# Patient Record
Sex: MALE | Race: WHITE | NOT HISPANIC OR LATINO | Employment: OTHER | URBAN - METROPOLITAN AREA
[De-identification: names, ages, dates, MRNs, and addresses within clinical notes are randomized per-mention and may not be internally consistent; named-entity substitution may affect disease eponyms.]

---

## 2017-01-13 ENCOUNTER — TELEPHONE (OUTPATIENT)
Dept: TRANSPLANT | Facility: CLINIC | Age: 57
End: 2017-01-13

## 2017-01-13 DIAGNOSIS — G89.29 CHRONIC BACK PAIN, UNSPECIFIED BACK LOCATION, UNSPECIFIED BACK PAIN LATERALITY: Primary | ICD-10-CM

## 2017-01-13 DIAGNOSIS — M54.9 CHRONIC BACK PAIN, UNSPECIFIED BACK LOCATION, UNSPECIFIED BACK PAIN LATERALITY: Primary | ICD-10-CM

## 2017-01-13 NOTE — TELEPHONE ENCOUNTER
----- Message from Radha Bergman sent at 1/12/2017  4:50 PM CST -----  Contact: Dianne DOWELL   Calling to speak with Bina about a getting a pain management doctor. Please call

## 2017-01-13 NOTE — TELEPHONE ENCOUNTER
Informed pt's sister in law will place referral to pain management so they can call and make appointment at the location of their choice.

## 2017-01-19 ENCOUNTER — TELEPHONE (OUTPATIENT)
Dept: TRANSPLANT | Facility: CLINIC | Age: 57
End: 2017-01-19

## 2017-01-19 NOTE — TELEPHONE ENCOUNTER
----- Message from Yeny Kendall sent at 1/18/2017  3:04 PM CST -----  Contact: pt ABRAM  Says that she did receive msg but still wants to talk to you, please call

## 2017-02-15 ENCOUNTER — TELEPHONE (OUTPATIENT)
Dept: TRANSPLANT | Facility: CLINIC | Age: 57
End: 2017-02-15

## 2017-04-04 ENCOUNTER — CLINICAL SUPPORT (OUTPATIENT)
Dept: SMOKING CESSATION | Facility: CLINIC | Age: 57
End: 2017-04-04
Payer: COMMERCIAL

## 2017-04-04 DIAGNOSIS — F17.210 MODERATE CIGARETTE SMOKER (10-19 PER DAY): Primary | ICD-10-CM

## 2017-04-04 PROCEDURE — 99404 PREV MED CNSL INDIV APPRX 60: CPT | Mod: S$GLB,,,

## 2017-04-04 RX ORDER — VENLAFAXINE HYDROCHLORIDE 150 MG/1
75 CAPSULE, EXTENDED RELEASE ORAL DAILY
Status: ON HOLD | COMMUNITY
End: 2018-03-15 | Stop reason: HOSPADM

## 2017-04-04 RX ORDER — GABAPENTIN 400 MG/1
400 CAPSULE ORAL 3 TIMES DAILY
Status: ON HOLD | COMMUNITY
End: 2018-03-15 | Stop reason: HOSPADM

## 2017-04-04 RX ORDER — IBUPROFEN 200 MG
1 TABLET ORAL DAILY
Qty: 14 PATCH | Refills: 0 | Status: SHIPPED | OUTPATIENT
Start: 2017-04-04 | End: 2017-04-20 | Stop reason: SDUPTHER

## 2017-04-11 ENCOUNTER — CLINICAL SUPPORT (OUTPATIENT)
Dept: SMOKING CESSATION | Facility: CLINIC | Age: 57
End: 2017-04-11
Payer: COMMERCIAL

## 2017-04-11 DIAGNOSIS — F17.210 LIGHT CIGARETTE SMOKER (1-9 CIGS/DAY): Primary | ICD-10-CM

## 2017-04-11 PROCEDURE — 90853 GROUP PSYCHOTHERAPY: CPT | Mod: S$GLB,,,

## 2017-04-11 NOTE — Clinical Note
Patient is smoking 7-8 cpd.  Patient set a new goal of 4 cpd.  Patient continue to use nicotine spray 10 mg/ml as needed in place of cigarettes with no negative side effects. The patient denies any abnormal behavioral or mental changes at this time. The patient will continue with group therapy sessions and medication monitoring by CTTS. Prescribed medication management will be by physician.

## 2017-04-15 NOTE — PROGRESS NOTES
Site: [facility]  Date:  4/11/17  Clinical Status of Patient: Outpatient   Length of Service and Code: 60 minutes - 54041   Number in Attendance: 5  Group Activities/Focus of Group:  orientation, client introductions, completion of TCRS (Tobacco Cessation Rating Scale) learned addiction model, cues/triggers, personal reasons for quitting, medications, goals, quit date    Target symptoms:  withdrawal and medication side effects             The following were rated moderate (3) to severe (4) on TCRS:       Moderate 3: Andgry/Irritated/Frustrated/incresed appetite/Explained as withdrawal symptoms     Severe 4:  none  Patient's Response to Intervention: Patient is smoking 7-8 cpd.  Patient set a new goal of 4 cpd.  Patient continue to use nicotine spray 10 mg/ml as needed in place of cigarettes with no negative side effects. The patient denies any abnormal behavioral or mental changes at this time. The patient will continue with group therapy sessions and medication monitoring by CTTS. Prescribed medication management will be by physician.        Progress Toward Goals and Other Mental Status Changes: The patient denies any abnormal behavioral or mental changes at this time.     Interval History:     Diagnosis: Z72.0  Plan: The patient will continue with group therapy sessions and medication regimen prescribed with management by physician or Cessation Clinic Provider. Patient will inform Smoking Clinic Cessation Counselor of symptoms as rated high on TCRS.    Return to Clinic: 1 week

## 2017-04-18 ENCOUNTER — CLINICAL SUPPORT (OUTPATIENT)
Dept: SMOKING CESSATION | Facility: CLINIC | Age: 57
End: 2017-04-18
Payer: COMMERCIAL

## 2017-04-18 DIAGNOSIS — F17.210 HEAVY SMOKER (MORE THAN 20 CIGARETTES PER DAY): Primary | ICD-10-CM

## 2017-04-18 PROCEDURE — 99999 PR PBB SHADOW E&M-EST. PATIENT-LVL I: CPT | Mod: PBBFAC,,,

## 2017-04-18 PROCEDURE — 90853 GROUP PSYCHOTHERAPY: CPT | Mod: S$GLB,,,

## 2017-04-18 NOTE — Clinical Note
: Patient was unsure of how many cigarettes he smoked this week.  Patient has committed to smoking no more than 1 pack of cigarettes this week.  Patient remains on prescribed tobacco cessation medication regimen of 21 mg patch, without any negative side effects at this time. Patient continue to use 10mg/ml nicotine spray with no negative side effects.

## 2017-04-20 RX ORDER — IBUPROFEN 200 MG
1 TABLET ORAL DAILY
Qty: 14 PATCH | Refills: 0 | Status: SHIPPED | OUTPATIENT
Start: 2017-04-20 | End: 2017-05-11 | Stop reason: DRUGHIGH

## 2017-04-20 NOTE — PROGRESS NOTES
Smoking Cessation Group Session #3    Site: [facility]  Date:  4/20/17  Clinical Status of Patient: Outpatient   Length of Service and Code: 60 minutes - 14248   Number in Attendance: 5  Group Activities/Focus of Group:  completion of TCRS (Tobacco Cessation Rating Scale) reviewed strategies, controlling environment, cues, triggers, new goals set. Introduced high risk situations with preparation interventions, caffeine similarities with withdrawal issues of habit and nicotine, Alcohol, Understanding urges, cravings, stress and relaxation. Open discussion with intervention discussion.  Specific session focus:     Target symptoms:  withdrawal and medication side effects             The following were rated moderate (3) to severe (4) on TCRS:       Moderate 3: Desire crave tobacco/Depressed sad mood/anxous nervous/Explained as withdrawal symptoms     Severe 4:   none  Patient's Response to Intervention: Patient was unsure of how many cigarettes he smoked this week.  Patient has committed to smoking no more than 1 pack of cigarettes this week.  Patient remains on prescribed tobacco cessation medication regimen of 21 mg patch, without any negative side effects at this time. Patient continue to use 10mg/ml nicotine spray with no negative side effects.  The patient denies any abnormal behavioral or mental changes at this time. The patient will continue with group therapy sessions and medication monitoring by CTTS. Prescribed medication management will be by physician.       Progress Toward Goals and Other Mental Status Changes: The patient denies any abnormal behavioral or mental changes at this time.     Interval History:     Diagnosis: Z72.0  Plan: The patient will continue with group therapy sessions and medication regimen prescribed with management by physician or by the Cessation Clinic Provider. Patient will inform Smoking Cessation Counselor of symptoms as rated high on TCRS.    Return to Clinic: 1 week    Quit Date:     Planned Quit Date:

## 2017-05-02 ENCOUNTER — CLINICAL SUPPORT (OUTPATIENT)
Dept: SMOKING CESSATION | Facility: CLINIC | Age: 57
End: 2017-05-02
Payer: COMMERCIAL

## 2017-05-02 DIAGNOSIS — F17.210 LIGHT SMOKER: Primary | ICD-10-CM

## 2017-05-02 PROCEDURE — 90853 GROUP PSYCHOTHERAPY: CPT | Mod: S$GLB,,,

## 2017-05-02 NOTE — Clinical Note
Patient is smoking 8cpd.  We reviewed patient's willingness to quit.  Patient continue to use nicotine nasal spray 10mg/ml as needed in place of cigarettes. Patient set a new goal of 5 cpd as he is not ready for complete abstinence at this time.  The patient denies any abnormal behavioral or mental changes at this time. The patient will continue with group therapy sessions and medication monitoring by CTTS. Prescribed medication management will be by physician.

## 2017-05-04 NOTE — PROGRESS NOTES
Smoking Cessation Group Session #5    Site: [facility]  Date:  5/3/2017  Clinical Status of Patient: Outpatient   Length of Service and Code: 60 minutes - 96970   Number in Attendance: 5  Group Activities/Focus of Group:  completion of TCRS (Tobacco Cessation Rating Scale) reviewed strategies, habitual behavior, high risks situations, understanding urges and cravings, stress and relaxation with open discussion and additional interventions, Introduced lapses, relapses, understanding them and analyzing the situation of a lapse, conflict issues that may be linked to a lapse.       Specific session focus: Lapse and Relapse    Target symptoms:  withdrawal and medication side effects             The following were rated moderate (3) to severe (4) on TCRS:       Moderate 3: none     Severe 4:   none  Patient's Response to Intervention: Patient is smoking 8cpd.  We reviewed patient's willingness to quit.  Patient continue to use nicotine nasal spray 10mg/ml as needed in place of cigarettes. Patient set a new goal of 5 cpd as he is not ready for complete abstinence at this time.  The patient denies any abnormal behavioral or mental changes at this time. The patient will continue with group therapy sessions and medication monitoring by CTTS. Prescribed medication management will be by physician.       Progress Toward Goals and Other Mental Status Changes: The patient denies any abnormal behavioral or mental changes at this time.     Interval History:     Diagnosis: Z72.0  Plan: The patient will continue with group therapy sessions and medication regimen prescribed with management by physician or by the Cessation Clinic Provider. Patient will inform Smoking Cessation Counselor of symptoms as rated high on TCRS.    Return to Clinic: 1 week    Quit Date:    Planned Quit Date:

## 2017-05-09 ENCOUNTER — CLINICAL SUPPORT (OUTPATIENT)
Dept: SMOKING CESSATION | Facility: CLINIC | Age: 57
End: 2017-05-09
Payer: COMMERCIAL

## 2017-05-09 DIAGNOSIS — F17.210 NICOTINE DEPENDENCE, CIGARETTES, UNCOMPLICATED: Primary | ICD-10-CM

## 2017-05-09 PROCEDURE — 90853 GROUP PSYCHOTHERAPY: CPT | Mod: S$GLB,,,

## 2017-05-09 NOTE — Clinical Note
[facility]  Patient continue to use nicotine spray 10mg/ml as needed in place of cigarettes.  Patient will begin on 14 mg nicotine patch.  The patient denies any abnormal behavioral or mental changes at this time. The patient will continue with group therapy sessions and medication monitoring by CTTS. Prescribed medication management will be by physician.

## 2017-05-11 RX ORDER — IBUPROFEN 200 MG
1 TABLET ORAL DAILY
Qty: 14 PATCH | Refills: 0 | Status: SHIPPED | OUTPATIENT
Start: 2017-05-11 | End: 2017-07-18 | Stop reason: SDUPTHER

## 2017-05-12 NOTE — PROGRESS NOTES
Smoking Cessation Group Session #6    Site: [facility]  Date:  5/9/17  Clinical Status of Patient: Outpatient   Length of Service and Code: 60 minutes - 21005   Number in Attendance: 5  Group Activities/Focus of Group:  completion of TCRS (Tobacco Cessation Rating Scale) reviewed strategies, cues, triggers, high risk situations, lapses, relapses, diet, exercise, stress, relaxation, sleep, habitual behavior, and life style changes.      Specific session focus: Review    Target symptoms:  withdrawal and medication side effects             The following were rated moderate (3) to severe (4) on TCRS:       Moderate 3: none     Severe 4:   none  Patient's Response to Intervention: Patient is tobacco free as of 5/5/17.  Patient continue to use nicotine spray 10mg/ml as needed in place of cigarettes.  Patient will begin on 14 mg nicotine patch.  The patient denies any abnormal behavioral or mental changes at this time. The patient will continue with group therapy sessions and medication monitoring by CTTS. Prescribed medication management will be by physician.         Progress Toward Goals and Other Mental Status Changes: The patient denies any abnormal behavioral or mental changes at this time.     Interval History:     Diagnosis: Z72.0  Plan: The patient will continue with group therapy sessions and medication regimen prescribed with management by physician or by the Cessation Clinic Provider. Patient will inform Smoking Cessation Counselor of symptoms as rated high on TCRS.    Return to Clinic: 1 week    Quit Date: 5/5/17   Planned Quit Date:

## 2017-05-17 ENCOUNTER — TELEPHONE (OUTPATIENT)
Dept: TRANSPLANT | Facility: CLINIC | Age: 57
End: 2017-05-17

## 2017-05-17 DIAGNOSIS — Z94.4 LIVER REPLACED BY TRANSPLANT: Primary | ICD-10-CM

## 2017-05-22 ENCOUNTER — TELEPHONE (OUTPATIENT)
Dept: SMOKING CESSATION | Facility: CLINIC | Age: 57
End: 2017-05-22

## 2017-05-22 NOTE — TELEPHONE ENCOUNTER
Telephoned patient as per follow up.  I received a text from his sister-in-law on last week with report that he and she were still tobacco free.  I left a message for patient to contact me today for follow up.

## 2017-06-19 ENCOUNTER — OFFICE VISIT (OUTPATIENT)
Dept: TRANSPLANT | Facility: CLINIC | Age: 57
End: 2017-06-19
Payer: MEDICAID

## 2017-06-19 VITALS
BODY MASS INDEX: 24.66 KG/M2 | OXYGEN SATURATION: 97 % | RESPIRATION RATE: 16 BRPM | TEMPERATURE: 99 F | DIASTOLIC BLOOD PRESSURE: 104 MMHG | HEART RATE: 77 BPM | WEIGHT: 176.13 LBS | HEIGHT: 71 IN | SYSTOLIC BLOOD PRESSURE: 155 MMHG

## 2017-06-19 DIAGNOSIS — F10.21 ALCOHOL DEPENDENCE IN REMISSION: ICD-10-CM

## 2017-06-19 DIAGNOSIS — M54.9 CHRONIC BACK PAIN, UNSPECIFIED BACK LOCATION, UNSPECIFIED BACK PAIN LATERALITY: ICD-10-CM

## 2017-06-19 DIAGNOSIS — Z29.89 PROPHYLACTIC IMMUNOTHERAPY: ICD-10-CM

## 2017-06-19 DIAGNOSIS — G89.29 CHRONIC BACK PAIN, UNSPECIFIED BACK LOCATION, UNSPECIFIED BACK PAIN LATERALITY: ICD-10-CM

## 2017-06-19 DIAGNOSIS — Z94.4 STATUS POST LIVER TRANSPLANT: Primary | Chronic | ICD-10-CM

## 2017-06-19 PROCEDURE — 99999 PR PBB SHADOW E&M-EST. PATIENT-LVL IV: CPT | Mod: PBBFAC,,, | Performed by: NURSE PRACTITIONER

## 2017-06-19 PROCEDURE — 99214 OFFICE O/P EST MOD 30 MIN: CPT | Mod: PBBFAC | Performed by: NURSE PRACTITIONER

## 2017-06-19 PROCEDURE — 99213 OFFICE O/P EST LOW 20 MIN: CPT | Mod: S$PBB,,, | Performed by: NURSE PRACTITIONER

## 2017-06-19 RX ORDER — LANOLIN ALCOHOL/MO/W.PET/CERES
400 CREAM (GRAM) TOPICAL DAILY
COMMUNITY

## 2017-06-19 RX ORDER — QUETIAPINE FUMARATE 25 MG/1
25 TABLET, FILM COATED ORAL NIGHTLY PRN
Status: ON HOLD | COMMUNITY
End: 2018-03-15 | Stop reason: HOSPADM

## 2017-06-19 NOTE — Clinical Note
Patient liver is [place] and worried he will not f/u with Derm.  Can assist in getting him an appt  rtc 1 year with Dr. CYR

## 2017-06-19 NOTE — LETTER
June 19, 2017        Maryana Kearns  [address]  [facility] [address]  Phone: [phone]  Fax: [phone]             [facility]  [address]  [facility] [address]  Phone: [phone]   Patient: Dereck Centeno   MR Number: [id number]   YOB: 1960   Date of Visit: 6/19/2017       Dear Dr. Maryana Kearns    Thank you for referring Dereck Centeno to me for evaluation. Attached you will find relevant portions of my assessment and plan of care.    If you have questions, please do not hesitate to call me. I look forward to following Dereck Centeno along with you.    Sincerely,    Babs Goodrich NP    Enclosure    If you would like to receive this communication electronically, please contact [email or web address] or [phone] to request [employer] access.    [employer] is a tool which provides read-only access to select patient information with whom you have a relationship. Its easy to use and provides real time access to review your patients record including encounter summaries, notes, results, and demographic information.    If you feel you have received this communication in error or would no longer like to receive these types of communications, please e-mail [email or web address]

## 2017-06-19 NOTE — PROGRESS NOTES
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    ORGAN: LIVER  Whole or Partial: whole liver  Donor Type:  - brain death  CDC High Risk: no  Donor CMV Status: Negative  Donor HCV Status: Negative  Donor HBcAb: Negative  Biliary Anastomosis: end to end  Arterial Anatomy: standard  IVC reconstruction: end to end ivc  Portal vein status: patent    He has had the following complications since transplant: recurrent hepatitis C. The noted complications is well controlled.    Subjective:     Interval History: Dereck was last seen on 2016. Currently, he is doing adequately. Current complaints include foot and back pain, chronic.    He has good allograft function, maintained on tacrolimus .   His hepatitis C ws treated post transplant w/ harvoni on 10/1/15 until 3/24/2016  - 24 weeks  - SVR 24 on 10/2016    No c/o jaundice, dark urine, abdominal distension, edema.  His only complaints is chronic back pain and foot pain which is r/t bunions.  He sees a specialist who has recommended surgery but has declined at this time.  He is requesting for refill of oxycodone.      Review of Systems   Constitutional: Negative for activity change, appetite change, chills, diaphoresis, fatigue, fever and unexpected weight change.   HENT: Negative for facial swelling.    Respiratory: Negative for cough, chest tightness and shortness of breath.    Cardiovascular: Negative for chest pain, palpitations and leg swelling.   Gastrointestinal: Negative for abdominal distention, abdominal pain, blood in stool, constipation, diarrhea, nausea and vomiting.   Musculoskeletal: Positive for back pain. Negative for neck pain and neck stiffness.   Skin: Negative for color change, rash and wound.   Neurological: Negative for dizziness, tremors, weakness and light-headedness.    Hematological: Negative for adenopathy. Does not bruise/bleed easily.   Psychiatric/Behavioral: Negative for agitation and decreased concentration. The patient is not nervous/anxious.        Objective:     Physical Exam   Constitutional: He is oriented to person, place, and time. He appears well-developed and well-nourished. No distress.   HENT:   Head: Normocephalic and atraumatic.   Eyes: Conjunctivae are normal. Pupils are equal, round, and reactive to light. No scleral icterus.   Neck: Normal range of motion. Neck supple.   Cardiovascular: Normal rate.    Pulmonary/Chest: Effort normal.   Abdominal: Soft. He exhibits no distension and no mass. There is no tenderness. There is no rebound and no guarding.   Musculoskeletal: Normal range of motion.   Neurological: He is alert and oriented to person, place, and time. No cranial nerve deficit. He exhibits normal muscle tone. Coordination normal.   No asterixis   Skin: Skin is warm and dry. No rash noted. He is not diaphoretic. No erythema.        Surgical incision healed well, no hernia appreciated   Psychiatric: He has a normal mood and affect. His behavior is normal. Judgment and thought content normal.     Lab Results   Component Value Date    BILITOT 1.0 05/16/2017    AST 22 05/16/2017    ALT 22 05/16/2017    ALKPHOS 72 05/16/2017    CREATININE 1.05 05/16/2017    ALBUMIN 4.3 05/16/2017     Lab Results   Component Value Date    WBC 7.61 05/16/2017    HGB 15.7 05/16/2017    HCT 46.3 05/16/2017    HCT 32 (L) 01/12/2015     (L) 05/16/2017     Lab Results   Component Value Date    TACROLIMUS 5.6 05/16/2017       Assessment/Plan:     1. Liver transplant 1/11/2015 for HCV    2. Prophylactic immunotherapy    3. Squamous cell carcinoma    4. Alcohol dependence in remission        S/P liver transplant due to HCV/alcohol : Normal LFTs. Continue monitoring symptoms, labs and drug levels for drug-related toxicity and side effects  -continue with post transplant labs  per protocol  IS: Chronic immunosuppressive medications for rejection prophylaxis at target.   no adjustment needed.   Alcohol abuse: in remission  SCC: reminded of annual f/u with Derm for surveillance  Chronic back pain/foot pain: informed I an unable to treat chronic pain and suggested that he f/u with either Ortho or pain management   Maintenance:  -Instructed to f/u with PCP for regular health maintenance care including cancer screenings and BMD  -Reviewed need to avoid sun exposure with use of sunblock, hats, long sleeves related to increased risk of skin cancers  -Recommend f/u with Dermatology for annual skin checks    RTC 1 year    FRANK Rodriguez Patient Status  Functional Status: 100% - Normal, no complaints, no evidence of disease  Physical Capacity: Limited Mobility

## 2017-06-30 DIAGNOSIS — Z94.4 STATUS POST LIVER TRANSPLANT: Chronic | ICD-10-CM

## 2017-06-30 RX ORDER — TACROLIMUS 1 MG/1
CAPSULE ORAL
Qty: 90 CAPSULE | Refills: 11 | Status: SHIPPED | OUTPATIENT
Start: 2017-06-30 | End: 2018-07-23 | Stop reason: SDUPTHER

## 2017-07-18 ENCOUNTER — CLINICAL SUPPORT (OUTPATIENT)
Dept: SMOKING CESSATION | Facility: CLINIC | Age: 57
End: 2017-07-18
Payer: COMMERCIAL

## 2017-07-18 DIAGNOSIS — F17.210 LIGHT SMOKER: Primary | ICD-10-CM

## 2017-07-18 PROCEDURE — 99404 PREV MED CNSL INDIV APPRX 60: CPT | Mod: S$GLB,,,

## 2017-07-18 PROCEDURE — 99999 PR PBB SHADOW E&M-EST. PATIENT-LVL I: CPT | Mod: PBBFAC,,,

## 2017-07-18 RX ORDER — IBUPROFEN 200 MG
1 TABLET ORAL DAILY
Qty: 14 PATCH | Refills: 0 | Status: SHIPPED | OUTPATIENT
Start: 2017-07-18 | End: 2017-07-31 | Stop reason: SDUPTHER

## 2017-07-31 ENCOUNTER — CLINICAL SUPPORT (OUTPATIENT)
Dept: SMOKING CESSATION | Facility: CLINIC | Age: 57
End: 2017-07-31
Payer: COMMERCIAL

## 2017-07-31 DIAGNOSIS — F17.210 LIGHT SMOKER: Primary | ICD-10-CM

## 2017-07-31 PROCEDURE — 90853 GROUP PSYCHOTHERAPY: CPT | Mod: S$GLB,,,

## 2017-07-31 RX ORDER — IBUPROFEN 200 MG
1 TABLET ORAL DAILY
Qty: 14 PATCH | Refills: 0 | Status: SHIPPED | OUTPATIENT
Start: 2017-07-31 | End: 2017-08-28 | Stop reason: SDUPTHER

## 2017-07-31 NOTE — Clinical Note
Patient smokes 3 cigarettes per week.  Patient reports only smoking when he has the opportunity.  We discussed his willingness to quit.  We reviewed tobacco cessation elimination strategies.  Patient remains on prescribed tobacco cessation medication regimen of 21 mg patch, without any negative side effects at this time.The patient denies any abnormal behavioral or mental changes at this time.

## 2017-07-31 NOTE — PROGRESS NOTES
Site: [facility]  Date:  7/31/2017  Clinical Status of Patient: Outpatient   Length of Service and Code: 60 minutes - 68094   Number in Attendance: 2  Group Activities/Focus of Group:  orientation, client introductions, completion of TCRS (Tobacco Cessation Rating Scale) learned addiction model, cues/triggers, personal reasons for quitting, medications, goals, quit date    Target symptoms:  withdrawal and medication side effects             The following were rated moderate (3) to severe (4) on TCRS:       Moderate 3: Desire Crave tobacco/Explained as withdrawal symptom     Severe 4:   None  Patient's Response to Intervention: Patient smokes 3 cigarettes per week.  Patient reports only smoking when he has the opportunity.  We discussed his willingness to quit.  We reviewed tobacco cessation elimination strategies.  Patient remains on prescribed tobacco cessation medication regimen of 21 mg patch, without any negative side effects at this time.The patient denies any abnormal behavioral or mental changes at this time.       Progress Toward Goals and Other Mental Status Changes: The patient denies any abnormal behavioral or mental changes at this time.     Interval History:     Diagnosis: Z72.0  Plan: The patient will continue with group therapy sessions and medication regimen prescribed with management by physician or Cessation Clinic Provider. Patient will inform Smoking Clinic Cessation Counselor of symptoms as rated high on TCRS.    Return to Clinic: 2 weeks

## 2017-08-16 ENCOUNTER — TELEPHONE (OUTPATIENT)
Dept: TRANSPLANT | Facility: CLINIC | Age: 57
End: 2017-08-16

## 2017-08-28 ENCOUNTER — CLINICAL SUPPORT (OUTPATIENT)
Dept: SMOKING CESSATION | Facility: CLINIC | Age: 57
End: 2017-08-28
Payer: COMMERCIAL

## 2017-08-28 DIAGNOSIS — F17.210 LIGHT SMOKER: Primary | ICD-10-CM

## 2017-08-28 PROCEDURE — 90853 GROUP PSYCHOTHERAPY: CPT | Mod: S$GLB,,,

## 2017-08-28 PROCEDURE — 99999 PR PBB SHADOW E&M-EST. PATIENT-LVL I: CPT | Mod: PBBFAC,,,

## 2017-08-28 RX ORDER — IBUPROFEN 200 MG
1 TABLET ORAL DAILY
Qty: 14 PATCH | Refills: 0 | Status: SHIPPED | OUTPATIENT
Start: 2017-08-28 | End: 2017-09-16 | Stop reason: SDUPTHER

## 2017-08-28 NOTE — Clinical Note
Patient is smoking from 0-2 cpd.  Patient is smoking cigarettes he gets from his neighbor.  We discussed his commitment to quitting tobacco.  We discussed positive thinking and strategies to eliminate tobacco. Patient remains on 21 mg nicotine patch with no negative side effects at this time.  The patient will continue with group therapy sessions and medication monitoring by CTTS. Prescribed medication management will be by physician.

## 2017-08-30 NOTE — PROGRESS NOTES
Smoking Cessation Group Session #2    Site: [facility]  Date:  8/28/17  Clinical Status of Patient: Outpatient   Length of Service and Code: 60 minutes - 26043   Number in Attendance: 2  Group Activities/Focus of Group: completion of TCRS (Tobacco Cessation Rating Scale) reviewed strategies, cues, and triggers. Introduced the negative impact of tobacco on health, the health advantages of discontinuing the use of tobacco, time line improved health changes after a quit, withdrawal issues to expect from nicotine and habit, and ways to achieve the goal of a quit.  Specific session focus: Health Issue/    Target symptoms:  withdrawal and medication side effects             The following were rated moderate (3) to severe (4) on TCRS:       Moderate 3: Angry irritated/Desire Crave tobacco/Increased appetite weigh gain/Depressedsad/Explained as possible withdrawal symptoms     Severe 4:   Anxious/Withdrawal  Patient's Response to Intervention: Patient is smoking from 0-2 cpd.  Patient is smoking cigarettes he gets from his neighbor.  We discussed his commitment to quitting tobacco.  We discussed positive thinking and strategies to eliminate tobacco. Patient remains on 21 mg nicotine patch with no negative side effects at this time.  The patient will continue with group therapy sessions and medication monitoring by CTTS. Prescribed medication management will be by physician.     Progress Toward Goals and Other Mental Status Changes: The patient denies any abnormal behavioral or mental changes at this time.     Interval History:     Diagnosis: Z72.0  Plan: The patient will continue with group therapy sessions and medication regimen prescribed with management by physician or by the Cessation Clinic Provider. Patient will inform Smoking Cessation Counselor of symptoms as rated high on TCRS.    Return to Clinic: 1 week    Quit Date:    Planned Quit Date:

## 2017-09-01 ENCOUNTER — TELEPHONE (OUTPATIENT)
Dept: TRANSPLANT | Facility: CLINIC | Age: 57
End: 2017-09-01

## 2017-09-01 NOTE — TELEPHONE ENCOUNTER
----- Message from Radha Bergman sent at 9/1/2017  9:34 AM CDT -----  Contact: patient   Calling to get a refill on the patient prograf, please call

## 2017-09-13 ENCOUNTER — CLINICAL SUPPORT (OUTPATIENT)
Dept: SMOKING CESSATION | Facility: CLINIC | Age: 57
End: 2017-09-13
Payer: COMMERCIAL

## 2017-09-13 DIAGNOSIS — F17.210 LIGHT SMOKER: Primary | ICD-10-CM

## 2017-09-13 PROCEDURE — 99407 BEHAV CHNG SMOKING > 10 MIN: CPT | Mod: S$GLB,,,

## 2017-09-16 RX ORDER — IBUPROFEN 200 MG
1 TABLET ORAL DAILY
Qty: 14 PATCH | Refills: 0 | Status: SHIPPED | OUTPATIENT
Start: 2017-09-16 | End: 2017-09-29 | Stop reason: SDUPTHER

## 2017-09-26 ENCOUNTER — CLINICAL SUPPORT (OUTPATIENT)
Dept: SMOKING CESSATION | Facility: CLINIC | Age: 57
End: 2017-09-26
Payer: COMMERCIAL

## 2017-09-26 DIAGNOSIS — F17.210 LIGHT SMOKER: Primary | ICD-10-CM

## 2017-09-26 PROCEDURE — 99407 BEHAV CHNG SMOKING > 10 MIN: CPT | Mod: S$GLB,,,

## 2017-09-29 RX ORDER — IBUPROFEN 200 MG
1 TABLET ORAL DAILY
Qty: 14 PATCH | Refills: 0 | Status: SHIPPED | OUTPATIENT
Start: 2017-09-29 | End: 2017-10-24 | Stop reason: SDUPTHER

## 2017-10-21 ENCOUNTER — ANESTHESIA EVENT (OUTPATIENT)
Dept: ENDOSCOPY | Facility: HOSPITAL | Age: 57
End: 2017-10-21
Payer: MEDICAID

## 2017-10-21 ENCOUNTER — ANESTHESIA (OUTPATIENT)
Dept: ENDOSCOPY | Facility: HOSPITAL | Age: 57
End: 2017-10-21
Payer: MEDICAID

## 2017-10-21 ENCOUNTER — HOSPITAL ENCOUNTER (EMERGENCY)
Facility: HOSPITAL | Age: 57
Discharge: HOME | End: 2017-10-21
Attending: EMERGENCY MEDICINE | Admitting: INTERNAL MEDICINE
Payer: MEDICAID

## 2017-10-21 VITALS
TEMPERATURE: 98 F | RESPIRATION RATE: 16 BRPM | DIASTOLIC BLOOD PRESSURE: 88 MMHG | BODY MASS INDEX: 25.2 KG/M2 | OXYGEN SATURATION: 96 % | SYSTOLIC BLOOD PRESSURE: 150 MMHG | HEIGHT: 71 IN | WEIGHT: 180 LBS | HEART RATE: 72 BPM

## 2017-10-21 DIAGNOSIS — T18.108A FOREIGN BODY IN ESOPHAGUS, INITIAL ENCOUNTER: Primary | ICD-10-CM

## 2017-10-21 DIAGNOSIS — R13.14 PHARYNGOESOPHAGEAL DYSPHAGIA: ICD-10-CM

## 2017-10-21 PROCEDURE — 00740 HC ANESTHESIA EA ADD 15 MINS: CPT | Performed by: INTERNAL MEDICINE

## 2017-10-21 PROCEDURE — 00740 HC ANESTHESIA 1ST 15 MINUTES: CPT | Performed by: INTERNAL MEDICINE

## 2017-10-21 PROCEDURE — 27201089 HC SNARE, DISP (ANY): Performed by: INTERNAL MEDICINE

## 2017-10-21 PROCEDURE — 96360 HYDRATION IV INFUSION INIT: CPT | Mod: 59

## 2017-10-21 PROCEDURE — 43247 EGD REMOVE FOREIGN BODY: CPT | Performed by: INTERNAL MEDICINE

## 2017-10-21 PROCEDURE — 00731 ANES UPR GI NDSC PX NOS: CPT | Performed by: INTERNAL MEDICINE

## 2017-10-21 PROCEDURE — 25000003 PHARM REV CODE 250: Performed by: NURSE ANESTHETIST, CERTIFIED REGISTERED

## 2017-10-21 PROCEDURE — 43247 EGD REMOVE FOREIGN BODY: CPT | Mod: ,,, | Performed by: INTERNAL MEDICINE

## 2017-10-21 PROCEDURE — 99284 EMERGENCY DEPT VISIT MOD MDM: CPT | Mod: 25

## 2017-10-21 PROCEDURE — 37000009 HC ANESTHESIA EA ADD 15 MINS: Performed by: INTERNAL MEDICINE

## 2017-10-21 PROCEDURE — 99214 OFFICE O/P EST MOD 30 MIN: CPT | Mod: 25,,, | Performed by: INTERNAL MEDICINE

## 2017-10-21 PROCEDURE — 63600175 PHARM REV CODE 636 W HCPCS: Performed by: NURSE ANESTHETIST, CERTIFIED REGISTERED

## 2017-10-21 PROCEDURE — 37000008 HC ANESTHESIA 1ST 15 MINUTES: Performed by: INTERNAL MEDICINE

## 2017-10-21 PROCEDURE — 25000003 PHARM REV CODE 250: Performed by: EMERGENCY MEDICINE

## 2017-10-21 RX ORDER — SODIUM CHLORIDE 9 MG/ML
INJECTION, SOLUTION INTRAVENOUS CONTINUOUS PRN
Status: DISCONTINUED | OUTPATIENT
Start: 2017-10-21 | End: 2017-10-21

## 2017-10-21 RX ORDER — LIDOCAINE HCL/PF 100 MG/5ML
SYRINGE (ML) INTRAVENOUS
Status: DISCONTINUED | OUTPATIENT
Start: 2017-10-21 | End: 2017-10-21

## 2017-10-21 RX ORDER — SUCCINYLCHOLINE CHLORIDE 20 MG/ML
INJECTION INTRAMUSCULAR; INTRAVENOUS
Status: DISCONTINUED | OUTPATIENT
Start: 2017-10-21 | End: 2017-10-21

## 2017-10-21 RX ORDER — MIDAZOLAM HYDROCHLORIDE 1 MG/ML
INJECTION INTRAMUSCULAR; INTRAVENOUS
Status: DISCONTINUED | OUTPATIENT
Start: 2017-10-21 | End: 2017-10-21

## 2017-10-21 RX ORDER — ONDANSETRON HYDROCHLORIDE 2 MG/ML
INJECTION, SOLUTION INTRAMUSCULAR; INTRAVENOUS
Status: DISCONTINUED | OUTPATIENT
Start: 2017-10-21 | End: 2017-10-21

## 2017-10-21 RX ORDER — PROPOFOL 10 MG/ML
VIAL (ML) INTRAVENOUS
Status: DISCONTINUED | OUTPATIENT
Start: 2017-10-21 | End: 2017-10-21

## 2017-10-21 RX ORDER — SODIUM CHLORIDE 9 MG/ML
1000 INJECTION, SOLUTION INTRAVENOUS
Status: COMPLETED | OUTPATIENT
Start: 2017-10-21 | End: 2017-10-21

## 2017-10-21 RX ORDER — FENTANYL CITRATE 50 UG/ML
INJECTION, SOLUTION INTRAMUSCULAR; INTRAVENOUS
Status: DISCONTINUED | OUTPATIENT
Start: 2017-10-21 | End: 2017-10-21

## 2017-10-21 RX ADMIN — SUCCINYLCHOLINE CHLORIDE 120 MG: 20 INJECTION, SOLUTION INTRAMUSCULAR; INTRAVENOUS at 03:10

## 2017-10-21 RX ADMIN — FENTANYL CITRATE 100 MCG: 50 INJECTION, SOLUTION INTRAMUSCULAR; INTRAVENOUS at 03:10

## 2017-10-21 RX ADMIN — ONDANSETRON 8 MG: 2 INJECTION, SOLUTION INTRAMUSCULAR; INTRAVENOUS at 04:10

## 2017-10-21 RX ADMIN — PROPOFOL 200 MG: 10 INJECTION, EMULSION INTRAVENOUS at 03:10

## 2017-10-21 RX ADMIN — LIDOCAINE HYDROCHLORIDE 80 MG: 20 INJECTION, SOLUTION INTRAVENOUS at 03:10

## 2017-10-21 RX ADMIN — SODIUM CHLORIDE 1000 ML: 0.9 INJECTION, SOLUTION INTRAVENOUS at 02:10

## 2017-10-21 RX ADMIN — SODIUM CHLORIDE: 0.9 INJECTION, SOLUTION INTRAVENOUS at 03:10

## 2017-10-21 RX ADMIN — MIDAZOLAM HYDROCHLORIDE 2 MG: 1 INJECTION, SOLUTION INTRAMUSCULAR; INTRAVENOUS at 03:10

## 2017-10-21 NOTE — ANESTHESIA POSTPROCEDURE EVALUATION
"Anesthesia Post Evaluation    Patient: Dereck Centeno    Procedure(s) Performed: Procedure(s) (LRB):  REMOVAL-FOREIGN BODY (N/A)    Final Anesthesia Type: general  Patient location during evaluation: PACU  Patient participation: Yes- Able to Participate  Level of consciousness: awake and alert  Post-procedure vital signs: reviewed and stable  Pain management: adequate  Airway patency: patent  PONV status at discharge: No PONV  Anesthetic complications: no      Cardiovascular status: blood pressure returned to baseline  Respiratory status: unassisted  Hydration status: euvolemic  Follow-up not needed.        Visit Vitals  BP (!) 150/88   Pulse 72   Temp 36.8 °C (98.2 °F)   Resp 16   Ht 5' 11" (1.803 m)   Wt 81.6 kg (180 lb)   SpO2 96%   BMI 25.10 kg/m²       Pain/Huber Score: Pain Assessment Performed: Yes (10/21/2017  5:24 AM)  Presence of Pain: denies (10/21/2017  5:24 AM)  Huber Score: 10 (10/21/2017  5:24 AM)      "

## 2017-10-21 NOTE — ED PROVIDER NOTES
Encounter Date: 10/21/2017    SCRIBE #1 NOTE: I, Henok Luis, am scribing for, and in the presence of,  Omer Edgar MD. I have scribed the entire note.       History     Chief Complaint   Patient presents with    Foreign Body In Throat     Patient presents to the ED for food bolus in the throat. Was seen at [facility] and sent to [facility] for gastro Dr. Duncan for endoscopy procedure.      Time patient was seen by the provider: 2:16 AM      The patient is a 56 y.o. male with hx of: Alcoholic Cirrhosis, depression, HTN, throat cancer that presents to the ED with a complaint of FB stuck in throat starting around 2000. He states that he was eating ribs and he believes a piece of that is the cause of this. Pt reports he is unable to tolerate secretions. He denies abdominal pain, or other complaints at this time. The patient sees Dr. Duncan in GI. The patient presents in transfer from [facility] with Dr. Duncan consulted, he will come in and take the patient to GI lab.        The history is provided by the patient.     Review of patient's allergies indicates:  No Known Allergies  Past Medical History:   Diagnosis Date    Alcohol withdrawal seizure     Alcoholic cirrhosis     Anxiety     Depression     Hepatitis C     History of throat cancer     HTN (hypertension), benign     Tobacco use      Past Surgical History:   Procedure Laterality Date    ESOPHAGEAL VARICE LIGATION      LIVER TRANSPLANT      TIPS PROCEDURE       Family History   Problem Relation Age of Onset    Alcohol abuse Mother     Alcohol abuse Father      Social History   Substance Use Topics    Smoking status: Former Smoker     Packs/day: 0.50     Types: Cigarettes     Quit date: 5/5/2017    Smokeless tobacco: Former User     Types: Chew    Alcohol use No      Comment: former heavy alcohol user, quit 1 year ago     Review of Systems   Constitutional: Negative for fever.   HENT: Negative for sore throat.          Throat pain, sensation of FB   Respiratory: Negative for shortness of breath.    Cardiovascular: Negative for chest pain.   Gastrointestinal: Positive for nausea and vomiting.   Genitourinary: Negative for dysuria.   Musculoskeletal: Negative for back pain.   Skin: Negative for rash.   Neurological: Negative for weakness.   Hematological: Does not bruise/bleed easily.       Physical Exam     Initial Vitals [10/21/17 0213]   BP Pulse Resp Temp SpO2   (!) 192/94 61 16 98.3 °F (36.8 °C) 98 %      MAP       126.67         Physical Exam    Nursing note and vitals reviewed.  Constitutional: He appears well-developed and well-nourished.   HENT:   Head: Normocephalic and atraumatic.   Eyes: Conjunctivae are normal.   Neck: Neck supple.   Cardiovascular: Normal rate, regular rhythm, normal heart sounds and intact distal pulses. Exam reveals no gallop and no friction rub.    No murmur heard.  Pulmonary/Chest: Breath sounds normal. He has no wheezes. He has no rhonchi. He has no rales.   Abdominal: Soft. He exhibits no distension. There is no tenderness.   Musculoskeletal: Normal range of motion.   Neurological: He is alert and oriented to person, place, and time.   Skin: No rash noted. No erythema.   Psychiatric: He has a normal mood and affect.         ED Course   Procedures  Labs Reviewed - No data to display          Medical Decision Making:   ED Management:  3:12 AM Dr. Duncan is inbound to the Ed, the GI team has been notified.   Patient taken up to the endoscopy lab by Dr. Duncan.                   ED Course      Clinical Impression:     1. Foreign body in esophagus, initial encounter    2. Pharyngoesophageal dysphagia                                 Omer Goodrich MD  10/21/17 0655

## 2017-10-21 NOTE — ED TRIAGE NOTES
"Patient presents to the ED for food bolus in the throat. Was seen at [facility] and sent to [facility]wa for gastro Dr. Duncan for endoscopy procedure. Patient reports having been eating dinner that had "ribs" when the patient felt nauseated and vomited. Patient states having vomited the ribs and that a piece became stuck afterwards. Patient reports being unable to tolerate PO fluids. Denies any nausea or vomiting at this time. Respirations are even and non labored.     Review of patient's allergies indicates:  No Known Allergies     Patient has verified the spelling of their name and  on armband.   APPEARANCE: Patient is alert, calm, oriented x 4, and does not appear distressed.  SKIN: Skin is normal for race, warm, and dry. Normal skin turgor and mucous membranes moist.  CARDIAC: Normal rate and no murmur heard.   RESPIRATORY:Normal rate and effort. Breath sounds clear bilaterally throughout chest. Respirations are equal and unlabored.    GASTRO: Bowel sounds normal, abdomen is soft, no tenderness, and no abdominal distention. Nausea/Vomiting (resolved at this time)  MUSCLE: Full ROM. No bony tenderness or soft tissue tenderness. No obvious deformity.  PERIPHERAL VASCULAR: peripheral pulses present. Normal cap refill. No edema. Warm to touch.  ENT: EARS: no obvious drainage. NOSE: no active bleeding. THROAT: no redness or swelling. +foreign body in the throat.   "

## 2017-10-21 NOTE — ANESTHESIA PREPROCEDURE EVALUATION
10/21/2017  Dereck Centeno is a 56 y.o., male.    Anesthesia Evaluation      I have reviewed the Medications.     Review of Systems  Anesthesia Hx:  No problems with previous Anesthesia Denies Hx of Anesthetic complications History of prior surgery of interest to airway management or planning: Previous anesthesia: General Denies Family Hx of Anesthesia complications.   Denies Personal Hx of Anesthesia complications.   Social:  No Alcohol Use, Smoker    Hematology/Oncology:  Hematology Normal       -- Cancer in past history:  Oncology Comments: Oropharygeal CA     EENT/Dental:EENT/Dental Normal   Cardiovascular:   Exercise tolerance: good Hypertension    Pulmonary:  Pulmonary Normal    Renal/:  Renal/ Normal     Hepatic/GI:   Liver Disease, Hepatitis    Musculoskeletal:  Musculoskeletal Normal    Neurological:  Neurology Normal    Endocrine:  Endocrine Normal    Dermatological:  Skin Normal    Psych:   Psychiatric History          Physical Exam  General:  Well nourished    Airway/Jaw/Neck:  Airway Findings: Mouth Opening: Normal Tongue: Normal  General Airway Assessment: Adult  Mallampati: II      Dental:  Dental Findings: In tact, Upper Dentures   Chest/Lungs:  Chest/Lungs Findings: Clear to auscultation, Normal Respiratory Rate     Heart/Vascular:  Heart Findings: Rate: Normal  Rhythm: Regular Rhythm        Mental Status:  Mental Status Findings:  Cooperative, Alert and Oriented         Anesthesia Plan  Type of Anesthesia, risks & benefits discussed:  Anesthesia Type:  general  Patient's Preference: general  Intra-op Monitoring Plan:   Intra-op Monitoring Plan Comments:   Post Op Pain Control Plan:   Post Op Pain Control Plan Comments:   Induction:   IV  Beta Blocker:         Informed Consent: Patient understands risks and agrees with Anesthesia plan.  Questions answered. Anesthesia consent signed with  patient.  ASA Score: 2     Day of Surgery Review of History & Physical:            Ready For Surgery From Anesthesia Perspective.

## 2017-10-21 NOTE — DISCHARGE INSTRUCTIONS
Post EGD Discharge Instruction    Dereck S Jacobo  10/21/2017  No att. providers found    RESTRICTIONS ON ACTIVITY:    -DO NOT drive a car, operate machinery or make critical decisions, or do activities that require coordination or balance for 24 hours.  -Following Day: Return to full activities including work.  -Diet: Eat and drink normally unless instructed otherwise.    TREATMENT FOR COMMON SIDE EFFECTS:  *Sore Throat - treat with throat lozenges, gargle with warm salt water.  *Mild abdominal pain & bloating- rest and take liquids only.    SYMPTOMS TO WATCH FOR AND REPORT TO YOUR PHYSICIAN:  1. Chills or fever occurring 24 hours after procedure.  2. Pain in chest.  3. SEVERE abdominal pain or bloating.  4. Rectal bleeding which could be maroon or black.    If you have any questions or problems, please call your Physician:    No att. providers found Phone:     Lab Results: [phone]    If a complication or emergency situation arises and you are unable to reach your Physician - GO TO THE EMERGENCY ROOM.

## 2017-10-21 NOTE — PLAN OF CARE
Discharge instructions given and reviewed with patient, VS stable, wheeled down in the lobby with family memebers, care transferred to family

## 2017-10-21 NOTE — TRANSFER OF CARE
"Anesthesia Transfer of Care Note    Patient: Dereck Centeno    Procedure(s) Performed: Procedure(s) (LRB):  REMOVAL-FOREIGN BODY (N/A)    Patient location: PACU    Anesthesia Type: general    Transport from OR: Transported from OR on 100% O2 by closed face mask with adequate spontaneous ventilation    Post pain: adequate analgesia    Post assessment: no apparent anesthetic complications    Post vital signs: stable    Level of consciousness: sedated    Nausea/Vomiting: no nausea/vomiting    Complications: none    Transfer of care protocol was followed      Last vitals:   Visit Vitals  BP (!) 160/78 (BP Location: Left arm, Patient Position: Sitting)   Pulse (!) 56   Temp 36.8 °C (98.3 °F) (Oral)   Resp 16   Ht 5' 11" (1.803 m)   Wt 81.6 kg (180 lb)   SpO2 99%   BMI 25.10 kg/m²     "

## 2017-10-21 NOTE — ED NOTES
Pt placed in gown, undressed from the waist up, all jewelry removed and placed in cup with lid. Asked pt if he had any belongings he would like locked up with security, like his jewelry or wallet, pt denied. Pt had 2 silver rings, 2 silver chains, and one silver earring.

## 2017-10-21 NOTE — PLAN OF CARE
Patient aaox3. Vss. C/o slight irritation to his throat, coke provided to him per request. Dr. Austyn chavez to release patient from PACU. Endoscopy nurse Yoly STANTON at bedside with patient.

## 2017-10-24 ENCOUNTER — CLINICAL SUPPORT (OUTPATIENT)
Dept: SMOKING CESSATION | Facility: CLINIC | Age: 57
End: 2017-10-24
Payer: COMMERCIAL

## 2017-10-24 DIAGNOSIS — F17.210 LIGHT SMOKER: Primary | ICD-10-CM

## 2017-10-24 PROCEDURE — 99999 PR PBB SHADOW E&M-EST. PATIENT-LVL I: CPT | Mod: PBBFAC,,,

## 2017-10-24 PROCEDURE — 90853 GROUP PSYCHOTHERAPY: CPT | Mod: S$GLB,,,

## 2017-10-24 RX ORDER — IBUPROFEN 200 MG
1 TABLET ORAL DAILY
Qty: 14 PATCH | Refills: 0 | Status: ON HOLD | OUTPATIENT
Start: 2017-10-24 | End: 2018-03-15 | Stop reason: HOSPADM

## 2017-10-24 NOTE — Clinical Note
Patient is smoking 1-2 cigarettes per week.  Patient states he will continue to attempt complete tobacco cessation.  Patient remains on prescribed tobacco cessation medication regimen of 21 mg patch, without any negative side effects at this time. Patient understands that we will begin weaning to 14 mg nicotine patch next visit.  We reviewed willingness and commitment to quit.  The patient denies any abnormal behavioral or mental changes at this time.  Patient will follow up in two weeks.

## 2017-10-26 NOTE — PROGRESS NOTES
Smoking Cessation Group Session #8    Site: [facility]  Date:  10/24/17  Clinical Status of Patient: Outpatient   Length of Service and Code: 60 minutes - [id number]   Number in Attendance: 2  Group Activities/Focus of Group:  completion of TCRS (Tobacco Cessation Rating Scale) reviewed strategies, cues, triggers, high risk situations, lapses, relapses, diet, exercise, stress, relaxation, sleep, habitual behavior, and life style changes.  Specific session focus:     Target symptoms:  withdrawal and medication side effects             The following were rated moderate (3) to severe (4) on TCRS:       Moderate 3: none     Severe 4:   none  Patient's Response to Intervention: Patient is smoking 1-2 cigarettes per week.  Patient states he will continue to attempt complete tobacco cessation.  Patient remains on prescribed tobacco cessation medication regimen of 21 mg patch, without any negative side effects at this time. Patient understands that we will begin weaning to 14 mg nicotine patch next visit.  We reviewed willingness and commitment to quit.  The patient denies any abnormal behavioral or mental changes at this time.  Patient will follow up in two weeks.     Progress Toward Goals and Other Mental Status Changes: The patient denies any abnormal behavioral or mental changes at this time.     Interval History:     Diagnosis: Z72.0  Plan: The patient will continue with group therapy sessions and medication regimen prescribed with management by physician or by the Cessation Clinic Provider. Patient will inform Smoking Cessation Counselor of symptoms as rated high on TCRS.    Return to Clinic: 1 week    Quit Date:    Planned Quit Date:

## 2017-11-29 ENCOUNTER — TELEPHONE (OUTPATIENT)
Dept: TRANSPLANT | Facility: CLINIC | Age: 57
End: 2017-11-29

## 2017-12-01 NOTE — H&P
Consult Note  Gastroenterology      SUBJECTIVE:     History of Present Illness:  The patient is a 56 y.o. male with hx of: Alcoholic Cirrhosis, depression, HTN, throat cancer that presents to the ED with a complaint of FB stuck in throat starting around 2000. He states that he was eating ribs and he believes a piece of that is the cause of this. Pt reports he is unable to tolerate secretions. He denies abdominal pain, or other complaints at this time.  The patient presents in transfer from [facility]       Review of patient's allergies indicates:  No Known Allergies    Past Medical History:   Diagnosis Date    Alcohol withdrawal seizure     Alcoholic cirrhosis     Anxiety     Depression     Hepatitis C     History of throat cancer     HTN (hypertension), benign     Tobacco use      Past Surgical History:   Procedure Laterality Date    ESOPHAGEAL VARICE LIGATION      LIVER TRANSPLANT      TIPS PROCEDURE       Family History   Problem Relation Age of Onset    Alcohol abuse Mother     Alcohol abuse Father      Social History   Substance Use Topics    Smoking status: Former Smoker     Packs/day: 0.50     Types: Cigarettes     Quit date: 5/5/2017    Smokeless tobacco: Former User     Types: Chew    Alcohol use No      Comment: former heavy alcohol user, quit 1 year ago     .     OBJECTIVE:     Vital Signs (Most Recent)  Temp: 98.2 °F (36.8 °C) (10/21/17 0455)  Pulse: 72 (10/21/17 0524)  Resp: 16 (10/21/17 0524)  BP: (!) 150/88 (10/21/17 0524)  SpO2: 96 % (10/21/17 0524)    Temperature Range Min/Max (Last 24H):       Physical Exam:  Eyes: Pupils are equal, round, and reactive to light.   Neck: Supple. No mass  Cardiovascular: Regular rhythm . No murmur   Pulmonary/Chest: Lungs clear   Abdominal: Soft. No mass palpated. Nontender, no guarding. Positive bowel sounds   Musculoskeletal: No deformity. No edema.   Psychiatric: Alert and oriented    Laboratory:  Lab Results   Component  Value Date     10/21/2017    K 3.9 10/21/2017    CL 99 10/21/2017    CO2 31 (H) 10/21/2017    BUN 8 10/21/2017    CREATININE 1.05 10/21/2017    GLU 99 10/21/2017    HGBA1C 4.0 (L) 01/10/2015    MG 1.8 04/26/2016    AST 33 10/21/2017    ALT 36 10/21/2017    ALBUMIN 5.1 10/21/2017    PROT 8.2 10/21/2017    BILITOT 1.2 (H) 10/21/2017    WBC 10.80 10/21/2017    HGB 16.9 10/21/2017    HCT 48.9 10/21/2017    HCT 32 (L) 01/12/2015    MCV 90 10/21/2017     (L) 10/21/2017    INR 1.1 10/21/2017    TSH 4.722 (H) 01/05/2015    CHOL 140 04/26/2016    HDL 47 04/26/2016    LDLCALC 79 04/26/2016    TRIG 72 04/26/2016         ASSESSMENT/PLAN:     Imp  Esophageal food bolus impaction    Plan   EGD    Addendum  EGD performed and food bolus removed  No tight stenosis noted    Discharged on a soft diet to follow up with GI    Yunier

## 2017-12-11 ENCOUNTER — TELEPHONE (OUTPATIENT)
Dept: TRANSPLANT | Facility: CLINIC | Age: 57
End: 2017-12-11

## 2017-12-11 NOTE — TELEPHONE ENCOUNTER
----- Message from Mathew Finney MD sent at 12/11/2017 10:10 AM CST -----  Results reviewed  ?post dose level

## 2017-12-18 ENCOUNTER — TELEPHONE (OUTPATIENT)
Dept: TRANSPLANT | Facility: CLINIC | Age: 57
End: 2017-12-18

## 2018-02-14 ENCOUNTER — TELEPHONE (OUTPATIENT)
Dept: TRANSPLANT | Facility: CLINIC | Age: 58
End: 2018-02-14

## 2018-03-13 PROBLEM — T50.902A INTENTIONAL OVERDOSE OF DRUG IN TABLET FORM: Status: ACTIVE | Noted: 2018-03-13

## 2018-03-14 ENCOUNTER — HOSPITAL ENCOUNTER (INPATIENT)
Facility: HOSPITAL | Age: 58
LOS: 1 days | Discharge: PSYCHIATRIC OR SUBSTANCE USE FACILITY | DRG: 918 | End: 2018-03-15
Attending: HOSPITALIST | Admitting: HOSPITALIST
Payer: MEDICAID

## 2018-03-14 DIAGNOSIS — Z94.4 STATUS POST LIVER TRANSPLANT: Chronic | ICD-10-CM

## 2018-03-14 DIAGNOSIS — F10.20 ALCOHOL USE DISORDER, SEVERE, DEPENDENCE: ICD-10-CM

## 2018-03-14 DIAGNOSIS — T50.902A INTENTIONAL OVERDOSE OF DRUG IN TABLET FORM: ICD-10-CM

## 2018-03-14 DIAGNOSIS — F32.A DEPRESSION, UNSPECIFIED DEPRESSION TYPE: ICD-10-CM

## 2018-03-14 DIAGNOSIS — R41.0 DELIRIUM: ICD-10-CM

## 2018-03-14 PROBLEM — I16.0 HYPERTENSIVE URGENCY: Status: ACTIVE | Noted: 2018-03-14

## 2018-03-14 PROBLEM — R74.01 TRANSAMINITIS: Status: ACTIVE | Noted: 2018-03-14

## 2018-03-14 LAB
ALBUMIN SERPL BCP-MCNC: 4.3 G/DL
ALP SERPL-CCNC: 101 U/L
ALT SERPL W/O P-5'-P-CCNC: 63 U/L
ANION GAP SERPL CALC-SCNC: 8 MMOL/L
AST SERPL-CCNC: 66 U/L
BASOPHILS # BLD AUTO: 0.06 K/UL
BASOPHILS NFR BLD: 0.6 %
BILIRUB SERPL-MCNC: 0.8 MG/DL
BUN SERPL-MCNC: 6 MG/DL
CALCIUM SERPL-MCNC: 9.6 MG/DL
CHLORIDE SERPL-SCNC: 104 MMOL/L
CO2 SERPL-SCNC: 28 MMOL/L
CREAT SERPL-MCNC: 1 MG/DL
DIFFERENTIAL METHOD: ABNORMAL
EOSINOPHIL # BLD AUTO: 0.4 K/UL
EOSINOPHIL NFR BLD: 3.6 %
ERYTHROCYTE [DISTWIDTH] IN BLOOD BY AUTOMATED COUNT: 12.9 %
EST. GFR  (AFRICAN AMERICAN): >60 ML/MIN/1.73 M^2
EST. GFR  (NON AFRICAN AMERICAN): >60 ML/MIN/1.73 M^2
ESTIMATED AVG GLUCOSE: 94 MG/DL
GLUCOSE SERPL-MCNC: 152 MG/DL
HAV IGM SERPL QL IA: NEGATIVE
HBA1C MFR BLD HPLC: 4.9 %
HBV CORE IGM SERPL QL IA: NEGATIVE
HBV SURFACE AG SERPL QL IA: NEGATIVE
HCT VFR BLD AUTO: 47.7 %
HCV AB SERPL QL IA: POSITIVE
HGB BLD-MCNC: 16 G/DL
IMM GRANULOCYTES # BLD AUTO: 0.05 K/UL
IMM GRANULOCYTES NFR BLD AUTO: 0.5 %
LYMPHOCYTES # BLD AUTO: 2.1 K/UL
LYMPHOCYTES NFR BLD: 19.9 %
MAGNESIUM SERPL-MCNC: 1.6 MG/DL
MCH RBC QN AUTO: 30.1 PG
MCHC RBC AUTO-ENTMCNC: 33.5 G/DL
MCV RBC AUTO: 90 FL
MONOCYTES # BLD AUTO: 0.7 K/UL
MONOCYTES NFR BLD: 6.9 %
NEUTROPHILS # BLD AUTO: 7.2 K/UL
NEUTROPHILS NFR BLD: 68.5 %
NRBC BLD-RTO: 0 /100 WBC
PHOSPHATE SERPL-MCNC: 4.3 MG/DL
PLATELET # BLD AUTO: 210 K/UL
PMV BLD AUTO: 10.8 FL
POTASSIUM SERPL-SCNC: 4.4 MMOL/L
PROT SERPL-MCNC: 7.1 G/DL
RBC # BLD AUTO: 5.31 M/UL
SODIUM SERPL-SCNC: 140 MMOL/L
TACROLIMUS BLD-MCNC: 7 NG/ML
WBC # BLD AUTO: 10.53 K/UL

## 2018-03-14 PROCEDURE — 80053 COMPREHEN METABOLIC PANEL: CPT

## 2018-03-14 PROCEDURE — 99223 1ST HOSP IP/OBS HIGH 75: CPT | Mod: ,,, | Performed by: PHYSICIAN ASSISTANT

## 2018-03-14 PROCEDURE — 99232 SBSQ HOSP IP/OBS MODERATE 35: CPT | Mod: ,,, | Performed by: INTERNAL MEDICINE

## 2018-03-14 PROCEDURE — 25000003 PHARM REV CODE 250: Performed by: HOSPITALIST

## 2018-03-14 PROCEDURE — 20600001 HC STEP DOWN PRIVATE ROOM

## 2018-03-14 PROCEDURE — 80074 ACUTE HEPATITIS PANEL: CPT

## 2018-03-14 PROCEDURE — 83036 HEMOGLOBIN GLYCOSYLATED A1C: CPT

## 2018-03-14 PROCEDURE — 25000003 PHARM REV CODE 250: Performed by: PHYSICIAN ASSISTANT

## 2018-03-14 PROCEDURE — 36415 COLL VENOUS BLD VENIPUNCTURE: CPT

## 2018-03-14 PROCEDURE — 85025 COMPLETE CBC W/AUTO DIFF WBC: CPT

## 2018-03-14 PROCEDURE — 83735 ASSAY OF MAGNESIUM: CPT

## 2018-03-14 PROCEDURE — 90792 PSYCH DIAG EVAL W/MED SRVCS: CPT | Mod: AF,HB,, | Performed by: PSYCHIATRY & NEUROLOGY

## 2018-03-14 PROCEDURE — 63600175 PHARM REV CODE 636 W HCPCS: Performed by: PHYSICIAN ASSISTANT

## 2018-03-14 PROCEDURE — 84100 ASSAY OF PHOSPHORUS: CPT

## 2018-03-14 PROCEDURE — 80197 ASSAY OF TACROLIMUS: CPT

## 2018-03-14 RX ORDER — VENLAFAXINE HYDROCHLORIDE 75 MG/1
75 CAPSULE, EXTENDED RELEASE ORAL DAILY
Status: DISCONTINUED | OUTPATIENT
Start: 2018-03-14 | End: 2018-03-14

## 2018-03-14 RX ORDER — FAMOTIDINE 20 MG/1
20 TABLET, FILM COATED ORAL NIGHTLY
Status: DISCONTINUED | OUTPATIENT
Start: 2018-03-14 | End: 2018-03-15 | Stop reason: HOSPADM

## 2018-03-14 RX ORDER — IBUPROFEN 200 MG
16 TABLET ORAL
Status: DISCONTINUED | OUTPATIENT
Start: 2018-03-14 | End: 2018-03-15 | Stop reason: HOSPADM

## 2018-03-14 RX ORDER — IBUPROFEN 200 MG
24 TABLET ORAL
Status: DISCONTINUED | OUTPATIENT
Start: 2018-03-14 | End: 2018-03-15 | Stop reason: HOSPADM

## 2018-03-14 RX ORDER — ASPIRIN 81 MG/1
81 TABLET ORAL DAILY
Status: DISCONTINUED | OUTPATIENT
Start: 2018-03-14 | End: 2018-03-15 | Stop reason: HOSPADM

## 2018-03-14 RX ORDER — LANOLIN ALCOHOL/MO/W.PET/CERES
800 CREAM (GRAM) TOPICAL 2 TIMES DAILY
Status: DISCONTINUED | OUTPATIENT
Start: 2018-03-14 | End: 2018-03-15 | Stop reason: HOSPADM

## 2018-03-14 RX ORDER — HYDRALAZINE HYDROCHLORIDE 50 MG/1
50 TABLET, FILM COATED ORAL EVERY 6 HOURS PRN
Status: DISCONTINUED | OUTPATIENT
Start: 2018-03-14 | End: 2018-03-14

## 2018-03-14 RX ORDER — POLYETHYLENE GLYCOL 3350 17 G/17G
17 POWDER, FOR SOLUTION ORAL DAILY PRN
Status: DISCONTINUED | OUTPATIENT
Start: 2018-03-14 | End: 2018-03-15 | Stop reason: HOSPADM

## 2018-03-14 RX ORDER — AMOXICILLIN 250 MG
1 CAPSULE ORAL 2 TIMES DAILY
Status: DISCONTINUED | OUTPATIENT
Start: 2018-03-14 | End: 2018-03-15 | Stop reason: HOSPADM

## 2018-03-14 RX ORDER — SODIUM CHLORIDE 0.9 % (FLUSH) 0.9 %
5 SYRINGE (ML) INJECTION
Status: DISCONTINUED | OUTPATIENT
Start: 2018-03-14 | End: 2018-03-15 | Stop reason: HOSPADM

## 2018-03-14 RX ORDER — TACROLIMUS 1 MG/1
1 CAPSULE ORAL EVERY EVENING
Status: DISCONTINUED | OUTPATIENT
Start: 2018-03-14 | End: 2018-03-15 | Stop reason: HOSPADM

## 2018-03-14 RX ORDER — LANOLIN ALCOHOL/MO/W.PET/CERES
400 CREAM (GRAM) TOPICAL DAILY
Status: DISCONTINUED | OUTPATIENT
Start: 2018-03-14 | End: 2018-03-14

## 2018-03-14 RX ORDER — CLONIDINE HYDROCHLORIDE 0.1 MG/1
0.2 TABLET ORAL 2 TIMES DAILY
Status: DISCONTINUED | OUTPATIENT
Start: 2018-03-14 | End: 2018-03-15 | Stop reason: HOSPADM

## 2018-03-14 RX ORDER — ONDANSETRON 8 MG/1
8 TABLET, ORALLY DISINTEGRATING ORAL EVERY 8 HOURS PRN
Status: DISCONTINUED | OUTPATIENT
Start: 2018-03-14 | End: 2018-03-15 | Stop reason: HOSPADM

## 2018-03-14 RX ORDER — HYDRALAZINE HYDROCHLORIDE 20 MG/ML
10 INJECTION INTRAMUSCULAR; INTRAVENOUS EVERY 6 HOURS PRN
Status: DISCONTINUED | OUTPATIENT
Start: 2018-03-14 | End: 2018-03-15 | Stop reason: HOSPADM

## 2018-03-14 RX ORDER — IPRATROPIUM BROMIDE AND ALBUTEROL SULFATE 2.5; .5 MG/3ML; MG/3ML
3 SOLUTION RESPIRATORY (INHALATION) EVERY 4 HOURS PRN
Status: DISCONTINUED | OUTPATIENT
Start: 2018-03-14 | End: 2018-03-15 | Stop reason: HOSPADM

## 2018-03-14 RX ORDER — GABAPENTIN 400 MG/1
400 CAPSULE ORAL 3 TIMES DAILY
Status: DISCONTINUED | OUTPATIENT
Start: 2018-03-14 | End: 2018-03-14

## 2018-03-14 RX ORDER — CHOLECALCIFEROL (VITAMIN D3) 25 MCG
2000 TABLET ORAL DAILY
Status: DISCONTINUED | OUTPATIENT
Start: 2018-03-14 | End: 2018-03-15 | Stop reason: HOSPADM

## 2018-03-14 RX ORDER — PAROXETINE 10 MG/1
10 TABLET, FILM COATED ORAL EVERY MORNING
Status: DISCONTINUED | OUTPATIENT
Start: 2018-03-14 | End: 2018-03-14

## 2018-03-14 RX ORDER — THIAMINE HCL 100 MG
100 TABLET ORAL DAILY
Status: DISCONTINUED | OUTPATIENT
Start: 2018-03-14 | End: 2018-03-15 | Stop reason: HOSPADM

## 2018-03-14 RX ORDER — FOLIC ACID 1 MG/1
1 TABLET ORAL DAILY
Status: DISCONTINUED | OUTPATIENT
Start: 2018-03-14 | End: 2018-03-15 | Stop reason: HOSPADM

## 2018-03-14 RX ORDER — RAMELTEON 8 MG/1
8 TABLET ORAL NIGHTLY PRN
Status: DISCONTINUED | OUTPATIENT
Start: 2018-03-14 | End: 2018-03-15 | Stop reason: HOSPADM

## 2018-03-14 RX ORDER — TACROLIMUS 1 MG/1
2 CAPSULE ORAL EVERY MORNING
Status: DISCONTINUED | OUTPATIENT
Start: 2018-03-14 | End: 2018-03-15 | Stop reason: HOSPADM

## 2018-03-14 RX ORDER — GLUCAGON 1 MG
1 KIT INJECTION
Status: DISCONTINUED | OUTPATIENT
Start: 2018-03-14 | End: 2018-03-15 | Stop reason: HOSPADM

## 2018-03-14 RX ORDER — ACETAMINOPHEN 325 MG/1
650 TABLET ORAL EVERY 4 HOURS PRN
Status: DISCONTINUED | OUTPATIENT
Start: 2018-03-14 | End: 2018-03-15 | Stop reason: HOSPADM

## 2018-03-14 RX ORDER — PROMETHAZINE HYDROCHLORIDE 25 MG/1
25 TABLET ORAL EVERY 6 HOURS PRN
Status: DISCONTINUED | OUTPATIENT
Start: 2018-03-14 | End: 2018-03-15 | Stop reason: HOSPADM

## 2018-03-14 RX ORDER — CLORAZEPATE DIPOTASSIUM 3.75 MG/1
3.75 TABLET ORAL 2 TIMES DAILY PRN
Status: DISCONTINUED | OUTPATIENT
Start: 2018-03-14 | End: 2018-03-14

## 2018-03-14 RX ADMIN — FOLIC ACID 1 MG: 1 TABLET ORAL at 09:03

## 2018-03-14 RX ADMIN — FAMOTIDINE 20 MG: 20 TABLET, FILM COATED ORAL at 08:03

## 2018-03-14 RX ADMIN — MAGNESIUM OXIDE TAB 400 MG (241.3 MG ELEMENTAL MG) 800 MG: 400 (241.3 MG) TAB at 09:03

## 2018-03-14 RX ADMIN — RAMELTEON 8 MG: 8 TABLET, FILM COATED ORAL at 11:03

## 2018-03-14 RX ADMIN — MAGNESIUM OXIDE TAB 400 MG (241.3 MG ELEMENTAL MG) 800 MG: 400 (241.3 MG) TAB at 08:03

## 2018-03-14 RX ADMIN — FAMOTIDINE 20 MG: 20 TABLET, FILM COATED ORAL at 03:03

## 2018-03-14 RX ADMIN — TACROLIMUS 1 MG: 1 CAPSULE ORAL at 05:03

## 2018-03-14 RX ADMIN — CLONIDINE HYDROCHLORIDE 0.2 MG: 0.1 TABLET ORAL at 08:03

## 2018-03-14 RX ADMIN — ACETAMINOPHEN 650 MG: 325 TABLET ORAL at 06:03

## 2018-03-14 RX ADMIN — HYDRALAZINE HYDROCHLORIDE 10 MG: 20 INJECTION INTRAMUSCULAR; INTRAVENOUS at 05:03

## 2018-03-14 RX ADMIN — STANDARDIZED SENNA CONCENTRATE AND DOCUSATE SODIUM 1 TABLET: 8.6; 5 TABLET, FILM COATED ORAL at 08:03

## 2018-03-14 RX ADMIN — CLONIDINE HYDROCHLORIDE 0.2 MG: 0.1 TABLET ORAL at 11:03

## 2018-03-14 RX ADMIN — Medication 100 MG: at 09:03

## 2018-03-14 RX ADMIN — STANDARDIZED SENNA CONCENTRATE AND DOCUSATE SODIUM 1 TABLET: 8.6; 5 TABLET, FILM COATED ORAL at 09:03

## 2018-03-14 RX ADMIN — ASPIRIN 81 MG: 81 TABLET, COATED ORAL at 09:03

## 2018-03-14 RX ADMIN — VITAMIN D, TAB 1000IU (100/BT) 2000 UNITS: 25 TAB at 09:03

## 2018-03-14 RX ADMIN — TACROLIMUS 2 MG: 1 CAPSULE ORAL at 09:03

## 2018-03-14 RX ADMIN — THERA TABS 1 TABLET: TAB at 09:03

## 2018-03-14 RX ADMIN — HYDRALAZINE HYDROCHLORIDE 10 MG: 20 INJECTION INTRAMUSCULAR; INTRAVENOUS at 12:03

## 2018-03-14 RX ADMIN — HYDRALAZINE HYDROCHLORIDE 50 MG: 50 TABLET ORAL at 03:03

## 2018-03-14 RX ADMIN — HYDRALAZINE HYDROCHLORIDE 10 MG: 20 INJECTION INTRAMUSCULAR; INTRAVENOUS at 11:03

## 2018-03-14 NOTE — CONSULTS
[facility]  Psychiatry  Consult Note    Patient Name: Dereck Centeno  MRN: [id number]   Code Status: Full Code  Admission Date: 3/14/2018  Hospital Length of Stay: 0 days  Expected Discharge Date: 3/16/2018  Attending Physician: Sorin Soto MD  Primary Care Provider: Eva Reynaga MD    Current Legal Status: [facility]    Patient information was obtained from patient and sister in law.     Subjective:     Principal Problem:Intentional overdose of drug in tablet form    Chief Complaint: Overdose, Depression     HPI:   Consultation-Liaison Psychiatry Consult Note      Chief Complaint / Reason for Consult:     Overdose, Depression    Subjective:     History of Present Illness:   Dereck Centeno is a 57 y.o. male with a history of anxiety, depression, chronic back pain, liver transplant (2015) secondary to HCV/ETOH abuse.  He presents with intentional overdose.  He states that he took 20mg clonidine and 1600mg gabapentin at 14:30 yesterday.  He also reports drinking ETOH.  Patient very difficult to understand on exam, but states that he was stressed about his living situation.      Patient initially cooperative with interview but later attempted to leave the room.  He was redirected by sitter and cooperated when threatened that security could be called.  Patient explains that he overdosed last night in a suicide attempt impulsively.  He has been struggling with depression for years and reports compliance with medications but cannot name what he takes, dosages, who or where he is seen for outpatient psychiatric care.  Initially denies alcohol use but when confronted with BAL in ED endorses recent alcohol use. Prior to this relapse he has been sober for many months.  Patient reports feeling guilty about relapsing on alcohol.  He does not plan on engaging in psychiatric care and would like to return home as soon as possible.  Interview was difficult to conduct due to patient's poor memory and difficulty  "with concentration.  His speech was slurred but improved when he removed his dentures.       Collateral: Dianne Centeno, sister in law, [phone]  Patient's sister in law was shocked regarding the patient's overdose.  Patient has been living in her home and she is responsible for making sure he has his medications.  She left to go to [place] for three days and the patient relapsed on alcohol and overdosed.  Patient is working at [employer] part time, attending meetings for .  There were no signs that he would attempt suicide.  Sister in law describes patient's baseline as "a bit slow" but reports that his mental status changed last night after the overdose.      Medical Review Of Systems:  Pertinent items are noted in HPI.    Psychiatric Review Of Systems - Is patient experiencing or having changes in:  sleep: no  appetite: no  weight: no  energy/anergy: no  interest/pleasure/anhedonia: yes  somatic symptoms: yes  libido: no  anxiety/panic: yes  guilty/hopelessness: yes  concentration: yes  S.I.B.s/risky behavior: yes  any drugs: no  alcohol: yes     Allergies:  Patient has no known allergies.    Past Medical/Surgical History  Past Medical History:   Diagnosis Date    Alcohol withdrawal seizure     Alcoholic cirrhosis     Anxiety     Depression     Hepatitis C     History of throat cancer     HTN (hypertension), benign     Tobacco use       has a past medical history of Alcohol withdrawal seizure; Alcoholic cirrhosis; Anxiety; Depression; Hepatitis C; History of throat cancer; HTN (hypertension), benign; and Tobacco use.  Past Surgical History:   Procedure Laterality Date    ESOPHAGEAL VARICE LIGATION      LIVER TRANSPLANT      TIPS PROCEDURE         Past Psychiatric History:  Previous Medication Trials: yes, Paxil 10mg daily, Seroquel 25mg qHS PRN, and Effexor 75mg daily  Previous Psychiatric Hospitalizations: yes, 2 related to prior suicide attempts/overdoses  Previous Suicide Attempts: yes   History " "of Violence: yes  Outpatient Psychiatrist: yes    Social History:  Marital Status:   Children: 3   Employment Status/Info: currently employed, part time at [employer]  Education: high school diploma/GED  Special Ed: no  Housing Status: with sister in law and brother  History of phys/sexual abuse: no  Access to gun: no    Substance Abuse History:  Recreational Drugs: patient states "i've tried them all". Sober for several years from IV drug use with heroin, amphetamines, crack  Use of Alcohol: patient is an alcoholic, reports recent relapse with light drinking  Rehab History:yes   Tobacco Use:yes  Use of Caffeine: denies use  Use of OTC: denied  Legal consequences of chemical use: yes    Legal History:  Past Charges/Incarcerations:yes, several incarcerations related to drugs   Pending charges:no     Family Psychiatric History:   denied    Objective:     Current Medications:  Infusions:    Scheduled:   aspirin  81 mg Oral Daily    famotidine  20 mg Oral QHS    folic acid  1 mg Oral Daily    magnesium oxide  800 mg Oral BID    multivitamin  1 tablet Oral Daily    senna-docusate 8.6-50 mg  1 tablet Oral BID    tacrolimus  1 mg Oral Daily    tacrolimus  2 mg Oral Daily    thiamine  100 mg Oral Daily    vitamin D  2,000 Units Oral Daily     PRN:  acetaminophen, albuterol-ipratropium 2.5mg-0.5mg/3mL, dextrose 50%, dextrose 50%, glucagon (human recombinant), glucose, glucose, hydrALAZINE, ondansetron, polyethylene glycol, promethazine, ramelteon, sodium chloride 0.9%    Home Medications:  Prior to Admission medications    Medication Sig Start Date End Date Taking? Authorizing Provider   albuterol (PROVENTIL HFA) 90 mcg/actuation inhaler Inhale 2 puffs into the lungs every 6 (six) hours as needed for Wheezing or Shortness of Breath.    Historical Provider, MD   alendronate (FOSAMAX) 70 MG tablet Take 70 mg by mouth every 7 days.    Historical Provider, MD   aspirin (ECOTRIN) 81 MG EC tablet Take 1 tablet (81 " mg total) by mouth once daily. 1/27/15 6/19/17  Ru Golden MD   cloNIDine (CATAPRES) 0.2 MG tablet Take 0.2 mg by mouth 2 (two) times daily.    Historical Provider, MD   clorazepate (TRANXENE) 3.75 MG Tab Take 3.75 mg by mouth 2 (two) times daily as needed.    Historical Provider, MD   docusate sodium (COLACE) 100 MG capsule Take 100 mg by mouth 3 (three) times daily as needed for Constipation.    Historical Provider, MD   famotidine (PEPCID) 20 MG tablet Take 1 tablet (20 mg total) by mouth every evening. 1/14/15 1/14/16  Lyle Downs MD   gabapentin (NEURONTIN) 400 MG capsule Take 400 mg by mouth 3 (three) times daily.    Historical Provider, MD   magnesium oxide (MAG-OX) 400 mg tablet Take 400 mg by mouth once daily.    Historical Provider, MD   nicotine (NICODERM CQ) 21 mg/24 hr Place 1 patch onto the skin once daily. 10/24/17   Lucia Herrera MD   oxycodone (ROXICODONE) 10 mg Tab immediate release tablet Take 1 tablet (10 mg total) by mouth every 4 (four) hours as needed. 2/18/15   Luis Eduardo MD   paroxetine (PAXIL) 10 MG tablet Take 10 mg by mouth every morning.    Historical Provider, MD   polyethylene glycol (GLYCOLAX) 17 gram PwPk Take 17 g by mouth daily as needed.    Historical Provider, MD   quetiapine (SEROQUEL) 25 MG Tab Take 25 mg by mouth nightly as needed (Taking 1/2 tablet as needed at night.).    Historical Provider, MD   ribavirin (REBETOL) 200 MG Cap Take 3 capsules (600 mg total) by mouth 2 (two) times daily. Initial dose 400 mg  mg PM. 6/8/15 6/7/16  Guillermina Daily NP   tacrolimus (PROGRAF) 1 MG Cap 2 mg in am and 1 mg in pm 6/30/17   Mathew Finney MD   venlafaxine (EFFEXOR-XR) 150 MG Cp24 Take 75 mg by mouth once daily.    Historical Provider, MD   vitamin D 1000 units Tab Take 2 tablets (2,000 Units total) by mouth once daily. 1/8/15   Mathew Finney MD   zinc gluconate 50 mg tablet Take 50 mg by mouth once daily.    Historical Provider, MD      Vital Signs:  Temp:  [97.8 °F (36.6 °C)-99 °F (37.2 °C)]   Pulse:  [43-60]   Resp:  [13-29]   BP: (165-215)/()   SpO2:  [92 %-99 %]     Physical Exam:  Gen: Drowsy, irritable, uncooperative, NAD   Head: MMM   Lungs: respirations unlabored   Chest wall: No tenderness or deformity   Heart: RRR   Abdomen: +BS   Extremities: Extremities normal, atraumatic   Pulses: 2+ and symmetric all extremities   Skin: Skin color, texture, turgor normal   Neurologic: CN II-XII grossly intact     Mental Status Exam:  Appearance: older than stated age, disheveled, lying in bed   Behavior: uncooperative, hostile, eye contact minimal   Speech/Language: normal tone, normal pitch, slowed, dysarthia   Mood: irritable   Affect: mood congruent   Thought Process:  poverty of thought   Thought Content: suicidal thoughts: (passive-yes)   Orientation: grossly intact   Cognition: impaired   Insight: poor   Judgment: poor     Laboratory Data:  Recent Results (from the past 48 hour(s))   CBC auto differential    Collection Time: 03/13/18  8:02 PM   Result Value Ref Range    WBC 8.75 3.90 - 12.70 K/uL    RBC 5.14 4.60 - 6.20 M/uL    Hemoglobin 15.9 14.0 - 18.0 g/dL    Hematocrit 46.3 40.0 - 54.0 %    MCV 90 82 - 98 fL    MCH 30.9 27.0 - 31.0 pg    MCHC 34.3 32.0 - 36.0 g/dL    RDW 13.0 11.5 - 14.5 %    Platelets 195 150 - 350 K/uL    MPV 10.5 9.2 - 12.9 fL    Gran # (ANC) 5.6 1.8 - 7.7 K/uL    Lymph # 2.1 1.0 - 4.8 K/uL    Mono # 0.7 0.3 - 1.0 K/uL    Eos # 0.4 0.0 - 0.5 K/uL    Baso # 0.05 0.00 - 0.20 K/uL    Gran% 64.2 38.0 - 73.0 %    Lymph% 23.4 18.0 - 48.0 %    Mono% 7.8 4.0 - 15.0 %    Eosinophil% 4.0 0.0 - 8.0 %    Basophil% 0.6 0.0 - 1.9 %    Differential Method Automated    Comprehensive metabolic panel    Collection Time: 03/13/18  8:02 PM   Result Value Ref Range    Sodium 144 136 - 145 mmol/L    Potassium 4.2 3.5 - 5.1 mmol/L    Chloride 106 95 - 110 mmol/L    CO2 25 23 - 29 mmol/L    Glucose 160 (H) 70 - 110 mg/dL    BUN, Bld 8  2 - 20 mg/dL    Creatinine 0.93 0.50 - 1.40 mg/dL    Calcium 9.1 8.7 - 10.5 mg/dL    Total Protein 7.4 6.0 - 8.4 g/dL    Albumin 4.6 3.5 - 5.2 g/dL    Total Bilirubin 1.1 (H) 0.1 - 1.0 mg/dL    Alkaline Phosphatase 86 38 - 126 U/L     (H) 15 - 46 U/L    ALT 94 (H) 10 - 44 U/L    Anion Gap 13 8 - 16 mmol/L    eGFR if African American >60.0 >60 mL/min/1.73 m^2    eGFR if non African American >60.0 >60 mL/min/1.73 m^2   TSH    Collection Time: 03/13/18  8:02 PM   Result Value Ref Range    TSH 4.100 (H) 0.400 - 4.000 uIU/mL   Ethanol    Collection Time: 03/13/18  8:02 PM   Result Value Ref Range    Alcohol, Medical, Serum 144 (H) <10 mg/dL   Acetaminophen level    Collection Time: 03/13/18  8:02 PM   Result Value Ref Range    Acetaminophen (Tylenol), Serum <10.0 10.0 - 20.0 ug/mL   Salicylate level    Collection Time: 03/13/18  8:02 PM   Result Value Ref Range    Salicylate Lvl <5.0 (L) 15.0 - 30.0 mg/dL   POCT glucose    Collection Time: 03/13/18  8:02 PM   Result Value Ref Range    POC Glucose 158 (A) 70 - 110 mg/dL   T4, free    Collection Time: 03/13/18  8:02 PM   Result Value Ref Range    Free T4 1.07 0.71 - 1.51 ng/dL   Urinalysis - clean catch    Collection Time: 03/13/18  8:12 PM   Result Value Ref Range    Specimen UA Urine, Clean Catch     Color, UA Yellow Yellow, Straw, Jannet    Appearance, UA Clear Clear    pH, UA 6.0 5.0 - 8.0    Specific Gravity, UA 1.015 1.005 - 1.030    Protein, UA Trace (A) Negative    Glucose, UA Negative Negative    Ketones, UA Negative Negative    Bilirubin (UA) Negative Negative    Occult Blood UA Trace (A) Negative    Nitrite, UA Negative Negative    Urobilinogen, UA Negative <2.0 EU/dL    Leukocytes, UA Negative Negative   Drug screen panel, emergency    Collection Time: 03/13/18  8:12 PM   Result Value Ref Range    Benzodiazepines Negative     Methadone metabolites Negative     Cocaine (Metab.) Negative     Opiate Scrn, Ur Negative     Barbiturate Screen, Ur Negative      Amphetamine Screen, Ur Negative     THC Negative     Phencyclidine Negative     Creatinine, Random Ur 58.7 23.0 - 375.0 mg/dL    Toxicology Information SEE COMMENT    Tacrolimus level    Collection Time: 03/13/18  8:39 PM   Result Value Ref Range    Tacrolimus Lvl 6.2 5.0 - 15.0 ng/mL   Protime-INR    Collection Time: 03/13/18  8:39 PM   Result Value Ref Range    Prothrombin Time 12.5 9.0 - 12.5 sec    INR 1.1 0.8 - 1.2   APTT    Collection Time: 03/13/18  8:39 PM   Result Value Ref Range    aPTT 30.0 21.0 - 32.0 sec   Ammonia    Collection Time: 03/13/18  8:39 PM   Result Value Ref Range    Ammonia 20 9 - 30 umol/L   Lactic acid, plasma    Collection Time: 03/13/18  8:39 PM   Result Value Ref Range    Lactate (Lactic Acid) 1.8 0.5 - 2.2 mmol/L   Bilirubin, direct    Collection Time: 03/13/18  9:05 PM   Result Value Ref Range    Bilirubin, Direct 0.2 0.0 - 0.3 mg/dL   Hepatic function panel    Collection Time: 03/13/18 10:12 PM   Result Value Ref Range    Total Protein 7.2 6.0 - 8.4 g/dL    Albumin 4.5 3.5 - 5.2 g/dL    Total Bilirubin 1.1 (H) 0.1 - 1.0 mg/dL     (H) 15 - 46 U/L    ALT 95 (H) 10 - 44 U/L    Alkaline Phosphatase 86 38 - 126 U/L    Bilirubin, Direct 0.2 0.0 - 0.3 mg/dL   Comprehensive Metabolic Panel (CMP)    Collection Time: 03/14/18  6:06 AM   Result Value Ref Range    Sodium 140 136 - 145 mmol/L    Potassium 4.4 3.5 - 5.1 mmol/L    Chloride 104 95 - 110 mmol/L    CO2 28 23 - 29 mmol/L    Glucose 152 (H) 70 - 110 mg/dL    BUN, Bld 6 6 - 20 mg/dL    Creatinine 1.0 0.5 - 1.4 mg/dL    Calcium 9.6 8.7 - 10.5 mg/dL    Total Protein 7.1 6.0 - 8.4 g/dL    Albumin 4.3 3.5 - 5.2 g/dL    Total Bilirubin 0.8 0.1 - 1.0 mg/dL    Alkaline Phosphatase 101 55 - 135 U/L    AST 66 (H) 10 - 40 U/L    ALT 63 (H) 10 - 44 U/L    Anion Gap 8 8 - 16 mmol/L    eGFR if African American >60.0 >60 mL/min/1.73 m^2    eGFR if non African American >60.0 >60 mL/min/1.73 m^2   Magnesium    Collection Time: 03/14/18  6:06 AM    Result Value Ref Range    Magnesium 1.6 1.6 - 2.6 mg/dL   Phosphorus    Collection Time: 03/14/18  6:06 AM   Result Value Ref Range    Phosphorus 4.3 2.7 - 4.5 mg/dL   Hemoglobin A1c    Collection Time: 03/14/18  6:06 AM   Result Value Ref Range    Hemoglobin A1C 4.9 4.0 - 5.6 %    Estimated Avg Glucose 94 68 - 131 mg/dL   CBC with Automated Differential    Collection Time: 03/14/18  6:06 AM   Result Value Ref Range    WBC 10.53 3.90 - 12.70 K/uL    RBC 5.31 4.60 - 6.20 M/uL    Hemoglobin 16.0 14.0 - 18.0 g/dL    Hematocrit 47.7 40.0 - 54.0 %    MCV 90 82 - 98 fL    MCH 30.1 27.0 - 31.0 pg    MCHC 33.5 32.0 - 36.0 g/dL    RDW 12.9 11.5 - 14.5 %    Platelets 210 150 - 350 K/uL    MPV 10.8 9.2 - 12.9 fL    Immature Granulocytes 0.5 0.0 - 0.5 %    Gran # (ANC) 7.2 1.8 - 7.7 K/uL    Immature Grans (Abs) 0.05 (H) 0.00 - 0.04 K/uL    Lymph # 2.1 1.0 - 4.8 K/uL    Mono # 0.7 0.3 - 1.0 K/uL    Eos # 0.4 0.0 - 0.5 K/uL    Baso # 0.06 0.00 - 0.20 K/uL    nRBC 0 0 /100 WBC    Gran% 68.5 38.0 - 73.0 %    Lymph% 19.9 18.0 - 48.0 %    Mono% 6.9 4.0 - 15.0 %    Eosinophil% 3.6 0.0 - 8.0 %    Basophil% 0.6 0.0 - 1.9 %    Differential Method Automated       No results found for: PHENYTOIN, PHENOBARB, VALPROATE, CBMZ  Imaging:  Imaging Results    None          Assessment:     Dereck Centeno is a 57 y.o. male with a history of anxiety, depression, chronic back pain, liver transplant (2015) secondary to HCV/ETOH abuse.  He presents with intentional overdose.  He states that he took 20mg clonidine and 1600mg gabapentin at 14:30 yesterday.  He also reports drinking ETOH.  Psychiatry was consulted to address overdose.    Recommendations:     Depressive Disorder Unspecified, R/O SIMD  Alcohol Use Disorder  Polysubstance Abuse with opiates, amphetamines, cocaine in sustained remission  R/O Delirium due to underlying medical condition, mixed type  · PEC patient for danger to self after overdose and request to leave hospital AMA.  Seek  inpatient psychiatric hospitalization once medically cleared.  Psychiatry will continue to follow the patient during this admission.    · Continue to hold psychiatric medications at this time in the context of delirium, recent overdose.  May use Zyprexa 10 mg PO/IM q8h PRN non redirectable agitation. Monitor for QTc prolongation and adverse reactions.  Most recent Qtc 460.  Avoid benzodiazepines, antihistamines, anticholinergics, and minimize opiate use as these may worsen delirium.  Recommend consult to PT/OT. Early mobility and exercise has been shown to decrease duration of delirium. Level of activity can be determined by RASS score.  RASS -1/0/1: active ROM, active exercise, sit, stand, walk, ADL training  RASS -3/-2: passive ROM, sit  RASS -5/-4: passive ROM  Provide appropriate lighting and clear signage; a clock and calendar should be easily visible to the patient.  Monitor environmental factors. Reduce light and noise at night (close shades, turn off lights, turn off TV, ect). Correct any alterations in sleep cycle.  Reorient the patient to person, place, time and situation on each encounter.   Correct sensory deficits if possible (replace eye glasses, hearing aids, ect).  Avoid restraints if possible. Severely delirious patients benefit from constant observation by a sitter.  Do not leave patient unattended.    Case discussed with staff psychiatrist, Pb Tyler MD.  Will continue to follow and monitor progression of current psychiatric condition.    Eva Dykes MD  [facility] PGY2  [phone]        Hospital Course: No notes on file    No new subjective & objective note has been filed under this hospital service since the last note was generated.    Assessment/Plan:     No notes have been filed under this hospital service.  Service: Psychiatry       Need for Continued Hospitalization:   Psychiatric illness continues to pose a potential threat to life or bodily function, of self or  others, thereby requiring the need for continued inpatient psychiatric hospitalization.    Anticipated Disposition: Psychiatric Hospital     Total time:  35 with greater than 50% of this time spent in counseling and/or coordination of care.       Eva Dykes MD   Psychiatry  [facility]

## 2018-03-14 NOTE — SUBJECTIVE & OBJECTIVE
Review of Systems   Constitutional: Positive for appetite change and fatigue. Negative for chills and fever.   HENT: Negative for facial swelling, mouth sores and trouble swallowing.    Eyes: Negative for pain and redness.   Respiratory: Negative for cough and shortness of breath.    Cardiovascular: Negative for chest pain and leg swelling.   Gastrointestinal: Positive for abdominal pain and nausea.   Genitourinary: Negative for dysuria and hematuria.   Musculoskeletal: Negative for gait problem and neck stiffness.   Skin: Negative for rash and wound.   Neurological: Negative for seizures and headaches.   Psychiatric/Behavioral: Positive for behavioral problems and suicidal ideas. Negative for confusion and hallucinations.       Past Medical History:   Diagnosis Date    Alcohol withdrawal seizure     Alcoholic cirrhosis     Anxiety     Depression     Hepatitis C     History of throat cancer     HTN (hypertension), benign     Tobacco use        Past Surgical History:   Procedure Laterality Date    ESOPHAGEAL VARICE LIGATION      LIVER TRANSPLANT      TIPS PROCEDURE         Family history of liver disease: No    Review of patient's allergies indicates:  No Known Allergies    Social History Main Topics    Smoking status: Former Smoker     Packs/day: 0.50     Types: Cigarettes     Quit date: 5/5/2017    Smokeless tobacco: Former User     Types: Chew    Alcohol use No      Comment: former heavy alcohol user, quit 1 year ago    Drug use: No    Sexual activity: Not on file       Prescriptions Prior to Admission   Medication Sig Dispense Refill Last Dose    albuterol (PROVENTIL HFA) 90 mcg/actuation inhaler Inhale 2 puffs into the lungs every 6 (six) hours as needed for Wheezing or Shortness of Breath.   Taking    alendronate (FOSAMAX) 70 MG tablet Take 70 mg by mouth every 7 days.       aspirin (ECOTRIN) 81 MG EC tablet Take 1 tablet (81 mg total) by mouth once daily. 30 tablet 0 Taking    cloNIDine  (CATAPRES) 0.2 MG tablet Take 0.2 mg by mouth 2 (two) times daily.       clorazepate (TRANXENE) 3.75 MG Tab Take 3.75 mg by mouth 2 (two) times daily as needed.   Taking    docusate sodium (COLACE) 100 MG capsule Take 100 mg by mouth 3 (three) times daily as needed for Constipation.   Not Taking    famotidine (PEPCID) 20 MG tablet Take 1 tablet (20 mg total) by mouth every evening. 30 tablet 11 Not Taking    gabapentin (NEURONTIN) 400 MG capsule Take 400 mg by mouth 3 (three) times daily.   Taking    magnesium oxide (MAG-OX) 400 mg tablet Take 400 mg by mouth once daily.   Taking    nicotine (NICODERM CQ) 21 mg/24 hr Place 1 patch onto the skin once daily. 14 patch 0 Taking    oxycodone (ROXICODONE) 10 mg Tab immediate release tablet Take 1 tablet (10 mg total) by mouth every 4 (four) hours as needed. 30 tablet 0 Taking    paroxetine (PAXIL) 10 MG tablet Take 10 mg by mouth every morning.       polyethylene glycol (GLYCOLAX) 17 gram PwPk Take 17 g by mouth daily as needed.   Not Taking    quetiapine (SEROQUEL) 25 MG Tab Take 25 mg by mouth nightly as needed (Taking 1/2 tablet as needed at night.).   Taking    ribavirin (REBETOL) 200 MG Cap Take 3 capsules (600 mg total) by mouth 2 (two) times daily. Initial dose 400 mg  mg PM. 180 capsule 11 Not Taking    tacrolimus (PROGRAF) 1 MG Cap 2 mg in am and 1 mg in pm 90 capsule 11 Taking    venlafaxine (EFFEXOR-XR) 150 MG Cp24 Take 75 mg by mouth once daily.   Taking    vitamin D 1000 units Tab Take 2 tablets (2,000 Units total) by mouth once daily. 60 tablet 5 Not Taking    zinc gluconate 50 mg tablet Take 50 mg by mouth once daily.          Objective:     Vital Signs (Most Recent):  Temp: 99.1 °F (37.3 °C) (03/14/18 1116)  Pulse: (!) 58 (03/14/18 1116)  Resp: 16 (03/14/18 1116)  BP: (!) 176/96 (03/14/18 0737)  SpO2: (!) 91 % (03/14/18 1116) Vital Signs (24h Range):  Temp:  [97.8 °F (36.6 °C)-99.1 °F (37.3 °C)] 99.1 °F (37.3 °C)  Pulse:  [43-60]  58  Resp:  [13-29] 16  SpO2:  [91 %-99 %] 91 %  BP: (165-215)/() 176/96     Weight: 78 kg (171 lb 15.3 oz) (03/14/18 0355)  Body mass index is 23.98 kg/m².    Physical Exam   Constitutional: He is oriented to person, place, and time. No distress.   Slow to respond but appropriate   HENT:   Head: Normocephalic and atraumatic.   Eyes: Conjunctivae and EOM are normal.   Neck: Neck supple. No thyromegaly present.   Cardiovascular: Normal rate, regular rhythm and intact distal pulses.    Pulmonary/Chest: Effort normal and breath sounds normal. No respiratory distress.   Abdominal: He exhibits no distension.   Musculoskeletal: Normal range of motion. He exhibits no tenderness.   Neurological: He is alert and oriented to person, place, and time. No cranial nerve deficit.   Skin: Skin is warm and dry. No rash noted.   Psychiatric:   Depressed mood ; + SI   Vitals reviewed.      MELD-Na score: 7 at 3/14/2018  6:06 AM  MELD score: 7 at 3/14/2018  6:06 AM  Calculated from:  Serum Creatinine: 1.0 mg/dL at 3/14/2018  6:06 AM  Serum Sodium: 140 mmol/L (Rounded to 137) at 3/14/2018  6:06 AM  Total Bilirubin: 0.8 mg/dL (Rounded to 1) at 3/14/2018  6:06 AM  INR(ratio): 1.1 at 3/13/2018  8:39 PM  Age: 57 years    Significant Labs:  CBC:   Recent Labs  Lab 03/14/18  0606   WBC 10.53   RBC 5.31   HGB 16.0   HCT 47.7        CMP:   Recent Labs  Lab 03/14/18  0606   *   CALCIUM 9.6   ALBUMIN 4.3   PROT 7.1      K 4.4   CO2 28      BUN 6   CREATININE 1.0   ALKPHOS 101   ALT 63*   AST 66*   BILITOT 0.8     Coagulation:   Recent Labs  Lab 03/13/18 2039   INR 1.1   APTT 30.0       Significant Imaging:  Labs: Reviewed

## 2018-03-14 NOTE — HPI
Patient is a 57 year old gentleman with a h/o anxiety, depression, chronic back pain, liver transplant (2015) secondary to HCV/ETOH abuse.  He presents with intentional overdose.  He states that he took 20mg clonidine and 1600mg gabapentin at 14:30 yesterday.  He also reports drinking ETOH.  Patient very difficult to understand on exam, but states that he was stressed about his living situation.  He denies chest pain, SOB, dizziness, palpitations, fever/chills, N/V/D, headache.  Currently no family at bedside, but per ER note, family reports history of depression with suicide attempt several years ago.

## 2018-03-14 NOTE — ASSESSMENT & PLAN NOTE
- /113.  - Patient reports taking 20mg clonidine and 1600mg gabapentin around 14:30 in suicide attempt.  Nevertheless, BP remains elevated.    - PRN hydralazine.  - Patient's only antihypertensive is clonidine 0.2mg BID.  This is likely a poor choice in this patient.  Consider switching.

## 2018-03-14 NOTE — CONSULTS
[facility]  Hepatology  Consult Note    Patient Name: Dereck Centeno  MRN: [id number]  Admission Date: 3/14/2018  Hospital Length of Stay: 0 days  Attending Provider: Soirn Soto MD   Primary Care Physician: Eva Reynaga MD  Principal Problem:Intentional overdose of drug in tablet form    Consults  Subjective:     Transplant status: Post-transplant    HPI:  This is a 56 y/o male with hx of OLTx 2/2 HCV / Etoh 1/11/15 (currently on prograf, hx of relapse to etoh, s/p treatment with Harvoni post transplant with SVR 24) who presented for intentional overdose.  Pt has been in 'funk' with depression lately. He admits to taking 20mg clonidine and 1600 gabapentin. Also with use of etoh.  His LFTs were initially elevated ,but are  Trending down on repeat today.    Hepatology evaluation in post transplant setting.  He is maintained on prograf, he thinks he takes 2in Am and 1 in pm.      Review of Systems   Constitutional: Positive for appetite change and fatigue. Negative for chills and fever.   HENT: Negative for facial swelling, mouth sores and trouble swallowing.    Eyes: Negative for pain and redness.   Respiratory: Negative for cough and shortness of breath.    Cardiovascular: Negative for chest pain and leg swelling.   Gastrointestinal: Positive for abdominal pain and nausea.   Genitourinary: Negative for dysuria and hematuria.   Musculoskeletal: Negative for gait problem and neck stiffness.   Skin: Negative for rash and wound.   Neurological: Negative for seizures and headaches.   Psychiatric/Behavioral: Positive for behavioral problems and suicidal ideas. Negative for confusion and hallucinations.       Past Medical History:   Diagnosis Date    Alcohol withdrawal seizure     Alcoholic cirrhosis     Anxiety     Depression     Hepatitis C     History of throat cancer     HTN (hypertension), benign     Tobacco use        Past Surgical History:   Procedure Laterality Date     ESOPHAGEAL VARICE LIGATION      LIVER TRANSPLANT      TIPS PROCEDURE         Family history of liver disease: No    Review of patient's allergies indicates:  No Known Allergies    Social History Main Topics    Smoking status: Former Smoker     Packs/day: 0.50     Types: Cigarettes     Quit date: 5/5/2017    Smokeless tobacco: Former User     Types: Chew    Alcohol use No      Comment: former heavy alcohol user, quit 1 year ago    Drug use: No    Sexual activity: Not on file       Prescriptions Prior to Admission   Medication Sig Dispense Refill Last Dose    albuterol (PROVENTIL HFA) 90 mcg/actuation inhaler Inhale 2 puffs into the lungs every 6 (six) hours as needed for Wheezing or Shortness of Breath.   Taking    alendronate (FOSAMAX) 70 MG tablet Take 70 mg by mouth every 7 days.       aspirin (ECOTRIN) 81 MG EC tablet Take 1 tablet (81 mg total) by mouth once daily. 30 tablet 0 Taking    cloNIDine (CATAPRES) 0.2 MG tablet Take 0.2 mg by mouth 2 (two) times daily.       clorazepate (TRANXENE) 3.75 MG Tab Take 3.75 mg by mouth 2 (two) times daily as needed.   Taking    docusate sodium (COLACE) 100 MG capsule Take 100 mg by mouth 3 (three) times daily as needed for Constipation.   Not Taking    famotidine (PEPCID) 20 MG tablet Take 1 tablet (20 mg total) by mouth every evening. 30 tablet 11 Not Taking    gabapentin (NEURONTIN) 400 MG capsule Take 400 mg by mouth 3 (three) times daily.   Taking    magnesium oxide (MAG-OX) 400 mg tablet Take 400 mg by mouth once daily.   Taking    nicotine (NICODERM CQ) 21 mg/24 hr Place 1 patch onto the skin once daily. 14 patch 0 Taking    oxycodone (ROXICODONE) 10 mg Tab immediate release tablet Take 1 tablet (10 mg total) by mouth every 4 (four) hours as needed. 30 tablet 0 Taking    paroxetine (PAXIL) 10 MG tablet Take 10 mg by mouth every morning.       polyethylene glycol (GLYCOLAX) 17 gram PwPk Take 17 g by mouth daily as needed.   Not Taking    quetiapine  (SEROQUEL) 25 MG Tab Take 25 mg by mouth nightly as needed (Taking 1/2 tablet as needed at night.).   Taking    ribavirin (REBETOL) 200 MG Cap Take 3 capsules (600 mg total) by mouth 2 (two) times daily. Initial dose 400 mg  mg PM. 180 capsule 11 Not Taking    tacrolimus (PROGRAF) 1 MG Cap 2 mg in am and 1 mg in pm 90 capsule 11 Taking    venlafaxine (EFFEXOR-XR) 150 MG Cp24 Take 75 mg by mouth once daily.   Taking    vitamin D 1000 units Tab Take 2 tablets (2,000 Units total) by mouth once daily. 60 tablet 5 Not Taking    zinc gluconate 50 mg tablet Take 50 mg by mouth once daily.          Objective:     Vital Signs (Most Recent):  Temp: 99.1 °F (37.3 °C) (03/14/18 1116)  Pulse: (!) 58 (03/14/18 1116)  Resp: 16 (03/14/18 1116)  BP: (!) 176/96 (03/14/18 0737)  SpO2: (!) 91 % (03/14/18 1116) Vital Signs (24h Range):  Temp:  [97.8 °F (36.6 °C)-99.1 °F (37.3 °C)] 99.1 °F (37.3 °C)  Pulse:  [43-60] 58  Resp:  [13-29] 16  SpO2:  [91 %-99 %] 91 %  BP: (165-215)/() 176/96     Weight: 78 kg (171 lb 15.3 oz) (03/14/18 0355)  Body mass index is 23.98 kg/m².    Physical Exam   Constitutional: He is oriented to person, place, and time. No distress.   Slow to respond but appropriate   HENT:   Head: Normocephalic and atraumatic.   Eyes: Conjunctivae and EOM are normal.   Neck: Neck supple. No thyromegaly present.   Cardiovascular: Normal rate, regular rhythm and intact distal pulses.    Pulmonary/Chest: Effort normal and breath sounds normal. No respiratory distress.   Abdominal: He exhibits no distension.   Musculoskeletal: Normal range of motion. He exhibits no tenderness.   Neurological: He is alert and oriented to person, place, and time. No cranial nerve deficit.   Skin: Skin is warm and dry. No rash noted.   Psychiatric:   Depressed mood ; + SI   Vitals reviewed.      MELD-Na score: 7 at 3/14/2018  6:06 AM  MELD score: 7 at 3/14/2018  6:06 AM  Calculated from:  Serum Creatinine: 1.0 mg/dL at 3/14/2018  6:06  AM  Serum Sodium: 140 mmol/L (Rounded to 137) at 3/14/2018  6:06 AM  Total Bilirubin: 0.8 mg/dL (Rounded to 1) at 3/14/2018  6:06 AM  INR(ratio): 1.1 at 3/13/2018  8:39 PM  Age: 57 years    Significant Labs:  CBC:   Recent Labs  Lab 03/14/18  0606   WBC 10.53   RBC 5.31   HGB 16.0   HCT 47.7        CMP:   Recent Labs  Lab 03/14/18  0606   *   CALCIUM 9.6   ALBUMIN 4.3   PROT 7.1      K 4.4   CO2 28      BUN 6   CREATININE 1.0   ALKPHOS 101   ALT 63*   AST 66*   BILITOT 0.8     Coagulation:   Recent Labs  Lab 03/13/18 2039   INR 1.1   APTT 30.0       Significant Imaging:  Labs: Reviewed    Assessment/Plan:     * Intentional overdose of drug in tablet form    Appreciate IM and psych teams.    PEC.          Liver transplant 1/11/2015 for HCV    Slight elevation in LFTs on admit, likely due to Etoh and overdose.  Already improving and anticipate full recovery.    Counseled today on strict etoh abstinence to protect his liver graft.    Recs:  CMP and INR daily while inpatient  Continue tacrolimus home dose.  Check trough daily tacrolimus levels.            Thank you for your consult. I will follow-up with patient. Please contact us if you have any additional questions.   Will follow by chart.    Rusty Beaulieu MD  Hepatology  [facility]

## 2018-03-14 NOTE — PROGRESS NOTES
"Patient lethargic upon am assessment, speech slurred - patient difficult to understand. States he does not have bottom dentures with him. Disoriented to time, states it is "1919" then corrects himself to state "2019." Also states "I am going home, I am just waiting on the paperwork." Patient appears agitated with questioning at times and when RN states he is not yet ready to be discharged. BP elevated at 176/96 manually but improved since hydralazine administration at 0600. Bed alarm on, sitter at bedside.   "
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
## 2018-03-14 NOTE — HPI
This is a 56 y/o male with hx of OLTx 2/2 HCV / Etoh 1/11/15 (currently on prograf, hx of relapse to etoh, s/p treatment with [facility] post transplant with SVR 24) who presented for intentional overdose.  Pt has been in 'funk' with depression lately. He admits to taking 20mg clonidine and 1600 gabapentin. Also with use of etoh.  His LFTs were initially elevated ,but are  Trending down on repeat today.    Hepatology evaluation in post transplant setting.  He is maintained on prograf, he thinks he takes 2in Am and 1 in pm.

## 2018-03-14 NOTE — PLAN OF CARE
Problem: Patient Care Overview  Goal: Plan of Care Review  Outcome: Ongoing (interventions implemented as appropriate)  Patient arousable but asleep the majority of shift - falls asleep when unstimulated. Speech remains slurred and difficult to understand. Patient's BP remains elevated but has slightly improved - clonidine 0.2mg BID restarted, hydralazine IV given x1 prn. Psych evaluated patient - awaiting psych bed once medically cleared.

## 2018-03-14 NOTE — ASSESSMENT & PLAN NOTE
Depression  Anxiety disorder    - Patient reports intentional OD on clonidine and gabapentin.  - Tox screen negative.  Blood ETOH 144.  - PEC in place.  Sitter at bedside.  - Will consult psych.  - Telemetry, neuro checks q4hrs.  - Will hold clonidine 0.2mg BID, clorazepate 3.75mg BID PRN, gabapentin 400mg TID, oxycodone 10mg QID PRN, Paxil 10mg daily, Seroquel 25mg qHS PRN, and Effexor 75mg daily pending psych recommendations.

## 2018-03-14 NOTE — ASSESSMENT & PLAN NOTE
Transaminitis    -  elevated from 23 on 2/12/2018, ALT up to 94 from 22, AlkPhos 86 from 68.  - Blood ETOH level 144.  - Complete cessation recommended.   - Hepatitis panel, although strongly suspect due to ETOH use.  - Thiamine, folic acid, multivitamin.  Monitor for withdrawals.

## 2018-03-14 NOTE — ASSESSMENT & PLAN NOTE
Slight elevation in LFTs on admit, likely due to Etoh and overdose.  Already improving and anticipate full recovery.    Counseled today on strict etoh abstinence to protect his liver graft.    Recs:  CMP and INR daily while inpatient  Continue tacrolimus home dose.  Check trough daily tacrolimus levels.
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
## 2018-03-14 NOTE — PLAN OF CARE
Cm spoke w sitter at bs. Pt is PEC'd  Dc plan: inpatient psych vs other  Info obtained from chart/evy/md at [facility]     03/14/18 6239   Discharge Assessment   Assessment Type Discharge Planning Assessment   Confirmed/corrected address and phone number on facesheet? No   Assessment information obtained from? Medical Record;Other   Expected Length of Stay (days) 3   Communicated expected length of stay with patient/caregiver yes  (sitter at bs; pt is pec)   Prior to hospitilization cognitive status: Alert/Oriented   Prior to hospitalization functional status: Independent;Assistive Equipment   Current cognitive status: Alert/Oriented  (per sitter at bs; currently, sleeping)   Current Functional Status: Independent;Assistive Equipment   Lives With sibling(s)   Able to Return to Prior Arrangements (most likely will need Inpt psych)   Is patient able to care for self after discharge? No   Patient's perception of discharge disposition psychiatric hospital   Patient currently being followed by outpatient case management? No   Patient currently receives any other outside agency services? No   Equipment Currently Used at Home walker, rolling   Do you have any problems affording any of your prescribed medications? No   Is the patient taking medications as prescribed? yes   Does the patient have transportation home? Yes   Transportation Available family or friend will provide   Does the patient receive services at the [facility]? No   Discharge Plan A Psychiatric hospital   Discharge Plan B Other   Patient/Family In Agreement With Plan unable to assess

## 2018-03-14 NOTE — SUBJECTIVE & OBJECTIVE
Past Medical History:   Diagnosis Date    Alcohol withdrawal seizure     Alcoholic cirrhosis     Anxiety     Depression     Hepatitis C     History of throat cancer     HTN (hypertension), benign     Tobacco use        Past Surgical History:   Procedure Laterality Date    ESOPHAGEAL VARICE LIGATION      LIVER TRANSPLANT      TIPS PROCEDURE         Review of patient's allergies indicates:  No Known Allergies    Current Facility-Administered Medications on File Prior to Encounter   Medication    [COMPLETED] 0.9%  NaCl infusion    [COMPLETED] naloxone 0.4 mg/mL injection 0.8 mg    [COMPLETED] ondansetron injection 4 mg     Current Outpatient Prescriptions on File Prior to Encounter   Medication Sig    albuterol (PROVENTIL HFA) 90 mcg/actuation inhaler Inhale 2 puffs into the lungs every 6 (six) hours as needed for Wheezing or Shortness of Breath.    alendronate (FOSAMAX) 70 MG tablet Take 70 mg by mouth every 7 days.    aspirin (ECOTRIN) 81 MG EC tablet Take 1 tablet (81 mg total) by mouth once daily.    cloNIDine (CATAPRES) 0.2 MG tablet Take 0.2 mg by mouth 2 (two) times daily.    clorazepate (TRANXENE) 3.75 MG Tab Take 3.75 mg by mouth 2 (two) times daily as needed.    docusate sodium (COLACE) 100 MG capsule Take 100 mg by mouth 3 (three) times daily as needed for Constipation.    famotidine (PEPCID) 20 MG tablet Take 1 tablet (20 mg total) by mouth every evening.    gabapentin (NEURONTIN) 400 MG capsule Take 400 mg by mouth 3 (three) times daily.    magnesium oxide (MAG-OX) 400 mg tablet Take 400 mg by mouth once daily.    nicotine (NICODERM CQ) 21 mg/24 hr Place 1 patch onto the skin once daily.    oxycodone (ROXICODONE) 10 mg Tab immediate release tablet Take 1 tablet (10 mg total) by mouth every 4 (four) hours as needed.    paroxetine (PAXIL) 10 MG tablet Take 10 mg by mouth every morning.    polyethylene glycol (GLYCOLAX) 17 gram PwPk Take 17 g by mouth daily as needed.    quetiapine  (SEROQUEL) 25 MG Tab Take 25 mg by mouth nightly as needed (Taking 1/2 tablet as needed at night.).    ribavirin (REBETOL) 200 MG Cap Take 3 capsules (600 mg total) by mouth 2 (two) times daily. Initial dose 400 mg  mg PM.    tacrolimus (PROGRAF) 1 MG Cap 2 mg in am and 1 mg in pm    venlafaxine (EFFEXOR-XR) 150 MG Cp24 Take 75 mg by mouth once daily.    vitamin D 1000 units Tab Take 2 tablets (2,000 Units total) by mouth once daily.    zinc gluconate 50 mg tablet Take 50 mg by mouth once daily.     Family History     Problem Relation (Age of Onset)    Alcohol abuse Mother, Father        Social History Main Topics    Smoking status: Former Smoker     Packs/day: 0.50     Types: Cigarettes     Quit date: 5/5/2017    Smokeless tobacco: Former User     Types: Chew    Alcohol use No      Comment: former heavy alcohol user, quit 1 year ago    Drug use: No    Sexual activity: Not on file     Review of Systems   Constitutional: Negative for activity change, appetite change, chills, diaphoresis, fatigue, fever and unexpected weight change.   HENT: Negative for congestion, rhinorrhea, sore throat, trouble swallowing and voice change.    Eyes: Negative for visual disturbance.   Respiratory: Negative for cough, choking, chest tightness, shortness of breath and wheezing.    Cardiovascular: Negative for chest pain, palpitations and leg swelling.   Gastrointestinal: Negative for abdominal distention, abdominal pain, anal bleeding, blood in stool, constipation, diarrhea, nausea and vomiting.   Endocrine: Negative for cold intolerance, heat intolerance, polydipsia and polyuria.   Genitourinary: Negative for dysuria, flank pain, frequency, hematuria and urgency.   Musculoskeletal: Negative for arthralgias, back pain, joint swelling and myalgias.   Skin: Negative for color change and rash.   Neurological: Negative for dizziness, seizures, syncope, facial asymmetry, speech difficulty, weakness, light-headedness,  numbness and headaches.   Hematological: Negative for adenopathy. Does not bruise/bleed easily.   Psychiatric/Behavioral: Positive for self-injury and suicidal ideas. Negative for agitation, confusion and hallucinations.     Objective:     Vital Signs (Most Recent):  Temp: 98 °F (36.7 °C) (03/14/18 0143)  Pulse: (!) 51 (03/14/18 0355)  Resp: 14 (03/14/18 0355)  BP: (!) 201/113 (03/14/18 0355)  SpO2: 96 % (03/14/18 0355) Vital Signs (24h Range):  Temp:  [97.8 °F (36.6 °C)-98 °F (36.7 °C)] 98 °F (36.7 °C)  Pulse:  [43-60] 51  Resp:  [13-29] 14  SpO2:  [93 %-99 %] 96 %  BP: (165-215)/() 201/113     Weight: 78 kg (171 lb 15.3 oz)  Body mass index is 23.98 kg/m².    Physical Exam   Constitutional: He is oriented to person, place, and time. He appears well-developed and well-nourished. He appears lethargic. No distress.   HENT:   Head: Normocephalic and atraumatic.   Neck: Neck supple. Carotid bruit is not present. No thyromegaly present.   Cardiovascular: Normal rate and regular rhythm.  Exam reveals no gallop.    No murmur heard.  Pulmonary/Chest: Effort normal and breath sounds normal. No respiratory distress. He has no wheezes.   Abdominal: Bowel sounds are normal. He exhibits no distension. There is no splenomegaly or hepatomegaly. There is no tenderness.   Musculoskeletal: Normal range of motion. He exhibits no edema.   Neurological: He is oriented to person, place, and time. He appears lethargic. No cranial nerve deficit or sensory deficit.   Skin: Skin is warm and dry. No rash noted.   Psychiatric: He has a normal mood and affect. His behavior is normal. His speech is slurred. He expresses suicidal ideation. He expresses no homicidal ideation.           Significant Labs:   CBC:   Recent Labs  Lab 03/13/18 2002   WBC 8.75   HGB 15.9   HCT 46.3        CMP:   Recent Labs  Lab 03/13/18 2002 03/13/18  2212     --    K 4.2  --      --    CO2 25  --    *  --    BUN 8  --    CREATININE  0.93  --    CALCIUM 9.1  --    PROT 7.4 7.2   ALBUMIN 4.6 4.5   BILITOT 1.1* 1.1*   ALKPHOS 86 86   * 143*   ALT 94* 95*   ANIONGAP 13  --    EGFRNONAA >60.0  --      Urine Studies:   Recent Labs  Lab 03/13/18 2012   COLORU Yellow   APPEARANCEUA Clear   PHUR 6.0   SPECGRAV 1.015   PROTEINUA Trace*   GLUCUA Negative   KETONESU Negative   BILIRUBINUA Negative   OCCULTUA Trace*   NITRITE Negative   UROBILINOGEN Negative   LEUKOCYTESUR Negative       Significant Imaging: I have reviewed all pertinent imaging results/findings within the past 24 hours.

## 2018-03-14 NOTE — H&P
[facility] Hospital Medicine  History & Physical    Patient Name: Dereck Centeno  MRN: [id number]  Admission Date: 3/14/2018  Attending Physician: Sorin Soto MD   Primary Care Provider: Eva Reynaga MD    [facility] Medicine Team: Networked reference to record PCT  Jennifer Garcia PA-C     Patient information was obtained from patient, past medical records and ER records.     Subjective:     Principal Problem:Intentional overdose of drug in tablet form    Chief Complaint: No chief complaint on file.       HPI: Patient is a 57 year old gentleman with a h/o anxiety, depression, chronic back pain, liver transplant (2015) secondary to HCV/ETOH abuse.  He presents with intentional overdose.  He states that he took 20mg clonidine and 1600mg gabapentin at 14:30 yesterday.  He also reports drinking ETOH.  Patient very difficult to understand on exam, but states that he was stressed about his living situation.  He denies chest pain, SOB, dizziness, palpitations, fever/chills, N/V/D, headache.  Currently no family at bedside, but per ER note, family reports history of depression with suicide attempt several years ago.      Past Medical History:   Diagnosis Date    Alcohol withdrawal seizure     Alcoholic cirrhosis     Anxiety     Depression     Hepatitis C     History of throat cancer     HTN (hypertension), benign     Tobacco use        Past Surgical History:   Procedure Laterality Date    ESOPHAGEAL VARICE LIGATION      LIVER TRANSPLANT      TIPS PROCEDURE         Review of patient's allergies indicates:  No Known Allergies    Current Facility-Administered Medications on File Prior to Encounter   Medication    [COMPLETED] 0.9%  NaCl infusion    [COMPLETED] naloxone 0.4 mg/mL injection 0.8 mg    [COMPLETED] ondansetron injection 4 mg     Current Outpatient Prescriptions on File Prior to Encounter   Medication Sig    albuterol (PROVENTIL HFA) 90 mcg/actuation inhaler Inhale 2  puffs into the lungs every 6 (six) hours as needed for Wheezing or Shortness of Breath.    alendronate (FOSAMAX) 70 MG tablet Take 70 mg by mouth every 7 days.    aspirin (ECOTRIN) 81 MG EC tablet Take 1 tablet (81 mg total) by mouth once daily.    cloNIDine (CATAPRES) 0.2 MG tablet Take 0.2 mg by mouth 2 (two) times daily.    clorazepate (TRANXENE) 3.75 MG Tab Take 3.75 mg by mouth 2 (two) times daily as needed.    docusate sodium (COLACE) 100 MG capsule Take 100 mg by mouth 3 (three) times daily as needed for Constipation.    famotidine (PEPCID) 20 MG tablet Take 1 tablet (20 mg total) by mouth every evening.    gabapentin (NEURONTIN) 400 MG capsule Take 400 mg by mouth 3 (three) times daily.    magnesium oxide (MAG-OX) 400 mg tablet Take 400 mg by mouth once daily.    nicotine (NICODERM CQ) 21 mg/24 hr Place 1 patch onto the skin once daily.    oxycodone (ROXICODONE) 10 mg Tab immediate release tablet Take 1 tablet (10 mg total) by mouth every 4 (four) hours as needed.    paroxetine (PAXIL) 10 MG tablet Take 10 mg by mouth every morning.    polyethylene glycol (GLYCOLAX) 17 gram PwPk Take 17 g by mouth daily as needed.    quetiapine (SEROQUEL) 25 MG Tab Take 25 mg by mouth nightly as needed (Taking 1/2 tablet as needed at night.).    ribavirin (REBETOL) 200 MG Cap Take 3 capsules (600 mg total) by mouth 2 (two) times daily. Initial dose 400 mg  mg PM.    tacrolimus (PROGRAF) 1 MG Cap 2 mg in am and 1 mg in pm    venlafaxine (EFFEXOR-XR) 150 MG Cp24 Take 75 mg by mouth once daily.    vitamin D 1000 units Tab Take 2 tablets (2,000 Units total) by mouth once daily.    zinc gluconate 50 mg tablet Take 50 mg by mouth once daily.     Family History     Problem Relation (Age of Onset)    Alcohol abuse Mother, Father        Social History Main Topics    Smoking status: Former Smoker     Packs/day: 0.50     Types: Cigarettes     Quit date: 5/5/2017    Smokeless tobacco: Former User     Types:  Chew    Alcohol use No      Comment: former heavy alcohol user, quit 1 year ago    Drug use: No    Sexual activity: Not on file     Review of Systems   Constitutional: Negative for activity change, appetite change, chills, diaphoresis, fatigue, fever and unexpected weight change.   HENT: Negative for congestion, rhinorrhea, sore throat, trouble swallowing and voice change.    Eyes: Negative for visual disturbance.   Respiratory: Negative for cough, choking, chest tightness, shortness of breath and wheezing.    Cardiovascular: Negative for chest pain, palpitations and leg swelling.   Gastrointestinal: Negative for abdominal distention, abdominal pain, anal bleeding, blood in stool, constipation, diarrhea, nausea and vomiting.   Endocrine: Negative for cold intolerance, heat intolerance, polydipsia and polyuria.   Genitourinary: Negative for dysuria, flank pain, frequency, hematuria and urgency.   Musculoskeletal: Negative for arthralgias, back pain, joint swelling and myalgias.   Skin: Negative for color change and rash.   Neurological: Negative for dizziness, seizures, syncope, facial asymmetry, speech difficulty, weakness, light-headedness, numbness and headaches.   Hematological: Negative for adenopathy. Does not bruise/bleed easily.   Psychiatric/Behavioral: Positive for self-injury and suicidal ideas. Negative for agitation, confusion and hallucinations.     Objective:     Vital Signs (Most Recent):  Temp: 98 °F (36.7 °C) (03/14/18 0143)  Pulse: (!) 51 (03/14/18 0355)  Resp: 14 (03/14/18 0355)  BP: (!) 201/113 (03/14/18 0355)  SpO2: 96 % (03/14/18 0355) Vital Signs (24h Range):  Temp:  [97.8 °F (36.6 °C)-98 °F (36.7 °C)] 98 °F (36.7 °C)  Pulse:  [43-60] 51  Resp:  [13-29] 14  SpO2:  [93 %-99 %] 96 %  BP: (165-215)/() 201/113     Weight: 78 kg (171 lb 15.3 oz)  Body mass index is 23.98 kg/m².    Physical Exam   Constitutional: He is oriented to person, place, and time. He appears well-developed and  well-nourished. He appears lethargic. No distress.   HENT:   Head: Normocephalic and atraumatic.   Neck: Neck supple. Carotid bruit is not present. No thyromegaly present.   Cardiovascular: Normal rate and regular rhythm.  Exam reveals no gallop.    No murmur heard.  Pulmonary/Chest: Effort normal and breath sounds normal. No respiratory distress. He has no wheezes.   Abdominal: Bowel sounds are normal. He exhibits no distension. There is no splenomegaly or hepatomegaly. There is no tenderness.   Musculoskeletal: Normal range of motion. He exhibits no edema.   Neurological: He is oriented to person, place, and time. He appears lethargic. No cranial nerve deficit or sensory deficit.   Skin: Skin is warm and dry. No rash noted.   Psychiatric: He has a normal mood and affect. His behavior is normal. His speech is slurred. He expresses suicidal ideation. He expresses no homicidal ideation.           Significant Labs:   CBC:   Recent Labs  Lab 03/13/18 2002   WBC 8.75   HGB 15.9   HCT 46.3        CMP:   Recent Labs  Lab 03/13/18 2002 03/13/18  2212     --    K 4.2  --      --    CO2 25  --    *  --    BUN 8  --    CREATININE 0.93  --    CALCIUM 9.1  --    PROT 7.4 7.2   ALBUMIN 4.6 4.5   BILITOT 1.1* 1.1*   ALKPHOS 86 86   * 143*   ALT 94* 95*   ANIONGAP 13  --    EGFRNONAA >60.0  --      Urine Studies:   Recent Labs  Lab 03/13/18 2012   COLORU Yellow   APPEARANCEUA Clear   PHUR 6.0   SPECGRAV 1.015   PROTEINUA Trace*   GLUCUA Negative   KETONESU Negative   BILIRUBINUA Negative   OCCULTUA Trace*   NITRITE Negative   UROBILINOGEN Negative   LEUKOCYTESUR Negative       Significant Imaging: I have reviewed all pertinent imaging results/findings within the past 24 hours.    Assessment/Plan:     * Intentional overdose of drug in tablet form    Depression  Anxiety disorder    - Patient reports intentional OD on clonidine and gabapentin.  - Tox screen negative.  Blood ETOH 144.  - PEC in  place.  Sitter at bedside.  - Will consult psych.  - Telemetry, neuro checks q4hrs.  - Will hold clonidine 0.2mg BID, clorazepate 3.75mg BID PRN, gabapentin 400mg TID, oxycodone 10mg QID PRN, Paxil 10mg daily, Seroquel 25mg qHS PRN, and Effexor 75mg daily pending psych recommendations.        Hypertensive urgency    - /113.  - Patient reports taking 20mg clonidine and 1600mg gabapentin around 14:30 in suicide attempt.  Nevertheless, BP remains elevated.    - PRN hydralazine.  - Patient's only antihypertensive is clonidine 0.2mg BID.  This is likely a poor choice in this patient.  Consider switching.          Alcohol dependence with intoxication    Transaminitis    -  elevated from 23 on 2/12/2018, ALT up to 94 from 22, AlkPhos 86 from 68.  - Blood ETOH level 144.  - Complete cessation recommended.   - Hepatitis panel, although strongly suspect due to ETOH use.  - Thiamine, folic acid, multivitamin.  Monitor for withdrawals.        Liver transplant 1/11/2015 for HCV    Prophylactic immunotherapy    - Continue Prograf 2mg daily and 1mg qHS.  - Monitor levels.        Chronic back pain    - Will hold oxycodone.  Supportive care.            VTE Risk Mitigation         Ordered     Medium Risk of VTE  Once      03/14/18 0321     Place sequential compression device  Until discontinued      03/14/18 0321     Place MUSTAPHA hose  Until discontinued      03/14/18 0321             Jennifer Garcia PA-C  Department of Hospital Medicine   [facility]

## 2018-03-14 NOTE — CONSULTS
Hepatology Note    Consult received. Please see consult note from same date.    Please call with any additional concerns /questions    Rusty Beaulieu MD  Gastroenterology / Hepatology Fellow (PGY-V)  Pager: [phone]

## 2018-03-15 ENCOUNTER — HOSPITAL ENCOUNTER (INPATIENT)
Facility: HOSPITAL | Age: 58
LOS: 5 days | Discharge: HOME | DRG: 897 | End: 2018-03-20
Attending: PSYCHIATRY & NEUROLOGY | Admitting: PSYCHIATRY & NEUROLOGY
Payer: MEDICAID

## 2018-03-15 VITALS
TEMPERATURE: 98 F | SYSTOLIC BLOOD PRESSURE: 158 MMHG | WEIGHT: 171.94 LBS | RESPIRATION RATE: 18 BRPM | HEIGHT: 71 IN | HEART RATE: 52 BPM | OXYGEN SATURATION: 97 % | DIASTOLIC BLOOD PRESSURE: 87 MMHG | BODY MASS INDEX: 24.07 KG/M2

## 2018-03-15 DIAGNOSIS — T50.902A INTENTIONAL OVERDOSE OF DRUG IN TABLET FORM: ICD-10-CM

## 2018-03-15 DIAGNOSIS — M54.9 CHRONIC BACK PAIN, UNSPECIFIED BACK LOCATION, UNSPECIFIED BACK PAIN LATERALITY: Chronic | ICD-10-CM

## 2018-03-15 DIAGNOSIS — Z72.0 TOBACCO ABUSE: ICD-10-CM

## 2018-03-15 DIAGNOSIS — F10.20 ALCOHOL USE DISORDER, SEVERE, DEPENDENCE: Primary | ICD-10-CM

## 2018-03-15 DIAGNOSIS — G89.29 CHRONIC BACK PAIN, UNSPECIFIED BACK LOCATION, UNSPECIFIED BACK PAIN LATERALITY: Chronic | ICD-10-CM

## 2018-03-15 DIAGNOSIS — F41.9 ANXIETY DISORDER, UNSPECIFIED TYPE: ICD-10-CM

## 2018-03-15 DIAGNOSIS — F19.94 SUBSTANCE INDUCED MOOD DISORDER: Chronic | ICD-10-CM

## 2018-03-15 DIAGNOSIS — F32.A DEPRESSIVE DISORDER: ICD-10-CM

## 2018-03-15 DIAGNOSIS — I10 ESSENTIAL HYPERTENSION: ICD-10-CM

## 2018-03-15 DIAGNOSIS — Z94.4 STATUS POST LIVER TRANSPLANT: Chronic | ICD-10-CM

## 2018-03-15 DIAGNOSIS — R41.0 DELIRIUM: ICD-10-CM

## 2018-03-15 LAB
ALBUMIN SERPL BCP-MCNC: 3.6 G/DL
ALP SERPL-CCNC: 82 U/L
ALT SERPL W/O P-5'-P-CCNC: 33 U/L
ANION GAP SERPL CALC-SCNC: 11 MMOL/L
AST SERPL-CCNC: 26 U/L
BASOPHILS # BLD AUTO: 0.02 K/UL
BASOPHILS NFR BLD: 0.3 %
BILIRUB SERPL-MCNC: 0.9 MG/DL
BUN SERPL-MCNC: 13 MG/DL
CALCIUM SERPL-MCNC: 8.5 MG/DL
CHLORIDE SERPL-SCNC: 100 MMOL/L
CO2 SERPL-SCNC: 23 MMOL/L
CREAT SERPL-MCNC: 0.8 MG/DL
DIFFERENTIAL METHOD: ABNORMAL
EOSINOPHIL # BLD AUTO: 0.2 K/UL
EOSINOPHIL NFR BLD: 2 %
ERYTHROCYTE [DISTWIDTH] IN BLOOD BY AUTOMATED COUNT: 12.5 %
EST. GFR  (AFRICAN AMERICAN): >60 ML/MIN/1.73 M^2
EST. GFR  (NON AFRICAN AMERICAN): >60 ML/MIN/1.73 M^2
GLUCOSE SERPL-MCNC: 137 MG/DL
HCT VFR BLD AUTO: 41.5 %
HGB BLD-MCNC: 14.3 G/DL
IMM GRANULOCYTES # BLD AUTO: 0.03 K/UL
IMM GRANULOCYTES NFR BLD AUTO: 0.4 %
LYMPHOCYTES # BLD AUTO: 0.8 K/UL
LYMPHOCYTES NFR BLD: 11 %
MCH RBC QN AUTO: 31.1 PG
MCHC RBC AUTO-ENTMCNC: 34.5 G/DL
MCV RBC AUTO: 90 FL
MONOCYTES # BLD AUTO: 0.4 K/UL
MONOCYTES NFR BLD: 5.6 %
NEUTROPHILS # BLD AUTO: 6.2 K/UL
NEUTROPHILS NFR BLD: 80.7 %
NRBC BLD-RTO: 0 /100 WBC
PLATELET # BLD AUTO: 106 K/UL
PLATELET BLD QL SMEAR: ABNORMAL
PMV BLD AUTO: 11.1 FL
POTASSIUM SERPL-SCNC: 3.4 MMOL/L
PROT SERPL-MCNC: 5.9 G/DL
RBC # BLD AUTO: 4.6 M/UL
SODIUM SERPL-SCNC: 134 MMOL/L
TACROLIMUS BLD-MCNC: 6.5 NG/ML
WBC # BLD AUTO: 7.67 K/UL

## 2018-03-15 PROCEDURE — 80053 COMPREHEN METABOLIC PANEL: CPT

## 2018-03-15 PROCEDURE — 99232 SBSQ HOSP IP/OBS MODERATE 35: CPT | Mod: AF,HB,, | Performed by: PSYCHIATRY & NEUROLOGY

## 2018-03-15 PROCEDURE — 25000003 PHARM REV CODE 250: Performed by: PHYSICIAN ASSISTANT

## 2018-03-15 PROCEDURE — 25000003 PHARM REV CODE 250: Performed by: PSYCHIATRY & NEUROLOGY

## 2018-03-15 PROCEDURE — 94761 N-INVAS EAR/PLS OXIMETRY MLT: CPT

## 2018-03-15 PROCEDURE — 99239 HOSP IP/OBS DSCHRG MGMT >30: CPT | Mod: ,,, | Performed by: HOSPITALIST

## 2018-03-15 PROCEDURE — 63600175 PHARM REV CODE 636 W HCPCS: Performed by: PHYSICIAN ASSISTANT

## 2018-03-15 PROCEDURE — 12400001 HC PSYCH SEMI-PRIVATE ROOM

## 2018-03-15 PROCEDURE — 36415 COLL VENOUS BLD VENIPUNCTURE: CPT

## 2018-03-15 PROCEDURE — 25000003 PHARM REV CODE 250: Performed by: HOSPITALIST

## 2018-03-15 PROCEDURE — 80197 ASSAY OF TACROLIMUS: CPT

## 2018-03-15 PROCEDURE — 85025 COMPLETE CBC W/AUTO DIFF WBC: CPT

## 2018-03-15 RX ORDER — DIPHENHYDRAMINE HCL 50 MG
50 CAPSULE ORAL EVERY 4 HOURS PRN
Status: DISCONTINUED | OUTPATIENT
Start: 2018-03-15 | End: 2018-03-20 | Stop reason: HOSPADM

## 2018-03-15 RX ORDER — DIPHENHYDRAMINE HYDROCHLORIDE 50 MG/ML
50 INJECTION INTRAMUSCULAR; INTRAVENOUS EVERY 4 HOURS PRN
Status: DISCONTINUED | OUTPATIENT
Start: 2018-03-15 | End: 2018-03-20 | Stop reason: HOSPADM

## 2018-03-15 RX ORDER — NICOTINE 7MG/24HR
1 PATCH, TRANSDERMAL 24 HOURS TRANSDERMAL DAILY
Status: DISCONTINUED | OUTPATIENT
Start: 2018-03-16 | End: 2018-03-18

## 2018-03-15 RX ORDER — TACROLIMUS 0.5 MG/1
1 CAPSULE ORAL EVERY EVENING
Status: DISCONTINUED | OUTPATIENT
Start: 2018-03-15 | End: 2018-03-15

## 2018-03-15 RX ORDER — ADHESIVE BANDAGE
30 BANDAGE TOPICAL DAILY PRN
Status: DISCONTINUED | OUTPATIENT
Start: 2018-03-15 | End: 2018-03-20 | Stop reason: HOSPADM

## 2018-03-15 RX ORDER — THIAMINE HCL 100 MG
100 TABLET ORAL DAILY
Status: DISCONTINUED | OUTPATIENT
Start: 2018-03-16 | End: 2018-03-15

## 2018-03-15 RX ORDER — FOLIC ACID 1 MG/1
1 TABLET ORAL DAILY
Status: DISCONTINUED | OUTPATIENT
Start: 2018-03-16 | End: 2018-03-20 | Stop reason: HOSPADM

## 2018-03-15 RX ORDER — LORAZEPAM 1 MG/1
2 TABLET ORAL EVERY 4 HOURS PRN
Status: DISCONTINUED | OUTPATIENT
Start: 2018-03-15 | End: 2018-03-20 | Stop reason: HOSPADM

## 2018-03-15 RX ORDER — DOCUSATE SODIUM 100 MG/1
100 CAPSULE, LIQUID FILLED ORAL DAILY PRN
Status: DISCONTINUED | OUTPATIENT
Start: 2018-03-15 | End: 2018-03-20 | Stop reason: HOSPADM

## 2018-03-15 RX ORDER — HALOPERIDOL 5 MG/ML
5 INJECTION INTRAMUSCULAR EVERY 4 HOURS PRN
Status: DISCONTINUED | OUTPATIENT
Start: 2018-03-15 | End: 2018-03-20 | Stop reason: HOSPADM

## 2018-03-15 RX ORDER — CHOLECALCIFEROL (VITAMIN D3) 25 MCG
2000 TABLET ORAL DAILY
Status: DISCONTINUED | OUTPATIENT
Start: 2018-03-16 | End: 2018-03-20 | Stop reason: HOSPADM

## 2018-03-15 RX ORDER — TACROLIMUS 0.5 MG/1
1 CAPSULE ORAL EVERY EVENING
Status: DISCONTINUED | OUTPATIENT
Start: 2018-03-15 | End: 2018-03-20 | Stop reason: HOSPADM

## 2018-03-15 RX ORDER — CLONIDINE HYDROCHLORIDE 0.1 MG/1
0.2 TABLET ORAL 2 TIMES DAILY
Status: DISCONTINUED | OUTPATIENT
Start: 2018-03-15 | End: 2018-03-19

## 2018-03-15 RX ORDER — FOLIC ACID 1 MG/1
1 TABLET ORAL DAILY
Status: DISCONTINUED | OUTPATIENT
Start: 2018-03-16 | End: 2018-03-15

## 2018-03-15 RX ORDER — THIAMINE HCL 100 MG
100 TABLET ORAL DAILY
Status: DISCONTINUED | OUTPATIENT
Start: 2018-03-16 | End: 2018-03-20 | Stop reason: HOSPADM

## 2018-03-15 RX ORDER — LANOLIN ALCOHOL/MO/W.PET/CERES
800 CREAM (GRAM) TOPICAL 2 TIMES DAILY
Status: DISCONTINUED | OUTPATIENT
Start: 2018-03-15 | End: 2018-03-20 | Stop reason: HOSPADM

## 2018-03-15 RX ORDER — HYDROXYZINE HYDROCHLORIDE 25 MG/1
25 TABLET, FILM COATED ORAL NIGHTLY PRN
Status: DISCONTINUED | OUTPATIENT
Start: 2018-03-15 | End: 2018-03-20 | Stop reason: HOSPADM

## 2018-03-15 RX ORDER — SENNOSIDES 8.6 MG/1
8.6 TABLET ORAL 2 TIMES DAILY
Status: DISCONTINUED | OUTPATIENT
Start: 2018-03-15 | End: 2018-03-20 | Stop reason: HOSPADM

## 2018-03-15 RX ORDER — HALOPERIDOL 5 MG/1
5 TABLET ORAL EVERY 8 HOURS PRN
Status: DISCONTINUED | OUTPATIENT
Start: 2018-03-15 | End: 2018-03-20 | Stop reason: HOSPADM

## 2018-03-15 RX ORDER — TACROLIMUS 0.5 MG/1
2 CAPSULE ORAL EVERY MORNING
Status: DISCONTINUED | OUTPATIENT
Start: 2018-03-16 | End: 2018-03-20 | Stop reason: HOSPADM

## 2018-03-15 RX ORDER — FAMOTIDINE 20 MG/1
20 TABLET, FILM COATED ORAL NIGHTLY
Status: DISCONTINUED | OUTPATIENT
Start: 2018-03-15 | End: 2018-03-20 | Stop reason: HOSPADM

## 2018-03-15 RX ORDER — ACETAMINOPHEN 325 MG/1
650 TABLET ORAL EVERY 6 HOURS PRN
Status: DISCONTINUED | OUTPATIENT
Start: 2018-03-15 | End: 2018-03-20 | Stop reason: HOSPADM

## 2018-03-15 RX ORDER — NAPROXEN SODIUM 220 MG/1
81 TABLET, FILM COATED ORAL DAILY
Status: DISCONTINUED | OUTPATIENT
Start: 2018-03-16 | End: 2018-03-20 | Stop reason: HOSPADM

## 2018-03-15 RX ADMIN — THERA TABS 1 TABLET: TAB at 09:03

## 2018-03-15 RX ADMIN — CLONIDINE HYDROCHLORIDE 0.2 MG: 0.1 TABLET ORAL at 09:03

## 2018-03-15 RX ADMIN — TACROLIMUS 1 MG: 0.5 CAPSULE ORAL at 06:03

## 2018-03-15 RX ADMIN — MAGNESIUM OXIDE TAB 400 MG (241.3 MG ELEMENTAL MG) 800 MG: 400 (241.3 MG) TAB at 09:03

## 2018-03-15 RX ADMIN — VITAMIN D, TAB 1000IU (100/BT) 2000 UNITS: 25 TAB at 09:03

## 2018-03-15 RX ADMIN — TACROLIMUS 2 MG: 1 CAPSULE ORAL at 09:03

## 2018-03-15 RX ADMIN — ASPIRIN 81 MG: 81 TABLET, COATED ORAL at 09:03

## 2018-03-15 RX ADMIN — FAMOTIDINE 20 MG: 20 TABLET, FILM COATED ORAL at 09:03

## 2018-03-15 RX ADMIN — FOLIC ACID 1 MG: 1 TABLET ORAL at 09:03

## 2018-03-15 RX ADMIN — ACETAMINOPHEN 650 MG: 325 TABLET ORAL at 03:03

## 2018-03-15 RX ADMIN — Medication 100 MG: at 09:03

## 2018-03-15 RX ADMIN — SENNOSIDES 8.6 MG: 8.6 TABLET, FILM COATED ORAL at 09:03

## 2018-03-15 RX ADMIN — POLYETHYLENE GLYCOL 3350 17 G: 17 POWDER, FOR SOLUTION ORAL at 11:03

## 2018-03-15 RX ADMIN — STANDARDIZED SENNA CONCENTRATE AND DOCUSATE SODIUM 1 TABLET: 8.6; 5 TABLET, FILM COATED ORAL at 09:03

## 2018-03-15 NOTE — HOSPITAL COURSE
3/15/2018 : Patient continues to refuse need for Psychiatric hospitalization and reports embarrassment regarding overdose.  Mental status and cognition have improved.  Discussed improvement in blood pressure and future plans.

## 2018-03-15 NOTE — H&P
[facility]  Psychiatry  History & Physical    Patient Name: Dereck Centeno  MRN: [id number]   Code Status: Full Code  Admission Date: 3/15/2018  Attending Physician: Lyle Machado MD   Primary Care Provider: Eva Reynaga MD    Current Legal Status: [facility]    Patient information was obtained from patient, relative(s) and ER records.     Subjective:     Principal Problem: Overdose    Chief Complaint:  Overdose     HPI:   History of Present Illness:   Dereck Centeno is a 57 y.o. male with a history of anxiety, depression, chronic back pain, liver transplant (2015) secondary to HCV/ETOH abuse.  He presents with intentional overdose.  He states that he took 20mg clonidine and 1600mg gabapentin at 14:30 yesterday.  He also reports drinking ETOH.  Patient very difficult to understand on exam, but states that he was stressed about his living situation.       Patient initially cooperative with interview but later attempted to leave the room.  He was redirected by sitter and cooperated when threatened that security could be called.  Patient explains that he overdosed last night in a suicide attempt impulsively.  He has been struggling with depression for years and reports compliance with medications but cannot name what he takes, dosages, who or where he is seen for outpatient psychiatric care.  Initially denies alcohol use but when confronted with BAL in ED endorses recent alcohol use. Prior to this relapse he has been sober for many months.  Patient reports feeling guilty about relapsing on alcohol.  He does not plan on engaging in psychiatric care and would like to return home as soon as possible.  Interview was difficult to conduct due to patient's poor memory and difficulty with concentration.  His speech was slurred but improved when he removed his dentures.        Collateral: Dianne Centeno, sister in law, [phone]  Patient's sister in law was shocked regarding the patient's overdose.   "Patient has been living in her home and she is responsible for making sure he has his medications.  She left to go to [place] for three days and the patient relapsed on alcohol and overdosed.  Patient is working at [employer] part time, attending meetings for [employer].  There were no signs that he would attempt suicide.  Sister in law describes patient's baseline as "a bit slow" but reports that his mental status changed last night after the overdose.       Medical Review Of Systems:  Pertinent items are noted in HPI.     Psychiatric Review Of Systems - Is patient experiencing or having changes in:  sleep: no  appetite: no  weight: no  energy/anergy: no  interest/pleasure/anhedonia: yes  somatic symptoms: yes  libido: no  anxiety/panic: yes  guilty/hopelessness: yes  concentration: yes  S.I.B.s/risky behavior: yes  any drugs: no  alcohol: yes      Allergies:  Patient has no known allergies.     Past Medical/Surgical History       Past Medical History:   Diagnosis Date    Alcohol withdrawal seizure      Alcoholic cirrhosis      Anxiety      Depression      Hepatitis C      History of throat cancer      HTN (hypertension), benign      Tobacco use         has a past medical history of Alcohol withdrawal seizure; Alcoholic cirrhosis; Anxiety; Depression; Hepatitis C; History of throat cancer; HTN (hypertension), benign; and Tobacco use.        Past Surgical History:   Procedure Laterality Date    ESOPHAGEAL VARICE LIGATION        LIVER TRANSPLANT        TIPS PROCEDURE             Past Psychiatric History:  Previous Medication Trials: yes, Paxil 10mg daily, Seroquel 25mg qHS PRN, and Effexor 75mg daily  Previous Psychiatric Hospitalizations: yes, 2 related to prior suicide attempts/overdoses  Previous Suicide Attempts: yes   History of Violence: yes  Outpatient Psychiatrist: yes     Social History:  Marital Status:   Children: 3   Employment Status/Info: currently employed, part time at [employer]  Education: " "high school diploma/GED  Special Ed: no  Housing Status: with sister in law and brother  History of phys/sexual abuse: no  Access to gun: no     Substance Abuse History:  Recreational Drugs: patient states "i've tried them all". Sober for several years from IV drug use with heroin, amphetamines, crack  Use of Alcohol: patient is an alcoholic, reports recent relapse with light drinking  Rehab History:yes   Tobacco Use:yes  Use of Caffeine: denies use  Use of OTC: denied  Legal consequences of chemical use: yes     Legal History:  Past Charges/Incarcerations:yes, several incarcerations related to drugs   Pending charges:no      Family Psychiatric History:   denied           Patient History           Medical as of 3/15/2018     Past Medical History     Diagnosis Date Comments Source    Alcohol withdrawal seizure -- -- Provider    Alcoholic cirrhosis -- -- Provider    Anxiety -- -- Provider    Depression -- -- Provider    Hepatitis C -- -- Provider    History of throat cancer -- -- Provider    HTN (hypertension), benign -- -- Provider    Tobacco use -- -- Provider                  Surgical as of 3/15/2018     Past Surgical History     Procedure Laterality Date Comments Source    TIPS PROCEDURE -- -- -- Provider    ESOPHAGEAL VARICE LIGATION -- -- -- Provider    LIVER TRANSPLANT -- -- -- Provider                  Family as of 3/15/2018     Problem Relation Name Age of Onset Comments Source    Alcohol abuse Mother -- -- -- Provider    Alcohol abuse Father -- -- -- Provider            Tobacco Use as of 3/15/2018     Smoking Status Smoking Start Date Smoking Quit Date Packs/day Years Used    Former Smoker -- 5/5/2017 0.50 --    Types Comments Smokeless Tobacco Status Smokeless Tobacco Quit Date Source     Cigarettes, Chew -- Former User -- Provider            Alcohol Use as of 3/15/2018     Alcohol Use Drinks/Week Alcohol/Week Comments Source    No -- -- former heavy alcohol user, quit 1 year ago Provider            Drug " Use as of 3/15/2018     Drug Use Types Frequency Comments Source    No -- -- -- Provider            Sexual Activity as of 3/15/2018     Sexually Active Birth Control Partners Comments Source    Not Asked -- -- -- Provider            Activities of Daily Living as of 3/15/2018    **None**           Social Documentation as of 3/15/2018    **None**           Occupational as of 3/15/2018    **None**           Socioeconomic as of 3/15/2018     Marital Status Spouse Name Number of Children Years Education Preferred Language Ethnicity Race Source    Single -- -- -- English /White White Provider         Pertinent History Q A Comments    as of 3/15/2018 Lives with      Place in Birth Order      Lives in      Number of Siblings      Raised by      Legal Involvement      Childhood Trauma      Criminal History of      Financial Status      Highest Level of Education      Does patient have access to a firearm?       Service      Primary Leisure Activity      Spirituality       Past Medical History:   Diagnosis Date    Alcohol withdrawal seizure     Alcoholic cirrhosis     Anxiety     Depression     Hepatitis C     History of throat cancer     HTN (hypertension), benign     Tobacco use      Past Surgical History:   Procedure Laterality Date    ESOPHAGEAL VARICE LIGATION      LIVER TRANSPLANT      TIPS PROCEDURE       Family History     Problem Relation (Age of Onset)    Alcohol abuse Mother, Father        Social History Main Topics    Smoking status: Former Smoker     Packs/day: 0.50     Types: Cigarettes     Quit date: 5/5/2017    Smokeless tobacco: Former User     Types: Chew    Alcohol use No      Comment: former heavy alcohol user, quit 1 year ago    Drug use: No    Sexual activity: Not on file     Review of patient's allergies indicates:  No Known Allergies    Current Facility-Administered Medications on File Prior to Encounter   Medication    [DISCONTINUED] acetaminophen tablet 650 mg     [DISCONTINUED] albuterol-ipratropium 2.5mg-0.5mg/3mL nebulizer solution 3 mL    [DISCONTINUED] aspirin EC tablet 81 mg    [DISCONTINUED] cloNIDine tablet 0.2 mg    [DISCONTINUED] dextrose 50% injection 12.5 g    [DISCONTINUED] dextrose 50% injection 25 g    [DISCONTINUED] famotidine tablet 20 mg    [DISCONTINUED] folic acid tablet 1 mg    [DISCONTINUED] glucagon (human recombinant) injection 1 mg    [DISCONTINUED] glucose chewable tablet 16 g    [DISCONTINUED] glucose chewable tablet 24 g    [DISCONTINUED] hydrALAZINE injection 10 mg    [DISCONTINUED] magnesium oxide tablet 800 mg    [DISCONTINUED] multivitamin tablet 1 tablet    [DISCONTINUED] ondansetron disintegrating tablet 8 mg    [DISCONTINUED] polyethylene glycol packet 17 g    [DISCONTINUED] promethazine tablet 25 mg    [DISCONTINUED] ramelteon tablet 8 mg    [DISCONTINUED] senna-docusate 8.6-50 mg per tablet 1 tablet    [DISCONTINUED] sodium chloride 0.9% flush 5 mL    [DISCONTINUED] tacrolimus capsule 1 mg    [DISCONTINUED] tacrolimus capsule 2 mg    [DISCONTINUED] thiamine tablet 100 mg    [DISCONTINUED] vitamin D 1000 units tablet 2,000 Units     Current Outpatient Prescriptions on File Prior to Encounter   Medication Sig    albuterol (PROVENTIL HFA) 90 mcg/actuation inhaler Inhale 2 puffs into the lungs every 6 (six) hours as needed for Wheezing or Shortness of Breath.    alendronate (FOSAMAX) 70 MG tablet Take 70 mg by mouth every 7 days.    aspirin (ECOTRIN) 81 MG EC tablet Take 1 tablet (81 mg total) by mouth once daily.    cloNIDine (CATAPRES) 0.2 MG tablet Take 0.2 mg by mouth 2 (two) times daily.    docusate sodium (COLACE) 100 MG capsule Take 100 mg by mouth 3 (three) times daily as needed for Constipation.    famotidine (PEPCID) 20 MG tablet Take 1 tablet (20 mg total) by mouth every evening.    magnesium oxide (MAG-OX) 400 mg tablet Take 400 mg by mouth once daily.    [START ON 3/16/2018] multivitamin (THERAGRAN)  tablet Take 1 tablet by mouth once daily.    tacrolimus (PROGRAF) 1 MG Cap 2 mg in am and 1 mg in pm    vitamin D 1000 units Tab Take 2 tablets (2,000 Units total) by mouth once daily.    [DISCONTINUED] clorazepate (TRANXENE) 3.75 MG Tab Take 3.75 mg by mouth 2 (two) times daily as needed.    [DISCONTINUED] gabapentin (NEURONTIN) 400 MG capsule Take 400 mg by mouth 3 (three) times daily.    [DISCONTINUED] nicotine (NICODERM CQ) 21 mg/24 hr Place 1 patch onto the skin once daily.    [DISCONTINUED] oxycodone (ROXICODONE) 10 mg Tab immediate release tablet Take 1 tablet (10 mg total) by mouth every 4 (four) hours as needed.    [DISCONTINUED] paroxetine (PAXIL) 10 MG tablet Take 10 mg by mouth every morning.    [DISCONTINUED] polyethylene glycol (GLYCOLAX) 17 gram PwPk Take 17 g by mouth daily as needed.    [DISCONTINUED] quetiapine (SEROQUEL) 25 MG Tab Take 25 mg by mouth nightly as needed (Taking 1/2 tablet as needed at night.).    [DISCONTINUED] ribavirin (REBETOL) 200 MG Cap Take 3 capsules (600 mg total) by mouth 2 (two) times daily. Initial dose 400 mg  mg PM.    [DISCONTINUED] venlafaxine (EFFEXOR-XR) 150 MG Cp24 Take 75 mg by mouth once daily.    [DISCONTINUED] zinc gluconate 50 mg tablet Take 50 mg by mouth once daily.     Psychotherapeutics     Start     Stop Route Frequency Ordered    03/15/18 1646  haloperidol tablet 5 mg  (Med - Acute  Behavioral Management)      -- Oral Every 8 hours PRN 03/15/18 1646    03/15/18 1646  LORazepam tablet 2 mg  (Med - Acute  Behavioral Management)      -- Oral Every 4 hours PRN 03/15/18 1646    03/15/18 1646  haloperidol lactate injection 5 mg  (Med - Acute  Behavioral Management)      -- IM Every 4 hours PRN 03/15/18 1646    03/15/18 1646  lorazepam (ATIVAN) injection 2 mg  (Med - Acute  Behavioral Management)      -- IM Every 4 hours PRN 03/15/18 1646        Review of Systems  Strengths and Liabilities: Strength: Patient has positive support  network.    Objective:     Vital Signs (Most Recent):    Vital Signs (24h Range):  Temp:  [98.4 °F (36.9 °C)-99 °F (37.2 °C)] 98.4 °F (36.9 °C)  Pulse:  [50-62] 52  Resp:  [16-18] 18  SpO2:  [94 %-97 %] 97 %  BP: (130-182)/(72-99) 158/87           There is no height or weight on file to calculate BMI.    No intake or output data in the 24 hours ending 03/15/18 1656    Physical Exam   Eyes: EOM are normal.     NEUROLOGICAL EXAMINATION:     CRANIAL NERVES   Cranial nerves II through XII intact.     CN II   Visual fields full to confrontation.   Visual acuity: normal  Right visual field deficit: none  Left visual field deficit: none     CN III, IV, VI   Extraocular motions are normal.   Right pupil: Shape: regular.   Left pupil: Shape: regular.   CN III: no CN III palsy  CN VI: no CN VI palsy  Diplopia: none  Upgaze: normal  Downgaze: normal    CN V   Facial sensation intact.   Right facial sensation deficit: none  Left facial sensation deficit: none  Right corneal reflex: normal  Left corneal reflex: normal  Jaw jerk: normal    CN VII   Facial expression full, symmetric.   Right facial weakness: none  Left facial weakness: none    CN VIII   CN VIII normal.   Hearing: intact    CN IX, X   CN IX normal.   CN X normal.   Palate: symmetric  Right gag reflex: normal  Left gag reflex: normal    CN XI   CN XI normal.   Right sternocleidomastoid strength: normal  Left sternocleidomastoid strength: normal  Right trapezius strength: normal  Left trapezius strength: normal    CN XII   CN XII normal.   Tongue: not atrophic  Fasciculations: absent  Tongue deviation: none       Physical Exam:  Gen: Drowsy, irritable, uncooperative, NAD   Head: MMM   Lungs: respirations unlabored   Chest wall: No tenderness or deformity   Heart: RRR   Abdomen: +BS   Extremities: Extremities normal, atraumatic   Pulses: 2+ and symmetric all extremities   Skin: Skin color, texture, turgor normal   Neurologic: CN II-XII grossly intact      Mental  Status Exam:  Appearance: older than stated age, disheveled, lying in bed   Behavior: uncooperative, hostile, eye contact minimal   Speech/Language: normal tone, normal pitch, slowed, dysarthia   Mood: irritable   Affect: mood congruent   Thought Process:  poverty of thought   Thought Content: suicidal thoughts: (passive-yes)   Orientation: grossly intact   Cognition: impaired   Insight: poor   Judgment: poor       Significant Labs:   Last 24 Hours:   Recent Lab Results       03/15/18  0429      Immature Granulocytes 0.4     Immature Grans (Abs) 0.03  Comment:  Mild elevation in immature granulocytes is non specific and   can be seen in a variety of conditions including stress response,   acute inflammation, trauma and pregnancy. Correlation with other   laboratory and clinical findings is essential.       Albumin 3.6     Alkaline Phosphatase 82     ALT 33     Anion Gap 11     AST 26     Baso # 0.02     Basophil% 0.3     Total Bilirubin 0.9  Comment:  For infants and newborns, interpretation of results should be based  on gestational age, weight and in agreement with clinical  observations.  Premature Infant recommended reference ranges:  Up to 24 hours.............<8.0 mg/dL  Up to 48 hours............<12.0 mg/dL  3-5 days..................<15.0 mg/dL  6-29 days.................<15.0 mg/dL       BUN, Bld 13     Calcium 8.5(L)     Chloride 100     CO2 23     Creatinine 0.8     Differential Method Automated     eGFR if African American >60.0     eGFR if non  >60.0  Comment:  Calculation used to obtain the estimated glomerular filtration  rate (eGFR) is the CKD-EPI equation.        Eos # 0.2     Eosinophil% 2.0     Glucose 137(H)     Gran # (ANC) 6.2     Gran% 80.7(H)     Hematocrit 41.5     Hemoglobin 14.3     Lymph # 0.8(L)     Lymph% 11.0(L)     MCH 31.1(H)     MCHC 34.5     MCV 90     Mono # 0.4     Mono% 5.6     MPV 11.1     nRBC 0     Platelet Estimate Decreased(A)     Platelets 106(L)      Potassium 3.4(L)     Total Protein 5.9(L)     RBC 4.60     RDW 12.5     Sodium 134(L)     Tacrolimus Lvl 6.5  Comment:  Testing performed by Liquid Chromatography-Tandem  Mass Spectrometry (LC-MS/MS).  This test was developed and its performance characteristics  determined by [facility], Department of Pathology  and Laboratory Medicine in a manner consistent with CLIA  requirements. It has not been cleared or approved by the [place]  Food and Drug Administration.  This test is used for clinical   purposes.  It should not be regarded as investigational or for  research.       WBC 7.67           Significant Imaging: None    Assessment/Plan:     Intentional overdose of drug in tablet form    Patient was admitted to Hospital Medicine due to rebound hypertension.  Initially clonidine was held but later restarted at home dose of 0.2mg BID prior to discharge.  Other home medications of clorazepate 3.75mg BID PRN, gabapentin 400mg TID, oxycodone 10mg QID PRN, Paxil 10mg daily, Seroquel 25mg qHS PRN, and Effexor 75mg held.  Will defer to primary team to restart as needed.   Suicide Precautions         Delirium    Patient initially struggled with clear communication and concentration after overdose.  He was unstable on his feet and attempted to elope from the ICU during the psychiatric consult interview.  On day of discharge from medicine he was alert and oriented and appears to have returned to baseline.         Alcohol use disorder, severe, dependence    Patient was admitted with BAL of 144. He reports relapse after long period (years) of sobriety.  Substance Abuse treatment to be discussed prior to discharge  Patient has is a recipient of a liver transplant   Folic Acid, Multivitamin, Thiamine         HTN (hypertension)    Improving, continued home clonidine         Tobacco abuse    Will order a nicotine patch            Estimated Discharge Date:   Initial Discharge Plan: Other: Residential Rehab    Prognosis:  Fair    Need for Continued Hospitalization:   Psychiatric illness continues to pose a potential threat to life or bodily function, of self or others, thereby requiring the need for continued inpatient psychiatric hospitalization.    Total Time: 30 with greater than 50% of time spent in counseling and/or coordination of care.     Eva Dykes MD   Psychiatry  [facility]

## 2018-03-15 NOTE — SUBJECTIVE & OBJECTIVE
Patient History           Medical as of 3/15/2018     Past Medical History     Diagnosis Date Comments Source    Alcohol withdrawal seizure -- -- Provider    Alcoholic cirrhosis -- -- Provider    Anxiety -- -- Provider    Depression -- -- Provider    Hepatitis C -- -- Provider    History of throat cancer -- -- Provider    HTN (hypertension), benign -- -- Provider    Tobacco use -- -- Provider                  Surgical as of 3/15/2018     Past Surgical History     Procedure Laterality Date Comments Source    TIPS PROCEDURE -- -- -- Provider    ESOPHAGEAL VARICE LIGATION -- -- -- Provider    LIVER TRANSPLANT -- -- -- Provider                  Family as of 3/15/2018     Problem Relation Name Age of Onset Comments Source    Alcohol abuse Mother -- -- -- Provider    Alcohol abuse Father -- -- -- Provider            Tobacco Use as of 3/15/2018     Smoking Status Smoking Start Date Smoking Quit Date Packs/day Years Used    Former Smoker -- 5/5/2017 0.50 --    Types Comments Smokeless Tobacco Status Smokeless Tobacco Quit Date Source     Cigarettes, Chew -- Former User -- Provider            Alcohol Use as of 3/15/2018     Alcohol Use Drinks/Week Alcohol/Week Comments Source    No -- -- former heavy alcohol user, quit 1 year ago Provider            Drug Use as of 3/15/2018     Drug Use Types Frequency Comments Source    No -- -- -- Provider            Sexual Activity as of 3/15/2018     Sexually Active Birth Control Partners Comments Source    Not Asked -- -- -- Provider            Activities of Daily Living as of 3/15/2018    **None**           Social Documentation as of 3/15/2018    **None**           Occupational as of 3/15/2018    **None**           Socioeconomic as of 3/15/2018     Marital Status Spouse Name Number of Children Years Education Preferred Language Ethnicity Race Source    Single -- -- -- English /White White Provider         Pertinent History Q A Comments    as of 3/15/2018 Lives with       Place in Birth Order      Lives in      Number of Siblings      Raised by      Legal Involvement      Childhood Trauma      Criminal History of      Financial Status      Highest Level of Education      Does patient have access to a firearm?       Service      Primary Leisure Activity      Spirituality       Past Medical History:   Diagnosis Date    Alcohol withdrawal seizure     Alcoholic cirrhosis     Anxiety     Depression     Hepatitis C     History of throat cancer     HTN (hypertension), benign     Tobacco use      Past Surgical History:   Procedure Laterality Date    ESOPHAGEAL VARICE LIGATION      LIVER TRANSPLANT      TIPS PROCEDURE       Family History     Problem Relation (Age of Onset)    Alcohol abuse Mother, Father        Social History Main Topics    Smoking status: Former Smoker     Packs/day: 0.50     Types: Cigarettes     Quit date: 5/5/2017    Smokeless tobacco: Former User     Types: Chew    Alcohol use No      Comment: former heavy alcohol user, quit 1 year ago    Drug use: No    Sexual activity: Not on file     Review of patient's allergies indicates:  No Known Allergies    Current Facility-Administered Medications on File Prior to Encounter   Medication    [DISCONTINUED] acetaminophen tablet 650 mg    [DISCONTINUED] albuterol-ipratropium 2.5mg-0.5mg/3mL nebulizer solution 3 mL    [DISCONTINUED] aspirin EC tablet 81 mg    [DISCONTINUED] cloNIDine tablet 0.2 mg    [DISCONTINUED] dextrose 50% injection 12.5 g    [DISCONTINUED] dextrose 50% injection 25 g    [DISCONTINUED] famotidine tablet 20 mg    [DISCONTINUED] folic acid tablet 1 mg    [DISCONTINUED] glucagon (human recombinant) injection 1 mg    [DISCONTINUED] glucose chewable tablet 16 g    [DISCONTINUED] glucose chewable tablet 24 g    [DISCONTINUED] hydrALAZINE injection 10 mg    [DISCONTINUED] magnesium oxide tablet 800 mg    [DISCONTINUED] multivitamin tablet 1 tablet    [DISCONTINUED] ondansetron  disintegrating tablet 8 mg    [DISCONTINUED] polyethylene glycol packet 17 g    [DISCONTINUED] promethazine tablet 25 mg    [DISCONTINUED] ramelteon tablet 8 mg    [DISCONTINUED] senna-docusate 8.6-50 mg per tablet 1 tablet    [DISCONTINUED] sodium chloride 0.9% flush 5 mL    [DISCONTINUED] tacrolimus capsule 1 mg    [DISCONTINUED] tacrolimus capsule 2 mg    [DISCONTINUED] thiamine tablet 100 mg    [DISCONTINUED] vitamin D 1000 units tablet 2,000 Units     Current Outpatient Prescriptions on File Prior to Encounter   Medication Sig    albuterol (PROVENTIL HFA) 90 mcg/actuation inhaler Inhale 2 puffs into the lungs every 6 (six) hours as needed for Wheezing or Shortness of Breath.    alendronate (FOSAMAX) 70 MG tablet Take 70 mg by mouth every 7 days.    aspirin (ECOTRIN) 81 MG EC tablet Take 1 tablet (81 mg total) by mouth once daily.    cloNIDine (CATAPRES) 0.2 MG tablet Take 0.2 mg by mouth 2 (two) times daily.    docusate sodium (COLACE) 100 MG capsule Take 100 mg by mouth 3 (three) times daily as needed for Constipation.    famotidine (PEPCID) 20 MG tablet Take 1 tablet (20 mg total) by mouth every evening.    magnesium oxide (MAG-OX) 400 mg tablet Take 400 mg by mouth once daily.    [START ON 3/16/2018] multivitamin (THERAGRAN) tablet Take 1 tablet by mouth once daily.    tacrolimus (PROGRAF) 1 MG Cap 2 mg in am and 1 mg in pm    vitamin D 1000 units Tab Take 2 tablets (2,000 Units total) by mouth once daily.    [DISCONTINUED] clorazepate (TRANXENE) 3.75 MG Tab Take 3.75 mg by mouth 2 (two) times daily as needed.    [DISCONTINUED] gabapentin (NEURONTIN) 400 MG capsule Take 400 mg by mouth 3 (three) times daily.    [DISCONTINUED] nicotine (NICODERM CQ) 21 mg/24 hr Place 1 patch onto the skin once daily.    [DISCONTINUED] oxycodone (ROXICODONE) 10 mg Tab immediate release tablet Take 1 tablet (10 mg total) by mouth every 4 (four) hours as needed.    [DISCONTINUED] paroxetine (PAXIL) 10 MG  tablet Take 10 mg by mouth every morning.    [DISCONTINUED] polyethylene glycol (GLYCOLAX) 17 gram PwPk Take 17 g by mouth daily as needed.    [DISCONTINUED] quetiapine (SEROQUEL) 25 MG Tab Take 25 mg by mouth nightly as needed (Taking 1/2 tablet as needed at night.).    [DISCONTINUED] ribavirin (REBETOL) 200 MG Cap Take 3 capsules (600 mg total) by mouth 2 (two) times daily. Initial dose 400 mg  mg PM.    [DISCONTINUED] venlafaxine (EFFEXOR-XR) 150 MG Cp24 Take 75 mg by mouth once daily.    [DISCONTINUED] zinc gluconate 50 mg tablet Take 50 mg by mouth once daily.     Psychotherapeutics     Start     Stop Route Frequency Ordered    03/15/18 1646  haloperidol tablet 5 mg  (Med - Acute  Behavioral Management)      -- Oral Every 8 hours PRN 03/15/18 1646    03/15/18 1646  LORazepam tablet 2 mg  (Med - Acute  Behavioral Management)      -- Oral Every 4 hours PRN 03/15/18 1646    03/15/18 1646  haloperidol lactate injection 5 mg  (Med - Acute  Behavioral Management)      -- IM Every 4 hours PRN 03/15/18 1646    03/15/18 1646  lorazepam (ATIVAN) injection 2 mg  (Med - Acute  Behavioral Management)      -- IM Every 4 hours PRN 03/15/18 1646        Review of Systems  Strengths and Liabilities: Strength: Patient has positive support network.    Objective:     Vital Signs (Most Recent):    Vital Signs (24h Range):  Temp:  [98.4 °F (36.9 °C)-99 °F (37.2 °C)] 98.4 °F (36.9 °C)  Pulse:  [50-62] 52  Resp:  [16-18] 18  SpO2:  [94 %-97 %] 97 %  BP: (130-182)/(72-99) 158/87           There is no height or weight on file to calculate BMI.    No intake or output data in the 24 hours ending 03/15/18 1656    Physical Exam   Eyes: EOM are normal.     NEUROLOGICAL EXAMINATION:     CRANIAL NERVES   Cranial nerves II through XII intact.     CN II   Visual fields full to confrontation.   Visual acuity: normal  Right visual field deficit: none  Left visual field deficit: none     CN III, IV, VI   Extraocular motions are normal.    Right pupil: Shape: regular.   Left pupil: Shape: regular.   CN III: no CN III palsy  CN VI: no CN VI palsy  Diplopia: none  Upgaze: normal  Downgaze: normal    CN V   Facial sensation intact.   Right facial sensation deficit: none  Left facial sensation deficit: none  Right corneal reflex: normal  Left corneal reflex: normal  Jaw jerk: normal    CN VII   Facial expression full, symmetric.   Right facial weakness: none  Left facial weakness: none    CN VIII   CN VIII normal.   Hearing: intact    CN IX, X   CN IX normal.   CN X normal.   Palate: symmetric  Right gag reflex: normal  Left gag reflex: normal    CN XI   CN XI normal.   Right sternocleidomastoid strength: normal  Left sternocleidomastoid strength: normal  Right trapezius strength: normal  Left trapezius strength: normal    CN XII   CN XII normal.   Tongue: not atrophic  Fasciculations: absent  Tongue deviation: none       Physical Exam:  Gen: Drowsy, irritable, uncooperative, NAD   Head: MMM   Lungs: respirations unlabored   Chest wall: No tenderness or deformity   Heart: RRR   Abdomen: +BS   Extremities: Extremities normal, atraumatic   Pulses: 2+ and symmetric all extremities   Skin: Skin color, texture, turgor normal   Neurologic: CN II-XII grossly intact      Mental Status Exam:  Appearance: older than stated age, disheveled, lying in bed   Behavior: uncooperative, hostile, eye contact minimal   Speech/Language: normal tone, normal pitch, slowed, dysarthia   Mood: irritable   Affect: mood congruent   Thought Process:  poverty of thought   Thought Content: suicidal thoughts: (passive-yes)   Orientation: grossly intact   Cognition: impaired   Insight: poor   Judgment: poor       Significant Labs:   Last 24 Hours:   Recent Lab Results       03/15/18  0429      Immature Granulocytes 0.4     Immature Grans (Abs) 0.03  Comment:  Mild elevation in immature granulocytes is non specific and   can be seen in a variety of conditions including stress response,    acute inflammation, trauma and pregnancy. Correlation with other   laboratory and clinical findings is essential.       Albumin 3.6     Alkaline Phosphatase 82     ALT 33     Anion Gap 11     AST 26     Baso # 0.02     Basophil% 0.3     Total Bilirubin 0.9  Comment:  For infants and newborns, interpretation of results should be based  on gestational age, weight and in agreement with clinical  observations.  Premature Infant recommended reference ranges:  Up to 24 hours.............<8.0 mg/dL  Up to 48 hours............<12.0 mg/dL  3-5 days..................<15.0 mg/dL  6-29 days.................<15.0 mg/dL       BUN, Bld 13     Calcium 8.5(L)     Chloride 100     CO2 23     Creatinine 0.8     Differential Method Automated     eGFR if African American >60.0     eGFR if non  >60.0  Comment:  Calculation used to obtain the estimated glomerular filtration  rate (eGFR) is the CKD-EPI equation.        Eos # 0.2     Eosinophil% 2.0     Glucose 137(H)     Gran # (ANC) 6.2     Gran% 80.7(H)     Hematocrit 41.5     Hemoglobin 14.3     Lymph # 0.8(L)     Lymph% 11.0(L)     MCH 31.1(H)     MCHC 34.5     MCV 90     Mono # 0.4     Mono% 5.6     MPV 11.1     nRBC 0     Platelet Estimate Decreased(A)     Platelets 106(L)     Potassium 3.4(L)     Total Protein 5.9(L)     RBC 4.60     RDW 12.5     Sodium 134(L)     Tacrolimus Lvl 6.5  Comment:  Testing performed by Liquid Chromatography-Tandem  Mass Spectrometry (LC-MS/MS).  This test was developed and its performance characteristics  determined by [facility], Department of Pathology  and Laboratory Medicine in a manner consistent with CLIA  requirements. It has not been cleared or approved by the [place]  Food and Drug Administration.  This test is used for clinical   purposes.  It should not be regarded as investigational or for  research.       WBC 7.67           Significant Imaging: None

## 2018-03-15 NOTE — SUBJECTIVE & OBJECTIVE
"  Family History     Problem Relation (Age of Onset)    Alcohol abuse Mother, Father        Social History Main Topics    Smoking status: Former Smoker     Packs/day: 0.50     Types: Cigarettes     Quit date: 5/5/2017    Smokeless tobacco: Former User     Types: Chew    Alcohol use No      Comment: former heavy alcohol user, quit 1 year ago    Drug use: No    Sexual activity: Not on file     Psychotherapeutics     Start     Stop Route Frequency Ordered    03/14/18 0419  ramelteon tablet 8 mg      -- Oral Nightly PRN 03/14/18 0321           Review of Systems  Objective:     Vital Signs (Most Recent):  Temp: 99 °F (37.2 °C) (03/15/18 0803)  Pulse: 61 (03/15/18 0803)  Resp: 18 (03/15/18 0803)  BP: 133/73 (03/15/18 0803)  SpO2: (!) 94 % (03/15/18 0803) Vital Signs (24h Range):  Temp:  [98.6 °F (37 °C)-99.1 °F (37.3 °C)] 99 °F (37.2 °C)  Pulse:  [51-62] 61  Resp:  [15-18] 18  SpO2:  [91 %-96 %] 94 %  BP: (130-184)/() 133/73     Height: 5' 11" (180.3 cm)  Weight: 78 kg (171 lb 15.3 oz)  Body mass index is 23.98 kg/m².      Intake/Output Summary (Last 24 hours) at 03/15/18 1031  Last data filed at 03/15/18 0910   Gross per 24 hour   Intake             1980 ml   Output                0 ml   Net             1980 ml       Physical Exam      Mental Status Exam:  Appearance: older than stated age, lying in bed   Behavior: Cooperative   Speech/Language: normal tone, normal pitch, slowed, dysarthia   Mood: Irritable, redirectable    Affect: mood congruent   Thought Process:  poverty of thought   Thought Content: suicidal thoughts: (passive-yes)   Orientation: grossly intact   Cognition: Impaired, improving    Insight: poor   Judgment: poor        Significant Labs:   Last 24 Hours:   Recent Lab Results       03/15/18  0429      Immature Granulocytes 0.4     Immature Grans (Abs) 0.03  Comment:  Mild elevation in immature granulocytes is non specific and   can be seen in a variety of conditions including stress response, "   acute inflammation, trauma and pregnancy. Correlation with other   laboratory and clinical findings is essential.       Albumin 3.6     Alkaline Phosphatase 82     ALT 33     Anion Gap 11     AST 26     Baso # 0.02     Basophil% 0.3     Total Bilirubin 0.9  Comment:  For infants and newborns, interpretation of results should be based  on gestational age, weight and in agreement with clinical  observations.  Premature Infant recommended reference ranges:  Up to 24 hours.............<8.0 mg/dL  Up to 48 hours............<12.0 mg/dL  3-5 days..................<15.0 mg/dL  6-29 days.................<15.0 mg/dL       BUN, Bld 13     Calcium 8.5(L)     Chloride 100     CO2 23     Creatinine 0.8     Differential Method Automated     eGFR if African American >60.0     eGFR if non  >60.0  Comment:  Calculation used to obtain the estimated glomerular filtration  rate (eGFR) is the CKD-EPI equation.        Eos # 0.2     Eosinophil% 2.0     Glucose 137(H)     Gran # (ANC) 6.2     Gran% 80.7(H)     Hematocrit 41.5     Hemoglobin 14.3     Lymph # 0.8(L)     Lymph% 11.0(L)     MCH 31.1(H)     MCHC 34.5     MCV 90     Mono # 0.4     Mono% 5.6     MPV 11.1     nRBC 0     Platelet Estimate Decreased(A)     Platelets 106(L)     Potassium 3.4(L)     Total Protein 5.9(L)     RBC 4.60     RDW 12.5     Sodium 134(L)     Tacrolimus Lvl 6.5  Comment:  Testing performed by Liquid Chromatography-Tandem  Mass Spectrometry (LC-MS/MS).  This test was developed and its performance characteristics  determined by [facility], Department of Pathology  and Laboratory Medicine in a manner consistent with CLIA  requirements. It has not been cleared or approved by the [place]  Food and Drug Administration.  This test is used for clinical   purposes.  It should not be regarded as investigational or for  research.       WBC 7.67           Significant Imaging: None

## 2018-03-15 NOTE — ASSESSMENT & PLAN NOTE
Patient was admitted to Hospital Medicine due to rebound hypertension.  Initially clonidine was held but later restarted at home dose of 0.2mg BID prior to discharge.  Other home medications of clorazepate 3.75mg BID PRN, gabapentin 400mg TID, oxycodone 10mg QID PRN, Paxil 10mg daily, Seroquel 25mg qHS PRN, and Effexor 75mg held.  Will defer to primary team to restart as needed.   Suicide Precautions

## 2018-03-15 NOTE — PROGRESS NOTES
[facility]  Psychiatry  Progress Note    Patient Name: Dereck Centeno  MRN: [id number]   Code Status: Full Code  Admission Date: 3/14/2018  Hospital Length of Stay: 1 days  Expected Discharge Date: 3/16/2018  Attending Physician: Sorin Soto MD  Primary Care Provider: Eva Reynaga MD    Current Legal Status: [facility]    Patient information was obtained from patient.     Subjective:     Principal Problem:Intentional overdose of drug in tablet form    Chief Complaint: Overdose    HPI:   Consultation-Liaison Psychiatry Consult Note      Chief Complaint / Reason for Consult:     Overdose, Depression    Subjective:     History of Present Illness:   Dereck Centeno is a 57 y.o. male with a history of anxiety, depression, chronic back pain, liver transplant (2015) secondary to HCV/ETOH abuse.  He presents with intentional overdose.  He states that he took 20mg clonidine and 1600mg gabapentin at 14:30 yesterday.  He also reports drinking ETOH.  Patient very difficult to understand on exam, but states that he was stressed about his living situation.      Patient initially cooperative with interview but later attempted to leave the room.  He was redirected by sitter and cooperated when threatened that security could be called.  Patient explains that he overdosed last night in a suicide attempt impulsively.  He has been struggling with depression for years and reports compliance with medications but cannot name what he takes, dosages, who or where he is seen for outpatient psychiatric care.  Initially denies alcohol use but when confronted with BAL in ED endorses recent alcohol use. Prior to this relapse he has been sober for many months.  Patient reports feeling guilty about relapsing on alcohol.  He does not plan on engaging in psychiatric care and would like to return home as soon as possible.  Interview was difficult to conduct due to patient's poor memory and difficulty with concentration.  His  "speech was slurred but improved when he removed his dentures.       Collateral: Dianne Centeno, sister in law, [phone]  Patient's sister in law was shocked regarding the patient's overdose.  Patient has been living in her home and she is responsible for making sure he has his medications.  She left to go to [place] for three days and the patient relapsed on alcohol and overdosed.  Patient is working at [employer] part time, attending meetings for .  There were no signs that he would attempt suicide.  Sister in law describes patient's baseline as "a bit slow" but reports that his mental status changed last night after the overdose.      Medical Review Of Systems:  Pertinent items are noted in HPI.    Psychiatric Review Of Systems - Is patient experiencing or having changes in:  sleep: no  appetite: no  weight: no  energy/anergy: no  interest/pleasure/anhedonia: yes  somatic symptoms: yes  libido: no  anxiety/panic: yes  guilty/hopelessness: yes  concentration: yes  S.I.B.s/risky behavior: yes  any drugs: no  alcohol: yes     Allergies:  Patient has no known allergies.    Past Medical/Surgical History  Past Medical History:   Diagnosis Date    Alcohol withdrawal seizure     Alcoholic cirrhosis     Anxiety     Depression     Hepatitis C     History of throat cancer     HTN (hypertension), benign     Tobacco use       has a past medical history of Alcohol withdrawal seizure; Alcoholic cirrhosis; Anxiety; Depression; Hepatitis C; History of throat cancer; HTN (hypertension), benign; and Tobacco use.  Past Surgical History:   Procedure Laterality Date    ESOPHAGEAL VARICE LIGATION      LIVER TRANSPLANT      TIPS PROCEDURE         Past Psychiatric History:  Previous Medication Trials: yes, Paxil 10mg daily, Seroquel 25mg qHS PRN, and Effexor 75mg daily  Previous Psychiatric Hospitalizations: yes, 2 related to prior suicide attempts/overdoses  Previous Suicide Attempts: yes   History of Violence: " "yes  Outpatient Psychiatrist: yes    Social History:  Marital Status:   Children: 3   Employment Status/Info: currently employed, part time at [employer]  Education: high school diploma/GED  Special Ed: no  Housing Status: with sister in law and brother  History of phys/sexual abuse: no  Access to gun: no    Substance Abuse History:  Recreational Drugs: patient states "i've tried them all". Sober for several years from IV drug use with heroin, amphetamines, crack  Use of Alcohol: patient is an alcoholic, reports recent relapse with light drinking  Rehab History:yes   Tobacco Use:yes  Use of Caffeine: denies use  Use of OTC: denied  Legal consequences of chemical use: yes    Legal History:  Past Charges/Incarcerations:yes, several incarcerations related to drugs   Pending charges:no     Family Psychiatric History:   denied    Objective:     Current Medications:  Infusions:    Scheduled:   aspirin  81 mg Oral Daily    famotidine  20 mg Oral QHS    folic acid  1 mg Oral Daily    magnesium oxide  800 mg Oral BID    multivitamin  1 tablet Oral Daily    senna-docusate 8.6-50 mg  1 tablet Oral BID    tacrolimus  1 mg Oral Daily    tacrolimus  2 mg Oral Daily    thiamine  100 mg Oral Daily    vitamin D  2,000 Units Oral Daily     PRN:  acetaminophen, albuterol-ipratropium 2.5mg-0.5mg/3mL, dextrose 50%, dextrose 50%, glucagon (human recombinant), glucose, glucose, hydrALAZINE, ondansetron, polyethylene glycol, promethazine, ramelteon, sodium chloride 0.9%    Home Medications:  Prior to Admission medications    Medication Sig Start Date End Date Taking? Authorizing Provider   albuterol (PROVENTIL HFA) 90 mcg/actuation inhaler Inhale 2 puffs into the lungs every 6 (six) hours as needed for Wheezing or Shortness of Breath.    Historical Provider, MD   alendronate (FOSAMAX) 70 MG tablet Take 70 mg by mouth every 7 days.    Historical Provider, MD   aspirin (ECOTRIN) 81 MG EC tablet Take 1 tablet (81 mg total) by " mouth once daily. 1/27/15 6/19/17  Ru Golden MD   cloNIDine (CATAPRES) 0.2 MG tablet Take 0.2 mg by mouth 2 (two) times daily.    Historical Provider, MD   clorazepate (TRANXENE) 3.75 MG Tab Take 3.75 mg by mouth 2 (two) times daily as needed.    Historical Provider, MD   docusate sodium (COLACE) 100 MG capsule Take 100 mg by mouth 3 (three) times daily as needed for Constipation.    Historical Provider, MD   famotidine (PEPCID) 20 MG tablet Take 1 tablet (20 mg total) by mouth every evening. 1/14/15 1/14/16  Lyle Downs MD   gabapentin (NEURONTIN) 400 MG capsule Take 400 mg by mouth 3 (three) times daily.    Historical Provider, MD   magnesium oxide (MAG-OX) 400 mg tablet Take 400 mg by mouth once daily.    Historical Provider, MD   nicotine (NICODERM CQ) 21 mg/24 hr Place 1 patch onto the skin once daily. 10/24/17   Lucia Herrera MD   oxycodone (ROXICODONE) 10 mg Tab immediate release tablet Take 1 tablet (10 mg total) by mouth every 4 (four) hours as needed. 2/18/15   Luis Eduardo MD   paroxetine (PAXIL) 10 MG tablet Take 10 mg by mouth every morning.    Historical Provider, MD   polyethylene glycol (GLYCOLAX) 17 gram PwPk Take 17 g by mouth daily as needed.    Historical Provider, MD   quetiapine (SEROQUEL) 25 MG Tab Take 25 mg by mouth nightly as needed (Taking 1/2 tablet as needed at night.).    Historical Provider, MD   ribavirin (REBETOL) 200 MG Cap Take 3 capsules (600 mg total) by mouth 2 (two) times daily. Initial dose 400 mg  mg PM. 6/8/15 6/7/16  Guillermina Daily NP   tacrolimus (PROGRAF) 1 MG Cap 2 mg in am and 1 mg in pm 6/30/17   Mathew Finney MD   venlafaxine (EFFEXOR-XR) 150 MG Cp24 Take 75 mg by mouth once daily.    Historical Provider, MD   vitamin D 1000 units Tab Take 2 tablets (2,000 Units total) by mouth once daily. 1/8/15   Mathew Finney MD   zinc gluconate 50 mg tablet Take 50 mg by mouth once daily.    Historical Provider, MD Garcia  Signs:  Temp:  [97.8 °F (36.6 °C)-99 °F (37.2 °C)]   Pulse:  [43-60]   Resp:  [13-29]   BP: (165-215)/()   SpO2:  [92 %-99 %]     Physical Exam:  Gen: Drowsy, irritable, uncooperative, NAD   Head: MMM   Lungs: respirations unlabored   Chest wall: No tenderness or deformity   Heart: RRR   Abdomen: +BS   Extremities: Extremities normal, atraumatic   Pulses: 2+ and symmetric all extremities   Skin: Skin color, texture, turgor normal   Neurologic: CN II-XII grossly intact     Mental Status Exam:  Appearance: older than stated age, disheveled, lying in bed   Behavior: uncooperative, hostile, eye contact minimal   Speech/Language: normal tone, normal pitch, slowed, dysarthia   Mood: irritable   Affect: mood congruent   Thought Process:  poverty of thought   Thought Content: suicidal thoughts: (passive-yes)   Orientation: grossly intact   Cognition: impaired   Insight: poor   Judgment: poor     Laboratory Data:  Recent Results (from the past 48 hour(s))   CBC auto differential    Collection Time: 03/13/18  8:02 PM   Result Value Ref Range    WBC 8.75 3.90 - 12.70 K/uL    RBC 5.14 4.60 - 6.20 M/uL    Hemoglobin 15.9 14.0 - 18.0 g/dL    Hematocrit 46.3 40.0 - 54.0 %    MCV 90 82 - 98 fL    MCH 30.9 27.0 - 31.0 pg    MCHC 34.3 32.0 - 36.0 g/dL    RDW 13.0 11.5 - 14.5 %    Platelets 195 150 - 350 K/uL    MPV 10.5 9.2 - 12.9 fL    Gran # (ANC) 5.6 1.8 - 7.7 K/uL    Lymph # 2.1 1.0 - 4.8 K/uL    Mono # 0.7 0.3 - 1.0 K/uL    Eos # 0.4 0.0 - 0.5 K/uL    Baso # 0.05 0.00 - 0.20 K/uL    Gran% 64.2 38.0 - 73.0 %    Lymph% 23.4 18.0 - 48.0 %    Mono% 7.8 4.0 - 15.0 %    Eosinophil% 4.0 0.0 - 8.0 %    Basophil% 0.6 0.0 - 1.9 %    Differential Method Automated    Comprehensive metabolic panel    Collection Time: 03/13/18  8:02 PM   Result Value Ref Range    Sodium 144 136 - 145 mmol/L    Potassium 4.2 3.5 - 5.1 mmol/L    Chloride 106 95 - 110 mmol/L    CO2 25 23 - 29 mmol/L    Glucose 160 (H) 70 - 110 mg/dL    BUN, Bld 8 2 - 20  mg/dL    Creatinine 0.93 0.50 - 1.40 mg/dL    Calcium 9.1 8.7 - 10.5 mg/dL    Total Protein 7.4 6.0 - 8.4 g/dL    Albumin 4.6 3.5 - 5.2 g/dL    Total Bilirubin 1.1 (H) 0.1 - 1.0 mg/dL    Alkaline Phosphatase 86 38 - 126 U/L     (H) 15 - 46 U/L    ALT 94 (H) 10 - 44 U/L    Anion Gap 13 8 - 16 mmol/L    eGFR if African American >60.0 >60 mL/min/1.73 m^2    eGFR if non African American >60.0 >60 mL/min/1.73 m^2   TSH    Collection Time: 03/13/18  8:02 PM   Result Value Ref Range    TSH 4.100 (H) 0.400 - 4.000 uIU/mL   Ethanol    Collection Time: 03/13/18  8:02 PM   Result Value Ref Range    Alcohol, Medical, Serum 144 (H) <10 mg/dL   Acetaminophen level    Collection Time: 03/13/18  8:02 PM   Result Value Ref Range    Acetaminophen (Tylenol), Serum <10.0 10.0 - 20.0 ug/mL   Salicylate level    Collection Time: 03/13/18  8:02 PM   Result Value Ref Range    Salicylate Lvl <5.0 (L) 15.0 - 30.0 mg/dL   POCT glucose    Collection Time: 03/13/18  8:02 PM   Result Value Ref Range    POC Glucose 158 (A) 70 - 110 mg/dL   T4, free    Collection Time: 03/13/18  8:02 PM   Result Value Ref Range    Free T4 1.07 0.71 - 1.51 ng/dL   Urinalysis - clean catch    Collection Time: 03/13/18  8:12 PM   Result Value Ref Range    Specimen UA Urine, Clean Catch     Color, UA Yellow Yellow, Straw, Jannet    Appearance, UA Clear Clear    pH, UA 6.0 5.0 - 8.0    Specific Gravity, UA 1.015 1.005 - 1.030    Protein, UA Trace (A) Negative    Glucose, UA Negative Negative    Ketones, UA Negative Negative    Bilirubin (UA) Negative Negative    Occult Blood UA Trace (A) Negative    Nitrite, UA Negative Negative    Urobilinogen, UA Negative <2.0 EU/dL    Leukocytes, UA Negative Negative   Drug screen panel, emergency    Collection Time: 03/13/18  8:12 PM   Result Value Ref Range    Benzodiazepines Negative     Methadone metabolites Negative     Cocaine (Metab.) Negative     Opiate Scrn, Ur Negative     Barbiturate Screen, Ur Negative      Amphetamine Screen, Ur Negative     THC Negative     Phencyclidine Negative     Creatinine, Random Ur 58.7 23.0 - 375.0 mg/dL    Toxicology Information SEE COMMENT    Tacrolimus level    Collection Time: 03/13/18  8:39 PM   Result Value Ref Range    Tacrolimus Lvl 6.2 5.0 - 15.0 ng/mL   Protime-INR    Collection Time: 03/13/18  8:39 PM   Result Value Ref Range    Prothrombin Time 12.5 9.0 - 12.5 sec    INR 1.1 0.8 - 1.2   APTT    Collection Time: 03/13/18  8:39 PM   Result Value Ref Range    aPTT 30.0 21.0 - 32.0 sec   Ammonia    Collection Time: 03/13/18  8:39 PM   Result Value Ref Range    Ammonia 20 9 - 30 umol/L   Lactic acid, plasma    Collection Time: 03/13/18  8:39 PM   Result Value Ref Range    Lactate (Lactic Acid) 1.8 0.5 - 2.2 mmol/L   Bilirubin, direct    Collection Time: 03/13/18  9:05 PM   Result Value Ref Range    Bilirubin, Direct 0.2 0.0 - 0.3 mg/dL   Hepatic function panel    Collection Time: 03/13/18 10:12 PM   Result Value Ref Range    Total Protein 7.2 6.0 - 8.4 g/dL    Albumin 4.5 3.5 - 5.2 g/dL    Total Bilirubin 1.1 (H) 0.1 - 1.0 mg/dL     (H) 15 - 46 U/L    ALT 95 (H) 10 - 44 U/L    Alkaline Phosphatase 86 38 - 126 U/L    Bilirubin, Direct 0.2 0.0 - 0.3 mg/dL   Comprehensive Metabolic Panel (CMP)    Collection Time: 03/14/18  6:06 AM   Result Value Ref Range    Sodium 140 136 - 145 mmol/L    Potassium 4.4 3.5 - 5.1 mmol/L    Chloride 104 95 - 110 mmol/L    CO2 28 23 - 29 mmol/L    Glucose 152 (H) 70 - 110 mg/dL    BUN, Bld 6 6 - 20 mg/dL    Creatinine 1.0 0.5 - 1.4 mg/dL    Calcium 9.6 8.7 - 10.5 mg/dL    Total Protein 7.1 6.0 - 8.4 g/dL    Albumin 4.3 3.5 - 5.2 g/dL    Total Bilirubin 0.8 0.1 - 1.0 mg/dL    Alkaline Phosphatase 101 55 - 135 U/L    AST 66 (H) 10 - 40 U/L    ALT 63 (H) 10 - 44 U/L    Anion Gap 8 8 - 16 mmol/L    eGFR if African American >60.0 >60 mL/min/1.73 m^2    eGFR if non African American >60.0 >60 mL/min/1.73 m^2   Magnesium    Collection Time: 03/14/18  6:06 AM    Result Value Ref Range    Magnesium 1.6 1.6 - 2.6 mg/dL   Phosphorus    Collection Time: 03/14/18  6:06 AM   Result Value Ref Range    Phosphorus 4.3 2.7 - 4.5 mg/dL   Hemoglobin A1c    Collection Time: 03/14/18  6:06 AM   Result Value Ref Range    Hemoglobin A1C 4.9 4.0 - 5.6 %    Estimated Avg Glucose 94 68 - 131 mg/dL   CBC with Automated Differential    Collection Time: 03/14/18  6:06 AM   Result Value Ref Range    WBC 10.53 3.90 - 12.70 K/uL    RBC 5.31 4.60 - 6.20 M/uL    Hemoglobin 16.0 14.0 - 18.0 g/dL    Hematocrit 47.7 40.0 - 54.0 %    MCV 90 82 - 98 fL    MCH 30.1 27.0 - 31.0 pg    MCHC 33.5 32.0 - 36.0 g/dL    RDW 12.9 11.5 - 14.5 %    Platelets 210 150 - 350 K/uL    MPV 10.8 9.2 - 12.9 fL    Immature Granulocytes 0.5 0.0 - 0.5 %    Gran # (ANC) 7.2 1.8 - 7.7 K/uL    Immature Grans (Abs) 0.05 (H) 0.00 - 0.04 K/uL    Lymph # 2.1 1.0 - 4.8 K/uL    Mono # 0.7 0.3 - 1.0 K/uL    Eos # 0.4 0.0 - 0.5 K/uL    Baso # 0.06 0.00 - 0.20 K/uL    nRBC 0 0 /100 WBC    Gran% 68.5 38.0 - 73.0 %    Lymph% 19.9 18.0 - 48.0 %    Mono% 6.9 4.0 - 15.0 %    Eosinophil% 3.6 0.0 - 8.0 %    Basophil% 0.6 0.0 - 1.9 %    Differential Method Automated       No results found for: PHENYTOIN, PHENOBARB, VALPROATE, CBMZ  Imaging:  Imaging Results    None          Assessment:     Dereck Centeno is a 57 y.o. male with a history of anxiety, depression, chronic back pain, liver transplant (2015) secondary to HCV/ETOH abuse.  He presents with intentional overdose.  He states that he took 20mg clonidine and 1600mg gabapentin at 14:30 yesterday.  He also reports drinking ETOH.  Psychiatry was consulted to address overdose.    Recommendations:     Depressive Disorder Unspecified, R/O SIMD  Alcohol Use Disorder  Polysubstance Abuse with opiates, amphetamines, cocaine in sustained remission  R/O Delirium due to underlying medical condition, mixed type  · PEC patient for danger to self after overdose and request to leave hospital AMA.  Seek  inpatient psychiatric hospitalization once medically cleared.  Psychiatry will continue to follow the patient during this admission.    · Continue to hold psychiatric medications at this time in the context of delirium, recent overdose.  May use Zyprexa 10 mg PO/IM q8h PRN non redirectable agitation. Monitor for QTc prolongation and adverse reactions.  Most recent Qtc 460.  Avoid benzodiazepines, antihistamines, anticholinergics, and minimize opiate use as these may worsen delirium.  Recommend consult to PT/OT. Early mobility and exercise has been shown to decrease duration of delirium. Level of activity can be determined by RASS score.  RASS -1/0/1: active ROM, active exercise, sit, stand, walk, ADL training  RASS -3/-2: passive ROM, sit  RASS -5/-4: passive ROM  Provide appropriate lighting and clear signage; a clock and calendar should be easily visible to the patient.  Monitor environmental factors. Reduce light and noise at night (close shades, turn off lights, turn off TV, ect). Correct any alterations in sleep cycle.  Reorient the patient to person, place, time and situation on each encounter.   Correct sensory deficits if possible (replace eye glasses, hearing aids, ect).  Avoid restraints if possible. Severely delirious patients benefit from constant observation by a sitter.  Do not leave patient unattended.    Case discussed with staff psychiatrist, Pb Tyler MD.  Will continue to follow and monitor progression of current psychiatric condition.    Eva Dykes MD  [facility] PGY2  [phone]        Hospital Course: 3/15/2018 : Patient continues to refuse need for Psychiatric hospitalization and reports embarrassment regarding overdose.  Mental status and cognition have improved.  Discussed improvement in blood pressure and future plans.       Family History     Problem Relation (Age of Onset)    Alcohol abuse Mother, Father        Social History Main Topics    Smoking status: Former  "Smoker     Packs/day: 0.50     Types: Cigarettes     Quit date: 5/5/2017    Smokeless tobacco: Former User     Types: Chew    Alcohol use No      Comment: former heavy alcohol user, quit 1 year ago    Drug use: No    Sexual activity: Not on file     Psychotherapeutics     Start     Stop Route Frequency Ordered    03/14/18 0419  ramelteon tablet 8 mg      -- Oral Nightly PRN 03/14/18 0321           Review of Systems  Objective:     Vital Signs (Most Recent):  Temp: 99 °F (37.2 °C) (03/15/18 0803)  Pulse: 61 (03/15/18 0803)  Resp: 18 (03/15/18 0803)  BP: 133/73 (03/15/18 0803)  SpO2: (!) 94 % (03/15/18 0803) Vital Signs (24h Range):  Temp:  [98.6 °F (37 °C)-99.1 °F (37.3 °C)] 99 °F (37.2 °C)  Pulse:  [51-62] 61  Resp:  [15-18] 18  SpO2:  [91 %-96 %] 94 %  BP: (130-184)/() 133/73     Height: 5' 11" (180.3 cm)  Weight: 78 kg (171 lb 15.3 oz)  Body mass index is 23.98 kg/m².      Intake/Output Summary (Last 24 hours) at 03/15/18 1031  Last data filed at 03/15/18 0910   Gross per 24 hour   Intake             1980 ml   Output                0 ml   Net             1980 ml       Physical Exam      Mental Status Exam:  Appearance: older than stated age, lying in bed   Behavior: Cooperative   Speech/Language: normal tone, normal pitch, slowed, dysarthia   Mood: Irritable, redirectable    Affect: mood congruent   Thought Process:  poverty of thought   Thought Content: suicidal thoughts: (passive-yes)   Orientation: grossly intact   Cognition: Impaired, improving    Insight: poor   Judgment: poor        Significant Labs:   Last 24 Hours:   Recent Lab Results       03/15/18  0429      Immature Granulocytes 0.4     Immature Grans (Abs) 0.03  Comment:  Mild elevation in immature granulocytes is non specific and   can be seen in a variety of conditions including stress response,   acute inflammation, trauma and pregnancy. Correlation with other   laboratory and clinical findings is essential.       Albumin 3.6     Alkaline " Phosphatase 82     ALT 33     Anion Gap 11     AST 26     Baso # 0.02     Basophil% 0.3     Total Bilirubin 0.9  Comment:  For infants and newborns, interpretation of results should be based  on gestational age, weight and in agreement with clinical  observations.  Premature Infant recommended reference ranges:  Up to 24 hours.............<8.0 mg/dL  Up to 48 hours............<12.0 mg/dL  3-5 days..................<15.0 mg/dL  6-29 days.................<15.0 mg/dL       BUN, Bld 13     Calcium 8.5(L)     Chloride 100     CO2 23     Creatinine 0.8     Differential Method Automated     eGFR if African American >60.0     eGFR if non  >60.0  Comment:  Calculation used to obtain the estimated glomerular filtration  rate (eGFR) is the CKD-EPI equation.        Eos # 0.2     Eosinophil% 2.0     Glucose 137(H)     Gran # (ANC) 6.2     Gran% 80.7(H)     Hematocrit 41.5     Hemoglobin 14.3     Lymph # 0.8(L)     Lymph% 11.0(L)     MCH 31.1(H)     MCHC 34.5     MCV 90     Mono # 0.4     Mono% 5.6     MPV 11.1     nRBC 0     Platelet Estimate Decreased(A)     Platelets 106(L)     Potassium 3.4(L)     Total Protein 5.9(L)     RBC 4.60     RDW 12.5     Sodium 134(L)     Tacrolimus Lvl 6.5  Comment:  Testing performed by Liquid Chromatography-Tandem  Mass Spectrometry (LC-MS/MS).  This test was developed and its performance characteristics  determined by [facility], Department of Pathology  and Laboratory Medicine in a manner consistent with CLIA  requirements. It has not been cleared or approved by the [place]  Food and Drug Administration.  This test is used for clinical   purposes.  It should not be regarded as investigational or for  research.       WBC 7.67           Significant Imaging: None    Assessment/Plan:     * Intentional overdose of drug in tablet form    Depressive Disorder Unspecified, R/O SIMD  · PEC patient for danger to self after overdose and request to leave hospital AMA.  Seek inpatient  psychiatric hospitalization once medically cleared.  Psychiatry will continue to follow the patient during this admission.  If patient is admitted to [facility], he will need an EKG.      · Continue to hold psychiatric medications at this time in the context of delirium, recent overdose.  · May use Zyprexa 10 mg PO/IM q8h PRN non redirectable agitation. Monitor for QTc prolongation and adverse reactions.  Most recent Qtc 460.          Delirium    · Improving with better controlled blood pressure.  Likely multifactorial in nature given recent overdose.  · Avoid benzodiazepines, antihistamines, anticholinergics, and minimize opiate use as these may worsen delirium.  · Recommend consult to PT/OT. Early mobility and exercise has been shown to decrease duration of delirium. Level of activity can be determined by RASS score.  · RASS -1/0/1: active ROM, active exercise, sit, stand, walk, ADL training  · RASS -3/-2: passive ROM, sit  · RASS -5/-4: passive ROM  · Provide appropriate lighting and clear signage; a clock and calendar should be easily visible to the patient.  · Monitor environmental factors. Reduce light and noise at night (close shades, turn off lights, turn off TV, ect). Correct any alterations in sleep cycle.  · Reorient the patient to person, place, time and situation on each encounter.   · Correct sensory deficits if possible (replace eye glasses, hearing aids, ect).  · Avoid restraints if possible. Severely delirious patients benefit from constant observation by a sitter.  · Do not leave patient unattended.          Alcohol dependence with intoxication    Patient showing no signs of alcohol withdrawal and mental status is improving.               Need for Continued Hospitalization:   Psychiatric illness continues to pose a potential threat to life or bodily function, of self or others, thereby requiring the need for continued inpatient psychiatric hospitalization.    Anticipated Disposition: Psychiatric  Hospital     Total time:  15 with greater than 50% of this time spent in counseling and/or coordination of care.       Eva Dykes MD   Psychiatry  [facility]

## 2018-03-15 NOTE — DISCHARGE SUMMARY
Discharge Summary  Hospital Medicine    Patient Name: Dereck Centeno  MRN:  [id number]  Hospital Medicine [place] [facility] [place] Sorin Soto MD  Date of Admission:  3/14/2018     Date of Discharge:  03/15/2018  Length of Stay:  LOS: 1 day   Principal Problem:  Intentional overdose of drug in tablet form     Active Hospital Problems    Diagnosis  POA    *Intentional overdose of drug in tablet form [T50.902A]  Yes    Alcohol use disorder, severe, dependence [F10.20]  Unknown    Hypertensive urgency [I16.0]  Yes    Liver transplant 1/11/2015 for HCV [Z94.4]  Not Applicable     Chronic    Alcohol dependence with intoxication [F10.229]  Yes    Delirium [R41.0]  Yes    Chronic back pain [M54.9, G89.29]  Yes     Chronic    Depression [F32.9]  Yes      Resolved Hospital Problems    Diagnosis Date Resolved POA   No resolved problems to display.      Mr. Centeno is a 56 yo man with liver transplant in 2015 secondary to HCV/Etoh, depression, chronic back pain who was [place] [facility] [place] intentional drug overdose. Suposedly, pt took 20 mg of clonidine and 1600 mg of gabapentin on 3/13. He also drank 6-7 beers and 1/2 glass of wine. Pt lives with friends and has had prior suicide attempts. On admission, liver enzymes were slightly elevated but on day of discharge improved. Pt is medically cleared and can be discharged to [facility] inpatient psych.      Labs:       Recent Labs  Lab 03/13/18 2002 03/14/18  0606 03/15/18  0429   WBC 8.75 10.53 7.67   HGB 15.9 16.0 14.3   HCT 46.3 47.7 41.5    210 106*       Recent Labs  Lab 03/13/18 2002 03/13/18 2212 03/14/18  0606 03/15/18  0429     --  140 134*   K 4.2  --  4.4 3.4*     --  104 100   CO2 25  --  28 23   BUN 8  --  6 13   CREATININE 0.93  --  1.0 0.8   CALCIUM 9.1  --  9.6 8.5*   MG  --   --  1.6  --    PHOS  --   --  4.3  --    PROT 7.4 7.2 7.1 5.9*   BILITOT 1.1* 1.1* 0.8 0.9   ALKPHOS 86 86 101 82   ALT 94* 95* 63* 33   * 143* 66*  26         Vitals/PE     Vital Signs (Most Recent):  Temp: 98.9 °F (37.2 °C) (03/15/18 1118)  Pulse: (!) 55 (03/15/18 1124)  Resp: 18 (03/15/18 1124)  BP: (!) 169/86 (03/15/18 1118)  SpO2: 96 % (03/15/18 1124) Vital Signs Range (Last 24H):  Temp:  [98.6 °F (37 °C)-99 °F (37.2 °C)]   Pulse:  [50-62]   Resp:  [15-18]   BP: (130-182)/(72-99)   SpO2:  [94 %-96 %]    Body mass index is 23.98 kg/m².     Physical Exam:  Constitutional: Appears well-developed and well-nourished.   Head: Normocephalic and atraumatic.   Mouth/Throat: Oropharynx is clear and moist.   Eyes: EOM are normal. Pupils are equal, round, and reactive to light. No scleral icterus.   Neck: Normal range of motion. Neck supple.   Cardiovascular: Normal rate and regular rhythm.  No murmur heard.  Pulmonary/Chest: Effort normal and breath sounds normal. No respiratory distress. No wheezes, rales, or rhonchi  Abdominal: Soft. Bowel sounds are normal.  No distension or tenderness  Musculoskeletal: Normal range of motion. No edema.   Neurological: Alert and oriented to person, place, and time.   Skin: Skin is warm and dry.   Psychiatric: Normal mood and affect. Behavior is normal.   Vitals reviewed.      Discharge Medications:      Current Discharge Medication List      START taking these medications    Details   multivitamin (THERAGRAN) tablet Take 1 tablet by mouth once daily.         CONTINUE these medications which have NOT CHANGED    Details   albuterol (PROVENTIL HFA) 90 mcg/actuation inhaler Inhale 2 puffs into the lungs every 6 (six) hours as needed for Wheezing or Shortness of Breath.      alendronate (FOSAMAX) 70 MG tablet Take 70 mg by mouth every 7 days.      aspirin (ECOTRIN) 81 MG EC tablet Take 1 tablet (81 mg total) by mouth once daily.  Qty: 30 tablet, Refills: 0      cloNIDine (CATAPRES) 0.2 MG tablet Take 0.2 mg by mouth 2 (two) times daily.      docusate sodium (COLACE) 100 MG capsule Take 100 mg by mouth 3 (three) times daily as needed for  Constipation.      famotidine (PEPCID) 20 MG tablet Take 1 tablet (20 mg total) by mouth every evening.  Qty: 30 tablet, Refills: 11      magnesium oxide (MAG-OX) 400 mg tablet Take 400 mg by mouth once daily.      tacrolimus (PROGRAF) 1 MG Cap 2 mg in am and 1 mg in pm  Qty: 90 capsule, Refills: 11    Associated Diagnoses: Status post liver transplant      vitamin D 1000 units Tab Take 2 tablets (2,000 Units total) by mouth once daily.  Qty: 60 tablet, Refills: 5    Associated Diagnoses: Vitamin D deficiency; Edema         STOP taking these medications       clorazepate (TRANXENE) 3.75 MG Tab Comments:   Reason for Stopping:         gabapentin (NEURONTIN) 400 MG capsule Comments:   Reason for Stopping:         nicotine (NICODERM CQ) 21 mg/24 hr Comments:   Reason for Stopping:         oxycodone (ROXICODONE) 10 mg Tab immediate release tablet Comments:   Reason for Stopping:         paroxetine (PAXIL) 10 MG tablet Comments:   Reason for Stopping:         polyethylene glycol (GLYCOLAX) 17 gram PwPk Comments:   Reason for Stopping:         quetiapine (SEROQUEL) 25 MG Tab Comments:   Reason for Stopping:         ribavirin (REBETOL) 200 MG Cap Comments:   Reason for Stopping:         venlafaxine (EFFEXOR-XR) 150 MG Cp24 Comments:   Reason for Stopping:         zinc gluconate 50 mg tablet Comments:   Reason for Stopping:               Time spent on the discharge of the patient including review of hospital course with the patient. reviewing discharge medications and arranging follow-up care >30 minutes.  Patient was seen and examined on the date of discharge and determined to be suitable for discharge.    Future Appointments  Date Time Provider Department Center   5/14/2018 8:00 AM HOSPITAL LAB, [facility]       SHEBA CARRANZA MD   [facility] Medicine  Pager: [phone]  Spectralink: [phone]   Cell: [phone]

## 2018-03-15 NOTE — ASSESSMENT & PLAN NOTE
Patient initially struggled with clear communication and concentration after overdose.  He was unstable on his feet and attempted to elope from the ICU during the psychiatric consult interview.  On day of discharge from medicine he was alert and oriented and appears to have returned to baseline.

## 2018-03-15 NOTE — ASSESSMENT & PLAN NOTE
Patient was admitted with BAL of 144. He reports relapse after long period (years) of sobriety.  Substance Abuse treatment to be discussed prior to discharge  Patient has is a recipient of a liver transplant   Folic Acid, Multivitamin, Thiamine

## 2018-03-15 NOTE — PLAN OF CARE
Called [facility] & spoke to Ramiro. She will look at the patient & F/U with Dajuan    Called [facility] & spoke Gwyn. Received referral cannot meet patients needs

## 2018-03-15 NOTE — PLAN OF CARE
Called [facility] & spoke to Mickey. Cannot meet patients needs    [facility]ed [facility] & spoke to Tena. Receoved referral & will F/U with Dereck

## 2018-03-15 NOTE — HPI
"History of Present Illness:   Dereck Centeno is a 57 y.o. male with a history of anxiety, depression, chronic back pain, liver transplant (2015) secondary to HCV/ETOH abuse.  He presents with intentional overdose.  He states that he took 20mg clonidine and 1600mg gabapentin at 14:30 yesterday.  He also reports drinking ETOH.  Patient very difficult to understand on exam, but states that he was stressed about his living situation.       Patient initially cooperative with interview but later attempted to leave the room.  He was redirected by sitter and cooperated when threatened that security could be called.  Patient explains that he overdosed last night in a suicide attempt impulsively.  He has been struggling with depression for years and reports compliance with medications but cannot name what he takes, dosages, who or where he is seen for outpatient psychiatric care.  Initially denies alcohol use but when confronted with BAL in ED endorses recent alcohol use. Prior to this relapse he has been sober for many months.  Patient reports feeling guilty about relapsing on alcohol.  He does not plan on engaging in psychiatric care and would like to return home as soon as possible.  Interview was difficult to conduct due to patient's poor memory and difficulty with concentration.  His speech was slurred but improved when he removed his dentures.        Collateral: Dianne Centeno, sister in law, [phone]  Patient's sister in law was shocked regarding the patient's overdose.  Patient has been living in her home and she is responsible for making sure he has his medications.  She left to go to [place] for three days and the patient relapsed on alcohol and overdosed.  Patient is working at [employer] part time, attending meetings for [employer].  There were no signs that he would attempt suicide.  Sister in law describes patient's baseline as "a bit slow" but reports that his mental status changed last night after the overdose.  " "     Medical Review Of Systems:  Pertinent items are noted in HPI.     Psychiatric Review Of Systems - Is patient experiencing or having changes in:  sleep: no  appetite: no  weight: no  energy/anergy: no  interest/pleasure/anhedonia: yes  somatic symptoms: yes  libido: no  anxiety/panic: yes  guilty/hopelessness: yes  concentration: yes  S.I.B.s/risky behavior: yes  any drugs: no  alcohol: yes      Allergies:  Patient has no known allergies.     Past Medical/Surgical History       Past Medical History:   Diagnosis Date    Alcohol withdrawal seizure      Alcoholic cirrhosis      Anxiety      Depression      Hepatitis C      History of throat cancer      HTN (hypertension), benign      Tobacco use         has a past medical history of Alcohol withdrawal seizure; Alcoholic cirrhosis; Anxiety; Depression; Hepatitis C; History of throat cancer; HTN (hypertension), benign; and Tobacco use.        Past Surgical History:   Procedure Laterality Date    ESOPHAGEAL VARICE LIGATION        LIVER TRANSPLANT        TIPS PROCEDURE             Past Psychiatric History:  Previous Medication Trials: yes, Paxil 10mg daily, Seroquel 25mg qHS PRN, and Effexor 75mg daily  Previous Psychiatric Hospitalizations: yes, 2 related to prior suicide attempts/overdoses  Previous Suicide Attempts: yes   History of Violence: yes  Outpatient Psychiatrist: yes     Social History:  Marital Status:   Children: 3   Employment Status/Info: currently employed, part time at [employer]  Education: high school diploma/GED  Special Ed: no  Housing Status: with sister in law and brother  History of phys/sexual abuse: no  Access to gun: no     Substance Abuse History:  Recreational Drugs: patient states "i've tried them all". Sober for several years from IV drug use with heroin, amphetamines, crack  Use of Alcohol: patient is an alcoholic, reports recent relapse with light drinking  Rehab History:yes   Tobacco Use:yes  Use of Caffeine: denies " use  Use of OTC: denied  Legal consequences of chemical use: yes     Legal History:  Past Charges/Incarcerations:yes, several incarcerations related to drugs   Pending charges:no      Family Psychiatric History:   denied

## 2018-03-15 NOTE — PLAN OF CARE
Called [facility] & spoke to Nishi. Received referral & will F/U with Dereck Kelly & [facility]    Resent to [facility]

## 2018-03-15 NOTE — PLAN OF CARE
Called [facility] & spoke to Chiquis. No beds    Called [facility] & spoke to nAdriy. Will sheck & call me back    Called [facility] & spoke to JONES  Will call me back

## 2018-03-15 NOTE — PLAN OF CARE
Problem: Patient Care Overview  Goal: Plan of Care Review  Outcome: Ongoing (interventions implemented as appropriate)  Pt was AAOx4, independent, and VSS. Sitter was with pt all shift. Pt c/o of back pain and foot pain- refused tylenol. Telemetry monitoring was in place-DC for transfer. Pt was transferred to psych unit.

## 2018-03-15 NOTE — PLAN OF CARE
"Pt ambulated onto unit via wheelchair, escorted by security and floor nurse. All belongings brought with pt and placed in nsg station. Original PEC brought with pt and placed in pt's chart. CO notified of PEC transfer. Pt was dressed in blue paper scrubs, was changed into new set of blue paper scrubs,and was searched for contraband; none found. Pt is calm and cooperative upon admission.Unclean, disheveled appearance; malodorous. Pt displayed a depressed mood. Pt stated that he "tried to kill himself" by overdosing but regrets it now. Denies SI/HI at this time. Denies AH/VH at this time. Pt was oriented to unit and signed all admit paperwork. NAD observed. Will cont to monitor.  "

## 2018-03-15 NOTE — ASSESSMENT & PLAN NOTE
· Improving with better controlled blood pressure.  Likely multifactorial in nature given recent overdose.  · Avoid benzodiazepines, antihistamines, anticholinergics, and minimize opiate use as these may worsen delirium.  · Recommend consult to PT/OT. Early mobility and exercise has been shown to decrease duration of delirium. Level of activity can be determined by RASS score.  · RASS -1/0/1: active ROM, active exercise, sit, stand, walk, ADL training  · RASS -3/-2: passive ROM, sit  · RASS -5/-4: passive ROM  · Provide appropriate lighting and clear signage; a clock and calendar should be easily visible to the patient.  · Monitor environmental factors. Reduce light and noise at night (close shades, turn off lights, turn off TV, ect). Correct any alterations in sleep cycle.  · Reorient the patient to person, place, time and situation on each encounter.   · Correct sensory deficits if possible (replace eye glasses, hearing aids, ect).  · Avoid restraints if possible. Severely delirious patients benefit from constant observation by a sitter.  · Do not leave patient unattended.

## 2018-03-15 NOTE — ASSESSMENT & PLAN NOTE
Depressive Disorder Unspecified, R/O SIMD  · PEC patient for danger to self after overdose and request to leave hospital AMA.  Seek inpatient psychiatric hospitalization once medically cleared.  Psychiatry will continue to follow the patient during this admission.    · Continue to hold psychiatric medications at this time in the context of delirium, recent overdose.  · May use Zyprexa 10 mg PO/IM q8h PRN non redirectable agitation. Monitor for QTc prolongation and adverse reactions.  Most recent Qtc 460.

## 2018-03-15 NOTE — NURSING
Pt was transported to psych unit via wheelchair accompanied by security. Patient belongings were transferred with pt. PIVs were removed and Telemetry DC.

## 2018-03-16 ENCOUNTER — TELEPHONE (OUTPATIENT)
Dept: TRANSPLANT | Facility: CLINIC | Age: 58
End: 2018-03-16

## 2018-03-16 PROBLEM — F19.94 SUBSTANCE INDUCED MOOD DISORDER: Chronic | Status: ACTIVE | Noted: 2018-03-16

## 2018-03-16 LAB
CHOLEST SERPL-MCNC: 84 MG/DL
CHOLEST/HDLC SERPL: 2 {RATIO}
HDLC SERPL-MCNC: 42 MG/DL
HDLC SERPL: 50 %
LDLC SERPL CALC-MCNC: 24 MG/DL
NONHDLC SERPL-MCNC: 42 MG/DL
TRIGL SERPL-MCNC: 90 MG/DL

## 2018-03-16 PROCEDURE — 90853 GROUP PSYCHOTHERAPY: CPT | Mod: HP,,, | Performed by: PSYCHOLOGIST

## 2018-03-16 PROCEDURE — 25000003 PHARM REV CODE 250: Performed by: PSYCHIATRY & NEUROLOGY

## 2018-03-16 PROCEDURE — 80061 LIPID PANEL: CPT

## 2018-03-16 PROCEDURE — S4991 NICOTINE PATCH NONLEGEND: HCPCS | Performed by: PSYCHIATRY & NEUROLOGY

## 2018-03-16 PROCEDURE — 36415 COLL VENOUS BLD VENIPUNCTURE: CPT

## 2018-03-16 PROCEDURE — 12400001 HC PSYCH SEMI-PRIVATE ROOM

## 2018-03-16 PROCEDURE — 99233 SBSQ HOSP IP/OBS HIGH 50: CPT | Mod: AF,HB,, | Performed by: PSYCHIATRY & NEUROLOGY

## 2018-03-16 RX ORDER — GABAPENTIN 100 MG/1
300 CAPSULE ORAL 3 TIMES DAILY
Status: DISCONTINUED | OUTPATIENT
Start: 2018-03-16 | End: 2018-03-20

## 2018-03-16 RX ORDER — ESCITALOPRAM OXALATE 10 MG/1
10 TABLET ORAL DAILY
Status: DISCONTINUED | OUTPATIENT
Start: 2018-03-16 | End: 2018-03-19

## 2018-03-16 RX ORDER — MIRTAZAPINE 15 MG/1
15 TABLET, FILM COATED ORAL NIGHTLY
Status: DISCONTINUED | OUTPATIENT
Start: 2018-03-16 | End: 2018-03-20 | Stop reason: HOSPADM

## 2018-03-16 RX ADMIN — NICOTINE 1 PATCH: 7 PATCH, EXTENDED RELEASE TRANSDERMAL at 08:03

## 2018-03-16 RX ADMIN — FOLIC ACID 1 MG: 1 TABLET ORAL at 08:03

## 2018-03-16 RX ADMIN — SENNOSIDES 8.6 MG: 8.6 TABLET, FILM COATED ORAL at 08:03

## 2018-03-16 RX ADMIN — GABAPENTIN 300 MG: 100 CAPSULE ORAL at 03:03

## 2018-03-16 RX ADMIN — GABAPENTIN 300 MG: 100 CAPSULE ORAL at 08:03

## 2018-03-16 RX ADMIN — Medication 100 MG: at 08:03

## 2018-03-16 RX ADMIN — MIRTAZAPINE 15 MG: 15 TABLET, FILM COATED ORAL at 08:03

## 2018-03-16 RX ADMIN — ASPIRIN 81 MG CHEWABLE TABLET 81 MG: 81 TABLET CHEWABLE at 08:03

## 2018-03-16 RX ADMIN — TACROLIMUS 1 MG: 0.5 CAPSULE ORAL at 05:03

## 2018-03-16 RX ADMIN — THERA TABS 1 TABLET: TAB at 08:03

## 2018-03-16 RX ADMIN — MAGNESIUM OXIDE TAB 400 MG (241.3 MG ELEMENTAL MG) 800 MG: 400 (241.3 MG) TAB at 08:03

## 2018-03-16 RX ADMIN — FAMOTIDINE 20 MG: 20 TABLET, FILM COATED ORAL at 08:03

## 2018-03-16 RX ADMIN — TACROLIMUS 2 MG: 0.5 CAPSULE ORAL at 08:03

## 2018-03-16 RX ADMIN — ESCITALOPRAM OXALATE 10 MG: 10 TABLET ORAL at 12:03

## 2018-03-16 RX ADMIN — CLONIDINE HYDROCHLORIDE 0.2 MG: 0.1 TABLET ORAL at 08:03

## 2018-03-16 RX ADMIN — VITAMIN D, TAB 1000IU (100/BT) 2000 UNITS: 25 TAB at 08:03

## 2018-03-16 NOTE — PLAN OF CARE
Problem: Patient Care Overview (Adult)  Goal: Plan of Care Review  Outcome: Ongoing (interventions implemented as appropriate)  Observed awake and alert, Affect flat, mood quiet, isolative and withdrawn. Denied SI/HI, A/V hallucinations. No agitations or complaints voiced. Took scheduled medications without any problems. Appeared asleep throughout the night as noted during frequent rounds. Respirations even and unlabored. Free from falls/injury. Safety maintained. Continue to monitor.

## 2018-03-16 NOTE — PLAN OF CARE
Per chart notes, pt was dc to [facility]  psych yesterday evening.     03/16/18 0818   Final Note   Assessment Type Final Discharge Note   Discharge Disposition Psych

## 2018-03-16 NOTE — PROGRESS NOTES
Group Psychotherapy (PhD/LCSW)    Site: [facility]    Clinical status of patient: Inpatient    Date: 3/16/2018    Group Focus: Life Skills    Length of service: [id number] - 35-40 minutes    Number of patients in attendance: 6    Referred by: Acute Psychiatry Unit Treatment Team    Target symptoms: Alcohol Abuse, Anxiety and Mood Disorder    Patient's response to treatment: Active Listening and Self-disclosure    Progress toward goals: Progressing slowly    Interval History: Pt appeared alert and attentive in group. Pt participated appropriately in a group discussion of the danger of expectations when they are excessively high or excessively low. Pt discussed the way drug use (LSD) in the '90's created unrealistic expectations for him that led to problems in his life     Diagnosis: Substance Induced Mood d/o; Alcohol dependence; Anxiety     Plan: Continue treatment on APU

## 2018-03-16 NOTE — PROGRESS NOTES
03/16/18 1337   [facility] Group Therapy   Group Name Therapeutic Recreation   Participation Level None   Participation Quality Refused;Withdrawn

## 2018-03-16 NOTE — PLAN OF CARE
Problem: Patient Care Overview (Adult)  Goal: Plan of Care Review  Outcome: Ongoing (interventions implemented as appropriate)  Patients affect is flat.  Mood is depressed. Patient reports he is disappointed that he relapsed on alcohol.  Reports that he does not plan to go to rehab that he has learned all that before.  He shows poor insight into the negative effects of substance abuse.  Patient is isolative to his room.  Does not attend groups.  Show limited insight into illness and aftercare need.  Appearance is unkempt.  He is medication compliant.  No complaints at this time.      Problem: Overarching Goals (Adult)  Goal: Adheres to Safety Considerations for Self and Others  Outcome: Ongoing (interventions implemented as appropriate)  Patient follows safety rules and routines.  No safety issues noted.   Goal: Optimized Coping Skills in Response to Life Stressors  Outcome: Ongoing (interventions implemented as appropriate)  Patient not using coping skills at time.    Goal: Develops/Participates in Therapeutic [place] to Support Successful Transition  Outcome: Ongoing (interventions implemented as appropriate)  Patient minimally participates in the therapeutic alliance    Problem: Impaired Control (Excessive Substance Use) (Adult)  Goal: Participates in Recovery Program  Outcome: Ongoing (interventions implemented as appropriate)  Patient minimally participates in recovery program.  Reports that he does  Not want to go to rehab    Problem: Safety Awareness Impairment (Excessive Substance Use) (Adult)  Goal: Enhanced Safety Awareness  Outcome: Ongoing (interventions implemented as appropriate)  Patient verbaized understanding of the negative effects of substance abuse.    Problem: Mood Impairment (Depressive Signs/Symptoms) (Adult)  Goal: Improved Mood Symptoms  Outcome: Ongoing (interventions implemented as appropriate)  Mood remains depressed.      Problem: Psychomotor Impairment (Depressive Signs/Symptoms)  (Adult)  Goal: Improved Psychomotor Symptoms  Outcome: Ongoing (interventions implemented as appropriate)  Patient staying in his room in his bed.

## 2018-03-16 NOTE — TELEPHONE ENCOUNTER
Pt is currently admitted to psychiatric care.  Dianne called to discuss wit transplant coordinator.  Denies needs, no action required.

## 2018-03-16 NOTE — PROGRESS NOTES
03/15/18 2000   [facility] Group Therapy   Group Name Community Reintegration   Specific Interventions Relapse Prevention   Participation Level None

## 2018-03-16 NOTE — NURSING
Patient asking for information on [facility].  He is out of the room more this afternoon watching TV.  Eating meals and is social with select peers.  Medication compliant.  No complaints at this time.  Sleeping well on the unit.  Appearance is a litlle unkempt.

## 2018-03-16 NOTE — PROGRESS NOTES
Pt slept 8 hours he remains calm and cooperative on the unit MVC in place will continue to monitor.

## 2018-03-16 NOTE — TELEPHONE ENCOUNTER
----- Message from Samanta Hollins sent at 3/16/2018  2:41 PM CDT -----  Contact: Pt sister in law-Dianne Centeno  Calling to speak with Bina in regards to the pt being hospitalized, didn't give details.      Call back number: [phone]

## 2018-03-16 NOTE — PLAN OF CARE
03/16/18 1300   [facility] Group Therapy   Group Name Medication   Participation Level None   Participation Quality Refused

## 2018-03-17 PROCEDURE — 25000003 PHARM REV CODE 250: Performed by: PSYCHIATRY & NEUROLOGY

## 2018-03-17 PROCEDURE — 99231 SBSQ HOSP IP/OBS SF/LOW 25: CPT | Mod: AF,HB,, | Performed by: PSYCHIATRY & NEUROLOGY

## 2018-03-17 PROCEDURE — S4991 NICOTINE PATCH NONLEGEND: HCPCS | Performed by: PSYCHIATRY & NEUROLOGY

## 2018-03-17 PROCEDURE — 12400001 HC PSYCH SEMI-PRIVATE ROOM

## 2018-03-17 RX ADMIN — MIRTAZAPINE 15 MG: 15 TABLET, FILM COATED ORAL at 09:03

## 2018-03-17 RX ADMIN — CLONIDINE HYDROCHLORIDE 0.2 MG: 0.1 TABLET ORAL at 09:03

## 2018-03-17 RX ADMIN — TACROLIMUS 2 MG: 0.5 CAPSULE ORAL at 09:03

## 2018-03-17 RX ADMIN — MAGNESIUM OXIDE TAB 400 MG (241.3 MG ELEMENTAL MG) 800 MG: 400 (241.3 MG) TAB at 09:03

## 2018-03-17 RX ADMIN — FAMOTIDINE 20 MG: 20 TABLET, FILM COATED ORAL at 09:03

## 2018-03-17 RX ADMIN — MAGNESIUM OXIDE TAB 400 MG (241.3 MG ELEMENTAL MG) 800 MG: 400 (241.3 MG) TAB at 04:03

## 2018-03-17 RX ADMIN — TACROLIMUS 1 MG: 0.5 CAPSULE ORAL at 09:03

## 2018-03-17 RX ADMIN — ASPIRIN 81 MG CHEWABLE TABLET 81 MG: 81 TABLET CHEWABLE at 09:03

## 2018-03-17 RX ADMIN — Medication 100 MG: at 09:03

## 2018-03-17 RX ADMIN — VITAMIN D, TAB 1000IU (100/BT) 2000 UNITS: 25 TAB at 09:03

## 2018-03-17 RX ADMIN — NICOTINE 1 PATCH: 7 PATCH, EXTENDED RELEASE TRANSDERMAL at 09:03

## 2018-03-17 RX ADMIN — SENNOSIDES 8.6 MG: 8.6 TABLET, FILM COATED ORAL at 09:03

## 2018-03-17 RX ADMIN — GABAPENTIN 300 MG: 100 CAPSULE ORAL at 04:03

## 2018-03-17 RX ADMIN — FOLIC ACID 1 MG: 1 TABLET ORAL at 09:03

## 2018-03-17 RX ADMIN — GABAPENTIN 300 MG: 100 CAPSULE ORAL at 09:03

## 2018-03-17 RX ADMIN — THERA TABS 1 TABLET: TAB at 09:03

## 2018-03-17 RX ADMIN — ESCITALOPRAM OXALATE 10 MG: 10 TABLET ORAL at 09:03

## 2018-03-17 NOTE — PLAN OF CARE
Problem: Patient Care Overview (Adult)  Goal: Plan of Care Review  Outcome: Ongoing (interventions implemented as appropriate)  Calm, cooperative. Pleasant. Interacting with peers and staff appropriately. Visible in milieu. Compliant with treatments and medications. Has not verbalized willingness to abstain from alcohol or participate in recovery program at this time. Modified visual contact maintained per MD orders.

## 2018-03-17 NOTE — PROGRESS NOTES
03/17/18 0900 03/17/18 1000 03/17/18 1100   [facility] Group Therapy   Group Name Community Reintegration Mental Awareness Stress Management   Specific Interventions Current Events Cognitive Stimulation Training Guided Imagery/Relaxation   Participation Level Active;Appropriate;Attentive Active;Appropriate;Attentive None   Participation Quality Cooperative;Social Cooperative;Social Sleeping   Insight/Motivation Improved Improved --    Affect/Mood Display Appropriate;Flat Appropriate;Flat --    Cognition Oriented Oriented --        03/17/18 1300   [facility] Group Therapy   Group Name Therapeutic Recreation   Specific Interventions Skilled Activity Crafts   Participation Level None   Participation Quality Sleeping   Insight/Motivation --    Affect/Mood Display --    Cognition --

## 2018-03-17 NOTE — PROGRESS NOTES
[facility]  Psychiatry  Progress Note    Patient Name: Dereck Centeno  MRN: [id number]   Code Status: Full Code  Admission Date: 3/15/2018  Hospital Length of Stay: 2 days  Expected Discharge Date:   Attending Physician: Lyle Machado MD  Primary Care Provider: Eva Reynaga MD    Current Legal Status: [facility]    Patient information was obtained from patient.     Subjective:     Principal Problem:Substance induced mood disorder    Chief Complaint: Overdose     HPI:   History of Present Illness:   Dereck Centeno is a 57 y.o. male with a history of anxiety, depression, chronic back pain, liver transplant (2015) secondary to HCV/ETOH abuse.  He presents with intentional overdose.  He states that he took 20mg clonidine and 1600mg gabapentin at 14:30 yesterday.  He also reports drinking ETOH.  Patient very difficult to understand on exam, but states that he was stressed about his living situation.       Patient initially cooperative with interview but later attempted to leave the room.  He was redirected by sitter and cooperated when threatened that security could be called.  Patient explains that he overdosed last night in a suicide attempt impulsively.  He has been struggling with depression for years and reports compliance with medications but cannot name what he takes, dosages, who or where he is seen for outpatient psychiatric care.  Initially denies alcohol use but when confronted with BAL in ED endorses recent alcohol use. Prior to this relapse he has been sober for many months.  Patient reports feeling guilty about relapsing on alcohol.  He does not plan on engaging in psychiatric care and would like to return home as soon as possible.  Interview was difficult to conduct due to patient's poor memory and difficulty with concentration.  His speech was slurred but improved when he removed his dentures.        Collateral: Dianne Jacobo, sister in law, [phone]  Patient's sister in law was  "shocked regarding the patient's overdose.  Patient has been living in her home and she is responsible for making sure he has his medications.  She left to go to [place] for three days and the patient relapsed on alcohol and overdosed.  Patient is working at [employer] part time, attending meetings for [employer].  There were no signs that he would attempt suicide.  Sister in law describes patient's baseline as "a bit slow" but reports that his mental status changed last night after the overdose.       Medical Review Of Systems:  Pertinent items are noted in HPI.     Psychiatric Review Of Systems - Is patient experiencing or having changes in:  sleep: no  appetite: no  weight: no  energy/anergy: no  interest/pleasure/anhedonia: yes  somatic symptoms: yes  libido: no  anxiety/panic: yes  guilty/hopelessness: yes  concentration: yes  S.I.B.s/risky behavior: yes  any drugs: no  alcohol: yes      Allergies:  Patient has no known allergies.     Past Medical/Surgical History       Past Medical History:   Diagnosis Date    Alcohol withdrawal seizure      Alcoholic cirrhosis      Anxiety      Depression      Hepatitis C      History of throat cancer      HTN (hypertension), benign      Tobacco use         has a past medical history of Alcohol withdrawal seizure; Alcoholic cirrhosis; Anxiety; Depression; Hepatitis C; History of throat cancer; HTN (hypertension), benign; and Tobacco use.        Past Surgical History:   Procedure Laterality Date    ESOPHAGEAL VARICE LIGATION        LIVER TRANSPLANT        TIPS PROCEDURE             Past Psychiatric History:  Previous Medication Trials: yes, Paxil 10mg daily, Seroquel 25mg qHS PRN, and Effexor 75mg daily  Previous Psychiatric Hospitalizations: yes, 2 related to prior suicide attempts/overdoses  Previous Suicide Attempts: yes   History of Violence: yes  Outpatient Psychiatrist: yes     Social History:  Marital Status:   Children: 3   Employment Status/Info: currently " "employed, part time at [employer]  Education: high school diploma/GED  Special Ed: no  Housing Status: with sister in law and brother  History of phys/sexual abuse: no  Access to gun: no     Substance Abuse History:  Recreational Drugs: patient states "i've tried them all". Sober for several years from IV drug use with heroin, amphetamines, crack  Use of Alcohol: patient is an alcoholic, reports recent relapse with light drinking  Rehab History:yes   Tobacco Use:yes  Use of Caffeine: denies use  Use of OTC: denied  Legal consequences of chemical use: yes     Legal History:  Past Charges/Incarcerations:yes, several incarcerations related to drugs   Pending charges:no      Family Psychiatric History:   denied      Hospital Course: 3/17/2018 : Patient reports he is "feeling good".  Feels grateful to be alive after overdose.  "things have turned out in a good way."  Has been sleeping well.  Describes his time outside the hospital as spent "laying around."  Doesn't feel his current medications are very different than his previous ones.  Feels "they are doing alright" and likes that he is taking several vitamins.  Denies side effects.       Family History     Problem Relation (Age of Onset)    Alcohol abuse Mother, Father        Social History Main Topics    Smoking status: Former Smoker     Packs/day: 0.50     Types: Cigarettes     Quit date: 5/5/2017    Smokeless tobacco: Former User     Types: Chew    Alcohol use No      Comment: former heavy alcohol user, quit 1 year ago    Drug use: No    Sexual activity: Not on file     Psychotherapeutics     Start     Stop Route Frequency Ordered    03/16/18 2100  mirtazapine tablet 15 mg      -- Oral Nightly 03/16/18 1159    03/16/18 1300  escitalopram oxalate tablet 10 mg      -- Oral Daily 03/16/18 1159    03/15/18 1646  haloperidol tablet 5 mg  (Med - Acute  Behavioral Management)      -- Oral Every 8 hours PRN 03/15/18 1646    03/15/18 1646  LORazepam tablet 2 mg  (Med - " "Acute  Behavioral Management)      -- Oral Every 4 hours PRN 03/15/18 1646    03/15/18 1646  haloperidol lactate injection 5 mg  (Med - Acute  Behavioral Management)      -- IM Every 4 hours PRN 03/15/18 1646    03/15/18 1646  lorazepam (ATIVAN) injection 2 mg  (Med - Acute  Behavioral Management)      -- IM Every 4 hours PRN 03/15/18 1646           Review of Systems  Objective:     Vital Signs (Most Recent):  Temp: 98.8 °F (37.1 °C) (03/16/18 1905)  Pulse: (!) 58 (03/16/18 1905)  Resp: 16 (03/16/18 1905)  BP: 97/61 (03/16/18 1905) Vital Signs (24h Range):  Temp:  [98.5 °F (36.9 °C)-98.8 °F (37.1 °C)] 98.8 °F (37.1 °C)  Pulse:  [58-64] 58  Resp:  [16-18] 16  BP: ()/(61-78) 97/61     Height: 5' 11" (180.3 cm)  Weight: 77 kg (169 lb 12.1 oz)  Body mass index is 23.68 kg/m².    No intake or output data in the 24 hours ending 03/17/18 0953    Physical Exam      Mental Status Exam:  Appearance: older than stated age, neatly groomed  Behavior/Cooperation:  cooperative, eye contact normal  Speech:  normal tone, normal rate, normal pitch, normal volume  Language: uses words appropriately; NO aphasia or dysarthria  Mood: steady  Affect:  congruent with mood and appropriate to situation/content   Thought Process: normal and logical  Thought Content: normal, no suicidality, no homicidality, delusions, or paranoia  Level of Consciousness: Alert and Oriented x3  Memory:  Grossly Intact  Attention/concentration: appropriate for age/education.   Fund of Knowledge: appears adequate  Insight: Intact  Judgment: Intact    Significant Labs:   Last 24 Hours:   Recent Lab Results     None          Significant Imaging: None    Assessment/Plan:     Intentional overdose of drug in tablet form    Substance Induced Mood Disorder  Patient was admitted to Hospital Medicine due to rebound hypertension.  Initially clonidine was held but later restarted at home dose of 0.2mg BID prior to discharge.  Other home medications of clorazepate " 3.75mg BID PRN, gabapentin 400mg TID, oxycodone 10mg QID PRN, Paxil 10mg daily, Seroquel 25mg qHS PRN, and Effex  or 75mg held.  Gabapentin restarted at 300 mg TID.  Also started Lexapro 10 mg daily and Remeron 30 mg daily.   Suicide Precautions         Delirium    Resolved   Patient initially struggled with clear communication and concentration after overdose.  He was unstable on his feet and attempted to elope from the ICU during the psychiatric consult interview.  On day of discharge from medicine he was alert and oriented and appears to have returned to baseline.         Alcohol use disorder, severe, dependence    Patient was admitted with BAL of 144. He reports relapse after long period (years) of sobriety.  Substance Abuse treatment to be discussed prior to discharge  Patient has is a recipient of a liver transplant   Folic Acid, Multivitamin, Thiamine         HTN (hypertension)    Improving, continued home clonidine         Tobacco abuse    Will order a nicotine patch              Need for Continued Hospitalization:   Psychiatric illness continues to pose a potential threat to life or bodily function, of self or others, thereby requiring the need for continued inpatient psychiatric hospitalization.    Anticipated Disposition: Home or Self Care , potentially residential rehab     Total time:  15 with greater than 50% of this time spent in counseling and/or coordination of care.       Eva Dykes MD   Psychiatry  [facility]

## 2018-03-17 NOTE — PLAN OF CARE
Problem: Patient Care Overview (Adult)  Goal: Plan of Care Review  Outcome: Ongoing (interventions implemented as appropriate)  Patient is isolative to his room today.  Mood is still depressed.  Patient is cooperative with rules and routines.  Medication compliant.  No behavioral issues noted.      Problem: Overarching Goals (Adult)  Goal: Adheres to Safety Considerations for Self and Others  Outcome: Ongoing (interventions implemented as appropriate)  Adheres to safety goals and rules.  No issues noted.   Goal: Optimized Coping Skills in Response to Life Stressors  Outcome: Ongoing (interventions implemented as appropriate)  Coping skills ineffective at this time.  Goal: Develops/Participates in Therapeutic Angie to Support Successful Transition  Outcome: Ongoing (interventions implemented as appropriate)  Does not actively participate in the therapeutic alliance     Problem: Impaired Control (Excessive Substance Use) (Adult)  Goal: Participates in Recovery Program  Outcome: Ongoing (interventions implemented as appropriate)  Currently does not actively participate in his recovery program    Problem: Safety Awareness Impairment (Excessive Substance Use) (Adult)  Goal: Enhanced Safety Awareness  Outcome: Ongoing (interventions implemented as appropriate)  Verbalized understanding of the negative effects of substance abuse.     Problem: Mood Impairment (Depressive Signs/Symptoms) (Adult)  Goal: Improved Mood Symptoms  Outcome: Ongoing (interventions implemented as appropriate)  Mood slightly improved but still depressed.      Problem: Psychomotor Impairment (Depressive Signs/Symptoms) (Adult)  Goal: Improved Psychomotor Symptoms  Outcome: Ongoing (interventions implemented as appropriate)  Patient remains depressed.  Mood is improving but remains sad.  Patient reports feels disappointed in hiimself.

## 2018-03-17 NOTE — HOSPITAL COURSE
"3/17/2018 : Patient reports he is "feeling good".  Feels grateful to be alive after overdose.  "things have turned out in a good way."  Has been sleeping well.  Describes his time outside the hospital as spent "laying around."  Doesn't feel his current medications are very different than his previous ones.  Feels "they are doing alright" and likes that he is taking several vitamins.  Denies side effects.     03/18/2018: Patient continues to be feeling good on the unit. He reports that yesterday was boring because there wasn't much to do on the unit. He did get to see his family yesterday and his sister brought him a crossword puzzle book which has kept him busy. Patient denies SI/HI/AVH. He reports that the last time that he felt like dying was the day he attempted suicide. He reports that he is happy that he is still alive. He thinks that alcohol played a big role in his suicide attempt. He denies any cravings for alcohol and reports motivation to stay sober.    03/19/2018 - no behavioral issues.  Reports mood improving, denies SI.  He is future oriented and participative in milieu.  He remains defensive about relapse.  Motivational interviewing and relapse prevention provided.    03/20/2018 - We have a patient meeting this morning with the patient's sister-in-law. The patient notes that he feels safe currently and denies any SI. He is noted to be forward thinking and motivated to engage in his program of recovery. She notes that she was very surprised of the patient's suicide attempt and relapse on alcohol. She notes that her home is a safe environment and believes that the patient will be safe to return home. He adds that impulsivity lead to the suicide attempt and he notes that he is very happy that he was not successful. The patient's outpatient psychiatrist and therapist were attempted to be contacted but they did not return our phone calls.   "

## 2018-03-17 NOTE — ASSESSMENT & PLAN NOTE
Substance Induced Mood Disorder  Patient was admitted to Hospital Medicine due to rebound hypertension.  Initially clonidine was held but later restarted at home dose of 0.2mg BID prior to discharge.  Other home medications of clorazepate 3.75mg BID PRN, gabapentin 400mg TID, oxycodone 10mg QID PRN, Paxil 10mg daily, Seroquel 25mg qHS PRN, and Effex  or 75mg held.  Gabapentin restarted at 300 mg TID.  Also started Lexapro 10 mg daily and Remeron 30 mg daily.   Suicide Precautions

## 2018-03-17 NOTE — ASSESSMENT & PLAN NOTE
Resolved   Patient initially struggled with clear communication and concentration after overdose.  He was unstable on his feet and attempted to elope from the ICU during the psychiatric consult interview.  On day of discharge from medicine he was alert and oriented and appears to have returned to baseline.

## 2018-03-17 NOTE — SUBJECTIVE & OBJECTIVE
"  Family History     Problem Relation (Age of Onset)    Alcohol abuse Mother, Father        Social History Main Topics    Smoking status: Former Smoker     Packs/day: 0.50     Types: Cigarettes     Quit date: 5/5/2017    Smokeless tobacco: Former User     Types: Chew    Alcohol use No      Comment: former heavy alcohol user, quit 1 year ago    Drug use: No    Sexual activity: Not on file     Psychotherapeutics     Start     Stop Route Frequency Ordered    03/16/18 2100  mirtazapine tablet 15 mg      -- Oral Nightly 03/16/18 1159    03/16/18 1300  escitalopram oxalate tablet 10 mg      -- Oral Daily 03/16/18 1159    03/15/18 1646  haloperidol tablet 5 mg  (Med - Acute  Behavioral Management)      -- Oral Every 8 hours PRN 03/15/18 1646    03/15/18 1646  LORazepam tablet 2 mg  (Med - Acute  Behavioral Management)      -- Oral Every 4 hours PRN 03/15/18 1646    03/15/18 1646  haloperidol lactate injection 5 mg  (Med - Acute  Behavioral Management)      -- IM Every 4 hours PRN 03/15/18 1646    03/15/18 1646  lorazepam (ATIVAN) injection 2 mg  (Med - Acute  Behavioral Management)      -- IM Every 4 hours PRN 03/15/18 1646           Review of Systems  Objective:     Vital Signs (Most Recent):  Temp: 98.8 °F (37.1 °C) (03/16/18 1905)  Pulse: (!) 58 (03/16/18 1905)  Resp: 16 (03/16/18 1905)  BP: 97/61 (03/16/18 1905) Vital Signs (24h Range):  Temp:  [98.5 °F (36.9 °C)-98.8 °F (37.1 °C)] 98.8 °F (37.1 °C)  Pulse:  [58-64] 58  Resp:  [16-18] 16  BP: ()/(61-78) 97/61     Height: 5' 11" (180.3 cm)  Weight: 77 kg (169 lb 12.1 oz)  Body mass index is 23.68 kg/m².    No intake or output data in the 24 hours ending 03/17/18 0953    Physical Exam      Mental Status Exam:  Appearance: older than stated age, neatly groomed  Behavior/Cooperation:  cooperative, eye contact normal  Speech:  normal tone, normal rate, normal pitch, normal volume  Language: uses words appropriately; NO aphasia or dysarthria  Mood: steady  Affect: "  congruent with mood and appropriate to situation/content   Thought Process: normal and logical  Thought Content: normal, no suicidality, no homicidality, delusions, or paranoia  Level of Consciousness: Alert and Oriented x3  Memory:  Grossly Intact  Attention/concentration: appropriate for age/education.   Fund of Knowledge: appears adequate  Insight: Intact  Judgment: Intact    Significant Labs:   Last 24 Hours:   Recent Lab Results     None          Significant Imaging: None

## 2018-03-18 PROCEDURE — 25000003 PHARM REV CODE 250: Performed by: PSYCHIATRY & NEUROLOGY

## 2018-03-18 PROCEDURE — 12400001 HC PSYCH SEMI-PRIVATE ROOM

## 2018-03-18 PROCEDURE — S4991 NICOTINE PATCH NONLEGEND: HCPCS | Performed by: PSYCHIATRY & NEUROLOGY

## 2018-03-18 PROCEDURE — 99231 SBSQ HOSP IP/OBS SF/LOW 25: CPT | Mod: AF,HB,, | Performed by: PSYCHIATRY & NEUROLOGY

## 2018-03-18 RX ORDER — IBUPROFEN 200 MG
1 TABLET ORAL DAILY
Status: DISCONTINUED | OUTPATIENT
Start: 2018-03-18 | End: 2018-03-20 | Stop reason: HOSPADM

## 2018-03-18 RX ADMIN — FOLIC ACID 1 MG: 1 TABLET ORAL at 08:03

## 2018-03-18 RX ADMIN — SENNOSIDES 8.6 MG: 8.6 TABLET, FILM COATED ORAL at 09:03

## 2018-03-18 RX ADMIN — GABAPENTIN 300 MG: 100 CAPSULE ORAL at 09:03

## 2018-03-18 RX ADMIN — ESCITALOPRAM OXALATE 10 MG: 10 TABLET ORAL at 08:03

## 2018-03-18 RX ADMIN — THERA TABS 1 TABLET: TAB at 08:03

## 2018-03-18 RX ADMIN — VITAMIN D, TAB 1000IU (100/BT) 2000 UNITS: 25 TAB at 02:03

## 2018-03-18 RX ADMIN — GABAPENTIN 300 MG: 100 CAPSULE ORAL at 08:03

## 2018-03-18 RX ADMIN — MIRTAZAPINE 15 MG: 15 TABLET, FILM COATED ORAL at 09:03

## 2018-03-18 RX ADMIN — FAMOTIDINE 20 MG: 20 TABLET, FILM COATED ORAL at 09:03

## 2018-03-18 RX ADMIN — MAGNESIUM OXIDE TAB 400 MG (241.3 MG ELEMENTAL MG) 800 MG: 400 (241.3 MG) TAB at 09:03

## 2018-03-18 RX ADMIN — TACROLIMUS 1 MG: 0.5 CAPSULE ORAL at 05:03

## 2018-03-18 RX ADMIN — Medication 100 MG: at 08:03

## 2018-03-18 RX ADMIN — GABAPENTIN 300 MG: 100 CAPSULE ORAL at 02:03

## 2018-03-18 RX ADMIN — NICOTINE 1 PATCH: 7 PATCH, EXTENDED RELEASE TRANSDERMAL at 08:03

## 2018-03-18 RX ADMIN — CLONIDINE HYDROCHLORIDE 0.2 MG: 0.1 TABLET ORAL at 09:03

## 2018-03-18 RX ADMIN — MAGNESIUM OXIDE TAB 400 MG (241.3 MG ELEMENTAL MG) 800 MG: 400 (241.3 MG) TAB at 08:03

## 2018-03-18 RX ADMIN — CLONIDINE HYDROCHLORIDE 0.2 MG: 0.1 TABLET ORAL at 08:03

## 2018-03-18 RX ADMIN — TACROLIMUS 2 MG: 0.5 CAPSULE ORAL at 08:03

## 2018-03-18 RX ADMIN — NICOTINE 1 PATCH: 14 PATCH, EXTENDED RELEASE TRANSDERMAL at 12:03

## 2018-03-18 NOTE — SUBJECTIVE & OBJECTIVE
"Interval History: See hospital course    Family History     Problem Relation (Age of Onset)    Alcohol abuse Mother, Father        Social History Main Topics    Smoking status: Former Smoker     Packs/day: 0.50     Types: Cigarettes     Quit date: 5/5/2017    Smokeless tobacco: Former User     Types: Chew    Alcohol use No      Comment: former heavy alcohol user, quit 1 year ago    Drug use: No    Sexual activity: Not on file     Psychotherapeutics     Start     Stop Route Frequency Ordered    03/16/18 2100  mirtazapine tablet 15 mg      -- Oral Nightly 03/16/18 1159    03/16/18 1300  escitalopram oxalate tablet 10 mg      -- Oral Daily 03/16/18 1159    03/15/18 1646  haloperidol tablet 5 mg  (Med - Acute  Behavioral Management)      -- Oral Every 8 hours PRN 03/15/18 1646    03/15/18 1646  LORazepam tablet 2 mg  (Med - Acute  Behavioral Management)      -- Oral Every 4 hours PRN 03/15/18 1646    03/15/18 1646  haloperidol lactate injection 5 mg  (Med - Acute  Behavioral Management)      -- IM Every 4 hours PRN 03/15/18 1646    03/15/18 1646  lorazepam (ATIVAN) injection 2 mg  (Med - Acute  Behavioral Management)      -- IM Every 4 hours PRN 03/15/18 1646           Review of Systems  Objective:     Vital Signs (Most Recent):  Temp: 98.7 °F (37.1 °C) (03/18/18 0746)  Pulse: 70 (03/18/18 0746)  Resp: 18 (03/18/18 0746)  BP: (!) 145/87 (03/18/18 0746) Vital Signs (24h Range):  Temp:  [98.7 °F (37.1 °C)-99 °F (37.2 °C)] 98.7 °F (37.1 °C)  Pulse:  [62-70] 70  Resp:  [18] 18  BP: (138-145)/(74-87) 145/87     Height: 5' 11" (180.3 cm)  Weight: 77 kg (169 lb 12.1 oz)  Body mass index is 23.68 kg/m².    No intake or output data in the 24 hours ending 03/18/18 0933    Physical Exam   Mental Status Exam:  Appearance: older than stated age, neatly groomed  Behavior/Cooperation:  cooperative, eye contact normal  Speech:  normal tone, normal rate, normal pitch, normal volume  Language: uses words appropriately; NO aphasia or " dysarthria  Mood: steady  Affect:  congruent with mood and appropriate to situation/content   Thought Process: normal and logical  Thought Content: normal, no suicidality, no homicidality, delusions, or paranoia  Level of Consciousness: Alert and Oriented x3  Memory:  Grossly Intact  Attention/concentration: appropriate for age/education.   Fund of Knowledge: appears adequate  Insight: Intact  Judgment: Intact     Significant Labs:   Last 24 Hours:   Recent Lab Results     None          Significant Imaging: I have reviewed all pertinent imaging results/findings within the past 24 hours.

## 2018-03-18 NOTE — NURSING
Patient reports feeling pretty good.  Mood has improved.  Patient reports that he is highly motivated to be sober.  Currently denies any cravings.  He denies suicidal or homicidal ideations.  Medication compliant.  Sleeping and eating well.
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
## 2018-03-18 NOTE — PROGRESS NOTES
03/18/18 0900 03/18/18 1000 03/18/18 1100   [facility] Group Therapy   Group Name Community Reintegration Mental Awareness Stress Management   Specific Interventions Current Events (tribond) Guided Imagery/Relaxation   Participation Level None Active;Appropriate;Attentive Appropriate;Attentive   Participation Quality Lack of Interest Cooperative;Social Cooperative   Insight/Motivation --  Good Good   Affect/Mood Display --  Appropriate Appropriate   Cognition --  Alert;Oriented Alert;Oriented       03/18/18 1300 03/18/18 1400   [facility] Group Therapy   Group Name Therapeutic Recreation Therapeutic Recreation   Specific Interventions (bean bag toss) Skilled Activity Crafts   Participation Level Appropriate;Active Appropriate;Active   Participation Quality Cooperative Cooperative   Insight/Motivation Good Good   Affect/Mood Display Appropriate Appropriate   Cognition Alert;Oriented Alert;Oriented

## 2018-03-18 NOTE — PLAN OF CARE
Problem: Patient Care Overview (Adult)  Goal: Plan of Care Review  Outcome: Ongoing (interventions implemented as appropriate)  Observed awake and alert. Affect flat,mood calm. Denied SI/HI, A/V hallucins. Pt's quiet isolative and withdrawn. Took scheduled medications. Appeared asleep throughout the night as noted during frequent rounds. Free from falls/injury. Safety maintained. Continue to monitor.

## 2018-03-18 NOTE — PLAN OF CARE
Problem: Mood Impairment (Depressive Signs/Symptoms) (Adult)  Goal: Improved Mood Symptoms  Outcome: Ongoing (interventions implemented as appropriate)  Reports that hs is feeling better.  He is out of the room a little bit today.  No detox symptoms.  No reports of patient craving etoh.  He does not want to go to rehab.     Problem: Psychomotor Impairment (Depressive Signs/Symptoms) (Adult)  Goal: Improved Psychomotor Symptoms  Outcome: Ongoing (interventions implemented as appropriate)  Patient is out of his bed today in the group room.

## 2018-03-19 PROCEDURE — S4991 NICOTINE PATCH NONLEGEND: HCPCS | Performed by: PSYCHIATRY & NEUROLOGY

## 2018-03-19 PROCEDURE — 25000003 PHARM REV CODE 250: Performed by: PSYCHIATRY & NEUROLOGY

## 2018-03-19 PROCEDURE — 90833 PSYTX W PT W E/M 30 MIN: CPT | Mod: AF,HB,, | Performed by: PSYCHIATRY & NEUROLOGY

## 2018-03-19 PROCEDURE — 12400001 HC PSYCH SEMI-PRIVATE ROOM

## 2018-03-19 PROCEDURE — 99232 SBSQ HOSP IP/OBS MODERATE 35: CPT | Mod: AF,HB,, | Performed by: PSYCHIATRY & NEUROLOGY

## 2018-03-19 PROCEDURE — 90853 GROUP PSYCHOTHERAPY: CPT | Mod: HP,HB,, | Performed by: PSYCHOLOGIST

## 2018-03-19 PROCEDURE — 97166 OT EVAL MOD COMPLEX 45 MIN: CPT

## 2018-03-19 RX ORDER — ESCITALOPRAM OXALATE 20 MG/1
20 TABLET ORAL DAILY
Status: DISCONTINUED | OUTPATIENT
Start: 2018-03-20 | End: 2018-03-20 | Stop reason: HOSPADM

## 2018-03-19 RX ORDER — CLONIDINE HYDROCHLORIDE 0.1 MG/1
0.1 TABLET ORAL 2 TIMES DAILY
Status: DISCONTINUED | OUTPATIENT
Start: 2018-03-19 | End: 2018-03-20 | Stop reason: HOSPADM

## 2018-03-19 RX ORDER — AMLODIPINE BESYLATE 2.5 MG/1
5 TABLET ORAL DAILY
Status: DISCONTINUED | OUTPATIENT
Start: 2018-03-19 | End: 2018-03-20 | Stop reason: HOSPADM

## 2018-03-19 RX ADMIN — VITAMIN D, TAB 1000IU (100/BT) 2000 UNITS: 25 TAB at 08:03

## 2018-03-19 RX ADMIN — MAGNESIUM OXIDE TAB 400 MG (241.3 MG ELEMENTAL MG) 800 MG: 400 (241.3 MG) TAB at 08:03

## 2018-03-19 RX ADMIN — THERA TABS 1 TABLET: TAB at 08:03

## 2018-03-19 RX ADMIN — MIRTAZAPINE 15 MG: 15 TABLET, FILM COATED ORAL at 08:03

## 2018-03-19 RX ADMIN — ASPIRIN 81 MG CHEWABLE TABLET 81 MG: 81 TABLET CHEWABLE at 08:03

## 2018-03-19 RX ADMIN — FAMOTIDINE 20 MG: 20 TABLET, FILM COATED ORAL at 08:03

## 2018-03-19 RX ADMIN — SENNOSIDES 8.6 MG: 8.6 TABLET, FILM COATED ORAL at 08:03

## 2018-03-19 RX ADMIN — Medication 100 MG: at 08:03

## 2018-03-19 RX ADMIN — TACROLIMUS 1 MG: 0.5 CAPSULE ORAL at 05:03

## 2018-03-19 RX ADMIN — TACROLIMUS 2 MG: 0.5 CAPSULE ORAL at 08:03

## 2018-03-19 RX ADMIN — GABAPENTIN 300 MG: 100 CAPSULE ORAL at 03:03

## 2018-03-19 RX ADMIN — CLONIDINE HYDROCHLORIDE 0.2 MG: 0.1 TABLET ORAL at 08:03

## 2018-03-19 RX ADMIN — GABAPENTIN 300 MG: 100 CAPSULE ORAL at 08:03

## 2018-03-19 RX ADMIN — ESCITALOPRAM OXALATE 10 MG: 10 TABLET ORAL at 08:03

## 2018-03-19 RX ADMIN — CLONIDINE HYDROCHLORIDE 0.1 MG: 0.1 TABLET ORAL at 08:03

## 2018-03-19 RX ADMIN — NICOTINE 1 PATCH: 14 PATCH, EXTENDED RELEASE TRANSDERMAL at 08:03

## 2018-03-19 RX ADMIN — AMLODIPINE BESYLATE 5 MG: 2.5 TABLET ORAL at 12:03

## 2018-03-19 RX ADMIN — FOLIC ACID 1 MG: 1 TABLET ORAL at 08:03

## 2018-03-19 NOTE — ASSESSMENT & PLAN NOTE
Substance Induced Mood Disorder  Patient was admitted to Hospital Medicine due to rebound hypertension.  Initially clonidine was held but later restarted at home dose of 0.2mg BID prior to discharge.  Other home medications of clorazepate 3.75mg BID PRN, gabapentin 400mg TID, oxycodone 10mg QID PRN, Paxil 10mg daily, Seroquel 25mg qHS PRN, and Effexor 75mg daily held.  Gabapentin restarted at 300 mg TID.  Also started Lexapro 10 mg daily and Remeron 30 mg daily.       Patient denies further SI at this time, noted to be future oriented.

## 2018-03-19 NOTE — ASSESSMENT & PLAN NOTE
Improving on unit.  Continue Lexapro and Remeron  Avoid Tranxene  3/19/18 - titrate lexapro to 20mg daily - first dose 3/20/18
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
## 2018-03-19 NOTE — SUBJECTIVE & OBJECTIVE
"Interval History: staff reports patient improving on unit.  He is defensive about drinking that led to admit.  He continues to decline referral to rehab but states he will re-engage in 12 step meetings.  He states he cannot say why he drank that day but is happy to be alive.  He denies any further SI.  He is future oriented, spoke about returning to work and fishing.  He spoke about increasing frequency of psychotherapy.  He feels meds working better since they have been switched around.  He reports pain continues.  We spoke about his relationship with his sister in law.      PMHx  Past Medical History Reviewed    ROS  Musculoskeletal: +pain in neck, back, feet bilaterally      EXAMINATION    VITALS   Vitals:    03/19/18 0800   BP: (!) 147/98   Pulse: 83   Resp: 16   Temp: 98.1 °F (36.7 °C)       CONSTITUTIONAL  General Appearance: stated age, casually dressed, multiple tattoos    MUSCULOSKELETAL  Muscle Strength and Tone: no weakness or spasticity  Abnormal Involuntary Movements: no tremor noted  Gait and Station: ambulates without assistance    PSYCHIATRIC   Level of Consciousness: alert  Orientation: to person, place, time  Grooming: intact, showering  Psychomotor Behavior: no retardation or agitation  Speech: conversational or spontaneous  Language: speaks English, repeats words/phrases  Mood: "was upbeat - now down a few notches that I'm not going home"  Affect: reactive, less constricted  Thought Process: linear  Associations: intact, no loosening of associations  Thought Content: denies SI  Memory: improving  Attention: not distractible  Fund of Knowledge: intact  Insight: improving  Judgment: improving      Family History     Problem Relation (Age of Onset)    Alcohol abuse Mother, Father        Social History Main Topics    Smoking status: Former Smoker     Packs/day: 0.50     Types: Cigarettes     Quit date: 5/5/2017    Smokeless tobacco: Former User     Types: Chew    Alcohol use No      Comment: " "former heavy alcohol user, quit 1 year ago    Drug use: No    Sexual activity: Not on file     Psychotherapeutics     Start     Stop Route Frequency Ordered    03/16/18 2100  mirtazapine tablet 15 mg      -- Oral Nightly 03/16/18 1159    03/16/18 1300  escitalopram oxalate tablet 10 mg      -- Oral Daily 03/16/18 1159    03/15/18 1646  haloperidol tablet 5 mg  (Med - Acute  Behavioral Management)      -- Oral Every 8 hours PRN 03/15/18 1646    03/15/18 1646  LORazepam tablet 2 mg  (Med - Acute  Behavioral Management)      -- Oral Every 4 hours PRN 03/15/18 1646    03/15/18 1646  haloperidol lactate injection 5 mg  (Med - Acute  Behavioral Management)      -- IM Every 4 hours PRN 03/15/18 1646    03/15/18 1646  lorazepam (ATIVAN) injection 2 mg  (Med - Acute  Behavioral Management)      -- IM Every 4 hours PRN 03/15/18 1646           Review of Systems  Objective:     Vital Signs (Most Recent):  Temp: 98.1 °F (36.7 °C) (03/19/18 0800)  Pulse: 83 (03/19/18 0800)  Resp: 16 (03/19/18 0800)  BP: (!) 147/98 (03/19/18 0800) Vital Signs (24h Range):  Temp:  [98 °F (36.7 °C)-98.7 °F (37.1 °C)] 98.1 °F (36.7 °C)  Pulse:  [59-87] 83  Resp:  [16-18] 16  BP: (140-175)/(81-98) 147/98     Height: 5' 11" (180.3 cm)  Weight: 77 kg (169 lb 12.1 oz)  Body mass index is 23.68 kg/m².    No intake or output data in the 24 hours ending 03/19/18 0947    Physical Exam     Significant Labs:   Last 24 Hours:   Recent Lab Results     None          Significant Imaging: None  "

## 2018-03-19 NOTE — ASSESSMENT & PLAN NOTE
Mood improving on unit.  He denies SI at this time  Continue trials of Lexapro and Remeron  3/19/18 - titrate lexapro to 20mg daily - first dose 3/20/18

## 2018-03-19 NOTE — PROGRESS NOTES
[facility]  Psychiatry  Progress Note    Patient Name: Dereck Centeno  MRN: [id number]   Code Status: Full Code  Admission Date: 3/15/2018  Hospital Length of Stay: 4 days  Expected Discharge Date:   Attending Physician: Lyle Machado MD  Primary Care Provider: Eva Reynaga MD    [place] [facility] [place]    Patient information was obtained from patient and ER records.     Subjective:     Principal Problem:Substance induced mood disorder    Chief Complaint: depression, SI, alcohol addiction    HPI:   History of Present Illness:   Dereck Centeno is a 57 y.o. male with a history of anxiety, depression, chronic back pain, liver transplant (2015) secondary to HCV/ETOH abuse.  He presents with intentional overdose.  He states that he took 20mg clonidine and 1600mg gabapentin at 14:30 yesterday.  He also reports drinking ETOH.  Patient very difficult to understand on exam, but states that he was stressed about his living situation.       Patient initially cooperative with interview but later attempted to leave the room.  He was redirected by sitter and cooperated when threatened that security could be called.  Patient explains that he overdosed last night in a suicide attempt impulsively.  He has been struggling with depression for years and reports compliance with medications but cannot name what he takes, dosages, who or where he is seen for outpatient psychiatric care.  Initially denies alcohol use but when confronted with BAL in ED endorses recent alcohol use. Prior to this relapse he has been sober for many months.  Patient reports feeling guilty about relapsing on alcohol.  He does not plan on engaging in psychiatric care and would like to return home as soon as possible.  Interview was difficult to conduct due to patient's poor memory and difficulty with concentration.  His speech was slurred but improved when he removed his dentures.        Collateral: Dianne Centeno, sister in law,  "[phone]  Patient's sister in law was shocked regarding the patient's overdose.  Patient has been living in her home and she is responsible for making sure he has his medications.  She left to go to [place] for three days and the patient relapsed on alcohol and overdosed.  Patient is working at [email or web address] part time, attending meetings for [employer].  There were no signs that he would attempt suicide.  Sister in law describes patient's baseline as "a bit slow" but reports that his mental status changed last night after the overdose.       Medical Review Of Systems:  Pertinent items are noted in HPI.     Psychiatric Review Of Systems - Is patient experiencing or having changes in:  sleep: no  appetite: no  weight: no  energy/anergy: no  interest/pleasure/anhedonia: yes  somatic symptoms: yes  libido: no  anxiety/panic: yes  guilty/hopelessness: yes  concentration: yes  S.I.B.s/risky behavior: yes  any drugs: no  alcohol: yes      Allergies:  Patient has no known allergies.     Past Medical/Surgical History       Past Medical History:   Diagnosis Date    Alcohol withdrawal seizure      Alcoholic cirrhosis      Anxiety      Depression      Hepatitis C      History of throat cancer      HTN (hypertension), benign      Tobacco use         has a past medical history of Alcohol withdrawal seizure; Alcoholic cirrhosis; Anxiety; Depression; Hepatitis C; History of throat cancer; HTN (hypertension), benign; and Tobacco use.        Past Surgical History:   Procedure Laterality Date    ESOPHAGEAL VARICE LIGATION        LIVER TRANSPLANT        TIPS PROCEDURE             Past Psychiatric History:  Previous Medication Trials: yes, Paxil 10mg daily, Seroquel 25mg qHS PRN, and Effexor 75mg daily  Previous Psychiatric Hospitalizations: yes, 2 related to prior suicide attempts/overdoses  Previous Suicide Attempts: yes   History of Violence: yes  Outpatient Psychiatrist: yes     Social History:  Marital Status: " "  Children: 3   Employment Status/Info: currently employed, part time at [employer]  Education: high school diploma/GED  Special Ed: no  Housing Status: with sister in law and brother  History of phys/sexual abuse: no  Access to gun: no     Substance Abuse History:  Recreational Drugs: patient states "i've tried them all". Sober for several years from IV drug use with heroin, amphetamines, crack  Use of Alcohol: patient is an alcoholic, reports recent relapse with light drinking  Rehab History:yes   Tobacco Use:yes  Use of Caffeine: denies use  Use of OTC: denied  Legal consequences of chemical use: yes     Legal History:  Past Charges/Incarcerations:yes, several incarcerations related to drugs   Pending charges:no      Family Psychiatric History:   denied      Hospital Course: 3/17/2018 : Patient reports he is "feeling good".  Feels grateful to be alive after overdose.  "things have turned out in a good way."  Has been sleeping well.  Describes his time outside the hospital as spent "laying around."  Doesn't feel his current medications are very different than his previous ones.  Feels "they are doing alright" and likes that he is taking several vitamins.  Denies side effects.     03/18/2018: Patient continues to be feeling good on the unit. He reports that yesterday was boring because there wasn't much to do on the unit. He did get to see his family yesterday and his sister brought him a crossword puzzle book which has kept him busy. Patient denies SI/HI/AVH. He reports that the last time that he felt like dying was the day he attempted suicide. He reports that he is happy that he is still alive. He thinks that alcohol played a big role in his suicide attempt. He denies any cravings for alcohol and reports motivation to stay sober.    03/19/2018 - no behavioral issues.  Reports mood improving, denies SI.  He is future oriented and participative in milieu.  He remains defensive about relapse.  Motivational " "interviewing and relapse prevention provided.    Interval History: staff reports patient improving on unit.  He is defensive about drinking that led to admit.  He continues to decline referral to rehab but states he will re-engage in 12 step meetings.  He states he cannot say why he drank that day but is happy to be alive.  He denies any further SI.  He is future oriented, spoke about returning to work and fishing.  He spoke about increasing frequency of psychotherapy.  He feels meds working better since they have been switched around.  He reports pain continues.  We spoke about his relationship with his sister in law.      PMHx  Past Medical History Reviewed    ROS  Musculoskeletal: +pain in neck, back, feet bilaterally      EXAMINATION    VITALS   Vitals:    03/19/18 0800   BP: (!) 147/98   Pulse: 83   Resp: 16   Temp: 98.1 °F (36.7 °C)       CONSTITUTIONAL  General Appearance: stated age, casually dressed, multiple tattoos    MUSCULOSKELETAL  Muscle Strength and Tone: no weakness or spasticity  Abnormal Involuntary Movements: no tremor noted  Gait and Station: ambulates without assistance    PSYCHIATRIC   Level of Consciousness: alert  Orientation: to person, place, time  Grooming: intact, showering  Psychomotor Behavior: no retardation or agitation  Speech: conversational or spontaneous  Language: speaks English, repeats words/phrases  Mood: "was upbeat - now down a few notches that I'm not going home"  Affect: reactive, less constricted  Thought Process: linear  Associations: intact, no loosening of associations  Thought Content: denies SI  Memory: improving  Attention: not distractible  Fund of Knowledge: intact  Insight: improving  Judgment: improving      Family History     Problem Relation (Age of Onset)    Alcohol abuse Mother, Father        Social History Main Topics    Smoking status: Former Smoker     Packs/day: 0.50     Types: Cigarettes     Quit date: 5/5/2017    Smokeless tobacco: Former User     " "Types: Chew    Alcohol use No      Comment: former heavy alcohol user, quit 1 year ago    Drug use: No    Sexual activity: Not on file     Psychotherapeutics     Start     Stop Route Frequency Ordered    03/16/18 2100  mirtazapine tablet 15 mg      -- Oral Nightly 03/16/18 1159    03/16/18 1300  escitalopram oxalate tablet 10 mg      -- Oral Daily 03/16/18 1159    03/15/18 1646  haloperidol tablet 5 mg  (Med - Acute  Behavioral Management)      -- Oral Every 8 hours PRN 03/15/18 1646    03/15/18 1646  LORazepam tablet 2 mg  (Med - Acute  Behavioral Management)      -- Oral Every 4 hours PRN 03/15/18 1646    03/15/18 1646  haloperidol lactate injection 5 mg  (Med - Acute  Behavioral Management)      -- IM Every 4 hours PRN 03/15/18 1646    03/15/18 1646  lorazepam (ATIVAN) injection 2 mg  (Med - Acute  Behavioral Management)      -- IM Every 4 hours PRN 03/15/18 1646           Review of Systems  Objective:     Vital Signs (Most Recent):  Temp: 98.1 °F (36.7 °C) (03/19/18 0800)  Pulse: 83 (03/19/18 0800)  Resp: 16 (03/19/18 0800)  BP: (!) 147/98 (03/19/18 0800) Vital Signs (24h Range):  Temp:  [98 °F (36.7 °C)-98.7 °F (37.1 °C)] 98.1 °F (36.7 °C)  Pulse:  [59-87] 83  Resp:  [16-18] 16  BP: (140-175)/(81-98) 147/98     Height: 5' 11" (180.3 cm)  Weight: 77 kg (169 lb 12.1 oz)  Body mass index is 23.68 kg/m².    No intake or output data in the 24 hours ending 03/19/18 0947    Physical Exam     Significant Labs:   Last 24 Hours:   Recent Lab Results     None          Significant Imaging: None    Assessment/Plan:     * Substance induced mood disorder    Mood improving on unit.  He denies SI at this time  Continue trials of Lexapro and Remeron  3/19/18 - titrate lexapro to 20mg daily - first dose 3/20/18        Alcohol use disorder, severe, dependence    Patient was admitted with BAL of 144. He reports relapse after long period (years) of sobriety.  Substance Abuse treatment discussed with patient  Motivational " interviewing and relapse prevention provided  Patient has is a recipient of a liver transplant   Folic Acid, Multivitamin, Thiamine         Intentional overdose of drug in tablet form    Substance Induced Mood Disorder  Patient was admitted to Hospital Medicine due to rebound hypertension.  Initially clonidine was held but later restarted at home dose of 0.2mg BID prior to discharge.  Other home medications of clorazepate 3.75mg BID PRN, gabapentin 400mg TID, oxycodone 10mg QID PRN, Paxil 10mg daily, Seroquel 25mg qHS PRN, and Effexor 75mg daily held.  Gabapentin restarted at 300 mg TID.  Also started Lexapro 10 mg daily and Remeron 30 mg daily.       Patient denies further SI at this time, noted to be future oriented.        Tobacco abuse    Patient was counseled on the benefits of cessation of tobacco use, and assessed for readiness and motivation to quit.    Motivational interviewing employed to foster change.    Nicotine patch is available for patient use on the unit to decrease cravings.  [facility] is a non-smoking facility.          Liver transplant 1/11/2015 for HCV    Continue Prograf - last level 6.5 on 3/15/18   3/15/18 - LFTs improved, now wnl        HTN (hypertension)    BP remains elevated on unit - will likely need adjustment  Continue clonidine at this time        Anxiety disorder    Improving on unit.  Continue Lexapro and Remeron  Avoid Tranxene  3/19/18 - titrate lexapro to 20mg daily - first dose 3/20/18        Delirium    Resolved   Patient initially struggled with clear communication and concentration after overdose.  He was unstable on his feet and attempted to elope from the ICU during the psychiatric consult interview.  On day of discharge from medicine he was alert and oriented and appears to have returned to baseline.         Chronic back pain    Continue neurontin - will titrate dose  Avoid tramadol on unit             Need for Continued Hospitalization:   Protective inpatient psychiatric  hospitalization required while a safe disposition plan is enacted. and Requires ongoing hospitalization for stabilization of medications.    Anticipated Disposition: Home or Self Care   Discharge anticipated in 1-3 days if improvement continues at current rate.        PSYCHOTHERAPY ADD-ON +[id number]   30 (16-37*) minutes    Site: [facility] [address]  Time: 25 minutes  Participants: Met with patient    Therapeutic Intervention Type: supportive psychotherapy  Why chosen therapy is appropriate versus another modality: relevant to diagnosis    Target symptoms: alcohol abuse, depression  Primary focus: recent relapse discussed.  Denial addressed.  Relapse prevention and motivational interviewing applied.  Discussed relationship with sister in law, working 12 steps.  Psychotherapeutic techniques: supportive listening, exploratory questions, clarification, motivational interviewing.    Outcome monitoring methods: self-report, observation    Patient's response to intervention:  The patient's response to intervention is accepting, reluctant.    Progress toward goals:  The patient's progress toward goals is progressing adequately.            Lyle Machado MD   Psychiatry  [facility]

## 2018-03-19 NOTE — ASSESSMENT & PLAN NOTE
Patient was counseled on the benefits of cessation of tobacco use, and assessed for readiness and motivation to quit.    Motivational interviewing employed to foster change.    Nicotine patch is available for patient use on the unit to decrease cravings.  [facility] is a non-smoking facility.

## 2018-03-19 NOTE — PROGRESS NOTES
03/18/18 2000   [facility] Group Therapy   Group Name Stress Management   Specific Interventions Relaxation Training   Participation Level Active;Appropriate   Participation Quality Cooperative   Insight/Motivation Good   Affect/Mood Display Appropriate   Cognition Alert;Oriented

## 2018-03-19 NOTE — PLAN OF CARE
Problem: Patient Care Overview (Adult)  Goal: Plan of Care Review  Outcome: Ongoing (interventions implemented as appropriate)  Calm, cooperative. Pleasant. Interacting with peers and staff appropriately. Visible in milieu. Compliant with treatments and medications. Has not verbalized willingness to participate in recovery program at this time. Modified visual contact maintained per MD orders.

## 2018-03-19 NOTE — PROGRESS NOTES
sw confirmed family meeting with pt's sister in law at 11 am. She could  Not make it for 10 due to a previous doctor's appointment.

## 2018-03-19 NOTE — PROGRESS NOTES
Group Psychotherapy (PhD/LCSW)    Site: [facility]    Clinical status of patient: Inpatient    Date: 3/19/2018    Group Focus: Life Skills    Length of service: 01258 - 35-40 minutes    Number of patients in attendance: 6    Referred by: Acute Psychiatry Unit Treatment Team    Target symptoms: Alcohol Abuse, Anxiety and Mood Disorder    Patient's response to treatment: Active Listening and Self-disclosure    Progress toward goals: Progressing slowly    Interval History: Pt appeared alert and attentive. Pt participated appropriately in a group discussion of conflict reducing strategies for problems in family relationships.     Diagnosis: Substance Induced Mood d/o; Alcohol dependence; Anxiety     Plan: Continue treatment on [facility]

## 2018-03-19 NOTE — PLAN OF CARE
Pt less irritable and more interactive with this RN than observed in previous shifts. Flat affect displayed. Pt was visible in milieu and attended unit activities. Medication compliant. Denies SI/HI. Denies AH/VH. Complained of having back pain but declined to take PRN tylenol PO. NAD observed. Will cont to monitor.

## 2018-03-19 NOTE — ASSESSMENT & PLAN NOTE
Patient was admitted with BAL of 144. He reports relapse after long period (years) of sobriety.  Substance Abuse treatment discussed with patient  Motivational interviewing and relapse prevention provided  Patient has is a recipient of a liver transplant   Folic Acid, Multivitamin, Thiamine

## 2018-03-20 VITALS
DIASTOLIC BLOOD PRESSURE: 98 MMHG | WEIGHT: 169.75 LBS | HEIGHT: 71 IN | TEMPERATURE: 99 F | HEART RATE: 81 BPM | SYSTOLIC BLOOD PRESSURE: 164 MMHG | BODY MASS INDEX: 23.77 KG/M2 | RESPIRATION RATE: 17 BRPM

## 2018-03-20 PROBLEM — T50.902A INTENTIONAL OVERDOSE OF DRUG IN TABLET FORM: Status: RESOLVED | Noted: 2018-03-13 | Resolved: 2018-03-20

## 2018-03-20 PROCEDURE — 25000003 PHARM REV CODE 250: Performed by: PSYCHIATRY & NEUROLOGY

## 2018-03-20 PROCEDURE — 99239 HOSP IP/OBS DSCHRG MGMT >30: CPT | Mod: 25,AF,HB, | Performed by: PSYCHIATRY & NEUROLOGY

## 2018-03-20 PROCEDURE — S4991 NICOTINE PATCH NONLEGEND: HCPCS | Performed by: PSYCHIATRY & NEUROLOGY

## 2018-03-20 PROCEDURE — 90847 FAMILY PSYTX W/PT 50 MIN: CPT | Mod: AF,HB,, | Performed by: PSYCHIATRY & NEUROLOGY

## 2018-03-20 RX ORDER — FAMOTIDINE 20 MG/1
20 TABLET, FILM COATED ORAL NIGHTLY
Qty: 30 TABLET | Refills: 0 | Status: SHIPPED | OUTPATIENT
Start: 2018-03-20 | End: 2023-03-14

## 2018-03-20 RX ORDER — MIRTAZAPINE 15 MG/1
15 TABLET, FILM COATED ORAL NIGHTLY
Qty: 30 TABLET | Refills: 0 | Status: SHIPPED | OUTPATIENT
Start: 2018-03-20 | End: 2023-03-14

## 2018-03-20 RX ORDER — GABAPENTIN 400 MG/1
400 CAPSULE ORAL 3 TIMES DAILY
Status: DISCONTINUED | OUTPATIENT
Start: 2018-03-20 | End: 2018-03-20 | Stop reason: HOSPADM

## 2018-03-20 RX ORDER — ESCITALOPRAM OXALATE 20 MG/1
20 TABLET ORAL DAILY
Qty: 30 TABLET | Refills: 0 | Status: SHIPPED | OUTPATIENT
Start: 2018-03-21 | End: 2023-03-14

## 2018-03-20 RX ORDER — LANOLIN ALCOHOL/MO/W.PET/CERES
100 CREAM (GRAM) TOPICAL DAILY
Qty: 30 TABLET | Refills: 0 | Status: SHIPPED | OUTPATIENT
Start: 2018-03-21 | End: 2023-01-30 | Stop reason: CLARIF

## 2018-03-20 RX ORDER — GABAPENTIN 400 MG/1
400 CAPSULE ORAL 3 TIMES DAILY
Qty: 90 CAPSULE | Refills: 0 | Status: SHIPPED | OUTPATIENT
Start: 2018-03-20 | End: 2023-03-14

## 2018-03-20 RX ORDER — AMLODIPINE BESYLATE 5 MG/1
5 TABLET ORAL DAILY
Qty: 30 TABLET | Refills: 0 | Status: SHIPPED | OUTPATIENT
Start: 2018-03-21 | End: 2023-10-11

## 2018-03-20 RX ORDER — CLONIDINE HYDROCHLORIDE 0.1 MG/1
0.1 TABLET ORAL 2 TIMES DAILY
Qty: 60 TABLET | Refills: 0 | Status: SHIPPED | OUTPATIENT
Start: 2018-03-20 | End: 2023-10-11

## 2018-03-20 RX ADMIN — ASPIRIN 81 MG CHEWABLE TABLET 81 MG: 81 TABLET CHEWABLE at 08:03

## 2018-03-20 RX ADMIN — FOLIC ACID 1 MG: 1 TABLET ORAL at 08:03

## 2018-03-20 RX ADMIN — NICOTINE 1 PATCH: 14 PATCH, EXTENDED RELEASE TRANSDERMAL at 08:03

## 2018-03-20 RX ADMIN — VITAMIN D, TAB 1000IU (100/BT) 2000 UNITS: 25 TAB at 08:03

## 2018-03-20 RX ADMIN — AMLODIPINE BESYLATE 5 MG: 2.5 TABLET ORAL at 08:03

## 2018-03-20 RX ADMIN — SENNOSIDES 8.6 MG: 8.6 TABLET, FILM COATED ORAL at 08:03

## 2018-03-20 RX ADMIN — Medication 100 MG: at 08:03

## 2018-03-20 RX ADMIN — CLONIDINE HYDROCHLORIDE 0.1 MG: 0.1 TABLET ORAL at 08:03

## 2018-03-20 RX ADMIN — MAGNESIUM OXIDE TAB 400 MG (241.3 MG ELEMENTAL MG) 800 MG: 400 (241.3 MG) TAB at 08:03

## 2018-03-20 RX ADMIN — GABAPENTIN 300 MG: 100 CAPSULE ORAL at 08:03

## 2018-03-20 RX ADMIN — TACROLIMUS 2 MG: 0.5 CAPSULE ORAL at 08:03

## 2018-03-20 RX ADMIN — ESCITALOPRAM 20 MG: 20 TABLET, FILM COATED ORAL at 08:03

## 2018-03-20 RX ADMIN — THERA TABS 1 TABLET: TAB at 08:03

## 2018-03-20 NOTE — ASSESSMENT & PLAN NOTE
Improving on unit.  Continue Lexapro and Remeron  Avoid Tranxene and other medications that are potentially habit forming  3/19/18 - titrate lexapro to 20mg daily - first dose 3/20/18

## 2018-03-20 NOTE — DISCHARGE SUMMARY
[facility]  Psychiatry  Discharge Summary      Patient Name: Dereck Centeno  MRN: [id number]  Admission Date: 3/15/2018  Hospital Length of Stay: 5 days  Discharge Date and Time:  03/20/2018 11:14 AM  Attending Physician: Lyle Machado MD   Discharging Provider: Mandeep Aaron MD  Primary Care Provider: Eva Reynaga MD    HPI:   History of Present Illness:   Dereck Centeno is a 57 y.o. male with a history of anxiety, depression, chronic back pain, liver transplant (2015) secondary to HCV/ETOH abuse.  He presents with intentional overdose.  He states that he took 20mg clonidine and 1600mg gabapentin at 14:30 yesterday.  He also reports drinking ETOH.  Patient very difficult to understand on exam, but states that he was stressed about his living situation.       Patient initially cooperative with interview but later attempted to leave the room.  He was redirected by sitter and cooperated when threatened that security could be called.  Patient explains that he overdosed last night in a suicide attempt impulsively.  He has been struggling with depression for years and reports compliance with medications but cannot name what he takes, dosages, who or where he is seen for outpatient psychiatric care.  Initially denies alcohol use but when confronted with BAL in ED endorses recent alcohol use. Prior to this relapse he has been sober for many months.  Patient reports feeling guilty about relapsing on alcohol.  He does not plan on engaging in psychiatric care and would like to return home as soon as possible.  Interview was difficult to conduct due to patient's poor memory and difficulty with concentration.  His speech was slurred but improved when he removed his dentures.        Collateral: Dianne Centeno, sister in law, [phone]  Patient's sister in law was shocked regarding the patient's overdose.  Patient has been living in her home and she is responsible for making sure he has his  "medications.  She left to go to [place] for three days and the patient relapsed on alcohol and overdosed.  Patient is working at [employer] part time, attending meetings for [employer].  There were no signs that he would attempt suicide.  Sister in law describes patient's baseline as "a bit slow" but reports that his mental status changed last night after the overdose.       Medical Review Of Systems:  Pertinent items are noted in HPI.     Psychiatric Review Of Systems - Is patient experiencing or having changes in:  sleep: no  appetite: no  weight: no  energy/anergy: no  interest/pleasure/anhedonia: yes  somatic symptoms: yes  libido: no  anxiety/panic: yes  guilty/hopelessness: yes  concentration: yes  S.I.B.s/risky behavior: yes  any drugs: no  alcohol: yes      Allergies:  Patient has no known allergies.     Past Medical/Surgical History       Past Medical History:   Diagnosis Date    Alcohol withdrawal seizure      Alcoholic cirrhosis      Anxiety      Depression      Hepatitis C      History of throat cancer      HTN (hypertension), benign      Tobacco use         has a past medical history of Alcohol withdrawal seizure; Alcoholic cirrhosis; Anxiety; Depression; Hepatitis C; History of throat cancer; HTN (hypertension), benign; and Tobacco use.        Past Surgical History:   Procedure Laterality Date    ESOPHAGEAL VARICE LIGATION        LIVER TRANSPLANT        TIPS PROCEDURE             Past Psychiatric History:  Previous Medication Trials: yes, Paxil 10mg daily, Seroquel 25mg qHS PRN, and Effexor 75mg daily  Previous Psychiatric Hospitalizations: yes, 2 related to prior suicide attempts/overdoses  Previous Suicide Attempts: yes   History of Violence: yes  Outpatient Psychiatrist: yes     Social History:  Marital Status:   Children: 3   Employment Status/Info: currently employed, part time at [employer]  Education: high school diploma/GED  Special Ed: no  Housing Status: with sister in law and " "brother  History of phys/sexual abuse: no  Access to gun: no     Substance Abuse History:  Recreational Drugs: patient states "i've tried them all". Sober for several years from IV drug use with heroin, amphetamines, crack  Use of Alcohol: patient is an alcoholic, reports recent relapse with light drinking  Rehab History:yes   Tobacco Use:yes  Use of Caffeine: denies use  Use of OTC: denied  Legal consequences of chemical use: yes     Legal History:  Past Charges/Incarcerations:yes, several incarcerations related to drugs   Pending charges:no      Family Psychiatric History:   denied      Hospital Course:   3/17/2018 : Patient reports he is "feeling good".  Feels grateful to be alive after overdose.  "things have turned out in a good way."  Has been sleeping well.  Describes his time outside the hospital as spent "laying around."  Doesn't feel his current medications are very different than his previous ones.  Feels "they are doing alright" and likes that he is taking several vitamins.  Denies side effects.     03/18/2018: Patient continues to be feeling good on the unit. He reports that yesterday was boring because there wasn't much to do on the unit. He did get to see his family yesterday and his sister brought him a crossword puzzle book which has kept him busy. Patient denies SI/HI/AVH. He reports that the last time that he felt like dying was the day he attempted suicide. He reports that he is happy that he is still alive. He thinks that alcohol played a big role in his suicide attempt. He denies any cravings for alcohol and reports motivation to stay sober.    03/19/2018 - no behavioral issues.  Reports mood improving, denies SI.  He is future oriented and participative in milieu.  He remains defensive about relapse.  Motivational interviewing and relapse prevention provided.    03/20/2018 - We have a patient meeting this morning with the patient's sister-in-law. The patient notes that he feels safe currently " and denies any SI. He is noted to be forward thinking and motivated to engage in his program of recovery. She notes that she was very surprised of the patient's suicide attempt and relapse on alcohol. She notes that her home is a safe environment and believes that the patient will be safe to return home. He adds that impulsivity lead to the suicide attempt and he notes that he is very happy that he was not successful. The patient's outpatient psychiatrist and therapist were attempted to be contacted but they did not return our phone calls.      * No surgery found *     Consults:   Physical Exam     Significant Labs:   Last 24 Hours:   Recent Lab Results     None          Significant Imaging: I have reviewed all pertinent imaging results/findings within the past 24 hours.    Smoking Cessation:   Does the patient smoke? Yes  Does the patient want a prescription for Smoking Cessation? No  Does the patient want counseling for Smoking Cessation? No         Pending Diagnostic Studies:     None        Final Active Diagnoses:    Diagnosis Date Noted POA    PRINCIPAL PROBLEM:  Substance induced mood disorder [F19.94] 03/16/2018 Yes     Chronic    Alcohol use disorder, severe, dependence [F10.20] 03/15/2018 Yes    Intentional overdose of drug in tablet form [T50.902A] 03/13/2018 Yes    Tobacco abuse [Z72.0] 09/29/2015 Yes    Liver transplant 1/11/2015 for HCV [Z94.4] 01/13/2015 Not Applicable     Chronic    HTN (hypertension) [I10] 01/12/2015 Yes    Anxiety disorder [F41.9] 12/03/2014 Yes    Delirium [R41.0] 11/30/2014 Yes    Chronic back pain [M54.9, G89.29] 04/28/2014 Yes     Chronic      Problems Resolved During this Admission:    Diagnosis Date Noted Date Resolved POA    Depressive disorder [F32.9] 03/15/2018 03/15/2018 Yes    Depression [F32.9] 04/28/2014 03/15/2018 Yes      * Substance induced mood disorder    Mood improving on unit.  He denies SI at this time  Continue trials of Lexapro and Remeron  3/19/18  - titrate lexapro to 20mg daily - first dose 3/20/18        Alcohol use disorder, severe, dependence    Patient was admitted with BAL of 144. He reports relapse after long period (years) of sobriety.  Substance Abuse treatment discussed with patient, motivated to be more motivated to engage in more 12-step recovery and engage in classes at his local mental health center  Motivational interviewing and relapse prevention provided  Patient has is a recipient of a liver transplant   Folic Acid, Multivitamin, Thiamine         Intentional overdose of drug in tablet form    Substance Induced Mood Disorder  Patient was admitted to Hospital Medicine due to rebound hypertension.  Initially clonidine was held but later restarted at home dose of 0.2mg BID prior to discharge. Decreased to Clonidine 0.1 mg PO BID and Amlodipine 5 mg PO daily started on 3/19/18. Plan to adjust medications per PCP as an outpatient.    Patient denies further SI, noted to be future oriented at time of discharge        Tobacco abuse    Patient was counseled on the benefits of cessation of tobacco use, and assessed for readiness and motivation to quit.    Motivational interviewing employed to foster change.    Nicotine patch is available for patient use on the unit to decrease cravings.  [facility] is a non-smoking facility.          Liver transplant 1/11/2015 for HCV    Continue Prograf - last level 6.5 on 3/15/18   3/15/18 - LFTs improved, now wnl        HTN (hypertension)    BP remains elevated on unit - will likely need adjustment  Plan to titrate off Clonidine as an outpatient  Plan to continue Amlodipine 5 mg PO daily for HTN and up-titrate as appropriate per outpatient PCP        Anxiety disorder    Improving on unit.  Continue Lexapro and Remeron  Avoid Tranxene and other medications that are potentially habit forming  3/19/18 - titrate lexapro to 20mg daily - first dose 3/20/18        Delirium    Resolved   Patient initially struggled with clear  communication and concentration after overdose.  He was unstable on his feet and attempted to elope from the ICU during the psychiatric consult interview.  On day of discharge from medicine he was alert and oriented and appears to have returned to baseline. No s/sx of delirium at discharge.         Chronic back pain    Continue neurontin   Avoid tramadol and other medications that are potentially habit forming            Functional Condition: Independent ambulation, Independent communication skills, Can read/write, Able to access transportation , Independent with ADLs and basic life skills, Able to obtain/access community resources and Able to participate in aftercare arrangements independently    Discharged Condition: stable    Disposition:  Home/Self care  Follow Up:  Follow-up Information     [facility] On 3/20/2018.    Specialties:  Behavioral Health, Psychiatry, Psychology  Why:  substance abuse treatment, mental health follow up. [address] [place] [address] [place]  Contact information:  [address] [place] [address] [phone]                 Patient Instructions:   No discharge procedures on file.  Medications:  Reconciled Home Medications:   Current Discharge Medication List      START taking these medications    Details   amLODIPine (NORVASC) 5 MG tablet Take 1 tablet (5 mg total) by mouth once daily.  Qty: 30 tablet, Refills: 0      escitalopram oxalate (LEXAPRO) 20 MG tablet Take 1 tablet (20 mg total) by mouth once daily.  Qty: 30 tablet, Refills: 0      mirtazapine (REMERON) 15 MG tablet Take 1 tablet (15 mg total) by mouth every evening.  Qty: 30 tablet, Refills: 0      thiamine 100 MG tablet Take 1 tablet (100 mg total) by mouth once daily.  Qty: 30 tablet, Refills: 0         CONTINUE these medications which have CHANGED    Details   cloNIDine (CATAPRES) 0.1 MG tablet Take 1 tablet (0.1 mg total) by mouth 2 (two) times daily.  Qty: 60 tablet, Refills: 0      famotidine  (PEPCID) 20 MG tablet Take 1 tablet (20 mg total) by mouth every evening.  Qty: 30 tablet, Refills: 0      gabapentin (NEURONTIN) 400 MG capsule Take 1 capsule (400 mg total) by mouth 3 (three) times daily.  Qty: 90 capsule, Refills: 0         CONTINUE these medications which have NOT CHANGED    Details   albuterol (PROVENTIL HFA) 90 mcg/actuation inhaler Inhale 2 puffs into the lungs every 6 (six) hours as needed for Wheezing or Shortness of Breath.      alendronate (FOSAMAX) 70 MG tablet Take 70 mg by mouth every 7 days.      aspirin (ECOTRIN) 81 MG EC tablet Take 1 tablet (81 mg total) by mouth once daily.  Qty: 30 tablet, Refills: 0      docusate sodium (COLACE) 100 MG capsule Take 100 mg by mouth 3 (three) times daily as needed for Constipation.      magnesium oxide (MAG-OX) 400 mg tablet Take 400 mg by mouth once daily.      multivitamin (THERAGRAN) tablet Take 1 tablet by mouth once daily.      tacrolimus (PROGRAF) 1 MG Cap 2 mg in am and 1 mg in pm  Qty: 90 capsule, Refills: 11    Associated Diagnoses: Status post liver transplant      vitamin D 1000 units Tab Take 2 tablets (2,000 Units total) by mouth once daily.  Qty: 60 tablet, Refills: 5    Associated Diagnoses: Vitamin D deficiency; Edema         STOP taking these medications       clorazepate (TRANXENE) 3.75 MG Tab Comments:   Reason for Stopping:         nicotine (NICODERM CQ) 21 mg/24 hr Comments:   Reason for Stopping:         oxycodone (ROXICODONE) 10 mg Tab immediate release tablet Comments:   Reason for Stopping:         paroxetine (PAXIL) 10 MG tablet Comments:   Reason for Stopping:         polyethylene glycol (GLYCOLAX) 17 gram PwPk Comments:   Reason for Stopping:         quetiapine (SEROQUEL) 25 MG Tab Comments:   Reason for Stopping:         ribavirin (REBETOL) 200 MG Cap Comments:   Reason for Stopping:         venlafaxine (EFFEXOR-XR) 150 MG Cp24 Comments:   Reason for Stopping:         zinc gluconate 50 mg tablet Comments:   Reason for  Stopping:             Is patient being discharged on multiple antipsychotics? No        Total time:60 with greater than 50% of this time spent in counseling and/or coordination of care.     All elements of the transition record were discussed with the patient at discharge and patient agrees to this plan.    Mandeep Aaron MD  Psychiatry Resident  [facility]

## 2018-03-20 NOTE — ASSESSMENT & PLAN NOTE
Resolved   Patient initially struggled with clear communication and concentration after overdose.  He was unstable on his feet and attempted to elope from the ICU during the psychiatric consult interview.  On day of discharge from medicine he was alert and oriented and appears to have returned to baseline. No s/sx of delirium at discharge.

## 2018-03-20 NOTE — ASSESSMENT & PLAN NOTE
Substance Induced Mood Disorder  Patient was admitted to Hospital Medicine due to rebound hypertension.  Initially clonidine was held but later restarted at home dose of 0.2mg BID prior to discharge. Decreased to Clonidine 0.1 mg PO BID and Amlodipine 5 mg PO daily started on 3/19/18. Plan to adjust medications per PCP as an outpatient.    Patient denies further SI, noted to be future oriented at time of discharge

## 2018-03-20 NOTE — ASSESSMENT & PLAN NOTE
Patient was admitted with BAL of 144. He reports relapse after long period (years) of sobriety.  Substance Abuse treatment discussed with patient, motivated to be more motivated to engage in more 12-step recovery and engage in classes at his local mental health center  Motivational interviewing and relapse prevention provided  Patient has is a recipient of a liver transplant   Folic Acid, Multivitamin, Thiamine

## 2018-03-20 NOTE — MEDICAL/APP STUDENT
PSYCHIATRY DAILY INPATIENT PROGRESS NOTE  SUBSEQUENT HOSPITAL VISIT    ENCOUNTER DATE: 3/20/2018  SITE: [facility] [address]    DATE OF ADMISSION: 3/15/2018  4:40 PM  LENGTH OF STAY: 5 days  CURRENT LEGAL STATUS: ***        HISTORY    CHIEF COMPLAINT   Dereck Centeno is a 57 y.o. male, seen during daily willard rounds on the inpatient unit.  Dereck Centeno presents with the chief complaint of ***    HPI   (Elements: Location, Quality, Severity, Duration, Timing, Content, Modifying Factors, Associated Signs & Symptoms)    ***    ROS  {ros master:894681}    PAST MEDICAL HISTORY   ***    CURRENT MEDICAL ISSUES  ***    PSYCHOTROPIC MEDICATIONS   ***        EXAMINATION    VITALS   Vitals:    03/19/18 1915   BP: (!) 145/93   Pulse: 78   Resp: 16   Temp: 98.6 °F (37 °C)       CONSTITUTIONAL  General Appearance: ***    MUSCULOSKELETAL  Muscle Strength and Tone: ***  Abnormal Involuntary Movements: ***  Gait and Station: ***    PSYCHIATRIC   Level of Consciousness: ***  Orientation: ***  Grooming: ***  Psychomotor Behavior: ***  Speech: ***  Language: ***  Mood: ***  Affect: ***  Thought Process: ***  Associations: ***  Thought Content: ***  Memory: ***  Attention: ***  Fund of Knowledge: ***  Insight: ***  Judgment: ***        MEDICAL DECISION MAKING    DIAGNOSES  ***    PROBLEM LIST AND MANAGEMENT PLANS    New problem(s) (3 points each):   - ***    Established problem(s), worsening (2 points each):   - ***    Established problem(s), stable/improved (1 point each):  - ***      GLOBAL ASSESSMENT OF FUNCTION SCALE  The patient's GAF is ***    PRESCRIPTION DRUG MANAGEMENT  Compliance: ***  Side Effects: ***  Regimen Adjustments: ***    DIAGNOSTIC TESTING   Laboratory Results  No results found for this or any previous visit (from the past 24 hour(s)).    COLLATERAL OBTAINED IN PAST 24 HOURS  ***    DISCHARGE PLANNING  Expected Disposition Plan: ***    RESIDENT: Tere Smallwood      ATTENDING  COMMENTS    The chart was reviewed and the case was discussed with the treatment team.  The patient was seen by me during daily willard rounds on the inpatient unit.  I agree with the above assessment and plan.     ***    MEDICATION REGIMEN ADJUSTMENTS   ***    NEED FOR CONTINUED HOSPITALIZATION  Psychiatric illness continues to pose a potential threat to life or bodily function, of self or others, thereby requiring the need for continued inpatient psychiatric hospitalization: {YES,NO, WILDCARD(MULTI):10424}    Protective inpatient pyschiatric hospitalization required while a safe disposition plan is enacted: {YES,NO, WILDCARD(MULTI):63164}    Patient stabilized and ready for discharge from inpatient psychiatric unit: {YES,NO, WILDCARD(MULTI):93285}    E/M LEVEL {NUMBERS;1-3:95011}: 9923{NUMBERS;1-3:22000}

## 2018-03-20 NOTE — ASSESSMENT & PLAN NOTE
BP remains elevated on unit - will likely need adjustment  Plan to titrate off Clonidine as an outpatient  Plan to continue Amlodipine 5 mg PO daily for HTN and up-titrate as appropriate per outpatient PCP

## 2018-03-20 NOTE — PLAN OF CARE
Problem: Patient Care Overview (Adult)  Goal: Plan of Care Review  Outcome: Ongoing (interventions implemented as appropriate)  Calm, cooperative. Pleasant. Interacting with peers and staff appropriately. Visible in milieu. Compliant with treatments and medications. Modified visual contact maintained per MD orders.

## 2018-03-20 NOTE — NURSING
Pt discharged home to care of self/family. Denies SI/HI/AVH, denies depressed mood or thoughts of self harm. Pt ambulatory with steady gait. Denies physical complaints. Pt discharged medications reviewed and preprinted prescriptions given for pt to fill at pharmacy of choice. All pt belongings verified with pt and returned. Pt to follow up with [facility] at appointed time. 3/29 at 0930. Pt provided with National Suicide prevention lifeline and instructed to return to nearest emergency dept for re-occuring symptoms. Pt transported home with all belongings accompanied with family member.

## 2018-03-20 NOTE — MEDICAL/APP STUDENT
PSYCHIATRY DAILY INPATIENT PROGRESS NOTE  SUBSEQUENT HOSPITAL VISIT    ENCOUNTER DATE: 3/19/2018  SITE: [facility], [address]    DATE OF ADMISSION: 3/15/2018  4:40 PM  LENGTH OF STAY: 4 days  CURRENT LEGAL STATUS: CEC        HISTORY    CHIEF COMPLAINT   Dereck Centeno is a 57 y.o. male, seen during daily willard rounds on the inpatient unit.  Dereck Centeno presents with the chief complaint of depression, suicidal ideation, and alcohol addication     HPI     Dereck Centeno is a 57 y.o. male with a history of anxiety, depression, chronic back pain, liver transplant (2015) secondary to HCV/ETOH abuse.  He presents with intentional overdose.  He states that he took 20mg clonidine and 1600mg gabapentin at 14:30 yesterday.  He also reports drinking ETOH.  Patient very difficult to understand on exam, but states that he was stressed about his living situation.       Patient initially cooperative with interview but later attempted to leave the room.  He was redirected by sitter and cooperated when threatened that security could be called.  Patient explains that he overdosed last night in a suicide attempt impulsively.  He has been struggling with depression for years and reports compliance with medications but cannot name what he takes, dosages, who or where he is seen for outpatient psychiatric care.  Initially denies alcohol use but when confronted with BAL in ED endorses recent alcohol use. Prior to this relapse he has been sober for many months.  Patient reports feeling guilty about relapsing on alcohol.  He does not plan on engaging in psychiatric care and would like to return home as soon as possible.  Interview was difficult to conduct due to patient's poor memory and difficulty with concentration.  His speech was slurred but improved when he removed his dentures.       ROS  Psychiatric Review Of Systems - Is patient experiencing or having changes in:  sleep: no  appetite: no  weight:  "no  energy/anergy: no  interest/pleasure/anhedonia: yes  somatic symptoms: yes  libido: no  anxiety/panic: yes  guilty/hopelessness: yes  concentration: yes  S.I.B.s/risky behavior: yes  any drugs: no  alcohol: yes       Allergies:  Patient has no known allergies.     Past Medical/Surgical History          Past Medical History:   Diagnosis Date    Alcohol withdrawal seizure      Alcoholic cirrhosis      Anxiety      Depression      Hepatitis C      History of throat cancer      HTN (hypertension), benign      Tobacco use         has a past medical history of Alcohol withdrawal seizure; Alcoholic cirrhosis; Anxiety; Depression; Hepatitis C; History of throat cancer; HTN (hypertension), benign; and Tobacco use.            Past Surgical History:   Procedure Laterality Date    ESOPHAGEAL VARICE LIGATION        LIVER TRANSPLANT        TIPS PROCEDURE             Past Psychiatric History:  Previous Medication Trials: yes, Paxil 10mg daily, Seroquel 25mg qHS PRN, and Effexor 75mg daily  Previous Psychiatric Hospitalizations: yes, 2 related to prior suicide attempts/overdoses  Previous Suicide Attempts: yes   History of Violence: yes  Outpatient Psychiatrist: yes     Social History:  Marital Status:   Children: 3   Employment Status/Info: currently employed, part time at [employer]  Education: high school diploma/GED  Special Ed: no  Housing Status: with sister in law and brother  History of phys/sexual abuse: no  Access to gun: no     Substance Abuse History:  Recreational Drugs: patient states "i've tried them all". Sober for several years from IV drug use with heroin, amphetamines, crack  Use of Alcohol: patient is an alcoholic, reports recent relapse with light drinking  Rehab History:yes   Tobacco Use:yes  Use of Caffeine: denies use  Use of OTC: denied  Legal consequences of chemical use: yes     Legal History:  Past Charges/Incarcerations:yes, several incarcerations related to drugs   Pending " "charges:no      Family Psychiatric History:   denied        Hospital Course: 3/17/2018 : Patient reports he is "feeling good".  Feels grateful to be alive after overdose.  "things have turned out in a good way."  Has been sleeping well.  Describes his time outside the hospital as spent "laying around."  Doesn't feel his current medications are very different than his previous ones.  Feels "they are doing alright" and likes that he is taking several vitamins.  Denies side effects.      03/18/2018: Patient continues to be feeling good on the unit. He reports that yesterday was boring because there wasn't much to do on the unit. He did get to see his family yesterday and his sister brought him a crossword puzzle book which has kept him busy. Patient denies SI/HI/AVH. He reports that the last time that he felt like dying was the day he attempted suicide. He reports that he is happy that he is still alive. He thinks that alcohol played a big role in his suicide attempt. He denies any cravings for alcohol and reports motivation to stay sober.     03/19/2018 - no behavioral issues.  Reports mood improving, denies SI.  He is future oriented and participative in milieu.  He remains defensive about relapse.  Motivational interviewing and relapse prevention provided.     Interval History: staff reports patient improving on unit.  He is defensive about drinking that led to admit.  He continues to decline referral to rehab but states he will re-engage in 12 step meetings.  He states he cannot say why he drank that day but is happy to be alive.  He denies any further SI.  He is future oriented, spoke about returning to work and fishing.  He spoke about increasing frequency of psychotherapy.  He feels meds working better since they have been switched around.  He reports pain continues.  We spoke about his relationship with his sister in law.    Afternoon interview: Patient states that he has no desire in rehabilitation and " "believes he does not have a problem and "he knows that we can't keep him here much longer anyway". He reports no SI or HI. Patient has consented for the team to contact his psychiatrist and PCP.        PMHx  Past Medical History Reviewed     ROS  Musculoskeletal: +pain in neck, back, feet bilaterally        EXAMINATION     VITALS   BP (!) 145/93 (BP Location: Right arm, Patient Position: Sitting)   Pulse 78   Temp 98.6 °F (37 °C) (Oral)   Resp 16   Ht 5' 11" (1.803 m)   Wt 77 kg (169 lb 12.1 oz)   BMI 23.68 kg/m²      CONSTITUTIONAL  General Appearance: stated age, casually dressed, multiple tattoos. He is easily agiated     MUSCULOSKELETAL  Muscle Strength and Tone: no weakness or spasticity  Abnormal Involuntary Movements: no tremor noted  Gait and Station: ambulates without assistance     PSYCHIATRIC   Level of Consciousness: alert  Orientation: to person, place, time  Grooming: intact, showering  Psychomotor Behavior: no retardation or agitation  Speech: conversational or spontaneous  Language: speaks English, repeats words/phrases  Mood: "was upbeat - now down a few notches that I'm not going home"  Affect: reactive, less constricted  Thought Process: linear  Associations: intact, no loosening of associations  Thought Content: denies SI  Memory: improving  Attention: not distractible  Fund of Knowledge: intact  Insight: improving  Judgment: improving             Family History      Problem Relation (Age of Onset)     Alcohol abuse Mother, Father                 Social History Main Topics    Smoking status: Former Smoker       Packs/day: 0.50       Types: Cigarettes       Quit date: 5/5/2017    Smokeless tobacco: Former User       Types: Chew    Alcohol use No         Comment: former heavy alcohol user, quit 1 year ago    Drug use: No    Sexual activity: Not on file                Psychotherapeutics      Start     Stop Route Frequency Ordered     03/16/18 2100   mirtazapine tablet 15 mg      -- Oral " "Nightly 03/16/18 1159     03/16/18 1300   escitalopram oxalate tablet 10 mg      -- Oral Daily 03/16/18 1159     03/15/18 1646   haloperidol tablet 5 mg  (Med - Acute  Behavioral Management)      -- Oral Every 8 hours PRN 03/15/18 1646     03/15/18 1646   LORazepam tablet 2 mg  (Med - Acute  Behavioral Management)      -- Oral Every 4 hours PRN 03/15/18 1646     03/15/18 1646   haloperidol lactate injection 5 mg  (Med - Acute  Behavioral Management)      -- IM Every 4 hours PRN 03/15/18 1646     03/15/18 1646   lorazepam (ATIVAN) injection 2 mg  (Med - Acute  Behavioral Management)      -- IM Every 4 hours PRN 03/15/18 1646             Review of Systems  Objective:      Vital Signs (Most Recent):  Temp: 98.1 °F (36.7 °C) (03/19/18 0800)  Pulse: 83 (03/19/18 0800)  Resp: 16 (03/19/18 0800)  BP: (!) 147/98 (03/19/18 0800) Vital Signs (24h Range):  Temp:  [98 °F (36.7 °C)-98.7 °F (37.1 °C)] 98.1 °F (36.7 °C)  Pulse:  [59-87] 83  Resp:  [16-18] 16  BP: (140-175)/(81-98) 147/98      Height: 5' 11" (180.3 cm)  Weight: 77 kg (169 lb 12.1 oz)  Body mass index is 23.68 kg/m².     No intake or output data in the 24 hours ending 03/19/18 0947     Physical Exam     Significant Labs:   Last 24 Hours:       Recent Lab Results      None             Significant Imaging: None     Assessment/Plan:          * Substance induced mood disorder     Mood improving on unit.  He denies SI at this time.   Continue trials of Lexapro and Remeron  3/19/18 - titrate lexapro to 20mg daily - first dose 3/20/18          Alcohol use disorder, severe, dependence     Patient was admitted with BAL of 144. He reports relapse after long period (years) of sobriety.  Substance Abuse treatment discussed with patient  Motivational interviewing and relapse prevention provided  Patient has is a recipient of a liver transplant   Folic Acid, Multivitamin, Thiamine           Intentional overdose of drug in tablet form     Substance Induced Mood Disorder  Patient " was admitted to Hospital Medicine due to rebound hypertension.  Initially clonidine was held but later restarted at home dose of 0.2mg BID prior to discharge.  Other home medications of clorazepate 3.75mg BID PRN, gabapentin 400mg TID, oxycodone 10mg QID PRN, Paxil 10mg daily, Seroquel 25mg qHS PRN, and Effexor 75mg daily held.  Gabapentin restarted at 300 mg TID.  Also started Lexapro 10 mg daily and Remeron 30 mg daily.         Patient denies further SI at this time, noted to be future oriented.          Tobacco abuse     Patient was counseled on the benefits of cessation of tobacco use, and assessed for readiness and motivation to quit.     Motivational interviewing employed to foster change.     Nicotine patch is available for patient use on the unit to decrease cravings.  [facility] is a non-smoking facility.             Liver transplant 1/11/2015 for HCV     Continue Prograf - last level 6.5 on 3/15/18   3/15/18 - LFTs improved, now wnl          HTN (hypertension)     BP remains elevated on unit - will likely need adjustment  Continue clonidine at this time          Anxiety disorder     Improving on unit.  Continue Lexapro and Remeron  Avoid Tranxene  3/19/18 - titrate lexapro to 20mg daily - first dose 3/20/18          Delirium     Resolved   Patient initially struggled with clear communication and concentration after overdose.  He was unstable on his feet and attempted to elope from the ICU during the psychiatric consult interview.  On day of discharge from medicine he was alert and oriented and appears to have returned to baseline.           Chronic back pain     Continue neurontin - will titrate dose  Avoid tramadol on unit                Need for Continued Hospitalization:   Protective inpatient psychiatric hospitalization required while a safe disposition plan is enacted. and Requires ongoing hospitalization for stabilization of medications.     Anticipated Disposition: Home or Self Care   Discharge  anticipated in 1-3 days if improvement continues at current rate.

## 2018-04-05 ENCOUNTER — CLINICAL SUPPORT (OUTPATIENT)
Dept: SMOKING CESSATION | Facility: CLINIC | Age: 58
End: 2018-04-05
Payer: COMMERCIAL

## 2018-04-05 DIAGNOSIS — F17.200 NICOTINE DEPENDENCE: Primary | ICD-10-CM

## 2018-04-05 PROCEDURE — 99407 BEHAV CHNG SMOKING > 10 MIN: CPT | Mod: S$GLB,,, | Performed by: INTERNAL MEDICINE

## 2018-04-05 NOTE — PROGRESS NOTES
Spoke with patient's relative Mrs. Dean regarding Mr. Harden smoking cessation, She states patient is not tobacco free. She states patient had some other health issues and tried to harm himself, smoking cessation was the least of their worries. Contact information given. Will resolve episode for Quit attempt #1 and complete smart form.

## 2018-05-08 ENCOUNTER — TELEPHONE (OUTPATIENT)
Dept: NEUROLOGY | Facility: HOSPITAL | Age: 58
End: 2018-05-08

## 2018-05-08 NOTE — TELEPHONE ENCOUNTER
Clinic appt scheduled with Dianne Centeno, sister-in-law, on Wednesday, May 23, 2018, at 130pm.  Dianne given clinic address and telephone number.  Dianne repeated information given correctly.

## 2018-05-16 ENCOUNTER — TELEPHONE (OUTPATIENT)
Dept: TRANSPLANT | Facility: CLINIC | Age: 58
End: 2018-05-16

## 2018-05-16 DIAGNOSIS — Z94.4 LIVER REPLACED BY TRANSPLANT: Primary | ICD-10-CM

## 2018-05-23 ENCOUNTER — OFFICE VISIT (OUTPATIENT)
Dept: NEUROLOGY | Facility: HOSPITAL | Age: 58
End: 2018-05-23
Attending: INTERNAL MEDICINE
Payer: MEDICAID

## 2018-05-23 VITALS
SYSTOLIC BLOOD PRESSURE: 114 MMHG | HEART RATE: 74 BPM | DIASTOLIC BLOOD PRESSURE: 69 MMHG | HEIGHT: 71 IN | TEMPERATURE: 100 F | WEIGHT: 175.5 LBS | BODY MASS INDEX: 24.57 KG/M2

## 2018-05-23 DIAGNOSIS — K62.5 RECTAL BLEEDING: Primary | ICD-10-CM

## 2018-05-23 PROCEDURE — 99215 OFFICE O/P EST HI 40 MIN: CPT | Performed by: INTERNAL MEDICINE

## 2018-05-23 RX ORDER — POLYETHYLENE GLYCOL 3350, SODIUM SULFATE ANHYDROUS, SODIUM BICARBONATE, SODIUM CHLORIDE, POTASSIUM CHLORIDE 236; 22.74; 6.74; 5.86; 2.97 G/4L; G/4L; G/4L; G/4L; G/4L
4 POWDER, FOR SOLUTION ORAL ONCE
Qty: 4000 ML | Refills: 0 | Status: SHIPPED | OUTPATIENT
Start: 2018-05-23 | End: 2018-05-23

## 2018-05-23 RX ORDER — QUETIAPINE FUMARATE 25 MG/1
50 TABLET, FILM COATED ORAL NIGHTLY
Refills: 3 | COMMUNITY
Start: 2018-05-04

## 2018-05-23 NOTE — PATIENT INSTRUCTIONS
Nulytely Colonoscopy Prep Instructions    [facility]   [address] [place] [address]  [phone]    PROCEDURE DAY/TIME: 6/14/2018    CLEAR LIQUID DIET (START THE DAY BEFORE PROCEDURE):  Clear Liquid Diet means any liquid from the list below that is not red or purple in color:   Gatorade, Hipolito-Aid, Lemonade (Yellow ONLY)-Gatorade is the preferred liquid   Tea (no milk or dairy)   Carbonated beverages (soft drink), regular or diet   Apple juice, white grape juice, white cranberry juice   Jell-O (orange, lemon, or lime flavors ONLY)   Clear, fat-free, beef or chicken broth   Bouillon, clear consommé   Snowball, Popsicles (NOT red or purple)  * No Solid Food or Alcohol   ITEMS TO BE PURCHASED FOR PREP (Nu-Lytely requires a prescription):         Nu-Lytely preparation solution.          Gas tablets (Gas-X, Mylanta Gas, Simethicone)  BOWEL PREP INSTRUCTIONS THE DAY BEFORE THE EXAM:  1. Drink only clear liquids (see the above diet) all day. Gatorade is the best liquid.   Drink an extra 8 ounces of clear liquid every hour from 11am to 5pm.         2.   At 6pm, mix Nu-Lytely powder according to the directions on the container and               drink 8 ounces of solution every 10 minutes until about half of the solution is                consumed. Place the remainder of the solution in the refrigerator.         3.   At 9pm, take two gas tablets with 8 ounces of clear liquid.        4.   At 10pm, take two gas tablets with 8 ounces of clear liquid.  THE DAY OF THE EXAM:        1.  Take your morning medications, if any, with a small sip of water.         2.  Beginning 5 hours before your procedure time, drink the remaining half of              Nu-Lytely solution. Drink 8 ounces of solution every 10 minutes until the solution              is gone.              *If your procedure is scheduled for the early morning, you will need to get up in               the middle of the night to take  this dose of preparation. The correct timing of this               dose is essential to an effective preparation. If you do not take this dose, your               exam may be incomplete and need to be repeated.         3.  Have nothing to eat or drink for 3 hours before the procedure.         3.  Bring someone to drive you home (you should not drive for 12 hrs after the exam)        4.  Report to Admitting, 1st floor hospital entrance 2 hours before procedure time.     If you are diabetic, do not take insulin or oral medications the morning of the procedure. Take only a half dose of insulin the day before your procedure. Do not take your diabetic pills the day before your procedure

## 2018-06-11 ENCOUNTER — TELEPHONE (OUTPATIENT)
Dept: NEUROLOGY | Facility: HOSPITAL | Age: 58
End: 2018-06-11

## 2018-06-11 NOTE — PROGRESS NOTES
03/19/18 0900 03/19/18 1100 03/19/18 1300   [facility] Group Therapy   Group Name Community Reintegration Mental Awareness Therapeutic Recreation   Specific Interventions Current Events Cognitive Stimulation Training Skilled Activity Crafts   Participation Level Active;Appropriate;Attentive Active;Appropriate;Attentive Active;Appropriate;Attentive   Participation Quality Cooperative;Social Cooperative;Social Cooperative;Social   Insight/Motivation Good Good Good   Affect/Mood Display Appropriate Appropriate Appropriate   Cognition Alert;Oriented Alert;Oriented Alert;Oriented      Patient's daughter was notified of CT results. Verbalized understanding.

## 2018-06-11 NOTE — TELEPHONE ENCOUNTER
----- Message from Milena Diaz LPN sent at 5/23/2018 12:04 PM CDT -----  Jannet,   Can we please get auth for this pt for 6/14?  DX: K62.5  CPT: 92737, 40083    Thanks!  Whit

## 2018-06-14 ENCOUNTER — SURGERY (OUTPATIENT)
Age: 58
End: 2018-06-14

## 2018-06-14 ENCOUNTER — ANESTHESIA EVENT (OUTPATIENT)
Dept: ENDOSCOPY | Facility: HOSPITAL | Age: 58
End: 2018-06-14
Payer: MEDICAID

## 2018-06-14 ENCOUNTER — HOSPITAL ENCOUNTER (OUTPATIENT)
Facility: HOSPITAL | Age: 58
Discharge: HOME | End: 2018-06-14
Attending: INTERNAL MEDICINE | Admitting: INTERNAL MEDICINE
Payer: MEDICAID

## 2018-06-14 ENCOUNTER — ANESTHESIA (OUTPATIENT)
Dept: ENDOSCOPY | Facility: HOSPITAL | Age: 58
End: 2018-06-14
Payer: MEDICAID

## 2018-06-14 VITALS
HEIGHT: 72 IN | HEART RATE: 65 BPM | RESPIRATION RATE: 18 BRPM | TEMPERATURE: 98 F | OXYGEN SATURATION: 96 % | WEIGHT: 160 LBS | SYSTOLIC BLOOD PRESSURE: 117 MMHG | BODY MASS INDEX: 21.67 KG/M2 | DIASTOLIC BLOOD PRESSURE: 60 MMHG

## 2018-06-14 DIAGNOSIS — R13.10 DYSPHAGIA, UNSPECIFIED TYPE: ICD-10-CM

## 2018-06-14 DIAGNOSIS — K63.5 POLYP OF COLON, UNSPECIFIED PART OF COLON, UNSPECIFIED TYPE: ICD-10-CM

## 2018-06-14 DIAGNOSIS — K62.5 RECTAL BLEEDING: Primary | ICD-10-CM

## 2018-06-14 PROCEDURE — 00813 ANES UPR LWR GI NDSC PX: CPT | Performed by: INTERNAL MEDICINE

## 2018-06-14 PROCEDURE — 27201012 HC FORCEPS, HOT/COLD, DISP: Performed by: INTERNAL MEDICINE

## 2018-06-14 PROCEDURE — 37000009 HC ANESTHESIA EA ADD 15 MINS: Performed by: INTERNAL MEDICINE

## 2018-06-14 PROCEDURE — 43239 EGD BIOPSY SINGLE/MULTIPLE: CPT | Performed by: INTERNAL MEDICINE

## 2018-06-14 PROCEDURE — 88305 TISSUE EXAM BY PATHOLOGIST: CPT | Performed by: PATHOLOGY

## 2018-06-14 PROCEDURE — 25000003 PHARM REV CODE 250: Performed by: INTERNAL MEDICINE

## 2018-06-14 PROCEDURE — 45380 COLONOSCOPY AND BIOPSY: CPT | Performed by: INTERNAL MEDICINE

## 2018-06-14 PROCEDURE — 88312 SPECIAL STAINS GROUP 1: CPT | Mod: 26,,, | Performed by: PATHOLOGY

## 2018-06-14 PROCEDURE — 37000008 HC ANESTHESIA 1ST 15 MINUTES: Performed by: INTERNAL MEDICINE

## 2018-06-14 PROCEDURE — 25000003 PHARM REV CODE 250: Performed by: NURSE ANESTHETIST, CERTIFIED REGISTERED

## 2018-06-14 PROCEDURE — 88312 SPECIAL STAINS GROUP 1: CPT | Performed by: PATHOLOGY

## 2018-06-14 PROCEDURE — 88305 TISSUE EXAM BY PATHOLOGIST: CPT | Mod: 26,,, | Performed by: PATHOLOGY

## 2018-06-14 PROCEDURE — 63600175 PHARM REV CODE 636 W HCPCS: Performed by: NURSE ANESTHETIST, CERTIFIED REGISTERED

## 2018-06-14 RX ORDER — CALCIUM CARBONATE 600 MG
600 TABLET ORAL ONCE
COMMUNITY

## 2018-06-14 RX ORDER — BUTALBITAL, ASPIRIN, AND CAFFEINE 325; 50; 40 MG/1; MG/1; MG/1
1 CAPSULE ORAL DAILY PRN
COMMUNITY
End: 2023-01-30 | Stop reason: CLARIF

## 2018-06-14 RX ORDER — PROPOFOL 10 MG/ML
VIAL (ML) INTRAVENOUS
Status: DISCONTINUED | OUTPATIENT
Start: 2018-06-14 | End: 2018-06-14

## 2018-06-14 RX ORDER — LIDOCAINE HCL/PF 100 MG/5ML
SYRINGE (ML) INTRAVENOUS
Status: DISCONTINUED | OUTPATIENT
Start: 2018-06-14 | End: 2018-06-14

## 2018-06-14 RX ORDER — SODIUM CHLORIDE 9 MG/ML
INJECTION, SOLUTION INTRAVENOUS CONTINUOUS
Status: DISCONTINUED | OUTPATIENT
Start: 2018-06-14 | End: 2018-06-14 | Stop reason: HOSPADM

## 2018-06-14 RX ORDER — GLYCOPYRROLATE 0.2 MG/ML
INJECTION INTRAMUSCULAR; INTRAVENOUS
Status: DISCONTINUED | OUTPATIENT
Start: 2018-06-14 | End: 2018-06-14

## 2018-06-14 RX ORDER — ATORVASTATIN CALCIUM 40 MG/1
40 TABLET, FILM COATED ORAL NIGHTLY
COMMUNITY

## 2018-06-14 RX ORDER — TRAMADOL HYDROCHLORIDE 50 MG/1
50 TABLET ORAL EVERY 6 HOURS PRN
COMMUNITY
End: 2023-03-16

## 2018-06-14 RX ORDER — PROPOFOL 10 MG/ML
VIAL (ML) INTRAVENOUS CONTINUOUS PRN
Status: DISCONTINUED | OUTPATIENT
Start: 2018-06-14 | End: 2018-06-14

## 2018-06-14 RX ADMIN — GLYCOPYRROLATE 0.2 MG: 0.2 INJECTION, SOLUTION INTRAMUSCULAR; INTRAVENOUS at 08:06

## 2018-06-14 RX ADMIN — SODIUM CHLORIDE: 0.9 INJECTION, SOLUTION INTRAVENOUS at 08:06

## 2018-06-14 RX ADMIN — PROPOFOL 60 MG: 10 INJECTION, EMULSION INTRAVENOUS at 08:06

## 2018-06-14 RX ADMIN — LIDOCAINE HYDROCHLORIDE 60 MG: 20 INJECTION, SOLUTION INTRAVENOUS at 08:06

## 2018-06-14 RX ADMIN — PROPOFOL 150 MCG/KG/MIN: 10 INJECTION, EMULSION INTRAVENOUS at 08:06

## 2018-06-14 NOTE — ANESTHESIA PREPROCEDURE EVALUATION
06/14/2018  Dereck Centeno is a 57 y.o., male.    Anesthesia Evaluation     I have reviewed the Nursing Notes.   I have reviewed the Medications.     Review of Systems  Anesthesia Hx:  No problems with previous Anesthesia Denies Hx of Anesthetic complications History of prior surgery of interest to airway management or planning: Previous anesthesia: General Denies Family Hx of Anesthesia complications.   Denies Personal Hx of Anesthesia complications.   Social:  Non-Smoker, No Alcohol Use    Hematology/Oncology:  Hematology Normal   Oncology Normal   Oncology Comments: Oropharygeal CA     EENT/Dental:EENT/Dental Normal   Cardiovascular:   Exercise tolerance: good Hypertension    Pulmonary:  Pulmonary Normal    Renal/:  Renal/ Normal     Hepatic/GI:  Hepatic/GI Normal    Musculoskeletal:  Musculoskeletal Normal    Neurological:  Neurology Normal    Endocrine:  Endocrine Normal    Dermatological:  Skin Normal    Psych:   Psychiatric History          Physical Exam  General:  Well nourished    Airway/Jaw/Neck:  Airway Findings: Mouth Opening: Normal Tongue: Normal  General Airway Assessment: Adult  Mallampati: II      Dental:  Dental Findings: In tact, Upper Dentures   Chest/Lungs:  Chest/Lungs Findings: Clear to auscultation, Normal Respiratory Rate     Heart/Vascular:  Heart Findings: Rate: Normal  Rhythm: Regular Rhythm        Mental Status:  Mental Status Findings:  Cooperative, Alert and Oriented         Anesthesia Plan  Type of Anesthesia, risks & benefits discussed:  Anesthesia Type:  general  Patient's Preference: general  Intra-op Monitoring Plan:   Intra-op Monitoring Plan Comments:   Post Op Pain Control Plan:   Post Op Pain Control Plan Comments:   Induction:   IV  Beta Blocker:         Informed Consent: Patient understands risks and agrees with Anesthesia plan.  Questions answered. Anesthesia  consent signed with patient.  ASA Score: 3     Day of Surgery Review of History & Physical:        Anesthesia Plan Notes: liver transplantation in 2015 for alcoholic cirrhosis. He was recently in the hospital after presenting with intentional drug overdose from clonidine/gabapentin. It was a suicidal attempt. He is on immunosuppression with tacrolimus at home dose and monitor labs.        Ready For Surgery From Anesthesia Perspective.

## 2018-06-14 NOTE — TRANSFER OF CARE
"Anesthesia Transfer of Care Note    Patient: Dereck Centeno    Procedure(s) Performed: Procedure(s) (LRB):  ESOPHAGOGASTRODUODENOSCOPY (EGD) (N/A)  COLONOSCOPY (N/A)    Patient location: GI    Anesthesia Type: MAC    Transport from OR: Transported from OR on room air with adequate spontaneous ventilation    Post pain: adequate analgesia    Post assessment: no apparent anesthetic complications and tolerated procedure well    Post vital signs: stable    Level of consciousness: awake, alert and oriented    Nausea/Vomiting: no nausea/vomiting    Complications: none    Transfer of care protocol was followed      Last vitals:   Visit Vitals  BP (!) 100/49   Pulse (!) 56   Temp 36.7 °C (98.1 °F) (Oral)   Resp 15   Ht 5' 11.5" (1.816 m)   Wt 72.6 kg (160 lb)   SpO2 (!) 94%   BMI 22.00 kg/m²     "

## 2018-06-14 NOTE — [employer] PATIENT INSTRUCTIONS
Discharge Summary/Instructions after an Endoscopic Procedure  Patient Name: Dereck Centeno  Patient MRN: [id number]  Patient YOB: 1960  Thursday, June 14, 2018  Conor Castro MD  RESTRICTIONS:  During your procedure today, you received medications for sedation.  These   medications may affect your judgment, balance and coordination.  Therefore,   for 24 hours, you have the following restrictions:   - DO NOT drive a car, operate machinery, make legal/financial decisions,   sign important papers or drink alcohol.    ACTIVITY:  Today: no heavy lifting, straining or running due to procedural   sedation/anesthesia.  The following day: return to full activity including work.  DIET:  Eat and drink normally unless instructed otherwise.     TREATMENT FOR COMMON SIDE EFFECTS:  - Mild abdominal pain, nausea, belching, bloating or excessive gas:  rest,   eat lightly and use a heating pad.  - Sore Throat: treat with throat lozenges and/or gargle with warm salt   water.  - Because air was used during the procedure, expelling large amounts of air   from your rectum or belching is normal.  - If a bowel prep was taken, you may not have a bowel movement for 1-3 days.    This is normal.  SYMPTOMS TO WATCH FOR AND REPORT TO YOUR PHYSICIAN:  1. Abdominal pain or bloating, other than gas cramps.  2. Chest pain.  3. Back pain.  4. Signs of infection such as: chills or fever occurring within 24 hours   after the procedure.  5. Rectal bleeding, which would show as bright red, maroon, or black stools.   (A tablespoon of blood from the rectum is not serious, especially if   hemorrhoids are present.)  6. Vomiting.  7. Weakness or dizziness.  GO DIRECTLY TO THE NEAREST EMERGENCY ROOM IF YOU HAVE ANY OF THE FOLLOWING:      Difficulty breathing              Chills and/or fever over 101 F   Persistent vomiting and/or vomiting blood   Severe abdominal pain   Severe chest pain   Black, tarry stools   Bleeding- more than one  tablespoon   Any other symptom or condition that you feel may need urgent attention  Your doctor recommends these additional instructions:  If any biopsies were taken, your doctors clinic will contact you in 1 to 2   weeks with any results.  Follow up biopsies for EoE  Discharge to home   Condition stable   Resume previous diet   The signs and symptoms of potential delayed complications were discussed   with the patient. If signs or symptoms of these complications develop, call   the [facility] at 1 [phone].   Return to normal activities tomorrow.  Written discharge instructions were   provided to the patient.   - Discharge patient to home (ambulatory).  For questions, problems or results please call your physician - Conor Castro MD at Work:  [phone].  EMERGENCY PHONE NUMBER: [phone],  LAB RESULTS: [phone]  IF A COMPLICATION OR EMERGENCY SITUATION ARISES AND YOU ARE UNABLE TO REACH   YOUR PHYSICIAN - GO DIRECTLY TO THE EMERGENCY ROOM.  MD Conor Bobo MD  6/14/2018 9:43:30 AM  This report has been verified and signed electronically.  [employer]

## 2018-06-14 NOTE — PROVATION PATIENT INSTRUCTIONS
Discharge Summary/Instructions after an Endoscopic Procedure  Patient Name: Dereck Centeno  Patient MRN: [id number]  Patient YOB: 1960  Thursday, June 14, 2018  Conor Castro MD  RESTRICTIONS:  During your procedure today, you received medications for sedation.  These   medications may affect your judgment, balance and coordination.  Therefore,   for 24 hours, you have the following restrictions:   - DO NOT drive a car, operate machinery, make legal/financial decisions,   sign important papers or drink alcohol.    ACTIVITY:  Today: no heavy lifting, straining or running due to procedural   sedation/anesthesia.  The following day: return to full activity including work.  DIET:  Eat and drink normally unless instructed otherwise.     TREATMENT FOR COMMON SIDE EFFECTS:  - Mild abdominal pain, nausea, belching, bloating or excessive gas:  rest,   eat lightly and use a heating pad.  - Sore Throat: treat with throat lozenges and/or gargle with warm salt   water.  - Because air was used during the procedure, expelling large amounts of air   from your rectum or belching is normal.  - If a bowel prep was taken, you may not have a bowel movement for 1-3 days.    This is normal.  SYMPTOMS TO WATCH FOR AND REPORT TO YOUR PHYSICIAN:  1. Abdominal pain or bloating, other than gas cramps.  2. Chest pain.  3. Back pain.  4. Signs of infection such as: chills or fever occurring within 24 hours   after the procedure.  5. Rectal bleeding, which would show as bright red, maroon, or black stools.   (A tablespoon of blood from the rectum is not serious, especially if   hemorrhoids are present.)  6. Vomiting.  7. Weakness or dizziness.  GO DIRECTLY TO THE NEAREST EMERGENCY ROOM IF YOU HAVE ANY OF THE FOLLOWING:      Difficulty breathing              Chills and/or fever over 101 F   Persistent vomiting and/or vomiting blood   Severe abdominal pain   Severe chest pain   Black, tarry stools   Bleeding- more than one  tablespoon   Any other symptom or condition that you feel may need urgent attention  Your doctor recommends these additional instructions:  If any biopsies were taken, your doctors clinic will contact you in 1 to 2   weeks with any results.  Symptomatic management of mild internal hemorrhoids  Will call with pathology and discuss repeat colonsocopy in the future given   fair prep.  Discharge to home   Condition stable   Resume previous diet   The signs and symptoms of potential delayed complications were discussed   with the patient. If signs or symptoms of these complications develop, call   the [facility] at 1 [phone].   Return to normal activities tomorrow.  Written discharge instructions were   provided to the patient.   - Discharge patient to home (ambulatory).  For questions, problems or results please call your physician - Conor Castro MD at Work:  [phone].  EMERGENCY PHONE NUMBER: [phone],  LAB RESULTS: [phone]  IF A COMPLICATION OR EMERGENCY SITUATION ARISES AND YOU ARE UNABLE TO REACH   YOUR PHYSICIAN - GO DIRECTLY TO THE EMERGENCY ROOM.  MD Conor Bobo MD  6/14/2018 9:43:08 AM  This report has been verified and signed electronically.  PROVATION

## 2018-06-14 NOTE — ANESTHESIA POSTPROCEDURE EVALUATION
"Anesthesia Post Evaluation    Patient: Dereck Centeno    Procedure(s) Performed: Procedure(s) (LRB):  ESOPHAGOGASTRODUODENOSCOPY (EGD) (N/A)  COLONOSCOPY (N/A)    Final Anesthesia Type: MAC  Patient location during evaluation: GI PACU  Patient participation: Yes- Able to Participate  Level of consciousness: awake and alert and oriented  Post-procedure vital signs: reviewed and stable  Pain management: adequate  Airway patency: patent  PONV status at discharge: No PONV  Anesthetic complications: no      Cardiovascular status: blood pressure returned to baseline and hemodynamically stable  Respiratory status: unassisted, room air and spontaneous ventilation  Hydration status: euvolemic  Follow-up not needed.        Visit Vitals  BP (!) 100/49   Pulse (!) 56   Temp 36.7 °C (98.1 °F) (Oral)   Resp 15   Ht 5' 11.5" (1.816 m)   Wt 72.6 kg (160 lb)   SpO2 (!) 94%   BMI 22.00 kg/m²       Pain/Huber Score: Pain Assessment Performed: Yes (6/14/2018  8:52 AM)  Presence of Pain: non-verbal indicators absent (6/14/2018  8:52 AM)  Huber Score: 10 (6/14/2018  8:51 AM)      "

## 2018-06-14 NOTE — H&P
[facility]    CC: rectal bleeding    HPI 57 y.o. male with a history of anxiety, depression, chronic back pain, liver transplant (2015) secondary to HCV/ETOH abuse who presents for evaluation of multiple small painless colon polyps found on prior colonoscopy without associated abdominal pain or weight loss. Now he reports small volume bright red rectal bleeding mainly seen on the toilet paper and the bleeding is new for him. On review of medical records he also has had a food bolus removal in 10/2017. He currently does not complain of dysphagia. The colonoscopy is not in our system. He has not had endoscopy since.     He follows with Dr. Romero regarding his liver transplantation in 2015 for alcoholic cirrhosis. He was recently in the hospital after presenting with intentional drug overdose from clonidine/gabapentin. It was a suicidal attempt. He is on immunosuppression with tacrolimus at home dose and monitor labs.     Medical records reviewed. Additional history obtained from family member at bedside.      Past Medical History:   Diagnosis Date    Alcohol withdrawal seizure     Alcoholic cirrhosis     Anxiety     Depression     Hepatitis C     History of throat cancer     HTN (hypertension), benign     Tobacco use          Review of Systems  General ROS: negative for chills, fever or weight loss  Cardiovascular ROS: no chest pain or dyspnea on exertion  Gastrointestinal ROS: no abdominal pain, change in bowel habits, or black/ bloody stools    Physical Examination  There were no vitals taken for this visit.  General appearance: alert, cooperative, no distress  HENT: Normocephalic, atraumatic, neck symmetrical, no nasal discharge   Lungs: clear to auscultation bilaterally, no dullness to percussion bilaterally  Heart: regular rate and rhythm without rub; no displacement of the PMI   Abdomen: soft, non-tender; bowel sounds normoactive; no organomegaly  Extremities: extremities symmetric; no clubbing,  cyanosis, or edema  Neurologic: Alert and oriented X 3, normal strength, normal coordination and gait    Labs:  H/H 15/44.4  Plt 134     Imaging:    CXR:   No acute radiographic findings in the chest.    Assessment:   57 y.o. male  with a history of liver transplant (2015) secondary to HCV/ETOH abuse who presents for follow up of colon polyps and for evaluation of rectal bleeding. Suspect the rectal bleeding may be hemorrhoidal in nature but given immunosuppression he will need endoscopic evaluation as listed below to rule out occult malignancy.       Plan:  -EGD given history of food bolus in the past for evaluation if there is a ring or stenosis that requires dilation; if no obstruction seen endoscopically will plan biopsies for evaluation of EoE. Continue pepcid for now but change to prn GERD if EGD and biopsies are normal   -colonoscopy also today to evaluate for history of colon polyps and rectal bleeding  -Patient of Eva Castro MD   [address], [address]   [place] [address] [phone]

## 2018-06-21 ENCOUNTER — TELEPHONE (OUTPATIENT)
Dept: NEUROLOGY | Facility: HOSPITAL | Age: 58
End: 2018-06-21

## 2018-06-29 ENCOUNTER — OFFICE VISIT (OUTPATIENT)
Dept: TRANSPLANT | Facility: CLINIC | Age: 58
End: 2018-06-29
Payer: MEDICAID

## 2018-06-29 VITALS
RESPIRATION RATE: 18 BRPM | BODY MASS INDEX: 25.31 KG/M2 | SYSTOLIC BLOOD PRESSURE: 130 MMHG | OXYGEN SATURATION: 97 % | WEIGHT: 176.81 LBS | DIASTOLIC BLOOD PRESSURE: 85 MMHG | HEART RATE: 73 BPM | HEIGHT: 70 IN | TEMPERATURE: 98 F

## 2018-06-29 DIAGNOSIS — Z86.19 HISTORY OF HEPATITIS C: ICD-10-CM

## 2018-06-29 DIAGNOSIS — Z94.4 STATUS POST LIVER TRANSPLANT: Primary | Chronic | ICD-10-CM

## 2018-06-29 DIAGNOSIS — Z29.89 PROPHYLACTIC IMMUNOTHERAPY: ICD-10-CM

## 2018-06-29 DIAGNOSIS — I10 ESSENTIAL HYPERTENSION: ICD-10-CM

## 2018-06-29 DIAGNOSIS — Z72.0 TOBACCO ABUSE: ICD-10-CM

## 2018-06-29 DIAGNOSIS — Z85.819 HISTORY OF ORAL CANCER: ICD-10-CM

## 2018-06-29 PROBLEM — R74.01 TRANSAMINITIS: Status: RESOLVED | Noted: 2018-03-14 | Resolved: 2018-06-29

## 2018-06-29 PROCEDURE — 99999 PR PBB SHADOW E&M-EST. PATIENT-LVL III: CPT | Mod: PBBFAC,,, | Performed by: NURSE PRACTITIONER

## 2018-06-29 PROCEDURE — 99213 OFFICE O/P EST LOW 20 MIN: CPT | Mod: PBBFAC | Performed by: NURSE PRACTITIONER

## 2018-06-29 PROCEDURE — 99214 OFFICE O/P EST MOD 30 MIN: CPT | Mod: S$PBB,,, | Performed by: NURSE PRACTITIONER

## 2018-06-29 NOTE — LETTER
June 29, 2018        Maryana Kearns  [address]  [facility] [address]  Phone: [phone]  Fax: [phone]             [facility]  [address]  [facility] [address]  Phone: [phone]   Patient: Dereck Centeno   MR Number: [id number]   YOB: 1960   Date of Visit: 6/29/2018       Dear Dr. Maryana Kearns    Thank you for referring Dereck Centeno to me for evaluation. Attached you will find relevant portions of my assessment and plan of care.    If you have questions, please do not hesitate to call me. I look forward to following Dereck Centeno along with you.    Sincerely,    Janna Fitzpatrick NP    Enclosure    If you would like to receive this communication electronically, please contact [email or web address] or [phone] to request [employer] access.    [employer] is a tool which provides read-only access to select patient information with whom you have a relationship. Its easy to use and provides real time access to review your patients record including encounter summaries, notes, results, and demographic information.    If you feel you have received this communication in error or would no longer like to receive these types of communications, please e-mail [email or web address]

## 2018-06-29 NOTE — PROGRESS NOTES
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    ORGAN: LIVER  Whole or Partial: whole liver  Donor Type:  - brain death  CDC High Risk: no  Donor CMV Status: Negative  Donor HCV Status: Negative  Donor HBcAb: Negative  Donor HBV ALBERTA: Negative  Donor HCV ALBERTA:   Biliary Anastomosis: end to end  Arterial Anatomy: standard  IVC reconstruction: end to end ivc  Portal vein status: patent    He has had the following complications since transplant: recurrent hepatitis C. The noted complications are well controlled. HCV treated post transplant with Harvoni x 24 weeks with successful SVR (10/2016).     Subjective:     Interval History: Dereck was last seen on 17 by Babs Goodrich NP. He is now 3 years post liver transplant for cirrhosis due to alcohol / HCV. Currently, he is doing well. Current complaints include chronic pain in his back and feet. Following with podiatry for recurrent bunions/callus and may need surgery at some point. Has previously looked into pain management for his back but reports that his insurance does not cover this. Also reports that he has started smoking cigarettes again. Has a history of oral CA, treated at [facility] with resection. He was using chewing tobacco prior to this; has since stopped since being treated for CA.      Current immunosuppression: tacrolimus 2 mg / 1 mg. Last prograf level 7.0. LFTs stable, good allograft function Renal function stable, creatinine 0.9.        Health Maintenance  -Bone mineral density: last BMD 2016 with osteoporosis; on fosamax and Ca/Vit D      -Colonoscopy: 2018 with one polyp, benign.                 -Dermatology: needs annual skin check     -BP today: 130/85; on amlodipine, clonidine.                       Review of Systems   GENERAL: Denies fever, chills, weight loss/gain,  "fatigue  HEENT: Denies headaches, dizziness, vision/hearing changes  CARDIOVASCULAR: Denies chest pain, palpitations, or edema  RESPIRATORY: Denies dyspnea, cough  GI: Denies abdominal pain, rectal bleeding, nausea, vomiting. No change in bowel pattern or color  : Denies dysuria, hematuria   SKIN: Denies rash, itching   NEURO: Denies confusion, memory loss, or mood changes  PSYCH: Denies depression or anxiety  HEME/LYMPH: Denies easy bruising or bleeding   MSK: Chronic back and foot pain        Objective:     Physical Exam   Friendly White male, in no acute distress; alert and oriented to person, place and time  VITALS: /85 (BP Location: Right arm, Patient Position: Sitting, BP Method: Medium (Automatic))   Pulse 73   Temp 98.1 °F (36.7 °C) (Oral)   Resp 18   Ht 5' 9.69" (1.77 m)   Wt 80.2 kg (176 lb 12.9 oz)   SpO2 97%   BMI 25.60 kg/m²   HENT: Normocephalic, without obvious abnormality. Oral mucosa pink and moist. Dentition good. Healed scar from oral CA resection   EYES: Sclerae anicteric. No conjunctival pallor.   NECK: Supple. No masses or cervical adenopathy.  CARDIOVASCULAR: Regular rate and rhythm. No murmurs.  RESPIRATORY: Normal respiratory effort. BBS CTA. No wheezes or crackles.  GI: Soft, non-tender, non-distended. No hepatosplenomegaly. No masses palpable. No ascites. Liver transplant incision well healed, CDI, no hernia/hematoma   EXTREMITIES:  No clubbing, cyanosis or edema.  SKIN: Warm and dry. No jaundice. No rashes noted to exposed skin. No telangectasias noted. No palmar erythema.  NEURO:  Normal gate. No asterixis.  PSYCH:  Memory intact. Thought and speech pattern appropriate. Behavior normal. No depression or anxiety noted.      Lab Results   Component Value Date    BILITOT 0.6 05/14/2018    AST 30 05/14/2018    ALT 41 05/14/2018    ALKPHOS 75 05/14/2018    CREATININE 0.96 05/14/2018    ALBUMIN 4.4 05/14/2018     Lab Results   Component Value Date    WBC 6.34 05/14/2018    HGB " 15.0 05/14/2018    HCT 44.4 05/14/2018    HCT 32 (L) 01/12/2015     (L) 05/14/2018     Lab Results   Component Value Date    TACROLIMUS 7.0 05/14/2018       Assessment/Plan:   1. S/P liver transplant due to: alcohol and hepatitis C  -- Stable LFTs. Continue monitoring symptoms, labs, and drug levels for drug-related toxicity and side effects.  -- Continue with post transplant labs per protocol.    2. Prophylactic immunotherapy  -- Current IS: tacrolimus 2 mg / 1 mg. Last prograf level 7.0.   -- Chronic immunosuppressive medications for rejection prophylaxis at target. No adjustment needed at this time.     3. History of hepatitis C  -- HCV treated post transplant with Harvoni x 24 weeks with successful SVR (10/2016).    4. Hypertension  -- Well controlled on amlodipine and clonidine; managed per PCP    5. History of oral cancer  -- s/p resection at [facility]  -- Has quit chewing tobacco but still smoking cigarettes     6. Tobacco abuse  -- Strongly encouraged patient to quit smoking given history of oral cancer and immunosuppressive state  -- Encouraged patient to follow-up with PCP for smoking cessation medication options if unable to quit    7. Chronic back pain  -- Recommended ortho and/or pain management     8. Chronic foot pain  -- Continue following with podiatry      Health Maintenance:  -- Instructed to f/u with PCP for regular health maintenance care including cancer and bone mineral density screenings   -- Reviewed need to avoid sun exposure with use of sunblock, hats, long sleeves related to increased risk of skin cancers. Recommend f/u with Dermatology for annual skin checks    Return to clinic in 1 year      Janna Fitzpatrick NP    Hepatology and Liver Transplant        [facility] Patient Status  Functional Status: 100% - Normal, no complaints, no evidence of disease  Physical Capacity: No Limitations

## 2018-07-23 DIAGNOSIS — Z94.4 STATUS POST LIVER TRANSPLANT: Chronic | ICD-10-CM

## 2018-07-23 RX ORDER — TACROLIMUS 1 MG/1
CAPSULE ORAL
Qty: 90 CAPSULE | Refills: 11 | Status: SHIPPED | OUTPATIENT
Start: 2018-07-23 | End: 2019-06-14 | Stop reason: SDUPTHER

## 2018-07-23 NOTE — TELEPHONE ENCOUNTER
----- Message from Guillermina Lawrence sent at 7/23/2018 10:25 AM CDT -----  Rx Refill/Request     Is this a Refill or New Rx:  n  Rx Name and Strength:  tacrolimus (PROGRAF) 1 MG Cap  Preferred Pharmacy with phone number: [facility]  Communication Preference: [phone]  Additional Information:  Pt is completely out of medication and needs to rx overnighted and pt will pay for extra charge/pls call once refill is completed

## 2018-08-14 ENCOUNTER — TELEPHONE (OUTPATIENT)
Dept: TRANSPLANT | Facility: CLINIC | Age: 58
End: 2018-08-14

## 2018-10-25 ENCOUNTER — PATIENT MESSAGE (OUTPATIENT)
Dept: ADMINISTRATIVE | Facility: OTHER | Age: 58
End: 2018-10-25

## 2018-11-16 ENCOUNTER — TELEPHONE (OUTPATIENT)
Dept: TRANSPLANT | Facility: CLINIC | Age: 58
End: 2018-11-16

## 2019-02-15 ENCOUNTER — TELEPHONE (OUTPATIENT)
Dept: TRANSPLANT | Facility: CLINIC | Age: 59
End: 2019-02-15

## 2019-04-12 ENCOUNTER — PATIENT MESSAGE (OUTPATIENT)
Dept: TRANSPLANT | Facility: CLINIC | Age: 59
End: 2019-04-12

## 2019-05-17 ENCOUNTER — TELEPHONE (OUTPATIENT)
Dept: TRANSPLANT | Facility: CLINIC | Age: 59
End: 2019-05-17

## 2019-05-17 DIAGNOSIS — Z94.4 LIVER REPLACED BY TRANSPLANT: Primary | ICD-10-CM

## 2019-06-14 ENCOUNTER — OFFICE VISIT (OUTPATIENT)
Dept: TRANSPLANT | Facility: CLINIC | Age: 59
End: 2019-06-14
Payer: MEDICAID

## 2019-06-14 VITALS
DIASTOLIC BLOOD PRESSURE: 84 MMHG | TEMPERATURE: 99 F | RESPIRATION RATE: 18 BRPM | BODY MASS INDEX: 23.08 KG/M2 | WEIGHT: 164.88 LBS | OXYGEN SATURATION: 99 % | SYSTOLIC BLOOD PRESSURE: 142 MMHG | HEIGHT: 71 IN | HEART RATE: 63 BPM

## 2019-06-14 DIAGNOSIS — M81.0 OSTEOPOROSIS, UNSPECIFIED OSTEOPOROSIS TYPE, UNSPECIFIED PATHOLOGICAL FRACTURE PRESENCE: ICD-10-CM

## 2019-06-14 DIAGNOSIS — Z72.0 TOBACCO ABUSE: ICD-10-CM

## 2019-06-14 DIAGNOSIS — Z94.4 STATUS POST LIVER TRANSPLANT: Primary | Chronic | ICD-10-CM

## 2019-06-14 DIAGNOSIS — Z29.89 PROPHYLACTIC IMMUNOTHERAPY: ICD-10-CM

## 2019-06-14 DIAGNOSIS — I10 ESSENTIAL HYPERTENSION: ICD-10-CM

## 2019-06-14 PROBLEM — K62.5 RECTAL BLEEDING: Status: RESOLVED | Noted: 2018-06-14 | Resolved: 2019-06-14

## 2019-06-14 PROBLEM — F10.20 ALCOHOL USE DISORDER, SEVERE, DEPENDENCE: Status: RESOLVED | Noted: 2018-03-15 | Resolved: 2019-06-14

## 2019-06-14 PROBLEM — I16.0 HYPERTENSIVE URGENCY: Status: RESOLVED | Noted: 2018-03-14 | Resolved: 2019-06-14

## 2019-06-14 PROCEDURE — 99214 PR OFFICE/OUTPT VISIT, EST, LEVL IV, 30-39 MIN: ICD-10-PCS | Mod: S$PBB,,, | Performed by: NURSE PRACTITIONER

## 2019-06-14 PROCEDURE — 99999 PR PBB SHADOW E&M-EST. PATIENT-LVL IV: CPT | Mod: PBBFAC,,, | Performed by: NURSE PRACTITIONER

## 2019-06-14 PROCEDURE — 99214 OFFICE O/P EST MOD 30 MIN: CPT | Mod: PBBFAC | Performed by: NURSE PRACTITIONER

## 2019-06-14 PROCEDURE — 99999 PR PBB SHADOW E&M-EST. PATIENT-LVL IV: ICD-10-PCS | Mod: PBBFAC,,, | Performed by: NURSE PRACTITIONER

## 2019-06-14 PROCEDURE — 99214 OFFICE O/P EST MOD 30 MIN: CPT | Mod: S$PBB,,, | Performed by: NURSE PRACTITIONER

## 2019-06-14 RX ORDER — TACROLIMUS 1 MG/1
CAPSULE ORAL
Qty: 90 CAPSULE | Refills: 11 | Status: SHIPPED | OUTPATIENT
Start: 2019-06-14 | End: 2020-06-18 | Stop reason: SDUPTHER

## 2019-06-14 NOTE — PATIENT INSTRUCTIONS
1. Need skin cancer screening with dermatologist  2. Repeat bone mineral density scan (DXA). Continue fosamax, calcium, vitamin D  3. Continue prograf at current dose  4. Labs every 3 months  5. Return to clinic in 1 year

## 2019-06-14 NOTE — PROGRESS NOTES
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    ORGAN: LIVER  Whole or Partial: whole liver  Donor Type:  - brain death  CDC High Risk: no  Donor CMV Status: Negative  Donor HCV Status: Negative  Donor HBcAb: Negative  Donor HBV ALBERTA: Negative  Donor HCV ALBERTA:   Biliary Anastomosis: end to end  Arterial Anatomy: standard  IVC reconstruction: end to end ivc  Portal vein status: patent    He has had the following complications since transplant: recurrent hepatitis C. The noted complications are well controlled. HCV treated post transplant with Harvoni x 24 weeks with successful SVR (10/2016).     Subjective:     Interval History: Dereck was last seen on 18. He is now 4 years post liver transplant for cirrhosis due to alcohol / HCV. Currently, he is doing well. Current complaints include chronic back/foot pain. Continues to f/u with podiatry and will likely need surgery for bunions/recurrent callus. 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     Continues to smoke cigarettes despite history of oral CA. Has tried patches but does not seem overly interested in stopping. Stopped chewing tobacco.     Interval hospitalizations: none    Current immunosuppression:   - Tacrolimus: 2 mg / 1 mg  - Last trough 6.2                                                                   Graft Function: excellent; LFTs normal      Post-LT Recommended Health Maintenance  Dermatology: needs skin check                                        Colonoscopy: 2018 with 1 polyp removed- benign                                         Bone Mineral Density (BMD): last BMD 2016 with osteoporosis; on fosamax and Ca/Vit D                               HbA1C:   Lab Results   Component Value Date    HGBA1C 4.9  "03/14/2018     BP today: 142/84  BMI: Body mass index is 23.14 kg/m².      Review of Systems   Constitutional: Negative for appetite change, chills, fever and unexpected weight change. (+) fatigue   Eyes: Negative for visual disturbance.   Respiratory: Negative for cough and shortness of breath.    Cardiovascular: Negative for chest pain, palpitations and leg swelling.   Gastrointestinal: Negative for abdominal distention, abdominal pain, blood in stool, constipation, diarrhea, nausea and vomiting. No change in stool color.  Genitourinary: Negative for dysuria and hematuria. Denies dark urine.   Musculoskeletal: (+) chronic back and foot pain  Skin: Negative for color change, rash and wound. Denies itching.   Neurological: Negative for dizziness, tremors, speech difficulty, and headaches.   Hematological: Does not bruise/bleed easily.   Psychiatric/Behavioral: Negative for confusion, decreased concentration and sleep disturbance. Denies memory loss. Denies depression. The patient is not nervous/anxious.           Objective:     Physical Exam   Friendly White male, in no acute distress; alert and oriented to person, place and time  VITALS: BP (!) 142/84 (BP Location: Right arm, Patient Position: Sitting, BP Method: Medium (Automatic))   Pulse 63   Temp 98.8 °F (37.1 °C) (Oral)   Resp 18   Ht 5' 10.79" (1.798 m)   Wt 74.8 kg (164 lb 14.5 oz)   SpO2 99%   BMI 23.14 kg/m²   HENT: Normocephalic, without obvious abnormality. Oral mucosa pink and moist. Dentition good. Healed scar from oral CA resection   EYES: Sclerae anicteric. No conjunctival pallor.   NECK: Supple. No masses or cervical adenopathy.  CARDIOVASCULAR: Regular rate and rhythm. No murmurs.  RESPIRATORY: Normal respiratory effort. BBS CTA. No wheezes or crackles.  GI: Soft, non-tender, non-distended. No hepatosplenomegaly. No masses palpable. No ascites. Well healed chevron scar.   EXTREMITIES:  No clubbing, cyanosis or edema.  SKIN: Warm and dry. No " jaundice. No rashes noted to exposed skin. No telangectasias noted. No palmar erythema.  NEURO:  Normal gate. No asterixis.  PSYCH:  Memory intact. Thought and speech pattern appropriate. Behavior normal. No depression or anxiety noted.      Lab Results   Component Value Date    BILITOT 0.9 05/13/2019    AST 21 05/13/2019    ALT 16 05/13/2019    ALKPHOS 66 05/13/2019    CREATININE 1.18 05/13/2019    ALBUMIN 4.6 05/13/2019     Lab Results   Component Value Date    WBC 7.08 05/13/2019    HGB 14.6 05/13/2019    HCT 43.4 05/13/2019    HCT 32 (L) 01/12/2015     (L) 05/13/2019     Lab Results   Component Value Date    TACROLIMUS 6.2 05/13/2019       Assessment/Plan:   1. S/P liver transplant due to: alcohol and hepatitis C  -- Stable LFTs. Continue monitoring symptoms, labs, and drug levels for drug-related toxicity and side effects.  -- Continue with post transplant labs per protocol.    2. Prophylactic immunotherapy  -- Current IS: tacrolimus 2 mg / 1 mg. Refilled.  -- Last trough 6.2   -- Chronic immunosuppressive medications for rejection prophylaxis at target. No adjustment needed at this time.     3. History of hepatitis C  -- HCV treated post transplant with Harvoni x 24 weeks with successful SVR (10/2016)    4. Hypertension  -- Well controlled, continue current meds and f/u with PCP    5. History of oral cancer  -- s/p resection at [facility]  -- Has quit chewing tobacco but still smoking cigarettes     6. Tobacco abuse  -- Strongly encouraged patient to quit smoking given history of oral cancer and immunosuppression  -- Encouraged patient to follow-up with PCP to discuss additional smoking cessation options if unable to quit     7. Chronic foot pain  -- Continue f/u with podiatry      8. Health maintenance  -- Need skin cancer screening    9. Osteoporosis  -- Continue fosamax, Ca/Vit D  -- Repeat DXA, scheduled         Return to clinic in 1 year        Janna Fitzpatrick NP    Hepatology and Liver Transplant         [facility] Patient Status  Functional Status: 100% - Normal, no complaints, no evidence of disease  Physical Capacity: No Limitations

## 2019-08-15 ENCOUNTER — TELEPHONE (OUTPATIENT)
Dept: TRANSPLANT | Facility: CLINIC | Age: 59
End: 2019-08-15

## 2019-11-13 ENCOUNTER — TELEPHONE (OUTPATIENT)
Dept: TRANSPLANT | Facility: CLINIC | Age: 59
End: 2019-11-13

## 2019-12-02 DIAGNOSIS — J43.9 EMPHYSEMA/COPD: Primary | ICD-10-CM

## 2019-12-03 NOTE — TELEPHONE ENCOUNTER
----- Message from Pb Shaffer sent at 12/3/2019  2:45 PM CST -----  Contact: pt sister in law  Call regarding RX tacrolimus (PROGRAF) 1 MG Cap states that he needs a 2day supply     Pt hasn't had his meds in 2days     Call back: [phone]

## 2019-12-03 NOTE — TELEPHONE ENCOUNTER
Spoke to pt's sister in law who says he missed counted tacrolimus pills and informed pharmacy he had a week supply left but he did not. Pt has been out of medication for 2 days. Will send a couple day supply to local pharmacy while pt waits for shipment from [facility].

## 2019-12-04 RX ORDER — TACROLIMUS 1 MG/1
CAPSULE ORAL
Qty: 6 CAPSULE | Refills: 0 | Status: SHIPPED | OUTPATIENT
Start: 2019-12-04 | End: 2023-01-30 | Stop reason: CLARIF

## 2020-02-13 ENCOUNTER — TELEPHONE (OUTPATIENT)
Dept: TRANSPLANT | Facility: CLINIC | Age: 60
End: 2020-02-13

## 2020-02-13 NOTE — LETTER
February 13, 2020    Dereck Centeno  [address]  [facility] [facility] [place] [facility] [address]          Dear Dereck Centeno:  MRN: [id number]    This is a follow up to your recent labs, your lab results were stable.  There are no medicine changes.  Please have your labs drawn again on 5/11/20.      If you cannot have your labs drawn on the scheduled date, it is your responsibility to call the transplant department to reschedule.  To reschedule or make an appointment, please as to speak to or leave a message for my assistant, Florecita Ochoa or Dolly, at [phone].  When leaving a message for Florecita Ochoa Angela or myself, we ask that you leave a brief message regarding your request.    Sincerely,    Bina Preston, RN,BSN,CCTC    Your Liver Transplant Coordinator    [facility]  [address] [place] [address]  [phone]

## 2020-05-25 ENCOUNTER — TELEPHONE (OUTPATIENT)
Dept: TRANSPLANT | Facility: CLINIC | Age: 60
End: 2020-05-25

## 2020-05-25 DIAGNOSIS — Z94.4 STATUS POST LIVER TRANSPLANT: Primary | ICD-10-CM

## 2020-05-27 ENCOUNTER — TELEPHONE (OUTPATIENT)
Dept: TRANSPLANT | Facility: CLINIC | Age: 60
End: 2020-05-27

## 2020-05-27 NOTE — LETTER
May 27, 2020    Dereck Centeno  [address]  [place] [address]          Dear Dereck Centeno:  MRN: [id number]    This is a follow up to your recent labs, your lab results were stable.  There are no medicine changes.  Please have your labs drawn again on 8/24/20.      If you cannot have your labs drawn on the scheduled date, it is your responsibility to call the transplant department to reschedule.  To reschedule or make an appointment, please as to speak to or leave a message for my assistant, Florecita Ochoa or Dolly, at [phone].  When leaving a message for Florecita Ochoa Angela or myself, we ask that you leave a brief message regarding your request.    Sincerely,    Bina Preston, RN,BSN,CCTC    Your Liver Transplant Coordinator    [facility]  [address] [place] [id number]  [phone]

## 2020-06-18 DIAGNOSIS — Z94.4 STATUS POST LIVER TRANSPLANT: Chronic | ICD-10-CM

## 2020-06-19 RX ORDER — TACROLIMUS 1 MG/1
CAPSULE ORAL
Qty: 90 CAPSULE | Refills: 11 | Status: SHIPPED | OUTPATIENT
Start: 2020-06-19 | End: 2021-06-29 | Stop reason: SDUPTHER

## 2020-07-20 ENCOUNTER — OFFICE VISIT (OUTPATIENT)
Dept: TRANSPLANT | Facility: CLINIC | Age: 60
End: 2020-07-20
Payer: MEDICAID

## 2020-07-20 VITALS
SYSTOLIC BLOOD PRESSURE: 115 MMHG | BODY MASS INDEX: 22.41 KG/M2 | OXYGEN SATURATION: 97 % | WEIGHT: 162.94 LBS | TEMPERATURE: 98 F | RESPIRATION RATE: 17 BRPM | DIASTOLIC BLOOD PRESSURE: 78 MMHG | HEART RATE: 74 BPM

## 2020-07-20 DIAGNOSIS — Z29.89 PROPHYLACTIC IMMUNOTHERAPY: ICD-10-CM

## 2020-07-20 DIAGNOSIS — Z72.0 TOBACCO ABUSE: ICD-10-CM

## 2020-07-20 DIAGNOSIS — Z94.4 STATUS POST LIVER TRANSPLANT: Primary | ICD-10-CM

## 2020-07-20 DIAGNOSIS — Z94.4 STATUS POST LIVER TRANSPLANT: Primary | Chronic | ICD-10-CM

## 2020-07-20 PROCEDURE — 99214 OFFICE O/P EST MOD 30 MIN: CPT | Mod: S$PBB,,, | Performed by: INTERNAL MEDICINE

## 2020-07-20 PROCEDURE — 99999 PR PBB SHADOW E&M-EST. PATIENT-LVL IV: ICD-10-PCS | Mod: PBBFAC,,, | Performed by: INTERNAL MEDICINE

## 2020-07-20 PROCEDURE — 99214 PR OFFICE/OUTPT VISIT, EST, LEVL IV, 30-39 MIN: ICD-10-PCS | Mod: S$PBB,,, | Performed by: INTERNAL MEDICINE

## 2020-07-20 PROCEDURE — 99214 OFFICE O/P EST MOD 30 MIN: CPT | Mod: PBBFAC | Performed by: INTERNAL MEDICINE

## 2020-07-20 PROCEDURE — 99999 PR PBB SHADOW E&M-EST. PATIENT-LVL IV: CPT | Mod: PBBFAC,,, | Performed by: INTERNAL MEDICINE

## 2020-07-20 NOTE — PROGRESS NOTES
Subjective:       Patient ID: Dereck Centeno is a 59 y.o. male.    Chief Complaint: Liver Transplant Follow-up    HPI  I saw this 59 y.o. man who had a liver tx for HCV/alcohol cirrhosis on 2015.  He is doing well with no complaints at 5 years post OLT.    Mild HCV recurrence: started harvoni on 10/1/15 until 3/24/2016  - 24 weeks  - SVR 12 on 2016      ORGAN: LIVER  Whole or Partial: whole liver  Donor Type:  - brain death  PHS Increased Risk: no  Donor CMV Status: negative  Donor HCV Status: negative  Donor HBcAb: negative  Biliary Anastomosis: end to end  Arterial Anatomy: standard  IVC reconstruction: end to end ivc  Portal vein status: patent    PCP PROPHYLAXIS: Bactrim daily until 7/10/15  Aspirin: YES DOSE: 81 mg     History of oral cancer  -- s/p resection at [facility]  -- Has quit chewing tobacco but still smoking cigarettes  Continues to smoke cigarettes despite history of oral CA. Has tried patches and a smoking cessation program but has been unsuccessful. Stopped chewing tobacco    I also note his suicidal attempt with a drug overdose in 2018 (took clonidine and gabapentin). At the time of admission, he had a positive alcohol level.    No alcohol at the moment.    Review of Systems   Constitutional: Negative for activity change, appetite change, chills, fatigue, fever and unexpected weight change.   HENT: Negative for hearing loss.    Eyes: Negative for discharge and visual disturbance.   Respiratory: Negative for cough, chest tightness, shortness of breath and wheezing.    Cardiovascular: Negative for chest pain, palpitations and leg swelling.   Gastrointestinal: Negative for abdominal distention, abdominal pain, constipation, diarrhea and nausea.   Genitourinary: Negative for dysuria and frequency.   Musculoskeletal: Negative for arthralgias and back pain.   Skin: Negative for pallor and rash.   Neurological: Negative for dizziness, tremors, speech difficulty and headaches.    Hematological: Negative for adenopathy.   Psychiatric/Behavioral: Negative for agitation and confusion.         Lab Results   Component Value Date    ALT 14 05/26/2020    AST 22 05/26/2020    GGT 32 05/16/2017    ALKPHOS 67 05/26/2020    BILITOT 1.0 05/26/2020     Past Medical History:   Diagnosis Date    Alcohol withdrawal seizure     Alcoholic cirrhosis     Anxiety     Depression     Hepatitis C     History of throat cancer     HTN (hypertension), benign     Tobacco use      Past Surgical History:   Procedure Laterality Date    COLONOSCOPY N/A 6/14/2018    Procedure: COLONOSCOPY;  Surgeon: Conor Castro MD;  Location: [facility];  Service: Endoscopy;  Laterality: N/A;    ESOPHAGEAL VARICE LIGATION      ESOPHAGOGASTRODUODENOSCOPY N/A 6/14/2018    Procedure: ESOPHAGOGASTRODUODENOSCOPY (EGD);  Surgeon: Conor Castro MD;  Location: [facility];  Service: Endoscopy;  Laterality: N/A;    LIVER TRANSPLANT      TIPS PROCEDURE       Current Outpatient Medications   Medication Sig    albuterol (PROVENTIL HFA) 90 mcg/actuation inhaler Inhale 2 puffs into the lungs every 6 (six) hours as needed for Wheezing or Shortness of Breath.    alendronate (FOSAMAX) 70 MG tablet Take 70 mg by mouth every 7 days.    atorvastatin (LIPITOR) 40 MG tablet Take 40 mg by mouth every evening.    butalbital-aspirin-caffeine -40 mg (FIORINAL) -40 mg Cap Take 1 capsule by mouth daily as needed.    calcium carbonate (OS-KRISTEL) 600 mg calcium (1,500 mg) Tab Take 600 mg by mouth once.    docusate sodium (COLACE) 100 MG capsule Take 100 mg by mouth 3 (three) times daily as needed for Constipation.    magnesium oxide (MAG-OX) 400 mg tablet Take 400 mg by mouth once daily.    multivitamin (THERAGRAN) tablet Take 1 tablet by mouth once daily.    QUEtiapine (SEROQUEL) 25 MG Tab     tacrolimus (PROGRAF) 1 MG Cap Take 2 capsules (2 mg total) by mouth every morning AND 1 capsule (1 mg total) every evening.    vitamin  D 1000 units Tab Take 2 tablets (2,000 Units total) by mouth once daily.    amLODIPine (NORVASC) 5 MG tablet Take 1 tablet (5 mg total) by mouth once daily.    aspirin (ECOTRIN) 81 MG EC tablet Take 1 tablet (81 mg total) by mouth once daily.    cloNIDine (CATAPRES) 0.1 MG tablet Take 1 tablet (0.1 mg total) by mouth 2 (two) times daily.    escitalopram oxalate (LEXAPRO) 20 MG tablet Take 1 tablet (20 mg total) by mouth once daily.    famotidine (PEPCID) 20 MG tablet Take 1 tablet (20 mg total) by mouth every evening.    gabapentin (NEURONTIN) 400 MG capsule Take 1 capsule (400 mg total) by mouth 3 (three) times daily. (Patient taking differently: Take 600 mg by mouth 3 (three) times daily. )    hepatitis A virus vaccine, PF, (HAVRIX) 1,440 Kimberlee unit/mL Susp Inject 1 mL into the muscle once.    HYDROcodone-acetaminophen (NORCO) 7.5-325 mg per tablet Take 1 tablet by mouth every 6 (six) hours as needed for Pain. (Patient not taking: Reported on 7/20/2020)    mirtazapine (REMERON) 15 MG tablet Take 1 tablet (15 mg total) by mouth every evening.    tacrolimus (PROGRAF) 1 MG Cap TAKE 2 CAPSULES (2MG TOTAL) BY MOUTH EVERY MORNING AND 1 CAPSULE (1MG TOTAL) EVERY EVENING    thiamine 100 MG tablet Take 1 tablet (100 mg total) by mouth once daily. (Patient not taking: Reported on 7/20/2020)    traMADol (ULTRAM) 50 mg tablet Take 50 mg by mouth every 6 (six) hours as needed for Pain.     No current facility-administered medications for this visit.        Objective:      Physical Exam   Constitutional: He is oriented to person, place, and time. He appears well-developed and well-nourished. No distress.   HENT:   Head: Normocephalic.   Eyes: Pupils are equal, round, and reactive to light. No scleral icterus.   Neck: Normal range of motion. Neck supple. No JVD present. No thyromegaly present.   Cardiovascular: Normal rate, regular rhythm, normal heart sounds and intact distal pulses.   Pulmonary/Chest: Effort normal  and breath sounds normal. No respiratory distress. He has no wheezes.   Abdominal: Soft. He exhibits no distension and no mass. There is no abdominal tenderness. There is no rebound and no guarding.       Musculoskeletal: Normal range of motion.         General: No edema.   Lymphadenopathy:     He has no cervical adenopathy.   Neurological: He is alert and oriented to person, place, and time.   Skin: Skin is warm and dry. No rash noted. He is not diaphoretic. No erythema.   Psychiatric: He has a normal mood and affect. His behavior is normal. Judgment and thought content normal.       Assessment:       1. Liver transplant 1/11/2015 for HCV    2. Tobacco abuse    3. Prophylactic immunotherapy        Plan:   Stable.   - Tac- not sure of dose    - PCP follows his BP and DEXA bone scan  - labs every 3 months  - clinic in 1 year  - encouraged to stop smoking  - reminded not to drink alcohol.    Functional Status: 100% - Normal, no complaints, no evidence of disease  Physical Capacity: No Limitations

## 2020-07-20 NOTE — LETTER
July 20, 2020        Maryana Kearns  [address]  [facility] [address]  Phone: [phone]  Fax: [phone]             [facility]  [address]  [facility] [address]  Phone: [phone]   Patient: Dereck Centeno   MR Number: [id number]   YOB: 1960   Date of Visit: 7/20/2020       Dear Dr. Maryana Kearns    Thank you for referring Dereck Centeno to me for evaluation. Attached you will find relevant portions of my assessment and plan of care.    If you have questions, please do not hesitate to call me. I look forward to following Dereck Centeno along with you.    Sincerely,    Mathew Finney MD    Enclosure    If you would like to receive this communication electronically, please contact [email or web address] or [phone] to request [employer] Link access.    [employer] Link is a tool which provides read-only access to select patient information with whom you have a relationship. Its easy to use and provides real time access to review your patients record including encounter summaries, notes, results, and demographic information.    If you feel you have received this communication in error or would no longer like to receive these types of communications, please e-mail [email or web address]

## 2020-08-27 ENCOUNTER — TELEPHONE (OUTPATIENT)
Dept: TRANSPLANT | Facility: CLINIC | Age: 60
End: 2020-08-27

## 2020-08-27 NOTE — TELEPHONE ENCOUNTER
Labs reviewed and are stable, continue q 3 months. Letter sent.      ----- Message from Mathew Finney MD sent at 8/26/2020  3:57 PM CDT -----  Results reviewed

## 2020-08-27 NOTE — LETTER
August 27, 2020    Dereck Centeno  [address]  [facility] [address]          Dear Dereck Centeno:  MRN: [id number]    This is a follow up to your recent labs, your lab results were stable.  There are no medicine changes.  Please have your labs drawn again on 11/30/20.      If you cannot have your labs drawn on the scheduled date, it is your responsibility to call the transplant department to reschedule.  Please call [phone] and ask to speak to Dolly CERNA   for all scheduling requests.     Sincerely,    Bina Preston, RN,BSN,CCTC    Your Liver Transplant Coordinator    [facility]  [address] [place] [address]  [phone]

## 2020-12-01 ENCOUNTER — TELEPHONE (OUTPATIENT)
Dept: TRANSPLANT | Facility: CLINIC | Age: 60
End: 2020-12-01

## 2020-12-01 NOTE — LETTER
December 1, 2020    Dereck Centeno  [address]  [facility] [address]          Dear Dereck Centeno:  MRN: [id number]    This is a follow up to your recent labs, your lab results were stable.  There are no medicine changes.  Please have your labs drawn again on 3/1/21.      If you cannot have your labs drawn on the scheduled date, it is your responsibility to call the transplant department to reschedule.  Please call [phone] and ask to speak to Dolly CERNA   for all scheduling requests.     Sincerely,    Bina Preston, RN,BSN,CCTC    Your Liver Transplant Coordinator    [facility]  [address] [place] [id number]  [phone]

## 2020-12-01 NOTE — TELEPHONE ENCOUNTER
Labs reviewed and are stable, continue q 3 months. Letter sent.   ----- Message from Mathew Finney MD sent at 12/1/2020  1:55 PM CST -----  Results reviewed

## 2021-01-08 ENCOUNTER — PATIENT MESSAGE (OUTPATIENT)
Dept: TRANSPLANT | Facility: CLINIC | Age: 61
End: 2021-01-08

## 2021-03-02 ENCOUNTER — TELEPHONE (OUTPATIENT)
Dept: TRANSPLANT | Facility: CLINIC | Age: 61
End: 2021-03-02

## 2021-06-04 ENCOUNTER — TELEPHONE (OUTPATIENT)
Dept: TRANSPLANT | Facility: CLINIC | Age: 61
End: 2021-06-04

## 2021-06-29 DIAGNOSIS — Z94.4 STATUS POST LIVER TRANSPLANT: Chronic | ICD-10-CM

## 2021-06-29 RX ORDER — TACROLIMUS 1 MG/1
CAPSULE ORAL
Qty: 90 CAPSULE | Refills: 11 | Status: SHIPPED | OUTPATIENT
Start: 2021-06-29 | End: 2021-10-21 | Stop reason: SDUPTHER

## 2021-10-08 ENCOUNTER — TELEPHONE (OUTPATIENT)
Dept: TRANSPLANT | Facility: CLINIC | Age: 61
End: 2021-10-08

## 2021-10-21 DIAGNOSIS — Z94.4 STATUS POST LIVER TRANSPLANT: Chronic | ICD-10-CM

## 2021-10-21 RX ORDER — TACROLIMUS 1 MG/1
CAPSULE ORAL
Qty: 90 CAPSULE | Refills: 11 | Status: SHIPPED | OUTPATIENT
Start: 2021-10-21 | End: 2022-10-05 | Stop reason: SDUPTHER

## 2022-02-23 ENCOUNTER — TELEPHONE (OUTPATIENT)
Dept: TRANSPLANT | Facility: CLINIC | Age: 62
End: 2022-02-23
Payer: COMMERCIAL

## 2022-02-24 ENCOUNTER — PATIENT MESSAGE (OUTPATIENT)
Dept: TRANSPLANT | Facility: CLINIC | Age: 62
End: 2022-02-24
Payer: MEDICAID

## 2022-03-09 ENCOUNTER — TELEPHONE (OUTPATIENT)
Dept: TRANSPLANT | Facility: CLINIC | Age: 62
End: 2022-03-09
Payer: MEDICAID

## 2022-03-11 ENCOUNTER — OFFICE VISIT (OUTPATIENT)
Dept: TRANSPLANT | Facility: CLINIC | Age: 62
End: 2022-03-11
Payer: MEDICAID

## 2022-03-11 DIAGNOSIS — Z29.89 PROPHYLACTIC IMMUNOTHERAPY: ICD-10-CM

## 2022-03-11 DIAGNOSIS — Z94.4 STATUS POST LIVER TRANSPLANT: Primary | Chronic | ICD-10-CM

## 2022-03-11 DIAGNOSIS — Z72.0 TOBACCO ABUSE: ICD-10-CM

## 2022-03-11 PROCEDURE — 99213 OFFICE O/P EST LOW 20 MIN: CPT | Mod: 95,,, | Performed by: INTERNAL MEDICINE

## 2022-03-11 PROCEDURE — 3044F HG A1C LEVEL LT 7.0%: CPT | Mod: CPTII,95,, | Performed by: INTERNAL MEDICINE

## 2022-03-11 PROCEDURE — 99213 PR OFFICE/OUTPT VISIT, EST, LEVL III, 20-29 MIN: ICD-10-PCS | Mod: 95,,, | Performed by: INTERNAL MEDICINE

## 2022-03-11 PROCEDURE — 4010F PR ACE/ARB THEARPY RXD/TAKEN: ICD-10-PCS | Mod: CPTII,95,, | Performed by: INTERNAL MEDICINE

## 2022-03-11 PROCEDURE — 4010F ACE/ARB THERAPY RXD/TAKEN: CPT | Mod: CPTII,95,, | Performed by: INTERNAL MEDICINE

## 2022-03-11 PROCEDURE — 3044F PR MOST RECENT HEMOGLOBIN A1C LEVEL <7.0%: ICD-10-PCS | Mod: CPTII,95,, | Performed by: INTERNAL MEDICINE

## 2022-03-11 NOTE — LETTER
March 11, 2022        Maryana Kearns  [address]  [facility] [address]  Phone: [phone]  Fax: [phone]             [facility] 1st [facility]  [address]  [facility] [address]  Phone: [phone]   Patient: Dereck Centeno   MR Number: [id number]   YOB: 1960   Date of Visit: 3/11/2022       Dear Dr. Maryana Kearns    Thank you for referring Dereck Centeno to me for evaluation. Attached you will find relevant portions of my assessment and plan of care.    If you have questions, please do not hesitate to call me. I look forward to following Dereck Centeno along with you.    Sincerely,    Mathew Finney MD    Enclosure    If you would like to receive this communication electronically, please contact [email or web address] or [phone] to request [employer] ([employer]) Link access.    [employer] Link is a tool which provides read-only access to select patient information with whom you have a relationship. Its easy to use and provides real time access to review your patients record including encounter summaries, notes, results, and demographic information.    If you feel you have received this communication in error or would no longer like to receive these types of communications, please e-mail [email or web address]

## 2022-03-11 NOTE — PROGRESS NOTES
Subjective:       Patient ID: Dereck Centeno is a 61 y.o. male.    Chief Complaint: No chief complaint on file.  The patient location is: Home  The chief complaint leading to consultation is: Liver transplant    Visit type: audiovisual    Face to Face time with patient: 20 minutes of total time spent on the encounter, which includes face to face time and non-face to face time preparing to see the patient (eg, review of tests), Obtaining and/or reviewing separately obtained history, Documenting clinical information in the electronic or other health record, Independently interpreting results (not separately reported) and communicating results to the patient/family/caregiver, or Care coordination (not separately reported).       Each patient to whom he or she provides medical services by telemedicine is:  (1) informed of the relationship between the physician and patient and the respective role of any other health care provider with respect to management of the patient; and (2) notified that he or she may decline to receive medical services by telemedicine and may withdraw from such care at any time.    Notes:     HPI  I saw this 61 y.o. man who had a liver tx for HCV/alcohol cirrhosis on 2015.  He is doing well at 7 years post OLT.    Mild HCV recurrence: started harvoni on 10/1/15 until 3/24/2016  - 24 weeks  - SVR 12 on 2016      ORGAN: LIVER  Whole or Partial: whole liver  Donor Type:  - brain death  PHS Increased Risk: no  Donor CMV Status: negative  Donor HCV Status: negative  Donor HBcAb: negative  Biliary Anastomosis: end to end  Arterial Anatomy: standard  IVC reconstruction: end to end ivc  Portal vein status: patent    PCP PROPHYLAXIS: Bactrim daily until 7/10/15  Aspirin: YES DOSE: 81 mg     History of oral cancer  -- s/p resection at [facility]   -- Has quit chewing tobacco but still smoking cigarettes  Continues to smoke cigarettes despite history of oral CA. Has tried patches and a  smoking cessation program but has been unsuccessful. Stopped chewing tobacco    I also note his suicidal attempt with a drug overdose in March 2018 (took clonidine and gabapentin). At the time of admission, he had a positive alcohol level.    No alcohol at the moment.  Does not take his BP    Review of Systems   Constitutional: Negative for activity change, appetite change, chills, fatigue, fever and unexpected weight change.   HENT: Negative for hearing loss.    Eyes: Negative for discharge and visual disturbance.   Respiratory: Negative for cough, chest tightness, shortness of breath and wheezing.    Cardiovascular: Negative for chest pain, palpitations and leg swelling.   Gastrointestinal: Negative for abdominal distention, abdominal pain, constipation, diarrhea and nausea.   Genitourinary: Negative for dysuria and frequency.   Musculoskeletal: Negative for arthralgias and back pain.   Skin: Negative for pallor and rash.   Neurological: Negative for dizziness, tremors, speech difficulty and headaches.   Hematological: Negative for adenopathy.   Psychiatric/Behavioral: Negative for agitation and confusion.         Lab Results   Component Value Date    ALT 22 01/27/2022    AST 28 01/27/2022    GGT 32 05/16/2017    ALKPHOS 45 01/27/2022    BILITOT 0.7 01/27/2022     Past Medical History:   Diagnosis Date    Alcohol withdrawal seizure     Alcoholic cirrhosis     Anxiety     Depression     Hepatitis C     History of throat cancer     HTN (hypertension), benign     Tobacco use      Past Surgical History:   Procedure Laterality Date    COLONOSCOPY N/A 6/14/2018    Procedure: COLONOSCOPY;  Surgeon: Conor Castro MD;  Location: [facility];  Service: Endoscopy;  Laterality: N/A;    ESOPHAGEAL VARICE LIGATION      ESOPHAGOGASTRODUODENOSCOPY N/A 6/14/2018    Procedure: ESOPHAGOGASTRODUODENOSCOPY (EGD);  Surgeon: Conor Castro MD;  Location: [facility];  Service: Endoscopy;  Laterality: N/A;    LIVER  TRANSPLANT      TIPS PROCEDURE       Current Outpatient Medications   Medication Sig    albuterol (PROVENTIL HFA) 90 mcg/actuation inhaler Inhale 2 puffs into the lungs every 6 (six) hours as needed for Wheezing or Shortness of Breath.    alendronate (FOSAMAX) 70 MG tablet Take 70 mg by mouth every 7 days.    amLODIPine (NORVASC) 5 MG tablet Take 1 tablet (5 mg total) by mouth once daily.    aspirin (ECOTRIN) 81 MG EC tablet Take 1 tablet (81 mg total) by mouth once daily.    atorvastatin (LIPITOR) 40 MG tablet Take 40 mg by mouth every evening.    butalbital-aspirin-caffeine -40 mg (FIORINAL) -40 mg Cap Take 1 capsule by mouth daily as needed.    calcium carbonate (OS-KRISTEL) 600 mg calcium (1,500 mg) Tab Take 600 mg by mouth once.    cloNIDine (CATAPRES) 0.1 MG tablet Take 1 tablet (0.1 mg total) by mouth 2 (two) times daily.    docusate sodium (COLACE) 100 MG capsule Take 100 mg by mouth 3 (three) times daily as needed for Constipation.    escitalopram oxalate (LEXAPRO) 20 MG tablet Take 1 tablet (20 mg total) by mouth once daily.    famotidine (PEPCID) 20 MG tablet Take 1 tablet (20 mg total) by mouth every evening.    gabapentin (NEURONTIN) 400 MG capsule Take 1 capsule (400 mg total) by mouth 3 (three) times daily. (Patient taking differently: Take 600 mg by mouth 3 (three) times daily. )    hepatitis A virus vaccine, PF, (HAVRIX) 1,440 Kimberlee unit/mL Susp Inject 1 mL into the muscle once.    HYDROcodone-acetaminophen (NORCO) 7.5-325 mg per tablet Take 1 tablet by mouth every 6 (six) hours as needed for Pain. (Patient not taking: Reported on 7/20/2020)    magnesium oxide (MAG-OX) 400 mg tablet Take 400 mg by mouth once daily.    mirtazapine (REMERON) 15 MG tablet Take 1 tablet (15 mg total) by mouth every evening.    multivitamin (THERAGRAN) tablet Take 1 tablet by mouth once daily.    QUEtiapine (SEROQUEL) 25 MG Tab     tacrolimus (PROGRAF) 1 MG Cap Take 2 capsules (2 mg total) by  mouth every morning AND 1 capsule (1 mg total) every evening.    tacrolimus (PROGRAF) 1 MG Cap TAKE 2 CAPSULES (2MG TOTAL) BY MOUTH EVERY MORNING AND 1 CAPSULE (1MG TOTAL) EVERY EVENING    thiamine 100 MG tablet Take 1 tablet (100 mg total) by mouth once daily. (Patient not taking: Reported on 7/20/2020)    traMADol (ULTRAM) 50 mg tablet Take 50 mg by mouth every 6 (six) hours as needed for Pain.    vitamin D 1000 units Tab Take 2 tablets (2,000 Units total) by mouth once daily.     No current facility-administered medications for this visit.       Objective:    NOT DONE- VIDEO VISIT      Assessment:       1. Liver transplant 1/11/2015 for HCV    2. Tobacco abuse    3. Prophylactic immunotherapy        Plan:   Stable.   - Tac- unchanged dose    - PCP follows his BP and DEXA bone scan  - labs every 3 months  - clinic in 1 year  - encouraged to stop smoking  - reminded not to drink alcohol.    Functional Status: 100% - Normal, no complaints, no evidence of disease  Physical Capacity: No Limitations

## 2022-04-06 ENCOUNTER — TELEPHONE (OUTPATIENT)
Dept: TRANSPLANT | Facility: CLINIC | Age: 62
End: 2022-04-06
Payer: MEDICAID

## 2022-04-06 NOTE — TELEPHONE ENCOUNTER
Labs reviewed and are stable, continue q 24 weeks. Letter sent.       ----- Message from Mathew Finney MD sent at 4/6/2022  3:28 PM CDT -----  Results reviewed

## 2022-04-06 NOTE — LETTER
April 6, 2022    Dereck Centeno  [address]  [facility] [address]          Dear Dereck Centeno:  MRN: [id number]    This is a follow up to your recent labs, your lab results were stable.  There are no medicine changes.  Please have your labs drawn again on 10/3/22.      If you cannot have your labs drawn on the scheduled date, it is your responsibility to call the transplant department to reschedule.  Please call [phone] and ask to speak to Dolly CERNA   for all scheduling requests.     Sincerely,    Bina Preston, RN,BSN,CCTC    Your Liver Transplant Coordinator    [facility]  [address] [place] [address]  [phone]

## 2022-05-03 DIAGNOSIS — I70.213 ATHEROSCLEROSIS OF NATIVE ARTERY OF BOTH LOWER EXTREMITIES WITH INTERMITTENT CLAUDICATION: Primary | ICD-10-CM

## 2022-05-09 DIAGNOSIS — F17.210 SMOKING GREATER THAN 30 PACK YEARS: Primary | ICD-10-CM

## 2022-05-11 ENCOUNTER — PATIENT MESSAGE (OUTPATIENT)
Dept: RESEARCH | Facility: CLINIC | Age: 62
End: 2022-05-11
Payer: MEDICAID

## 2022-05-12 DIAGNOSIS — R56.9 SEIZURE: Primary | ICD-10-CM

## 2022-08-27 ENCOUNTER — NURSE TRIAGE (OUTPATIENT)
Dept: ADMINISTRATIVE | Facility: CLINIC | Age: 62
End: 2022-08-27
Payer: MEDICAID

## 2022-08-27 NOTE — TELEPHONE ENCOUNTER
Reason for Disposition   [1] Prescription refill request for NON-ESSENTIAL medicine (i.e., no harm to patient if med not taken) AND [2] triager unable to refill per department policy    Protocols used: Medication Question Call-A-SADIA      Dianne, Dereck' sister in law, called to report that his long-term podiatrist's office has closed and she is requesting prescription Amlactin lotion for his feet. Encouraged her to call Eva Reynaga MD, Dereck' pcp, on Monday when clinic opens for this, as this is not something that has been ordered by his non-[facility] podiatrist.  She said she will.  Dereck did have liver transplant on 01/11/2015.  She would also like assistance in obtaining a referral for him to get an appointment to [facility] care in podiatry, and is requesting a message to be sent to Bina Preston RN, his liver transplant coordinator to request a call back on Monday to let her know if this is something she will be able to assist with.  Also encouraged requesting this referral from his pcp as well.  No triage.  Non-urgent medication request only.

## 2022-09-08 ENCOUNTER — TELEPHONE (OUTPATIENT)
Dept: TRANSPLANT | Facility: CLINIC | Age: 62
End: 2022-09-08
Payer: MEDICAID

## 2022-09-08 NOTE — TELEPHONE ENCOUNTER
Informed pt's sister pt needs to be seen in a clinic setting for rash. She verbalizes understanding.     ----- Message from Wily Jimenez sent at 9/8/2022 11:45 AM CDT -----  Regarding: speak to coordinator  Patient's sister-in-law, Dianne, calling to speak to coordinator regarding patient status and wanting to send pictures. She's asking for coordinator to call from iphone so she can send pics of what looks like Monkey Pox. Requesting a call back.      Call: [phone]

## 2022-10-05 DIAGNOSIS — Z94.4 STATUS POST LIVER TRANSPLANT: Chronic | ICD-10-CM

## 2022-10-05 RX ORDER — TACROLIMUS 1 MG/1
CAPSULE ORAL
Qty: 90 CAPSULE | Refills: 11 | Status: SHIPPED | OUTPATIENT
Start: 2022-10-05 | End: 2023-10-22 | Stop reason: SDUPTHER

## 2022-10-26 ENCOUNTER — TELEPHONE (OUTPATIENT)
Dept: TRANSPLANT | Facility: CLINIC | Age: 62
End: 2022-10-26
Payer: MEDICAID

## 2022-10-26 NOTE — LETTER
October 26, 2022    Dereck Centeno  [address]  [facility] [address]          Dear Dereck Centeno:  MRN: [id number]    This is a follow up to your recent labs, your lab results were stable.  There are no medicine changes.  Please have your labs drawn again on 4/10/23.      If you cannot have your labs drawn on the scheduled date, it is your responsibility to call the transplant department to reschedule.  Please call [phone] and ask to speak to Dolly CERNA   for all scheduling requests.     Sincerely,    Bina Preston, RN,BSN,CCTC    Your Liver Transplant Coordinator    [facility]  [address] [place] [address]  [phone]

## 2023-01-19 ENCOUNTER — TELEPHONE (OUTPATIENT)
Dept: HEMATOLOGY/ONCOLOGY | Facility: CLINIC | Age: 63
End: 2023-01-19
Payer: MEDICAID

## 2023-01-19 NOTE — TELEPHONE ENCOUNTER
----- Message from Arti Moncada RN sent at 1/17/2023  4:42 PM CST -----  Regarding: FW: MRN:  [id number]  Contact: Patient Sister Dianne    ----- Message -----  From: Ann-Marie Olsen  Sent: 1/17/2023   4:00 PM CST  To: [facility]  Subject: MRN:  [id number]                                    Good afternoon, this is a new patient needing to be scheduled. He lives in  [facility] maybe we can have someone there see him?        ----- Message -----  From: Erica Armas  Sent: 1/17/2023   3:51 PM CST  To: , #    Type:  Appointment Request    Name of Caller:Patient sister Dianne  Symptoms:previous cancer patient and transplant /per sister patient has a lump on neck   Would the patient rather a call back or a response via FSIner? Call back  Best Call Back Number:[phone]  Additional Information: Please assist

## 2023-01-19 NOTE — TELEPHONE ENCOUNTER
Called & spoke with pt & sister. He reports a history of right neck cancer about 5 yrs ago treated with surgery at [facility]. Unable to recall doctor's name. He now feels a lump on the left neck. Scheduled with  on Tuesday & will attempt to obtain old records. Reviewed appt details & confirmed.

## 2023-01-24 ENCOUNTER — LAB VISIT (OUTPATIENT)
Dept: LAB | Facility: HOSPITAL | Age: 63
End: 2023-01-24
Attending: OTOLARYNGOLOGY
Payer: MEDICAID

## 2023-01-24 ENCOUNTER — OFFICE VISIT (OUTPATIENT)
Dept: OTOLARYNGOLOGY | Facility: CLINIC | Age: 63
End: 2023-01-24
Payer: MEDICAID

## 2023-01-24 VITALS — DIASTOLIC BLOOD PRESSURE: 80 MMHG | HEART RATE: 68 BPM | SYSTOLIC BLOOD PRESSURE: 168 MMHG

## 2023-01-24 DIAGNOSIS — Z94.4 STATUS POST LIVER TRANSPLANT: Chronic | ICD-10-CM

## 2023-01-24 DIAGNOSIS — R59.0 LOCALIZED ENLARGED LYMPH NODES: ICD-10-CM

## 2023-01-24 DIAGNOSIS — Z85.819 HISTORY OF MALIGNANT NEOPLASM OF ORAL CAVITY: ICD-10-CM

## 2023-01-24 DIAGNOSIS — D37.05 NEOPLASM OF UNCERTAIN BEHAVIOR OF PHARYNX: ICD-10-CM

## 2023-01-24 DIAGNOSIS — D37.05 NEOPLASM OF UNCERTAIN BEHAVIOR OF PHARYNX: Primary | ICD-10-CM

## 2023-01-24 LAB
ALBUMIN SERPL BCP-MCNC: 4.3 G/DL (ref 3.5–5.2)
ALP SERPL-CCNC: 59 U/L (ref 55–135)
ALT SERPL W/O P-5'-P-CCNC: 10 U/L (ref 10–44)
ANION GAP SERPL CALC-SCNC: 10 MMOL/L (ref 8–16)
AST SERPL-CCNC: 13 U/L (ref 10–40)
BASOPHILS # BLD AUTO: 0.04 K/UL (ref 0–0.2)
BASOPHILS NFR BLD: 0.6 % (ref 0–1.9)
BILIRUB SERPL-MCNC: 0.6 MG/DL (ref 0.1–1)
BUN SERPL-MCNC: 9 MG/DL (ref 8–23)
CALCIUM SERPL-MCNC: 9.6 MG/DL (ref 8.7–10.5)
CHLORIDE SERPL-SCNC: 102 MMOL/L (ref 95–110)
CO2 SERPL-SCNC: 26 MMOL/L (ref 23–29)
CREAT SERPL-MCNC: 1.3 MG/DL (ref 0.5–1.4)
DIFFERENTIAL METHOD: ABNORMAL
EOSINOPHIL # BLD AUTO: 0.2 K/UL (ref 0–0.5)
EOSINOPHIL NFR BLD: 3.3 % (ref 0–8)
ERYTHROCYTE [DISTWIDTH] IN BLOOD BY AUTOMATED COUNT: 13.2 % (ref 11.5–14.5)
EST. GFR  (NO RACE VARIABLE): >60 ML/MIN/1.73 M^2
GLUCOSE SERPL-MCNC: 93 MG/DL (ref 70–110)
HCT VFR BLD AUTO: 44.9 % (ref 40–54)
HGB BLD-MCNC: 14.5 G/DL (ref 14–18)
IMM GRANULOCYTES # BLD AUTO: 0.02 K/UL (ref 0–0.04)
IMM GRANULOCYTES NFR BLD AUTO: 0.3 % (ref 0–0.5)
LYMPHOCYTES # BLD AUTO: 1.3 K/UL (ref 1–4.8)
LYMPHOCYTES NFR BLD: 18.1 % (ref 18–48)
MCH RBC QN AUTO: 30.5 PG (ref 27–31)
MCHC RBC AUTO-ENTMCNC: 32.3 G/DL (ref 32–36)
MCV RBC AUTO: 94 FL (ref 82–98)
MONOCYTES # BLD AUTO: 0.4 K/UL (ref 0.3–1)
MONOCYTES NFR BLD: 6.3 % (ref 4–15)
NEUTROPHILS # BLD AUTO: 5 K/UL (ref 1.8–7.7)
NEUTROPHILS NFR BLD: 71.4 % (ref 38–73)
NRBC BLD-RTO: 0 /100 WBC
PLATELET # BLD AUTO: 142 K/UL (ref 150–450)
PMV BLD AUTO: 9.7 FL (ref 9.2–12.9)
POTASSIUM SERPL-SCNC: 5 MMOL/L (ref 3.5–5.1)
PREALB SERPL-MCNC: 21 MG/DL (ref 20–43)
PROT SERPL-MCNC: 7 G/DL (ref 6–8.4)
RBC # BLD AUTO: 4.76 M/UL (ref 4.6–6.2)
SODIUM SERPL-SCNC: 138 MMOL/L (ref 136–145)
WBC # BLD AUTO: 6.96 K/UL (ref 3.9–12.7)

## 2023-01-24 PROCEDURE — 85025 COMPLETE CBC W/AUTO DIFF WBC: CPT | Performed by: OTOLARYNGOLOGY

## 2023-01-24 PROCEDURE — 99999 PR PBB SHADOW E&M-EST. PATIENT-LVL III: ICD-10-PCS | Mod: PBBFAC,,, | Performed by: OTOLARYNGOLOGY

## 2023-01-24 PROCEDURE — 36415 COLL VENOUS BLD VENIPUNCTURE: CPT | Performed by: OTOLARYNGOLOGY

## 2023-01-24 PROCEDURE — 3079F DIAST BP 80-89 MM HG: CPT | Mod: CPTII,,, | Performed by: OTOLARYNGOLOGY

## 2023-01-24 PROCEDURE — 31575 PR LARYNGOSCOPY, FLEXIBLE; DIAGNOSTIC: ICD-10-PCS | Mod: S$PBB,,, | Performed by: OTOLARYNGOLOGY

## 2023-01-24 PROCEDURE — 99205 OFFICE O/P NEW HI 60 MIN: CPT | Mod: 25,S$PBB,, | Performed by: OTOLARYNGOLOGY

## 2023-01-24 PROCEDURE — 31575 DIAGNOSTIC LARYNGOSCOPY: CPT | Mod: PBBFAC | Performed by: OTOLARYNGOLOGY

## 2023-01-24 PROCEDURE — 84134 ASSAY OF PREALBUMIN: CPT | Performed by: OTOLARYNGOLOGY

## 2023-01-24 PROCEDURE — 31575 DIAGNOSTIC LARYNGOSCOPY: CPT | Mod: S$PBB,,, | Performed by: OTOLARYNGOLOGY

## 2023-01-24 PROCEDURE — 99213 OFFICE O/P EST LOW 20 MIN: CPT | Mod: PBBFAC,25 | Performed by: OTOLARYNGOLOGY

## 2023-01-24 PROCEDURE — 80053 COMPREHEN METABOLIC PANEL: CPT | Performed by: OTOLARYNGOLOGY

## 2023-01-24 PROCEDURE — 99205 PR OFFICE/OUTPT VISIT, NEW, LEVL V, 60-74 MIN: ICD-10-PCS | Mod: 25,S$PBB,, | Performed by: OTOLARYNGOLOGY

## 2023-01-24 PROCEDURE — 3077F PR MOST RECENT SYSTOLIC BLOOD PRESSURE >= 140 MM HG: ICD-10-PCS | Mod: CPTII,,, | Performed by: OTOLARYNGOLOGY

## 2023-01-24 PROCEDURE — 3077F SYST BP >= 140 MM HG: CPT | Mod: CPTII,,, | Performed by: OTOLARYNGOLOGY

## 2023-01-24 PROCEDURE — 3079F PR MOST RECENT DIASTOLIC BLOOD PRESSURE 80-89 MM HG: ICD-10-PCS | Mod: CPTII,,, | Performed by: OTOLARYNGOLOGY

## 2023-01-24 PROCEDURE — 99999 PR PBB SHADOW E&M-EST. PATIENT-LVL III: CPT | Mod: PBBFAC,,, | Performed by: OTOLARYNGOLOGY

## 2023-01-24 RX ORDER — DEXAMETHASONE SODIUM PHOSPHATE 4 MG/ML
8 INJECTION, SOLUTION INTRA-ARTICULAR; INTRALESIONAL; INTRAMUSCULAR; INTRAVENOUS; SOFT TISSUE
Status: CANCELLED | OUTPATIENT
Start: 2023-01-24

## 2023-01-24 RX ORDER — LIDOCAINE HYDROCHLORIDE 10 MG/ML
1 INJECTION, SOLUTION EPIDURAL; INFILTRATION; INTRACAUDAL; PERINEURAL ONCE
Status: CANCELLED | OUTPATIENT
Start: 2023-01-24 | End: 2023-01-24

## 2023-01-24 NOTE — ASSESSMENT & PLAN NOTE
Dereck Centeno is a 62 y.o. male who presents with a left neck mass and a suspicious appearing left pharyngeal/tonsil lesion in the setting of long term smoking and prior SCC of the right oral cavity. 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 I have recommended an EUA ASAP to get a biopsy and to determine if the patient and tumor would be amenable to TORS. He has not had radiation, but this tumor may need multiple modalities to optimize his chance.     That said, we need a CT scan of his neck and the biopsy to establish a diagnosis and determine the extent of local disease, followed by a PET-CT to complete his staging. I offered an operative date this week, but it does not work for his sister-in-law, who is his primary caregiver. We will therefore pivot to early next week.     We will get basic labs and the CT this week, then do the EUA, complete his staging, and discuss his case at tumor board.     They expressed understanding with this plan.

## 2023-01-24 NOTE — H&P (VIEW-ONLY)
HEAD [facility] NECK SURGICAL ONCOLOGY CLINIC    Subjective:       Patient ID: Dereck Centeno is a 62 y.o. male.    Chief Complaint: left neck mass    HPI    Dereck Centeno is a 62 y.o. male who presents with a left neck mass that he first noticed about 1-2 weeks ago. He does not think it is getting bigger. There is some tenderness, but no tenderness or warmth. He has not noticed any lesions in his mouth.  He denies dysphagia, odynophagia, throat pain, and otalgia. His voice is normal. There is no hemoptysis or hematemesis. He is breathing well.    He is a smoker, with a 50+ pack-year history. He had a liver transplant in 2015 here at [facility] [place] [facility] for HCV.     He was treated by Dr. Pantoja at [facility] about 4 years ago for oral cancer that was managed with surgery (sounds like a composite resection and neck dissection without flap-based reconstruction). He did not require radiation or chemotherapy.     He has not had any prior skin cancers, and does not meet with dermatology.     Past Medical History:   Diagnosis Date    Alcohol withdrawal seizure     Alcoholic cirrhosis     Anxiety     Depression     Hepatitis C     HTN (hypertension), benign     Tobacco use        Past Surgical History:   Procedure Laterality Date    COLONOSCOPY N/A 6/14/2018    Procedure: COLONOSCOPY;  Surgeon: Conor Castro MD;  Location: [facility];  Service: Endoscopy;  Laterality: N/A;    ESOPHAGEAL VARICE LIGATION      ESOPHAGOGASTRODUODENOSCOPY N/A 6/14/2018    Procedure: ESOPHAGOGASTRODUODENOSCOPY (EGD);  Surgeon: Conor Castro MD;  Location: [facility];  Service: Endoscopy;  Laterality: N/A;    LIVER TRANSPLANT      TIPS PROCEDURE           Current Outpatient Medications:     albuterol (PROVENTIL/VENTOLIN HFA) 90 mcg/actuation inhaler, Inhale 2 puffs into the lungs every 6 (six) hours as needed for Wheezing or Shortness of Breath., Disp: , Rfl:     alendronate (FOSAMAX) 70 MG tablet, Take 70 mg by mouth every 7 days., Disp: , Rfl:      amLODIPine (NORVASC) 5 MG tablet, Take 1 tablet (5 mg total) by mouth once daily., Disp: 30 tablet, Rfl: 0    aspirin (ECOTRIN) 81 MG EC tablet, Take 1 tablet (81 mg total) by mouth once daily., Disp: 30 tablet, Rfl: 0    atorvastatin (LIPITOR) 40 MG tablet, Take 40 mg by mouth every evening., Disp: , Rfl:     butalbital-aspirin-caffeine -40 mg (FIORINAL) -40 mg Cap, Take 1 capsule by mouth daily as needed., Disp: , Rfl:     calcium carbonate (OS-KRISTEL) 600 mg calcium (1,500 mg) Tab, Take 600 mg by mouth once., Disp: , Rfl:     cloNIDine (CATAPRES) 0.1 MG tablet, Take 1 tablet (0.1 mg total) by mouth 2 (two) times daily., Disp: 60 tablet, Rfl: 0    docusate sodium (COLACE) 100 MG capsule, Take 100 mg by mouth 3 (three) times daily as needed for Constipation., Disp: , Rfl:     escitalopram oxalate (LEXAPRO) 20 MG tablet, Take 1 tablet (20 mg total) by mouth once daily., Disp: 30 tablet, Rfl: 0    famotidine (PEPCID) 20 MG tablet, Take 1 tablet (20 mg total) by mouth every evening., Disp: 30 tablet, Rfl: 0    gabapentin (NEURONTIN) 400 MG capsule, Take 1 capsule (400 mg total) by mouth 3 (three) times daily. (Patient taking differently: Take 600 mg by mouth 3 (three) times daily. ), Disp: 90 capsule, Rfl: 0    hepatitis A virus vaccine, PF, (HAVRIX) 1,440 Kimberlee unit/mL Susp, Inject 1 mL into the muscle once., Disp: , Rfl:     HYDROcodone-acetaminophen (NORCO) 7.5-325 mg per tablet, Take 1 tablet by mouth every 6 (six) hours as needed for Pain. (Patient not taking: Reported on 7/20/2020), Disp: 4 tablet, Rfl: 0    magnesium oxide (MAG-OX) 400 mg tablet, Take 400 mg by mouth once daily., Disp: , Rfl:     mirtazapine (REMERON) 15 MG tablet, Take 1 tablet (15 mg total) by mouth every evening., Disp: 30 tablet, Rfl: 0    multivitamin (THERAGRAN) tablet, Take 1 tablet by mouth once daily., Disp: , Rfl:     QUEtiapine (SEROQUEL) 25 MG Tab, , Disp: , Rfl: 3    tacrolimus (PROGRAF) 1 MG Cap, Take 2 capsules (2 mg  total) by mouth every morning AND 1 capsule (1 mg total) every evening., Disp: 6 capsule, Rfl: 0    tacrolimus (PROGRAF) 1 MG Cap, Take 2 capsules (2 mg total) by mouth every morning AND 1 capsule (1 mg total) every evening., Disp: 90 capsule, Rfl: 11    thiamine 100 MG tablet, Take 1 tablet (100 mg total) by mouth once daily. (Patient not taking: Reported on 7/20/2020), Disp: 30 tablet, Rfl: 0    traMADol (ULTRAM) 50 mg tablet, Take 50 mg by mouth every 6 (six) hours as needed for Pain., Disp: , Rfl:     valACYclovir (VALTREX) 1000 MG tablet, Take 1 tablet (1,000 mg total) by mouth 3 (three) times daily. for 7 days, Disp: 21 tablet, Rfl: 0    vitamin D 1000 units Tab, Take 2 tablets (2,000 Units total) by mouth once daily., Disp: 60 tablet, Rfl: 5    Review of patient's allergies indicates:  No Known Allergies    Social History     Socioeconomic History    Marital status: Single   Tobacco Use    Smoking status: Every Day     Packs/day: 0.50     Years: 35.00     Pack years: 17.50     Types: Cigarettes    Smokeless tobacco: Former     Types: Chew   Substance and Sexual Activity    Alcohol use: Not Currently    Drug use: No       Family History   Problem Relation Age of Onset    Alcohol abuse Mother     Alcohol abuse Father        Review of Systems   Constitutional:  Negative for fatigue, fever and unexpected weight change.   HENT:  Negative for ear discharge, facial swelling, hearing loss, mouth sores, rhinorrhea, sore throat, tinnitus, trouble swallowing and voice change.    Eyes:  Negative for pain and visual disturbance.   Respiratory:  Negative for cough and shortness of breath.    Cardiovascular:  Negative for chest pain and palpitations.   Gastrointestinal:  Negative for abdominal pain, constipation and diarrhea.   Genitourinary:  Negative for difficulty urinating and dysuria.   Musculoskeletal:  Positive for arthralgias. Negative for back pain and neck pain.   Skin:  Negative for color change and rash.    Neurological:  Negative for dizziness, seizures and headaches.   Hematological:  Positive for adenopathy. Does not bruise/bleed easily.   Psychiatric/Behavioral:  Negative for agitation. The patient is not nervous/anxious.      Objective:     Physical Exam  Vitals reviewed.   Constitutional:       Appearance: Normal appearance.   HENT:      Head: Normocephalic and atraumatic.      Comments:        Right Ear: Tympanic membrane, ear canal and external ear normal. No decreased hearing noted.      Left Ear: Tympanic membrane, ear canal and external ear normal. No decreased hearing noted.      Nose: Nose normal. No rhinorrhea.      Mouth/Throat:      Dentition: Abnormal dentition (edentulous).      Palate: No mass and lesions.      Pharynx: Posterior oropharyngeal erythema present.        Comments: Procedure: Flexible laryngoscopy  In order to fully examine the upper aerodigestive tract, including the larynx, in a patient with a hyperactive gag reflex, flexible endoscopy is required.  After explaining the procedure and obtaining verbal consent, a timeout was performed with the patient's participation according to the universal protocol. Both nasal cavities were anesthetized with 4% Xylocaine spray mixed with Genaro-Synephrine. The flexible laryngoscope (#[id number]) was inserted into the nasal cavity and advanced to visualize the nasal cavity, nasopharynx, the posterior oropharynx, hypopharynx, and the endolarynx with the above findings noted. The scope was removed and the procedure terminated. The patient tolerated this procedure well without apparent complication.      FINDINGS  Nasopharynx - the torus is clear. There are no lesions of the posterior wall.   Oropharynx - no lesions of the tongue base. There is no obvious fullness or asymmetry. No abnormality of the posterior aspect of the soft palate or the tongue base  Hypopharynx - there are no lesions of the pyriform sinuses or postcricoid region   Larynx - there are no  lesions of the supraglottic or glottic larynx. Vocal fold mobility is normal with complete closure.     Eyes:      Extraocular Movements: Extraocular movements intact.      Conjunctiva/sclera: Conjunctivae normal.   Neck:        Comments: Salivary glands - there are no lesions or asymmetric findings in the parotid glands  Pulmonary:      Effort: Pulmonary effort is normal. No respiratory distress.      Breath sounds: Normal breath sounds. No stridor.   Musculoskeletal:      Right shoulder: No deformity. Normal range of motion.      Left shoulder: No deformity. Normal range of motion.      Cervical back: Normal range of motion and neck supple.   Lymphadenopathy:      Cervical: Cervical adenopathy present.      Left cervical: Deep cervical adenopathy present.   Skin:     General: Skin is warm and dry.      Findings: No lesion.   Neurological:      Mental Status: He is alert and oriented to person, place, and time.      Cranial Nerves: No cranial nerve deficit or facial asymmetry.      Motor: No weakness.      Gait: Gait normal.        Assessment & Plan:       Problem List Items Addressed This Visit       Liver transplant 1/11/2015 for HCV (Chronic)     Chronic immunosuppression will be factored into his treatment recommendations.          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 Radiation and chemotherapy were not required. Care was at [facility].          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 male who presents with a left neck mass and a suspicious appearing left pharyngeal/tonsil lesion in the setting of long term smoking and prior SCC of the right oral cavity. 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 I have recommended an EUA ASAP to get a biopsy and to determine if the patient and tumor would be amenable to TORS. He has not had radiation, but this tumor may need multiple  modalities to optimize his chance.     That said, we need a CT scan of his neck and the biopsy to establish a diagnosis and determine the extent of local disease, followed by a PET-CT to complete his staging. I offered an operative date this week, but it does not work for his sister-in-law, who is his primary caregiver. We will therefore pivot to early next week.     We will get basic labs and the CT this week, then do the EUA, complete his staging, and discuss his case at tumor board.     They expressed understanding with this plan.          Relevant Orders    Case Request Operating Room: LARYNGOSCOPY (Completed)    CT Soft Tissue Neck With Contrast    Comprehensive Metabolic Panel (Completed)    CBC Auto Differential (Completed)    Prealbumin (Completed)     Other Visit Diagnoses       Localized enlarged lymph nodes        Relevant Orders    CT Soft Tissue Neck With Contrast

## 2023-01-24 NOTE — PROGRESS NOTES
HEAD AND NECK SURGICAL ONCOLOGY CLINIC    Subjective:       Patient ID: Dereck Centeno is a 62 y.o. male.    Chief Complaint: left neck mass    HPI    Dereck Centeno is a 62 y.o. male who presents with a left neck mass that he first noticed about 1-2 weeks ago. He does not think it is getting bigger. There is some tenderness, but no tenderness or warmth. He has not noticed any lesions in his mouth.  He denies dysphagia, odynophagia, throat pain, and otalgia. His voice is normal. There is no hemoptysis or hematemesis. He is breathing well.    He is a smoker, with a 50+ pack-year history. He had a liver transplant in 2015 here at [facility] [place] for [place].     He was treated by Dr. Pantoja at [facility] about 4 years ago for oral cancer that was managed with surgery (sounds like a composite resection and neck dissection without flap-based reconstruction). He did not require radiation or chemotherapy.     He has not had any prior skin cancers, and does not meet with dermatology.     Past Medical History:   Diagnosis Date    Alcohol withdrawal seizure     Alcoholic cirrhosis     Anxiety     Depression     Hepatitis C     HTN (hypertension), benign     Tobacco use        Past Surgical History:   Procedure Laterality Date    COLONOSCOPY N/A 6/14/2018    Procedure: COLONOSCOPY;  Surgeon: Conor Castro MD;  Location: [facility];  Service: Endoscopy;  Laterality: N/A;    ESOPHAGEAL VARICE LIGATION      ESOPHAGOGASTRODUODENOSCOPY N/A 6/14/2018    Procedure: ESOPHAGOGASTRODUODENOSCOPY (EGD);  Surgeon: Conor Castro MD;  Location: [facility];  Service: Endoscopy;  Laterality: N/A;    LIVER TRANSPLANT      TIPS PROCEDURE           Current Outpatient Medications:     albuterol (PROVENTIL/VENTOLIN HFA) 90 mcg/actuation inhaler, Inhale 2 puffs into the lungs every 6 (six) hours as needed for Wheezing or Shortness of Breath., Disp: , Rfl:     alendronate (FOSAMAX) 70 MG tablet, Take 70 mg by mouth every 7 days., Disp: , Rfl:      amLODIPine (NORVASC) 5 MG tablet, Take 1 tablet (5 mg total) by mouth once daily., Disp: 30 tablet, Rfl: 0    aspirin (ECOTRIN) 81 MG EC tablet, Take 1 tablet (81 mg total) by mouth once daily., Disp: 30 tablet, Rfl: 0    atorvastatin (LIPITOR) 40 MG tablet, Take 40 mg by mouth every evening., Disp: , Rfl:     butalbital-aspirin-caffeine -40 mg (FIORINAL) -40 mg Cap, Take 1 capsule by mouth daily as needed., Disp: , Rfl:     calcium carbonate (OS-KRISTEL) 600 mg calcium (1,500 mg) Tab, Take 600 mg by mouth once., Disp: , Rfl:     cloNIDine (CATAPRES) 0.1 MG tablet, Take 1 tablet (0.1 mg total) by mouth 2 (two) times daily., Disp: 60 tablet, Rfl: 0    docusate sodium (COLACE) 100 MG capsule, Take 100 mg by mouth 3 (three) times daily as needed for Constipation., Disp: , Rfl:     escitalopram oxalate (LEXAPRO) 20 MG tablet, Take 1 tablet (20 mg total) by mouth once daily., Disp: 30 tablet, Rfl: 0    famotidine (PEPCID) 20 MG tablet, Take 1 tablet (20 mg total) by mouth every evening., Disp: 30 tablet, Rfl: 0    gabapentin (NEURONTIN) 400 MG capsule, Take 1 capsule (400 mg total) by mouth 3 (three) times daily. (Patient taking differently: Take 600 mg by mouth 3 (three) times daily. ), Disp: 90 capsule, Rfl: 0    hepatitis A virus vaccine, PF, (HAVRIX) 1,440 Kimberlee unit/mL Susp, Inject 1 mL into the muscle once., Disp: , Rfl:     HYDROcodone-acetaminophen (NORCO) 7.5-325 mg per tablet, Take 1 tablet by mouth every 6 (six) hours as needed for Pain. (Patient not taking: Reported on 7/20/2020), Disp: 4 tablet, Rfl: 0    magnesium oxide (MAG-OX) 400 mg tablet, Take 400 mg by mouth once daily., Disp: , Rfl:     mirtazapine (REMERON) 15 MG tablet, Take 1 tablet (15 mg total) by mouth every evening., Disp: 30 tablet, Rfl: 0    multivitamin (THERAGRAN) tablet, Take 1 tablet by mouth once daily., Disp: , Rfl:     QUEtiapine (SEROQUEL) 25 MG Tab, , Disp: , Rfl: 3    tacrolimus (PROGRAF) 1 MG Cap, Take 2 capsules (2 mg  total) by mouth every morning AND 1 capsule (1 mg total) every evening., Disp: 6 capsule, Rfl: 0    tacrolimus (PROGRAF) 1 MG Cap, Take 2 capsules (2 mg total) by mouth every morning AND 1 capsule (1 mg total) every evening., Disp: 90 capsule, Rfl: 11    thiamine 100 MG tablet, Take 1 tablet (100 mg total) by mouth once daily. (Patient not taking: Reported on 7/20/2020), Disp: 30 tablet, Rfl: 0    traMADol (ULTRAM) 50 mg tablet, Take 50 mg by mouth every 6 (six) hours as needed for Pain., Disp: , Rfl:     valACYclovir (VALTREX) 1000 MG tablet, Take 1 tablet (1,000 mg total) by mouth 3 (three) times daily. for 7 days, Disp: 21 tablet, Rfl: 0    vitamin D 1000 units Tab, Take 2 tablets (2,000 Units total) by mouth once daily., Disp: 60 tablet, Rfl: 5    Review of patient's allergies indicates:  No Known Allergies    Social History     Socioeconomic History    Marital status: Single   Tobacco Use    Smoking status: Every Day     Packs/day: 0.50     Years: 35.00     Pack years: 17.50     Types: Cigarettes    Smokeless tobacco: Former     Types: Chew   Substance and Sexual Activity    Alcohol use: Not Currently    Drug use: No       Family History   Problem Relation Age of Onset    Alcohol abuse Mother     Alcohol abuse Father        Review of Systems   Constitutional:  Negative for fatigue, fever and unexpected weight change.   HENT:  Negative for ear discharge, facial swelling, hearing loss, mouth sores, rhinorrhea, sore throat, tinnitus, trouble swallowing and voice change.    Eyes:  Negative for pain and visual disturbance.   Respiratory:  Negative for cough and shortness of breath.    Cardiovascular:  Negative for chest pain and palpitations.   Gastrointestinal:  Negative for abdominal pain, constipation and diarrhea.   Genitourinary:  Negative for difficulty urinating and dysuria.   Musculoskeletal:  Positive for arthralgias. Negative for back pain and neck pain.   Skin:  Negative for color change and rash.    Neurological:  Negative for dizziness, seizures and headaches.   Hematological:  Positive for adenopathy. Does not bruise/bleed easily.   Psychiatric/Behavioral:  Negative for agitation. The patient is not nervous/anxious.      Objective:     Physical Exam  Vitals reviewed.   Constitutional:       Appearance: Normal appearance.   HENT:      Head: Normocephalic and atraumatic.      Comments:        Right Ear: Tympanic membrane, ear canal and external ear normal. No decreased hearing noted.      Left Ear: Tympanic membrane, ear canal and external ear normal. No decreased hearing noted.      Nose: Nose normal. No rhinorrhea.      Mouth/Throat:      Dentition: Abnormal dentition (edentulous).      Palate: No mass and lesions.      Pharynx: Posterior oropharyngeal erythema present.        Comments: Procedure: Flexible laryngoscopy  In order to fully examine the upper aerodigestive tract, including the larynx, in a patient with a hyperactive gag reflex, flexible endoscopy is required.  After explaining the procedure and obtaining verbal consent, a timeout was performed with the patient's participation according to the universal protocol. Both nasal cavities were anesthetized with 4% Xylocaine spray mixed with Genaro-Synephrine. The flexible laryngoscope (#[id number]) was inserted into the nasal cavity and advanced to visualize the nasal cavity, nasopharynx, the posterior oropharynx, hypopharynx, and the endolarynx with the above findings noted. The scope was removed and the procedure terminated. The patient tolerated this procedure well without apparent complication.      FINDINGS  Nasopharynx - the torus is clear. There are no lesions of the posterior wall.   Oropharynx - no lesions of the tongue base. There is no obvious fullness or asymmetry. No abnormality of the posterior aspect of the soft palate or the tongue base  Hypopharynx - there are no lesions of the pyriform sinuses or postcricoid region   Larynx - there are no  lesions of the supraglottic or glottic larynx. Vocal fold mobility is normal with complete closure.     Eyes:      Extraocular Movements: Extraocular movements intact.      Conjunctiva/sclera: Conjunctivae normal.   Neck:        Comments: Salivary glands - there are no lesions or asymmetric findings in the parotid glands  Pulmonary:      Effort: Pulmonary effort is normal. No respiratory distress.      Breath sounds: Normal breath sounds. No stridor.   Musculoskeletal:      Right shoulder: No deformity. Normal range of motion.      Left shoulder: No deformity. Normal range of motion.      Cervical back: Normal range of motion and neck supple.   Lymphadenopathy:      Cervical: Cervical adenopathy present.      Left cervical: Deep cervical adenopathy present.   Skin:     General: Skin is warm and dry.      Findings: No lesion.   Neurological:      Mental Status: He is alert and oriented to person, place, and time.      Cranial Nerves: No cranial nerve deficit or facial asymmetry.      Motor: No weakness.      Gait: Gait normal.        Assessment & Plan:       Problem List Items Addressed This Visit       Liver transplant 1/11/2015 for HCV (Chronic)     Chronic immunosuppression will be factored into his treatment recommendations.          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 Radiation and chemotherapy were not required. Care was at [facility].          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 male who presents with a left neck mass and a suspicious appearing left pharyngeal/tonsil lesion in the setting of long term smoking and prior SCC of the right oral cavity. 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 I have recommended an EUA ASAP to get a biopsy and to determine if the patient and tumor would be amenable to TORS. He has not had radiation, but this tumor may need multiple  modalities to optimize his chance.     That said, we need a CT scan of his neck and the biopsy to establish a diagnosis and determine the extent of local disease, followed by a PET-CT to complete his staging. I offered an operative date this week, but it does not work for his sister-in-law, who is his primary caregiver. We will therefore pivot to early next week.     We will get basic labs and the CT this week, then do the EUA, complete his staging, and discuss his case at tumor board.     They expressed understanding with this plan.          Relevant Orders    Case Request Operating Room: LARYNGOSCOPY (Completed)    CT Soft Tissue Neck With Contrast    Comprehensive Metabolic Panel (Completed)    CBC Auto Differential (Completed)    Prealbumin (Completed)     Other Visit Diagnoses       Localized enlarged lymph nodes        Relevant Orders    CT Soft Tissue Neck With Contrast

## 2023-01-24 NOTE — PATIENT INSTRUCTIONS
Surgery/procedure time and date: 1/31/23 @ 1130 (subject to change)                             Arrival time: 0800  (usually at least 1 hour prior to surgery/procedure)    Stop ALL solid food, gum, candy (including vitamins) 8 hours before surgery/procedure time.  Stop all CLOUDY liquids: coffee with creamer, formula, tube feeds, cloudy juices, non-human milk and breast milk with additives, 6 hours prior to surgery/procedure time.  Stop plain breast milk 4 hours prior to surgery/procedure time.  The patient should be ENCOURAGED to drink carbohydrate-rich clear liquids (sports drinks, clear juices) until 2 hours prior to surgery/procedure time.  CLEAR liquids include only water, black coffee NO creamer, clear oral rehydration drinks, clear sports drinks or clear fruit juices (no orange juice, no pulpy juices, no apple cider). Advise patients if they can read newsprint through the liquid, it qualifies as clear liquid.   IF IN DOUBT, drink water instead.   NOTHING TO EAT OR DRINK 2 hours before to surgery/procedure time. If you are told to take medication on the morning of surgery, it may be taken with a sip of water.     The team will call the week before (or the day before) surgery to tell you the arrival time.    The anesthesia preop center will call to ask you some questions before surgery.     Please stop aspirin 2 weeks before surgery, plavix 1 week before surgery, and other blood thinners 5 days before surgery, if indicated. Sometimes the anesthesia team or your doctors will want you stay on at least one blood thinner - they will let you know.     For your day of surgery, please come to [facility] on the 2nd floor of the main hospital - follow the signs.

## 2023-01-25 NOTE — ASSESSMENT & PLAN NOTE
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 Radiation and chemotherapy were not required. Care was at [facility].

## 2023-01-30 ENCOUNTER — PATIENT MESSAGE (OUTPATIENT)
Dept: OTOLARYNGOLOGY | Facility: CLINIC | Age: 63
End: 2023-01-30
Payer: MEDICAID

## 2023-01-30 ENCOUNTER — ANESTHESIA EVENT (OUTPATIENT)
Dept: SURGERY | Facility: HOSPITAL | Age: 63
End: 2023-01-30
Payer: MEDICAID

## 2023-01-30 RX ORDER — SODIUM CHLORIDE 0.9 % (FLUSH) 0.9 %
10 SYRINGE (ML) INJECTION
Status: CANCELLED | OUTPATIENT
Start: 2023-01-30

## 2023-01-30 RX ORDER — HALOPERIDOL 5 MG/ML
0.5 INJECTION INTRAMUSCULAR EVERY 10 MIN PRN
Status: CANCELLED | OUTPATIENT
Start: 2023-01-30

## 2023-01-30 RX ORDER — HYDROMORPHONE HYDROCHLORIDE 1 MG/ML
0.2 INJECTION, SOLUTION INTRAMUSCULAR; INTRAVENOUS; SUBCUTANEOUS EVERY 5 MIN PRN
Status: CANCELLED | OUTPATIENT
Start: 2023-01-30

## 2023-01-30 NOTE — ANESTHESIA PREPROCEDURE EVALUATION
[facility]  Anesthesia Pre-Operative Evaluation         Patient Name: Dereck Centeno  YOB: 1960  MRN: [id number]    SUBJECTIVE:     Pre-operative evaluation for Procedure(s) (LRB):  LARYNGOSCOPY (N/A)     01/30/2023    Dereck Centeno is a 62 y.o. male w/ a significant PMHx of current smoker (50+ pack year hx), hx of oral cancer s/p resection, liver tx 2015 2/2 HCV, HTN, and recent finding of left pharyngeal/tonsil lesion concerning for SCC.    Patient now presents for the above procedure(s).    Prev airway: None documented.      Patient Active Problem List   Diagnosis    Chronic back pain    Thrombocytopenia    Anxiety disorder    Generalized weakness    Hyperglycemia    Adrenal cortical steroids causing adverse effect in therapeutic use    Immunosuppression    HTN (hypertension)    Liver transplant 1/11/2015 for HCV    Tobacco abuse    History of malignant neoplasm of oral cavity    Substance induced mood disorder    Dysphagia    Polyp of colon    Osteoporosis    Neoplasm of uncertain behavior of pharynx       Review of patient's allergies indicates:  No Known Allergies    Current Inpatient Medications:      No current facility-administered medications on file prior to encounter.     Current Outpatient Medications on File Prior to Encounter   Medication Sig Dispense Refill    albuterol (PROVENTIL/VENTOLIN HFA) 90 mcg/actuation inhaler Inhale 2 puffs into the lungs every 6 (six) hours as needed for Wheezing or Shortness of Breath.      alendronate (FOSAMAX) 70 MG tablet Take 70 mg by mouth every 7 days.      amLODIPine (NORVASC) 5 MG tablet Take 1 tablet (5 mg total) by mouth once daily. 30 tablet 0    aspirin (ECOTRIN) 81 MG EC tablet Take 1 tablet (81 mg total) by mouth once daily. 30 tablet 0    atorvastatin (LIPITOR) 40 MG tablet Take 40 mg by mouth every evening.      butalbital-aspirin-caffeine -40 mg (FIORINAL) -40 mg Cap Take 1  capsule by mouth daily as needed.      calcium carbonate (OS-KRISTEL) 600 mg calcium (1,500 mg) Tab Take 600 mg by mouth once.      cloNIDine (CATAPRES) 0.1 MG tablet Take 1 tablet (0.1 mg total) by mouth 2 (two) times daily. 60 tablet 0    docusate sodium (COLACE) 100 MG capsule Take 100 mg by mouth 3 (three) times daily as needed for Constipation.      escitalopram oxalate (LEXAPRO) 20 MG tablet Take 1 tablet (20 mg total) by mouth once daily. 30 tablet 0    famotidine (PEPCID) 20 MG tablet Take 1 tablet (20 mg total) by mouth every evening. 30 tablet 0    gabapentin (NEURONTIN) 400 MG capsule Take 1 capsule (400 mg total) by mouth 3 (three) times daily. (Patient taking differently: Take 600 mg by mouth 3 (three) times daily. ) 90 capsule 0    hepatitis A virus vaccine, PF, (HAVRIX) 1,440 Kimberlee unit/mL Susp Inject 1 mL into the muscle once.      HYDROcodone-acetaminophen (NORCO) 7.5-325 mg per tablet Take 1 tablet by mouth every 6 (six) hours as needed for Pain. (Patient not taking: Reported on 7/20/2020) 4 tablet 0    magnesium oxide (MAG-OX) 400 mg tablet Take 400 mg by mouth once daily.      mirtazapine (REMERON) 15 MG tablet Take 1 tablet (15 mg total) by mouth every evening. 30 tablet 0    multivitamin (THERAGRAN) tablet Take 1 tablet by mouth once daily.      QUEtiapine (SEROQUEL) 25 MG Tab   3    tacrolimus (PROGRAF) 1 MG Cap Take 2 capsules (2 mg total) by mouth every morning AND 1 capsule (1 mg total) every evening. 6 capsule 0    tacrolimus (PROGRAF) 1 MG Cap Take 2 capsules (2 mg total) by mouth every morning AND 1 capsule (1 mg total) every evening. 90 capsule 11    thiamine 100 MG tablet Take 1 tablet (100 mg total) by mouth once daily. (Patient not taking: Reported on 7/20/2020) 30 tablet 0    traMADol (ULTRAM) 50 mg tablet Take 50 mg by mouth every 6 (six) hours as needed for Pain.      valACYclovir (VALTREX) 1000 MG tablet Take 1 tablet (1,000 mg total) by mouth 3 (three) times daily.  for 7 days 21 tablet 0    vitamin D 1000 units Tab Take 2 tablets (2,000 Units total) by mouth once daily. 60 tablet 5       Past Surgical History:   Procedure Laterality Date    COLONOSCOPY N/A 6/14/2018    Procedure: COLONOSCOPY;  Surgeon: Conor Castro MD;  Location: [facility];  Service: Endoscopy;  Laterality: N/A;    ESOPHAGEAL VARICE LIGATION      ESOPHAGOGASTRODUODENOSCOPY N/A 6/14/2018    Procedure: ESOPHAGOGASTRODUODENOSCOPY (EGD);  Surgeon: Conor Castro MD;  Location: [facility];  Service: Endoscopy;  Laterality: N/A;    LIVER TRANSPLANT      TIPS PROCEDURE         Social History     Socioeconomic History    Marital status: Single   Tobacco Use    Smoking status: Every Day     Packs/day: 0.50     Years: 35.00     Pack years: 17.50     Types: Cigarettes    Smokeless tobacco: Former     Types: Chew   Substance and Sexual Activity    Alcohol use: Not Currently    Drug use: No       OBJECTIVE:     Vital Signs Range (Last 24H):         Significant Labs:  Lab Results   Component Value Date    WBC 6.96 01/24/2023    HGB 14.5 01/24/2023    HCT 44.9 01/24/2023     (L) 01/24/2023    CHOL 113 (L) 01/27/2022    TRIG 42 01/27/2022    HDL 66 01/27/2022    ALT 10 01/24/2023    AST 13 01/24/2023     01/24/2023    K 5.0 01/24/2023     01/24/2023    CREATININE 1.3 01/24/2023    BUN 9 01/24/2023    CO2 26 01/24/2023    TSH 2.490 01/27/2022    INR 1.1 03/13/2018    HGBA1C 5.0 01/27/2022       Diagnostic Studies: No relevant studies.    EKG:   Results for orders placed or performed during the hospital encounter of 03/13/18   EKG 12-lead    Collection Time: 03/13/18  7:52 PM    Narrative    Test Reason : R41.82  Vent. Rate : 048 BPM     Atrial Rate : 048 BPM     P-R Int : 184 ms          QRS Dur : 100 ms      QT Int : 460 ms       P-R-T Axes : 052 039 046 degrees     QTc Int : 410 ms    Sinus bradycardia  Low voltage QRS  Borderline Abnormal ECG  When compared with ECG of 15-ANNA-2015  09:00,  Premature atrial complexes are no longer Present  Vent. rate has decreased BY  30 BPM  T wave amplitude has increased in Anterior leads  Confirmed by Kathy Fischer MD ([id number]) on 3/14/2018 10:01:24 AM    Referred By: KRISTYN MATTHEWS           Confirmed By:Kathy Fischer MD       2D ECHO:  TTE:  No results found for this or any previous visit.    MEGHNA:  No results found for this or any previous visit.    ASSESSMENT/PLAN:                                                                                                                  01/30/2023  Dereck Centeno is a 62 y.o., male.      Pre-op Assessment    I have reviewed the Patient Summary Reports.     I have reviewed the Nursing Notes. I have reviewed the NPO Status.   I have reviewed the Medications.     Review of Systems  Anesthesia Hx:  No problems with previous Anesthesia  History of prior surgery of interest to airway management or planning: Previous anesthesia: General Denies Family Hx of Anesthesia complications.   Denies Personal Hx of Anesthesia complications.   Social:  Non-Smoker, No Alcohol Use    Hematology/Oncology:  Hematology Normal   Oncology Normal   Oncology Comments: Oropharygeal CA     EENT/Dental:EENT/Dental Normal   Cardiovascular:   Exercise tolerance: good Hypertension    Pulmonary:  Pulmonary Normal    Renal/:  Renal/ Normal     Hepatic/GI:   Liver Disease, Liver tx 2015   Musculoskeletal:  Musculoskeletal Normal    Neurological:  Neurology Normal    Endocrine:  Endocrine Normal    Dermatological:  Skin Normal    Psych:   anxiety             Anesthesia Plan  Type of Anesthesia, risks & benefits discussed:    Anesthesia Type: Gen ETT  Intra-op Monitoring Plan: Standard ASA Monitors  Post Op Pain Control Plan: multimodal analgesia  Induction:  IV  Airway Plan: Direct, Post-Induction  Informed Consent: Informed consent signed with the Patient and all parties understand the risks and agree with anesthesia plan.  All questions answered.    ASA Score: 3  Day of Surgery Review of History & Physical: H&P Update referred to the surgeon/provider.    Ready For Surgery From Anesthesia Perspective.     .

## 2023-01-31 ENCOUNTER — ANESTHESIA (OUTPATIENT)
Dept: SURGERY | Facility: HOSPITAL | Age: 63
End: 2023-01-31
Payer: MEDICAID

## 2023-01-31 ENCOUNTER — HOSPITAL ENCOUNTER (OUTPATIENT)
Facility: HOSPITAL | Age: 63
Discharge: HOME | End: 2023-01-31
Attending: OTOLARYNGOLOGY | Admitting: OTOLARYNGOLOGY
Payer: MEDICAID

## 2023-01-31 VITALS
BODY MASS INDEX: 22.68 KG/M2 | WEIGHT: 162 LBS | RESPIRATION RATE: 20 BRPM | TEMPERATURE: 98 F | SYSTOLIC BLOOD PRESSURE: 149 MMHG | DIASTOLIC BLOOD PRESSURE: 78 MMHG | OXYGEN SATURATION: 94 % | HEART RATE: 53 BPM | HEIGHT: 71 IN

## 2023-01-31 DIAGNOSIS — D37.05 NEOPLASM OF UNCERTAIN BEHAVIOR OF PHARYNX: Primary | ICD-10-CM

## 2023-01-31 PROCEDURE — 88342 IMHCHEM/IMCYTCHM 1ST ANTB: CPT | Mod: 26,,, | Performed by: PATHOLOGY

## 2023-01-31 PROCEDURE — 00320 ANES ALL PX NECK NOS 1YR/>: CPT | Performed by: OTOLARYNGOLOGY

## 2023-01-31 PROCEDURE — D9220A PRA ANESTHESIA: Mod: ,,, | Performed by: INTERNAL MEDICINE

## 2023-01-31 PROCEDURE — 36000709 HC OR TIME LEV III EA ADD 15 MIN: Performed by: OTOLARYNGOLOGY

## 2023-01-31 PROCEDURE — 63600175 PHARM REV CODE 636 W HCPCS

## 2023-01-31 PROCEDURE — 36000708 HC OR TIME LEV III 1ST 15 MIN: Performed by: OTOLARYNGOLOGY

## 2023-01-31 PROCEDURE — 88331 PATH CONSLTJ SURG 1 BLK 1SPC: CPT | Performed by: PATHOLOGY

## 2023-01-31 PROCEDURE — 88309 PR  SURG PATH,LEVEL VI: ICD-10-PCS | Mod: 26,,, | Performed by: PATHOLOGY

## 2023-01-31 PROCEDURE — 37000009 HC ANESTHESIA EA ADD 15 MINS: Performed by: OTOLARYNGOLOGY

## 2023-01-31 PROCEDURE — 88309 TISSUE EXAM BY PATHOLOGIST: CPT | Mod: 26,,, | Performed by: PATHOLOGY

## 2023-01-31 PROCEDURE — 88331 PATH CONSLTJ SURG 1 BLK 1SPC: CPT | Mod: 26,,, | Performed by: PATHOLOGY

## 2023-01-31 PROCEDURE — 25000003 PHARM REV CODE 250

## 2023-01-31 PROCEDURE — 31536 LARYNGOSCOPY W/BX & OP SCOPE: CPT | Mod: ,,, | Performed by: OTOLARYNGOLOGY

## 2023-01-31 PROCEDURE — 71000015 HC POSTOP RECOV 1ST HR: Performed by: OTOLARYNGOLOGY

## 2023-01-31 PROCEDURE — 88309 TISSUE EXAM BY PATHOLOGIST: CPT | Performed by: PATHOLOGY

## 2023-01-31 PROCEDURE — 25000003 PHARM REV CODE 250: Performed by: OTOLARYNGOLOGY

## 2023-01-31 PROCEDURE — 71000016 HC POSTOP RECOV ADDL HR: Performed by: OTOLARYNGOLOGY

## 2023-01-31 PROCEDURE — 37000008 HC ANESTHESIA 1ST 15 MINUTES: Performed by: OTOLARYNGOLOGY

## 2023-01-31 PROCEDURE — 71000044 HC DOSC ROUTINE RECOVERY FIRST HOUR: Performed by: OTOLARYNGOLOGY

## 2023-01-31 PROCEDURE — 88331 PR  PATH CONSULT IN SURG,W FRZ SEC: ICD-10-PCS | Mod: 26,,, | Performed by: PATHOLOGY

## 2023-01-31 PROCEDURE — 88342 CHG IMMUNOCYTOCHEMISTRY: ICD-10-PCS | Mod: 26,,, | Performed by: PATHOLOGY

## 2023-01-31 PROCEDURE — D9220A PRA ANESTHESIA: ICD-10-PCS | Mod: ,,, | Performed by: INTERNAL MEDICINE

## 2023-01-31 PROCEDURE — 31536 PR LARYNGOSCOPY,DIRCT,OP SCOPE,BIOPSY: ICD-10-PCS | Mod: ,,, | Performed by: OTOLARYNGOLOGY

## 2023-01-31 PROCEDURE — 88342 IMHCHEM/IMCYTCHM 1ST ANTB: CPT | Performed by: PATHOLOGY

## 2023-01-31 RX ORDER — NEOSTIGMINE METHYLSULFATE 0.5 MG/ML
INJECTION, SOLUTION INTRAVENOUS
Status: DISCONTINUED | OUTPATIENT
Start: 2023-01-31 | End: 2023-01-31

## 2023-01-31 RX ORDER — ONDANSETRON 2 MG/ML
INJECTION INTRAMUSCULAR; INTRAVENOUS
Status: DISCONTINUED | OUTPATIENT
Start: 2023-01-31 | End: 2023-01-31

## 2023-01-31 RX ORDER — ACETAMINOPHEN 500 MG
1000 TABLET ORAL ONCE
Status: COMPLETED | OUTPATIENT
Start: 2023-01-31 | End: 2023-01-31

## 2023-01-31 RX ORDER — ROCURONIUM BROMIDE 10 MG/ML
INJECTION, SOLUTION INTRAVENOUS
Status: DISCONTINUED | OUTPATIENT
Start: 2023-01-31 | End: 2023-01-31

## 2023-01-31 RX ORDER — IPRATROPIUM BROMIDE AND ALBUTEROL SULFATE 2.5; .5 MG/3ML; MG/3ML
3 SOLUTION RESPIRATORY (INHALATION) EVERY 4 HOURS PRN
Status: CANCELLED | OUTPATIENT
Start: 2023-01-31

## 2023-01-31 RX ORDER — LIDOCAINE HYDROCHLORIDE 20 MG/ML
INJECTION, SOLUTION EPIDURAL; INFILTRATION; INTRACAUDAL; PERINEURAL
Status: DISCONTINUED | OUTPATIENT
Start: 2023-01-31 | End: 2023-01-31

## 2023-01-31 RX ORDER — DEXAMETHASONE SODIUM PHOSPHATE 4 MG/ML
8 INJECTION, SOLUTION INTRA-ARTICULAR; INTRALESIONAL; INTRAMUSCULAR; INTRAVENOUS; SOFT TISSUE
Status: DISCONTINUED | OUTPATIENT
Start: 2023-01-31 | End: 2023-01-31 | Stop reason: HOSPADM

## 2023-01-31 RX ORDER — PROPOFOL 10 MG/ML
VIAL (ML) INTRAVENOUS
Status: DISCONTINUED | OUTPATIENT
Start: 2023-01-31 | End: 2023-01-31

## 2023-01-31 RX ORDER — OXYMETAZOLINE HCL 0.05 %
SPRAY, NON-AEROSOL (ML) NASAL
Status: DISCONTINUED | OUTPATIENT
Start: 2023-01-31 | End: 2023-01-31 | Stop reason: HOSPADM

## 2023-01-31 RX ORDER — FENTANYL CITRATE 50 UG/ML
INJECTION, SOLUTION INTRAMUSCULAR; INTRAVENOUS
Status: DISCONTINUED | OUTPATIENT
Start: 2023-01-31 | End: 2023-01-31

## 2023-01-31 RX ORDER — LIDOCAINE HYDROCHLORIDE 10 MG/ML
1 INJECTION, SOLUTION EPIDURAL; INFILTRATION; INTRACAUDAL; PERINEURAL ONCE
Status: DISCONTINUED | OUTPATIENT
Start: 2023-01-31 | End: 2023-01-31 | Stop reason: HOSPADM

## 2023-01-31 RX ORDER — HYDROCODONE BITARTRATE AND ACETAMINOPHEN 5; 325 MG/1; MG/1
1 TABLET ORAL EVERY 6 HOURS PRN
Qty: 15 TABLET | Refills: 0 | Status: ON HOLD | OUTPATIENT
Start: 2023-01-31 | End: 2023-03-10 | Stop reason: HOSPADM

## 2023-01-31 RX ORDER — DEXAMETHASONE SODIUM PHOSPHATE 4 MG/ML
INJECTION, SOLUTION INTRA-ARTICULAR; INTRALESIONAL; INTRAMUSCULAR; INTRAVENOUS; SOFT TISSUE
Status: DISCONTINUED | OUTPATIENT
Start: 2023-01-31 | End: 2023-01-31

## 2023-01-31 RX ORDER — MIDAZOLAM HYDROCHLORIDE 1 MG/ML
INJECTION, SOLUTION INTRAMUSCULAR; INTRAVENOUS
Status: DISCONTINUED | OUTPATIENT
Start: 2023-01-31 | End: 2023-01-31

## 2023-01-31 RX ADMIN — SODIUM CHLORIDE: 0.9 INJECTION, SOLUTION INTRAVENOUS at 12:01

## 2023-01-31 RX ADMIN — GLYCOPYRROLATE 0.6 MG: 0.2 INJECTION INTRAMUSCULAR; INTRAVENOUS at 01:01

## 2023-01-31 RX ADMIN — PROPOFOL 200 MG: 10 INJECTION, EMULSION INTRAVENOUS at 12:01

## 2023-01-31 RX ADMIN — FENTANYL CITRATE 50 MCG: 50 INJECTION INTRAMUSCULAR; INTRAVENOUS at 12:01

## 2023-01-31 RX ADMIN — ONDANSETRON 4 MG: 2 INJECTION INTRAMUSCULAR; INTRAVENOUS at 01:01

## 2023-01-31 RX ADMIN — ROCURONIUM BROMIDE 50 MG: 10 INJECTION INTRAVENOUS at 12:01

## 2023-01-31 RX ADMIN — LIDOCAINE HYDROCHLORIDE 100 MG: 20 INJECTION, SOLUTION EPIDURAL; INFILTRATION; INTRACAUDAL; PERINEURAL at 12:01

## 2023-01-31 RX ADMIN — NEOSTIGMINE METHYLSULFATE 5 MG: 0.5 INJECTION, SOLUTION INTRAVENOUS at 01:01

## 2023-01-31 RX ADMIN — ACETAMINOPHEN 1000 MG: 500 TABLET ORAL at 10:01

## 2023-01-31 RX ADMIN — DEXAMETHASONE SODIUM PHOSPHATE 4 MG: 4 INJECTION INTRA-ARTICULAR; INTRALESIONAL; INTRAMUSCULAR; INTRAVENOUS; SOFT TISSUE at 12:01

## 2023-01-31 RX ADMIN — MIDAZOLAM 2 MG: 1 INJECTION INTRAMUSCULAR; INTRAVENOUS at 12:01

## 2023-01-31 NOTE — BRIEF OP NOTE
Ralf Cuellar - Surgery ([facility])  Brief Operative Note    Surgery Date: 1/31/2023     Surgeon(s) and Role:     * Misael Garza MD - Primary     * Brandon Villa MD - Resident - Assisting        Pre-op Diagnosis:  Neoplasm of uncertain behavior of pharynx [D37.05]    Post-op Diagnosis:  Post-Op Diagnosis Codes:     * Neoplasm of uncertain behavior of pharynx [D37.05]    Procedure(s) (LRB):  LARYNGOSCOPY (N/A)    Anesthesia: General    Operative Findings: left tonsil mass with frozen section analysis positive for squamous cell carcinoma    Estimated Blood Loss: 5 mL         Specimens:   Specimen (24h ago, onward)       Start     Ordered    01/31/23 1252  Specimen to Pathology, Surgery ENT  Once        Comments: 1.  Left tonsil #1 (frozen)2. Left tonsil #2 (permanent)     References:    Click here for ordering Quick Tip   Question Answer Comment   Procedure Type: ENT    Specimen Class: Known or suspected malignancy    Which provider would you like to cc? MISAEL GARZA    Release to patient Immediate        01/31/23 1316                  As above  I was present for and participated in all aspects of this operation.     Discharge Note    OUTCOME: Patient tolerated treatment/procedure well without complication and is now ready for discharge.    DISPOSITION: Home or Self Care    FINAL DIAGNOSIS:  Neoplasm of uncertain behavior of pharynx    FOLLOWUP: In clinic    DISCHARGE INSTRUCTIONS:    Discharge Procedure Orders   Diet Adult Regular     Notify your health care provider if you experience any of the following:  severe uncontrolled pain     Notify your health care provider if you experience any of the following:  persistent nausea and vomiting or diarrhea     Notify your health care provider if you experience any of the following:  difficulty breathing or increased cough     No dressing needed     Activity as tolerated

## 2023-01-31 NOTE — TRANSFER OF CARE
"Anesthesia Transfer of Care Note    Patient: Dereck Centeno    Procedure(s) Performed: Procedure(s) (LRB):  LARYNGOSCOPY (N/A)    Patient location: [facility]    Anesthesia Type: general    Transport from OR: Transported from OR on room air with adequate spontaneous ventilation    Post pain: adequate analgesia    Post assessment: no apparent anesthetic complications    Post vital signs: stable    Level of consciousness: awake and alert    Nausea/Vomiting: no nausea/vomiting    Complications: none    Transfer of care protocol was followed      Last vitals:   Visit Vitals  BP (!) 186/86 (BP Location: Left arm, Patient Position: Lying)   Pulse 62   Temp 36.8 °C (98.2 °F) (Oral)   Resp (!) 22   Ht 5' 11" (1.803 m)   Wt 73.5 kg (162 lb)   SpO2 100%   BMI 22.59 kg/m²     "

## 2023-01-31 NOTE — ANESTHESIA PROCEDURE NOTES
Intubation    Date/Time: 1/31/2023 12:21 PM  Performed by: Verna Booth MD  Authorized by: Paulette Heard MD     Intubation:     Induction:  Intravenous    Intubated:  Postinduction    Mask Ventilation:  Easy with oral airway    Attempts:  1    Attempted By:  Resident anesthesiologist    Method of Intubation:  Video laryngoscopy    Blade:  Marcos 3    Laryngeal View Grade: Grade I - full view of cords      Difficult Airway Encountered?: No      Complications:  None    Airway Device:  Oral endotracheal tube    Airway Device Size:  6.0    Style/Cuff Inflation:  Cuffed (inflated to minimal occlusive pressure)    Inflation Amount (mL):  8    Tube secured:  21    Secured at:  The lips    Placement Verified By:  Capnometry    Complicating Factors:  None    Findings Post-Intubation:  BS equal bilateral

## 2023-02-01 DIAGNOSIS — C10.2 MALIGNANT NEOPLASM OF LATERAL WALL OF OROPHARYNX: Primary | ICD-10-CM

## 2023-02-01 NOTE — ASSESSMENT & PLAN NOTE
Diagnosis confirmed on frozen section. Halo of papillomatous change suggests HPV-related disease. Will get PET-CT and discuss TORS +/- radiation therapy

## 2023-02-01 NOTE — OP NOTE
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 male who presented with left neck adenopathy and was found to have a suspicious lesion of the left tonsil/inferior aspect of the lateral well.   The risks, benefits, indications, and alternatives to this procedure were thoroughly discussed with the patient preoperatively, and informed consent was obtained. Please see my detailed preoperative notes for a thorough account of this discussion.    Findings: Frozen section was consistent with squamous cell carcinoma. The left tonsil lesion was mobile over the lateral wall and pterygoids. There was a clear gap between the tumor and the tongue base, and it was similarly discrete from the soft palate, even accounting for margins. The tumor seems amenable to TORS, and the patient will meet with the multidisciplinary team, including Dr. Templeton for TORS eval.     EBL: 10mL    IVF:  May be found in the operative record    Detailed Account of Technique Employed:    The patient was brought into the operating room and placed supine on the operating table. The patient was intubated transorally by the anesthesiology service, and an adequate level of general endotracheal anesthesia was obtained. The patient's face was prepped and  draped in the standard, sterile fashion. A timeout was performed according to the universal protocol.     Next, a maxillary raytex was placed to protect the gums. The Mai laryngoscope was then inserted into the oral cavity and advanced to visualize the oral cavity, oropharynx, hypopharynx, and endolarynx, with the above findings noted. Visualization was optimized with a 0 degree telescope. Photodocumentation was obtained, and multiple biopsies of the left tonsil were taken and sent for frozen and then permanent pathologic analysis. Hemostasis was accomplished with neurosurgical cottonoids saturated in Afrin. The raytex and scope were removed, and the procedure terminated. Frozen section was consistent with SCC.     The patient was allowed to awaken from anesthesia, extubated, and taken to the PACU in stable condition.     All sponge, needle, and instrument counts were correct at the end of the procedure x 2.     I was present for and performed all portions of the operation as noted above.

## 2023-02-01 NOTE — ANESTHESIA RELEASE NOTE
"Anesthesia Release from PACU Note    Patient: Dereck Centeno    Procedure(s) Performed: Procedure(s) (LRB):  LARYNGOSCOPY (N/A)    Anesthesia type: general    Post pain: Adequate analgesia    Post assessment: no apparent anesthetic complications    Last Vitals:   Visit Vitals  BP (!) 149/78   Pulse (!) 53   Temp 36.5 °C (97.7 °F) (Temporal)   Resp 20   Ht 5' 11" (1.803 m)   Wt 73.5 kg (162 lb)   SpO2 (!) 94%   BMI 22.59 kg/m²       Post vital signs: stable    Level of consciousness: awake    Nausea/Vomiting: no nausea/no vomiting    Complications: none    Airway Patency: patent    Respiratory: unassisted    Cardiovascular: stable and blood pressure at baseline    Hydration: euvolemic  "

## 2023-02-01 NOTE — ANESTHESIA POSTPROCEDURE EVALUATION
Anesthesia Post Evaluation    Patient: Dereck Centeno    Procedure(s) Performed: Procedure(s) (LRB):  LARYNGOSCOPY (N/A)    Final Anesthesia Type: general      Patient location during evaluation: PACU  Patient participation: Yes- Able to Participate  Level of consciousness: awake and alert  Post-procedure vital signs: reviewed and stable  Pain management: adequate  Airway patency: patent    PONV status at discharge: No PONV  Anesthetic complications: no      Cardiovascular status: blood pressure returned to baseline  Respiratory status: unassisted  Hydration status: euvolemic  Follow-up not needed.          Vitals Value Taken Time   /78 01/31/23 1515   Temp 36.5 °C (97.7 °F) 01/31/23 1515   Pulse 53 01/31/23 1515   Resp 20 01/31/23 1515   SpO2 94 % 01/31/23 1515         No case tracking events are documented in the log.      Pain/Huber Score: Pain Rating Prior to Med Admin: 0 (1/31/2023 10:41 AM)  Huber Score: 10 (1/31/2023  3:15 PM)

## 2023-02-02 ENCOUNTER — OFFICE VISIT (OUTPATIENT)
Dept: OTOLARYNGOLOGY | Facility: CLINIC | Age: 63
End: 2023-02-02
Payer: MEDICAID

## 2023-02-02 VITALS
DIASTOLIC BLOOD PRESSURE: 82 MMHG | SYSTOLIC BLOOD PRESSURE: 159 MMHG | HEIGHT: 71 IN | WEIGHT: 168.44 LBS | HEART RATE: 58 BPM | BODY MASS INDEX: 23.58 KG/M2

## 2023-02-02 DIAGNOSIS — C10.2 MALIGNANT NEOPLASM OF LATERAL WALL OF OROPHARYNX: ICD-10-CM

## 2023-02-02 DIAGNOSIS — C09.9 SQUAMOUS CELL CARCINOMA OF LEFT TONSIL: Primary | ICD-10-CM

## 2023-02-02 PROCEDURE — 3008F BODY MASS INDEX DOCD: CPT | Mod: CPTII,,, | Performed by: OTOLARYNGOLOGY

## 2023-02-02 PROCEDURE — 99214 OFFICE O/P EST MOD 30 MIN: CPT | Mod: PBBFAC | Performed by: OTOLARYNGOLOGY

## 2023-02-02 PROCEDURE — 99999 PR PBB SHADOW E&M-EST. PATIENT-LVL IV: CPT | Mod: PBBFAC,,, | Performed by: OTOLARYNGOLOGY

## 2023-02-02 PROCEDURE — 1159F PR MEDICATION LIST DOCUMENTED IN MEDICAL RECORD: ICD-10-PCS | Mod: CPTII,,, | Performed by: OTOLARYNGOLOGY

## 2023-02-02 PROCEDURE — 99213 PR OFFICE/OUTPT VISIT, EST, LEVL III, 20-29 MIN: ICD-10-PCS | Mod: S$PBB,,, | Performed by: OTOLARYNGOLOGY

## 2023-02-02 PROCEDURE — 99999 PR PBB SHADOW E&M-EST. PATIENT-LVL IV: ICD-10-PCS | Mod: PBBFAC,,, | Performed by: OTOLARYNGOLOGY

## 2023-02-02 PROCEDURE — 1160F RVW MEDS BY RX/DR IN RCRD: CPT | Mod: CPTII,,, | Performed by: OTOLARYNGOLOGY

## 2023-02-02 PROCEDURE — 3008F PR BODY MASS INDEX (BMI) DOCUMENTED: ICD-10-PCS | Mod: CPTII,,, | Performed by: OTOLARYNGOLOGY

## 2023-02-02 PROCEDURE — 3077F SYST BP >= 140 MM HG: CPT | Mod: CPTII,,, | Performed by: OTOLARYNGOLOGY

## 2023-02-02 PROCEDURE — 3079F PR MOST RECENT DIASTOLIC BLOOD PRESSURE 80-89 MM HG: ICD-10-PCS | Mod: CPTII,,, | Performed by: OTOLARYNGOLOGY

## 2023-02-02 PROCEDURE — 3077F PR MOST RECENT SYSTOLIC BLOOD PRESSURE >= 140 MM HG: ICD-10-PCS | Mod: CPTII,,, | Performed by: OTOLARYNGOLOGY

## 2023-02-02 PROCEDURE — 3079F DIAST BP 80-89 MM HG: CPT | Mod: CPTII,,, | Performed by: OTOLARYNGOLOGY

## 2023-02-02 PROCEDURE — 99213 OFFICE O/P EST LOW 20 MIN: CPT | Mod: S$PBB,,, | Performed by: OTOLARYNGOLOGY

## 2023-02-02 PROCEDURE — 1159F MED LIST DOCD IN RCRD: CPT | Mod: CPTII,,, | Performed by: OTOLARYNGOLOGY

## 2023-02-02 PROCEDURE — 1160F PR REVIEW ALL MEDS BY PRESCRIBER/CLIN PHARMACIST DOCUMENTED: ICD-10-PCS | Mod: CPTII,,, | Performed by: OTOLARYNGOLOGY

## 2023-02-03 ENCOUNTER — TUMOR BOARD CONFERENCE (OUTPATIENT)
Dept: OTOLARYNGOLOGY | Facility: CLINIC | Age: 63
End: 2023-02-03
Payer: MEDICAID

## 2023-02-03 RX ORDER — LIDOCAINE HYDROCHLORIDE 10 MG/ML
1 INJECTION, SOLUTION EPIDURAL; INFILTRATION; INTRACAUDAL; PERINEURAL ONCE
Status: CANCELLED | OUTPATIENT
Start: 2023-02-03 | End: 2023-02-03

## 2023-02-03 RX ORDER — SODIUM CHLORIDE 0.9 % (FLUSH) 0.9 %
10 SYRINGE (ML) INJECTION
Status: CANCELLED | OUTPATIENT
Start: 2023-02-03

## 2023-02-03 NOTE — PROGRESS NOTES
Presenting Hospital / Clinic: [facility]  Virtual Tumor Board Conference: Virtual  Presenter: Dr. Alfonso  Date Presented to Tumor Board: 02/02/23  Specialties Present: Medical Oncology; Radiation Oncology; Pathology; Radiology; Head and Neck; Nutrition; Navigation; Speech Pathology  Presentation at Cancer Conference: Prospective  Cancer Type: Head and neck cancer  Recommended Plan Note: surgery, adjuvant radiation

## 2023-02-03 NOTE — PROGRESS NOTES
CC: Left tonsil cancer      HPI   62 y.o. male presents with a left neck mass that he first noticed about 1-2 weeks ago. He does not think it is getting bigger. There is some tenderness, but no tenderness or warmth. He has not noticed any lesions in his mouth.  He denies dysphagia, odynophagia, throat pain, and otalgia. His voice is normal. There is no hemoptysis or hematemesis. He is breathing well.     He is a smoker, with a 50+ pack-year history. He had a liver transplant in 2015 here at [facility] for HCV.      He was treated by Dr. Pantoja at [facility] about 4 years ago for oral cancer that was managed with surgery (sounds like a composite resection and neck dissection without flap-based reconstruction). He did not require radiation or chemotherapy.      He has not had any prior skin cancers, and does not meet with dermatology (Per Dr. Alfonso's note).    He recently underwent DL and bx of a L tonsil mass.  Frozen + for SCCA.      He states that he wishes to avoid chemo and presents to discuss his surgical options.     Review of Systems   Constitutional: Negative for fatigue and unexpected weight change.   HENT: Per HPI.  Eyes: Negative for visual disturbance.   Respiratory: Negative for shortness of breath, hemoptysis   Cardiovascular: Negative for chest pain and palpitations.   Musculoskeletal: Negative for decreased ROM, back pain.   Skin: Negative for rash, sunburn, itching.   Neurological: Negative for dizziness and seizures.   Hematological: Negative for adenopathy. Does not bruise/bleed easily.   Endocrine: Negative for rapid weight loss/weight gain, heat/cold intolerance.     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           Past Surgical History   Past Surgical History:   Procedure Laterality Date    COLONOSCOPY N/A 6/14/2018    Procedure: COLONOSCOPY;  Surgeon: Conor Castro MD;  Location: [facility] ENDO;  Service: Endoscopy;  Laterality: N/A;    ESOPHAGEAL VARICE LIGATION      ESOPHAGOGASTRODUODENOSCOPY N/A 6/14/2018    Procedure: ESOPHAGOGASTRODUODENOSCOPY (EGD);  Surgeon: Conor Castro MD;  Location: [facility] ENDO;  Service: Endoscopy;  Laterality: N/A;    LARYNGOSCOPY N/A 1/31/2023    Procedure: LARYNGOSCOPY;  Surgeon: Guzman Alfonso MD;  Location: [facility] OR [address] [place] [address];  Service: ENT;  Laterality: N/A;  [id number]    LIVER TRANSPLANT      TIPS PROCEDURE           Family History   Family History   Problem Relation Age of Onset    Alcohol abuse Mother     Alcohol abuse Father            Social History   .  Social History     Socioeconomic History    Marital status: Single   Tobacco Use    Smoking status: Every Day     Packs/day: 0.50     Years: 35.00     Pack years: 17.50     Types: Cigarettes    Smokeless tobacco: Former     Types: Chew   Substance and Sexual Activity    Alcohol use: Not Currently    Drug use: No    Sexual activity: Not Currently         Allergies   Review of patient's allergies indicates:  No Known Allergies        Physical Exam     Vitals:    02/02/23 1014   BP: (!) 159/82   Pulse: (!) 58         Body mass index is 23.49 kg/m².      General: AOx3, NAD   Respiratory:  Symmetric chest rise, normal effort  Oral Cavity:  Oral Tongue mobile, no lesions noted. Hard Palate WNL. No buccal or FOM lesions.  Oropharynx:  No masses/lesions of the posterior pharyngeal wall. L tonsil with ulcerated lesion. Soft palate without masses. Midline uvula.   Neck: Well-healed R neck scar.   2 cm firm, mobile L level II neck mass. No thyromegaly or thyroid nodules.   Normal range of motion.    Face: House Brackmann I bilaterally.     NP: No lesions of posterior wall  OP: No lesions of posterior wall or BOT. BOT is soft to palpation  Larynx: No lesions of glottic or supraglottic larynx. Normal vocal fold mobility   HP: No lesions of pyriform sinuses or postcricoid region  Mirror examination was performed.    Neck CT reviewed.    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  I explained to him that nonsurgical treatment would involve chemoradiation therapy.  He states that he wishes to avoid chemotherapy.  I explained to him that we could offer him surgery including lateral pharyngectomy with or without the use of transoral robotic surgery, left neck dissection and reconstruction with either a submental or pectoralis flap.  He understands and wishes to proceed with surgery.  The risks and benefits of surgery were reviewed.  I will contact her with the date for surgery once I have secured time on the de Ángela robot.  I also ordered a chest CT to complete his staging workup.          Other Visit Diagnoses       Squamous cell carcinoma of left tonsil    -  Primary    Relevant Orders    CT Chest With Contrast (Completed)

## 2023-02-03 NOTE — H&P (VIEW-ONLY)
CC: Left tonsil cancer      HPI   62 y.o. male presents with a left neck mass that he first noticed about 1-2 weeks ago. He does not think it is getting bigger. There is some tenderness, but no tenderness or warmth. He has not noticed any lesions in his mouth.  He denies dysphagia, odynophagia, throat pain, and otalgia. His voice is normal. There is no hemoptysis or hematemesis. He is breathing well.     He is a smoker, with a 50+ pack-year history. He had a [place] [facility] [place].      He was treated by Dr. Pantoja at [facility] about 4 years ago for oral cancer that was managed with surgery (sounds like a composite resection and neck dissection without flap-based reconstruction). He did not require radiation or chemotherapy.      He has not had any prior skin cancers, and does not meet with dermatology (Per Dr. Alfonso's note).    He recently underwent DL and bx of a L tonsil mass.  Frozen + for SCCA.      He states that he wishes to avoid chemo and presents to discuss his surgical options.     Review of Systems   Constitutional: Negative for fatigue and unexpected weight change.   HENT: Per HPI.  Eyes: Negative for visual disturbance.   Respiratory: Negative for shortness of breath, hemoptysis   Cardiovascular: Negative for chest pain and palpitations.   Musculoskeletal: Negative for decreased ROM, back pain.   Skin: Negative for rash, sunburn, itching.   Neurological: Negative for dizziness and seizures.   Hematological: Negative for adenopathy. Does not bruise/bleed easily.   Endocrine: Negative for rapid weight loss/weight gain, heat/cold intolerance.     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           Past Surgical History   Past Surgical History:   Procedure Laterality Date    COLONOSCOPY N/A 6/14/2018    Procedure: COLONOSCOPY;  Surgeon: Conor Castro MD;  Location: [facility] ENDO;  Service: Endoscopy;  Laterality: N/A;    ESOPHAGEAL VARICE LIGATION      ESOPHAGOGASTRODUODENOSCOPY N/A 6/14/2018    Procedure: ESOPHAGOGASTRODUODENOSCOPY (EGD);  Surgeon: Conor Castro MD;  Location: [facility] ENDO;  Service: Endoscopy;  Laterality: N/A;    LARYNGOSCOPY N/A 1/31/2023    Procedure: LARYNGOSCOPY;  Surgeon: Guzman Alfonso MD;  Location: [facility] OR [address] [place] [address];  Service: ENT;  Laterality: N/A;  [id number]    LIVER TRANSPLANT      TIPS PROCEDURE           Family History   Family History   Problem Relation Age of Onset    Alcohol abuse Mother     Alcohol abuse Father            Social History   .  Social History     Socioeconomic History    Marital status: Single   Tobacco Use    Smoking status: Every Day     Packs/day: 0.50     Years: 35.00     Pack years: 17.50     Types: Cigarettes    Smokeless tobacco: Former     Types: Chew   Substance and Sexual Activity    Alcohol use: Not Currently    Drug use: No    Sexual activity: Not Currently         Allergies   Review of patient's allergies indicates:  No Known Allergies        Physical Exam     Vitals:    02/02/23 1014   BP: (!) 159/82   Pulse: (!) 58         Body mass index is 23.49 kg/m².      General: AOx3, NAD   Respiratory:  Symmetric chest rise, normal effort  Oral Cavity:  Oral Tongue mobile, no lesions noted. Hard Palate WNL. No buccal or FOM lesions.  Oropharynx:  No masses/lesions of the posterior pharyngeal wall. L tonsil with ulcerated lesion. Soft palate without masses. Midline uvula.   Neck: Well-healed R neck scar.   2 cm firm, mobile L level II neck mass. No thyromegaly or thyroid nodules.   Normal range of motion.    Face: House Brackmann I bilaterally.     NP: No lesions of posterior wall  OP: No lesions of posterior wall or BOT. BOT is soft to palpation  Larynx: No lesions of glottic or supraglottic larynx. Normal vocal fold mobility   HP: No lesions of pyriform sinuses or postcricoid region  Mirror examination was performed.    Neck CT reviewed.    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  I explained to him that nonsurgical treatment would involve chemoradiation therapy.  He states that he wishes to avoid chemotherapy.  I explained to him that we could offer him surgery including lateral pharyngectomy with or without the use of transoral robotic surgery, left neck dissection and reconstruction with either a submental or pectoralis flap.  He understands and wishes to proceed with surgery.  The risks and benefits of surgery were reviewed.  I will contact her with the date for surgery once I have secured time on the de Ángela robot.  I also ordered a chest CT to complete his staging workup.          Other Visit Diagnoses       Squamous cell carcinoma of left tonsil    -  Primary    Relevant Orders    CT Chest With Contrast (Completed)

## 2023-02-03 NOTE — ASSESSMENT & PLAN NOTE
Squamous cell carcinoma of the left tonsil.  I explained to him that nonsurgical treatment would involve chemoradiation therapy.  He states that he wishes to avoid chemotherapy.  I explained to him that we could offer him surgery including lateral pharyngectomy with or without the use of transoral robotic surgery, left neck dissection and reconstruction with either a submental or pectoralis flap.  He understands and wishes to proceed with surgery.  The risks and benefits of surgery were reviewed.  I will contact her with the date for surgery once I have secured time on the de Ángela robot.  I also ordered a chest CT to complete his staging workup.

## 2023-02-09 LAB
COMMENT: NORMAL
FINAL PATHOLOGIC DIAGNOSIS: NORMAL
FROZEN SECTION DIAGNOSIS: NORMAL
FROZEN SECTION FOOTNOTE: NORMAL
GROSS: NORMAL
Lab: NORMAL

## 2023-02-10 DIAGNOSIS — Z01.818 PRE-OP TESTING: Primary | ICD-10-CM

## 2023-02-10 NOTE — ANESTHESIA PAT ROS NOTE
02/10/2023  Dereck Centeno is a 62 y.o., male.      Pre-op Assessment          Review of Systems         Anesthesia Assessment: Preoperative EQUATION    Planned Procedure: Procedure(s) (LRB):  ROBOTIC TRANSORAL SURGERY (TORS) (Left)  DISSECTION, NECK (Left)  CREATION, FLAP, MUSCLE ROTATION (N/A)  Requested Anesthesia Type:General  Surgeon: Alexandre Templeton MD  Service: ENT  Known or anticipated Date of Surgery:2/20/2023    Surgeon notes: reviewed    Electronic QUestionnaire Assessment completed via nurse interview with patient.        Triage considerations:     The patient has no apparent active cardiac condition (No unstable coronary Syndrome such as severe unstable angina or recent [<1 month] myocardial infarction, decompensated CHF, severe valvular   disease or significant arrhythmia)    Previous anesthesia records:GETA and No problems    Airway/Jaw/Neck:  Airway Findings: Mouth Opening: Normal Tongue: Normal  General Airway Assessment: Adult  Mallampati: II      Dental:  Dental Findings: In tact, Upper Dentures     Intubation     Date/Time: 1/31/2023 12:21 PM  Performed by: Verna Booth MD  Authorized by: Paulette Heard MD      Intubation:     Induction:  Intravenous    Intubated:  Postinduction    Mask Ventilation:  Easy with oral airway    Attempts:  1    Attempted By:  Resident anesthesiologist    Method of Intubation:  Video laryngoscopy    Blade:  Marcos 3    Laryngeal View Grade: Grade I - full view of cords      Difficult Airway Encountered?: No      Complications:  None    Airway Device:  Oral endotracheal tube    Airway Device Size:  6.0    Style/Cuff Inflation:  Cuffed (inflated to minimal occlusive pressure)    Inflation Amount (mL):  8    Tube secured:  21    Secured at:  The lips    Placement Verified By:  Capnometry    Complicating Factors:  None  Findings Post-Intubation:  BS  equal bilateral    Last PCP note:  NO PCP  Subspecialty notes: ENT, Liver Transplant    Other important co-morbidities: GERD, HLD, HTN, and SQUAMOUS CELL CARCINOMA OF LEFT TONSIL, MALIGNANT NEOPLASM OF LATERAL WALL OF OROPHARYNX, 2015 LIVER TRANSPLANT, S/P TIPS, HEPATITIS C, HX OF ALCOHOL WITHDRAWAL SEIZURE       Tests already available:  Available tests,  within 3 months , > 1 year ago , within [facility] .     2/2/23  CT CHEST    1/27/23  CT NECK    1/24/23  PREALBUMIN, CBC, CMP,     10/5/22  TACROLIMUS    5/25/20  STRESS ECHO-EF 60%            Instructions given. (See in Nurse's note)    Optimization:  Anesthesia Preop Clinic Assessment  Indicated    Medical Opinion Indicated [facility] PERIOP CLINIC       Sub-specialist consult indicated:   TBD      Plan:    Testing:  EKG, PT/INR, PTT, and T&S   Pre-anesthesia  visit       Visit focus: concerns in complex and/or prolonged anesthesia     Consultation:IM Perioperative Hospitalist     Patient  has previously scheduled Medical Appointment: NOT AT THIS TIME    Navigation: Tests Scheduled.              Consults scheduled.             Results will be tracked by Preop Clinic.    Patient is optimized for surgery.        Sina Dykes NP  Perioperative Medicine  [facility]

## 2023-02-10 NOTE — PRE-PROCEDURE INSTRUCTIONS
VITAMIN/NSAID/MEDICATION INSTRUCTIONS GIVEN. PATIENT'S SISTER IN LAW, PRANAY, VERBALIZED UNDERSTANDING.

## 2023-02-13 ENCOUNTER — TELEPHONE (OUTPATIENT)
Dept: PREADMISSION TESTING | Facility: HOSPITAL | Age: 63
End: 2023-02-13
Payer: MEDICAID

## 2023-02-13 NOTE — TELEPHONE ENCOUNTER
----- Message from Hansa Nam RN sent at 2/10/2023 12:46 PM CST -----  2/20 RAVIorNP/POC/LAB/EKG    Patient confirmed his identity and asked me to speak with his sister in law, Dianne.

## 2023-02-14 DIAGNOSIS — Z94.4 STATUS POST LIVER TRANSPLANT: Primary | ICD-10-CM

## 2023-02-14 NOTE — ASSESSMENT & PLAN NOTE
Followed per Liver Transplant; last seen 3/2022; stable.   Recurrence of Hep C- treated with Harvoni from 10/15 to 3/2016  Treated with tacrolimus

## 2023-02-14 NOTE — ASSESSMENT & PLAN NOTE
Current BP  not at goal today; 167/80; attributes high readings to anxiety about surgery.    Taking: clonidine/amlodipine- took meds today  Occasional home BP reading taken: around 160s/80s; suggest continued follow-up with PCP. Not enough time to evaluate BP readings and change medication.   Keeping a healthy weight/BMI can help with better BP control    Lifestyle changes to reduce systolic BP:  Smoking cessation; exercise 30 minutes per day,  5 days per week or 150 minutes weekly; sodium reduction and avoidance of high salt foods such as processed meats, frozen meals and  fast foods.      I recommend monitoring BP during perioperative period as uncontrolled pain can elevate blood pressure.

## 2023-02-14 NOTE — ASSESSMENT & PLAN NOTE
Treated with: Lexapro/Seroquel/Remeron/gabapentin; controlled.   Followed per outside Psych and  talks to therapist every month.   Denies suicidal/homicidal ideations

## 2023-02-14 NOTE — ASSESSMENT & PLAN NOTE
Treated with Tramadol; no back surgery  No loss of bladder or bowel control    Denies N/T to buttocks, perineum and inner surfaces of the thighs (saddle anesthesia)    Not followed per Pain Management ; managed per PCP.    Recent imaging: none  per patient

## 2023-02-15 ENCOUNTER — OFFICE VISIT (OUTPATIENT)
Dept: INTERNAL MEDICINE | Facility: CLINIC | Age: 63
End: 2023-02-15
Payer: MEDICAID

## 2023-02-15 ENCOUNTER — HOSPITAL ENCOUNTER (OUTPATIENT)
Dept: CARDIOLOGY | Facility: CLINIC | Age: 63
Discharge: HOME | End: 2023-02-15
Payer: MEDICAID

## 2023-02-15 VITALS
HEIGHT: 71 IN | OXYGEN SATURATION: 98 % | HEART RATE: 66 BPM | DIASTOLIC BLOOD PRESSURE: 80 MMHG | RESPIRATION RATE: 18 BRPM | SYSTOLIC BLOOD PRESSURE: 167 MMHG | BODY MASS INDEX: 23.42 KG/M2 | TEMPERATURE: 99 F | WEIGHT: 167.31 LBS

## 2023-02-15 DIAGNOSIS — I10 PRIMARY HYPERTENSION: ICD-10-CM

## 2023-02-15 DIAGNOSIS — N40.1 BENIGN PROSTATIC HYPERPLASIA WITH NOCTURIA: ICD-10-CM

## 2023-02-15 DIAGNOSIS — R35.1 BENIGN PROSTATIC HYPERPLASIA WITH NOCTURIA: ICD-10-CM

## 2023-02-15 DIAGNOSIS — K63.5 POLYP OF COLON, UNSPECIFIED PART OF COLON, UNSPECIFIED TYPE: ICD-10-CM

## 2023-02-15 DIAGNOSIS — Z72.0 TOBACCO ABUSE: ICD-10-CM

## 2023-02-15 DIAGNOSIS — M54.9 CHRONIC BACK PAIN, UNSPECIFIED BACK LOCATION, UNSPECIFIED BACK PAIN LATERALITY: Chronic | ICD-10-CM

## 2023-02-15 DIAGNOSIS — C10.2 MALIGNANT NEOPLASM OF LATERAL WALL OF OROPHARYNX: ICD-10-CM

## 2023-02-15 DIAGNOSIS — K21.9 GASTROESOPHAGEAL REFLUX DISEASE, UNSPECIFIED WHETHER ESOPHAGITIS PRESENT: ICD-10-CM

## 2023-02-15 DIAGNOSIS — J43.2 CENTRILOBULAR EMPHYSEMA: ICD-10-CM

## 2023-02-15 DIAGNOSIS — Z01.818 PRE-OP TESTING: ICD-10-CM

## 2023-02-15 DIAGNOSIS — F41.9 ANXIETY DISORDER, UNSPECIFIED TYPE: ICD-10-CM

## 2023-02-15 DIAGNOSIS — G89.29 CHRONIC BACK PAIN, UNSPECIFIED BACK LOCATION, UNSPECIFIED BACK PAIN LATERALITY: Chronic | ICD-10-CM

## 2023-02-15 DIAGNOSIS — D69.6 THROMBOCYTOPENIA: ICD-10-CM

## 2023-02-15 DIAGNOSIS — Z94.4 STATUS POST LIVER TRANSPLANT: Chronic | ICD-10-CM

## 2023-02-15 DIAGNOSIS — D35.02 ADRENAL ADENOMA, LEFT: ICD-10-CM

## 2023-02-15 DIAGNOSIS — Z01.818 PREOPERATIVE EXAMINATION: Primary | ICD-10-CM

## 2023-02-15 DIAGNOSIS — F19.94 SUBSTANCE INDUCED MOOD DISORDER: Chronic | ICD-10-CM

## 2023-02-15 DIAGNOSIS — D84.9 IMMUNOSUPPRESSION: ICD-10-CM

## 2023-02-15 PROBLEM — N40.0 BPH (BENIGN PROSTATIC HYPERPLASIA): Status: ACTIVE | Noted: 2023-02-15

## 2023-02-15 PROCEDURE — 93010 EKG 12-LEAD: ICD-10-PCS | Mod: S$PBB,,, | Performed by: INTERNAL MEDICINE

## 2023-02-15 PROCEDURE — 99205 OFFICE O/P NEW HI 60 MIN: CPT | Mod: S$PBB,,, | Performed by: NURSE PRACTITIONER

## 2023-02-15 PROCEDURE — 3079F DIAST BP 80-89 MM HG: CPT | Mod: CPTII,,, | Performed by: NURSE PRACTITIONER

## 2023-02-15 PROCEDURE — 99205 PR OFFICE/OUTPT VISIT, NEW, LEVL V, 60-74 MIN: ICD-10-PCS | Mod: S$PBB,,, | Performed by: NURSE PRACTITIONER

## 2023-02-15 PROCEDURE — 99999 PR PBB SHADOW E&M-EST. PATIENT-LVL V: CPT | Mod: PBBFAC,,, | Performed by: NURSE PRACTITIONER

## 2023-02-15 PROCEDURE — 93010 ELECTROCARDIOGRAM REPORT: CPT | Mod: S$PBB,,, | Performed by: INTERNAL MEDICINE

## 2023-02-15 PROCEDURE — 3079F PR MOST RECENT DIASTOLIC BLOOD PRESSURE 80-89 MM HG: ICD-10-PCS | Mod: CPTII,,, | Performed by: NURSE PRACTITIONER

## 2023-02-15 PROCEDURE — 99215 OFFICE O/P EST HI 40 MIN: CPT | Mod: PBBFAC | Performed by: NURSE PRACTITIONER

## 2023-02-15 PROCEDURE — 1159F PR MEDICATION LIST DOCUMENTED IN MEDICAL RECORD: ICD-10-PCS | Mod: CPTII,,, | Performed by: NURSE PRACTITIONER

## 2023-02-15 PROCEDURE — 3008F BODY MASS INDEX DOCD: CPT | Mod: CPTII,,, | Performed by: NURSE PRACTITIONER

## 2023-02-15 PROCEDURE — 3077F PR MOST RECENT SYSTOLIC BLOOD PRESSURE >= 140 MM HG: ICD-10-PCS | Mod: CPTII,,, | Performed by: NURSE PRACTITIONER

## 2023-02-15 PROCEDURE — 1159F MED LIST DOCD IN RCRD: CPT | Mod: CPTII,,, | Performed by: NURSE PRACTITIONER

## 2023-02-15 PROCEDURE — 99999 PR PBB SHADOW E&M-EST. PATIENT-LVL V: ICD-10-PCS | Mod: PBBFAC,,, | Performed by: NURSE PRACTITIONER

## 2023-02-15 PROCEDURE — 93005 ELECTROCARDIOGRAM TRACING: CPT | Mod: PBBFAC | Performed by: INTERNAL MEDICINE

## 2023-02-15 PROCEDURE — 3077F SYST BP >= 140 MM HG: CPT | Mod: CPTII,,, | Performed by: NURSE PRACTITIONER

## 2023-02-15 PROCEDURE — 3008F PR BODY MASS INDEX (BMI) DOCUMENTED: ICD-10-PCS | Mod: CPTII,,, | Performed by: NURSE PRACTITIONER

## 2023-02-15 RX ORDER — UMECLIDINIUM BROMIDE AND VILANTEROL TRIFENATATE 62.5; 25 UG/1; UG/1
POWDER RESPIRATORY (INHALATION)
COMMUNITY
End: 2023-03-16 | Stop reason: SDUPTHER

## 2023-02-15 RX ORDER — CLONAZEPAM 0.5 MG/1
0.5 TABLET ORAL 2 TIMES DAILY PRN
COMMUNITY

## 2023-02-15 NOTE — ASSESSMENT & PLAN NOTE
Per CT chest 6/5/2020- stable on CT chest 2/2/23  Denies: excessive sweating/tachycardia/CP/SOB/headaches; has HTN   Recommend continued monitoring

## 2023-02-15 NOTE — DISCHARGE INSTRUCTIONS
Your surgery has been scheduled for:_____2/20/23_____________________________________    You should report to:  ____[facility], located on the [place] side of the first floor of the           [facility] ([phone])  _X___[facility], located on the [facility] side of the            Second floor of the [facility] ([phone])  ____3rd Floor [facility] located on the [facility] side of the [facility] [phone]  ____[facility]: located at [address] [facility] [address] [place] [address]. [address].     Please Note   Tell your doctor if you take Aspirin, products containing Aspirin, herbal medications  or blood thinners, such as Coumadin, Ticlid, or Plavix.  (Consult your provider regarding holding or stopping before surgery).  Arrange for someone to drive you home following surgery.  You will not be allowed to leave the surgical facility alone or drive yourself home following sedation and anesthesia.    Before Surgery  Stop taking all herbal medications, vitamins, and supplements 7 days prior to surgery  No Motrin/Advil (Ibuprofen) 7 days before surgery  No Aleve (Naproxen) 7 days before surgery  Stop Taking Asprin, products containing Aspirin _7____days before surgery   No Goody's/BC Powder 7 days before surgery  Refrain from drinking alcoholic beverages for 24 hours before and after surgery  Stop or limit smoking at least 24 hours prior to surgery  You may take Tylenol for pain    Night before Surgery  Do not eat or drink after midnight  Take a shower or bath (shower is recommended).  Bathe with Hibiclens soap or an antibacterial soap from the neck down.  If not supplied by your surgeon, hibiclens soap will need to be purchased over the counter in pharmacy.  Rinse soap off thoroughly.  Shampoo your hair with your regular shampoo    The Day of Surgery  Take another bath or shower with hibiclens  or any antibacterial soap, to reduce the chance of infection.  Take heart and blood pressure medications with a small sip of water, as advised by the perioperative team.  Do not take fluid pills  You may brush your teeth and rinse your mouth, but do not swallow any additional water.   Do not apply perfumes, powder, body lotions or deodorant on the day of surgery.  Nail polish should be removed.  Do not wear makeup or moisturizer  Wear comfortable clothes, such as a button front shirt and loose fitting pants.  Leave all jewelry, including body piercings, and valuables at home.    Bring any devices you will neeed after surgery such as crutches or canes.  If you have sleep apnea, please bring your CPAP machine  In the event that your physical condition changes including the onset of a cold or respiratory illness, or if you have to delay or cancel your surgery, please notify your surgeon.

## 2023-02-15 NOTE — HPI
This is a 62 y.o. male  who presents today for a preoperative evaluation in preparation for a Head and Neck  procedure.  Scheduled for robotic transoral surgery/neck dissection.   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  Patient is new to me.  Details of current problem: The duration of problem is one month. He noticed a lump on left neck ; biopsy done 1/31/23 with results revealing SCC of left tonsil. Hx of oral cancer approximately 4 years ago but does not remember type of procedure done at [facility].   Reports symptoms of  tenderness of left neck mass. Denies difficulty swallowing, hemoptysis, or  throat pain.  There are no aggravating or relieving factors.       Reports pain: 8/10 to lower back and posterior neck;  took Tramadol.     The history has been obtained by speaking with the patient and reviewing the electronic medical record and/or outside health information. Significant health conditions for the perioperative period are discussed below in assessment and plan.     Patient reports current health status to be Fair.  Denies any new symptoms before surgery.

## 2023-02-15 NOTE — PROGRESS NOTES
[facility]  Progress Note    Patient Name: Dereck Centeno  MRN: [id number]  Date of Evaluation- 02/16/2023  PCP- Eva Reynaga MD    Future cases for Dereck Centeno [[id number]]       Case ID Status Date Time Michael Procedure Provider Location    [id number] [facility] 2/20/2023  8:30  ROBOTIC TRANSORAL SURGERY (TORS) Alexandre Templeton MD [[id number]] [facility] OR 2ND FLR            HPI:  This is a 62 y.o. male  who presents today for a preoperative evaluation in preparation for a Head and Neck  procedure.  Scheduled for robotic transoral surgery/neck dissection.   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  Patient is new to me.  Details of current problem: The duration of problem is one month. He noticed a lump on left neck ; biopsy done 1/31/23 with results revealing SCC of left tonsil. Hx of oral cancer approximately 4 years ago but does not remember type of procedure done at [facility].   Reports symptoms of  tenderness of left neck mass. Denies difficulty swallowing, hemoptysis, or  throat pain.  There are no aggravating or relieving factors.       Reports pain: 8/10 to lower back and posterior neck;  took Tramadol.     The history has been obtained by speaking with the patient and reviewing the electronic medical record and/or outside health information. Significant health conditions for the perioperative period are discussed below in assessment and plan.     Patient reports current health status to be Fair.  Denies any new symptoms before surgery.        Subjective/ Objective:     Chief Complaint: Preoperative evaulation, perioperative medical management, and complication reduction plan.     Functional Capacity: No regular exercise regimen; parked in garage today and walked to POC CP/SOB. Reports SOB with lifting heavy objects. Hx of Emphysema.       Anesthesia issues: None    Difficulty mouth opening: No    Steroid use in the last 12  months:  No    Dental Issues: top plate    Family anesthesia difficulty: None         Family Hx of Thrombosis: None    Past Medical History:   Diagnosis Date    Alcohol withdrawal seizure     Alcoholic cirrhosis     Anxiety     Depression     GERD (gastroesophageal reflux disease)     Hepatitis C     HTN (hypertension), benign     Hyperlipidemia     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 02/01/2023    Tobacco use          Past Medical History Pertinent Negatives:   Diagnosis Date Noted    Anemia 02/15/2023    CHF (congestive heart failure) 02/15/2023    Coronary artery disease 02/15/2023    Deep vein thrombosis 02/15/2023    Diabetes mellitus, type 2 02/15/2023    Disorder of kidney and ureter 02/15/2023    Stroke 02/15/2023    Thyroid disease 02/15/2023         Past Surgical History:   Procedure Laterality Date    COLONOSCOPY N/A 6/14/2018    Procedure: COLONOSCOPY;  Surgeon: Conor Castro MD;  Location: [facility];  Service: Endoscopy;  Laterality: N/A;    ESOPHAGEAL VARICE LIGATION      ESOPHAGOGASTRODUODENOSCOPY N/A 6/14/2018    Procedure: ESOPHAGOGASTRODUODENOSCOPY (EGD);  Surgeon: Conor Castro MD;  Location: [facility];  Service: Endoscopy;  Laterality: N/A;    LARYNGOSCOPY N/A 1/31/2023    Procedure: LARYNGOSCOPY;  Surgeon: Guzman Alfonso MD;  Location: [facility] OR [address] [place] [address];  Service: ENT;  Laterality: N/A;  [id number]    LIVER TRANSPLANT      TIPS PROCEDURE         Review of Systems   Constitutional:  Negative for chills, fatigue, fever and unexpected weight change.   HENT:  Negative for dental problem, hearing loss, postnasal drip, rhinorrhea, sore throat, tinnitus and trouble swallowing.    Eyes:  Negative for photophobia, pain, discharge and visual disturbance.   Respiratory:  Positive for shortness of breath (with heavy lifting). Negative for apnea, cough, chest tightness and wheezing.         STOP BANG risk factors:  HTN  Male sex   Cardiovascular:  Negative for chest pain, palpitations and  "leg swelling.   Gastrointestinal:  Negative for abdominal pain, blood in stool, constipation, nausea and vomiting.   Endocrine: Negative for cold intolerance and heat intolerance.   Genitourinary:  Positive for urgency. Negative for decreased urine volume, difficulty urinating, dysuria, frequency and hematuria.        Nocturia  Hx enlarged prostate   Musculoskeletal:  Positive for back pain and neck pain. Negative for arthralgias and neck stiffness.   Skin:  Negative for rash and wound.   Neurological:  Negative for dizziness, tremors, seizures, syncope, weakness, numbness and headaches.   Hematological:  Negative for adenopathy. Bruises/bleeds easily.   Psychiatric/Behavioral:  Negative for confusion, sleep disturbance and suicidal ideas.             VITALS  Visit Vitals  BP (!) 167/80   Pulse 66   Temp 98.8 °F (37.1 °C) (Oral)   Resp 18   Ht 5' 11" (1.803 m)   Wt 75.9 kg (167 lb 4.8 oz)   SpO2 98%   BMI 23.33 kg/m²          Physical Exam  Vitals reviewed.   Constitutional:       General: He is not in acute distress.     Appearance: He is well-developed.   HENT:      Head: Normocephalic.      Nose: Nose normal.      Mouth/Throat:      Pharynx: No oropharyngeal exudate.   Eyes:      General:         Right eye: No discharge.         Left eye: No discharge.      Conjunctiva/sclera: Conjunctivae normal.      Pupils: Pupils are equal, round, and reactive to light.   Neck:      Thyroid: No thyromegaly.      Vascular: No carotid bruit or JVD.      Trachea: No tracheal deviation.     Cardiovascular:      Rate and Rhythm: Normal rate and regular rhythm.      Pulses:           Carotid pulses are 2+ on the right side and 2+ on the left side.       Dorsalis pedis pulses are 2+ on the right side and 2+ on the left side.        Posterior tibial pulses are 2+ on the right side and 2+ on the left side.      Heart sounds: Normal heart sounds. No murmur heard.  Pulmonary:      Effort: Pulmonary effort is normal. No respiratory " distress.      Breath sounds: Normal breath sounds. No stridor. No wheezing, rhonchi or rales.   Abdominal:      General: Bowel sounds are normal. There is no distension.      Palpations: Abdomen is soft.      Tenderness: There is no abdominal tenderness. There is no guarding.   Musculoskeletal:      Cervical back: Normal range of motion. Pain with movement (with extension/lateral movement) present. Normal range of motion.      Right lower leg: No edema.      Left lower leg: No edema.   Skin:     General: Skin is warm and dry.      Capillary Refill: Capillary refill takes less than 2 seconds.      Findings: No erythema or rash.   Neurological:      Mental Status: He is alert and oriented to person, place, and time.      Coordination: Coordination normal.   Psychiatric:         Mood and Affect: Affect is flat.        Significant Labs:  Lab Results   Component Value Date    WBC 6.96 2023    HGB 14.5 2023    HCT 44.9 2023     (L) 2023    CHOL 113 (L) 2022    TRIG 42 2022    HDL 66 2022    ALT 10 2023    AST 13 2023     2023    K 5.0 2023     2023    CREATININE 1.3 2023    BUN 9 2023    CO2 26 2023    TSH 2.490 2022    INR 1.1 2018    HGBA1C 5.0 2022       Exercise Stress 20  Normal left ventricular systolic function. The estimated ejection fraction is 60%.  No wall motion abnormalities.  The ECG portion of this study is negative for myocardial ischemia.  The stress echo portion of this study is negative for myocardial ischemia.  Sensitivity is impaired due to the patient's failure to achieve target heart rate.  The test was stopped because the patient experienced fatigue, shortness of breath and claudication. The patient requested the test to be stopped.  There were no arrhythmias during stress.  The patient's exercise capacity was moderately impaired.        EK/15/23    Normal sinus  rhythm   Normal ECG   When compared with ECG of 13-MAR-2018 19:52,   No significant change was found   Confirmed by BETY BULL MD ([id number]) on 2/15/2023 11:47:52 AM     Referred By: ROXANE MARKS           Confirmed By:BETY BULL MD    2D ECHO:  TTE:  No results found for this or any previous visit.      Imaging   CT chest 2/2/23     FINDINGS:  The soft tissues and vascular structures at the base the neck are unremarkable.  The mediastinum including the heart and great vessels is significant for calcification of the aorta, aortic annulus, and coronary arteries.  There is postop change at the gastroesophageal junction.  Recommend correlation prior surgical history.  The visualized intra-abdominal content is significant for a 2.5 cm left adrenal nodule which measures the attenuation of a lipid rich adenoma on noncontrast chest CT performed 05/16/2022.  The osseous structures demonstrate degenerative change.     The trachea is patent and free of any intraluminal filling defects.  The lungs are significant for centrilobular emphysema.  There is a 0.3 cm right upper lobe noncalcified pulmonary nodule series 4, image 276 unchanged compared to previous CT dated 05/16/2022.  There is no pleural or pericardial fluid.     Impression:     No acute findings.     Left adrenal adenoma.     Centrilobular emphysema.        Electronically signed by: Kendell Taylor MD  Date:                                            02/02/2023  Time:                                           16:57      CT Neck 1/27/23  FINDINGS:  The visualized intracranial content is unremarkable.  The visualized portion of the lungs is significant for bilateral apical pleural thickening and/or scar as well as centrilobular emphysema.  The paranasal sinuses are significant for mild bilateral ethmoid mucous membrane thickening.  The mastoid air cells are well aerated.  The cervical spinal alignment and vertebral body heights are satisfactorily preserved.  There  is scattered degenerative change.     The visualized extracranial soft tissues and vascular structures from the base of the skull to the visualized portion of the superior mediastinum are significant for calcification of the carotid arteries bilaterally and calcification of the aortic arch.  There is postop change involving the right neck.  There are abnormal left-sided cervical lymph nodes including a partially necrotic enlarged lymph node with a short axis diameter of 1.2 cm located just anterior and superior to the left carotid bifurcation highly concerning for metastatic disease.  There are no priors available for comparison.     Impression:     Left cervical lymphadenopathy highly concerning for metastatic disease.     Centrilobular emphysema.     This report was flagged in Epic as abnormal.        Electronically signed by: Kendell Cherry MD  Date:                                            01/27/2023  Time:                                           14:43      ELI 2017     Findings: The right and left ABIs measure 0.99 and 1.07 which is normal.     Duplex and color flow Doppler evaluation demonstrates patent common femoral, superficial femoral, popliteal, posterior tibial, anterior tibial, and dorsal pedis arteries bilaterally. There is no doubling of velocity to suggest any hemodynamically significant stenosis.  IMPRESSION:    No evidence of any hemodynamically significant stenosis.        Electronically signed by: KENDELL CHERRY MD  Date:                                            08/21/17  Time:                                           16:15           Active Cardiac Conditions: None      Revised Cardiac Risk Index   High -Risk Surgery  Intraperitoneal; Intrathoracic; suprainguinal vascular Yes- + 1 No- 0   History of Ischemic Heart Disease   (Hx of MI/positive exercise test/current chest pain due to ischemia/use of nitrate therapy/EKG with pathological Q waves) Yes- + 1 No- 0   History of CHF  (Pulmonary  edema/bilateral rales or S3 gallop/PND/CXR showing pulmonary vascular redistribution) Yes- + 1 No- 0   History of CVA   (Prior stroke or TIA) Yes- + 1 No- 0   Pre-operative treatment with insulin Yes- + 1 No- 0   Pre-operative creatinine > 2mg/dl Yes- + 1 No- 0   Total: 0      Risk Status:  Estimated risk of cardiac complications after non-cardiac surgery using the Revised Cardiac Risk Index for Preoperative risk is 3.9 %      ARISCAT (Canet) risk index: Intermediate: 13.3% risk of post-op pulmonary complications.    American Society of Anesthesiologists Physical Status classification (ASA): 3    [facility] respiratory failure index: 4.2 %           No further cardiac workup needed prior to surgery.                   Assessment/Plan:     Anxiety disorder  Treated with: Vistaril/Klonopin prn; controlled symptoms.       HTN (hypertension)  Current BP  not at goal today; 167/80; attributes high readings to anxiety about surgery.    Taking: clonidine/amlodipine- took meds today  Occasional home BP reading taken: around 160s/80s; suggest continued follow-up with PCP. Not enough time to evaluate BP readings and change medication.   Keeping a healthy weight/BMI can help with better BP control    Lifestyle changes to reduce systolic BP:  Smoking cessation; exercise 30 minutes per day,  5 days per week or 150 minutes weekly; sodium reduction and avoidance of high salt foods such as processed meats, frozen meals and  fast foods.      I recommend monitoring BP during perioperative period as uncontrolled pain can elevate blood pressure.           Immunosuppression  Currently on tacrolimus secondary to liver transplant  Recent level was normal 10/5/22.    Thrombocytopenia  Platelet count:  142,000 on 1/24/23 labs  .   INR:   1.0   Not followed per Hematology      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  Scheduled with Dr. Templeton on 2/20/23 for neck dissection/transoral surgery.    Liver transplant 1/11/2015 for  HCV  Followed per Liver Transplant; last seen 3/2022; stable.   Recurrence of Hep C- treated with Harvoni from 10/15 to 3/2016  Treated with tacrolimus    Polyp of colon  Pathology results in 2018: Normal colonic mucosa with superficial hyperplastic changes      Chronic back pain  Treated with Tramadol; no back surgery  No loss of bladder or bowel control    Denies N/T to buttocks, perineum and inner surfaces of the thighs (saddle anesthesia)    Not followed per Pain Management ; managed per PCP.    Recent imaging: none  per patient     Substance induced mood disorder  Treated with: Lexapro/Seroquel/Remeron/gabapentin; controlled.   Followed per outside Psych and  talks to therapist every month.   Denies suicidal/homicidal ideations      Tobacco abuse  Advised to quit smoking to decrease risk of infection and delayed healing.   There is an increased risk for blood clots, cardiovascular and pulmonary complications.       Centrilobular emphysema  Per CT neck 1/27/23 and CT chest 2/2/23  Treated with Anoro inhaler daily/albuterol prn; stable .   Sats today: 98%  Current smoker  Followed by PCP.       PFTs available: old 4/8/21  Spirometry shows moderately severe obstruction, with close, but not significant, response to bronchodilator. That does not preclude possible benefit from these agents, however.   Lung volumes are moderately reduced.   The DLCO is moderately reduced.   Moderate COPD.          GERD (gastroesophageal reflux disease)  Currently being treated with Pepcid; controlled.    Smoking cessation was recommended.    Adrenal adenoma, left  Per CT chest 6/5/2020- stable on CT chest 2/2/23  Denies: excessive sweating/tachycardia/CP/SOB/headaches; has HTN   Recommend continued monitoring    BPH (benign prostatic hyperplasia)  Per personal history- was told long time ago  Not currently treated  LUTS: urgency/nocturia    There is an increased risk of post-op urinary retention.      Discussion/Management of  Perioperative Care    Thromboembolic prophylaxis (VTE) Care: Risk factors for thrombosis include: surgical procedure and tobacco use.  I recommend prophylaxis of thromboembolism with the use of compression stockings/pneumatic devices, and/or pharmacologic agents. The benefits should outweigh the risks for pharmacologic prophylaxis in the perioperative period. I also encourage early ambulation if not contraindicated during the post-operative period.    Risk factors for post-operative pulmonary complications include:COPD, HTN, surgery > 3 hours, and tobacco use. To reduce the risk of pulmonary complications, prophylactic recommendations include: smoking cessation, incentive spirometry use/deep breathing, end tidal carbon dioxide monitoring, pain control, and oral care.    I recommend monitoring sodium during the perioperative period. Pt. is currently on an SSRI which can be associated with SIADH. Surgical procedures are associated with hypersecretion of ADH as well.    Risk factors for renal complications include: HTN. To reduce the risk of postoperative renal complications, I recommend the patient maintain adequate fluid volume status by drinking 2 liters of water daily.  Avoid/reduce NSAIDS and MCFADDEN-2 inhibitors use as well as IV contrast for renal protection.    I recommend the use of appropriate prophylactic antibiotics to reduce the risk of surgical site infections.    Delirium risk factors include benzodiazepine use. I recommend to avoid/reduce use of benzodiazepine use (not for patients who take on a regular basis), anticholinergics, Benadryl,  and agents that may cause postoperative serotonin syndrome.  Controlled pain can decrease the risk for postop delirium and since opioids are used for postoperative pain control, I suggest using the lowest dose for the shortest amount of time necessary for pain management.     The patient is at an increased risk for urinary retention due to : BPH/LUTS. I recommend to  avoid/decrease the use of benzodiazepines, anticholinergics, and Benadryl in the perioperative period. I also recommend using opioids for the shortest period of time if possible.          This visit was focused on Preoperative evaluation, Perioperative Medical management, complication reduction plans. I suggest that the patient follows up with primary care or relevant sub specialists for ongoing health care.    I appreciate the opportunity to be involved in this patients care. Please feel free to contact me if there were any questions about this consultation.        I spent a total of 90 minutes on the day of the visit.This includes face to face time and non-face to face time preparing to see the patient (e.g., review of tests), obtaining and/or reviewing separately obtained history, documenting clinical information in the electronic or other health record, independently interpreting results and communicating results to the patient/family/caregiver, or care coordinator.       Patient is optimized for surgery.      Sina Dykes NP  Perioperative Medicine  [facility]

## 2023-02-15 NOTE — ASSESSMENT & PLAN NOTE
Per personal history- was told long time ago  Not currently treated  LUTS: urgency/nocturia    There is an increased risk of post-op urinary retention.

## 2023-02-15 NOTE — OUTPATIENT SUBJECTIVE & OBJECTIVE
Outpatient Subjective & Objective      Chief Complaint: Preoperative evaulation, perioperative medical management, and complication reduction plan.     Functional Capacity: No regular exercise regimen; parked in garage today and walked to POC CP/SOB. Reports SOB with lifting heavy objects. Hx of Emphysema.       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 02/01/2023    Tobacco use          Past Medical History Pertinent Negatives:   Diagnosis Date Noted    Anemia 02/15/2023    CHF (congestive heart failure) 02/15/2023    Coronary artery disease 02/15/2023    Deep vein thrombosis 02/15/2023    Diabetes mellitus, type 2 02/15/2023    Disorder of kidney and ureter 02/15/2023    Stroke 02/15/2023    Thyroid disease 02/15/2023         Past Surgical History:   Procedure Laterality Date    COLONOSCOPY N/A 6/14/2018    Procedure: COLONOSCOPY;  Surgeon: Conor Castro MD;  Location: [facility];  Service: Endoscopy;  Laterality: N/A;    ESOPHAGEAL VARICE LIGATION      ESOPHAGOGASTRODUODENOSCOPY N/A 6/14/2018    Procedure: ESOPHAGOGASTRODUODENOSCOPY (EGD);  Surgeon: Conor Castro MD;  Location: [facility];  Service: Endoscopy;  Laterality: N/A;    LARYNGOSCOPY N/A 1/31/2023    Procedure: LARYNGOSCOPY;  Surgeon: Guzman Alfonso MD;  Location: [address];  Service: ENT;  Laterality: N/A;  [id number]    LIVER TRANSPLANT      TIPS PROCEDURE         Review of Systems   Constitutional:  Negative for chills, fatigue, fever and unexpected weight change.   HENT:  Negative for dental problem,  "hearing loss, postnasal drip, rhinorrhea, sore throat, tinnitus and trouble swallowing.    Eyes:  Negative for photophobia, pain, discharge and visual disturbance.   Respiratory:  Positive for shortness of breath (with heavy lifting). Negative for apnea, cough, chest tightness and wheezing.         STOP BANG risk factors:  HTN  Male sex   Cardiovascular:  Negative for chest pain, palpitations and leg swelling.   Gastrointestinal:  Negative for abdominal pain, blood in stool, constipation, nausea and vomiting.   Endocrine: Negative for cold intolerance and heat intolerance.   Genitourinary:  Positive for urgency. Negative for decreased urine volume, difficulty urinating, dysuria, frequency and hematuria.        Nocturia  Hx enlarged prostate   Musculoskeletal:  Positive for back pain and neck pain. Negative for arthralgias and neck stiffness.   Skin:  Negative for rash and wound.   Neurological:  Negative for dizziness, tremors, seizures, syncope, weakness, numbness and headaches.   Hematological:  Negative for adenopathy. Bruises/bleeds easily.   Psychiatric/Behavioral:  Negative for confusion, sleep disturbance and suicidal ideas.             VITALS  Visit Vitals  BP (!) 167/80   Pulse 66   Temp 98.8 °F (37.1 °C) (Oral)   Resp 18   Ht 5' 11" (1.803 m)   Wt 75.9 kg (167 lb 4.8 oz)   SpO2 98%   BMI 23.33 kg/m²          Physical Exam  Vitals reviewed.   Constitutional:       General: He is not in acute distress.     Appearance: He is well-developed.   HENT:      Head: Normocephalic.      Nose: Nose normal.      Mouth/Throat:      Pharynx: No oropharyngeal exudate.   Eyes:      General:         Right eye: No discharge.         Left eye: No discharge.      Conjunctiva/sclera: Conjunctivae normal.      Pupils: Pupils are equal, round, and reactive to light.   Neck:      Thyroid: No thyromegaly.      Vascular: No carotid bruit or JVD.      Trachea: No tracheal deviation.     Cardiovascular:      Rate and Rhythm: Normal " rate and regular rhythm.      Pulses:           Carotid pulses are 2+ on the right side and 2+ on the left side.       Dorsalis pedis pulses are 2+ on the right side and 2+ on the left side.        Posterior tibial pulses are 2+ on the right side and 2+ on the left side.      Heart sounds: Normal heart sounds. No murmur heard.  Pulmonary:      Effort: Pulmonary effort is normal. No respiratory distress.      Breath sounds: Normal breath sounds. No stridor. No wheezing, rhonchi or rales.   Abdominal:      General: Bowel sounds are normal. There is no distension.      Palpations: Abdomen is soft.      Tenderness: There is no abdominal tenderness. There is no guarding.   Musculoskeletal:      Cervical back: Normal range of motion. Pain with movement (with extension/lateral movement) present. Normal range of motion.      Right lower leg: No edema.      Left lower leg: No edema.   Skin:     General: Skin is warm and dry.      Capillary Refill: Capillary refill takes less than 2 seconds.      Findings: No erythema or rash.   Neurological:      Mental Status: He is alert and oriented to person, place, and time.      Coordination: Coordination normal.   Psychiatric:         Mood and Affect: Affect is flat.        Significant Labs:  Lab Results   Component Value Date    WBC 6.96 01/24/2023    HGB 14.5 01/24/2023    HCT 44.9 01/24/2023     (L) 01/24/2023    CHOL 113 (L) 01/27/2022    TRIG 42 01/27/2022    HDL 66 01/27/2022    ALT 10 01/24/2023    AST 13 01/24/2023     01/24/2023    K 5.0 01/24/2023     01/24/2023    CREATININE 1.3 01/24/2023    BUN 9 01/24/2023    CO2 26 01/24/2023    TSH 2.490 01/27/2022    INR 1.1 03/13/2018    HGBA1C 5.0 01/27/2022       Exercise Stress 5/25/20  Normal left ventricular systolic function. The estimated ejection fraction is 60%.  No wall motion abnormalities.  The ECG portion of this study is negative for myocardial ischemia.  The stress echo portion of this study is  negative for myocardial ischemia.  Sensitivity is impaired due to the patient's failure to achieve target heart rate.  The test was stopped because the patient experienced fatigue, shortness of breath and claudication. The patient requested the test to be stopped.  There were no arrhythmias during stress.  The patient's exercise capacity was moderately impaired.        EK/15/23    Normal sinus rhythm   Normal ECG   When compared with ECG of 13-MAR-2018 19:52,   No significant change was found   Confirmed by BETY BULL MD ([id number]) on 2/15/2023 11:47:52 AM     Referred By: ROXANE MARKS           Confirmed By:BETY BULL MD    2D ECHO:  TTE:  No results found for this or any previous visit.      Imaging   CT chest 23     FINDINGS:  The soft tissues and vascular structures at the base the neck are unremarkable.  The mediastinum including the heart and great vessels is significant for calcification of the aorta, aortic annulus, and coronary arteries.  There is postop change at the gastroesophageal junction.  Recommend correlation prior surgical history.  The visualized intra-abdominal content is significant for a 2.5 cm left adrenal nodule which measures the attenuation of a lipid rich adenoma on noncontrast chest CT performed 2022.  The osseous structures demonstrate degenerative change.     The trachea is patent and free of any intraluminal filling defects.  The lungs are significant for centrilobular emphysema.  There is a 0.3 cm right upper lobe noncalcified pulmonary nodule series 4, image 276 unchanged compared to previous CT dated 2022.  There is no pleural or pericardial fluid.     Impression:     No acute findings.     Left adrenal adenoma.     Centrilobular emphysema.        Electronically signed by: Kendell Taylor MD  Date:                                            2023  Time:                                           16:57      CT Neck 23  FINDINGS:  The visualized  intracranial content is unremarkable.  The visualized portion of the lungs is significant for bilateral apical pleural thickening and/or scar as well as centrilobular emphysema.  The paranasal sinuses are significant for mild bilateral ethmoid mucous membrane thickening.  The mastoid air cells are well aerated.  The cervical spinal alignment and vertebral body heights are satisfactorily preserved.  There is scattered degenerative change.     The visualized extracranial soft tissues and vascular structures from the base of the skull to the visualized portion of the superior mediastinum are significant for calcification of the carotid arteries bilaterally and calcification of the aortic arch.  There is postop change involving the right neck.  There are abnormal left-sided cervical lymph nodes including a partially necrotic enlarged lymph node with a short axis diameter of 1.2 cm located just anterior and superior to the left carotid bifurcation highly concerning for metastatic disease.  There are no priors available for comparison.     Impression:     Left cervical lymphadenopathy highly concerning for metastatic disease.     Centrilobular emphysema.     This report was flagged in Epic as abnormal.        Electronically signed by: Kendell Cherry MD  Date:                                            01/27/2023  Time:                                           14:43      ELI 2017     Findings: The right and left ABIs measure 0.99 and 1.07 which is normal.     Duplex and color flow Doppler evaluation demonstrates patent common femoral, superficial femoral, popliteal, posterior tibial, anterior tibial, and dorsal pedis arteries bilaterally. There is no doubling of velocity to suggest any hemodynamically significant stenosis.  IMPRESSION:    No evidence of any hemodynamically significant stenosis.        Electronically signed by: KENDELL CHERRY MD  Date:                                            08/21/17  Time:                                            16:15           Active Cardiac Conditions: None      Revised Cardiac Risk Index   High -Risk Surgery  Intraperitoneal; Intrathoracic; suprainguinal vascular Yes- + 1 No- 0   History of Ischemic Heart Disease   (Hx of MI/positive exercise test/current chest pain due to ischemia/use of nitrate therapy/EKG with pathological Q waves) Yes- + 1 No- 0   History of CHF  (Pulmonary edema/bilateral rales or S3 gallop/PND/CXR showing pulmonary vascular redistribution) Yes- + 1 No- 0   History of CVA   (Prior stroke or TIA) Yes- + 1 No- 0   Pre-operative treatment with insulin Yes- + 1 No- 0   Pre-operative creatinine > 2mg/dl Yes- + 1 No- 0   Total: 0      Risk Status:  Estimated risk of cardiac complications after non-cardiac surgery using the Revised Cardiac Risk Index for Preoperative risk is 3.9 %      ARISCAT (Canet) risk index: Intermediate: 13.3% risk of post-op pulmonary complications.    American Society of Anesthesiologists Physical Status classification (ASA): 3    [facility] failure index: 4.2 %           No further cardiac workup needed prior to surgery.    Outpatient Subjective & Objective

## 2023-02-15 NOTE — ASSESSMENT & PLAN NOTE
Advised to quit smoking to decrease risk of infection and delayed healing.   There is an increased risk for blood clots, cardiovascular and pulmonary complications.

## 2023-02-15 NOTE — ASSESSMENT & PLAN NOTE
Per CT neck 1/27/23 and CT chest 2/2/23  Treated with Anoro inhaler daily/albuterol prn; stable .   Sats today: 98%  Current smoker  Followed by PCP.       PFTs available: old 4/8/21  Spirometry shows moderately severe obstruction, with close, but not significant, response to bronchodilator. That does not preclude possible benefit from these agents, however.   Lung volumes are moderately reduced.   The DLCO is moderately reduced.   Moderate COPD.

## 2023-02-16 ENCOUNTER — ANESTHESIA EVENT (OUTPATIENT)
Dept: SURGERY | Facility: HOSPITAL | Age: 63
DRG: 003 | End: 2023-02-16
Payer: MEDICAID

## 2023-02-17 ENCOUNTER — TELEPHONE (OUTPATIENT)
Dept: OTOLARYNGOLOGY | Facility: CLINIC | Age: 63
End: 2023-02-17
Payer: MEDICAID

## 2023-02-19 RX ORDER — HALOPERIDOL 5 MG/ML
0.5 INJECTION INTRAMUSCULAR EVERY 10 MIN PRN
Status: CANCELLED | OUTPATIENT
Start: 2023-02-19

## 2023-02-19 RX ORDER — HYDROMORPHONE HYDROCHLORIDE 1 MG/ML
0.2 INJECTION, SOLUTION INTRAMUSCULAR; INTRAVENOUS; SUBCUTANEOUS EVERY 5 MIN PRN
Status: CANCELLED | OUTPATIENT
Start: 2023-02-19

## 2023-02-19 RX ORDER — SODIUM CHLORIDE 0.9 % (FLUSH) 0.9 %
10 SYRINGE (ML) INJECTION
Status: CANCELLED | OUTPATIENT
Start: 2023-02-19

## 2023-02-20 ENCOUNTER — ANESTHESIA (OUTPATIENT)
Dept: SURGERY | Facility: HOSPITAL | Age: 63
DRG: 003 | End: 2023-02-20
Payer: MEDICAID

## 2023-02-20 ENCOUNTER — HOSPITAL ENCOUNTER (INPATIENT)
Facility: HOSPITAL | Age: 63
LOS: 18 days | Discharge: HOME | DRG: 003 | End: 2023-03-10
Attending: OTOLARYNGOLOGY | Admitting: OTOLARYNGOLOGY
Payer: MEDICAID

## 2023-02-20 ENCOUNTER — ANESTHESIA EVENT (OUTPATIENT)
Dept: SURGERY | Facility: HOSPITAL | Age: 63
DRG: 003 | End: 2023-02-20
Payer: MEDICAID

## 2023-02-20 DIAGNOSIS — C10.2 MALIGNANT NEOPLASM OF LATERAL WALL OF OROPHARYNX: ICD-10-CM

## 2023-02-20 DIAGNOSIS — G89.18 ACUTE POSTOPERATIVE PAIN: ICD-10-CM

## 2023-02-20 DIAGNOSIS — C09.9 SQUAMOUS CELL CARCINOMA OF LEFT TONSIL: ICD-10-CM

## 2023-02-20 DIAGNOSIS — I49.3 PVC (PREMATURE VENTRICULAR CONTRACTION): ICD-10-CM

## 2023-02-20 DIAGNOSIS — D84.9 IMMUNOSUPPRESSION: ICD-10-CM

## 2023-02-20 DIAGNOSIS — J95.821 ACUTE POSTOPERATIVE RESPIRATORY FAILURE: ICD-10-CM

## 2023-02-20 DIAGNOSIS — J95.830: Primary | ICD-10-CM

## 2023-02-20 DIAGNOSIS — E87.8 ELECTROLYTE ABNORMALITY: ICD-10-CM

## 2023-02-20 LAB
ABO + RH BLD: NORMAL
ALBUMIN SERPL BCP-MCNC: 3.9 G/DL (ref 3.5–5.2)
ALLENS TEST: ABNORMAL
ALP SERPL-CCNC: 61 U/L (ref 55–135)
ALT SERPL W/O P-5'-P-CCNC: 9 U/L (ref 10–44)
ANION GAP SERPL CALC-SCNC: 12 MMOL/L (ref 8–16)
APTT BLDCRRT: 25.5 SEC (ref 21–32)
AST SERPL-CCNC: 18 U/L (ref 10–40)
BASOPHILS # BLD AUTO: 0.05 K/UL (ref 0–0.2)
BASOPHILS NFR BLD: 0.3 % (ref 0–1.9)
BILIRUB SERPL-MCNC: 0.8 MG/DL (ref 0.1–1)
BLD GP AB SCN CELLS X3 SERPL QL: NORMAL
BUN SERPL-MCNC: 12 MG/DL (ref 8–23)
CALCIUM SERPL-MCNC: 8.9 MG/DL (ref 8.7–10.5)
CHLORIDE SERPL-SCNC: 105 MMOL/L (ref 95–110)
CO2 SERPL-SCNC: 19 MMOL/L (ref 23–29)
CREAT SERPL-MCNC: 1.1 MG/DL (ref 0.5–1.4)
DELSYS: ABNORMAL
DIFFERENTIAL METHOD: ABNORMAL
EOSINOPHIL # BLD AUTO: 0 K/UL (ref 0–0.5)
EOSINOPHIL NFR BLD: 0.1 % (ref 0–8)
ERYTHROCYTE [DISTWIDTH] IN BLOOD BY AUTOMATED COUNT: 13.1 % (ref 11.5–14.5)
ERYTHROCYTE [SEDIMENTATION RATE] IN BLOOD BY WESTERGREN METHOD: 18 MM/H
EST. GFR  (NO RACE VARIABLE): >60 ML/MIN/1.73 M^2
FIO2: 40
GLUCOSE SERPL-MCNC: 140 MG/DL (ref 70–110)
HCO3 UR-SCNC: 24.3 MMOL/L (ref 24–28)
HCT VFR BLD AUTO: 45.4 % (ref 40–54)
HCT VFR BLD CALC: 45 %PCV (ref 36–54)
HGB BLD-MCNC: 14.8 G/DL (ref 14–18)
IMM GRANULOCYTES # BLD AUTO: 0.09 K/UL (ref 0–0.04)
IMM GRANULOCYTES NFR BLD AUTO: 0.6 % (ref 0–0.5)
INR PPP: 1 (ref 0.8–1.2)
LYMPHOCYTES # BLD AUTO: 0.8 K/UL (ref 1–4.8)
LYMPHOCYTES NFR BLD: 5.3 % (ref 18–48)
MAGNESIUM SERPL-MCNC: 1.7 MG/DL (ref 1.6–2.6)
MCH RBC QN AUTO: 30.6 PG (ref 27–31)
MCHC RBC AUTO-ENTMCNC: 32.6 G/DL (ref 32–36)
MCV RBC AUTO: 94 FL (ref 82–98)
MODE: ABNORMAL
MONOCYTES # BLD AUTO: 0.3 K/UL (ref 0.3–1)
MONOCYTES NFR BLD: 2.1 % (ref 4–15)
NEUTROPHILS # BLD AUTO: 13.3 K/UL (ref 1.8–7.7)
NEUTROPHILS NFR BLD: 91.6 % (ref 38–73)
NRBC BLD-RTO: 0 /100 WBC
PCO2 BLDA: 45.6 MMHG (ref 35–45)
PEEP: 5
PH SMN: 7.33 [PH] (ref 7.35–7.45)
PHOSPHATE SERPL-MCNC: 3.1 MG/DL (ref 2.7–4.5)
PLATELET # BLD AUTO: 137 K/UL (ref 150–450)
PMV BLD AUTO: 9.8 FL (ref 9.2–12.9)
PO2 BLDA: 111 MMHG (ref 80–100)
POC BE: -2 MMOL/L
POC SATURATED O2: 98 % (ref 95–100)
POC TCO2: 26 MMOL/L (ref 23–27)
POCT GLUCOSE: 153 MG/DL (ref 70–110)
POCT GLUCOSE: 155 MG/DL (ref 70–110)
POTASSIUM SERPL-SCNC: 4.9 MMOL/L (ref 3.5–5.1)
PROT SERPL-MCNC: 6.9 G/DL (ref 6–8.4)
PROTHROMBIN TIME: 10.9 SEC (ref 9–12.5)
RBC # BLD AUTO: 4.84 M/UL (ref 4.6–6.2)
SAMPLE: ABNORMAL
SITE: ABNORMAL
SODIUM SERPL-SCNC: 136 MMOL/L (ref 136–145)
VT: 480
WBC # BLD AUTO: 14.57 K/UL (ref 3.9–12.7)

## 2023-02-20 PROCEDURE — 25000003 PHARM REV CODE 250: Performed by: STUDENT IN AN ORGANIZED HEALTH CARE EDUCATION/TRAINING PROGRAM

## 2023-02-20 PROCEDURE — 85025 COMPLETE CBC W/AUTO DIFF WBC: CPT | Mod: 91 | Performed by: OTOLARYNGOLOGY

## 2023-02-20 PROCEDURE — 35800 PR EXPLOR POSTOP BLEED,INFEC,CLOT-NECK: ICD-10-PCS | Mod: 58,,, | Performed by: OTOLARYNGOLOGY

## 2023-02-20 PROCEDURE — 37000008 HC ANESTHESIA 1ST 15 MINUTES: Performed by: OTOLARYNGOLOGY

## 2023-02-20 PROCEDURE — 99900035 HC TECH TIME PER 15 MIN (STAT)

## 2023-02-20 PROCEDURE — 63600175 PHARM REV CODE 636 W HCPCS

## 2023-02-20 PROCEDURE — 36600 WITHDRAWAL OF ARTERIAL BLOOD: CPT

## 2023-02-20 PROCEDURE — 85025 COMPLETE CBC W/AUTO DIFF WBC: CPT

## 2023-02-20 PROCEDURE — 85730 THROMBOPLASTIN TIME PARTIAL: CPT

## 2023-02-20 PROCEDURE — 85610 PROTHROMBIN TIME: CPT

## 2023-02-20 PROCEDURE — 63600175 PHARM REV CODE 636 W HCPCS: Performed by: STUDENT IN AN ORGANIZED HEALTH CARE EDUCATION/TRAINING PROGRAM

## 2023-02-20 PROCEDURE — 94002 VENT MGMT INPAT INIT DAY: CPT

## 2023-02-20 PROCEDURE — 80053 COMPREHEN METABOLIC PANEL: CPT

## 2023-02-20 PROCEDURE — 25000003 PHARM REV CODE 250: Performed by: OTOLARYNGOLOGY

## 2023-02-20 PROCEDURE — 31525 PR LARYNGOSCOPY,DIRECT,DIAGNOSTIC: ICD-10-PCS | Mod: 51,,, | Performed by: OTOLARYNGOLOGY

## 2023-02-20 PROCEDURE — D9220A PRA ANESTHESIA: Mod: 59,ANES,, | Performed by: ANESTHESIOLOGY

## 2023-02-20 PROCEDURE — 37000009 HC ANESTHESIA EA ADD 15 MINS: Performed by: OTOLARYNGOLOGY

## 2023-02-20 PROCEDURE — 36000711: Performed by: OTOLARYNGOLOGY

## 2023-02-20 PROCEDURE — D9220A PRA ANESTHESIA: ICD-10-PCS | Mod: ,,, | Performed by: STUDENT IN AN ORGANIZED HEALTH CARE EDUCATION/TRAINING PROGRAM

## 2023-02-20 PROCEDURE — 42890 LIMITED PHARYNGECTOMY: CPT | Mod: 51,,, | Performed by: OTOLARYNGOLOGY

## 2023-02-20 PROCEDURE — D9220A PRA ANESTHESIA: Mod: ,,, | Performed by: STUDENT IN AN ORGANIZED HEALTH CARE EDUCATION/TRAINING PROGRAM

## 2023-02-20 PROCEDURE — 20000000 HC ICU ROOM

## 2023-02-20 PROCEDURE — 36000706: Performed by: OTOLARYNGOLOGY

## 2023-02-20 PROCEDURE — 82962 GLUCOSE BLOOD TEST: CPT

## 2023-02-20 PROCEDURE — D9220A PRA ANESTHESIA: ICD-10-PCS | Mod: 59,CRNA,, | Performed by: NURSE ANESTHETIST, CERTIFIED REGISTERED

## 2023-02-20 PROCEDURE — 63600175 PHARM REV CODE 636 W HCPCS: Performed by: OTOLARYNGOLOGY

## 2023-02-20 PROCEDURE — 86900 BLOOD TYPING SEROLOGIC ABO: CPT

## 2023-02-20 PROCEDURE — 83735 ASSAY OF MAGNESIUM: CPT

## 2023-02-20 PROCEDURE — D9220A PRA ANESTHESIA: Mod: 59,CRNA,, | Performed by: NURSE ANESTHETIST, CERTIFIED REGISTERED

## 2023-02-20 PROCEDURE — 35800 EXPLORE NECK VESSELS: CPT | Mod: 58,,, | Performed by: OTOLARYNGOLOGY

## 2023-02-20 PROCEDURE — 82803 BLOOD GASES ANY COMBINATION: CPT

## 2023-02-20 PROCEDURE — 38724 REMOVAL OF LYMPH NODES NECK: CPT | Mod: LT,,, | Performed by: OTOLARYNGOLOGY

## 2023-02-20 PROCEDURE — 25000003 PHARM REV CODE 250

## 2023-02-20 PROCEDURE — 42890 PR PARTIAL REMOVAL OF PHARYNX: ICD-10-PCS | Mod: 51,,, | Performed by: OTOLARYNGOLOGY

## 2023-02-20 PROCEDURE — 84100 ASSAY OF PHOSPHORUS: CPT

## 2023-02-20 PROCEDURE — 99499 UNLISTED E&M SERVICE: CPT | Mod: ,,, | Performed by: ANESTHESIOLOGY

## 2023-02-20 PROCEDURE — 94761 N-INVAS EAR/PLS OXIMETRY MLT: CPT

## 2023-02-20 PROCEDURE — 31525 DX LARYNGOSCOPY EXCL NB: CPT | Mod: 51,,, | Performed by: OTOLARYNGOLOGY

## 2023-02-20 PROCEDURE — 36000707: Performed by: OTOLARYNGOLOGY

## 2023-02-20 PROCEDURE — 99499 NO LOS: ICD-10-PCS | Mod: ,,, | Performed by: ANESTHESIOLOGY

## 2023-02-20 PROCEDURE — 80197 ASSAY OF TACROLIMUS: CPT

## 2023-02-20 PROCEDURE — 27201423 OPTIME MED/SURG SUP & DEVICES STERILE SUPPLY: Performed by: OTOLARYNGOLOGY

## 2023-02-20 PROCEDURE — 38724 PR REMOVAL NODES, NECK,CERV MOD RAD: ICD-10-PCS | Mod: LT,,, | Performed by: OTOLARYNGOLOGY

## 2023-02-20 PROCEDURE — 36000710: Performed by: OTOLARYNGOLOGY

## 2023-02-20 PROCEDURE — 27000221 HC OXYGEN, UP TO 24 HOURS

## 2023-02-20 PROCEDURE — D9220A PRA ANESTHESIA: ICD-10-PCS | Mod: 59,ANES,, | Performed by: ANESTHESIOLOGY

## 2023-02-20 RX ORDER — ESCITALOPRAM OXALATE 20 MG/1
20 TABLET ORAL NIGHTLY
Status: DISCONTINUED | OUTPATIENT
Start: 2023-02-20 | End: 2023-03-02

## 2023-02-20 RX ORDER — CALCIUM GLUCONATE 20 MG/ML
2 INJECTION, SOLUTION INTRAVENOUS
Status: DISCONTINUED | OUTPATIENT
Start: 2023-02-20 | End: 2023-03-06

## 2023-02-20 RX ORDER — FENTANYL CITRATE 50 UG/ML
INJECTION, SOLUTION INTRAMUSCULAR; INTRAVENOUS
Status: DISCONTINUED | OUTPATIENT
Start: 2023-02-20 | End: 2023-02-20

## 2023-02-20 RX ORDER — AMLODIPINE BESYLATE 5 MG/1
5 TABLET ORAL DAILY
Status: DISCONTINUED | OUTPATIENT
Start: 2023-02-20 | End: 2023-02-27

## 2023-02-20 RX ORDER — MAGNESIUM SULFATE HEPTAHYDRATE 40 MG/ML
2 INJECTION, SOLUTION INTRAVENOUS
Status: DISCONTINUED | OUTPATIENT
Start: 2023-02-20 | End: 2023-03-06

## 2023-02-20 RX ORDER — ACETAMINOPHEN 10 MG/ML
INJECTION, SOLUTION INTRAVENOUS
Status: DISCONTINUED | OUTPATIENT
Start: 2023-02-20 | End: 2023-02-20

## 2023-02-20 RX ORDER — VALACYCLOVIR HYDROCHLORIDE 500 MG/1
1000 TABLET, FILM COATED ORAL 3 TIMES DAILY
Status: DISCONTINUED | OUTPATIENT
Start: 2023-02-20 | End: 2023-02-22

## 2023-02-20 RX ORDER — POLYETHYLENE GLYCOL 3350 17 G/17G
17 POWDER, FOR SOLUTION ORAL DAILY
Status: DISCONTINUED | OUTPATIENT
Start: 2023-02-20 | End: 2023-03-10 | Stop reason: HOSPADM

## 2023-02-20 RX ORDER — SODIUM CHLORIDE 0.9 % (FLUSH) 0.9 %
10 SYRINGE (ML) INJECTION
Status: DISCONTINUED | OUTPATIENT
Start: 2023-02-20 | End: 2023-02-21

## 2023-02-20 RX ORDER — FENTANYL CITRATE 50 UG/ML
INJECTION, SOLUTION INTRAMUSCULAR; INTRAVENOUS
Status: COMPLETED
Start: 2023-02-20 | End: 2023-02-20

## 2023-02-20 RX ORDER — ROCURONIUM BROMIDE 10 MG/ML
INJECTION, SOLUTION INTRAVENOUS
Status: DISCONTINUED | OUTPATIENT
Start: 2023-02-20 | End: 2023-02-20

## 2023-02-20 RX ORDER — PROPOFOL 10 MG/ML
VIAL (ML) INTRAVENOUS
Status: DISCONTINUED | OUTPATIENT
Start: 2023-02-20 | End: 2023-02-20

## 2023-02-20 RX ORDER — AMPICILLIN AND SULBACTAM 2; 1 G/1; G/1
INJECTION, POWDER, FOR SOLUTION INTRAMUSCULAR; INTRAVENOUS
Status: DISCONTINUED | OUTPATIENT
Start: 2023-02-20 | End: 2023-02-20

## 2023-02-20 RX ORDER — POTASSIUM CHLORIDE 7.45 MG/ML
80 INJECTION INTRAVENOUS
Status: DISCONTINUED | OUTPATIENT
Start: 2023-02-20 | End: 2023-03-06

## 2023-02-20 RX ORDER — LIDOCAINE HYDROCHLORIDE AND EPINEPHRINE 10; 10 MG/ML; UG/ML
INJECTION, SOLUTION INFILTRATION; PERINEURAL
Status: DISCONTINUED | OUTPATIENT
Start: 2023-02-20 | End: 2023-02-20 | Stop reason: HOSPADM

## 2023-02-20 RX ORDER — POTASSIUM CHLORIDE 7.45 MG/ML
40 INJECTION INTRAVENOUS
Status: DISCONTINUED | OUTPATIENT
Start: 2023-02-20 | End: 2023-03-06

## 2023-02-20 RX ORDER — LIDOCAINE HYDROCHLORIDE 10 MG/ML
1 INJECTION, SOLUTION EPIDURAL; INFILTRATION; INTRACAUDAL; PERINEURAL ONCE
Status: COMPLETED | OUTPATIENT
Start: 2023-02-20 | End: 2023-02-20

## 2023-02-20 RX ORDER — FENTANYL CITRATE 50 UG/ML
50 INJECTION, SOLUTION INTRAMUSCULAR; INTRAVENOUS
Status: DISCONTINUED | OUTPATIENT
Start: 2023-02-20 | End: 2023-03-01

## 2023-02-20 RX ORDER — POTASSIUM CHLORIDE 7.45 MG/ML
60 INJECTION INTRAVENOUS
Status: DISCONTINUED | OUTPATIENT
Start: 2023-02-20 | End: 2023-03-06

## 2023-02-20 RX ORDER — TACROLIMUS 1 MG/1
1 CAPSULE ORAL EVERY EVENING
Status: DISCONTINUED | OUTPATIENT
Start: 2023-02-20 | End: 2023-02-20

## 2023-02-20 RX ORDER — OXYMETAZOLINE HCL 0.05 %
SPRAY, NON-AEROSOL (ML) NASAL
Status: DISCONTINUED | OUTPATIENT
Start: 2023-02-20 | End: 2023-02-20 | Stop reason: HOSPADM

## 2023-02-20 RX ORDER — ACETAMINOPHEN 325 MG/1
650 TABLET ORAL EVERY 8 HOURS
Status: DISCONTINUED | OUTPATIENT
Start: 2023-02-20 | End: 2023-03-03

## 2023-02-20 RX ORDER — TACROLIMUS 1 MG/1
1 CAPSULE ORAL 2 TIMES DAILY
Status: DISCONTINUED | OUTPATIENT
Start: 2023-02-20 | End: 2023-02-20

## 2023-02-20 RX ORDER — ONDANSETRON 2 MG/ML
4 INJECTION INTRAMUSCULAR; INTRAVENOUS EVERY 8 HOURS PRN
Status: DISCONTINUED | OUTPATIENT
Start: 2023-02-20 | End: 2023-03-10 | Stop reason: HOSPADM

## 2023-02-20 RX ORDER — OXYCODONE HYDROCHLORIDE 5 MG/1
5 TABLET ORAL EVERY 4 HOURS PRN
Status: DISCONTINUED | OUTPATIENT
Start: 2023-02-20 | End: 2023-02-20

## 2023-02-20 RX ORDER — SODIUM CHLORIDE 0.9 % (FLUSH) 0.9 %
10 SYRINGE (ML) INJECTION
Status: DISCONTINUED | OUTPATIENT
Start: 2023-02-20 | End: 2023-02-20

## 2023-02-20 RX ORDER — CLONAZEPAM 0.5 MG/1
0.5 TABLET ORAL 2 TIMES DAILY PRN
Status: DISCONTINUED | OUTPATIENT
Start: 2023-02-20 | End: 2023-02-28

## 2023-02-20 RX ORDER — MAGNESIUM SULFATE HEPTAHYDRATE 40 MG/ML
4 INJECTION, SOLUTION INTRAVENOUS
Status: DISCONTINUED | OUTPATIENT
Start: 2023-02-20 | End: 2023-03-06

## 2023-02-20 RX ORDER — KETAMINE HCL IN 0.9 % NACL 50 MG/5 ML
SYRINGE (ML) INTRAVENOUS
Status: DISCONTINUED | OUTPATIENT
Start: 2023-02-20 | End: 2023-02-20

## 2023-02-20 RX ORDER — PROPOFOL 10 MG/ML
0-50 INJECTION, EMULSION INTRAVENOUS CONTINUOUS
Status: DISCONTINUED | OUTPATIENT
Start: 2023-02-20 | End: 2023-02-22

## 2023-02-20 RX ORDER — OXYMETAZOLINE HCL 0.05 %
SPRAY, NON-AEROSOL (ML) NASAL
Status: DISCONTINUED | OUTPATIENT
Start: 2023-02-20 | End: 2023-02-20

## 2023-02-20 RX ORDER — QUETIAPINE FUMARATE 25 MG/1
50 TABLET, FILM COATED ORAL NIGHTLY
Status: DISCONTINUED | OUTPATIENT
Start: 2023-02-20 | End: 2023-03-03

## 2023-02-20 RX ORDER — MIRTAZAPINE 15 MG/1
15 TABLET, FILM COATED ORAL NIGHTLY
Status: DISCONTINUED | OUTPATIENT
Start: 2023-02-20 | End: 2023-03-02

## 2023-02-20 RX ORDER — DEXTROSE MONOHYDRATE, SODIUM CHLORIDE, AND POTASSIUM CHLORIDE 50; 1.49; 9 G/1000ML; G/1000ML; G/1000ML
INJECTION, SOLUTION INTRAVENOUS CONTINUOUS
Status: DISCONTINUED | OUTPATIENT
Start: 2023-02-20 | End: 2023-02-20

## 2023-02-20 RX ORDER — TRAMADOL HYDROCHLORIDE 50 MG/1
50 TABLET ORAL EVERY 6 HOURS PRN
Status: DISCONTINUED | OUTPATIENT
Start: 2023-02-20 | End: 2023-02-20

## 2023-02-20 RX ORDER — ENOXAPARIN SODIUM 100 MG/ML
40 INJECTION SUBCUTANEOUS EVERY 24 HOURS
Status: DISCONTINUED | OUTPATIENT
Start: 2023-02-21 | End: 2023-02-27

## 2023-02-20 RX ORDER — MORPHINE SULFATE 2 MG/ML
4 INJECTION, SOLUTION INTRAMUSCULAR; INTRAVENOUS EVERY 4 HOURS PRN
Status: DISCONTINUED | OUTPATIENT
Start: 2023-02-20 | End: 2023-02-20

## 2023-02-20 RX ORDER — HYDRALAZINE HYDROCHLORIDE 20 MG/ML
10 INJECTION INTRAMUSCULAR; INTRAVENOUS EVERY 6 HOURS PRN
Status: DISCONTINUED | OUTPATIENT
Start: 2023-02-20 | End: 2023-03-10 | Stop reason: HOSPADM

## 2023-02-20 RX ORDER — CALCIUM GLUCONATE 20 MG/ML
1 INJECTION, SOLUTION INTRAVENOUS
Status: DISCONTINUED | OUTPATIENT
Start: 2023-02-20 | End: 2023-03-06

## 2023-02-20 RX ORDER — SODIUM CHLORIDE 9 MG/ML
INJECTION, SOLUTION INTRAVENOUS ONCE
Status: COMPLETED | OUTPATIENT
Start: 2023-02-20 | End: 2023-02-20

## 2023-02-20 RX ORDER — LABETALOL HCL 20 MG/4 ML
10 SYRINGE (ML) INTRAVENOUS EVERY 6 HOURS PRN
Status: DISCONTINUED | OUTPATIENT
Start: 2023-02-20 | End: 2023-03-10 | Stop reason: HOSPADM

## 2023-02-20 RX ORDER — CLONIDINE HYDROCHLORIDE 0.1 MG/1
0.1 TABLET ORAL NIGHTLY
Status: DISCONTINUED | OUTPATIENT
Start: 2023-02-20 | End: 2023-03-06

## 2023-02-20 RX ORDER — DEXAMETHASONE SODIUM PHOSPHATE 4 MG/ML
INJECTION, SOLUTION INTRA-ARTICULAR; INTRALESIONAL; INTRAMUSCULAR; INTRAVENOUS; SOFT TISSUE
Status: DISCONTINUED | OUTPATIENT
Start: 2023-02-20 | End: 2023-02-20

## 2023-02-20 RX ORDER — LIDOCAINE HYDROCHLORIDE 20 MG/ML
INJECTION, SOLUTION EPIDURAL; INFILTRATION; INTRACAUDAL; PERINEURAL
Status: DISCONTINUED | OUTPATIENT
Start: 2023-02-20 | End: 2023-02-20

## 2023-02-20 RX ORDER — MIDAZOLAM HYDROCHLORIDE 1 MG/ML
INJECTION, SOLUTION INTRAMUSCULAR; INTRAVENOUS
Status: DISCONTINUED | OUTPATIENT
Start: 2023-02-20 | End: 2023-02-20

## 2023-02-20 RX ORDER — FAMOTIDINE 10 MG/ML
20 INJECTION INTRAVENOUS EVERY 12 HOURS
Status: DISCONTINUED | OUTPATIENT
Start: 2023-02-20 | End: 2023-02-23

## 2023-02-20 RX ORDER — FENTANYL CITRATE 50 UG/ML
100 INJECTION, SOLUTION INTRAMUSCULAR; INTRAVENOUS ONCE
Status: COMPLETED | OUTPATIENT
Start: 2023-02-20 | End: 2023-02-20

## 2023-02-20 RX ORDER — TACROLIMUS 1 MG/1
2 CAPSULE ORAL EVERY MORNING
Status: DISCONTINUED | OUTPATIENT
Start: 2023-02-21 | End: 2023-02-20

## 2023-02-20 RX ORDER — GLUCAGON 1 MG
1 KIT INJECTION
Status: DISCONTINUED | OUTPATIENT
Start: 2023-02-20 | End: 2023-03-06

## 2023-02-20 RX ORDER — CALCIUM GLUCONATE 20 MG/ML
3 INJECTION, SOLUTION INTRAVENOUS
Status: DISCONTINUED | OUTPATIENT
Start: 2023-02-20 | End: 2023-03-06

## 2023-02-20 RX ORDER — ONDANSETRON 2 MG/ML
INJECTION INTRAMUSCULAR; INTRAVENOUS
Status: DISCONTINUED | OUTPATIENT
Start: 2023-02-20 | End: 2023-02-20

## 2023-02-20 RX ORDER — BACITRACIN ZINC 500 UNIT/G
OINTMENT (GRAM) TOPICAL
Status: DISCONTINUED | OUTPATIENT
Start: 2023-02-20 | End: 2023-02-20 | Stop reason: HOSPADM

## 2023-02-20 RX ORDER — PHENYLEPHRINE HYDROCHLORIDE 10 MG/ML
INJECTION INTRAVENOUS
Status: DISCONTINUED | OUTPATIENT
Start: 2023-02-20 | End: 2023-02-20

## 2023-02-20 RX ORDER — INSULIN ASPART 100 [IU]/ML
0-5 INJECTION, SOLUTION INTRAVENOUS; SUBCUTANEOUS EVERY 6 HOURS PRN
Status: DISCONTINUED | OUTPATIENT
Start: 2023-02-20 | End: 2023-03-06

## 2023-02-20 RX ORDER — GABAPENTIN 300 MG/1
600 CAPSULE ORAL 3 TIMES DAILY
Status: DISCONTINUED | OUTPATIENT
Start: 2023-02-20 | End: 2023-03-01

## 2023-02-20 RX ORDER — ACETAMINOPHEN 325 MG/1
650 TABLET ORAL EVERY 4 HOURS PRN
Status: DISCONTINUED | OUTPATIENT
Start: 2023-02-20 | End: 2023-02-20

## 2023-02-20 RX ADMIN — FENTANYL CITRATE 25 MCG: 50 INJECTION, SOLUTION INTRAMUSCULAR; INTRAVENOUS at 08:02

## 2023-02-20 RX ADMIN — AMPICILLIN SODIUM AND SULBACTAM SODIUM 3 G: 2; 1 INJECTION, POWDER, FOR SOLUTION INTRAMUSCULAR; INTRAVENOUS at 09:02

## 2023-02-20 RX ADMIN — TACROLIMUS 1 MG: 1 CAPSULE ORAL at 05:02

## 2023-02-20 RX ADMIN — FAMOTIDINE 20 MG: 10 INJECTION INTRAVENOUS at 10:02

## 2023-02-20 RX ADMIN — LIDOCAINE HYDROCHLORIDE 100 MG: 20 INJECTION, SOLUTION EPIDURAL; INFILTRATION; INTRACAUDAL; PERINEURAL at 09:02

## 2023-02-20 RX ADMIN — ROCURONIUM BROMIDE 20 MG: 10 INJECTION, SOLUTION INTRAVENOUS at 12:02

## 2023-02-20 RX ADMIN — ROCURONIUM BROMIDE 20 MG: 10 INJECTION, SOLUTION INTRAVENOUS at 10:02

## 2023-02-20 RX ADMIN — PHENYLEPHRINE HYDROCHLORIDE 300 MCG: 10 INJECTION INTRAVENOUS at 08:02

## 2023-02-20 RX ADMIN — Medication 10 MG: at 11:02

## 2023-02-20 RX ADMIN — MIDAZOLAM HYDROCHLORIDE 2 MG: 1 INJECTION, SOLUTION INTRAMUSCULAR; INTRAVENOUS at 08:02

## 2023-02-20 RX ADMIN — DEXAMETHASONE SODIUM PHOSPHATE 4 MG: 4 INJECTION, SOLUTION INTRAMUSCULAR; INTRAVENOUS at 09:02

## 2023-02-20 RX ADMIN — VALACYCLOVIR HYDROCHLORIDE 1000 MG: 500 TABLET, FILM COATED ORAL at 05:02

## 2023-02-20 RX ADMIN — ONDANSETRON 4 MG: 2 INJECTION INTRAMUSCULAR; INTRAVENOUS at 12:02

## 2023-02-20 RX ADMIN — ACETAMINOPHEN 1000 MG: 10 INJECTION INTRAVENOUS at 09:02

## 2023-02-20 RX ADMIN — ROCURONIUM BROMIDE 10 MG: 10 INJECTION, SOLUTION INTRAVENOUS at 10:02

## 2023-02-20 RX ADMIN — PROPOFOL 30 MG: 10 INJECTION, EMULSION INTRAVENOUS at 08:02

## 2023-02-20 RX ADMIN — POLYETHYLENE GLYCOL 3350 17 G: 17 POWDER, FOR SOLUTION ORAL at 03:02

## 2023-02-20 RX ADMIN — ROCURONIUM BROMIDE 50 MG: 10 INJECTION INTRAVENOUS at 09:02

## 2023-02-20 RX ADMIN — FENTANYL CITRATE 25 MCG: 50 INJECTION INTRAMUSCULAR; INTRAVENOUS at 12:02

## 2023-02-20 RX ADMIN — ROCURONIUM BROMIDE 10 MG: 10 INJECTION INTRAVENOUS at 08:02

## 2023-02-20 RX ADMIN — PHENYLEPHRINE HYDROCHLORIDE 200 MCG: 10 INJECTION INTRAVENOUS at 08:02

## 2023-02-20 RX ADMIN — FENTANYL CITRATE 50 MCG: 50 INJECTION INTRAMUSCULAR; INTRAVENOUS at 05:02

## 2023-02-20 RX ADMIN — ROCURONIUM BROMIDE 40 MG: 10 INJECTION INTRAVENOUS at 08:02

## 2023-02-20 RX ADMIN — FENTANYL CITRATE 100 MCG: 50 INJECTION INTRAMUSCULAR; INTRAVENOUS at 09:02

## 2023-02-20 RX ADMIN — VALACYCLOVIR HYDROCHLORIDE 1000 MG: 500 TABLET, FILM COATED ORAL at 10:02

## 2023-02-20 RX ADMIN — ROCURONIUM BROMIDE 10 MG: 10 INJECTION, SOLUTION INTRAVENOUS at 11:02

## 2023-02-20 RX ADMIN — LIDOCAINE HYDROCHLORIDE 1 MG: 10 INJECTION, SOLUTION EPIDURAL; INFILTRATION; INTRACAUDAL; PERINEURAL at 07:02

## 2023-02-20 RX ADMIN — QUETIAPINE FUMARATE 50 MG: 25 TABLET ORAL at 10:02

## 2023-02-20 RX ADMIN — PHENYLEPHRINE HYDROCHLORIDE 100 MCG: 10 INJECTION INTRAVENOUS at 08:02

## 2023-02-20 RX ADMIN — AMPICILLIN SODIUM AND SULBACTAM SODIUM 3 G: 2; 1 INJECTION, POWDER, FOR SOLUTION INTRAMUSCULAR; INTRAVENOUS at 11:02

## 2023-02-20 RX ADMIN — PROPOFOL 50 MCG/KG/MIN: 10 INJECTION, EMULSION INTRAVENOUS at 11:02

## 2023-02-20 RX ADMIN — PROPOFOL 150 MG: 10 INJECTION, EMULSION INTRAVENOUS at 09:02

## 2023-02-20 RX ADMIN — Medication 10 MG: at 12:02

## 2023-02-20 RX ADMIN — PROPOFOL 50 MG: 10 INJECTION, EMULSION INTRAVENOUS at 09:02

## 2023-02-20 RX ADMIN — CLONIDINE HYDROCHLORIDE 0.1 MG: 0.1 TABLET ORAL at 10:02

## 2023-02-20 RX ADMIN — FENTANYL CITRATE 100 MCG: 50 INJECTION INTRAMUSCULAR; INTRAVENOUS at 03:02

## 2023-02-20 RX ADMIN — GABAPENTIN 600 MG: 300 CAPSULE ORAL at 03:02

## 2023-02-20 RX ADMIN — AMPICILLIN SODIUM AND SULBACTAM SODIUM 3 G: 2; 1 INJECTION, POWDER, FOR SOLUTION INTRAMUSCULAR; INTRAVENOUS at 08:02

## 2023-02-20 RX ADMIN — FENTANYL CITRATE 50 MCG: 50 INJECTION INTRAMUSCULAR; INTRAVENOUS at 02:02

## 2023-02-20 RX ADMIN — DEXTROSE MONOHYDRATE, SODIUM CHLORIDE, AND POTASSIUM CHLORIDE: 50; 9; 1.49 INJECTION, SOLUTION INTRAVENOUS at 03:02

## 2023-02-20 RX ADMIN — OXYMETAZOLINE HYDROCHLORIDE 2 SPRAY: 0.05 SPRAY NASAL at 09:02

## 2023-02-20 RX ADMIN — MIRTAZAPINE 15 MG: 15 TABLET, FILM COATED ORAL at 10:02

## 2023-02-20 RX ADMIN — SODIUM CHLORIDE, SODIUM GLUCONATE, SODIUM ACETATE, POTASSIUM CHLORIDE, MAGNESIUM CHLORIDE, SODIUM PHOSPHATE, DIBASIC, AND POTASSIUM PHOSPHATE: .53; .5; .37; .037; .03; .012; .00082 INJECTION, SOLUTION INTRAVENOUS at 08:02

## 2023-02-20 RX ADMIN — ACETAMINOPHEN 650 MG: 325 TABLET ORAL at 10:02

## 2023-02-20 RX ADMIN — ROCURONIUM BROMIDE 20 MG: 10 INJECTION, SOLUTION INTRAVENOUS at 01:02

## 2023-02-20 RX ADMIN — Medication 20 MG: at 10:02

## 2023-02-20 RX ADMIN — ROCURONIUM BROMIDE 50 MG: 10 INJECTION, SOLUTION INTRAVENOUS at 09:02

## 2023-02-20 RX ADMIN — GABAPENTIN 600 MG: 300 CAPSULE ORAL at 10:02

## 2023-02-20 RX ADMIN — MIDAZOLAM 2 MG: 1 INJECTION INTRAMUSCULAR; INTRAVENOUS at 09:02

## 2023-02-20 RX ADMIN — SODIUM CHLORIDE, SODIUM GLUCONATE, SODIUM ACETATE, POTASSIUM CHLORIDE, MAGNESIUM CHLORIDE, SODIUM PHOSPHATE, DIBASIC, AND POTASSIUM PHOSPHATE: .53; .5; .37; .037; .03; .012; .00082 INJECTION, SOLUTION INTRAVENOUS at 09:02

## 2023-02-20 RX ADMIN — FENTANYL CITRATE 100 MCG: 50 INJECTION, SOLUTION INTRAMUSCULAR; INTRAVENOUS at 03:02

## 2023-02-20 RX ADMIN — SODIUM CHLORIDE: 9 INJECTION, SOLUTION INTRAVENOUS at 07:02

## 2023-02-20 RX ADMIN — AMLODIPINE BESYLATE 5 MG: 5 TABLET ORAL at 04:02

## 2023-02-20 RX ADMIN — SODIUM CHLORIDE: 0.9 INJECTION, SOLUTION INTRAVENOUS at 09:02

## 2023-02-20 RX ADMIN — ROCURONIUM BROMIDE 10 MG: 10 INJECTION, SOLUTION INTRAVENOUS at 12:02

## 2023-02-20 RX ADMIN — ESCITALOPRAM OXALATE 20 MG: 20 TABLET ORAL at 10:02

## 2023-02-20 RX ADMIN — MAGNESIUM SULFATE 2 G: 2 INJECTION INTRAVENOUS at 05:02

## 2023-02-20 NOTE — ASSESSMENT & PLAN NOTE
Dereck Centeno is a 62 y.o. male with PMHx of HTN, emphysema, HCV s/p liver transplant in 2015, tobacco abuse (50+ pack years), oral cancer s/p surgery at [facility] in 2019 (no chemo/radiation), and newly diagnosed SCC of the left tonsil. He is now s/p left tonsillectomy and neck dissection by Dr. Templeton on 2/20. Post operative course was c/b development of large hematoma at surgical site. He was taken back to the OR for an emergent neck exploration and hematoma evacuation on 2/20.         Neuro/Psych:   -- Sedation: propofol gtt   -- Pain: home gabapentin, tylenol, PRN fentanyl   -- Psych: home clonazepam PRN, scheduled mirtazapine, seroquel, lexapro     Cards  -- HDS; not currently on pressors   -- Restart home amlodipine and clonidine   -- PRN labetalol and hydralazine   -- Restart home ASA and statin       Pulm:   -- H/o emphysema   -- Continue home inhaler; albuterol nebs PRN for wheezing   -- Goal O2 sat > 88%  -- Wean as able  -- Daily SBT       Renal:  -- Keep auguste for strict I/O  -- BUN/Cr 16/1.2  -- UOP 1.5L/24H      FEN / GI:   -- Net +1.6L/24H  -- Replace lytes as needed  -- Nutrition: NPO + TF  -- LR 125cc/hr      ID:   -- Tm: afebrile; WBC 13  -- Abx: none       Heme/Onc:   -- H/H 12.2/39.7   -- Daily CBC  -- Home ASA       Endo:   -- Gluc goal 140-180  -- SSI      Immuno:  -- H/o HCV infection s/p liver transplant in 2015  -- Liver transplant consulted  -- Continue home tacrolimus  -- Monitor tacrolimus levels daily   -- Valacyclovir daily per ENT      PPx:   Feeding: NPO + TF  Analgesia/Sedation: PRN fentanyl, home gabapentin, tylenol/ propofol gtt   Thromboembolic prevention: lovenox   HOB >30: yes   Stress Ulcer ppx: famotidine BID   Glucose control: Critical care goal 140-180 g/dl, ISS    Lines/Drains/Airway: ETT, auguste, PIVx2, NG tube, neck drain x2      Dispo/Code Status/Palliative:   -- SICU / Full Code

## 2023-02-20 NOTE — ASSESSMENT & PLAN NOTE
Dereck Centeno is a 62 y.o. male with PMHx of HTN, emphysema, HCV s/p liver transplant in 2015, tobacco abuse (50+ pack years), oral cancer s/p surgery at [facility] in 2019 (no chemo/radiation), and newly diagnosed SCC of the left tonsil. He is now s/p left tonsillectomy and neck dissection by Dr. Templeton on 2/20.         Neuro/Psych:   -- Sedation: propofol gtt   -- Pain: home gabapentin, tylenol, PRN fentanyl   -- Psych: home clonazepam PRN, scheduled mirtazapine, seroquel, lexapro     Cards  -- HDS; not currently on pressors   -- Restart home amlodipine and clonidine   -- PRN labetalol and hydralazine   -- Restart home ASA and statin       Pulm:   -- H/o emphysema   -- Continue home inhaler; albuterol PRN for wheezing   -- Goal O2 sat > 88%  -- Wean as able  -- Daily SBT       Renal:  -- Keep auguste for strict I/O  -- BUN/Cr pending       FEN / GI:   -- Net N/A  -- Replace lytes as needed  -- Nutrition: NPO      ID:   -- Tm: afebrile; WBC pending   -- Abx: none       Heme/Onc:   -- H/H pending   -- Daily CBC  -- Home ASA       Endo:   -- Gluc goal 140-180  -- SSI      Immuno:  -- H/o HCV infection s/p liver transplant in 2015  -- Liver transplant consulted  -- Continue home tacrolimus  -- Monitor tacrolimus levels daily   -- Valacyclovir daily per ENT      PPx:   Feeding: NPO  Analgesia/Sedation: PRN fentanyl, home gabapentin, tylenol/ propofol gtt   Thromboembolic prevention: lovenox   HOB >30: yes   Stress Ulcer ppx: famotidine BID   Glucose control: Critical care goal 140-180 g/dl, ISS    Lines/Drains/Airway: ETT, auguste, PIVx2, NG tube, neck drain       Dispo/Code Status/Palliative:   -- SICU / Full Code

## 2023-02-20 NOTE — ANESTHESIA PREPROCEDURE EVALUATION
[facility]  Anesthesia Pre-Operative Evaluation         Patient Name: Dereck Centeno  YOB: 1960  MRN: [id number]    SUBJECTIVE:     Pre-operative evaluation for Procedure(s) (LRB):  ROBOTIC TRANSORAL SURGERY (TORS) (Left)  DISSECTION, NECK (Left)  CREATION, FLAP, MUSCLE ROTATION (N/A)     02/19/2023    Dereck Centeno is a 62 y.o. male w/ a significant PMHx of tobacco abuse (50+ pack years), HTN, emphysema, HCV s/p liver tx 2015, oral cancer s/p surgery at [facility] 2019 (no chemo/ radiation), left neck mass s/p DL and bx of L tonsil consistent SCCa.     Patient now presents for the above procedure(s).    Stress Echo Summary 5/25/2020:   Normal left ventricular systolic function. The estimated ejection fraction is 60%.   No wall motion abnormalities.   The ECG portion of this study is negative for myocardial ischemia.   The stress echo portion of this study is negative for myocardial ischemia.   Sensitivity is impaired due to the patient's failure to achieve target heart rate.   The test was stopped because the patient experienced fatigue, shortness of breath and claudication. The patient requested the test to be stopped.   There were no arrhythmias during stress.   The patient's exercise capacity was moderately impaired.       Prev airway:   Date/Time: 1/31/2023 12:21 PM  Performed by: Verna Booth MD  Authorized by: Paulette Heard MD      Intubation:     Induction:  Intravenous    Intubated:  Postinduction    Mask Ventilation:  Easy with oral airway    Attempts:  1    Attempted By:  Resident anesthesiologist    Method of Intubation:  Video laryngoscopy    Blade:  Marcos 3    Laryngeal View Grade: Grade I - full view of cords      Difficult Airway Encountered?: No      Complications:  None    Airway Device:  Oral endotracheal tube    Airway Device Size:  6.0    Style/Cuff Inflation:  Cuffed (inflated to minimal occlusive pressure)    Inflation Amount (mL):  8     Tube secured:  21    Secured at:  The lips    Placement Verified By:  Capnometry    Complicating Factors:  None    Findings Post-Intubation:  BS equal bilateral    LDA: None documented.     Drips: None documented.      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     Current Outpatient Medications on File Prior to Encounter   Medication Sig Dispense Refill    alendronate (FOSAMAX) 70 MG tablet Take 70 mg by mouth every 7 days. SATURDAYS      amLODIPine (NORVASC) 5 MG tablet Take 1 tablet (5 mg total) by mouth once daily. 30 tablet 0    albuterol (PROVENTIL/VENTOLIN HFA) 90 mcg/actuation inhaler Inhale 2 puffs into the lungs every 6 (six) hours as needed for Wheezing or Shortness of Breath.      aspirin (ECOTRIN) 81 MG EC tablet Take 1 tablet (81 mg total) by mouth once daily. (Patient taking differently: Take 81 mg by mouth every evening.) 30 tablet 0    atorvastatin (LIPITOR) 40 MG tablet Take 40 mg by mouth every evening.      calcium carbonate (OS-KRISTEL) 600 mg calcium (1,500 mg) Tab Take 600 mg by mouth once.      cloNIDine (CATAPRES) 0.1 MG tablet Take 1 tablet (0.1 mg total) by mouth 2 (two) times daily. (Patient taking differently: Take 0.1  mg by mouth every evening.) 60 tablet 0    docusate sodium (COLACE) 100 MG capsule Take 100 mg by mouth 3 (three) times daily as needed for Constipation.      escitalopram oxalate (LEXAPRO) 20 MG tablet Take 1 tablet (20 mg total) by mouth once daily. (Patient taking differently: Take 20 mg by mouth every evening.) 30 tablet 0    famotidine (PEPCID) 20 MG tablet Take 1 tablet (20 mg total) by mouth every evening. 30 tablet 0    gabapentin (NEURONTIN) 400 MG capsule Take 1 capsule (400 mg total) by mouth 3 (three) times daily. (Patient taking differently: Take 600 mg by mouth 3 (three) times daily.) 90 capsule 0    hepatitis A virus vaccine, PF, (HAVRIX) 1,440 Kimberlee unit/mL Susp Inject 1 mL into the muscle once.      HYDROcodone-acetaminophen (NORCO) 5-325 mg per tablet Take 1 tablet by mouth every 6 (six) hours as needed for Pain. 15 tablet 0    magnesium oxide (MAG-OX) 400 mg tablet Take 400 mg by mouth once daily.      mirtazapine (REMERON) 15 MG tablet Take 1 tablet (15 mg total) by mouth every evening. 30 tablet 0    multivitamin (THERAGRAN) tablet Take 1 tablet by mouth once daily.      QUEtiapine (SEROQUEL) 25 MG Tab Take 50 mg by mouth every evening.  3    tacrolimus (PROGRAF) 1 MG Cap Take 2 capsules (2 mg total) by mouth every morning AND 1 capsule (1 mg total) every evening. 90 capsule 11    traMADol (ULTRAM) 50 mg tablet Take 50 mg by mouth every 6 (six) hours as needed for Pain.      valACYclovir (VALTREX) 1000 MG tablet Take 1 tablet (1,000 mg total) by mouth 3 (three) times daily. for 7 days 21 tablet 0    vitamin D 1000 units Tab Take 2 tablets (2,000 Units total) by mouth once daily. 60 tablet 5       Past Surgical History:   Procedure Laterality Date    COLONOSCOPY N/A 6/14/2018    Procedure: COLONOSCOPY;  Surgeon: Conor Castro MD;  Location: [facility];  Service: Endoscopy;  Laterality: N/A;    ESOPHAGEAL VARICE LIGATION      ESOPHAGOGASTRODUODENOSCOPY N/A 6/14/2018     Procedure: ESOPHAGOGASTRODUODENOSCOPY (EGD);  Surgeon: Conor Castro MD;  Location: [facility];  Service: Endoscopy;  Laterality: N/A;    LARYNGOSCOPY N/A 1/31/2023    Procedure: LARYNGOSCOPY;  Surgeon: Guzman Alfonso MD;  Location: [facility] OR [address] [place] [address];  Service: ENT;  Laterality: N/A;  0124-DQJ    LIVER TRANSPLANT      TIPS PROCEDURE         Social History:  Tobacco Use: High Risk    Smoking Tobacco Use: Every Day    Smokeless Tobacco Use: Former    Passive Exposure: Not on file      Alcohol Use: Not on file        OBJECTIVE:     Vital Signs Range (Last 24H):         Significant Labs:  Lab Results   Component Value Date    WBC 6.96 01/24/2023    HGB 14.5 01/24/2023    HCT 44.9 01/24/2023     (L) 01/24/2023    CHOL 113 (L) 01/27/2022    TRIG 42 01/27/2022    HDL 66 01/27/2022    ALT 10 01/24/2023    AST 13 01/24/2023     01/24/2023    K 5.0 01/24/2023     01/24/2023    CREATININE 1.3 01/24/2023    BUN 9 01/24/2023    CO2 26 01/24/2023    TSH 2.490 01/27/2022    INR 1.0 02/15/2023    HGBA1C 5.0 01/27/2022       Diagnostic Studies: No relevant studies.    EKG:   Results for orders placed or performed during the hospital encounter of 02/15/23   EKG 12-lead    Collection Time: 02/15/23 10:23 AM    Narrative    Test Reason : Z01.818,    Vent. Rate : 060 BPM     Atrial Rate : 060 BPM     P-R Int : 184 ms          QRS Dur : 100 ms      QT Int : 414 ms       P-R-T Axes : 076 063 058 degrees     QTc Int : 414 ms    Normal sinus rhythm  Normal ECG  When compared with ECG of 13-MAR-2018 19:52,  No significant change was found  Confirmed by BETY BULL MD ([id number]) on 2/15/2023 11:47:52 AM    Referred By: ROXANE MARKS           Confirmed By:BETY BULL MD       2D ECHO:  TTE:  No results found for this or any previous visit.    MEGHNA:  No results found for this or any previous visit.    ASSESSMENT/PLAN:           Pre-op Assessment    I have reviewed the Patient Summary Reports.     I have reviewed  the Nursing Notes. I have reviewed the NPO Status.   I have reviewed the Medications.     Review of Systems  Anesthesia Hx:  No problems with previous Anesthesia  History of prior surgery of interest to airway management or planning: liver transplant. Denies Family Hx of Anesthesia complications.   Denies Personal Hx of Anesthesia complications.   Hematology/Oncology:  Hematology Normal      Current/Recent Cancer. --  Cancer in past history:    EENT/Dental:EENT/Dental Normal   Cardiovascular:   Hypertension    Pulmonary:   COPD    Renal/:  Renal/ Normal     Hepatic/GI:   GERD    Musculoskeletal:  Musculoskeletal Normal    Neurological:  Neurology Normal    Endocrine:  Endocrine Normal    Dermatological:  Skin Normal    Psych:  Psychiatric Normal           Physical Exam  General: Well nourished    Airway:  Mallampati: II   Mouth Opening: Normal  TM Distance: Normal  Tongue: Normal  Neck ROM: Normal ROM    Dental:  Dentures    Chest/Lungs:  Clear to auscultation, Normal Respiratory Rate    Heart:  Rate: Normal  Rhythm: Regular Rhythm  Sounds: Normal    Abdomen:  Normal, Nontender        Anesthesia Plan  Type of Anesthesia, risks & benefits discussed:    Anesthesia Type: Gen ETT  Intra-op Monitoring Plan: Standard ASA Monitors  Post Op Pain Control Plan: multimodal analgesia  Induction:  IV  Airway Plan: Direct and Video, Post-Induction  Informed Consent: Informed consent signed with the Patient and all parties understand the risks and agree with anesthesia plan.  All questions answered.   ASA Score: 3  Day of Surgery Review of History & Physical: H&P Update referred to the surgeon/provider.    Ready For Surgery From Anesthesia Perspective.     .

## 2023-02-20 NOTE — H&P
[facility]  Critical Care - Surgery  History & Physical    Patient Name: Dereck Centeno  MRN: [id number]  Admission Date: 2/20/2023  Code Status: Full Code  Attending Physician: Alexandre Templeton MD   Primary Care Provider: Eva Reynaga MD   Principal Problem: Squamous cell carcinoma of left tonsil    Subjective:     HPI:  Dereck Centeno is a 62 y.o. male with PMHx of HTN, emphysema, HCV s/p liver transplant in 2015, tobacco abuse (50+ pack years), and oral cancer s/p surgery at [facility] in 2019 (no chemo/radiation). Prior to admission, patient reported gradually enlarging left neck mass that he first noticied 1-2 weeks ago. He was seen in ENT clinic where he had a direct laryngoscopy done with biopsy of left tonsil mass. Pathology from this biopsy was positive for squamous cell carcinoma. He is now s/p left tonsillectomy and neck dissection.         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 02/01/2023    Tobacco use        Past Surgical History:   Procedure Laterality Date    COLONOSCOPY N/A 6/14/2018    Procedure: COLONOSCOPY;  Surgeon: Conor Castro MD;  Location: [facility];  Service: Endoscopy;  Laterality: N/A;    ESOPHAGEAL VARICE LIGATION      ESOPHAGOGASTRODUODENOSCOPY N/A 6/14/2018    Procedure: ESOPHAGOGASTRODUODENOSCOPY (EGD);  Surgeon: Conor Castro MD;  Location: [facility];  Service: Endoscopy;  Laterality: N/A;    LARYNGOSCOPY N/A 1/31/2023    Procedure: LARYNGOSCOPY;  Surgeon: Guzman Alfonso MD;  Location:  [facility] OR 2ND FLR;  Service: ENT;  Laterality: N/A;  0124-DQJ    LIVER TRANSPLANT      TIPS PROCEDURE         Review of patient's allergies indicates:  No Known Allergies    Family History       Problem Relation (Age of Onset)    Alcohol abuse Mother, Father    Cancer Mother          Tobacco Use    Smoking status: Every Day     Packs/day: 0.50     Years: 50.00     Pack years: 25.00     Types: Cigarettes    Smokeless tobacco: Former     Types: Chew   Substance and Sexual Activity    Alcohol use: Not Currently    Drug use: No    Sexual activity: Not Currently      Review of Systems   Unable to perform ROS: Intubated   Objective:     Vital Signs (Most Recent):  Temp: 98.4 °F (36.9 °C) (02/20/23 0730)  Pulse: 61 (02/20/23 0730)  Resp: 14 (02/20/23 0730)  BP: (!) 188/85 (02/20/23 0730)  SpO2: 99 % (02/20/23 0730)   Vital Signs (24h Range):  Temp:  [98.4 °F (36.9 °C)] 98.4 °F (36.9 °C)  Pulse:  [61] 61  Resp:  [14] 14  SpO2:  [99 %] 99 %  BP: (188)/(85) 188/85     Weight: 75.9 kg (167 lb 5.3 oz)  Body mass index is 23.34 kg/m².      Intake/Output Summary (Last 24 hours) at 2/20/2023 1501  Last data filed at 2/20/2023 1348  Gross per 24 hour   Intake 1400 ml   Output 1000 ml   Net 400 ml       Physical Exam  Vitals and nursing note reviewed.   Constitutional:       Appearance: Normal appearance.      Interventions: He is sedated, intubated and restrained.   HENT:      Head: Normocephalic and atraumatic.      Nose:      Comments: NG tube sutured in place   Eyes:      Conjunctiva/sclera: Conjunctivae normal.      Pupils: Pupils are equal, round, and reactive to light.   Neck:      Comments: ENMANUEL drain with SS on left side of neck   Surgical incision clean and dry   Cardiovascular:      Rate and Rhythm: Regular rhythm. Tachycardia present.      Pulses: Normal pulses.      Heart sounds: Normal heart sounds.   Pulmonary:      Effort: Pulmonary effort is normal. No respiratory distress. He is intubated.      Comments: ETT in  place   Abdominal:      General: Abdomen is flat. Bowel sounds are normal.      Palpations: Abdomen is soft.   Skin:     General: Skin is warm.       Vents:  Vent Mode: A/C (02/20/23 1443)  Ventilator Initiated: Yes (02/20/23 1443)  Set Rate: 18 BPM (02/20/23 1443)  Vt Set: 480 mL (02/20/23 1443)  PEEP/CPAP: 5 cmH20 (02/20/23 1443)  Oxygen Concentration (%): 50 (02/20/23 1443)  Peak Airway Pressure: 15 cmH20 (02/20/23 1443)  Plateau Pressure: 0 cmH20 (02/20/23 1443)  Total Ve: 8.55 L/m (02/20/23 1443)  Negative Inspiratory Force (cm H2O): 0 (02/20/23 1443)    Lines/Drains/Airways       Drain  Duration                  Closed/Suction Drain 02/20/23 1249 Left Neck Bulb 15 Fr. <1 day         Trans Pyloric Feeding Tube 02/20/23 1320 orogastric 12 Fr. <1 day         Urethral Catheter 02/20/23 0945 Non-latex 16 Fr. <1 day              Airway  Duration                  Airway - Non-Surgical 02/20/23 0926 Nasal Janneth <1 day              Peripheral Intravenous Line  Duration                  Peripheral IV - Single Lumen 02/20/23 0726 20 G Left;Posterior Forearm <1 day         Peripheral IV - Single Lumen 02/20/23 0930 18 G Right Forearm <1 day                    Significant Labs:    CBC/Anemia Profile:  No results for input(s): WBC, HGB, HCT, PLT, MCV, RDW, IRON, FERRITIN, RETIC, FOLATE, [id number], OCCULTBLOOD in the last 48 hours.     Chemistries:  No results for input(s): NA, K, CL, CO2, BUN, CREATININE, CALCIUM, ALBUMIN, PROT, BILITOT, ALKPHOS, ALT, AST, GLUCOSE, MG, PHOS in the last 48 hours.    All pertinent labs within the past 24 hours have been reviewed.    Significant Imaging: I have reviewed all pertinent imaging results/findings within the past 24 hours.    Assessment/Plan:     Oncology  * Squamous cell carcinoma of left tonsil  Dereck Centeno is a 62 y.o. male with PMHx of HTN, emphysema, HCV s/p liver transplant in 2015, tobacco abuse (50+ pack years), oral cancer s/p surgery at [facility] in 2019 (no chemo/radiation),  and newly diagnosed SCC of the left tonsil. He is now s/p left tonsillectomy and neck dissection by Dr. Templeton on 2/20.         Neuro/Psych:   -- Sedation: propofol gtt   -- Pain: home gabapentin, tylenol, PRN fentanyl   -- Psych: home clonazepam PRN, scheduled mirtazapine, seroquel, lexapro     Cards  -- HDS; not currently on pressors   -- Restart home amlodipine and clonidine   -- PRN labetalol and hydralazine   -- Restart home ASA and statin       Pulm:   -- H/o emphysema   -- Continue home inhaler; albuterol PRN for wheezing   -- Goal O2 sat > 88%  -- Wean as able  -- Daily SBT       Renal:  -- Keep auguste for strict I/O  -- BUN/Cr pending       FEN / GI:   -- Net N/A  -- Replace lytes as needed  -- Nutrition: NPO      ID:   -- Tm: afebrile; WBC pending   -- Abx: none       Heme/Onc:   -- H/H pending   -- Daily CBC  -- Home ASA       Endo:   -- Gluc goal 140-180  -- SSI      Immuno:  -- H/o HCV infection s/p liver transplant in 2015  -- Liver transplant consulted  -- Continue home tacrolimus  -- Monitor tacrolimus levels daily   -- Valacyclovir daily per ENT      PPx:   Feeding: NPO  Analgesia/Sedation: PRN fentanyl, home gabapentin, tylenol/ propofol gtt   Thromboembolic prevention: lovenox   HOB >30: yes   Stress Ulcer ppx: famotidine BID   Glucose control: Critical care goal 140-180 g/dl, ISS    Lines/Drains/Airway: ETT, auguste, PIVx2, NG tube, neck drain       Dispo/Code Status/Palliative:   -- SICU / Full Code           Critical care was time spent personally by me on the following activities: development of treatment plan with patient or surrogate and bedside caregivers, discussions with consultants, evaluation of patient's response to treatment, examination of patient, ordering and performing treatments and interventions, ordering and review of laboratory studies, ordering and review of radiographic studies, pulse oximetry, re-evaluation of patient's condition.  This critical care time did not  overlap with that of any other provider or involve time for any procedures.     Sven Conway MD  Critical Care - Surgery  [facility] - [facility] Int[facility]

## 2023-02-20 NOTE — RESPIRATORY THERAPY
Received patient from OR. Placed on ventilator with documented settings. Ambu and mask at bedside. Will continue to monitor.

## 2023-02-20 NOTE — TRANSFER OF CARE
"Anesthesia Transfer of Care Note    Patient: Dereck Centeno    Procedure(s) Performed: Procedure(s) (LRB):  ROBOTIC TRANSORAL SURGERY (TORS) (Left)  DISSECTION, NECK (Left)  LARYNGOSCOPY, DIRECT (N/A)    Patient location: PACU    Anesthesia Type: general    Transport from OR: Transported from OR intubated on 100% O2 by AMBU with adequate controlled ventilation    Post pain: adequate analgesia    Post assessment: no apparent anesthetic complications    Post vital signs: stable    Level of consciousness: sedated    Nausea/Vomiting: no nausea/vomiting    Complications: none    Transfer of care protocol was followed      Last vitals:   Visit Vitals  BP (!) 188/85 (BP Location: Left arm, Patient Position: Lying)   Pulse 61   Temp 36.9 °C (98.4 °F) (Oral)   Resp 14   Ht 5' 11" (1.803 m)   Wt 75.9 kg (167 lb 5.3 oz)   SpO2 99%   BMI 23.34 kg/m²     "

## 2023-02-20 NOTE — ANESTHESIA POSTPROCEDURE EVALUATION
Anesthesia Post Evaluation    Patient: Dereck Centeno    Procedure(s) Performed: Procedure(s) (LRB):  ROBOTIC TRANSORAL SURGERY (TORS) (Left)  DISSECTION, NECK (Left)  LARYNGOSCOPY, DIRECT (N/A)    Final Anesthesia Type: general      Patient location during evaluation: ICU  Patient participation: No - Unable to Participate, Intubation  Level of consciousness: sedated  Post-procedure vital signs: reviewed and stable  Pain management: adequate  Airway patency: patent  ANGELO mitigation strategies: Multimodal analgesia  PONV status at discharge: No PONV  Anesthetic complications: no      Cardiovascular status: blood pressure returned to baseline and hemodynamically stable  Respiratory status: intubated  Hydration status: euvolemic  Follow-up not needed.          Vitals Value Taken Time   /95 02/20/23 1454   Temp 37 02/20/23 1455   Pulse 68 02/20/23 1454   Resp 17 02/20/23 1454   SpO2 100 % 02/20/23 1454   Vitals shown include unvalidated device data.      No case tracking events are documented in the log.      Pain/Huber Score: No data recorded

## 2023-02-20 NOTE — NURSING
Pt admitted to SICU [address] s/p tonsillectomy and neck dissection. Pt connected to bedside monitor and vent. Pt on propofol gtt, but is very agitated. Pt following commands and answering yes/no questions, complaining of pain. Fentanyl administered per order. HR 60s, SBP initially 200s on arrival, now trending down post pain medication admin. Orders received.     Nurses Note -- 4 Eyes      2/20/2023   4:47 PM      Skin assessed during: Admit      [x] No Pressure Injuries Present    []Prevention Measures Documented      [] Yes- Altered Skin Integrity Present or Discovered   [] LDA Added if Not in Epic (Describe Wound)   [] New Altered Skin Integrity was Present on Admit and Documented in LDA   [] Wound Image Taken    Wound Care Consulted? No    Attending Nurse:  ROMY Siddiqui     Second RN/Staff Member:  ROMY Shah    No pressure injures noted, new incision from OR present. Pt's bed plugged in and working. Waffle mattress in place and inflated. Sacral foam and heel foams on.

## 2023-02-20 NOTE — ANESTHESIA PROCEDURE NOTES
Intubation    Date/Time: 2/20/2023 9:26 AM  Performed by: Stefany Stack MD  Authorized by: Ana Muñoz MD     Intubation:     Induction:  Intravenous    Intubated:  Postinduction    Mask Ventilation:  Moderately difficult with oral airway (2 hand mask with oral airway)    Attempts:  1    Attempted By:  Resident anesthesiologist    Method of Intubation:  Video laryngoscopy    Blade:  Marcos 4    Laryngeal View Grade: Grade I - full view of cords      Difficult Airway Encountered?: No      Complications:  None    Airway Device:  Nasal ioana    Airway Device Size:  6.5    Style/Cuff Inflation:  Cuffed (inflated to minimal occlusive pressure)    Placement Verified By:  Capnometry    Complicating Factors:  None    Findings Post-Intubation:  Atraumatic/condition of teeth unchanged and BS equal bilateral

## 2023-02-20 NOTE — BRIEF OP NOTE
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 Left level 2 neck mass - frozen (delivered to lab)2. Left neck dissection - permanent3. Left radical tonsillectomy, single stitch inferior, double stitch medial - frozen (delivered to lab)     References:    Click here for ordering Quick Tip   Question Answer Comment   Procedure Type: ENT    Specimen Class: Known or suspected malignancy    Which provider would you like to cc? MARIETTA POLLOCK    Release to patient Immediate        02/20/23 1306                    [id number]

## 2023-02-20 NOTE — LETTER
February 26, 2023         [address]  [facility] [address]  Phone: [phone]  Fax: [phone]       Patient: Dereck Centeno   YOB: 1960  Date of Visit: 02/26/2023    To Whom It May Concern:    Giovanny Centeno  was at [facility] on 02/26/2023. Family has been visiting and are excused from work at your discretion. If you have any questions or concerns, or if I can be of further assistance, please do not hesitate to contact me.    Sincerely,    Pb Wheeler, DO

## 2023-02-20 NOTE — HPI
Dereck Centeno is a 62 y.o. male with PMHx of HTN, emphysema, HCV s/p liver transplant in 2015, tobacco abuse (50+ pack years), and oral cancer s/p surgery at [facility] in 2019 (no chemo/radiation). Prior to admission, patient reported gradually enlarging left neck mass that he first noticied 1-2 weeks ago. He was seen in ENT clinic where he had a direct laryngoscopy done with biopsy of left tonsil mass. Pathology from this biopsy was positive for squamous cell carcinoma. He is now s/p left tonsillectomy and neck dissection.

## 2023-02-20 NOTE — SUBJECTIVE & OBJECTIVE
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 02/01/2023    Tobacco use        Past Surgical History:   Procedure Laterality Date    COLONOSCOPY N/A 6/14/2018    Procedure: COLONOSCOPY;  Surgeon: Conor Castro MD;  Location: [facility] ENDO;  Service: Endoscopy;  Laterality: N/A;    ESOPHAGEAL VARICE LIGATION      ESOPHAGOGASTRODUODENOSCOPY N/A 6/14/2018    Procedure: ESOPHAGOGASTRODUODENOSCOPY (EGD);  Surgeon: Conor Castro MD;  Location: [facility] ENDO;  Service: Endoscopy;  Laterality: N/A;    LARYNGOSCOPY N/A 1/31/2023    Procedure: LARYNGOSCOPY;  Surgeon: Guzman Alfonso MD;  Location: [address];  Service: ENT;  Laterality: N/A;  [id number]    LIVER TRANSPLANT      TIPS PROCEDURE         Review of patient's allergies indicates:  No Known Allergies    Family History       Problem Relation (Age of Onset)    Alcohol abuse Mother, Father    Cancer Mother          Tobacco Use    Smoking status: Every Day     Packs/day: 0.50     Years: 50.00     Pack years: 25.00     Types: Cigarettes    Smokeless tobacco: Former     Types: Chew   Substance and Sexual Activity    Alcohol use: Not Currently    Drug use: No    Sexual activity: Not Currently      Review of Systems   Unable to perform ROS: Intubated   Objective:     Vital Signs (Most Recent):  Temp: 98.4 °F (36.9 °C) (02/20/23 0730)  Pulse: 61 (02/20/23 0730)  Resp: 14 (02/20/23 0730)  BP: (!) 188/85 (02/20/23 0730)  SpO2: 99 % (02/20/23 0730)   Vital Signs (24h Range):  Temp:  [98.4 °F (36.9 °C)] 98.4 °F (36.9 °C)  Pulse:  [61] 61  Resp:  [14] 14  SpO2:  [99 %] 99  %  BP: (188)/(85) 188/85     Weight: 75.9 kg (167 lb 5.3 oz)  Body mass index is 23.34 kg/m².      Intake/Output Summary (Last 24 hours) at 2/20/2023 1501  Last data filed at 2/20/2023 1348  Gross per 24 hour   Intake 1400 ml   Output 1000 ml   Net 400 ml       Physical Exam  Vitals and nursing note reviewed.   Constitutional:       Appearance: Normal appearance.      Interventions: He is sedated, intubated and restrained.   HENT:      Head: Normocephalic and atraumatic.      Nose:      Comments: NG tube sutured in place   Eyes:      Conjunctiva/sclera: Conjunctivae normal.      Pupils: Pupils are equal, round, and reactive to light.   Neck:      Comments: ENMANUEL drain with SS on left side of neck   Surgical incision clean and dry   Cardiovascular:      Rate and Rhythm: Regular rhythm. Tachycardia present.      Pulses: Normal pulses.      Heart sounds: Normal heart sounds.   Pulmonary:      Effort: Pulmonary effort is normal. No respiratory distress. He is intubated.      Comments: ETT in place   Abdominal:      General: Abdomen is flat. Bowel sounds are normal.      Palpations: Abdomen is soft.   Skin:     General: Skin is warm.       Vents:  Vent Mode: A/C (02/20/23 1443)  Ventilator Initiated: Yes (02/20/23 1443)  Set Rate: 18 BPM (02/20/23 1443)  Vt Set: 480 mL (02/20/23 1443)  PEEP/CPAP: 5 cmH20 (02/20/23 1443)  Oxygen Concentration (%): 50 (02/20/23 1443)  Peak Airway Pressure: 15 cmH20 (02/20/23 1443)  Plateau Pressure: 0 cmH20 (02/20/23 1443)  Total Ve: 8.55 L/m (02/20/23 1443)  Negative Inspiratory Force (cm H2O): 0 (02/20/23 1443)    Lines/Drains/Airways       Drain  Duration                  Closed/Suction Drain 02/20/23 1249 Left Neck Bulb 15 Fr. <1 day         Trans Pyloric Feeding Tube 02/20/23 1320 orogastric 12 Fr. <1 day         Urethral Catheter 02/20/23 0945 Non-latex 16 Fr. <1 day              Airway  Duration                  Airway - Non-Surgical 02/20/23 0926 Nasal Janneth <1 day               Peripheral Intravenous Line  Duration                  Peripheral IV - Single Lumen 02/20/23 0726 20 G Left;Posterior Forearm <1 day         Peripheral IV - Single Lumen 02/20/23 0930 18 G Right Forearm <1 day                    Significant Labs:    CBC/Anemia Profile:  No results for input(s): WBC, HGB, HCT, PLT, MCV, RDW, IRON, FERRITIN, RETIC, FOLATE, ZIMIAWPE85, OCCULTBLOOD in the last 48 hours.     Chemistries:  No results for input(s): NA, K, CL, CO2, BUN, CREATININE, CALCIUM, ALBUMIN, PROT, BILITOT, ALKPHOS, ALT, AST, GLUCOSE, MG, PHOS in the last 48 hours.    All pertinent labs within the past 24 hours have been reviewed.    Significant Imaging: I have reviewed all pertinent imaging results/findings within the past 24 hours.

## 2023-02-20 NOTE — OP NOTE
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 man with a history of squamous cell carcinoma the oral cavity treated with surgery alone several years ago.  He was recently found to have a P 16 positive squamous cell carcinoma of the left tonsil metastatic to the left neck.  His case was reviewed at our multidisciplinary tumor board.  He was seen in the office and examined.  He reported a strong desire to avoid chemotherapy.  As such, I recommended that we proceed to the operating room for the aforementioned procedures.    He was apprised of the risks, benefits and alternatives to surgery.  In spite of the risk inherent to surgery,he provided informed consent for the aforementioned procedures.     PROCEDURE IN DETAIL:  The patient was taken to the operating room and placed on the operating table in the supine position.  General endotracheal anesthesia was induced by the anesthesia team.  He was intubated via nasotracheal intubation.      To begin, a direct laryngoscopy was carried out.  There was noted to be an ulcerated lesion in the left tonsillar fossa was significant surrounding induration.  There was no involvement of the soft palate or base of tongue.  It did extend down the lateral pharyngeal wall to the level of the hyoid bone.  I did feel this was amenable to transoral robotic resection.      Next, an apron  incision was marked in the left neck and injected with several cc of 1% lidocaine with epinephrine.  The neck was then prepped and draped in standard sterile fashion.      To begin, the skin of the neck was incised utilizing the Bovie on cutting current.  Sharp dissection proceeded through the underlying subcutaneous tissue and platysma.  Superiorly and inferiorly-based subplatysmal flaps were elevated from the level of the mandible and mastoid superiorly to the level of the clavicle inferiorly.  Next, the anterior border of the sternocleidomastoid muscle was skeletonized.  Abnormal lymph nodes within level 2A were identified.  The spinal accessory nerve was identified and traced cephalad the skull base.  The abnormal nodes were then freed from the surrounding structures including the internal jugular vein.  The hypoglossal nerve was identified and preserved.  Ultimately, these nodes were amputated and sent to pathology for frozen section analysis.  On frozen section, there was no evidence of extracapsular spread.  Next, the remainder of neck dissection was performed.      The remainder of the sternocleidomastoid muscle was skeletonized inferiorly.  The omohyoid muscle was circumferentially dissected and divided with the Bovie.  The fibrofatty tissue of level 2b was freed from the internal jugular vein medially.  It was transected down to the underlying deep cervical fascia and swept deep to the spinal accessory nerve.  The fibrofatty contents of levels 2A, 3 and 4 were then transected down to the underlying deep cervical fascia and transverse cervical vessels.  The phrenic nerve was identified and preserved.  Several pedicles were taken inferiorly at the lateral border the internal jugular vein.  They were clipped and divided in order to prevent a Chyle leak.  The specimen was then elevated off the floor neck from lateral to medial until the carotid sheath was opened.  It was then  from the carotid sheath  sharply utilizing a 15 blade.  Ultimately, the specimen was amputated and sent to pathology for permanent analysis.      Next, hemostasis was achieved in the wound electrocautery.  The facial and lingual arteries were then isolated, clipped and divided in order to minimize bleeding during the resection of the primary tumor.      Next, the neck was covered with a sterile dressing and our attention was turned to the transoral surgery.  The flex retractor was placed.  It was expanded and suspended from the Grimaldo stand.  The tongue was retracted anteriorly by placing a single silk suture in the tip of the tongue.  The tumor in question was identified in the left tonsillar fossa.  It extended inferiorly along the lateral pharyngeal wall.  Next, the mucosa of the soft palate was incised at the level of the pterygomandibular raphe.  Dissection proceeded down to the medial pterygoid muscle.  The parapharyngeal fat pad was identified and preserved.  The constrictor muscle was divided superiorly extending medially across the attachments to the soft palate.  The posterior pharyngeal wall was then divided in the midline.  The specimen was elevated from superior to inferior.  Several vascular branches were clipped and divided.  The accessory pharyngeal muscles were circumferentially dissected and divided with the Bovie.  Next, the de Ángela robot was brought into the field.  The camera was placed in the center arm, the Bovie in the left arm the [place] dissector in the right arm.  The remaining attachments between the tongue base and the specimen as well as the constrictor muscle deep down to the level of the prevertebral fascia were then taken down.  The posterior pharyngeal wall was incised inferiorly taking care to leave a cuff of normal mucosa surrounding the tumor.  The glossopharyngeal nerve was identified.  Several branches were divided as they were entering the specimen.  Ultimately, the specimen was amputated, oriented  as below and sent to pathology for frozen section analysis.  Hemostasis was achieved in the wound with electrocautery and the placement of Surgiflo.      All margins were found to be negative for carcinoma.  However, the superior and lateral margins were notable for high-grade dysplasia.  Additional margins were then resected and sent to pathology for frozen section analysis and were found to be negative for dysplasia or carcinoma.  Hemostasis was finally achieved with electrocautery.  The neck was then closed over a 15 Kiswahili Humberto drain which was secured with silk suture.  The neck incision was closed with 3-0 Vicryl, 4-0 Monocryl and Dermabond.  I elected to leave him intubated to allow for reduction in edema and oozing from the open wound in his throat.    Next a laryngoscopy was repeated.  The Dedo laryngoscope was inserted through the mouth and advanced to the level of the larynx.  The endotracheal tube was then removed by anesthesia and a size 7.5 endotracheal tube was passed through the laryngoscope and into the airway.  The cuff was inflated and the tube was attached to the anesthesia circuit.  Placement in the airway was confirmed with return of end-tidal CO2.  Next, a Dobbhoff feeding tube was placed, advanced in the esophagus and secured to the membranous septum with a silk suture.  A tube nixon was then placed on the endotracheal tube.  He was then handed back to Anesthesia and transferred to ICU in satisfactory condition.    There were no intraoperative complications.  I was present for and participated in the entire procedure as dictated above.       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 Left level 2 neck mass - frozen (delivered to lab)2. Left neck dissection - permanent3. Left radical tonsillectomy, single stitch inferior, double stitch medial - frozen (delivered to lab)     References:    Click here for ordering Quick Tip   Question Answer Comment   Procedure Type: ENT    Specimen Class: Known or suspected malignancy    Which provider would you like to cc? MARIETTA POLLOCK    Release to patient Immediate        02/20/23 1001

## 2023-02-21 LAB
ALBUMIN SERPL BCP-MCNC: 3.2 G/DL (ref 3.5–5.2)
ALP SERPL-CCNC: 48 U/L (ref 55–135)
ALT SERPL W/O P-5'-P-CCNC: 7 U/L (ref 10–44)
ANION GAP SERPL CALC-SCNC: 9 MMOL/L (ref 8–16)
AST SERPL-CCNC: 13 U/L (ref 10–40)
BASOPHILS # BLD AUTO: 0.04 K/UL (ref 0–0.2)
BASOPHILS # BLD AUTO: 0.04 K/UL (ref 0–0.2)
BASOPHILS NFR BLD: 0.2 % (ref 0–1.9)
BASOPHILS NFR BLD: 0.3 % (ref 0–1.9)
BILIRUB SERPL-MCNC: 0.8 MG/DL (ref 0.1–1)
BUN SERPL-MCNC: 16 MG/DL (ref 8–23)
CALCIUM SERPL-MCNC: 8 MG/DL (ref 8.7–10.5)
CHLORIDE SERPL-SCNC: 105 MMOL/L (ref 95–110)
CO2 SERPL-SCNC: 19 MMOL/L (ref 23–29)
CREAT SERPL-MCNC: 1.2 MG/DL (ref 0.5–1.4)
DIFFERENTIAL METHOD: ABNORMAL
DIFFERENTIAL METHOD: ABNORMAL
EOSINOPHIL # BLD AUTO: 0 K/UL (ref 0–0.5)
EOSINOPHIL # BLD AUTO: 0 K/UL (ref 0–0.5)
EOSINOPHIL NFR BLD: 0.1 % (ref 0–8)
EOSINOPHIL NFR BLD: 0.1 % (ref 0–8)
ERYTHROCYTE [DISTWIDTH] IN BLOOD BY AUTOMATED COUNT: 13.1 % (ref 11.5–14.5)
ERYTHROCYTE [DISTWIDTH] IN BLOOD BY AUTOMATED COUNT: 13.2 % (ref 11.5–14.5)
EST. GFR  (NO RACE VARIABLE): >60 ML/MIN/1.73 M^2
GLUCOSE SERPL-MCNC: 131 MG/DL (ref 70–110)
HCT VFR BLD AUTO: 38.2 % (ref 40–54)
HCT VFR BLD AUTO: 39.7 % (ref 40–54)
HGB BLD-MCNC: 12.2 G/DL (ref 14–18)
HGB BLD-MCNC: 12.4 G/DL (ref 14–18)
IMM GRANULOCYTES # BLD AUTO: 0.08 K/UL (ref 0–0.04)
IMM GRANULOCYTES # BLD AUTO: 0.11 K/UL (ref 0–0.04)
IMM GRANULOCYTES NFR BLD AUTO: 0.6 % (ref 0–0.5)
IMM GRANULOCYTES NFR BLD AUTO: 0.6 % (ref 0–0.5)
LYMPHOCYTES # BLD AUTO: 1.3 K/UL (ref 1–4.8)
LYMPHOCYTES # BLD AUTO: 1.5 K/UL (ref 1–4.8)
LYMPHOCYTES NFR BLD: 11.2 % (ref 18–48)
LYMPHOCYTES NFR BLD: 7.4 % (ref 18–48)
MAGNESIUM SERPL-MCNC: 2 MG/DL (ref 1.6–2.6)
MCH RBC QN AUTO: 29.7 PG (ref 27–31)
MCH RBC QN AUTO: 30.8 PG (ref 27–31)
MCHC RBC AUTO-ENTMCNC: 30.7 G/DL (ref 32–36)
MCHC RBC AUTO-ENTMCNC: 32.5 G/DL (ref 32–36)
MCV RBC AUTO: 95 FL (ref 82–98)
MCV RBC AUTO: 97 FL (ref 82–98)
MONOCYTES # BLD AUTO: 1.1 K/UL (ref 0.3–1)
MONOCYTES # BLD AUTO: 1.1 K/UL (ref 0.3–1)
MONOCYTES NFR BLD: 6.5 % (ref 4–15)
MONOCYTES NFR BLD: 8.5 % (ref 4–15)
NEUTROPHILS # BLD AUTO: 10.3 K/UL (ref 1.8–7.7)
NEUTROPHILS # BLD AUTO: 14.6 K/UL (ref 1.8–7.7)
NEUTROPHILS NFR BLD: 79.3 % (ref 38–73)
NEUTROPHILS NFR BLD: 85.2 % (ref 38–73)
NRBC BLD-RTO: 0 /100 WBC
NRBC BLD-RTO: 0 /100 WBC
PHOSPHATE SERPL-MCNC: 4.4 MG/DL (ref 2.7–4.5)
PLATELET # BLD AUTO: 141 K/UL (ref 150–450)
PLATELET # BLD AUTO: 153 K/UL (ref 150–450)
PMV BLD AUTO: 10 FL (ref 9.2–12.9)
PMV BLD AUTO: 9.8 FL (ref 9.2–12.9)
POCT GLUCOSE: 132 MG/DL (ref 70–110)
POCT GLUCOSE: 138 MG/DL (ref 70–110)
POCT GLUCOSE: 140 MG/DL (ref 70–110)
POCT GLUCOSE: 141 MG/DL (ref 70–110)
POCT GLUCOSE: 150 MG/DL (ref 70–110)
POTASSIUM SERPL-SCNC: 4.5 MMOL/L (ref 3.5–5.1)
POTASSIUM SERPL-SCNC: 5.2 MMOL/L (ref 3.5–5.1)
PROT SERPL-MCNC: 5.4 G/DL (ref 6–8.4)
RBC # BLD AUTO: 4.02 M/UL (ref 4.6–6.2)
RBC # BLD AUTO: 4.11 M/UL (ref 4.6–6.2)
SODIUM SERPL-SCNC: 133 MMOL/L (ref 136–145)
TACROLIMUS BLD-MCNC: 4.6 NG/ML (ref 5–15)
TACROLIMUS BLD-MCNC: 7.6 NG/ML (ref 5–15)
WBC # BLD AUTO: 13 K/UL (ref 3.9–12.7)
WBC # BLD AUTO: 17.14 K/UL (ref 3.9–12.7)

## 2023-02-21 PROCEDURE — 63600175 PHARM REV CODE 636 W HCPCS: Performed by: STUDENT IN AN ORGANIZED HEALTH CARE EDUCATION/TRAINING PROGRAM

## 2023-02-21 PROCEDURE — 84100 ASSAY OF PHOSPHORUS: CPT

## 2023-02-21 PROCEDURE — 25000003 PHARM REV CODE 250: Performed by: OTOLARYNGOLOGY

## 2023-02-21 PROCEDURE — 99900026 HC AIRWAY MAINTENANCE (STAT)

## 2023-02-21 PROCEDURE — 84132 ASSAY OF SERUM POTASSIUM: CPT

## 2023-02-21 PROCEDURE — 94761 N-INVAS EAR/PLS OXIMETRY MLT: CPT

## 2023-02-21 PROCEDURE — 80197 ASSAY OF TACROLIMUS: CPT

## 2023-02-21 PROCEDURE — 63600175 PHARM REV CODE 636 W HCPCS

## 2023-02-21 PROCEDURE — 25000003 PHARM REV CODE 250: Performed by: STUDENT IN AN ORGANIZED HEALTH CARE EDUCATION/TRAINING PROGRAM

## 2023-02-21 PROCEDURE — 25000003 PHARM REV CODE 250

## 2023-02-21 PROCEDURE — 20000000 HC ICU ROOM

## 2023-02-21 PROCEDURE — 99499 UNLISTED E&M SERVICE: CPT | Mod: ,,, | Performed by: ANESTHESIOLOGY

## 2023-02-21 PROCEDURE — 27000221 HC OXYGEN, UP TO 24 HOURS

## 2023-02-21 PROCEDURE — 83735 ASSAY OF MAGNESIUM: CPT

## 2023-02-21 PROCEDURE — 85025 COMPLETE CBC W/AUTO DIFF WBC: CPT

## 2023-02-21 PROCEDURE — 99900035 HC TECH TIME PER 15 MIN (STAT)

## 2023-02-21 PROCEDURE — 99499 NO LOS: ICD-10-PCS | Mod: ,,, | Performed by: ANESTHESIOLOGY

## 2023-02-21 PROCEDURE — 80053 COMPREHEN METABOLIC PANEL: CPT

## 2023-02-21 PROCEDURE — 94003 VENT MGMT INPAT SUBQ DAY: CPT

## 2023-02-21 RX ORDER — SODIUM CHLORIDE 9 MG/ML
INJECTION, SOLUTION INTRAVENOUS
Status: DISCONTINUED | OUTPATIENT
Start: 2023-02-21 | End: 2023-03-06

## 2023-02-21 RX ORDER — SODIUM CHLORIDE, SODIUM LACTATE, POTASSIUM CHLORIDE, CALCIUM CHLORIDE 600; 310; 30; 20 MG/100ML; MG/100ML; MG/100ML; MG/100ML
INJECTION, SOLUTION INTRAVENOUS CONTINUOUS
Status: DISCONTINUED | OUTPATIENT
Start: 2023-02-21 | End: 2023-02-21

## 2023-02-21 RX ORDER — FLUTICASONE FUROATE AND VILANTEROL 100; 25 UG/1; UG/1
1 POWDER RESPIRATORY (INHALATION) DAILY
Status: DISCONTINUED | OUTPATIENT
Start: 2023-02-21 | End: 2023-02-24

## 2023-02-21 RX ORDER — SODIUM CHLORIDE 0.9 % (FLUSH) 0.9 %
3 SYRINGE (ML) INJECTION
Status: DISCONTINUED | OUTPATIENT
Start: 2023-02-21 | End: 2023-02-21

## 2023-02-21 RX ORDER — SODIUM CHLORIDE, SODIUM LACTATE, POTASSIUM CHLORIDE, CALCIUM CHLORIDE 600; 310; 30; 20 MG/100ML; MG/100ML; MG/100ML; MG/100ML
INJECTION, SOLUTION INTRAVENOUS CONTINUOUS
Status: ACTIVE | OUTPATIENT
Start: 2023-02-21 | End: 2023-02-22

## 2023-02-21 RX ORDER — SODIUM CHLORIDE 0.9 % (FLUSH) 0.9 %
.1-1 SYRINGE (ML) INJECTION
Status: DISCONTINUED | OUTPATIENT
Start: 2023-02-21 | End: 2023-02-21

## 2023-02-21 RX ORDER — SODIUM CHLORIDE, SODIUM LACTATE, POTASSIUM CHLORIDE, CALCIUM CHLORIDE 600; 310; 30; 20 MG/100ML; MG/100ML; MG/100ML; MG/100ML
INJECTION, SOLUTION INTRAVENOUS CONTINUOUS
Status: ACTIVE | OUTPATIENT
Start: 2023-02-21 | End: 2023-02-21

## 2023-02-21 RX ADMIN — FAMOTIDINE 20 MG: 10 INJECTION INTRAVENOUS at 09:02

## 2023-02-21 RX ADMIN — VALACYCLOVIR HYDROCHLORIDE 1000 MG: 500 TABLET, FILM COATED ORAL at 09:02

## 2023-02-21 RX ADMIN — FAMOTIDINE 20 MG: 10 INJECTION INTRAVENOUS at 08:02

## 2023-02-21 RX ADMIN — POLYETHYLENE GLYCOL 3350 17 G: 17 POWDER, FOR SOLUTION ORAL at 08:02

## 2023-02-21 RX ADMIN — SODIUM CHLORIDE, POTASSIUM CHLORIDE, SODIUM LACTATE AND CALCIUM CHLORIDE: 600; 310; 30; 20 INJECTION, SOLUTION INTRAVENOUS at 01:02

## 2023-02-21 RX ADMIN — GABAPENTIN 600 MG: 300 CAPSULE ORAL at 08:02

## 2023-02-21 RX ADMIN — SODIUM CHLORIDE: 9 INJECTION, SOLUTION INTRAVENOUS at 12:02

## 2023-02-21 RX ADMIN — PROPOFOL 20 MCG/KG/MIN: 10 INJECTION, EMULSION INTRAVENOUS at 08:02

## 2023-02-21 RX ADMIN — VALACYCLOVIR HYDROCHLORIDE 1000 MG: 500 TABLET, FILM COATED ORAL at 02:02

## 2023-02-21 RX ADMIN — FENTANYL CITRATE 50 MCG: 50 INJECTION INTRAMUSCULAR; INTRAVENOUS at 08:02

## 2023-02-21 RX ADMIN — QUETIAPINE FUMARATE 50 MG: 25 TABLET ORAL at 09:02

## 2023-02-21 RX ADMIN — ENOXAPARIN SODIUM 40 MG: 40 INJECTION SUBCUTANEOUS at 04:02

## 2023-02-21 RX ADMIN — FENTANYL CITRATE 50 MCG: 50 INJECTION INTRAMUSCULAR; INTRAVENOUS at 04:02

## 2023-02-21 RX ADMIN — SODIUM CHLORIDE, POTASSIUM CHLORIDE, SODIUM LACTATE AND CALCIUM CHLORIDE: 600; 310; 30; 20 INJECTION, SOLUTION INTRAVENOUS at 09:02

## 2023-02-21 RX ADMIN — ACETAMINOPHEN 650 MG: 325 TABLET ORAL at 09:02

## 2023-02-21 RX ADMIN — AMLODIPINE BESYLATE 5 MG: 5 TABLET ORAL at 08:02

## 2023-02-21 RX ADMIN — GABAPENTIN 600 MG: 300 CAPSULE ORAL at 02:02

## 2023-02-21 RX ADMIN — ACETAMINOPHEN 650 MG: 325 TABLET ORAL at 02:02

## 2023-02-21 RX ADMIN — ESCITALOPRAM OXALATE 20 MG: 20 TABLET ORAL at 09:02

## 2023-02-21 RX ADMIN — MIRTAZAPINE 15 MG: 15 TABLET, FILM COATED ORAL at 09:02

## 2023-02-21 RX ADMIN — SODIUM CHLORIDE, POTASSIUM CHLORIDE, SODIUM LACTATE AND CALCIUM CHLORIDE: 600; 310; 30; 20 INJECTION, SOLUTION INTRAVENOUS at 05:02

## 2023-02-21 RX ADMIN — TACROLIMUS 2 MG: 1 CAPSULE ORAL at 08:02

## 2023-02-21 RX ADMIN — PROPOFOL 20 MCG/KG/MIN: 10 INJECTION, EMULSION INTRAVENOUS at 06:02

## 2023-02-21 RX ADMIN — VALACYCLOVIR HYDROCHLORIDE 1000 MG: 500 TABLET, FILM COATED ORAL at 08:02

## 2023-02-21 RX ADMIN — GABAPENTIN 600 MG: 300 CAPSULE ORAL at 09:02

## 2023-02-21 RX ADMIN — FENTANYL CITRATE 50 MCG: 50 INJECTION INTRAMUSCULAR; INTRAVENOUS at 06:02

## 2023-02-21 RX ADMIN — ACETAMINOPHEN 650 MG: 325 TABLET ORAL at 05:02

## 2023-02-21 RX ADMIN — TACROLIMUS 1 MG: 1 CAPSULE ORAL at 05:02

## 2023-02-21 RX ADMIN — FENTANYL CITRATE 50 MCG: 50 INJECTION INTRAMUSCULAR; INTRAVENOUS at 02:02

## 2023-02-21 NOTE — NURSING
Dr Dent paged again regarding increasing in swelling, hematoma like appearance on pt's L neck. Drain output is around 30 cc/hr sanguinous drainage. And leaking around site noted. Dr. Conway with SICU also aware. No new orders at this time.

## 2023-02-21 NOTE — OP NOTE
DATE OF PROCEDURE: 2/20/2023     PREOPERATIVE DIAGNOSES:   1. Squamous cell carcinoma of the left tonsil status post transoral robotic surgery and left neck dissection  2. Postoperative hematoma of left neck     POSTOPERATIVE DIAGNOSES:   Same    SURGEON:  Surgeon(s) and Role:     * Alexandre Templeton MD - Primary     * Indira Dent MD - Resident - Assisting      PROCEDURES PERFORMED:   Exploration of left neck with evacuation of hematoma and control of postoperative hemorrhage    ANESTHESIA: General      INDICATIONS FOR PROCEDURE:   Dereck Centeno is a 62 y.o. man who underwent left-sided radical tonsillectomy neck dissection for squamous cell carcinoma of the left tonsil earlier today.  He was left intubated at the conclusion of surgery.  This evening, we were alerted by his nurse in the ICU that he was manifesting evidence of expanding hematoma of the left neck.  On exam, he was noted to have an obvious hematoma of the left neck.  It was recommended that we return to the operating room emergently for exploration of the left neck, evacuation of hematoma and control of postoperative hemorrhage.    His brother was apprised of the risks, benefits and alternatives to surgery.  In spite of the risk inherent to surgery,He provided informed consent for the aforementioned procedures on behalf of the patient.     PROCEDURE IN DETAIL:  The patient was taken to the operating room and placed on the operating table in the supine position.  General endotracheal anesthesia was induced by the anesthesia team.     The neck was prepped and draped in standard sterile fashion.      To begin, the incision was opened.  There was noted to be an extensive amount of hematoma throughout the neck.  This was irrigated clear and removed piecemeal.  The neck was then irrigated free of any remaining blood.  There was a small swollen blood noted in the inferior neck.  There was a small arterial bleed noted the inferior most  aspect of the neck dissection.  This was cauterized with bipolar.  The remainder of the neck was explored.  There were several areas of diffuse oozing but no discrete bleeding.  I did not feel that cauterized in a focal bleeders was worthwhile.  The neck was allowed to sit untouched for roughly 5 minutes with no obvious evidence of accumulation of significant bleeding.  Next, a 19 Turkmen Humberto drain was placed in the lateral gutter and secured with a silk suture.  The wound was then closed with 3-0 Vicryl and skin staples.  At the conclusion of the operation, there was noted to be egress of some degree of blood from his oral cavity.  This was suctioned clear.  Again, there was noted to be diffuse oozing from the tonsillectomy site.  The pharynx was then packed with Afrin soaked Kerlix.  He was then handed back to anesthesia.  He was transferred back to ICU in satisfactory condition.    There were no intraoperative complications.  I was present for and participated in the entire procedure as dictated above.       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 Left level 2 neck mass - frozen (delivered to lab)2. Left neck dissection - permanent3. Left radical tonsillectomy, single stitch inferior, double stitch medial - frozen (delivered to lab)     References:    Click here for ordering Quick Tip   Question Answer Comment   Procedure Type: ENT    Specimen Class: Known or suspected malignancy    Which provider would you like to cc? MARIETTA POLLOCK    Release to patient Immediate        02/20/23 0527

## 2023-02-21 NOTE — SUBJECTIVE & OBJECTIVE
Interval History: Overnight taken emergently to the OR for neck exploration and hematoma evacuation. Remains intubated and lightly sedated. Oral cavity packed given oozing in OR.    Medications:  Continuous Infusions:   lactated ringers 125 mL/hr at 02/21/23 0600    propofoL 20 mcg/kg/min (02/21/23 0835)     Scheduled Meds:   acetaminophen  650 mg Per NG tube Q8H    amLODIPine  5 mg Per NG tube Daily    cloNIDine  0.1 mg Per NG tube QHS    enoxaparin  40 mg Subcutaneous Daily    EScitalopram oxalate  20 mg Per NG tube QHS    famotidine (PF)  20 mg Intravenous Q12H    fluticasone furoate-vilanteroL  1 puff Inhalation Daily    gabapentin  600 mg Per NG tube TID    mirtazapine  15 mg Per NG tube QHS    polyethylene glycol  17 g Per NG tube Daily    QUEtiapine  50 mg Per NG tube QHS    tacrolimus  2 mg Per NG tube Daily AM    And    tacrolimus  1 mg Per NG tube Daily PM    valACYclovir  1,000 mg Per NG tube TID     PRN Meds:sodium chloride 0.9%, calcium gluconate IVPB, calcium gluconate IVPB, calcium gluconate IVPB, clonazePAM, dextrose 10%, dextrose 10%, fentaNYL, glucagon (human recombinant), hydrALAZINE, insulin aspart U-100, labetalol, magnesium sulfate IVPB, magnesium sulfate IVPB, ondansetron, potassium chloride **AND** potassium chloride **AND** potassium chloride, sodium phosphate IVPB, sodium phosphate IVPB, sodium phosphate IVPB     Review of patient's allergies indicates:  No Known Allergies  Objective:     Vital Signs (24h Range):  Temp:  [96.6 °F (35.9 °C)-99.3 °F (37.4 °C)] 97 °F (36.1 °C)  Pulse:  [58-85] 71  Resp:  [18-22] 18  SpO2:  [95 %-100 %] 100 %  BP: ()/() 121/67     Date 02/21/23 0700 - 02/22/23 0659   Shift 8477-6001 0960-6808 5382-0422 24 Hour Total   INTAKE   Shift Total(mL/kg)       OUTPUT   Urine(mL/kg/hr) 295   295   Drains 18   18   Shift Total(mL/kg) 313(4.1)   313(4.1)   Weight (kg) 75.9 75.9 75.9 75.9     Lines/Drains/Airways       Drain  Duration                   Closed/Suction Drain 02/20/23 Left Neck Bulb 19 Fr. 1 day         Urethral Catheter 02/20/23 0945 Non-latex 16 Fr. 1 day         Trans Pyloric Feeding Tube 02/20/23 1320 orogastric 12 Fr. <1 day              Airway  Duration                  Airway - Non-Surgical 02/20/23 0926 Nasal Janneth 1 day              Peripheral Intravenous Line  Duration                  Peripheral IV - Single Lumen 02/20/23 0726 20 G Left;Posterior Forearm 1 day         Peripheral IV - Single Lumen 02/20/23 1545 20 G Right Forearm <1 day                    Physical Exam  Intubated, sedated  Oral cavity with Kerlix moderately saturated  Left ENMANUEL x1 w/ appropriate output   Neck ecchymotic, soft     Significant Labs:  CBC:   Recent Labs   Lab 02/21/23  0306   WBC 13.00*   RBC 4.11*   HGB 12.2*   HCT 39.7*   *   MCV 97   MCH 29.7   MCHC 30.7*     CMP:   Recent Labs   Lab 02/21/23  0306 02/21/23  0629   *  --    CALCIUM 8.0*  --    ALBUMIN 3.2*  --    PROT 5.4*  --    *  --    K 5.2* 4.5   CO2 19*  --      --    BUN 16  --    CREATININE 1.2  --    ALKPHOS 48*  --    ALT 7*  --    AST 13  --    BILITOT 0.8  --

## 2023-02-21 NOTE — ANESTHESIA PREPROCEDURE EVALUATION
[facility]  Anesthesia Pre-Operative Evaluation         Patient Name: Dereck Centeno  YOB: 1960  MRN: [id number]    SUBJECTIVE:     Pre-operative evaluation for Procedure(s) (LRB):  EXPLORATION, NECK (Left)     02/20/2023    Dereck Centeno is a 62 y.o. male w/ a significant PMHx of tobacco abuse (50+ pack years), HTN, emphysema, HCV s/p liver tx 2015, oral cancer s/p surgery at [facility] 2019 (no chemo/ radiation), left neck mass s/p DL and bx of L tonsil consistent SCCa. He is s/p robotic transoral surgery with neck dissection and muscle flap creation earlier today 2/20/23. He has had increased neck swelling c/f hematoma formation post-op.    Patient now presents for the above procedure(s).     Stress Echo Summary 5/25/2020:   Normal left ventricular systolic function. The estimated ejection fraction is 60%.   No wall motion abnormalities.   The ECG portion of this study is negative for myocardial ischemia.   The stress echo portion of this study is negative for myocardial ischemia.   Sensitivity is impaired due to the patient's failure to achieve target heart rate.   The test was stopped because the patient experienced fatigue, shortness of breath and claudication. The patient requested the test to be stopped.   There were no arrhythmias during stress.   The patient's exercise capacity was moderately impaired.    Patient Active Problem List   Diagnosis    Chronic back pain    Thrombocytopenia    Anxiety disorder    Generalized weakness    Hyperglycemia    Adrenal cortical steroids causing adverse effect in therapeutic use    Immunosuppression    HTN (hypertension)    Liver transplant 1/11/2015 for HCV    Tobacco abuse    History of malignant neoplasm of oral cavity    Substance induced mood disorder    Dysphagia    Polyp of colon    Osteoporosis    Squamous cell carcinoma of left tonsil    Centrilobular emphysema    GERD (gastroesophageal reflux  disease)    Adrenal adenoma, left    BPH (benign prostatic hyperplasia)       Review of patient's allergies indicates:  No Known Allergies    Current Inpatient Medications:   acetaminophen  650 mg Per NG tube Q8H    amLODIPine  5 mg Per NG tube Daily    cloNIDine  0.1 mg Per NG tube QHS    [START ON 2/21/2023] enoxaparin  40 mg Subcutaneous Daily    EScitalopram oxalate  20 mg Per NG tube QHS    famotidine (PF)  20 mg Intravenous Q12H    gabapentin  600 mg Per NG tube TID    mirtazapine  15 mg Per NG tube QHS    polyethylene glycol  17 g Per NG tube Daily    QUEtiapine  50 mg Per NG tube QHS    [START ON 2/21/2023] tacrolimus  2 mg Per NG tube Daily AM    And    tacrolimus  1 mg Per NG tube Daily PM    umeclidinium-vilanteroL  1 puff Inhalation Daily    valACYclovir  1,000 mg Per NG tube TID       No current facility-administered medications on file prior to encounter.     Current Outpatient Medications on File Prior to Encounter   Medication Sig Dispense Refill    albuterol (PROVENTIL/VENTOLIN HFA) 90 mcg/actuation inhaler Inhale 2 puffs into the lungs every 6 (six) hours as needed for Wheezing or Shortness of Breath.      amLODIPine (NORVASC) 5 MG tablet Take 1 tablet (5 mg total) by mouth once daily. 30 tablet 0    aspirin (ECOTRIN) 81 MG EC tablet Take 1 tablet (81 mg total) by mouth once daily. (Patient taking differently: Take 81 mg by mouth every evening.) 30 tablet 0    atorvastatin (LIPITOR) 40 MG tablet Take 40 mg by mouth every evening.      calcium carbonate (OS-KRISTEL) 600 mg calcium (1,500 mg) Tab Take 600 mg by mouth once.      cloNIDine (CATAPRES) 0.1 MG tablet Take 1 tablet (0.1 mg total) by mouth 2 (two) times daily. (Patient taking differently: Take 0.1 mg by mouth every evening.) 60 tablet 0    docusate sodium (COLACE) 100 MG capsule Take 100 mg by mouth 3 (three) times daily as needed for Constipation.      escitalopram oxalate (LEXAPRO) 20 MG tablet Take 1 tablet (20 mg  total) by mouth once daily. (Patient taking differently: Take 20 mg by mouth every evening.) 30 tablet 0    famotidine (PEPCID) 20 MG tablet Take 1 tablet (20 mg total) by mouth every evening. 30 tablet 0    gabapentin (NEURONTIN) 400 MG capsule Take 1 capsule (400 mg total) by mouth 3 (three) times daily. (Patient taking differently: Take 600 mg by mouth 3 (three) times daily.) 90 capsule 0    HYDROcodone-acetaminophen (NORCO) 5-325 mg per tablet Take 1 tablet by mouth every 6 (six) hours as needed for Pain. 15 tablet 0    magnesium oxide (MAG-OX) 400 mg tablet Take 400 mg by mouth once daily.      mirtazapine (REMERON) 15 MG tablet Take 1 tablet (15 mg total) by mouth every evening. 30 tablet 0    multivitamin (THERAGRAN) tablet Take 1 tablet by mouth once daily.      tacrolimus (PROGRAF) 1 MG Cap Take 2 capsules (2 mg total) by mouth every morning AND 1 capsule (1 mg total) every evening. 90 capsule 11    traMADol (ULTRAM) 50 mg tablet Take 50 mg by mouth every 6 (six) hours as needed for Pain.      vitamin D 1000 units Tab Take 2 tablets (2,000 Units total) by mouth once daily. 60 tablet 5    alendronate (FOSAMAX) 70 MG tablet Take 70 mg by mouth every 7 days. SATURDAYS      hepatitis A virus vaccine, PF, (HAVRIX) 1,440 Kimberlee unit/mL Susp Inject 1 mL into the muscle once.      QUEtiapine (SEROQUEL) 25 MG Tab Take 50 mg by mouth every evening.  3    valACYclovir (VALTREX) 1000 MG tablet Take 1 tablet (1,000 mg total) by mouth 3 (three) times daily. for 7 days 21 tablet 0       Past Surgical History:   Procedure Laterality Date    COLONOSCOPY N/A 6/14/2018    Procedure: COLONOSCOPY;  Surgeon: Conor Castro MD;  Location: [facility] ENDO;  Service: Endoscopy;  Laterality: N/A;    ESOPHAGEAL VARICE LIGATION      ESOPHAGOGASTRODUODENOSCOPY N/A 6/14/2018    Procedure: ESOPHAGOGASTRODUODENOSCOPY (EGD);  Surgeon: Conor Castro MD;  Location[facility];  Service: Endoscopy;  Laterality: N/A;     LARYNGOSCOPY N/A 1/31/2023    Procedure: LARYNGOSCOPY;  Surgeon: Guzman Alfonso MD;  Location: [facility] OR [address] [place] [address];  Service: ENT;  Laterality: N/A;  [id number]    LIVER TRANSPLANT      TIPS PROCEDURE         Social History     Socioeconomic History    Marital status: Single   Tobacco Use    Smoking status: Every Day     Packs/day: 0.50     Years: 50.00     Pack years: 25.00     Types: Cigarettes    Smokeless tobacco: Former     Types: Chew   Substance and Sexual Activity    Alcohol use: Not Currently    Drug use: No    Sexual activity: Not Currently       OBJECTIVE:     Vital Signs Range (Last 24H):  Temp:  [35.9 °C (96.6 °F)-36.9 °C (98.4 °F)]   Pulse:  [58-73]   Resp:  [14-22]   BP: (108-194)/()   SpO2:  [98 %-100 %]       CBC:   Recent Labs     02/20/23  1530 02/20/23  1532   WBC  --  14.57*   RBC  --  4.84   HGB  --  14.8   HCT 45 45.4   PLT  --  137*   MCV  --  94   MCH  --  30.6   MCHC  --  32.6       CMP:   Recent Labs     02/20/23  1532      K 4.9      CO2 19*   BUN 12   CREATININE 1.1   *   MG 1.7   PHOS 3.1   CALCIUM 8.9   ALBUMIN 3.9   PROT 6.9   ALKPHOS 61   ALT 9*   AST 18   BILITOT 0.8       INR:  Recent Labs     02/20/23  1532   INR 1.0   APTT 25.5       Diagnostic Studies: No relevant studies.    EKG:   Results for orders placed or performed during the hospital encounter of 02/15/23   EKG 12-lead    Collection Time: 02/15/23 10:23 AM    Narrative    Test Reason : Z01.818,    Vent. Rate : 060 BPM     Atrial Rate : 060 BPM     P-R Int : 184 ms          QRS Dur : 100 ms      QT Int : 414 ms       P-R-T Axes : 076 063 058 degrees     QTc Int : 414 ms    Normal sinus rhythm  Normal ECG  When compared with ECG of 13-MAR-2018 19:52,  No significant change was found  Confirmed by BETY BULL MD ([id number]) on 2/15/2023 11:47:52 AM    Referred By: ROXANE MARKS           Confirmed By:BETY BULL MD        2D ECHO:   No results found for this or any previous visit.          ASSESSMENT/PLAN:         Pre-op Assessment    I have reviewed the Patient Summary Reports.     I have reviewed the Nursing Notes. I have reviewed the NPO Status.   I have reviewed the Medications.     Review of Systems  Anesthesia Hx:  No problems with previous Anesthesia  History of prior surgery of interest to airway management or planning: liver transplant. Denies Family Hx of Anesthesia complications.   Denies Personal Hx of Anesthesia complications.   Hematology/Oncology:  Hematology Normal      Current/Recent Cancer. --  Cancer in past history:    EENT/Dental:EENT/Dental Normal   Cardiovascular:   Hypertension    Pulmonary:   COPD    Renal/:  Renal/ Normal     Hepatic/GI:   GERD    Musculoskeletal:  Musculoskeletal Normal    Neurological:  Neurology Normal    Endocrine:  Endocrine Normal    Dermatological:  Skin Normal    Psych:  Psychiatric Normal           Physical Exam  General: Well nourished    Airway:  Mallampati: II   Mouth Opening: Normal  TM Distance: Normal  Tongue: Normal  Neck ROM: Normal ROM    Dental:  Dentures        Anesthesia Plan  Type of Anesthesia, risks & benefits discussed:    Anesthesia Type: Gen ETT  Intra-op Monitoring Plan: Standard ASA Monitors  Post Op Pain Control Plan: multimodal analgesia  Induction:  IV  Airway Plan: Direct and Video, Post-Induction  Informed Consent: Informed consent signed with the Patient and all parties understand the risks and agree with anesthesia plan.  All questions answered.   ASA Score: 3 Emergent  Day of Surgery Review of History & Physical: H&P Update referred to the surgeon/provider.    Ready For Surgery From Anesthesia Perspective.     .

## 2023-02-21 NOTE — TRANSFER OF CARE
"Anesthesia Transfer of Care Note    Patient: Dereck Centeno    Procedure(s) Performed: Procedure(s) (LRB):  EXPLORATION, NECK (Left)  EVACUATION, HEMATOMA (Left)    Patient location: ICU    Anesthesia Type: general    Transport from OR: Transported from OR intubated on 100% O2 by AMBU with adequate controlled ventilation. Continuous ECG monitoring in transport. Continuous SpO2 monitoring in transport. Upon arrival to PACU/ICU, patient attached to ventilator and auscultated to confirm bilateral breath sounds and adequate TV    Post pain: adequate analgesia    Post assessment: no apparent anesthetic complications and tolerated procedure well    Post vital signs: stable    Level of consciousness: sedated    Nausea/Vomiting: no nausea/vomiting    Complications: none    Transfer of care protocol was followed      Last vitals:   Visit Vitals  /73   Pulse 69   Temp 36.6 °C (97.8 °F) (Oral)   Resp (!) 22   Ht 5' 10.98" (1.803 m)   Wt 75.9 kg (167 lb 5.3 oz)   SpO2 100%   BMI 23.35 kg/m²     "

## 2023-02-21 NOTE — PLAN OF CARE
Patient back from OR overnight. See note.     500 cc LR bolus overnight.   Tube feeds started this AM per orders.     VSS. Afebrile. NSR 60-70s. Maintaining MAP > 65 and SBP <180. Sats 100% on ventilator.     Vent: AC/VC 40%fiO2 / 5 PEEP / rate 18 / Vt 480    Neuro: PERRLA, follows commands, moves all extremities.     Gtts: Propofol, LR    Torres in place with adequate UOP. See flowsheets.     Left neck ENMANUEL to bulb suction with 10-20cc/hr bright red blood.    Kevyn tube in place. Trickle feeds.     Skin: Left neck staples intact. Skin reddened, soft and warm to touch. No new skin breakdown noted. Foams, heels, and SCDs on. Turned to prevent breakdown. Waffle inflated.     All labs reviewed.

## 2023-02-21 NOTE — NURSING
Pt arrived from OR intubated and sedated. Charge RN, RT, and MD at bedside. Connected to continuous ICU monitor and ventilator. Propofol gtt infusing. Maintaining MAP > 65 and SBP < 180. Left neck soft and warm to touch. No new orders at this time. Will continue to monitor.

## 2023-02-21 NOTE — PLAN OF CARE
"      SICU PLAN OF CARE NOTE    Dx: Squamous cell carcinoma of left tonsil    Shift Events: Admit SICU s/p neck dissection and tonsillectomy. Hematoma noted, ENT aware.     Goals of Care: Possible return to OR.     Neuro: Arouses to Voice, Follows Commands, and Moves All Extremities    Cardiac: SR 60-70, SBP goal < 180.     Vital Signs: /71   Pulse 72   Temp 98 °F (36.7 °C) (Oral)   Resp (!) 22   Ht 5' 11" (1.803 m)   Wt 75.9 kg (167 lb 5.3 oz)   SpO2 100%   BMI 23.34 kg/m²     Respiratory: Ventilator    Diet: NPO    Gtts: Propofol    Urine Output: Urinary Catheter 30-60 cc/hour    Drains: ENMANUEL Drain, total output 155 cc / shift     Labs/Accuchecks: q 6 accuchecks.    Skin: L neck incision with ENMANUEL drain, swollen and taut, team aware. NO breakdown to heels or sacrum. Turned q 2 hr to prevent breakdown. Pt on bed, plugged in and working. Waffle inflated.     See flowsheet for full assessment.        "

## 2023-02-21 NOTE — PROGRESS NOTES
[facility] - Surgical Intensive Care  Otorhinolaryngology-Head & Neck Surgery  Progress Note    Subjective:     Post-Op Info:  Procedure(s) (LRB):  EXPLORATION, NECK (Left)  EVACUATION, HEMATOMA (Left)   1 Day Post-Op  Hospital Day: 2     Interval History: Overnight taken emergently to the OR for neck exploration and hematoma evacuation. Remains intubated and lightly sedated. Oral cavity packed given oozing in OR.    Medications:  Continuous Infusions:   lactated ringers 125 mL/hr at 02/21/23 0600    propofoL 20 mcg/kg/min (02/21/23 0835)     Scheduled Meds:   acetaminophen  650 mg Per NG tube Q8H    amLODIPine  5 mg Per NG tube Daily    cloNIDine  0.1 mg Per NG tube QHS    enoxaparin  40 mg Subcutaneous Daily    EScitalopram oxalate  20 mg Per NG tube QHS    famotidine (PF)  20 mg Intravenous Q12H    fluticasone furoate-vilanteroL  1 puff Inhalation Daily    gabapentin  600 mg Per NG tube TID    mirtazapine  15 mg Per NG tube QHS    polyethylene glycol  17 g Per NG tube Daily    QUEtiapine  50 mg Per NG tube QHS    tacrolimus  2 mg Per NG tube Daily AM    And    tacrolimus  1 mg Per NG tube Daily PM    valACYclovir  1,000 mg Per NG tube TID     PRN Meds:sodium chloride 0.9%, calcium gluconate IVPB, calcium gluconate IVPB, calcium gluconate IVPB, clonazePAM, dextrose 10%, dextrose 10%, fentaNYL, glucagon (human recombinant), hydrALAZINE, insulin aspart U-100, labetalol, magnesium sulfate IVPB, magnesium sulfate IVPB, ondansetron, potassium chloride **AND** potassium chloride **AND** potassium chloride, sodium phosphate IVPB, sodium phosphate IVPB, sodium phosphate IVPB     Review of patient's allergies indicates:  No Known Allergies  Objective:     Vital Signs (24h Range):  Temp:  [96.6 °F (35.9 °C)-99.3 °F (37.4 °C)] 97 °F (36.1 °C)  Pulse:  [58-85] 71  Resp:  [18-22] 18  SpO2:  [95 %-100 %] 100 %  BP: ()/() 121/67     Date 02/21/23 0700 - 02/22/23 0659   Shift 3683-7691 2351-8768  0746-2020 24 Hour Total   INTAKE   Shift Total(mL/kg)       OUTPUT   Urine(mL/kg/hr) 295   295   Drains 18   18   Shift Total(mL/kg) 313(4.1)   313(4.1)   Weight (kg) 75.9 75.9 75.9 75.9     Lines/Drains/Airways       Drain  Duration                  Closed/Suction Drain 02/20/23 Left Neck Bulb 19 Fr. 1 day         Urethral Catheter 02/20/23 0945 Non-latex 16 Fr. 1 day         Trans Pyloric Feeding Tube 02/20/23 1320 orogastric 12 Fr. <1 day              Airway  Duration                  Airway - Non-Surgical 02/20/23 0926 Nasal Janneth 1 day              Peripheral Intravenous Line  Duration                  Peripheral IV - Single Lumen 02/20/23 0726 20 G Left;Posterior Forearm 1 day         Peripheral IV - Single Lumen 02/20/23 1545 20 G Right Forearm <1 day                    Physical Exam  Intubated, sedated  Oral cavity with Kerlix moderately saturated  Left ENMANUEL x1 w/ appropriate output   Neck ecchymotic, soft     Significant Labs:  CBC:   Recent Labs   Lab 02/21/23  0306   WBC 13.00*   RBC 4.11*   HGB 12.2*   HCT 39.7*   *   MCV 97   MCH 29.7   MCHC 30.7*     CMP:   Recent Labs   Lab 02/21/23  0306 02/21/23  0629   *  --    CALCIUM 8.0*  --    ALBUMIN 3.2*  --    PROT 5.4*  --    *  --    K 5.2* 4.5   CO2 19*  --      --    BUN 16  --    CREATININE 1.2  --    ALKPHOS 48*  --    ALT 7*  --    AST 13  --    BILITOT 0.8  --          Assessment/Plan:     * Squamous cell carcinoma of left tonsil  Dereck Centeno is a 62 y.o. 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 On day of surgery pt with expanding hematoma now s/p emergent neck exploration with washout 2/20.       -- Keep intubated today, please keep comfortably sedated given oral packing  -- Oral packing in place, will remove 2/22 and replace if needed   -- Drain care with strict  recording of drain output  -- If concern for neck swelling please call ENT on call ASAP  -- Pain/antinausea medications prn   -- Remainder of care per ICU  -- Please page ENT with any questions or concerns               Lola Mccord MD  Otorhinolaryngology-Head & Neck Surgery  [facility] - Surgical Intensive Care

## 2023-02-21 NOTE — SIGNIFICANT EVENT
ENT was called by the patient's RN for concern for increased neck swelling. On my arrival, the patient's neck is significantly bruised and swollen, consistent with a hematoma. He has remained intubated since surgery and his airway continues to be stable.     Plan to go to the OR for class A for left neck exploration. I called the patient's brother, Ki Centeno, and he provided consent for surgery over the phone.      Indira Dent MD  PGY-III

## 2023-02-21 NOTE — PT/OT/SLP PROGRESS
Physical Therapy  Consult    Patient Name:  Dereck Centeno   MRN:  [id number]  Admitting Diagnosis:  Squamous cell carcinoma of left tonsil   Recent Surgery: Procedure(s) (LRB):  EXPLORATION, NECK (Left)  EVACUATION, HEMATOMA (Left) 1 Day Post-Op  Admit Date: 2/20/2023  Length of Stay: 1 days    Physical Therapy orders received and acknowledged. Patient not seen today due to Pt remains intubated and lightly sedated due to urgent return to OR for neck hematoma washout early this AM.  PT to attempt when Dereck Centeno is medically appropriate for progressive mobility.    Ashley Coy, PT, DPT  2/21/2023  Pager: [phone]

## 2023-02-21 NOTE — PROGRESS NOTES
[facility] - [facility]  Critical Care - Surgery  Progress Note    Patient Name: Dereck Centeno  MRN: [id number]  Admission Date: 2/20/2023  Hospital Length of Stay: 1 days  Code Status: Full Code  Attending Provider: Alexandre Templeton MD  Primary Care Provider: Eva Reynaga MD   Principal Problem: Squamous cell carcinoma of left tonsil    Subjective:     Hospital/ICU Course:  No notes on file    Interval History/Significant Events: Patient developed large hematoma on left-side of neck overnight. He was taken emergently to the OR for neck exploration and hematoma evacuation.     Follow-up For: Procedure(s) (LRB):  EXPLORATION, NECK (Left)  EVACUATION, HEMATOMA (Left)    Post-Operative Day: 1 Day Post-Op    Objective:     Vital Signs (Most Recent):  Temp: 99.3 °F (37.4 °C) (02/21/23 0300)  Pulse: 69 (02/21/23 0500)  Resp: 18 (02/21/23 0441)  BP: (!) 101/56 (02/21/23 0500)  SpO2: 100 % (02/21/23 0500)   Vital Signs (24h Range):  Temp:  [96.6 °F (35.9 °C)-99.3 °F (37.4 °C)] 99.3 °F (37.4 °C)  Pulse:  [58-85] 69  Resp:  [14-22] 18  SpO2:  [95 %-100 %] 100 %  BP: ()/() 101/56     Weight: 75.9 kg (167 lb 5.3 oz)  Body mass index is 23.35 kg/m².      Intake/Output Summary (Last 24 hours) at 2/21/2023 0544  Last data filed at 2/21/2023 0500  Gross per 24 hour   Intake 3491.65 ml   Output 1892 ml   Net 1599.65 ml       Physical Exam  Vitals and nursing note reviewed.   Constitutional:       Appearance: Normal appearance.      Interventions: He is sedated, intubated and restrained.   HENT:      Head: Normocephalic and atraumatic.      Nose:      Comments: NG tube sutured in place   Eyes:      Conjunctiva/sclera: Conjunctivae normal.      Pupils: Pupils are equal, round, and reactive to light.   Neck:      Comments: ENMANUEL drain with SS on left side of neck   Surgical incision clean and dry   Cardiovascular:      Rate and Rhythm: Normal rate and regular rhythm.      Pulses: Normal pulses.      Heart  sounds: Normal heart sounds.   Pulmonary:      Effort: Pulmonary effort is normal. No respiratory distress. He is intubated.      Comments: ETT in place   Abdominal:      General: Abdomen is flat. Bowel sounds are normal.      Palpations: Abdomen is soft.   Skin:     General: Skin is warm.       Vents:  Vent Mode: A/C (02/21/23 0327)  Ventilator Initiated: Yes (02/20/23 1443)  Set Rate: 18 BPM (02/21/23 0327)  Vt Set: 480 mL (02/21/23 0327)  PEEP/CPAP: 5 cmH20 (02/21/23 0327)  Oxygen Concentration (%): 40 (02/21/23 0500)  Peak Airway Pressure: 17 cmH20 (02/21/23 0327)  Plateau Pressure: 9.1 cmH20 (02/21/23 0327)  Total Ve: 8.74 L/m (02/21/23 0327)  Negative Inspiratory Force (cm H2O): 0 (02/21/23 0327)  F/VT Ratio<105 (RSBI): (!) 36.81 (02/20/23 1530)    Lines/Drains/Airways       Drain  Duration                  Closed/Suction Drain 02/20/23 Left Neck Bulb 19 Fr. 1 day         Trans Pyloric Feeding Tube 02/20/23 1320 orogastric 12 Fr. <1 day         Urethral Catheter 02/20/23 0945 Non-latex 16 Fr. <1 day              Airway  Duration                  Airway - Non-Surgical 02/20/23 0926 Nasal Janneth <1 day              Peripheral Intravenous Line  Duration                  Peripheral IV - Single Lumen 02/20/23 0726 20 G Left;Posterior Forearm <1 day         Peripheral IV - Single Lumen 02/20/23 1545 20 G Right Forearm <1 day                    Significant Labs:    CBC/Anemia Profile:  Recent Labs   Lab 02/20/23  1532 02/20/23  2359 02/21/23  0306   WBC 14.57* 17.14* 13.00*   HGB 14.8 12.4* 12.2*   HCT 45.4 38.2* 39.7*   * 153 141*   MCV 94 95 97   RDW 13.1 13.1 13.2        Chemistries:  Recent Labs   Lab 02/20/23  1532 02/21/23  0306    133*   K 4.9 5.2*    105   CO2 19* 19*   BUN 12 16   CREATININE 1.1 1.2   CALCIUM 8.9 8.0*   ALBUMIN 3.9 3.2*   PROT 6.9 5.4*   BILITOT 0.8 0.8   ALKPHOS 61 48*   ALT 9* 7*   AST 18 13   MG 1.7 2.0   PHOS 3.1 4.4       All pertinent labs within the past 24 hours have  been reviewed.    Significant Imaging:  I have reviewed all pertinent imaging results/findings within the past 24 hours.    Assessment/Plan:     Oncology  * Squamous cell carcinoma of left tonsil  Dereck Centeno is a 62 y.o. male with PMHx of HTN, emphysema, HCV s/p liver transplant in 2015, tobacco abuse (50+ pack years), oral cancer s/p surgery at [facility] in 2019 (no chemo/radiation), and newly diagnosed SCC of the left tonsil. He is now s/p left tonsillectomy and neck dissection by Dr. Templeton on 2/20. Post operative course was c/b development of large hematoma at surgical site. He was taken back to the OR for an emergent neck exploration and hematoma evacuation on 2/20.         Neuro/Psych:   -- Sedation: propofol gtt   -- Pain: home gabapentin, tylenol, PRN fentanyl   -- Psych: home clonazepam PRN, scheduled mirtazapine, seroquel, lexapro     Cards  -- HDS; not currently on pressors   -- Restart home amlodipine and clonidine   -- PRN labetalol and hydralazine   -- Restart home ASA and statin       Pulm:   -- H/o emphysema   -- Continue home inhaler; albuterol nebs PRN for wheezing   -- Goal O2 sat > 88%  -- Wean as able  -- Daily SBT       Renal:  -- Keep auguste for strict I/O  -- BUN/Cr 16/1.2  -- UOP 1.5L/24H      FEN / GI:   -- Net +1.6L/24H  -- Replace lytes as needed  -- Nutrition: NPO + TF  -- LR 125cc/hr      ID:   -- Tm: afebrile; WBC 13  -- Abx: none       Heme/Onc:   -- H/H 12.2/39.7   -- Daily CBC  -- Home ASA       Endo:   -- Gluc goal 140-180  -- SSI      Immuno:  -- H/o HCV infection s/p liver transplant in 2015  -- Liver transplant consulted  -- Continue home tacrolimus  -- Monitor tacrolimus levels daily   -- Valacyclovir daily per ENT      PPx:   Feeding: NPO + TF  Analgesia/Sedation: PRN fentanyl, home gabapentin, tylenol/ propofol gtt   Thromboembolic prevention: lovenox   HOB >30: yes   Stress Ulcer ppx: famotidine BID   Glucose control: Critical care goal 140-180 g/dl,  ISS    Lines/Drains/Airway: ETT, auguste, PIVx2, NG tube, neck drain x2      Dispo/Code Status/Palliative:   -- SICU / Full Code            Critical care was time spent personally by me on the following activities: development of treatment plan with patient or surrogate and bedside caregivers, discussions with consultants, evaluation of patient's response to treatment, examination of patient, ordering and performing treatments and interventions, ordering and review of laboratory studies, ordering and review of radiographic studies, pulse oximetry, re-evaluation of patient's condition.  This critical care time did not overlap with that of any other provider or involve time for any procedures.     BASIL Dorsey MD  Critical Care - Surgery  [facility]

## 2023-02-21 NOTE — ASSESSMENT & PLAN NOTE
Dereck Centeno is a 62 y.o. 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 On day of surgery pt with expanding hematoma now s/p emergent neck exploration with washout 2/20.       -- Keep intubated today, please keep comfortably sedated given oral packing  -- Oral packing in place, will remove 2/22 and replace if needed   -- Drain care with strict recording of drain output  -- If concern for neck swelling please call ENT on call ASAP  -- Pain/antinausea medications prn   -- Remainder of care per ICU  -- Please page ENT with any questions or concerns

## 2023-02-21 NOTE — NURSING
"ENT paged for swelling/tautness around L neck incision. Per Dr Dent expecting "bulky" incision site by drain. Dr Conway also @ bedside to assess. No new orders at this time.   "

## 2023-02-21 NOTE — NURSING
Consent obtained and pt taken back to OR for exploration of neck d/t increased swelling. Connected to portable continuous monitor and 100% fiO2 ambu bag via ETT. Anesthesia team at bedside to transport patient to OR.

## 2023-02-21 NOTE — SUBJECTIVE & OBJECTIVE
Interval History/Significant Events: Patient developed large hematoma on left-side of neck overnight. He was taken emergently to the OR for neck exploration and hematoma evacuation.     Follow-up For: Procedure(s) (LRB):  EXPLORATION, NECK (Left)  EVACUATION, HEMATOMA (Left)    Post-Operative Day: 1 Day Post-Op    Objective:     Vital Signs (Most Recent):  Temp: 99.3 °F (37.4 °C) (02/21/23 0300)  Pulse: 69 (02/21/23 0500)  Resp: 18 (02/21/23 0441)  BP: (!) 101/56 (02/21/23 0500)  SpO2: 100 % (02/21/23 0500)   Vital Signs (24h Range):  Temp:  [96.6 °F (35.9 °C)-99.3 °F (37.4 °C)] 99.3 °F (37.4 °C)  Pulse:  [58-85] 69  Resp:  [14-22] 18  SpO2:  [95 %-100 %] 100 %  BP: ()/() 101/56     Weight: 75.9 kg (167 lb 5.3 oz)  Body mass index is 23.35 kg/m².      Intake/Output Summary (Last 24 hours) at 2/21/2023 0544  Last data filed at 2/21/2023 0500  Gross per 24 hour   Intake 3491.65 ml   Output 1892 ml   Net 1599.65 ml       Physical Exam  Vitals and nursing note reviewed.   Constitutional:       Appearance: Normal appearance.      Interventions: He is sedated, intubated and restrained.   HENT:      Head: Normocephalic and atraumatic.      Nose:      Comments: NG tube sutured in place   Eyes:      Conjunctiva/sclera: Conjunctivae normal.      Pupils: Pupils are equal, round, and reactive to light.   Neck:      Comments: ENMANUEL drain with SS on left side of neck   Surgical incision clean and dry   Cardiovascular:      Rate and Rhythm: Normal rate and regular rhythm.      Pulses: Normal pulses.      Heart sounds: Normal heart sounds.   Pulmonary:      Effort: Pulmonary effort is normal. No respiratory distress. He is intubated.      Comments: ETT in place   Abdominal:      General: Abdomen is flat. Bowel sounds are normal.      Palpations: Abdomen is soft.   Skin:     General: Skin is warm.       Vents:  Vent Mode: A/C (02/21/23 0327)  Ventilator Initiated: Yes (02/20/23 1443)  Set Rate: 18 BPM (02/21/23 0327)  Vt  Set: 480 mL (02/21/23 0327)  PEEP/CPAP: 5 cmH20 (02/21/23 0327)  Oxygen Concentration (%): 40 (02/21/23 0500)  Peak Airway Pressure: 17 cmH20 (02/21/23 0327)  Plateau Pressure: 9.1 cmH20 (02/21/23 0327)  Total Ve: 8.74 L/m (02/21/23 0327)  Negative Inspiratory Force (cm H2O): 0 (02/21/23 0327)  F/VT Ratio<105 (RSBI): (!) 36.81 (02/20/23 1530)    Lines/Drains/Airways       Drain  Duration                  Closed/Suction Drain 02/20/23 Left Neck Bulb 19 Fr. 1 day         Trans Pyloric Feeding Tube 02/20/23 1320 orogastric 12 Fr. <1 day         Urethral Catheter 02/20/23 0945 Non-latex 16 Fr. <1 day              Airway  Duration                  Airway - Non-Surgical 02/20/23 0926 Nasal Janneth <1 day              Peripheral Intravenous Line  Duration                  Peripheral IV - Single Lumen 02/20/23 0726 20 G Left;Posterior Forearm <1 day         Peripheral IV - Single Lumen 02/20/23 1545 20 G Right Forearm <1 day                    Significant Labs:    CBC/Anemia Profile:  Recent Labs   Lab 02/20/23  1532 02/20/23  2359 02/21/23  0306   WBC 14.57* 17.14* 13.00*   HGB 14.8 12.4* 12.2*   HCT 45.4 38.2* 39.7*   * 153 141*   MCV 94 95 97   RDW 13.1 13.1 13.2        Chemistries:  Recent Labs   Lab 02/20/23  1532 02/21/23  0306    133*   K 4.9 5.2*    105   CO2 19* 19*   BUN 12 16   CREATININE 1.1 1.2   CALCIUM 8.9 8.0*   ALBUMIN 3.9 3.2*   PROT 6.9 5.4*   BILITOT 0.8 0.8   ALKPHOS 61 48*   ALT 9* 7*   AST 18 13   MG 1.7 2.0   PHOS 3.1 4.4       All pertinent labs within the past 24 hours have been reviewed.    Significant Imaging:  I have reviewed all pertinent imaging results/findings within the past 24 hours.

## 2023-02-22 LAB
ALBUMIN SERPL BCP-MCNC: 2.9 G/DL (ref 3.5–5.2)
ALP SERPL-CCNC: 41 U/L (ref 55–135)
ALT SERPL W/O P-5'-P-CCNC: 5 U/L (ref 10–44)
ANION GAP SERPL CALC-SCNC: 6 MMOL/L (ref 8–16)
AST SERPL-CCNC: 9 U/L (ref 10–40)
BASOPHILS # BLD AUTO: 0.02 K/UL (ref 0–0.2)
BASOPHILS NFR BLD: 0.3 % (ref 0–1.9)
BILIRUB SERPL-MCNC: 0.3 MG/DL (ref 0.1–1)
BUN SERPL-MCNC: 17 MG/DL (ref 8–23)
CALCIUM SERPL-MCNC: 8.3 MG/DL (ref 8.7–10.5)
CHLORIDE SERPL-SCNC: 110 MMOL/L (ref 95–110)
CO2 SERPL-SCNC: 24 MMOL/L (ref 23–29)
CREAT SERPL-MCNC: 1 MG/DL (ref 0.5–1.4)
DIFFERENTIAL METHOD: ABNORMAL
EOSINOPHIL # BLD AUTO: 0.2 K/UL (ref 0–0.5)
EOSINOPHIL NFR BLD: 2.3 % (ref 0–8)
ERYTHROCYTE [DISTWIDTH] IN BLOOD BY AUTOMATED COUNT: 13.4 % (ref 11.5–14.5)
EST. GFR  (NO RACE VARIABLE): >60 ML/MIN/1.73 M^2
GLUCOSE SERPL-MCNC: 162 MG/DL (ref 70–110)
HCT VFR BLD AUTO: 31.2 % (ref 40–54)
HGB BLD-MCNC: 9.7 G/DL (ref 14–18)
IMM GRANULOCYTES # BLD AUTO: 0.04 K/UL (ref 0–0.04)
IMM GRANULOCYTES NFR BLD AUTO: 0.6 % (ref 0–0.5)
LYMPHOCYTES # BLD AUTO: 1.1 K/UL (ref 1–4.8)
LYMPHOCYTES NFR BLD: 16.6 % (ref 18–48)
MAGNESIUM SERPL-MCNC: 1.7 MG/DL (ref 1.6–2.6)
MCH RBC QN AUTO: 29.8 PG (ref 27–31)
MCHC RBC AUTO-ENTMCNC: 31.1 G/DL (ref 32–36)
MCV RBC AUTO: 96 FL (ref 82–98)
MONOCYTES # BLD AUTO: 0.5 K/UL (ref 0.3–1)
MONOCYTES NFR BLD: 7.9 % (ref 4–15)
NEUTROPHILS # BLD AUTO: 5 K/UL (ref 1.8–7.7)
NEUTROPHILS NFR BLD: 72.3 % (ref 38–73)
NRBC BLD-RTO: 0 /100 WBC
PHOSPHATE SERPL-MCNC: 1.7 MG/DL (ref 2.7–4.5)
PLATELET # BLD AUTO: 104 K/UL (ref 150–450)
PMV BLD AUTO: 9.8 FL (ref 9.2–12.9)
POCT GLUCOSE: 124 MG/DL (ref 70–110)
POTASSIUM SERPL-SCNC: 4.1 MMOL/L (ref 3.5–5.1)
PROT SERPL-MCNC: 5 G/DL (ref 6–8.4)
RBC # BLD AUTO: 3.25 M/UL (ref 4.6–6.2)
SODIUM SERPL-SCNC: 140 MMOL/L (ref 136–145)
TACROLIMUS BLD-MCNC: 4.2 NG/ML (ref 5–15)
WBC # BLD AUTO: 6.86 K/UL (ref 3.9–12.7)

## 2023-02-22 PROCEDURE — 84100 ASSAY OF PHOSPHORUS: CPT

## 2023-02-22 PROCEDURE — 25000003 PHARM REV CODE 250

## 2023-02-22 PROCEDURE — 20000000 HC ICU ROOM

## 2023-02-22 PROCEDURE — 99223 1ST HOSP IP/OBS HIGH 75: CPT | Mod: ,,, | Performed by: INTERNAL MEDICINE

## 2023-02-22 PROCEDURE — 27000221 HC OXYGEN, UP TO 24 HOURS

## 2023-02-22 PROCEDURE — 25000003 PHARM REV CODE 250: Performed by: OTOLARYNGOLOGY

## 2023-02-22 PROCEDURE — 80053 COMPREHEN METABOLIC PANEL: CPT

## 2023-02-22 PROCEDURE — 80197 ASSAY OF TACROLIMUS: CPT

## 2023-02-22 PROCEDURE — 63600175 PHARM REV CODE 636 W HCPCS: Performed by: STUDENT IN AN ORGANIZED HEALTH CARE EDUCATION/TRAINING PROGRAM

## 2023-02-22 PROCEDURE — 25000003 PHARM REV CODE 250: Performed by: STUDENT IN AN ORGANIZED HEALTH CARE EDUCATION/TRAINING PROGRAM

## 2023-02-22 PROCEDURE — 99233 SBSQ HOSP IP/OBS HIGH 50: CPT | Mod: ,,, | Performed by: ANESTHESIOLOGY

## 2023-02-22 PROCEDURE — 83735 ASSAY OF MAGNESIUM: CPT

## 2023-02-22 PROCEDURE — 94010 BREATHING CAPACITY TEST: CPT

## 2023-02-22 PROCEDURE — 99233 PR SUBSEQUENT HOSPITAL CARE,LEVL III: ICD-10-PCS | Mod: ,,, | Performed by: ANESTHESIOLOGY

## 2023-02-22 PROCEDURE — 94003 VENT MGMT INPAT SUBQ DAY: CPT

## 2023-02-22 PROCEDURE — 99223 PR INITIAL HOSPITAL CARE,LEVL III: ICD-10-PCS | Mod: ,,, | Performed by: INTERNAL MEDICINE

## 2023-02-22 PROCEDURE — 99900026 HC AIRWAY MAINTENANCE (STAT)

## 2023-02-22 PROCEDURE — 94150 VITAL CAPACITY TEST: CPT

## 2023-02-22 PROCEDURE — 99900035 HC TECH TIME PER 15 MIN (STAT)

## 2023-02-22 PROCEDURE — 94761 N-INVAS EAR/PLS OXIMETRY MLT: CPT

## 2023-02-22 PROCEDURE — 85025 COMPLETE CBC W/AUTO DIFF WBC: CPT

## 2023-02-22 PROCEDURE — 63600175 PHARM REV CODE 636 W HCPCS

## 2023-02-22 RX ORDER — SODIUM CHLORIDE, SODIUM LACTATE, POTASSIUM CHLORIDE, CALCIUM CHLORIDE 600; 310; 30; 20 MG/100ML; MG/100ML; MG/100ML; MG/100ML
INJECTION, SOLUTION INTRAVENOUS CONTINUOUS
Status: DISCONTINUED | OUTPATIENT
Start: 2023-02-22 | End: 2023-02-22

## 2023-02-22 RX ORDER — MUPIROCIN 20 MG/G
OINTMENT TOPICAL 2 TIMES DAILY
Status: COMPLETED | OUTPATIENT
Start: 2023-02-22 | End: 2023-02-26

## 2023-02-22 RX ORDER — DEXMEDETOMIDINE HYDROCHLORIDE 4 UG/ML
0-1.4 INJECTION, SOLUTION INTRAVENOUS CONTINUOUS
Status: DISCONTINUED | OUTPATIENT
Start: 2023-02-22 | End: 2023-02-27

## 2023-02-22 RX ADMIN — AMLODIPINE BESYLATE 5 MG: 5 TABLET ORAL at 08:02

## 2023-02-22 RX ADMIN — MUPIROCIN: 20 OINTMENT TOPICAL at 09:02

## 2023-02-22 RX ADMIN — ESCITALOPRAM OXALATE 20 MG: 20 TABLET ORAL at 09:02

## 2023-02-22 RX ADMIN — SODIUM CHLORIDE, POTASSIUM CHLORIDE, SODIUM LACTATE AND CALCIUM CHLORIDE: 600; 310; 30; 20 INJECTION, SOLUTION INTRAVENOUS at 03:02

## 2023-02-22 RX ADMIN — ACETAMINOPHEN 650 MG: 325 TABLET ORAL at 02:02

## 2023-02-22 RX ADMIN — DEXMEDETOMIDINE HYDROCHLORIDE 0.05 MCG/KG/HR: 4 INJECTION, SOLUTION INTRAVENOUS at 04:02

## 2023-02-22 RX ADMIN — GABAPENTIN 600 MG: 300 CAPSULE ORAL at 08:02

## 2023-02-22 RX ADMIN — PROPOFOL 30 MCG/KG/MIN: 10 INJECTION, EMULSION INTRAVENOUS at 03:02

## 2023-02-22 RX ADMIN — POLYETHYLENE GLYCOL 3350 17 G: 17 POWDER, FOR SOLUTION ORAL at 09:02

## 2023-02-22 RX ADMIN — FENTANYL CITRATE 50 MCG: 50 INJECTION INTRAMUSCULAR; INTRAVENOUS at 09:02

## 2023-02-22 RX ADMIN — ACETAMINOPHEN 650 MG: 325 TABLET ORAL at 09:02

## 2023-02-22 RX ADMIN — GABAPENTIN 600 MG: 300 CAPSULE ORAL at 02:02

## 2023-02-22 RX ADMIN — ACETAMINOPHEN 650 MG: 325 TABLET ORAL at 06:02

## 2023-02-22 RX ADMIN — QUETIAPINE FUMARATE 50 MG: 25 TABLET ORAL at 09:02

## 2023-02-22 RX ADMIN — MAGNESIUM SULFATE 2 G: 2 INJECTION INTRAVENOUS at 05:02

## 2023-02-22 RX ADMIN — FAMOTIDINE 20 MG: 10 INJECTION INTRAVENOUS at 08:02

## 2023-02-22 RX ADMIN — TACROLIMUS 1 MG: 1 CAPSULE ORAL at 05:02

## 2023-02-22 RX ADMIN — MIRTAZAPINE 15 MG: 15 TABLET, FILM COATED ORAL at 09:02

## 2023-02-22 RX ADMIN — SODIUM PHOSPHATE, MONOBASIC, MONOHYDRATE AND SODIUM PHOSPHATE, DIBASIC, ANHYDROUS 20.01 MMOL: 142; 276 INJECTION, SOLUTION INTRAVENOUS at 07:02

## 2023-02-22 RX ADMIN — FENTANYL CITRATE 50 MCG: 50 INJECTION INTRAMUSCULAR; INTRAVENOUS at 03:02

## 2023-02-22 RX ADMIN — MUPIROCIN 1 G: 20 OINTMENT TOPICAL at 09:02

## 2023-02-22 RX ADMIN — CLONIDINE HYDROCHLORIDE 0.1 MG: 0.1 TABLET ORAL at 09:02

## 2023-02-22 RX ADMIN — TACROLIMUS 2 MG: 1 CAPSULE ORAL at 08:02

## 2023-02-22 RX ADMIN — GABAPENTIN 600 MG: 300 CAPSULE ORAL at 09:02

## 2023-02-22 RX ADMIN — ENOXAPARIN SODIUM 40 MG: 40 INJECTION SUBCUTANEOUS at 05:02

## 2023-02-22 RX ADMIN — FAMOTIDINE 20 MG: 10 INJECTION INTRAVENOUS at 09:02

## 2023-02-22 RX ADMIN — FENTANYL CITRATE 50 MCG: 50 INJECTION INTRAMUSCULAR; INTRAVENOUS at 02:02

## 2023-02-22 RX ADMIN — PROPOFOL 25 MCG/KG/MIN: 10 INJECTION, EMULSION INTRAVENOUS at 10:02

## 2023-02-22 NOTE — SUBJECTIVE & OBJECTIVE
Interval History/Significant Events: NAEON, Afebrile, VSS.  Intubated on rate control overnight.  ENT to remove oral packing today.     Follow-up For: Procedure(s) (LRB):  EXPLORATION, NECK (Left)  EVACUATION, HEMATOMA (Left)    Post-Operative Day: 2 Days Post-Op    Objective:     Vital Signs (Most Recent):  Temp: 98.5 °F (36.9 °C) (02/21/23 2300)  Pulse: 74 (02/22/23 0400)  Resp: 20 (02/22/23 0334)  BP: 125/60 (02/22/23 0400)  SpO2: 99 % (02/22/23 0400) Vital Signs (24h Range):  Temp:  [97 °F (36.1 °C)-98.9 °F (37.2 °C)] 98.5 °F (36.9 °C)  Pulse:  [61-82] 74  Resp:  [18-20] 20  SpO2:  [98 %-100 %] 99 %  BP: ()/(50-83) 125/60     Weight: 75.9 kg (167 lb 5.3 oz)  Body mass index is 23.35 kg/m².      Intake/Output Summary (Last 24 hours) at 2/22/2023 0443  Last data filed at 2/22/2023 0400  Gross per 24 hour   Intake 3178.35 ml   Output 2189 ml   Net 989.35 ml       Physical Exam  Vitals and nursing note reviewed.   Constitutional:       Appearance: Normal appearance.      Interventions: He is sedated, intubated and restrained.   HENT:      Head: Normocephalic and atraumatic.      Nose:      Comments: NG tube sutured in place   Eyes:      Conjunctiva/sclera: Conjunctivae normal.      Pupils: Pupils are equal, round, and reactive to light.   Neck:      Comments: ENMANUEL drain with SS on left side of neck   Surgical incision is c/d/i  Cardiovascular:      Rate and Rhythm: Normal rate and regular rhythm.      Pulses: Normal pulses.      Heart sounds: Normal heart sounds.   Pulmonary:      Effort: Pulmonary effort is normal. No respiratory distress. He is intubated.      Comments: ETT in place   Abdominal:      General: Abdomen is flat. Bowel sounds are normal.      Palpations: Abdomen is soft.   Skin:     General: Skin is warm.     Vents:  Vent Mode: A/C (02/22/23 0342)  Ventilator Initiated: Yes (02/20/23 1443)  Set Rate: 18 BPM (02/22/23 0342)  Vt Set: 480 mL (02/22/23 0342)  PEEP/CPAP: 5 cmH20 (02/22/23 0342)  Oxygen  Concentration (%): 40 (02/22/23 0400)  Peak Airway Pressure: 23 cmH20 (02/22/23 0342)  Plateau Pressure: 12 cmH20 (02/22/23 0342)  Total Ve: 8.81 L/m (02/22/23 0342)  Negative Inspiratory Force (cm H2O): 0 (02/22/23 0342)  F/VT Ratio<105 (RSBI): (!) 34.48 (02/21/23 1506)    Lines/Drains/Airways       Drain  Duration                  Closed/Suction Drain 02/20/23 Left Neck Bulb 19 Fr. 2 days         Trans Pyloric Feeding Tube 02/20/23 1320 orogastric 12 Fr. 1 day         Urethral Catheter 02/20/23 0945 Non-latex 16 Fr. 1 day              Airway  Duration                  Airway - Non-Surgical 02/20/23 0926 Nasal Janneth 1 day              Peripheral Intravenous Line  Duration                  Peripheral IV - Single Lumen 02/20/23 0726 20 G Left;Posterior Forearm 1 day         Peripheral IV - Single Lumen 02/20/23 1545 20 G Right Forearm 1 day                    Significant Labs:    CBC/Anemia Profile:  Recent Labs   Lab 02/20/23  2359 02/21/23  0306 02/22/23  0350   WBC 17.14* 13.00* 6.86   HGB 12.4* 12.2* 9.7*   HCT 38.2* 39.7* 31.2*    141* 104*   MCV 95 97 96   RDW 13.1 13.2 13.4        Chemistries:  Recent Labs   Lab 02/20/23  1532 02/21/23  0306 02/21/23  0629 02/22/23  0350    133*  --  140   K 4.9 5.2* 4.5 4.1    105  --  110   CO2 19* 19*  --  24   BUN 12 16  --  17   CREATININE 1.1 1.2  --  1.0   CALCIUM 8.9 8.0*  --  8.3*   ALBUMIN 3.9 3.2*  --  2.9*   PROT 6.9 5.4*  --  5.0*   BILITOT 0.8 0.8  --  0.3   ALKPHOS 61 48*  --  41*   ALT 9* 7*  --  5*   AST 18 13  --  9*   MG 1.7 2.0  --  1.7   PHOS 3.1 4.4  --  1.7*       All pertinent labs within the past 24 hours have been reviewed.    Significant Imaging:  I have reviewed all pertinent imaging results/findings within the past 24 hours.

## 2023-02-22 NOTE — PLAN OF CARE
CM to patient room patient intubated and on the vent unable to discuss discharge no family in attendance   GRANT DAVIDSON

## 2023-02-22 NOTE — PT/OT/SLP PROGRESS
Physical Therapy      Patient Name:  Dereck Centeno   MRN:  [id number]    Patient not seen today secondary to  (pt on hold per RN due to packing being removed from back of throat. MD wanted pt to remain supine.). Will follow-up at a later date.    2/22/2023  .

## 2023-02-22 NOTE — SUBJECTIVE & OBJECTIVE
Interval History: NAEON. No significant oral bleeding. Neck swelling and ecchymosis with improvement.     Medications:  Continuous Infusions:   propofoL 35 mcg/kg/min (02/22/23 0600)     Scheduled Meds:   acetaminophen  650 mg Per NG tube Q8H    amLODIPine  5 mg Per NG tube Daily    cloNIDine  0.1 mg Per NG tube QHS    enoxaparin  40 mg Subcutaneous Daily    EScitalopram oxalate  20 mg Per NG tube QHS    famotidine (PF)  20 mg Intravenous Q12H    fluticasone furoate-vilanteroL  1 puff Inhalation Daily    gabapentin  600 mg Per NG tube TID    mirtazapine  15 mg Per NG tube QHS    polyethylene glycol  17 g Per NG tube Daily    QUEtiapine  50 mg Per NG tube QHS    tacrolimus  2 mg Per NG tube Daily AM    And    tacrolimus  1 mg Per NG tube Daily PM    valACYclovir  1,000 mg Per NG tube TID     PRN Meds:sodium chloride 0.9%, calcium gluconate IVPB, calcium gluconate IVPB, calcium gluconate IVPB, clonazePAM, dextrose 10%, dextrose 10%, fentaNYL, glucagon (human recombinant), hydrALAZINE, insulin aspart U-100, labetalol, magnesium sulfate IVPB, magnesium sulfate IVPB, ondansetron, potassium chloride **AND** potassium chloride **AND** potassium chloride, sodium phosphate IVPB, sodium phosphate IVPB, sodium phosphate IVPB     Review of patient's allergies indicates:  No Known Allergies  Objective:     Vital Signs (24h Range):  Temp:  [97.7 °F (36.5 °C)-98.9 °F (37.2 °C)] 98.5 °F (36.9 °C)  Pulse:  [61-82] 72  Resp:  [18-20] 20  SpO2:  [98 %-100 %] 100 %  BP: ()/(50-83) 87/51       Lines/Drains/Airways       Drain  Duration                  Closed/Suction Drain 02/20/23 Left Neck Bulb 19 Fr. 2 days         Trans Pyloric Feeding Tube 02/20/23 1320 orogastric 12 Fr. 1 day         Urethral Catheter 02/20/23 0945 Non-latex 16 Fr. 1 day              Airway  Duration                  Airway - Non-Surgical 02/20/23 0926 Nasal Jannteh 1 day              Peripheral Intravenous Line  Duration                  Peripheral IV - Single  Lumen 02/20/23 0726 20 G Left;Posterior Forearm 2 days         Peripheral IV - Single Lumen 02/20/23 1545 20 G Right Forearm 1 day                    Physical Exam  Intubated, sedated  Oral cavity with Kerlix, removed. No obvious bleeding though intraoral examination limited.   Left ENMANUEL x1 w/ appropriate output, stripped.    Neck ecchymotic but improved, soft     Significant Labs:  CBC:   Recent Labs   Lab 02/22/23  0350   WBC 6.86   RBC 3.25*   HGB 9.7*   HCT 31.2*   *   MCV 96   MCH 29.8   MCHC 31.1*     CMP:   Recent Labs   Lab 02/22/23  0350   *   CALCIUM 8.3*   ALBUMIN 2.9*   PROT 5.0*      K 4.1   CO2 24      BUN 17   CREATININE 1.0   ALKPHOS 41*   ALT 5*   AST 9*   BILITOT 0.3       Significant Diagnostics:  I have reviewed all pertinent imaging results/findings within the past 24 hours.

## 2023-02-22 NOTE — PLAN OF CARE
Recommendations     1.) TF recommendations:     - if pt is on propofol, recommend TF of Impact Peptide 1.5@40ml/hr to provide 1440kcal, 90gPRO, and 739ml FF      - if Propofol is d/c'd recommend increasing TF to Impact Peptide 1.5@ 45ml/hr to provide 1620kcal, 101gPRO, and 832ml of FF.      2.) Recommend ADAT with goal of regular diet, texture as per SLP/MD.      3.) RD to monitor.        Goals: To meet % of EEN/EPN by next RD f/u  Nutrition Goal Status: new, goal met  Communication of RD Recs:  (POC)

## 2023-02-22 NOTE — ASSESSMENT & PLAN NOTE
Dereck Centeno is a 62 y.o. male with PMHx of HTN, emphysema, HCV s/p liver transplant in 2015, tobacco abuse (50+ pack years), oral cancer s/p surgery at [facility] in 2019 (no chemo/radiation), and newly diagnosed SCC of the left tonsil. He is now s/p left tonsillectomy and neck dissection by Dr. Templeton on 2/20. Post operative course was c/b development of large hematoma at surgical site. He was taken back to the OR for an emergent neck exploration and hematoma evacuation on 2/20.       Neuro/Psych:   -- Sedation: propofol gtt   -- Pain: home gabapentin, tylenol, PRN fentanyl   -- Psych: home clonazepam PRN, scheduled mirtazapine, seroquel, lexapro     Cards  -- HDS; not currently on pressors   -- Continue home amlodipine and clonidine   -- PRN labetalol and hydralazine   -- Continue home ASA and statin       Pulm:   -- H/o emphysema   -- Continue home inhaler; albuterol nebs PRN for wheezing   -- Goal O2 sat > 88%  -- Wean as able  -- Daily SBT       Renal:  -- Keep auguste for strict I/O  -- BUN/Cr 17/1.0  -- UOP 1.8L/24H      FEN / GI:   -- Net +1.08L/24H  -- Replace lytes as needed  -- Nutrition: NPO + TF  -- LR 125cc/hr      ID:   -- Tm: afebrile; WBC 6.8 from 13  -- Abx: none       Heme/Onc:   -- H/H decreased to 9.7/31.2 from 12.2/39.7   -- Daily CBC  -- Home ASA       Endo:   -- Gluc goal 140-180  -- SSI      Immuno:  -- H/o HCV infection s/p liver transplant in 2015  -- Liver transplant consulted  -- Continue home tacrolimus  -- Monitor tacrolimus levels daily   -- Valacyclovir daily per ENT      PPx:   Feeding: NPO + TF  Analgesia/Sedation: PRN fentanyl, home gabapentin, tylenol/ propofol gtt   Thromboembolic prevention: lovenox   HOB >30: yes   Stress Ulcer ppx: famotidine BID   Glucose control: Critical care goal 140-180 g/dl, ISS    Lines/Drains/Airway: ETT, auguste, PIVx2, NG tube, neck drain x2      Dispo/Code Status/Palliative:   -- SICU / Full Code

## 2023-02-22 NOTE — PLAN OF CARE
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]    Admission Date: 2/20/2023  Expected Discharge Date:     Discharge Barriers Identified: Underinsured    Payor: MEDICAID / [facility]: [facility] / Product Type: Managed Medicaid /     Extended Emergency Contact Information  Primary Emergency Contact: Ki Centeno  Address: [address] [place] [address], [place] [address] [facility]  Home Phone: [phone]  Mobile Phone: [phone]  Relation: Brother  Secondary Emergency Contact: Dianne Centeno  Address: [address] [address] [address]           [place] [address] [facility]  Home Phone: [phone]  Mobile Phone: [phone]  Relation: Relative    Discharge Plan A:  (TBD)  Discharge Plan B:  (TBD)      [facility] [place] [address]  [address] [place] [address]  Phone: [phone] Fax: [phone]    [facility] [address] [place] [address]  Phone: [phone] Fax: [phone]    [id number] [address] AT [facility] [address]   [address]  [facility] [address]  Phone: [phone] Fax: [phone]    [facility] [place] [address] [place] [address]  Phone: [phone] Fax: [phone]    [facility] [place] [address]  [address]  [facility] [address]  Phone: [phone] Fax: [phone]      Initial Assessment (most recent)       Adult Discharge Assessment - 02/22/23 1224          Discharge Assessment    Assessment Type Discharge Planning Assessment     Confirmed/corrected address, phone number and insurance Yes     Confirmed Demographics Correct on  Facesheet     Source of Information family     When was your last doctors appointment? 02/15/23     Communicated JUAN with patient/caregiver Date not available/Unable to determine     People in Home parent(s);sibling(s);other relative(s)     Do you expect to return to your current living situation? Yes     Do you have help at home or someone to help you manage your care at home? Yes     Prior to hospitilization cognitive status: No Deficits     Current cognitive status: Coma/Sedated/Intubated     Home Layout Able to live on 1st floor     Equipment Currently Used at Home none     Readmission within 30 days? No     Patient currently being followed by outpatient case management? No     Do you currently have service(s) that help you manage your care at home? No     Do you take prescription medications? Yes     Do you have prescription coverage? Yes     Do you have any problems affording any of your prescribed medications? No     Is the patient taking medications as prescribed? yes     Who is going to help you get home at discharge? FAMILY     How do you get to doctors appointments? car, drives self;family or friend will provide     Are you on dialysis? No     Do you take coumadin? No     Discharge Plan A --   TBD    Discharge Plan B --   TBD    DME Needed Upon Discharge  feeding device;nutrition supplies;suction machine     Discharge Plan discussed with: POA;Sibling     Discharge Barriers Identified Underinsured                   Patient is intubated and on the vent   Discharge planning discussed with patients brother HUMA JOHNSON (POA) [phone] He states that the patient lives in a 1 story house with his sister in law ,step father and brother he is not on dialysis and does not take coumadin he has a ride home

## 2023-02-22 NOTE — NURSING
Per SICU on call MD, restart tube feedings as will hold off till AM to reassess readiness for extubating. Started Precedex @ 0.05, pt calm and follows commands, appears comfortable. Will continue to monitor.

## 2023-02-22 NOTE — PT/OT/SLP PROGRESS
Occupational Therapy      Patient Name:  Dereck Centeno   MRN:  [id number]    Patient not seen today secondary to RN hold 2' packing being removed from back of throat. MD wanted pt to remain supine to further assess. Will follow-up as appropriate.    2/22/2023

## 2023-02-22 NOTE — PROGRESS NOTES
[facility] - Surgical Intensive Care  Critical Care - Surgery  Progress Note    Patient Name: Dereck Centeno  MRN: [id number]  Admission Date: 2/20/2023  Hospital Length of Stay: 2 days  Code Status: Full Code  Attending Provider: Alexandre Templeton MD  Primary Care Provider: Eva Reynaga MD   Principal Problem: Squamous cell carcinoma of left tonsil    Subjective:     Hospital/ICU Course:  No notes on file    Interval History/Significant Events: NAEON, Afebrile, VSS.  Intubated on rate control overnight.  ENT to remove oral packing today.     Follow-up For: Procedure(s) (LRB):  EXPLORATION, NECK (Left)  EVACUATION, HEMATOMA (Left)    Post-Operative Day: 2 Days Post-Op    Objective:     Vital Signs (Most Recent):  Temp: 98.5 °F (36.9 °C) (02/21/23 2300)  Pulse: 74 (02/22/23 0400)  Resp: 20 (02/22/23 0334)  BP: 125/60 (02/22/23 0400)  SpO2: 99 % (02/22/23 0400) Vital Signs (24h Range):  Temp:  [97 °F (36.1 °C)-98.9 °F (37.2 °C)] 98.5 °F (36.9 °C)  Pulse:  [61-82] 74  Resp:  [18-20] 20  SpO2:  [98 %-100 %] 99 %  BP: ()/(50-83) 125/60     Weight: 75.9 kg (167 lb 5.3 oz)  Body mass index is 23.35 kg/m².      Intake/Output Summary (Last 24 hours) at 2/22/2023 0443  Last data filed at 2/22/2023 0400  Gross per 24 hour   Intake 3178.35 ml   Output 2189 ml   Net 989.35 ml       Physical Exam  Vitals and nursing note reviewed.   Constitutional:       Appearance: Normal appearance.      Interventions: He is sedated, intubated and restrained.   HENT:      Head: Normocephalic and atraumatic.      Nose:      Comments: NG tube sutured in place   Eyes:      Conjunctiva/sclera: Conjunctivae normal.      Pupils: Pupils are equal, round, and reactive to light.   Neck:      Comments: ENMANUEL drain with SS on left side of neck   Surgical incision is c/d/i  Cardiovascular:      Rate and Rhythm: Normal rate and regular rhythm.      Pulses: Normal pulses.      Heart sounds: Normal heart sounds.   Pulmonary:      Effort: Pulmonary  effort is normal. No respiratory distress. He is intubated.      Comments: ETT in place   Abdominal:      General: Abdomen is flat. Bowel sounds are normal.      Palpations: Abdomen is soft.   Skin:     General: Skin is warm.     Vents:  Vent Mode: A/C (02/22/23 0342)  Ventilator Initiated: Yes (02/20/23 1443)  Set Rate: 18 BPM (02/22/23 0342)  Vt Set: 480 mL (02/22/23 0342)  PEEP/CPAP: 5 cmH20 (02/22/23 0342)  Oxygen Concentration (%): 40 (02/22/23 0400)  Peak Airway Pressure: 23 cmH20 (02/22/23 0342)  Plateau Pressure: 12 cmH20 (02/22/23 0342)  Total Ve: 8.81 L/m (02/22/23 0342)  Negative Inspiratory Force (cm H2O): 0 (02/22/23 0342)  F/VT Ratio<105 (RSBI): (!) 34.48 (02/21/23 1506)    Lines/Drains/Airways       Drain  Duration                  Closed/Suction Drain 02/20/23 Left Neck Bulb 19 Fr. 2 days         Trans Pyloric Feeding Tube 02/20/23 1320 orogastric 12 Fr. 1 day         Urethral Catheter 02/20/23 0945 Non-latex 16 Fr. 1 day              Airway  Duration                  Airway - Non-Surgical 02/20/23 0926 Nasal Janneth 1 day              Peripheral Intravenous Line  Duration                  Peripheral IV - Single Lumen 02/20/23 0726 20 G Left;Posterior Forearm 1 day         Peripheral IV - Single Lumen 02/20/23 1545 20 G Right Forearm 1 day                    Significant Labs:    CBC/Anemia Profile:  Recent Labs   Lab 02/20/23  2359 02/21/23  0306 02/22/23  0350   WBC 17.14* 13.00* 6.86   HGB 12.4* 12.2* 9.7*   HCT 38.2* 39.7* 31.2*    141* 104*   MCV 95 97 96   RDW 13.1 13.2 13.4        Chemistries:  Recent Labs   Lab 02/20/23  1532 02/21/23  0306 02/21/23  0629 02/22/23  0350    133*  --  140   K 4.9 5.2* 4.5 4.1    105  --  110   CO2 19* 19*  --  24   BUN 12 16  --  17   CREATININE 1.1 1.2  --  1.0   CALCIUM 8.9 8.0*  --  8.3*   ALBUMIN 3.9 3.2*  --  2.9*   PROT 6.9 5.4*  --  5.0*   BILITOT 0.8 0.8  --  0.3   ALKPHOS 61 48*  --  41*   ALT 9* 7*  --  5*   AST 18 13  --  9*   MG 1.7  2.0  --  1.7   PHOS 3.1 4.4  --  1.7*       All pertinent labs within the past 24 hours have been reviewed.    Significant Imaging:  I have reviewed all pertinent imaging results/findings within the past 24 hours.    Assessment/Plan:     Oncology  * Squamous cell carcinoma of left tonsil  Dereck Centeno is a 62 y.o. male with PMHx of HTN, emphysema, HCV s/p liver transplant in 2015, tobacco abuse (50+ pack years), oral cancer s/p surgery at [facility] in 2019 (no chemo/radiation), and newly diagnosed SCC of the left tonsil. He is now s/p left tonsillectomy and neck dissection by Dr. Templeton on 2/20. Post operative course was c/b development of large hematoma at surgical site. He was taken back to the OR for an emergent neck exploration and hematoma evacuation on 2/20.       Neuro/Psych:   -- Sedation: propofol gtt   -- Pain: home gabapentin, tylenol, PRN fentanyl   -- Psych: home clonazepam PRN, scheduled mirtazapine, seroquel, lexapro     Cards  -- HDS; not currently on pressors   -- Continue home amlodipine and clonidine   -- PRN labetalol and hydralazine   -- Continue home ASA and statin       Pulm:   -- H/o emphysema   -- Continue home inhaler; albuterol nebs PRN for wheezing   -- Goal O2 sat > 88%  -- Wean as able  -- Daily SBT       Renal:  -- Keep auguste for strict I/O  -- BUN/Cr 17/1.0  -- UOP 1.8L/24H      FEN / GI:   -- Net +1.08L/24H  -- Replace lytes as needed  -- Nutrition: NPO + TF  -- LR 125cc/hr      ID:   -- Tm: afebrile; WBC 6.8 from 13  -- Abx: none       Heme/Onc:   -- H/H decreased to 9.7/31.2 from 12.2/39.7   -- Daily CBC  -- Home ASA       Endo:   -- Gluc goal 140-180  -- SSI      Immuno:  -- H/o HCV infection s/p liver transplant in 2015  -- Liver transplant consulted  -- Continue home tacrolimus  -- Monitor tacrolimus levels daily   -- Valacyclovir daily per ENT      PPx:   Feeding: NPO + TF  Analgesia/Sedation: PRN fentanyl, home gabapentin, tylenol/ propofol gtt   Thromboembolic  prevention: lovenox   HOB >30: yes   Stress Ulcer ppx: famotidine BID   Glucose control: Critical care goal 140-180 g/dl, ISS    Lines/Drains/Airway: ETT, auguste, PIVx2, NG tube, neck drain x2      Dispo/Code Status/Palliative:   -- SICU / Full Code        Critical care was time spent personally by me on the following activities: development of treatment plan with patient or surrogate and bedside caregivers, discussions with consultants, evaluation of patient's response to treatment, examination of patient, ordering and performing treatments and interventions, ordering and review of laboratory studies, ordering and review of radiographic studies, pulse oximetry, re-evaluation of patient's condition.  This critical care time did not overlap with that of any other provider or involve time for any procedures.     BASIL Dorsey MD  Critical Care - Surgery  [facility] - Surgical Intensive Care

## 2023-02-22 NOTE — ASSESSMENT & PLAN NOTE
Dereck Centeno is a 62 y.o. 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 On day of surgery pt with expanding hematoma now s/p emergent neck exploration with washout 2/20.     Oral packing removed 2/22    -- Will discuss timing of extubation with staff   -- Drain care with strict recording of drain output  -- If concern for neck swelling please call ENT on call ASAP  -- Pain/antinausea medications prn   -- Remainder of care per ICU  -- Please page ENT with any questions or concerns

## 2023-02-22 NOTE — PLAN OF CARE
VSS. Afebrile. NSR 60-70s. Maintaining MAP > 65 and SBP <180. Sats 100% on ventilator.      Vent: AC/VC 40%fiO2 / 5 PEEP / rate 18 / Vt 480     Neuro: PERRLA, follows commands, moves all extremities.      Gtts: Propofol, LR MIVF     Torres in place with adequate UOP. See flowsheets.      Left neck ENMANUEL to bulb suction with serosanguineous drainage.      Kevyn tube in place. Tube feeds at goal 40 cc/hr.     Skin: Left neck staples intact. Bacitracin applied to incision site. Skin around incision site reddened, soft and warm to touch. No new skin breakdown noted. Foams, heels, and SCDs on. Turned to prevent breakdown. Waffle inflated.      All labs reviewed. Electrolytes replaced prn.

## 2023-02-22 NOTE — ANESTHESIA POSTPROCEDURE EVALUATION
Anesthesia Post Evaluation    Patient: Dereck Centeno    Procedure(s) Performed: Procedure(s) (LRB):  EXPLORATION, NECK (Left)  EVACUATION, HEMATOMA (Left)    Final Anesthesia Type: general      Patient location during evaluation: ICU  Patient participation: No - Unable to Participate, Intubation  Level of consciousness: awake and alert  Post-procedure vital signs: reviewed and stable  Pain management: adequate  Airway patency: patent    PONV status at discharge: No PONV  Anesthetic complications: no      Cardiovascular status: stable  Respiratory status: intubated, ETT and ventilator  Hydration status: euvolemic  Follow-up not needed.          Vitals Value Taken Time   /65 02/21/23 1916   Temp 36.5 °C (97.7 °F) 02/21/23 1500   Pulse 72 02/21/23 1918   Resp 20 02/21/23 1816   SpO2 100 % 02/21/23 1918   Vitals shown include unvalidated device data.      No case tracking events are documented in the log.      Pain/Huber Score: Pain Rating Prior to Med Admin: 6 (2/21/2023  6:16 PM)  Pain Rating Post Med Admin: 3 (2/21/2023  6:46 PM)

## 2023-02-22 NOTE — CONSULTS
"   [facility] - Surgical Intensive Care  Adult Nutrition  Consult Note    SUMMARY     Recommendations    1.) TF recommendations:     - if pt is on propofol, recommend TF of Impact Peptide 1.5@40ml/hr to provide 1440kcal, 90gPRO, and 739ml FF     - if Propofol is d/c'd recommend increasing TF to Impact Peptide 1.5@ 45ml/hr to provide 1620kcal, 101gPRO, and 832ml of FF.     2.) Recommend ADAT with goal of regular diet, texture as per SLP/MD.     3.) RD to monitor.      Goals: To meet % of EEN/EPN by next RD f/u  Nutrition Goal Status: new, goal met  Communication of RD Recs:  (POC)    Assessment and Plan    Nutrition Problem  Increased PRO/energy needs    Related to (etiology):   Physiological needs     Signs and Symptoms (as evidenced by):   CA of tonsil    Interventions/Recommendations (treatment strategy):  Collaboration of nutritional care with other providers.  EN    Nutrition Diagnosis Status:   New    Reason for Assessment    Reason For Assessment: consult  Diagnosis: cancer diagnosis/related complications (CA of left tonsil)  Relevant Medical History: alcohol cirrhosis, HTN, GERD, HLD, s/p liver transplant 2015  Interdisciplinary Rounds: did not attend  General Information Comments: RD consult: Pt seen by RD. Pt remains sedated, on vent at this time, NFPE not appropriate. TF running at the ordered rate. No tolerance issues as per RN staff. Trace edema noted; pt is at risk for wt flux d/t edema present.  Nutrition Discharge Planning: as per medical course    Nutrition Risk Screen    Nutrition Risk Screen: tube feeding or parenteral nutrition    Nutrition/Diet History    Spiritual, Cultural Beliefs, Buddhist Practices, Values that Affect Care: no    Anthropometrics    Temp: 98.8 °F (37.1 °C)  Height: 5' 10" (177.8 cm)  Height (inches): 70 in  Weight: 75.9 kg (167 lb 5.3 oz)  Weight (lb): 167.33 lb  Ideal Body Weight (IBW), Male: 166 lb  % Ideal Body Weight, Male (lb): 97.28 %  BMI (Calculated): 23.3  BMI " Grade: 18.5-24.9 - normal     Lab/Procedures/Meds    Pertinent Labs Reviewed: reviewed  Pertinent Labs Comments: gluc: 162, Ca: 8.3, phos: 1.7, PRO: 5.0, alb: 2.9  Pertinent Medications Reviewed: reviewed  Pertinent Medications Comments: amlodipine, enoxaparin, clonidine, famotidine, tacrolimus, propofol    Estimated/Assessed Needs    Weight Used For Calorie Calculations: 75.9 kg (167 lb 5.3 oz)  Energy Calorie Requirements (kcal): 1555- 2021kcal (MSJx1-1.3)  Energy Need Method: [place]-St Jeor  Protein Requirements: 91- 106g (1.2-1.4g/kg)  Weight Used For Protein Calculations: 75.9 kg (167 lb 5.3 oz)  Fluid Requirements (mL): 1ml/1kcal or per MD  Estimated Fluid Requirement Method: RDA Method  RDA Method (mL): 1555     Nutrition Prescription Ordered    Current Diet Order: NPO  Current Nutrition Support Formula Ordered: Impact Peptide 1.5  Current Nutrition Support Rate Ordered: 40 (ml)  Current Nutrition Support Frequency Ordered: ml/hr x24hrs    Evaluation of Received Nutrient/Fluid Intake    Enteral Calories (kcal): 1440  Enteral Protein (gm): 90  Enteral (Free Water) Fluid (mL): 736  Other Calories (kcal): 323 (from 293ml of propofol)  Total Calories (kcal): 1763  % Kcal Needs: 100%  % Protein Needs: 100%  I/O: +2462ml since admit  Energy Calories Required: meeting needs  Protein Required: meeting needs  Fluid Required:  (as per MD)  Total Fluid Intake (mL/kg): as per MD  Comments: LBM 2/19  Tolerance: tolerating  % Intake of Estimated Energy Needs: 75 - 100 %  % Meal Intake: NPO    Nutrition Risk    Level of Risk/Frequency of Follow-up:  (RD to f/u x1/week)     Monitor and Evaluation    Food and Nutrient Intake: energy intake, enteral nutrition intake  Food and Nutrient Adminstration: enteral and parenteral nutrition administration  Physical Activity and Function: nutrition-related ADLs and IADLs  Anthropometric Measurements: weight, weight change, body mass index  Biochemical Data, Medical Tests and  Procedures: electrolyte and renal panel, gastrointestinal profile, glucose/endocrine profile, inflammatory profile, lipid profile  Nutrition-Focused Physical Findings: overall appearance, skin     Nutrition Follow-Up    RD Follow-up?: Yes

## 2023-02-22 NOTE — NURSING
1445: Pt. Seen by ENT MD, gave ok to extubate from their standpoint, SICU MD on call aware. Pt still on rate and Propofol at 20, patient does wake up and follow commands, is drowsy. 1500: Pt seen at bedside by SICU on resident, to assess readiness for extubation, pt took a few spontaneous breaths, but became tachycardic in 150s, was placed back on rate. Waiting on ENT Attending recommendations. Will turn off tube feeds at this time in prep for poss. extubation and decrease sedation.

## 2023-02-22 NOTE — NURSING
SICU PLAN OF CARE NOTE    Dx: Squamous cell carcinoma of left tonsil    Shift Events: No acute events this shift. Packing removed from throat this AM by MD. SICU/ENT remain cautious to extubate at this time d/t excessive secretions. Was assessed for readiness to extubate, it was decided to let patient be on AC/VC mode and reassess at a later time.     Gtts: Precedex, LR, Propofol d/leslie     Neuro: Arouses to Voice, Follows Commands, and Moves All Extremities purposefully, is drowsy d/t sedation.    Cardiac: NSR 70-80s    Respiratory: Ventilator Currently on AC/VC mode    GI: Tube Feeds: Impact Peptide 1.5 @ 40 (goal)    : Urinary Catheter 1075 cc/shift     Drains: ENMANUEL Drain, total output 43 cc / shift of thin bright red drainage.    Labs/Accuchecks: daily/ q6hr    Skin: Intact with approximated surgical neck incision closed with staples and ENMANUEL drain of left side of neck.

## 2023-02-22 NOTE — PROGRESS NOTES
[facility] - [facility] Intensive [facility]  Otorhinolaryngology-Head & Neck Surgery  Progress Note    Subjective:     Post-Op Info:  Procedure(s) (LRB):  EXPLORATION, NECK (Left)  EVACUATION, HEMATOMA (Left)   2 Days Post-Op  Hospital Day: 3     Interval History: NAEON. No significant oral bleeding. Neck swelling and ecchymosis with improvement.     Medications:  Continuous Infusions:   propofoL 35 mcg/kg/min (02/22/23 0600)     Scheduled Meds:   acetaminophen  650 mg Per NG tube Q8H    amLODIPine  5 mg Per NG tube Daily    cloNIDine  0.1 mg Per NG tube QHS    enoxaparin  40 mg Subcutaneous Daily    EScitalopram oxalate  20 mg Per NG tube QHS    famotidine (PF)  20 mg Intravenous Q12H    fluticasone furoate-vilanteroL  1 puff Inhalation Daily    gabapentin  600 mg Per NG tube TID    mirtazapine  15 mg Per NG tube QHS    polyethylene glycol  17 g Per NG tube Daily    QUEtiapine  50 mg Per NG tube QHS    tacrolimus  2 mg Per NG tube Daily AM    And    tacrolimus  1 mg Per NG tube Daily PM    valACYclovir  1,000 mg Per NG tube TID     PRN Meds:sodium chloride 0.9%, calcium gluconate IVPB, calcium gluconate IVPB, calcium gluconate IVPB, clonazePAM, dextrose 10%, dextrose 10%, fentaNYL, glucagon (human recombinant), hydrALAZINE, insulin aspart U-100, labetalol, magnesium sulfate IVPB, magnesium sulfate IVPB, ondansetron, potassium chloride **AND** potassium chloride **AND** potassium chloride, sodium phosphate IVPB, sodium phosphate IVPB, sodium phosphate IVPB     Review of patient's allergies indicates:  No Known Allergies  Objective:     Vital Signs (24h Range):  Temp:  [97.7 °F (36.5 °C)-98.9 °F (37.2 °C)] 98.5 °F (36.9 °C)  Pulse:  [61-82] 72  Resp:  [18-20] 20  SpO2:  [98 %-100 %] 100 %  BP: ()/(50-83) 87/51       Lines/Drains/Airways       Drain  Duration                  Closed/Suction Drain 02/20/23 Left Neck Bulb 19 Fr. 2 days         Trans Pyloric Feeding Tube 02/20/23 1320 orogastric 12 Fr. 1 day          Urethral Catheter 02/20/23 0945 Non-latex 16 Fr. 1 day              Airway  Duration                  Airway - Non-Surgical 02/20/23 0926 Nasal Janneth 1 day              Peripheral Intravenous Line  Duration                  Peripheral IV - Single Lumen 02/20/23 0726 20 G Left;Posterior Forearm 2 days         Peripheral IV - Single Lumen 02/20/23 1545 20 G Right Forearm 1 day                    Physical Exam  Intubated, sedated  Oral cavity with Kerlix, removed. No obvious bleeding though intraoral examination limited.   Left ENMANUEL x1 w/ appropriate output, stripped.    Neck ecchymotic but improved, soft     Significant Labs:  CBC:   Recent Labs   Lab 02/22/23  0350   WBC 6.86   RBC 3.25*   HGB 9.7*   HCT 31.2*   *   MCV 96   MCH 29.8   MCHC 31.1*     CMP:   Recent Labs   Lab 02/22/23  0350   *   CALCIUM 8.3*   ALBUMIN 2.9*   PROT 5.0*      K 4.1   CO2 24      BUN 17   CREATININE 1.0   ALKPHOS 41*   ALT 5*   AST 9*   BILITOT 0.3       Significant Diagnostics:  I have reviewed all pertinent imaging results/findings within the past 24 hours.    Assessment/Plan:     * Squamous cell carcinoma of left tonsil  Dereck Centeno is a 62 y.o. 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 On day of surgery pt with expanding hematoma now s/p emergent neck exploration with washout 2/20.     Oral packing removed 2/22    -- Will discuss timing of extubation with staff   -- Drain care with strict recording of drain output  -- If concern for neck swelling please call ENT on call ASAP  -- Pain/antinausea medications prn   -- Remainder of care per ICU  -- Please page ENT with any questions or concerns               Nayla Lomax MD  Otorhinolaryngology-Head & Neck Surgery  [facility] - Surgical Intensive Care

## 2023-02-22 NOTE — CONSULTS
[facility]  Heaptology  Consult Note    Patient Name: Dereck Centeno  MRN: [id number]  Admission Date: 2/20/2023  Hospital Length of Stay: 2 days  Code Status: Full Code   Attending Provider:  Dr. Goodson  Consulting Provider: Blake Mari MD  Primary Care Physician: Eva Reynaga MD  Principal Problem:Squamous cell carcinoma of left tonsil    Inpatient consult to Hepatology  Consult performed by: Blake Mari MD  Consult ordered by: Pb Wheeler DO      Subjective:     HPI: Dereck Centeno is a 62 y.o. male with history of HCV/alcohol cirrhosis s/p liver transplant in 2015, HCV s/p treatment (with Harvoni in 2016, SVR achieved), hx of oral cancer s/p resection in 2019, suicide attempt in 2018 & tobacco use who presented to [facility] for Squamous cell carcinoma of left tonsil. He underwent left radical tonsillectomy on 2/20/23 that was complicated by expanding hematoma now s/p emergent neck exploration with washout 2/20. He is currently intubated & mechanically ventilated. Not on IV abx, no concern for active infection. Currently receiving liquid tacrolimus 2 mg QAM & 1 mg QPM via NG tube. Normal LFTs.         Past Medical History:   Diagnosis Date    Alcohol withdrawal seizure     Alcoholic cirrhosis     Anxiety     Depression     GERD (gastroesophageal reflux disease)     Hepatitis C     HTN (hypertension), benign     Hyperlipidemia     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 02/01/2023    Tobacco use        Past Surgical History:   Procedure Laterality Date    COLONOSCOPY N/A 6/14/2018    Procedure: COLONOSCOPY;  Surgeon: Conor Castro MD;  Location: [facility];  Service: Endoscopy;  Laterality: N/A;    DIRECT LARYNGOSCOPY N/A 2/20/2023    Procedure: LARYNGOSCOPY, DIRECT;  Surgeon: Alexandre Templeton MD;  Location: [address];  Service: ENT;  Laterality: N/A;    DISSECTION OF NECK Left 2/20/2023    Procedure: DISSECTION, NECK;  Surgeon: Alexandre  JOEL Templeton MD;  [facility];  Service: ENT;  Laterality: Left;    ESOPHAGEAL VARICE LIGATION      ESOPHAGOGASTRODUODENOSCOPY N/A 6/14/2018    Procedure: ESOPHAGOGASTRODUODENOSCOPY (EGD);  Surgeon: Conor Castro MD;  Location: [facility] ENDO;  Service: Endoscopy;  Laterality: N/A;    LARYNGOSCOPY N/A 1/31/2023    Procedure: LARYNGOSCOPY;  Surgeon: Guzman Alfonso MD;  Location: [facility];  Service: ENT;  Laterality: N/A;  [id number]    LIVER TRANSPLANT      ROBOT-ASSISTED TRANSORAL SURGERY Left 2/20/2023    Procedure: ROBOTIC TRANSORAL SURGERY (TORS);  Surgeon: Alexandre Templeton MD;  [facility];  Service: ENT;  Laterality: Left;    TIPS PROCEDURE         Family History   Problem Relation Age of Onset    Cancer Mother     Alcohol abuse Mother     Alcohol abuse Father        Social History     Socioeconomic History    Marital status: Single   Tobacco Use    Smoking status: Every Day     Packs/day: 0.50     Years: 50.00     Pack years: 25.00     Types: Cigarettes    Smokeless tobacco: Former     Types: Chew   Substance and Sexual Activity    Alcohol use: Not Currently    Drug use: No    Sexual activity: Not Currently       No current facility-administered medications on file prior to encounter.     Current Outpatient Medications on File Prior to Encounter   Medication Sig Dispense Refill    albuterol (PROVENTIL/VENTOLIN HFA) 90 mcg/actuation inhaler Inhale 2 puffs into the lungs every 6 (six) hours as needed for Wheezing or Shortness of Breath.      amLODIPine (NORVASC) 5 MG tablet Take 1 tablet (5 mg total) by mouth once daily. 30 tablet 0    aspirin (ECOTRIN) 81 MG EC tablet Take 1 tablet (81 mg total) by mouth once daily. (Patient taking differently: Take 81 mg by mouth every evening.) 30 tablet 0    atorvastatin (LIPITOR) 40 MG tablet Take 40 mg by mouth every evening.      calcium carbonate (OS-KRISTEL) 600 mg calcium (1,500 mg) Tab Take 600 mg by mouth once.      cloNIDine (CATAPRES)  0.1 MG tablet Take 1 tablet (0.1 mg total) by mouth 2 (two) times daily. (Patient taking differently: Take 0.1 mg by mouth every evening.) 60 tablet 0    docusate sodium (COLACE) 100 MG capsule Take 100 mg by mouth 3 (three) times daily as needed for Constipation.      escitalopram oxalate (LEXAPRO) 20 MG tablet Take 1 tablet (20 mg total) by mouth once daily. (Patient taking differently: Take 20 mg by mouth every evening.) 30 tablet 0    famotidine (PEPCID) 20 MG tablet Take 1 tablet (20 mg total) by mouth every evening. 30 tablet 0    gabapentin (NEURONTIN) 400 MG capsule Take 1 capsule (400 mg total) by mouth 3 (three) times daily. (Patient taking differently: Take 600 mg by mouth 3 (three) times daily.) 90 capsule 0    HYDROcodone-acetaminophen (NORCO) 5-325 mg per tablet Take 1 tablet by mouth every 6 (six) hours as needed for Pain. 15 tablet 0    magnesium oxide (MAG-OX) 400 mg tablet Take 400 mg by mouth once daily.      mirtazapine (REMERON) 15 MG tablet Take 1 tablet (15 mg total) by mouth every evening. 30 tablet 0    multivitamin (THERAGRAN) tablet Take 1 tablet by mouth once daily.      tacrolimus (PROGRAF) 1 MG Cap Take 2 capsules (2 mg total) by mouth every morning AND 1 capsule (1 mg total) every evening. 90 capsule 11    traMADol (ULTRAM) 50 mg tablet Take 50 mg by mouth every 6 (six) hours as needed for Pain.      vitamin D 1000 units Tab Take 2 tablets (2,000 Units total) by mouth once daily. 60 tablet 5    alendronate (FOSAMAX) 70 MG tablet Take 70 mg by mouth every 7 days. SATURDAYS      hepatitis A virus vaccine, PF, (HAVRIX) 1,440 Kimberlee unit/mL Susp Inject 1 mL into the muscle once.      QUEtiapine (SEROQUEL) 25 MG Tab Take 50 mg by mouth every evening.  3    valACYclovir (VALTREX) 1000 MG tablet Take 1 tablet (1,000 mg total) by mouth 3 (three) times daily. for 7 days 21 tablet 0       Review of patient's allergies indicates:  No Known Allergies    Review of Systems   Unable to perform ROS:  Intubated      Objective:     Vitals:    02/22/23 0926   BP:    Pulse:    Resp: 18   Temp:      Physical Exam  Vitals and nursing note reviewed.   Constitutional:       Appearance: Normal appearance.      Interventions: He is sedated, intubated and restrained.   HENT:      Head: Normocephalic and atraumatic.      Nose:      Comments: NG tube sutured in place   Eyes:      Conjunctiva/sclera: Conjunctivae normal.      Pupils: Pupils are equal, round, and reactive to light.   Neck:      Comments: ENMANUEL drain with SS on left side of neck   Surgical incision is c/d/i  Cardiovascular:      Rate and Rhythm: Normal rate and regular rhythm.      Pulses: Normal pulses.      Heart sounds: Normal heart sounds.   Pulmonary:      Effort: Pulmonary effort is normal. No respiratory distress. He is intubated.      Comments: ETT in place   Abdominal:      General: Abdomen is flat. Bowel sounds are normal.      Palpations: Abdomen is soft.   Skin:     General: Skin is warm.     Significant Labs:  Recent Labs   Lab 02/20/23  2359 02/21/23  0306 02/22/23  0350   HGB 12.4* 12.2* 9.7*       Lab Results   Component Value Date    WBC 6.86 02/22/2023    HGB 9.7 (L) 02/22/2023    HCT 31.2 (L) 02/22/2023    MCV 96 02/22/2023     (L) 02/22/2023       Lab Results   Component Value Date     02/22/2023    K 4.1 02/22/2023     02/22/2023    CO2 24 02/22/2023    BUN 17 02/22/2023    CREATININE 1.0 02/22/2023    CALCIUM 8.3 (L) 02/22/2023    ANIONGAP 6 (L) 02/22/2023    ESTGFRAFRICA >60.0 04/05/2022    EGFRNONAA >60.0 04/05/2022       Lab Results   Component Value Date    ALT 5 (L) 02/22/2023    AST 9 (L) 02/22/2023    GGT 32 05/16/2017    ALKPHOS 41 (L) 02/22/2023    BILITOT 0.3 02/22/2023       Lab Results   Component Value Date    INR 1.0 02/20/2023    INR 1.0 02/15/2023    INR 1.1 03/13/2018       Significant Imaging:  Reviewed pertinent radiology findings.       Assessment/Plan:     Dereck Centeno is a 62 y.o. male with  history of HCV/alcohol cirrhosis s/p liver transplant in 2015, HCV s/p treatment (with Harvoni in 2016, SVR achieved), hx of oral cancer s/p resection in 2019, suicide attempt in 2018 & tobacco use who presented to [facility] for Squamous cell carcinoma of left tonsil. He underwent left radical tonsillectomy on 2/20/23 that was complicated by expanding hematoma now s/p emergent neck exploration with washout 2/20. He is currently intubated & mechanically ventilated. Currently receiving liquid tacrolimus 2 mg QAM & 1 mg QPM via NG tube. Good allograft function. No concern for infection.     Problem List:  HCV/alcohol cirrhosis s/p liver transplant in 2015  HCV s/p treatment (with Harvoni in 2016, SVR achieved)  Squamous cell carcinoma of left tonsil s/p left radical tonsillectomy on 2/20/23 c/b expanding hematoma         Recommendations:  -  Continue liquid tacrolimus 2 mg QAM & 1 mg QPM via NG tube. Target a trough Prograf level of 3-4.     - Daily CBC, CMP, INR & tacrolimus levels.     - Please alert our team if patient is started on antibiotics or there is concern for infection.     Thank you for involving us in the care of Dereck Centeno. Please call with any additional questions, concerns or changes in the patient's clinical status. We will continue to follow peripherally.     Blake Mari MD  Gastroenterology Fellow PGY IV  [facility]

## 2023-02-22 NOTE — NURSING
SICU Resident at bedside to assess readiness for extubation, placed on spontaneous and propofol decreased to 5. Cuff leak assessed by MD. Pt has excessive oral and ET secretions at this time, that are clear with bloody streaks. Pt suctioned via ET/oral to clear secretions to aid with ventilation and to clear airway. Will continue on spontaneous ventilator settings at this time per MD and will monitor closely. May change sedation from Propofol to Precedex.

## 2023-02-23 ENCOUNTER — ANESTHESIA EVENT (OUTPATIENT)
Dept: SURGERY | Facility: HOSPITAL | Age: 63
DRG: 003 | End: 2023-02-23
Payer: MEDICAID

## 2023-02-23 LAB
ALBUMIN SERPL BCP-MCNC: 2.8 G/DL (ref 3.5–5.2)
ALLENS TEST: ABNORMAL
ALP SERPL-CCNC: 41 U/L (ref 55–135)
ALT SERPL W/O P-5'-P-CCNC: 6 U/L (ref 10–44)
ANION GAP SERPL CALC-SCNC: 6 MMOL/L (ref 8–16)
ANION GAP SERPL CALC-SCNC: 6 MMOL/L (ref 8–16)
AST SERPL-CCNC: 9 U/L (ref 10–40)
BASOPHILS # BLD AUTO: 0.01 K/UL (ref 0–0.2)
BASOPHILS NFR BLD: 0.2 % (ref 0–1.9)
BILIRUB SERPL-MCNC: 0.4 MG/DL (ref 0.1–1)
BUN SERPL-MCNC: 14 MG/DL (ref 8–23)
BUN SERPL-MCNC: 14 MG/DL (ref 8–23)
CALCIUM SERPL-MCNC: 6.9 MG/DL (ref 8.7–10.5)
CALCIUM SERPL-MCNC: 8.4 MG/DL (ref 8.7–10.5)
CHLORIDE SERPL-SCNC: 109 MMOL/L (ref 95–110)
CHLORIDE SERPL-SCNC: 115 MMOL/L (ref 95–110)
CO2 SERPL-SCNC: 22 MMOL/L (ref 23–29)
CO2 SERPL-SCNC: 27 MMOL/L (ref 23–29)
CREAT SERPL-MCNC: 0.8 MG/DL (ref 0.5–1.4)
CREAT SERPL-MCNC: 0.9 MG/DL (ref 0.5–1.4)
DELSYS: ABNORMAL
DELSYS: ABNORMAL
DIFFERENTIAL METHOD: ABNORMAL
EOSINOPHIL # BLD AUTO: 0.1 K/UL (ref 0–0.5)
EOSINOPHIL NFR BLD: 1.9 % (ref 0–8)
ERYTHROCYTE [DISTWIDTH] IN BLOOD BY AUTOMATED COUNT: 13.2 % (ref 11.5–14.5)
ERYTHROCYTE [SEDIMENTATION RATE] IN BLOOD BY WESTERGREN METHOD: 15 MM/H
ERYTHROCYTE [SEDIMENTATION RATE] IN BLOOD BY WESTERGREN METHOD: 18 MM/H
EST. GFR  (NO RACE VARIABLE): >60 ML/MIN/1.73 M^2
EST. GFR  (NO RACE VARIABLE): >60 ML/MIN/1.73 M^2
FIO2: 40
FIO2: 40
GLUCOSE SERPL-MCNC: 101 MG/DL (ref 70–110)
GLUCOSE SERPL-MCNC: 140 MG/DL (ref 70–110)
HCO3 UR-SCNC: 26.2 MMOL/L (ref 24–28)
HCO3 UR-SCNC: 27.5 MMOL/L (ref 24–28)
HCO3 UR-SCNC: 28 MMOL/L (ref 24–28)
HCT VFR BLD AUTO: 26.8 % (ref 40–54)
HCT VFR BLD CALC: 24 %PCV (ref 36–54)
HCT VFR BLD CALC: 24 %PCV (ref 36–54)
HGB BLD-MCNC: 8.6 G/DL (ref 14–18)
IMM GRANULOCYTES # BLD AUTO: 0.03 K/UL (ref 0–0.04)
IMM GRANULOCYTES NFR BLD AUTO: 0.6 % (ref 0–0.5)
INR PPP: 1 (ref 0.8–1.2)
LYMPHOCYTES # BLD AUTO: 0.9 K/UL (ref 1–4.8)
LYMPHOCYTES NFR BLD: 18.8 % (ref 18–48)
MAGNESIUM SERPL-MCNC: 1.4 MG/DL (ref 1.6–2.6)
MAGNESIUM SERPL-MCNC: 1.5 MG/DL (ref 1.6–2.6)
MCH RBC QN AUTO: 30.3 PG (ref 27–31)
MCHC RBC AUTO-ENTMCNC: 32.1 G/DL (ref 32–36)
MCV RBC AUTO: 94 FL (ref 82–98)
MIN VOL: 1000
MODE: ABNORMAL
MODE: ABNORMAL
MONOCYTES # BLD AUTO: 0.4 K/UL (ref 0.3–1)
MONOCYTES NFR BLD: 7.5 % (ref 4–15)
NEUTROPHILS # BLD AUTO: 3.4 K/UL (ref 1.8–7.7)
NEUTROPHILS NFR BLD: 71 % (ref 38–73)
NRBC BLD-RTO: 0 /100 WBC
PCO2 BLDA: 34.9 MMHG (ref 35–45)
PCO2 BLDA: 35.6 MMHG (ref 35–45)
PCO2 BLDA: 36.2 MMHG (ref 35–45)
PEEP: 5
PEEP: 50
PH SMN: 7.47 [PH] (ref 7.35–7.45)
PH SMN: 7.49 [PH] (ref 7.35–7.45)
PH SMN: 7.51 [PH] (ref 7.35–7.45)
PHOSPHATE SERPL-MCNC: 1.5 MG/DL (ref 2.7–4.5)
PIP: 23
PLATELET # BLD AUTO: 89 K/UL (ref 150–450)
PMV BLD AUTO: 10.4 FL (ref 9.2–12.9)
PO2 BLDA: 114 MMHG (ref 80–100)
PO2 BLDA: 119 MMHG (ref 80–100)
PO2 BLDA: 54 MMHG (ref 40–60)
POC BE: 3 MMOL/L
POC BE: 4 MMOL/L
POC BE: 5 MMOL/L
POC IONIZED CALCIUM: 1.17 MMOL/L (ref 1.06–1.42)
POC SATURATED O2: 90 % (ref 95–100)
POC SATURATED O2: 99 % (ref 95–100)
POC SATURATED O2: 99 % (ref 95–100)
POC TCO2: 27 MMOL/L (ref 24–29)
POC TCO2: 29 MMOL/L (ref 23–27)
POC TCO2: 29 MMOL/L (ref 23–27)
POCT GLUCOSE: 105 MG/DL (ref 70–110)
POCT GLUCOSE: 107 MG/DL (ref 70–110)
POCT GLUCOSE: 119 MG/DL (ref 70–110)
POCT GLUCOSE: 145 MG/DL (ref 70–110)
POCT GLUCOSE: 156 MG/DL (ref 70–110)
POCT GLUCOSE: 163 MG/DL (ref 70–110)
POTASSIUM BLD-SCNC: 3.4 MMOL/L (ref 3.5–5.1)
POTASSIUM SERPL-SCNC: 3.3 MMOL/L (ref 3.5–5.1)
POTASSIUM SERPL-SCNC: 3.5 MMOL/L (ref 3.5–5.1)
PROT SERPL-MCNC: 5.1 G/DL (ref 6–8.4)
PROTHROMBIN TIME: 10.5 SEC (ref 9–12.5)
RBC # BLD AUTO: 2.84 M/UL (ref 4.6–6.2)
SAMPLE: ABNORMAL
SITE: ABNORMAL
SODIUM BLD-SCNC: 141 MMOL/L (ref 136–145)
SODIUM SERPL-SCNC: 142 MMOL/L (ref 136–145)
SODIUM SERPL-SCNC: 143 MMOL/L (ref 136–145)
SP02: 98
TACROLIMUS BLD-MCNC: 2.6 NG/ML (ref 5–15)
VT: 480
VT: 587
WBC # BLD AUTO: 4.79 K/UL (ref 3.9–12.7)

## 2023-02-23 PROCEDURE — 63600175 PHARM REV CODE 636 W HCPCS

## 2023-02-23 PROCEDURE — 25000003 PHARM REV CODE 250

## 2023-02-23 PROCEDURE — 85610 PROTHROMBIN TIME: CPT | Performed by: STUDENT IN AN ORGANIZED HEALTH CARE EDUCATION/TRAINING PROGRAM

## 2023-02-23 PROCEDURE — 99233 PR SUBSEQUENT HOSPITAL CARE,LEVL III: ICD-10-PCS | Mod: ,,, | Performed by: ANESTHESIOLOGY

## 2023-02-23 PROCEDURE — 80048 BASIC METABOLIC PNL TOTAL CA: CPT | Mod: XB | Performed by: OTOLARYNGOLOGY

## 2023-02-23 PROCEDURE — 82565 ASSAY OF CREATININE: CPT

## 2023-02-23 PROCEDURE — 83735 ASSAY OF MAGNESIUM: CPT | Mod: 91 | Performed by: OTOLARYNGOLOGY

## 2023-02-23 PROCEDURE — 85014 HEMATOCRIT: CPT

## 2023-02-23 PROCEDURE — 94761 N-INVAS EAR/PLS OXIMETRY MLT: CPT

## 2023-02-23 PROCEDURE — 84100 ASSAY OF PHOSPHORUS: CPT

## 2023-02-23 PROCEDURE — 63600175 PHARM REV CODE 636 W HCPCS: Performed by: STUDENT IN AN ORGANIZED HEALTH CARE EDUCATION/TRAINING PROGRAM

## 2023-02-23 PROCEDURE — 84295 ASSAY OF SERUM SODIUM: CPT

## 2023-02-23 PROCEDURE — 20000000 HC ICU ROOM

## 2023-02-23 PROCEDURE — 99233 SBSQ HOSP IP/OBS HIGH 50: CPT | Mod: ,,, | Performed by: ANESTHESIOLOGY

## 2023-02-23 PROCEDURE — 94003 VENT MGMT INPAT SUBQ DAY: CPT

## 2023-02-23 PROCEDURE — 85025 COMPLETE CBC W/AUTO DIFF WBC: CPT

## 2023-02-23 PROCEDURE — 25000003 PHARM REV CODE 250: Performed by: STUDENT IN AN ORGANIZED HEALTH CARE EDUCATION/TRAINING PROGRAM

## 2023-02-23 PROCEDURE — 82330 ASSAY OF CALCIUM: CPT

## 2023-02-23 PROCEDURE — 83735 ASSAY OF MAGNESIUM: CPT

## 2023-02-23 PROCEDURE — 27000221 HC OXYGEN, UP TO 24 HOURS

## 2023-02-23 PROCEDURE — 99900035 HC TECH TIME PER 15 MIN (STAT)

## 2023-02-23 PROCEDURE — 82803 BLOOD GASES ANY COMBINATION: CPT

## 2023-02-23 PROCEDURE — 80053 COMPREHEN METABOLIC PANEL: CPT

## 2023-02-23 PROCEDURE — 25000003 PHARM REV CODE 250: Performed by: OTOLARYNGOLOGY

## 2023-02-23 PROCEDURE — 99900026 HC AIRWAY MAINTENANCE (STAT)

## 2023-02-23 PROCEDURE — 80197 ASSAY OF TACROLIMUS: CPT

## 2023-02-23 PROCEDURE — 36600 WITHDRAWAL OF ARTERIAL BLOOD: CPT

## 2023-02-23 PROCEDURE — 84132 ASSAY OF SERUM POTASSIUM: CPT

## 2023-02-23 PROCEDURE — 82800 BLOOD PH: CPT

## 2023-02-23 PROCEDURE — 82330 ASSAY OF CALCIUM: CPT | Performed by: OTOLARYNGOLOGY

## 2023-02-23 RX ORDER — ATORVASTATIN CALCIUM 40 MG/1
40 TABLET, FILM COATED ORAL NIGHTLY
Status: DISCONTINUED | OUTPATIENT
Start: 2023-02-23 | End: 2023-03-06

## 2023-02-23 RX ORDER — SODIUM CHLORIDE, SODIUM LACTATE, POTASSIUM CHLORIDE, CALCIUM CHLORIDE 600; 310; 30; 20 MG/100ML; MG/100ML; MG/100ML; MG/100ML
INJECTION, SOLUTION INTRAVENOUS CONTINUOUS
Status: ACTIVE | OUTPATIENT
Start: 2023-02-23 | End: 2023-02-23

## 2023-02-23 RX ORDER — PROPOFOL 10 MG/ML
0-50 INJECTION, EMULSION INTRAVENOUS CONTINUOUS
Status: DISCONTINUED | OUTPATIENT
Start: 2023-02-23 | End: 2023-03-01

## 2023-02-23 RX ORDER — SENNOSIDES 8.8 MG/5ML
5 LIQUID ORAL 2 TIMES DAILY
Status: DISCONTINUED | OUTPATIENT
Start: 2023-02-23 | End: 2023-03-06

## 2023-02-23 RX ORDER — PROPOFOL 10 MG/ML
INJECTION, EMULSION INTRAVENOUS
Status: COMPLETED
Start: 2023-02-23 | End: 2023-02-23

## 2023-02-23 RX ORDER — FAMOTIDINE 20 MG/1
20 TABLET, FILM COATED ORAL 2 TIMES DAILY
Status: DISCONTINUED | OUTPATIENT
Start: 2023-02-23 | End: 2023-03-02

## 2023-02-23 RX ADMIN — TACROLIMUS 1 MG: 1 CAPSULE ORAL at 05:02

## 2023-02-23 RX ADMIN — POTASSIUM CHLORIDE 10 MEQ: 7.46 INJECTION, SOLUTION INTRAVENOUS at 04:02

## 2023-02-23 RX ADMIN — MAGNESIUM SULFATE 2 G: 2 INJECTION INTRAVENOUS at 05:02

## 2023-02-23 RX ADMIN — PROPOFOL 43.92 MCG/KG/MIN: 10 INJECTION, EMULSION INTRAVENOUS at 10:02

## 2023-02-23 RX ADMIN — ESCITALOPRAM OXALATE 20 MG: 20 TABLET ORAL at 09:02

## 2023-02-23 RX ADMIN — MIRTAZAPINE 15 MG: 15 TABLET, FILM COATED ORAL at 09:02

## 2023-02-23 RX ADMIN — GABAPENTIN 600 MG: 300 CAPSULE ORAL at 09:02

## 2023-02-23 RX ADMIN — MUPIROCIN 1 G: 20 OINTMENT TOPICAL at 09:02

## 2023-02-23 RX ADMIN — GABAPENTIN 600 MG: 300 CAPSULE ORAL at 08:02

## 2023-02-23 RX ADMIN — QUETIAPINE FUMARATE 50 MG: 25 TABLET ORAL at 09:02

## 2023-02-23 RX ADMIN — MUPIROCIN: 20 OINTMENT TOPICAL at 08:02

## 2023-02-23 RX ADMIN — PROPOFOL 35 MCG/KG/MIN: 10 INJECTION, EMULSION INTRAVENOUS at 03:02

## 2023-02-23 RX ADMIN — ACETAMINOPHEN 650 MG: 325 TABLET ORAL at 09:02

## 2023-02-23 RX ADMIN — SENNOSIDES 5 ML: 8.8 SYRUP ORAL at 09:02

## 2023-02-23 RX ADMIN — POTASSIUM CHLORIDE 10 MEQ: 7.46 INJECTION, SOLUTION INTRAVENOUS at 10:02

## 2023-02-23 RX ADMIN — POTASSIUM CHLORIDE 10 MEQ: 7.46 INJECTION, SOLUTION INTRAVENOUS at 06:02

## 2023-02-23 RX ADMIN — PROPOFOL 30 MCG/KG/MIN: 10 INJECTION, EMULSION INTRAVENOUS at 10:02

## 2023-02-23 RX ADMIN — ATORVASTATIN CALCIUM 40 MG: 40 TABLET, FILM COATED ORAL at 09:02

## 2023-02-23 RX ADMIN — SODIUM CHLORIDE, POTASSIUM CHLORIDE, SODIUM LACTATE AND CALCIUM CHLORIDE: 600; 310; 30; 20 INJECTION, SOLUTION INTRAVENOUS at 05:02

## 2023-02-23 RX ADMIN — ENOXAPARIN SODIUM 40 MG: 40 INJECTION SUBCUTANEOUS at 05:02

## 2023-02-23 RX ADMIN — ACETAMINOPHEN 650 MG: 325 TABLET ORAL at 02:02

## 2023-02-23 RX ADMIN — FENTANYL CITRATE 50 MCG: 50 INJECTION INTRAMUSCULAR; INTRAVENOUS at 08:02

## 2023-02-23 RX ADMIN — SODIUM PHOSPHATE, MONOBASIC, MONOHYDRATE AND SODIUM PHOSPHATE, DIBASIC, ANHYDROUS 30 MMOL: 142; 276 INJECTION, SOLUTION INTRAVENOUS at 10:02

## 2023-02-23 RX ADMIN — TACROLIMUS 2 MG: 1 CAPSULE ORAL at 08:02

## 2023-02-23 RX ADMIN — POLYETHYLENE GLYCOL 3350 17 G: 17 POWDER, FOR SOLUTION ORAL at 08:02

## 2023-02-23 RX ADMIN — FAMOTIDINE 20 MG: 10 INJECTION INTRAVENOUS at 08:02

## 2023-02-23 RX ADMIN — FENTANYL CITRATE 50 MCG: 50 INJECTION INTRAMUSCULAR; INTRAVENOUS at 03:02

## 2023-02-23 RX ADMIN — ACETAMINOPHEN 650 MG: 325 TABLET ORAL at 06:02

## 2023-02-23 RX ADMIN — GABAPENTIN 600 MG: 300 CAPSULE ORAL at 02:02

## 2023-02-23 RX ADMIN — FAMOTIDINE 20 MG: 20 TABLET ORAL at 09:02

## 2023-02-23 RX ADMIN — AMLODIPINE BESYLATE 5 MG: 5 TABLET ORAL at 08:02

## 2023-02-23 NOTE — PT/OT/SLP PROGRESS
Physical Therapy      Patient Name:  Dereck Centeno   MRN:  [id number]    Patient not seen today secondary to  (pt not seen due being on weaning parameters from the vent.). Will follow-up at a later date.    2/23/2023  .

## 2023-02-23 NOTE — ASSESSMENT & PLAN NOTE
Dereck Centeno is a 62 y.o. 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 On day of surgery pt with expanding hematoma now s/p emergent neck exploration with washout 2/20.     Oral packing removed 2/22. Unable to be extubated 2/2 tachycardia and secretions.     -- Trial wean/extubation today    - If fails, will consider tracheostomy   -- Drain care with strict recording of drain output  -- If concern for neck swelling please call ENT on call ASAP  -- Remainder of care per ICU  -- Please page ENT with any questions or concerns

## 2023-02-23 NOTE — PROGRESS NOTES
[facility] - Surgical Intensive Care  Critical Care - Surgery  Progress Note    Patient Name: Dereck Centeno  MRN: [id number]  Admission Date: 2/20/2023  Hospital Length of Stay: 3 days  Code Status: Full Code  Attending Provider: Alexandre Templeton MD  Primary Care Provider: Eva Reynaga MD   Principal Problem: Squamous cell carcinoma of left tonsil    Subjective:     Hospital/ICU Course:  No notes on file    Interval History/Significant Events: NAEO, afebrile, HDS. Neck swelling improved. Will discuss timing of extubation with ENT    Follow-up For: Procedure(s) (LRB):  EXPLORATION, NECK (Left)  EVACUATION, HEMATOMA (Left)    Post-Operative Day: 3 Days Post-Op    Objective:     Vital Signs (Most Recent):  Temp: 98.3 °F (36.8 °C) (02/23/23 0300)  Pulse: 68 (02/23/23 0424)  Resp: 18 (02/23/23 0424)  BP: 114/60 (02/23/23 0415)  SpO2: 100 % (02/23/23 0424) Vital Signs (24h Range):  Temp:  [97.8 °F (36.6 °C)-98.8 °F (37.1 °C)] 98.3 °F (36.8 °C)  Pulse:  [63-86] 68  Resp:  [16-22] 18  SpO2:  [99 %-100 %] 100 %  BP: ()/(51-84) 114/60     Weight: 75.9 kg (167 lb 5.3 oz)  Body mass index is 24.01 kg/m².      Intake/Output Summary (Last 24 hours) at 2/23/2023 0524  Last data filed at 2/23/2023 0400  Gross per 24 hour   Intake 4354.21 ml   Output 2236 ml   Net 2118.21 ml       Physical Exam  Vitals and nursing note reviewed.   Constitutional:       Appearance: Normal appearance.      Interventions: He is sedated, intubated and restrained.   HENT:      Head: Normocephalic and atraumatic.      Nose:      Comments: NG tube sutured in place   Eyes:      Conjunctiva/sclera: Conjunctivae normal.      Pupils: Pupils are equal, round, and reactive to light.   Neck:      Comments: ENMANUEL drain with SS on left side of neck   Surgical incision clean and dry   Cardiovascular:      Rate and Rhythm: Normal rate and regular rhythm.      Pulses: Normal pulses.      Heart sounds: Normal heart sounds.   Pulmonary:      Effort: Pulmonary  effort is normal. No respiratory distress. He is intubated.      Comments: ETT in place   Abdominal:      General: Abdomen is flat. Bowel sounds are normal.      Palpations: Abdomen is soft.   Skin:     General: Skin is warm.       Vents:  Vent Mode: A/C (02/23/23 0424)  Ventilator Initiated: Yes (02/20/23 1443)  Set Rate: 18 BPM (02/23/23 0424)  Vt Set: 480 mL (02/23/23 0424)  Pressure Support: 5 cmH20 (02/23/23 0424)  PEEP/CPAP: 5 cmH20 (02/23/23 0424)  Oxygen Concentration (%): 40 (02/23/23 0424)  Peak Airway Pressure: 28 cmH20 (02/23/23 0424)  Plateau Pressure: 12 cmH20 (02/23/23 0424)  Total Ve: 10.1 L/m (02/23/23 0424)  Negative Inspiratory Force (cm H2O): -25 (02/23/23 0424)  F/VT Ratio<105 (RSBI): (!) 27.57 (02/23/23 0424)    Lines/Drains/Airways       Drain  Duration                  Closed/Suction Drain 02/20/23 Left Neck Bulb 19 Fr. 3 days         Trans Pyloric Feeding Tube 02/20/23 1320 orogastric 12 Fr. 2 days         Urethral Catheter 02/20/23 0945 Non-latex 16 Fr. 2 days              Airway  Duration                  Airway - Non-Surgical 02/20/23 0926 Nasal Janneth 2 days              Peripheral Intravenous Line  Duration                  Peripheral IV - Single Lumen 02/20/23 0726 20 G Left;Posterior Forearm 2 days         Peripheral IV - Single Lumen 02/20/23 1545 20 G Right Forearm 2 days                    Significant Labs:    CBC/Anemia Profile:  Recent Labs   Lab 02/22/23  0350 02/23/23  0323 02/23/23  0325   WBC 6.86 4.79  --    HGB 9.7* 8.6*  --    HCT 31.2* 26.8* 24*   * 89*  --    MCV 96 94  --    RDW 13.4 13.2  --         Chemistries:  Recent Labs   Lab 02/21/23  0629 02/22/23  0350 02/23/23  0323   NA  --  140 142   K 4.5 4.1 3.5   CL  --  110 109   CO2  --  24 27   BUN  --  17 14   CREATININE  --  1.0 0.9   CALCIUM  --  8.3* 8.4*   ALBUMIN  --  2.9* 2.8*   PROT  --  5.0* 5.1*   BILITOT  --  0.3 0.4   ALKPHOS  --  41* 41*   ALT  --  5* 6*   AST  --  9* 9*   MG  --  1.7 1.5*   PHOS  --   1.7* 1.5*       All pertinent labs within the past 24 hours have been reviewed.    Significant Imaging:  I have reviewed all pertinent imaging results/findings within the past 24 hours.    Assessment/Plan:     Oncology  * Squamous cell carcinoma of left tonsil  Dereck Centeno is a 62 y.o. male with PMHx of HTN, emphysema, HCV s/p liver transplant in 2015, tobacco abuse (50+ pack years), oral cancer s/p surgery at [facility] in 2019 (no chemo/radiation), and newly diagnosed SCC of the left tonsil. He is now s/p left tonsillectomy and neck dissection by Dr. Templeton on 2/20. Post operative course was c/b development of large hematoma at surgical site. He was taken back to the OR for an emergent neck exploration and hematoma evacuation on 2/20.       Neuro/Psych:   -- Sedation: precedex gtt   -- Pain: home gabapentin, tylenol, PRN fentanyl   -- Psych: home clonazepam PRN, scheduled mirtazapine, seroquel, lexapro     Cards  -- HDS; not currently on pressors   -- Continue home amlodipine and clonidine   -- PRN labetalol and hydralazine   -- Continue home ASA and statin       Pulm:   -- H/o emphysema   -- Continue home inhaler once extubated; albuterol nebs PRN for wheezing   -- Goal O2 sat > 88%  -- Wean as able  -- Daily SBT       Renal:  -- Keep auguste for strict I/O  -- BUN/Cr 14/0.9  -- UOP 2L/24H      FEN / GI:   -- Net +2L/24H  -- Replace lytes as needed  -- Nutrition: NPO + TF  -- mIVF discontinued      ID:   -- Tm: afebrile; WBC 4.79  -- Abx: none       Heme/Onc:   -- H/H decreased to 8.6/26.8 from 9.7/31.2; increased bloody oral secretions but no other obvious signs of bleeding   -- H/o thrombocytopenia    -- Platelets slowly down trending since admission; 89 on 2/23   -- Continue to monitor   -- Daily CBC  -- Home ASA       Endo:   -- Gluc goal 140-180  -- SSI      Immuno:  -- H/o HCV infection s/p liver transplant in 2015  -- Hepatology consulted  -- Continue home tacrolimus  -- Monitor tacrolimus levels and  INR daily   -- Valacyclovir daily per ENT      PPx:   Feeding: NPO + TF  Analgesia/Sedation: PRN fentanyl, home gabapentin, tylenol/ precedex gtt   Thromboembolic prevention: lovenox   HOB >30: yes   Stress Ulcer ppx: famotidine BID   Glucose control: Critical care goal 140-180 g/dl, ISS    Lines/Drains/Airway: ETT, auguste, PIVx2, NG tube, neck drain x2      Dispo/Code Status/Palliative:   -- SICU / Full Code           Critical care was time spent personally by me on the following activities: development of treatment plan with patient or surrogate and bedside caregivers, discussions with consultants, evaluation of patient's response to treatment, examination of patient, ordering and performing treatments and interventions, ordering and review of laboratory studies, ordering and review of radiographic studies, pulse oximetry, re-evaluation of patient's condition.  This critical care time did not overlap with that of any other provider or involve time for any procedures.     Sven Conway MD  Critical Care - Surgery  [facility] - Surgical Intensive [facility]

## 2023-02-23 NOTE — PLAN OF CARE
"      SICU PLAN OF CARE NOTE    Dx: Squamous cell carcinoma of left tonsil    Shift Events: remained intubated for the night d/t low TV and breath stacking    Goals of Care: respiratory status stable enough for extubation    Neuro: Arouses to Voice, Follows Commands, and Moves All Extremities    Vital Signs: /60   Pulse 69   Temp 98.3 °F (36.8 °C) (Axillary)   Resp 18   Ht 5' 10" (1.778 m)   Wt 75.9 kg (167 lb 5.3 oz)   SpO2 100%   BMI 24.01 kg/m²     Respiratory: Ventilator    Diet: NPO and Tube Feeds    Gtts: precedex    Urine Output: Urinary Catheter 1005 cc/shift    Drains: ENMANUEL Drain, total output 73 cc / shift     Labs/Accuchecks: accuchecks q6hrs, daily CMP, CBC, mag and phos.    Skin: surgical incision with drain site; no breakdown noted       "

## 2023-02-23 NOTE — SUBJECTIVE & OBJECTIVE
Interval History/Significant Events: NAEO, afebrile, HDS. Neck swelling improved. Will discuss timing of extubation with ENT    Follow-up For: Procedure(s) (LRB):  EXPLORATION, NECK (Left)  EVACUATION, HEMATOMA (Left)    Post-Operative Day: 3 Days Post-Op    Objective:     Vital Signs (Most Recent):  Temp: 98.3 °F (36.8 °C) (02/23/23 0300)  Pulse: 68 (02/23/23 0424)  Resp: 18 (02/23/23 0424)  BP: 114/60 (02/23/23 0415)  SpO2: 100 % (02/23/23 0424) Vital Signs (24h Range):  Temp:  [97.8 °F (36.6 °C)-98.8 °F (37.1 °C)] 98.3 °F (36.8 °C)  Pulse:  [63-86] 68  Resp:  [16-22] 18  SpO2:  [99 %-100 %] 100 %  BP: ()/(51-84) 114/60     Weight: 75.9 kg (167 lb 5.3 oz)  Body mass index is 24.01 kg/m².      Intake/Output Summary (Last 24 hours) at 2/23/2023 0524  Last data filed at 2/23/2023 0400  Gross per 24 hour   Intake 4354.21 ml   Output 2236 ml   Net 2118.21 ml       Physical Exam  Vitals and nursing note reviewed.   Constitutional:       Appearance: Normal appearance.      Interventions: He is sedated, intubated and restrained.   HENT:      Head: Normocephalic and atraumatic.      Nose:      Comments: NG tube sutured in place   Eyes:      Conjunctiva/sclera: Conjunctivae normal.      Pupils: Pupils are equal, round, and reactive to light.   Neck:      Comments: ENMANUEL drain with SS on left side of neck   Surgical incision clean and dry   Cardiovascular:      Rate and Rhythm: Normal rate and regular rhythm.      Pulses: Normal pulses.      Heart sounds: Normal heart sounds.   Pulmonary:      Effort: Pulmonary effort is normal. No respiratory distress. He is intubated.      Comments: ETT in place   Abdominal:      General: Abdomen is flat. Bowel sounds are normal.      Palpations: Abdomen is soft.   Skin:     General: Skin is warm.       Vents:  Vent Mode: A/C (02/23/23 0424)  Ventilator Initiated: Yes (02/20/23 1443)  Set Rate: 18 BPM (02/23/23 0424)  Vt Set: 480 mL (02/23/23 0424)  Pressure Support: 5 cmH20 (02/23/23  0424)  PEEP/CPAP: 5 cmH20 (02/23/23 0424)  Oxygen Concentration (%): 40 (02/23/23 0424)  Peak Airway Pressure: 28 cmH20 (02/23/23 0424)  Plateau Pressure: 12 cmH20 (02/23/23 0424)  Total Ve: 10.1 L/m (02/23/23 0424)  Negative Inspiratory Force (cm H2O): -25 (02/23/23 0424)  F/VT Ratio<105 (RSBI): (!) 27.57 (02/23/23 0424)    Lines/Drains/Airways       Drain  Duration                  Closed/Suction Drain 02/20/23 Left Neck Bulb 19 Fr. 3 days         Trans Pyloric Feeding Tube 02/20/23 1320 orogastric 12 Fr. 2 days         Urethral Catheter 02/20/23 0945 Non-latex 16 Fr. 2 days              Airway  Duration                  Airway - Non-Surgical 02/20/23 0926 Nasal Janneth 2 days              Peripheral Intravenous Line  Duration                  Peripheral IV - Single Lumen 02/20/23 0726 20 G Left;Posterior Forearm 2 days         Peripheral IV - Single Lumen 02/20/23 1545 20 G Right Forearm 2 days                    Significant Labs:    CBC/Anemia Profile:  Recent Labs   Lab 02/22/23  0350 02/23/23  0323 02/23/23  0325   WBC 6.86 4.79  --    HGB 9.7* 8.6*  --    HCT 31.2* 26.8* 24*   * 89*  --    MCV 96 94  --    RDW 13.4 13.2  --         Chemistries:  Recent Labs   Lab 02/21/23  0629 02/22/23  0350 02/23/23  0323   NA  --  140 142   K 4.5 4.1 3.5   CL  --  110 109   CO2  --  24 27   BUN  --  17 14   CREATININE  --  1.0 0.9   CALCIUM  --  8.3* 8.4*   ALBUMIN  --  2.9* 2.8*   PROT  --  5.0* 5.1*   BILITOT  --  0.3 0.4   ALKPHOS  --  41* 41*   ALT  --  5* 6*   AST  --  9* 9*   MG  --  1.7 1.5*   PHOS  --  1.7* 1.5*       All pertinent labs within the past 24 hours have been reviewed.    Significant Imaging:  I have reviewed all pertinent imaging results/findings within the past 24 hours.

## 2023-02-23 NOTE — PLAN OF CARE
Failed SBT  Added on for tracheostomy tomorrow  Will obtain consent from NOK  Please hold tube feeds at midnight, hold lovenox tomorrow

## 2023-02-23 NOTE — PROGRESS NOTES
[facility]  Otorhinolaryngology-Head & Neck Surgery  Progress Note    Subjective:     Post-Op Info:  Procedure(s) (LRB):  EXPLORATION, NECK (Left)  EVACUATION, HEMATOMA (Left)   3 Days Post-Op  Hospital Day: 4     Interval History: Attempt to wean for extubation failed 2/2 tachycardia as well as significant secretions within ETT. No bleeding since packing removal.     Medications:  Continuous Infusions:   dexmedeTOMIDine (Precedex) infusion (titrating) 0.1 mcg/kg/hr (02/23/23 0600)     Scheduled Meds:   acetaminophen  650 mg Per NG tube Q8H    amLODIPine  5 mg Per NG tube Daily    atorvastatin  40 mg Per NG tube QHS    cloNIDine  0.1 mg Per NG tube QHS    enoxaparin  40 mg Subcutaneous Daily    EScitalopram oxalate  20 mg Per NG tube QHS    famotidine (PF)  20 mg Intravenous Q12H    fluticasone furoate-vilanteroL  1 puff Inhalation Daily    gabapentin  600 mg Per NG tube TID    mirtazapine  15 mg Per NG tube QHS    mupirocin   Nasal BID    polyethylene glycol  17 g Per NG tube Daily    QUEtiapine  50 mg Per NG tube QHS    sodium phosphate IVPB  30 mmol Intravenous Once    tacrolimus  2 mg Per NG tube Daily AM    And    tacrolimus  1 mg Per NG tube Daily PM     PRN Meds:sodium chloride 0.9%, calcium gluconate IVPB, calcium gluconate IVPB, calcium gluconate IVPB, clonazePAM, dextrose 10%, dextrose 10%, fentaNYL, glucagon (human recombinant), hydrALAZINE, insulin aspart U-100, labetalol, magnesium sulfate IVPB, magnesium sulfate IVPB, ondansetron, potassium chloride **AND** potassium chloride **AND** potassium chloride, sodium phosphate IVPB, sodium phosphate IVPB, sodium phosphate IVPB     Review of patient's allergies indicates:  No Known Allergies  Objective:     Vital Signs (24h Range):  Temp:  [97.8 °F (36.6 °C)-98.8 °F (37.1 °C)] 98.3 °F (36.8 °C)  Pulse:  [65-86] 68  Resp:  [16-22] 18  SpO2:  [99 %-100 %] 100 %  BP: ()/(52-84) 114/60       Lines/Drains/Airways        Drain  Duration                  Closed/Suction Drain 02/20/23 Left Neck Bulb 19 Fr. 3 days         Trans Pyloric Feeding Tube 02/20/23 1320 orogastric 12 Fr. 2 days         Urethral Catheter 02/20/23 0945 Non-latex 16 Fr. 2 days              Airway  Duration                  Airway - Non-Surgical 02/20/23 0926 Nasal Janneth 2 days              Peripheral Intravenous Line  Duration                  Peripheral IV - Single Lumen 02/20/23 0726 20 G Left;Posterior Forearm 2 days         Peripheral IV - Single Lumen 02/20/23 1545 20 G Right Forearm 2 days                    Physical Exam  Intubated, sedated  Oral cavity with no obvious bleeding though intraoral examination limited.   Left ENMANUEL x1 w/ appropriate output, stripped.    Neck ecchymotic but improved, soft     Significant Labs:  CBC:   Recent Labs   Lab 02/23/23  0323 02/23/23  0325   WBC 4.79  --    RBC 2.84*  --    HGB 8.6*  --    HCT 26.8* 24*   PLT 89*  --    MCV 94  --    MCH 30.3  --    MCHC 32.1  --      CMP:   Recent Labs   Lab 02/23/23  0323   *   CALCIUM 8.4*   ALBUMIN 2.8*   PROT 5.1*      K 3.5   CO2 27      BUN 14   CREATININE 0.9   ALKPHOS 41*   ALT 6*   AST 9*   BILITOT 0.4       Significant Diagnostics:  I have reviewed all pertinent imaging results/findings within the past 24 hours.    Assessment/Plan:     * Squamous cell carcinoma of left tonsil  Dereck Centeno is a 62 y.o. 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 On day of surgery pt with expanding hematoma now s/p emergent neck exploration with washout 2/20.     Oral packing removed 2/22. Unable to be extubated 2/2 tachycardia and secretions.     -- Trial wean/extubation today    - If fails, will consider tracheostomy   -- Drain care with strict recording of drain output  -- If concern for neck swelling please call ENT on  call ASAP  -- Remainder of care per ICU  -- Please page ENT with any questions or concerns               Nayla Lomax MD  Otorhinolaryngology-Head & Neck Surgery  [facility]

## 2023-02-23 NOTE — ASSESSMENT & PLAN NOTE
Dereck Centeno is a 62 y.o. male with PMHx of HTN, emphysema, HCV s/p liver transplant in 2015, tobacco abuse (50+ pack years), oral cancer s/p surgery at [facility] in 2019 (no chemo/radiation), and newly diagnosed SCC of the left tonsil. He is now s/p left tonsillectomy and neck dissection by Dr. Templeton on 2/20. Post operative course was c/b development of large hematoma at surgical site. He was taken back to the OR for an emergent neck exploration and hematoma evacuation on 2/20.       Neuro/Psych:   -- Sedation: precedex gtt and propofol gtt  -- Pain: home gabapentin, tylenol, PRN fentanyl   -- Psych: home clonazepam PRN, scheduled mirtazapine, seroquel, lexapro     Cards  -- HDS; not currently on pressors   -- Continue home amlodipine and clonidine   -- PRN labetalol and hydralazine   -- Continue home ASA and statin       Pulm:   -- H/o emphysema   -- Continue home inhaler once extubated; albuterol nebs PRN for wheezing   -- Goal O2 sat > 88%  -- Wean as able  -- Daily SBT       Renal:  -- Keep auguste for strict I/O  -- BUN/Cr 15/0.9  -- UOP 1.8L/24H      FEN / GI:   -- Net +2.7L/24H  -- Replace lytes as needed  -- Nutrition: NPO + TF  -- mIVF discontinued      ID:   -- Tm: afebrile; WBC 5  -- Abx: none       Heme/Onc:   -- H/H decreased to 8.8/27.9 from 8.6/26.8; increased bloody oral secretions but no other obvious signs of bleeding   -- H/o thrombocytopenia    -- Platelets slowly down trending since admission; 107 from 89 on 2/23   -- Continue to monitor   -- Daily CBC  -- Home ASA held       Endo:   -- Gluc goal 140-180  -- SSI      Immuno:  -- H/o HCV infection s/p liver transplant in 2015  -- Hepatology consulted  -- Continue home tacrolimus  -- Monitor tacrolimus levels and INR daily   -- Valacyclovir daily per ENT      PPx:   Feeding: NPO + TF  Analgesia/Sedation: PRN fentanyl, home gabapentin, tylenol/ precedex gtt   Thromboembolic prevention: lovenox   HOB >30: yes   Stress Ulcer ppx: famotidine BID    Glucose control: Critical care goal 140-180 g/dl, ISS    Lines/Drains/Airway: ETT, auguste, PIVx2, NG tube, neck drain x2      Dispo/Code Status/Palliative:   -- SICU / Full Code

## 2023-02-23 NOTE — SUBJECTIVE & OBJECTIVE
Interval History: Attempt to wean for extubation failed 2/2 tachycardia as well as significant secretions within ETT. No bleeding since packing removal.     Medications:  Continuous Infusions:   dexmedeTOMIDine (Precedex) infusion (titrating) 0.1 mcg/kg/hr (02/23/23 0600)     Scheduled Meds:   acetaminophen  650 mg Per NG tube Q8H    amLODIPine  5 mg Per NG tube Daily    atorvastatin  40 mg Per NG tube QHS    cloNIDine  0.1 mg Per NG tube QHS    enoxaparin  40 mg Subcutaneous Daily    EScitalopram oxalate  20 mg Per NG tube QHS    famotidine (PF)  20 mg Intravenous Q12H    fluticasone furoate-vilanteroL  1 puff Inhalation Daily    gabapentin  600 mg Per NG tube TID    mirtazapine  15 mg Per NG tube QHS    mupirocin   Nasal BID    polyethylene glycol  17 g Per NG tube Daily    QUEtiapine  50 mg Per NG tube QHS    sodium phosphate IVPB  30 mmol Intravenous Once    tacrolimus  2 mg Per NG tube Daily AM    And    tacrolimus  1 mg Per NG tube Daily PM     PRN Meds:sodium chloride 0.9%, calcium gluconate IVPB, calcium gluconate IVPB, calcium gluconate IVPB, clonazePAM, dextrose 10%, dextrose 10%, fentaNYL, glucagon (human recombinant), hydrALAZINE, insulin aspart U-100, labetalol, magnesium sulfate IVPB, magnesium sulfate IVPB, ondansetron, potassium chloride **AND** potassium chloride **AND** potassium chloride, sodium phosphate IVPB, sodium phosphate IVPB, sodium phosphate IVPB     Review of patient's allergies indicates:  No Known Allergies  Objective:     Vital Signs (24h Range):  Temp:  [97.8 °F (36.6 °C)-98.8 °F (37.1 °C)] 98.3 °F (36.8 °C)  Pulse:  [65-86] 68  Resp:  [16-22] 18  SpO2:  [99 %-100 %] 100 %  BP: ()/(52-84) 114/60       Lines/Drains/Airways       Drain  Duration                  Closed/Suction Drain 02/20/23 Left Neck Bulb 19 Fr. 3 days         Trans Pyloric Feeding Tube 02/20/23 1320 orogastric 12 Fr. 2 days         Urethral Catheter 02/20/23 0945 Non-latex 16 Fr. 2 days              Airway   Duration                  Airway - Non-Surgical 02/20/23 0926 Nasal Janneth 2 days              Peripheral Intravenous Line  Duration                  Peripheral IV - Single Lumen 02/20/23 0726 20 G Left;Posterior Forearm 2 days         Peripheral IV - Single Lumen 02/20/23 1545 20 G Right Forearm 2 days                    Physical Exam  Intubated, sedated  Oral cavity with no obvious bleeding though intraoral examination limited.   Left ENMANUEL x1 w/ appropriate output, stripped.    Neck ecchymotic but improved, soft     Significant Labs:  CBC:   Recent Labs   Lab 02/23/23 0323 02/23/23 0325   WBC 4.79  --    RBC 2.84*  --    HGB 8.6*  --    HCT 26.8* 24*   PLT 89*  --    MCV 94  --    MCH 30.3  --    MCHC 32.1  --      CMP:   Recent Labs   Lab 02/23/23 0323   *   CALCIUM 8.4*   ALBUMIN 2.8*   PROT 5.1*      K 3.5   CO2 27      BUN 14   CREATININE 0.9   ALKPHOS 41*   ALT 6*   AST 9*   BILITOT 0.4       Significant Diagnostics:  I have reviewed all pertinent imaging results/findings within the past 24 hours.

## 2023-02-23 NOTE — NURSING
Clarification on sodium phosphate administration with resident on call; only give one dose of 30mmol of the sodium phosphate, second order is a duplicate of already established replacement orders

## 2023-02-23 NOTE — ANESTHESIA PREPROCEDURE EVALUATION
[facility]  Anesthesia Pre-Operative Evaluation         Patient Name/: Dereck Centeno, 1960  MRN: [id number]    SUBJECTIVE:     Pre-operative evaluation for Procedure(s) (LRB):  CREATION, TRACHEOSTOMY (N/A)     2023    Dereck Centeno is a 62 y.o. male w/ a significant PMHx of tobacco abuse (50+ pack years), HTN, emphysema, HCV s/p liver tx , oral cancer s/p surgery at [facility]  (no chemo/ radiation), left neck mass s/p DL and bx of L tonsil consistent SCCa. Now s/p robotic transoral surgery with neck dissection and muscle flap creation 23 and take back same day for hematoma formation. Failed SBT, planning for trach.      Patient now presents for the above procedure(s).     Intubated, HDS no pressors. Sedated on propofol and dexmedetomidine.    Verbal informed consent done with patient's brother Ki Centeno over the phone. Witnessed by 2 RN in SICU.      Stress Echo Summary 2020:   Normal left ventricular systolic function. The estimated ejection fraction is 60%.   No wall motion abnormalities.   The ECG portion of this study is negative for myocardial ischemia.   The stress echo portion of this study is negative for myocardial ischemia.   Sensitivity is impaired due to the patient's failure to achieve target heart rate.   The test was stopped because the patient experienced fatigue, shortness of breath and claudication. The patient requested the test to be stopped.   There were no arrhythmias during stress.   The patient's exercise capacity was moderately impaired    Patient now presents for the above procedure(s).    Prev airway: Currently intubated. Nasal ioana.    Intubation  Date/Time: 2023 9:26 AM  Intubation:     Induction:  Intravenous    Intubated:  Postinduction    Mask Ventilation:  Moderately difficult with oral airway (2 hand mask with oral airway)    Attempts:  1    Attempted By:  Resident anesthesiologist    Method of Intubation:   Video laryngoscopy    Blade:  Marcos 4    Laryngeal View Grade: Grade I - full view of cords      Difficult Airway Encountered?: No      Complications:  None    Airway Device:  Nasal ioana    Airway Device Size:  6.5      LDA:   PIV 20G L forearm  PIV 20G R forearm    Drips:    dexmedeTOMIDine (Precedex) infusion (titrating) 0.12 mcg/kg/hr (02/23/23 1408)    lactated ringers 100 mL/hr at 02/23/23 1408    propofoL 25 mcg/kg/min (02/23/23 1408)       Patient Active Problem List   Diagnosis    Chronic back pain    Thrombocytopenia    Anxiety disorder    Generalized weakness    Hyperglycemia    Adrenal cortical steroids causing adverse effect in therapeutic use    Immunosuppression    HTN (hypertension)    Liver transplant 1/11/2015 for HCV    Tobacco abuse    History of malignant neoplasm of oral cavity    Substance induced mood disorder    Dysphagia    Polyp of colon    Osteoporosis    Squamous cell carcinoma of left tonsil    Centrilobular emphysema    GERD (gastroesophageal reflux disease)    Adrenal adenoma, left    BPH (benign prostatic hyperplasia)       Review of patient's allergies indicates:  No Known Allergies    Current Inpatient Medications:    acetaminophen  650 mg Per NG tube Q8H    amLODIPine  5 mg Per NG tube Daily    atorvastatin  40 mg Per NG tube QHS    cloNIDine  0.1 mg Per NG tube QHS    enoxaparin  40 mg Subcutaneous Daily    EScitalopram oxalate  20 mg Per NG tube QHS    famotidine  20 mg Per NG tube BID    fluticasone furoate-vilanteroL  1 puff Inhalation Daily    gabapentin  600 mg Per NG tube TID    mirtazapine  15 mg Per NG tube QHS    mupirocin   Nasal BID    polyethylene glycol  17 g Per NG tube Daily    QUEtiapine  50 mg Per NG tube QHS    sennosides 8.8 mg/5 ml  5 mL Per NG tube BID    sodium phosphate IVPB  30 mmol Intravenous Once    tacrolimus  2 mg Per NG tube Daily AM    And    tacrolimus  1 mg Per NG tube Daily PM       No current  facility-administered medications on file prior to encounter.     Current Outpatient Medications on File Prior to Encounter   Medication Sig Dispense Refill    albuterol (PROVENTIL/VENTOLIN HFA) 90 mcg/actuation inhaler Inhale 2 puffs into the lungs every 6 (six) hours as needed for Wheezing or Shortness of Breath.      amLODIPine (NORVASC) 5 MG tablet Take 1 tablet (5 mg total) by mouth once daily. 30 tablet 0    aspirin (ECOTRIN) 81 MG EC tablet Take 1 tablet (81 mg total) by mouth once daily. (Patient taking differently: Take 81 mg by mouth every evening.) 30 tablet 0    atorvastatin (LIPITOR) 40 MG tablet Take 40 mg by mouth every evening.      calcium carbonate (OS-KRISTEL) 600 mg calcium (1,500 mg) Tab Take 600 mg by mouth once.      cloNIDine (CATAPRES) 0.1 MG tablet Take 1 tablet (0.1 mg total) by mouth 2 (two) times daily. (Patient taking differently: Take 0.1 mg by mouth every evening.) 60 tablet 0    docusate sodium (COLACE) 100 MG capsule Take 100 mg by mouth 3 (three) times daily as needed for Constipation.      escitalopram oxalate (LEXAPRO) 20 MG tablet Take 1 tablet (20 mg total) by mouth once daily. (Patient taking differently: Take 20 mg by mouth every evening.) 30 tablet 0    famotidine (PEPCID) 20 MG tablet Take 1 tablet (20 mg total) by mouth every evening. 30 tablet 0    gabapentin (NEURONTIN) 400 MG capsule Take 1 capsule (400 mg total) by mouth 3 (three) times daily. (Patient taking differently: Take 600 mg by mouth 3 (three) times daily.) 90 capsule 0    HYDROcodone-acetaminophen (NORCO) 5-325 mg per tablet Take 1 tablet by mouth every 6 (six) hours as needed for Pain. 15 tablet 0    magnesium oxide (MAG-OX) 400 mg tablet Take 400 mg by mouth once daily.      mirtazapine (REMERON) 15 MG tablet Take 1 tablet (15 mg total) by mouth every evening. 30 tablet 0    multivitamin (THERAGRAN) tablet Take 1 tablet by mouth once daily.      tacrolimus (PROGRAF) 1 MG Cap Take 2 capsules (2 mg  total) by mouth every morning AND 1 capsule (1 mg total) every evening. 90 capsule 11    traMADol (ULTRAM) 50 mg tablet Take 50 mg by mouth every 6 (six) hours as needed for Pain.      vitamin D 1000 units Tab Take 2 tablets (2,000 Units total) by mouth once daily. 60 tablet 5    alendronate (FOSAMAX) 70 MG tablet Take 70 mg by mouth every 7 days. SATURDAYS      hepatitis A virus vaccine, PF, (HAVRIX) 1,440 Kimberlee unit/mL Susp Inject 1 mL into the muscle once.      QUEtiapine (SEROQUEL) 25 MG Tab Take 50 mg by mouth every evening.  3    valACYclovir (VALTREX) 1000 MG tablet Take 1 tablet (1,000 mg total) by mouth 3 (three) times daily. for 7 days 21 tablet 0       Past Surgical History:   Procedure Laterality Date    COLONOSCOPY N/A 6/14/2018    Procedure: COLONOSCOPY;  Surgeon: Conor Castro MD;  Location: [facility];  Service: Endoscopy;  Laterality: N/A;    DIRECT LARYNGOSCOPY N/A 2/20/2023    Procedure: LARYNGOSCOPY, DIRECT;  Surgeon: Alexandre Templeton MD;  Location: [address];  Service: ENT;  Laterality: N/A;    DISSECTION OF NECK Left 2/20/2023    Procedure: DISSECTION, NECK;  Surgeon: Alexandre Templeton MD;  Location: [address];  Service: ENT;  Laterality: Left;    ESOPHAGEAL VARICE LIGATION      ESOPHAGOGASTRODUODENOSCOPY N/A 6/14/2018    Procedure: ESOPHAGOGASTRODUODENOSCOPY (EGD);  Surgeon: Conor Castro MD;  Location: [facility];  Service: Endoscopy;  Laterality: N/A;    EVACUATION OF HEMATOMA Left 2/20/2023    Procedure: EVACUATION, HEMATOMA;  Surgeon: Alexandre Templeton MD;  [facility];  Service: ENT;  Laterality: Left;    LARYNGOSCOPY N/A 1/31/2023    Procedure: LARYNGOSCOPY;  Surgeon: Guzman Alfonso MD;  Location: [facility] OR [address] [place] [address];  Service: ENT;  Laterality: N/A;  [id number]    LIVER TRANSPLANT      NECK EXPLORATION Left 2/20/2023    Procedure: EXPLORATION, NECK;  Surgeon: Alexandre Templeton MD;  Location: [facility] OR [address] [place] [address];  Service: ENT;   Laterality: Left;    ROBOT-ASSISTED TRANSORAL SURGERY Left 2/20/2023    Procedure: ROBOTIC TRANSORAL SURGERY (TORS);  Surgeon: Alexandre Templeton MD;  Location: [facility] OR [address] [place] [address];  Service: ENT;  Laterality: Left;    TIPS PROCEDURE         Social History:  Tobacco Use: High Risk    Smoking Tobacco Use: Every Day    Smokeless Tobacco Use: Former    Passive Exposure: Not on file       Alcohol Use: Not on file       OBJECTIVE:     Vital Signs Range:  BMI Readings from Last 1 Encounters:   02/22/23 24.01 kg/m²       Temp:  [36.8 °C (98.3 °F)-37.2 °C (98.9 °F)]   Pulse:  [58-94]   Resp:  [0-25]   BP: ()/(54-81)   SpO2:  [98 %-100 %]        Significant Labs:        Component Value Date/Time    WBC 4.79 02/23/2023 0323    HGB 8.6 (L) 02/23/2023 0323    HCT 24 (L) 02/23/2023 0753    HCT 26.8 (L) 02/23/2023 0323    PLT 89 (L) 02/23/2023 0323     02/23/2023 0323    K 3.5 02/23/2023 0323     02/23/2023 0323    CO2 27 02/23/2023 0323     (H) 02/23/2023 0323    BUN 14 02/23/2023 0323    CREATININE 0.9 02/23/2023 0323    MG 1.5 (L) 02/23/2023 0323    PHOS 1.5 (L) 02/23/2023 0323    CALCIUM 8.4 (L) 02/23/2023 0323    ALBUMIN 2.8 (L) 02/23/2023 0323    PROT 5.1 (L) 02/23/2023 0323    ALKPHOS 41 (L) 02/23/2023 0323    BILITOT 0.4 02/23/2023 0323    AST 9 (L) 02/23/2023 0323    ALT 6 (L) 02/23/2023 0323    INR 1.0 02/23/2023 0323    HGBA1C 5.0 01/27/2022 1029        Please see Results Review for additional labs.     Diagnostic Studies: No relevant studies.    EKG:   Results for orders placed or performed during the hospital encounter of 02/15/23   EKG 12-lead    Collection Time: 02/15/23 10:23 AM    Narrative    Test Reason : Z01.818,    Vent. Rate : 060 BPM     Atrial Rate : 060 BPM     P-R Int : 184 ms          QRS Dur : 100 ms      QT Int : 414 ms       P-R-T Axes : 076 063 058 degrees     QTc Int : 414 ms    Normal sinus rhythm  Normal ECG  When compared with ECG of 13-MAR-2018 19:52,  No significant  change was found  Confirmed by BETY BULL MD ([id number]) on 2/15/2023 11:47:52 AM    Referred By: ROXANE MARKS           Confirmed By:BETY BULL MD       ECHO:  No results found for this or any previous visit.        ASSESSMENT/PLAN:         Pre-op Assessment    I have reviewed the Patient Summary Reports.     I have reviewed the Nursing Notes. I have reviewed the NPO Status.   I have reviewed the Medications.     Review of Systems  Anesthesia Hx:  No problems with previous Anesthesia  History of prior surgery of interest to airway management or planning: liver transplant. Denies Family Hx of Anesthesia complications.   Denies Personal Hx of Anesthesia complications.   Hematology/Oncology:  Hematology Normal      Current/Recent Cancer. --  Cancer in past history:    EENT/Dental:EENT/Dental Normal   Cardiovascular:   Hypertension    Pulmonary:   COPD    Renal/:  Renal/ Normal     Hepatic/GI:   GERD    Musculoskeletal:  Musculoskeletal Normal    Neurological:  Neurology Normal    Endocrine:  Endocrine Normal    Dermatological:  Skin Normal    Psych:  Psychiatric Normal           Physical Exam  General: Well nourished    Airway:  Pre-Existing Airway: Nasal Endotracheal        Anesthesia Plan  Type of Anesthesia, risks & benefits discussed:    Anesthesia Type: Gen ETT  Intra-op Monitoring Plan: Standard ASA Monitors  Post Op Pain Control Plan: multimodal analgesia and IV/PO Opioids PRN  Induction:  IV  Informed Consent: Informed consent signed with the Patient representative and all parties understand the risks and agree with anesthesia plan.  All questions answered.   ASA Score: 3  Day of Surgery Review of History & Physical: H&P Update referred to the surgeon/provider.    Ready For Surgery From Anesthesia Perspective.     .

## 2023-02-23 NOTE — ASSESSMENT & PLAN NOTE
Dereck Centeno is a 62 y.o. male with PMHx of HTN, emphysema, HCV s/p liver transplant in 2015, tobacco abuse (50+ pack years), oral cancer s/p surgery at [facility] in 2019 (no chemo/radiation), and newly diagnosed SCC of the left tonsil. He is now s/p left tonsillectomy and neck dissection by Dr. Templeton on 2/20. Post operative course was c/b development of large hematoma at surgical site. He was taken back to the OR for an emergent neck exploration and hematoma evacuation on 2/20.       Neuro/Psych:   -- Sedation: precedex gtt   -- Pain: home gabapentin, tylenol, PRN fentanyl   -- Psych: home clonazepam PRN, scheduled mirtazapine, seroquel, lexapro     Cards  -- HDS; not currently on pressors   -- Continue home amlodipine and clonidine   -- PRN labetalol and hydralazine   -- Continue home ASA and statin       Pulm:   -- H/o emphysema   -- Continue home inhaler once extubated; albuterol nebs PRN for wheezing   -- Goal O2 sat > 88%  -- Wean as able  -- Daily SBT       Renal:  -- Keep auguste for strict I/O  -- BUN/Cr 14/0.9  -- UOP 2L/24H      FEN / GI:   -- Net +2L/24H  -- Replace lytes as needed  -- Nutrition: NPO + TF  -- mIVF discontinued      ID:   -- Tm: afebrile; WBC 4.79  -- Abx: none       Heme/Onc:   -- H/H decreased to 8.6/26.8 from 9.7/31.2; increased bloody oral secretions but no other obvious signs of bleeding   -- H/o thrombocytopenia    -- Platelets slowly down trending since admission; 89 on 2/23   -- Continue to monitor   -- Daily CBC  -- Home ASA       Endo:   -- Gluc goal 140-180  -- SSI      Immuno:  -- H/o HCV infection s/p liver transplant in 2015  -- Hepatology consulted  -- Continue home tacrolimus  -- Monitor tacrolimus levels and INR daily   -- Valacyclovir daily per ENT      PPx:   Feeding: NPO + TF  Analgesia/Sedation: PRN fentanyl, home gabapentin, tylenol/ precedex gtt   Thromboembolic prevention: lovenox   HOB >30: yes   Stress Ulcer ppx: famotidine BID   Glucose control: Critical  care goal 140-180 g/dl, ISS    Lines/Drains/Airway: ETT, auguste, PIVx2, NG tube, neck drain x2      Dispo/Code Status/Palliative:   -- SICU / Full Code

## 2023-02-23 NOTE — PT/OT/SLP PROGRESS
Occupational Therapy      Patient Name:  Dereck Centeno   MRN:  [id number]    Patient not seen today secondary to  (hold per RN attempting to wean from ventilator). Will follow-up as appropriate.    2/23/2023

## 2023-02-24 ENCOUNTER — ANESTHESIA (OUTPATIENT)
Dept: SURGERY | Facility: HOSPITAL | Age: 63
DRG: 003 | End: 2023-02-24
Payer: MEDICAID

## 2023-02-24 LAB
ABO + RH BLD: NORMAL
ALBUMIN SERPL BCP-MCNC: 2.8 G/DL (ref 3.5–5.2)
ALLENS TEST: ABNORMAL
ALLENS TEST: ABNORMAL
ALP SERPL-CCNC: 41 U/L (ref 55–135)
ALT SERPL W/O P-5'-P-CCNC: 7 U/L (ref 10–44)
ANION GAP SERPL CALC-SCNC: 8 MMOL/L (ref 8–16)
AST SERPL-CCNC: 12 U/L (ref 10–40)
BASOPHILS # BLD AUTO: 0.01 K/UL (ref 0–0.2)
BASOPHILS NFR BLD: 0.2 % (ref 0–1.9)
BILIRUB SERPL-MCNC: 0.5 MG/DL (ref 0.1–1)
BLD GP AB SCN CELLS X3 SERPL QL: NORMAL
BUN SERPL-MCNC: 15 MG/DL (ref 8–23)
CA-I BLDV-SCNC: 1.12 MMOL/L (ref 1.06–1.42)
CALCIUM SERPL-MCNC: 8.7 MG/DL (ref 8.7–10.5)
CHLORIDE SERPL-SCNC: 108 MMOL/L (ref 95–110)
CO2 SERPL-SCNC: 25 MMOL/L (ref 23–29)
CREAT SERPL-MCNC: 0.9 MG/DL (ref 0.5–1.4)
DELSYS: ABNORMAL
DELSYS: ABNORMAL
DIFFERENTIAL METHOD: ABNORMAL
EOSINOPHIL # BLD AUTO: 0.2 K/UL (ref 0–0.5)
EOSINOPHIL NFR BLD: 3.4 % (ref 0–8)
ERYTHROCYTE [DISTWIDTH] IN BLOOD BY AUTOMATED COUNT: 13 % (ref 11.5–14.5)
ERYTHROCYTE [SEDIMENTATION RATE] IN BLOOD BY WESTERGREN METHOD: 15 MM/H
ERYTHROCYTE [SEDIMENTATION RATE] IN BLOOD BY WESTERGREN METHOD: 15 MM/H
EST. GFR  (NO RACE VARIABLE): >60 ML/MIN/1.73 M^2
FIO2: 40
FIO2: 40
GLUCOSE SERPL-MCNC: 105 MG/DL (ref 70–110)
HCO3 UR-SCNC: 26.8 MMOL/L (ref 24–28)
HCO3 UR-SCNC: 28.4 MMOL/L (ref 24–28)
HCT VFR BLD AUTO: 27.9 % (ref 40–54)
HCT VFR BLD CALC: 21 %PCV (ref 36–54)
HCT VFR BLD CALC: 22 %PCV (ref 36–54)
HGB BLD-MCNC: 8.8 G/DL (ref 14–18)
IMM GRANULOCYTES # BLD AUTO: 0.03 K/UL (ref 0–0.04)
IMM GRANULOCYTES NFR BLD AUTO: 0.6 % (ref 0–0.5)
INR PPP: 0.9 (ref 0.8–1.2)
LYMPHOCYTES # BLD AUTO: 0.7 K/UL (ref 1–4.8)
LYMPHOCYTES NFR BLD: 14.6 % (ref 18–48)
MAGNESIUM SERPL-MCNC: 2.8 MG/DL (ref 1.6–2.6)
MCH RBC QN AUTO: 30.7 PG (ref 27–31)
MCHC RBC AUTO-ENTMCNC: 31.5 G/DL (ref 32–36)
MCV RBC AUTO: 97 FL (ref 82–98)
MODE: ABNORMAL
MODE: ABNORMAL
MONOCYTES # BLD AUTO: 0.4 K/UL (ref 0.3–1)
MONOCYTES NFR BLD: 7.9 % (ref 4–15)
NEUTROPHILS # BLD AUTO: 3.7 K/UL (ref 1.8–7.7)
NEUTROPHILS NFR BLD: 73.3 % (ref 38–73)
NRBC BLD-RTO: 0 /100 WBC
PCO2 BLDA: 27.9 MMHG (ref 35–45)
PCO2 BLDA: 36.4 MMHG (ref 35–45)
PEEP: 5
PEEP: 5
PH SMN: 7.5 [PH] (ref 7.35–7.45)
PH SMN: 7.59 [PH] (ref 7.35–7.45)
PHOSPHATE SERPL-MCNC: 2.4 MG/DL (ref 2.7–4.5)
PLATELET # BLD AUTO: 107 K/UL (ref 150–450)
PMV BLD AUTO: 10.1 FL (ref 9.2–12.9)
PO2 BLDA: 102 MMHG (ref 80–100)
PO2 BLDA: 109 MMHG (ref 80–100)
POC BE: 5 MMOL/L
POC BE: 5 MMOL/L
POC SATURATED O2: 98 % (ref 95–100)
POC SATURATED O2: 99 % (ref 95–100)
POC TCO2: 28 MMOL/L (ref 23–27)
POC TCO2: 29 MMOL/L (ref 23–27)
POCT GLUCOSE: 109 MG/DL (ref 70–110)
POCT GLUCOSE: 131 MG/DL (ref 70–110)
POTASSIUM SERPL-SCNC: 4.4 MMOL/L (ref 3.5–5.1)
PROT SERPL-MCNC: 5.5 G/DL (ref 6–8.4)
PROTHROMBIN TIME: 9.8 SEC (ref 9–12.5)
RBC # BLD AUTO: 2.87 M/UL (ref 4.6–6.2)
SAMPLE: ABNORMAL
SAMPLE: ABNORMAL
SITE: ABNORMAL
SITE: ABNORMAL
SODIUM SERPL-SCNC: 141 MMOL/L (ref 136–145)
TACROLIMUS BLD-MCNC: 3.3 NG/ML (ref 5–15)
VT: 480
VT: 480
WBC # BLD AUTO: 5.06 K/UL (ref 3.9–12.7)

## 2023-02-24 PROCEDURE — 25000003 PHARM REV CODE 250: Performed by: STUDENT IN AN ORGANIZED HEALTH CARE EDUCATION/TRAINING PROGRAM

## 2023-02-24 PROCEDURE — 76937 US GUIDE VASCULAR ACCESS: CPT

## 2023-02-24 PROCEDURE — 94640 AIRWAY INHALATION TREATMENT: CPT

## 2023-02-24 PROCEDURE — 20000000 HC ICU ROOM

## 2023-02-24 PROCEDURE — 85610 PROTHROMBIN TIME: CPT | Performed by: STUDENT IN AN ORGANIZED HEALTH CARE EDUCATION/TRAINING PROGRAM

## 2023-02-24 PROCEDURE — 63600175 PHARM REV CODE 636 W HCPCS

## 2023-02-24 PROCEDURE — C1751 CATH, INF, PER/CENT/MIDLINE: HCPCS

## 2023-02-24 PROCEDURE — 99900035 HC TECH TIME PER 15 MIN (STAT)

## 2023-02-24 PROCEDURE — 31600 PLANNED TRACHEOSTOMY: CPT | Mod: ,,, | Performed by: OTOLARYNGOLOGY

## 2023-02-24 PROCEDURE — D9220A PRA ANESTHESIA: ICD-10-PCS | Mod: ANES,,, | Performed by: STUDENT IN AN ORGANIZED HEALTH CARE EDUCATION/TRAINING PROGRAM

## 2023-02-24 PROCEDURE — 99233 PR SUBSEQUENT HOSPITAL CARE,LEVL III: ICD-10-PCS | Mod: ,,, | Performed by: ANESTHESIOLOGY

## 2023-02-24 PROCEDURE — 36000707: Performed by: OTOLARYNGOLOGY

## 2023-02-24 PROCEDURE — 27201112

## 2023-02-24 PROCEDURE — 84100 ASSAY OF PHOSPHORUS: CPT

## 2023-02-24 PROCEDURE — 63600175 PHARM REV CODE 636 W HCPCS: Performed by: STUDENT IN AN ORGANIZED HEALTH CARE EDUCATION/TRAINING PROGRAM

## 2023-02-24 PROCEDURE — 82803 BLOOD GASES ANY COMBINATION: CPT

## 2023-02-24 PROCEDURE — 25000003 PHARM REV CODE 250: Performed by: OTOLARYNGOLOGY

## 2023-02-24 PROCEDURE — D9220A PRA ANESTHESIA: Mod: CRNA,,, | Performed by: STUDENT IN AN ORGANIZED HEALTH CARE EDUCATION/TRAINING PROGRAM

## 2023-02-24 PROCEDURE — D9220A PRA ANESTHESIA: Mod: ANES,,, | Performed by: STUDENT IN AN ORGANIZED HEALTH CARE EDUCATION/TRAINING PROGRAM

## 2023-02-24 PROCEDURE — 25000242 PHARM REV CODE 250 ALT 637 W/ HCPCS: Performed by: STUDENT IN AN ORGANIZED HEALTH CARE EDUCATION/TRAINING PROGRAM

## 2023-02-24 PROCEDURE — 94003 VENT MGMT INPAT SUBQ DAY: CPT

## 2023-02-24 PROCEDURE — 31600 PR TRACHEOSTOMY, PLANNED: ICD-10-PCS | Mod: ,,, | Performed by: OTOLARYNGOLOGY

## 2023-02-24 PROCEDURE — 85025 COMPLETE CBC W/AUTO DIFF WBC: CPT

## 2023-02-24 PROCEDURE — 80053 COMPREHEN METABOLIC PANEL: CPT

## 2023-02-24 PROCEDURE — 86900 BLOOD TYPING SEROLOGIC ABO: CPT | Performed by: OTOLARYNGOLOGY

## 2023-02-24 PROCEDURE — 36410 VNPNXR 3YR/> PHY/QHP DX/THER: CPT

## 2023-02-24 PROCEDURE — 37000009 HC ANESTHESIA EA ADD 15 MINS: Performed by: OTOLARYNGOLOGY

## 2023-02-24 PROCEDURE — 25000003 PHARM REV CODE 250

## 2023-02-24 PROCEDURE — 80197 ASSAY OF TACROLIMUS: CPT

## 2023-02-24 PROCEDURE — 99900026 HC AIRWAY MAINTENANCE (STAT)

## 2023-02-24 PROCEDURE — 94761 N-INVAS EAR/PLS OXIMETRY MLT: CPT

## 2023-02-24 PROCEDURE — 99233 SBSQ HOSP IP/OBS HIGH 50: CPT | Mod: ,,, | Performed by: ANESTHESIOLOGY

## 2023-02-24 PROCEDURE — 27201423 OPTIME MED/SURG SUP & DEVICES STERILE SUPPLY: Performed by: OTOLARYNGOLOGY

## 2023-02-24 PROCEDURE — 36000706: Performed by: OTOLARYNGOLOGY

## 2023-02-24 PROCEDURE — D9220A PRA ANESTHESIA: ICD-10-PCS | Mod: CRNA,,, | Performed by: STUDENT IN AN ORGANIZED HEALTH CARE EDUCATION/TRAINING PROGRAM

## 2023-02-24 PROCEDURE — 27200966 HC CLOSED SUCTION SYSTEM

## 2023-02-24 PROCEDURE — 83735 ASSAY OF MAGNESIUM: CPT

## 2023-02-24 PROCEDURE — 36600 WITHDRAWAL OF ARTERIAL BLOOD: CPT

## 2023-02-24 PROCEDURE — 37000008 HC ANESTHESIA 1ST 15 MINUTES: Performed by: OTOLARYNGOLOGY

## 2023-02-24 PROCEDURE — 27000221 HC OXYGEN, UP TO 24 HOURS

## 2023-02-24 RX ORDER — DEXAMETHASONE SODIUM PHOSPHATE 4 MG/ML
INJECTION, SOLUTION INTRA-ARTICULAR; INTRALESIONAL; INTRAMUSCULAR; INTRAVENOUS; SOFT TISSUE
Status: DISCONTINUED | OUTPATIENT
Start: 2023-02-24 | End: 2023-02-24

## 2023-02-24 RX ORDER — ONDANSETRON 2 MG/ML
INJECTION INTRAMUSCULAR; INTRAVENOUS
Status: DISCONTINUED | OUTPATIENT
Start: 2023-02-24 | End: 2023-02-24

## 2023-02-24 RX ORDER — FENTANYL CITRATE 50 UG/ML
INJECTION, SOLUTION INTRAMUSCULAR; INTRAVENOUS
Status: DISCONTINUED | OUTPATIENT
Start: 2023-02-24 | End: 2023-02-24

## 2023-02-24 RX ORDER — MIDAZOLAM HYDROCHLORIDE 1 MG/ML
INJECTION INTRAMUSCULAR; INTRAVENOUS
Status: DISCONTINUED | OUTPATIENT
Start: 2023-02-24 | End: 2023-02-24

## 2023-02-24 RX ORDER — ROCURONIUM BROMIDE 10 MG/ML
INJECTION, SOLUTION INTRAVENOUS
Status: DISCONTINUED | OUTPATIENT
Start: 2023-02-24 | End: 2023-02-24

## 2023-02-24 RX ORDER — IPRATROPIUM BROMIDE 0.5 MG/2.5ML
0.5 SOLUTION RESPIRATORY (INHALATION) EVERY 8 HOURS
Status: DISCONTINUED | OUTPATIENT
Start: 2023-02-24 | End: 2023-03-10 | Stop reason: HOSPADM

## 2023-02-24 RX ORDER — LIDOCAINE HYDROCHLORIDE AND EPINEPHRINE 10; 10 MG/ML; UG/ML
INJECTION, SOLUTION INFILTRATION; PERINEURAL
Status: DISCONTINUED | OUTPATIENT
Start: 2023-02-24 | End: 2023-02-24 | Stop reason: HOSPADM

## 2023-02-24 RX ORDER — LEVALBUTEROL INHALATION SOLUTION 0.63 MG/3ML
0.63 SOLUTION RESPIRATORY (INHALATION) EVERY 8 HOURS
Status: DISCONTINUED | OUTPATIENT
Start: 2023-02-24 | End: 2023-03-10 | Stop reason: HOSPADM

## 2023-02-24 RX ORDER — PHENYLEPHRINE HYDROCHLORIDE 10 MG/ML
INJECTION INTRAVENOUS
Status: DISCONTINUED | OUTPATIENT
Start: 2023-02-24 | End: 2023-02-24

## 2023-02-24 RX ADMIN — IPRATROPIUM BROMIDE 0.5 MG: 0.5 SOLUTION RESPIRATORY (INHALATION) at 09:02

## 2023-02-24 RX ADMIN — MIRTAZAPINE 15 MG: 15 TABLET, FILM COATED ORAL at 09:02

## 2023-02-24 RX ADMIN — ACETAMINOPHEN 650 MG: 325 TABLET ORAL at 09:02

## 2023-02-24 RX ADMIN — LEVALBUTEROL HYDROCHLORIDE 0.63 MG: 0.63 SOLUTION RESPIRATORY (INHALATION) at 04:02

## 2023-02-24 RX ADMIN — POTASSIUM CHLORIDE 10 MEQ: 7.46 INJECTION, SOLUTION INTRAVENOUS at 05:02

## 2023-02-24 RX ADMIN — CLONIDINE HYDROCHLORIDE 0.1 MG: 0.1 TABLET ORAL at 09:02

## 2023-02-24 RX ADMIN — POTASSIUM CHLORIDE 10 MEQ: 7.46 INJECTION, SOLUTION INTRAVENOUS at 12:02

## 2023-02-24 RX ADMIN — PHENYLEPHRINE HYDROCHLORIDE 40 MCG: 10 INJECTION INTRAVENOUS at 03:02

## 2023-02-24 RX ADMIN — IPRATROPIUM BROMIDE 0.5 MG: 0.5 SOLUTION RESPIRATORY (INHALATION) at 04:02

## 2023-02-24 RX ADMIN — GABAPENTIN 600 MG: 300 CAPSULE ORAL at 09:02

## 2023-02-24 RX ADMIN — TACROLIMUS 1 MG: 1 CAPSULE ORAL at 05:02

## 2023-02-24 RX ADMIN — FENTANYL CITRATE 50 MCG: 50 INJECTION INTRAMUSCULAR; INTRAVENOUS at 03:02

## 2023-02-24 RX ADMIN — ATORVASTATIN CALCIUM 40 MG: 40 TABLET, FILM COATED ORAL at 09:02

## 2023-02-24 RX ADMIN — AMLODIPINE BESYLATE 5 MG: 5 TABLET ORAL at 09:02

## 2023-02-24 RX ADMIN — POTASSIUM CHLORIDE 10 MEQ: 7.46 INJECTION, SOLUTION INTRAVENOUS at 02:02

## 2023-02-24 RX ADMIN — MAGNESIUM SULFATE 2 G: 2 INJECTION INTRAVENOUS at 02:02

## 2023-02-24 RX ADMIN — POTASSIUM CHLORIDE 10 MEQ: 7.46 INJECTION, SOLUTION INTRAVENOUS at 04:02

## 2023-02-24 RX ADMIN — ENOXAPARIN SODIUM 40 MG: 40 INJECTION SUBCUTANEOUS at 04:02

## 2023-02-24 RX ADMIN — SENNOSIDES 5 ML: 8.8 SYRUP ORAL at 09:02

## 2023-02-24 RX ADMIN — ROCURONIUM BROMIDE 30 MG: 10 INJECTION INTRAVENOUS at 03:02

## 2023-02-24 RX ADMIN — ESCITALOPRAM OXALATE 20 MG: 20 TABLET ORAL at 09:02

## 2023-02-24 RX ADMIN — DEXMEDETOMIDINE HYDROCHLORIDE 0.05 MCG/KG/HR: 4 INJECTION, SOLUTION INTRAVENOUS at 02:02

## 2023-02-24 RX ADMIN — MAGNESIUM SULFATE 2 G: 2 INJECTION INTRAVENOUS at 12:02

## 2023-02-24 RX ADMIN — LEVALBUTEROL HYDROCHLORIDE 0.63 MG: 0.63 SOLUTION RESPIRATORY (INHALATION) at 09:02

## 2023-02-24 RX ADMIN — CALCIUM GLUCONATE 1 G: 20 INJECTION, SOLUTION INTRAVENOUS at 02:02

## 2023-02-24 RX ADMIN — PROPOFOL 35 MCG/KG/MIN: 10 INJECTION, EMULSION INTRAVENOUS at 11:02

## 2023-02-24 RX ADMIN — FAMOTIDINE 20 MG: 20 TABLET ORAL at 09:02

## 2023-02-24 RX ADMIN — POLYETHYLENE GLYCOL 3350 17 G: 17 POWDER, FOR SOLUTION ORAL at 08:02

## 2023-02-24 RX ADMIN — PROPOFOL 35 MCG/KG/MIN: 10 INJECTION, EMULSION INTRAVENOUS at 05:02

## 2023-02-24 RX ADMIN — MIDAZOLAM HYDROCHLORIDE 2 MG: 1 INJECTION, SOLUTION INTRAMUSCULAR; INTRAVENOUS at 03:02

## 2023-02-24 RX ADMIN — ACETAMINOPHEN 650 MG: 325 TABLET ORAL at 02:02

## 2023-02-24 RX ADMIN — DEXAMETHASONE SODIUM PHOSPHATE 4 MG: 4 INJECTION, SOLUTION INTRAMUSCULAR; INTRAVENOUS at 03:02

## 2023-02-24 RX ADMIN — QUETIAPINE FUMARATE 50 MG: 25 TABLET ORAL at 09:02

## 2023-02-24 RX ADMIN — FENTANYL CITRATE 50 MCG: 50 INJECTION INTRAMUSCULAR; INTRAVENOUS at 02:02

## 2023-02-24 RX ADMIN — ONDANSETRON 4 MG: 2 INJECTION INTRAMUSCULAR; INTRAVENOUS at 03:02

## 2023-02-24 RX ADMIN — FENTANYL CITRATE 50 MCG: 50 INJECTION INTRAMUSCULAR; INTRAVENOUS at 04:02

## 2023-02-24 RX ADMIN — FENTANYL CITRATE 100 MCG: 50 INJECTION, SOLUTION INTRAMUSCULAR; INTRAVENOUS at 03:02

## 2023-02-24 RX ADMIN — TACROLIMUS 2 MG: 1 CAPSULE ORAL at 08:02

## 2023-02-24 RX ADMIN — GABAPENTIN 600 MG: 300 CAPSULE ORAL at 08:02

## 2023-02-24 RX ADMIN — POTASSIUM CHLORIDE 10 MEQ: 7.46 INJECTION, SOLUTION INTRAVENOUS at 01:02

## 2023-02-24 RX ADMIN — SODIUM CHLORIDE, SODIUM GLUCONATE, SODIUM ACETATE, POTASSIUM CHLORIDE, MAGNESIUM CHLORIDE, SODIUM PHOSPHATE, DIBASIC, AND POTASSIUM PHOSPHATE: .53; .5; .37; .037; .03; .012; .00082 INJECTION, SOLUTION INTRAVENOUS at 03:02

## 2023-02-24 RX ADMIN — SODIUM PHOSPHATE, MONOBASIC, MONOHYDRATE AND SODIUM PHOSPHATE, DIBASIC, ANHYDROUS 15 MMOL: 142; 276 INJECTION, SOLUTION INTRAVENOUS at 05:02

## 2023-02-24 RX ADMIN — MUPIROCIN: 20 OINTMENT TOPICAL at 08:02

## 2023-02-24 RX ADMIN — GABAPENTIN 600 MG: 300 CAPSULE ORAL at 02:02

## 2023-02-24 RX ADMIN — FAMOTIDINE 20 MG: 20 TABLET ORAL at 08:02

## 2023-02-24 RX ADMIN — MUPIROCIN 1 G: 20 OINTMENT TOPICAL at 09:02

## 2023-02-24 RX ADMIN — ACETAMINOPHEN 650 MG: 325 TABLET ORAL at 05:02

## 2023-02-24 RX ADMIN — FENTANYL CITRATE 50 MCG: 50 INJECTION INTRAMUSCULAR; INTRAVENOUS at 09:02

## 2023-02-24 NOTE — PLAN OF CARE
"      SICU PLAN OF CARE NOTE    Dx: Squamous cell carcinoma of left tonsil    Shift Events: Pt to OR for trach creation    Goals of Care: MAP>65, SBP<180    Neuro: Arouses to Voice, Follows Commands, and Moves All Extremities      Vital Signs: /60 (BP Location: Left arm, Patient Position: Lying)   Pulse 63   Temp 98 °F (36.7 °C) (Oral)   Resp 16   Ht 5' 10" (1.778 m)   Wt 75.9 kg (167 lb 5.3 oz)   SpO2 97%   BMI 24.01 kg/m²     Respiratory: Ventilator 40%/5 Peep    Diet: NPO and Tube Feeds    Gtts: Propfol and Precedex    Urine Output: Urinary Catheter 1235 cc/shift    Drains: ENMANUEL Drain, total output 10 cc / shift    Restraints   Checked Q2hr for breakdown, none noted.      Labs/Accuchecks: Daily Labs, Accuchecks Q6.    Skin: No new skin breakdown noted.Specialty bed plugged in and working. Neck incision previously documented, LENORA, CDI. Weight shifting assisstance provided Q2hr. Foams placed to heels and sacrum for addition breakdown prevention.     PoC reviewed with pt/family. All questions/concerns addressed. VSS See flowsheets for full assessment.        "

## 2023-02-24 NOTE — TRANSFER OF CARE
"Anesthesia Transfer of Care Note    Patient: Dereck Centeno    Procedure(s) Performed: Procedure(s) (LRB):  CREATION, TRACHEOSTOMY (N/A)    Patient location: ICU    Anesthesia Type: general    Transport from OR: Transported from OR intubated on 100% O2 by AMBU with adequate controlled ventilation    Post pain: adequate analgesia    Post assessment: no apparent anesthetic complications and tolerated procedure well    Post vital signs: stable    Level of consciousness: sedated    Nausea/Vomiting: no nausea/vomiting    Complications: none    Transfer of care protocol was followedComments: Patient transported to 10 floor ICU -trached on 10L O2 via ambu bag/assisted ventilation. Patient connected to ventilator upon arrival to ICU room. Report given to ICU nurse and care assumed. Vitals stable.       Last vitals:   Visit Vitals  BP (!) 166/81   Pulse 60   Temp 36.7 °C (98 °F) (Oral)   Resp 15   Ht 5' 10" (1.778 m)   Wt 75.9 kg (167 lb 5.3 oz)   SpO2 100%   BMI 24.01 kg/m²     "

## 2023-02-24 NOTE — PROGRESS NOTES
[facility]  Critical Care - Surgery  Progress Note    Patient Name: Dereck Centeno  MRN: [id number]  Admission Date: 2/20/2023  Hospital Length of Stay: 4 days  Code Status: Full Code  Attending Provider: Alexandre Templeton MD  Primary Care Provider: Eva Reynaga MD   Principal Problem: Squamous cell carcinoma of left tonsil    Subjective:     Hospital/ICU Course:  No notes on file    Interval History/Significant Events: 500 cc LR overnight for low urine output.  Tube feeds held at midnight. Planning to hold lovenox and tube feeds today in preparation for OR today for trach placement.      Follow-up For: Procedure(s) (LRB):  EXPLORATION, NECK (Left)  EVACUATION, HEMATOMA (Left)    Post-Operative Day: 4 Days Post-Op    Objective:     Vital Signs (Most Recent):  Temp: 98 °F (36.7 °C) (02/24/23 0400)  Pulse: 69 (02/24/23 0600)  Resp: 15 (02/24/23 0600)  BP: 133/73 (02/24/23 0600)  SpO2: 99 % (02/24/23 0600)   Vital Signs (24h Range):  Temp:  [97.9 °F (36.6 °C)-98.9 °F (37.2 °C)] 98 °F (36.7 °C)  Pulse:  [54-94] 69  Resp:  [0-33] 15  SpO2:  [94 %-100 %] 99 %  BP: ()/(50-91) 133/73     Weight: 75.9 kg (167 lb 5.3 oz)  Body mass index is 24.01 kg/m².      Intake/Output Summary (Last 24 hours) at 2/24/2023 0611  Last data filed at 2/24/2023 0600  Gross per 24 hour   Intake 4122.33 ml   Output 2051 ml   Net 2071.33 ml       Physical Exam  Vitals and nursing note reviewed.   Constitutional:       Appearance: Normal appearance.      Interventions: He is sedated, intubated and restrained.   HENT:      Head: Normocephalic and atraumatic.      Nose:      Comments: NG tube sutured in place   Eyes:      Conjunctiva/sclera: Conjunctivae normal.      Pupils: Pupils are equal, round, and reactive to light.   Neck:      Comments: ENMANUEL drain with SS on left side of neck   Surgical incision clean and dry   Cardiovascular:      Rate and Rhythm: Normal rate and regular rhythm.      Pulses: Normal pulses.       Heart sounds: Normal heart sounds.   Pulmonary:      Effort: Pulmonary effort is normal. No respiratory distress. He is intubated.      Comments: ETT in place   Abdominal:      General: Abdomen is flat. Bowel sounds are normal.      Palpations: Abdomen is soft.   Skin:     General: Skin is warm.       Vents:  Vent Mode: A/C (02/24/23 0600)  Ventilator Initiated: Yes (02/20/23 1443)  Set Rate: 15 BPM (02/24/23 0600)  Vt Set: 480 mL (02/24/23 0600)  Pressure Support: 5 cmH20 (02/23/23 0424)  PEEP/CPAP: 5 cmH20 (02/24/23 0600)  Oxygen Concentration (%): 40 (02/24/23 0600)  Peak Airway Pressure: 20 cmH20 (02/24/23 0600)  Plateau Pressure: 12 cmH20 (02/24/23 0600)  Total Ve: 7.38 L/m (02/24/23 0600)  Negative Inspiratory Force (cm H2O): -25 (02/24/23 0600)  F/VT Ratio<105 (RSBI): (!) 30.61 (02/24/23 0600)    Lines/Drains/Airways       Drain  Duration                  Closed/Suction Drain 02/20/23 Left Neck Bulb 19 Fr. 4 days         Trans Pyloric Feeding Tube 02/20/23 1320 orogastric 12 Fr. 3 days         Urethral Catheter 02/20/23 0945 Non-latex 16 Fr. 3 days              Airway  Duration                  Airway - Non-Surgical 02/20/23 0926 Nasal Janneth 3 days              Peripheral Intravenous Line  Duration                  Peripheral IV - Single Lumen 02/20/23 0726 20 G Left;Posterior Forearm 3 days         Peripheral IV - Single Lumen 02/24/23 0430 18 G Anterior;Right Wrist <1 day                    Significant Labs:    CBC/Anemia Profile:  Recent Labs   Lab 02/23/23  0323 02/23/23  0325 02/23/23  0753 02/24/23  0404 02/24/23  0414   WBC 4.79  --   --   --  5.06   HGB 8.6*  --   --   --  8.8*   HCT 26.8*   < > 24* 21* 27.9*   PLT 89*  --   --   --  107*   MCV 94  --   --   --  97   RDW 13.2  --   --   --  13.0    < > = values in this interval not displayed.        Chemistries:  Recent Labs   Lab 02/23/23  0323 02/23/23  2135 02/24/23  0414    143 141   K 3.5 3.3* 4.4    115* 108   CO2 27 22* 25   BUN  14 14 15   CREATININE 0.9 0.8 0.9   CALCIUM 8.4* 6.9* 8.7   ALBUMIN 2.8*  --  2.8*   PROT 5.1*  --  5.5*   BILITOT 0.4  --  0.5   ALKPHOS 41*  --  41*   ALT 6*  --  7*   AST 9*  --  12   MG 1.5* 1.4* 2.8*   PHOS 1.5*  --  2.4*       Recent Lab Results  (Last 5 results in the past 24 hours)        02/24/23  0414   02/24/23  0404   02/23/23  2354   02/23/23  2245   02/23/23  2135        Albumin 2.8               Alkaline Phosphatase 41               Allens Test   Pass             ALT 7               Anion Gap 8         6       AST 12               Baso # 0.01               Basophil % 0.2               BILIRUBIN TOTAL 0.5  Comment: For infants and newborns, interpretation of results should be based  on gestational age, weight and in agreement with clinical  observations.    Premature Infant recommended reference ranges:  Up to 24 hours.............<8.0 mg/dL  Up to 48 hours............<12.0 mg/dL  3-5 days..................<15.0 mg/dL  6-29 days.................<15.0 mg/dL                 Site   LR             BUN 15         14       Calcium 8.7         6.9  Comment: *Critical value notification by RP with confirmation of receipt to   Zoraida Noble RN at  [id number]         Ionized Calcium       1.12         Chloride 108         115       CO2 25         22       Creatinine 0.9         0.8       DelStack Exchanges   Adult Vent             Differential Method Automated               eGFR >60.0         >60.0       Eos # 0.2               Eosinophil % 3.4               FiO2   40             Glucose 105         101       Gran # (ANC) 3.7               Gran % 73.3               Hematocrit 27.9               Hemoglobin 8.8               Immature Grans (Abs) 0.03  Comment: Mild elevation in immature granulocytes is non specific and   can be seen in a variety of conditions including stress response,   acute inflammation, trauma and pregnancy. Correlation with other   laboratory and clinical findings is essential.                  Immature Granulocytes 0.6               INR 0.9  Comment: Coumadin Therapy:  2.0 - 3.0 for INR for all indicators except mechanical heart valves  and antiphospholipid syndromes which should use 2.5 - 3.5.                 Lymph # 0.7               Lymph % 14.6               Magnesium 2.8         1.4       MCH 30.7               MCHC 31.5               MCV 97               Mode   AC/PRVC             Mono # 0.4               Mono % 7.9               MPV 10.1               nRBC 0               PEEP   5             Phosphorus 2.4               Platelets 107               POC BE   5             POC HCO3   26.8             POC Hematocrit   21             POC PCO2   27.9             POC PH   7.590             POC PO2   109             POC SATURATED O2   99             POC TCO2   28             POCT Glucose     107           Potassium 4.4         3.3       PROTEIN TOTAL 5.5               Protime 9.8               Rate   15             RBC 2.87               RDW 13.0               Sample   ARTERIAL             Sodium 141         143       Vt   480             WBC 5.06                                      Significant Imaging:  I have reviewed all pertinent imaging results/findings within the past 24 hours.    Assessment/Plan:     Oncology  * Squamous cell carcinoma of left tonsil  Dereck Centeno is a 62 y.o. male with PMHx of HTN, emphysema, HCV s/p liver transplant in 2015, tobacco abuse (50+ pack years), oral cancer s/p surgery at [facility] in 2019 (no chemo/radiation), and newly diagnosed SCC of the left tonsil. He is now s/p left tonsillectomy and neck dissection by Dr. Templeton on 2/20. Post operative course was c/b development of large hematoma at surgical site. He was taken back to the OR for an emergent neck exploration and hematoma evacuation on 2/20.       Neuro/Psych:   -- Sedation: precedex gtt and propofol gtt  -- Pain: home gabapentin, tylenol, PRN fentanyl   -- Psych: home clonazepam PRN, scheduled mirtazapine,  seroquel, lexapro     Cards  -- HDS; not currently on pressors   -- Continue home amlodipine and clonidine   -- PRN labetalol and hydralazine   -- Continue home ASA and statin       Pulm:   -- H/o emphysema   -- Continue home inhaler once extubated; albuterol nebs PRN for wheezing   -- Goal O2 sat > 88%  -- Wean as able  -- Daily SBT       Renal:  -- Keep auguste for strict I/O  -- BUN/Cr 15/0.9  -- UOP 1.8L/24H      FEN / GI:   -- Net +2.7L/24H  -- Replace lytes as needed  -- Nutrition: NPO + TF, holding feeds for surgery today  -- mIVF discontinued      ID:   -- Tm: afebrile; WBC 5  -- Abx: none       Heme/Onc:   -- H/H decreased to 8.8/27.9 from 8.6/26.8; increased bloody oral secretions but no other obvious signs of bleeding   -- H/o thrombocytopenia    -- Platelets slowly down trending since admission; 107 from 89 on 2/23   -- Continue to monitor   -- Daily CBC  -- Home ASA held       Endo:   -- Gluc goal 140-180  -- SSI      Immuno:  -- H/o HCV infection s/p liver transplant in 2015  -- Hepatology consulted  -- Continue home tacrolimus  -- Monitor tacrolimus levels(goal 3-4) and INR daily       PPx:   Feeding: NPO + TF  Analgesia/Sedation: PRN fentanyl, home gabapentin, tylenol/ precedex gtt   Thromboembolic prevention: lovenox   HOB >30: yes   Stress Ulcer ppx: famotidine BID   Glucose control: Critical care goal 140-180 g/dl, ISS    Lines/Drains/Airway: ETT, auguste, PIVx2, NG tube, neck drain x2, Midline placement pending      Dispo/Code Status/Palliative:   -- SICU / Full Code         Critical care was time spent personally by me on the following activities: development of treatment plan with patient or surrogate and bedside caregivers, discussions with consultants, evaluation of patient's response to treatment, examination of patient, ordering and performing treatments and interventions, ordering and review of laboratory studies, ordering and review of radiographic studies, pulse oximetry, re-evaluation of  patient's condition.  This critical care time did not overlap with that of any other provider or involve time for any procedures.     Pb Wheeler DO  Critical Care - Surgery  [facility]

## 2023-02-24 NOTE — PT/OT/SLP PROGRESS
Physical Therapy      Patient Name:  Dereck Centeno   MRN:  [id number]    Patient not seen today. Pt intubated and preparing to go to the OR today for trach placement. Will follow-up when appropriate.

## 2023-02-24 NOTE — SUBJECTIVE & OBJECTIVE
Interval History/Significant Events: 500 cc LR overnight for low urine output.  Tube feeds held at midnight. Planning to hold lovenox and tube feeds today in preparation for OR today.      Follow-up For: Procedure(s) (LRB):  EXPLORATION, NECK (Left)  EVACUATION, HEMATOMA (Left)    Post-Operative Day: 4 Days Post-Op    Objective:     Vital Signs (Most Recent):  Temp: 98 °F (36.7 °C) (02/24/23 0400)  Pulse: 69 (02/24/23 0600)  Resp: 15 (02/24/23 0600)  BP: 133/73 (02/24/23 0600)  SpO2: 99 % (02/24/23 0600)   Vital Signs (24h Range):  Temp:  [97.9 °F (36.6 °C)-98.9 °F (37.2 °C)] 98 °F (36.7 °C)  Pulse:  [54-94] 69  Resp:  [0-33] 15  SpO2:  [94 %-100 %] 99 %  BP: ()/(50-91) 133/73     Weight: 75.9 kg (167 lb 5.3 oz)  Body mass index is 24.01 kg/m².      Intake/Output Summary (Last 24 hours) at 2/24/2023 0611  Last data filed at 2/24/2023 0600  Gross per 24 hour   Intake 4122.33 ml   Output 2051 ml   Net 2071.33 ml       Physical Exam  Vitals and nursing note reviewed.   Constitutional:       Appearance: Normal appearance.      Interventions: He is sedated, intubated and restrained.   HENT:      Head: Normocephalic and atraumatic.      Nose:      Comments: NG tube sutured in place   Eyes:      Conjunctiva/sclera: Conjunctivae normal.      Pupils: Pupils are equal, round, and reactive to light.   Neck:      Comments: ENMANUEL drain with SS on left side of neck   Surgical incision clean and dry   Cardiovascular:      Rate and Rhythm: Normal rate and regular rhythm.      Pulses: Normal pulses.      Heart sounds: Normal heart sounds.   Pulmonary:      Effort: Pulmonary effort is normal. No respiratory distress. He is intubated.      Comments: ETT in place   Abdominal:      General: Abdomen is flat. Bowel sounds are normal.      Palpations: Abdomen is soft.   Skin:     General: Skin is warm.       Vents:  Vent Mode: A/C (02/24/23 0600)  Ventilator Initiated: Yes (02/20/23 1443)  Set Rate: 15 BPM (02/24/23 0600)  Vt Set: 480  mL (02/24/23 0600)  Pressure Support: 5 cmH20 (02/23/23 0424)  PEEP/CPAP: 5 cmH20 (02/24/23 0600)  Oxygen Concentration (%): 40 (02/24/23 0600)  Peak Airway Pressure: 20 cmH20 (02/24/23 0600)  Plateau Pressure: 12 cmH20 (02/24/23 0600)  Total Ve: 7.38 L/m (02/24/23 0600)  Negative Inspiratory Force (cm H2O): -25 (02/24/23 0600)  F/VT Ratio<105 (RSBI): (!) 30.61 (02/24/23 0600)    Lines/Drains/Airways       Drain  Duration                  Closed/Suction Drain 02/20/23 Left Neck Bulb 19 Fr. 4 days         Trans Pyloric Feeding Tube 02/20/23 1320 orogastric 12 Fr. 3 days         Urethral Catheter 02/20/23 0945 Non-latex 16 Fr. 3 days              Airway  Duration                  Airway - Non-Surgical 02/20/23 0926 Nasal Janneth 3 days              Peripheral Intravenous Line  Duration                  Peripheral IV - Single Lumen 02/20/23 0726 20 G Left;Posterior Forearm 3 days         Peripheral IV - Single Lumen 02/24/23 0430 18 G Anterior;Right Wrist <1 day                    Significant Labs:    CBC/Anemia Profile:  Recent Labs   Lab 02/23/23 0323 02/23/23 0325 02/23/23 0753 02/24/23  0404 02/24/23  0414   WBC 4.79  --   --   --  5.06   HGB 8.6*  --   --   --  8.8*   HCT 26.8*   < > 24* 21* 27.9*   PLT 89*  --   --   --  107*   MCV 94  --   --   --  97   RDW 13.2  --   --   --  13.0    < > = values in this interval not displayed.        Chemistries:  Recent Labs   Lab 02/23/23 0323 02/23/23  2135 02/24/23  0414    143 141   K 3.5 3.3* 4.4    115* 108   CO2 27 22* 25   BUN 14 14 15   CREATININE 0.9 0.8 0.9   CALCIUM 8.4* 6.9* 8.7   ALBUMIN 2.8*  --  2.8*   PROT 5.1*  --  5.5*   BILITOT 0.4  --  0.5   ALKPHOS 41*  --  41*   ALT 6*  --  7*   AST 9*  --  12   MG 1.5* 1.4* 2.8*   PHOS 1.5*  --  2.4*       Recent Lab Results  (Last 5 results in the past 24 hours)        02/24/23  0414   02/24/23  0404   02/23/23  2354   02/23/23  2245   02/23/23  2135        Albumin 2.8               Alkaline Phosphatase  41               Allens Test   Pass             ALT 7               Anion Gap 8         6       AST 12               Baso # 0.01               Basophil % 0.2               BILIRUBIN TOTAL 0.5  Comment: For infants and newborns, interpretation of results should be based  on gestational age, weight and in agreement with clinical  observations.    Premature Infant recommended reference ranges:  Up to 24 hours.............<8.0 mg/dL  Up to 48 hours............<12.0 mg/dL  3-5 days..................<15.0 mg/dL  6-29 days.................<15.0 mg/dL                 Site   LR             BUN 15         14       Calcium 8.7         6.9  Comment: *Critical value notification by RP with confirmation of receipt to   Zoraida Noble RN at  [id number]         Ionized Calcium       1.12         Chloride 108         115       CO2 25         22       Creatinine 0.9         0.8       DelSys   Adult Vent             Differential Method Automated               eGFR >60.0         >60.0       Eos # 0.2               Eosinophil % 3.4               FiO2   40             Glucose 105         101       Gran # (ANC) 3.7               Gran % 73.3               Hematocrit 27.9               Hemoglobin 8.8               Immature Grans (Abs) 0.03  Comment: Mild elevation in immature granulocytes is non specific and   can be seen in a variety of conditions including stress response,   acute inflammation, trauma and pregnancy. Correlation with other   laboratory and clinical findings is essential.                 Immature Granulocytes 0.6               INR 0.9  Comment: Coumadin Therapy:  2.0 - 3.0 for INR for all indicators except mechanical heart valves  and antiphospholipid syndromes which should use 2.5 - 3.5.                 Lymph # 0.7               Lymph % 14.6               Magnesium 2.8         1.4       MCH 30.7               MCHC 31.5               MCV 97               Mode   AC/PRVC             Mono # 0.4               Mono %  7.9               MPV 10.1               nRBC 0               PEEP   5             Phosphorus 2.4               Platelets 107               POC BE   5             POC HCO3   26.8             POC Hematocrit   21             POC PCO2   27.9             POC PH   7.590             POC PO2   109             POC SATURATED O2   99             POC TCO2   28             POCT Glucose     107           Potassium 4.4         3.3       PROTEIN TOTAL 5.5               Protime 9.8               Rate   15             RBC 2.87               RDW 13.0               Sample   ARTERIAL             Sodium 141         143       Vt   480             WBC 5.06                                      Significant Imaging:  I have reviewed all pertinent imaging results/findings within the past 24 hours.

## 2023-02-24 NOTE — PROGRESS NOTES
[facility]  Otorhinolaryngology-Head & Neck Surgery  Progress Note    Subjective:     Post-Op Info:  Procedure(s) (LRB):  EXPLORATION, NECK (Left)  EVACUATION, HEMATOMA (Left)   4 Days Post-Op  Hospital Day: 5     Interval History: Failed weaning trial. Hypertensive overnight. Planning for tracheostomy today.     Medications:  Continuous Infusions:   dexmedeTOMIDine (Precedex) infusion (titrating) 0.15 mcg/kg/hr (02/24/23 0600)    propofoL 35 mcg/kg/min (02/24/23 0600)     Scheduled Meds:   acetaminophen  650 mg Per NG tube Q8H    amLODIPine  5 mg Per NG tube Daily    atorvastatin  40 mg Per NG tube QHS    cloNIDine  0.1 mg Per NG tube QHS    enoxaparin  40 mg Subcutaneous Daily    EScitalopram oxalate  20 mg Per NG tube QHS    famotidine  20 mg Per NG tube BID    fluticasone furoate-vilanteroL  1 puff Inhalation Daily    gabapentin  600 mg Per NG tube TID    lactated ringers  500 mL Intravenous Once    mirtazapine  15 mg Per NG tube QHS    mupirocin   Nasal BID    polyethylene glycol  17 g Per NG tube Daily    QUEtiapine  50 mg Per NG tube QHS    sennosides 8.8 mg/5 ml  5 mL Per NG tube BID    sodium phosphate IVPB  30 mmol Intravenous Once    tacrolimus  2 mg Per NG tube Daily AM    And    tacrolimus  1 mg Per NG tube Daily PM     PRN Meds:sodium chloride 0.9%, calcium gluconate IVPB, calcium gluconate IVPB, calcium gluconate IVPB, clonazePAM, dextrose 10%, dextrose 10%, fentaNYL, glucagon (human recombinant), hydrALAZINE, insulin aspart U-100, labetalol, magnesium sulfate IVPB, magnesium sulfate IVPB, ondansetron, potassium chloride **AND** potassium chloride **AND** potassium chloride, sodium phosphate IVPB, sodium phosphate IVPB, sodium phosphate IVPB     Review of patient's allergies indicates:  No Known Allergies  Objective:     Vital Signs (24h Range):  Temp:  [97.9 °F (36.6 °C)-98.9 °F (37.2 °C)] 98 °F (36.7 °C)  Pulse:  [54-94] 59  Resp:  [0-33] 15  SpO2:  [94  %-100 %] 100 %  BP: ()/(50-91) 99/57       Lines/Drains/Airways       Drain  Duration                  Closed/Suction Drain 02/20/23 Left Neck Bulb 19 Fr. 4 days         Trans Pyloric Feeding Tube 02/20/23 1320 orogastric 12 Fr. 3 days         Urethral Catheter 02/20/23 0945 Non-latex 16 Fr. 3 days              Airway  Duration                  Airway - Non-Surgical 02/20/23 0926 Nasal Janneth 3 days              Peripheral Intravenous Line  Duration                  Peripheral IV - Single Lumen 02/20/23 0726 20 G Left;Posterior Forearm 3 days         Peripheral IV - Single Lumen 02/24/23 0430 18 G Anterior;Right Wrist <1 day                    Physical Exam  Intubated, sedated  Oral cavity with no obvious bleeding though intraoral examination limited.   Left ENMANUEL x1 w/ appropriate output, stripped.    Neck ecchymotic but improved, soft     Significant Labs:  CBC:   Recent Labs   Lab 02/24/23  0414 02/24/23  0601   WBC 5.06  --    RBC 2.87*  --    HGB 8.8*  --    HCT 27.9* 22*   *  --    MCV 97  --    MCH 30.7  --    MCHC 31.5*  --      CMP:   Recent Labs   Lab 02/24/23  0414      CALCIUM 8.7   ALBUMIN 2.8*   PROT 5.5*      K 4.4   CO2 25      BUN 15   CREATININE 0.9   ALKPHOS 41*   ALT 7*   AST 12   BILITOT 0.5       Significant Diagnostics:  None    Assessment/Plan:     * Squamous cell carcinoma of left tonsil  Dereck Centeno is a 62 y.o. 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 On day of surgery pt with expanding hematoma now s/p emergent neck exploration with washout 2/20.     Oral packing removed 2/22. Failed weaning trials x 2    -- Plan for tracheostomy today   -- Drain care with strict recording of drain output  -- If concern for neck swelling please call ENT on call ASAP  -- Remainder of care per ICU  -- Please page ENT with any  questions or concerns               Nayla Lomax MD  Otorhinolaryngology-Head & Neck Surgery  [facility]

## 2023-02-24 NOTE — ASSESSMENT & PLAN NOTE
Dereck Centeno is a 62 y.o. 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 On day of surgery pt with expanding hematoma now s/p emergent neck exploration with washout 2/20.     Oral packing removed 2/22. Failed weaning trials x 2    -- Plan for tracheostomy today   -- Drain care with strict recording of drain output  -- If concern for neck swelling please call ENT on call ASAP  -- Remainder of care per ICU  -- Please page ENT with any questions or concerns

## 2023-02-24 NOTE — PT/OT/SLP PROGRESS
Occupational Therapy      Patient Name:  Dereck Centeno   MRN:  [id number]    Patient not seen today secondary to going for trach placement today. Will follow up at a later date.   2/24/2023

## 2023-02-24 NOTE — ANESTHESIA POSTPROCEDURE EVALUATION
Anesthesia Post Evaluation    Patient: Dereck Centeno    Procedure(s) Performed: Procedure(s) (LRB):  CREATION, TRACHEOSTOMY (N/A)    Final Anesthesia Type: general      Patient location during evaluation: PACU  Patient participation: Yes- Able to Participate  Level of consciousness: awake and alert  Post-procedure vital signs: reviewed and stable  Pain management: adequate  Airway patency: patent  ANGELO mitigation strategies: Multimodal analgesia  PONV status at discharge: No PONV  Anesthetic complications: no      Cardiovascular status: blood pressure returned to baseline and hemodynamically stable  Respiratory status: unassisted  Hydration status: euvolemic  Follow-up not needed.          Vitals Value Taken Time   /65 02/24/23 1631   Temp 37 02/24/23 1641   Pulse 66 02/24/23 1641   Resp 15 02/24/23 1641   SpO2 97 % 02/24/23 1641   Vitals shown include unvalidated device data.      No case tracking events are documented in the log.      Pain/Huber Score: Pain Rating Prior to Med Admin: 5 (2/24/2023  2:27 PM)  Pain Rating Post Med Admin: 2 (2/24/2023  4:00 AM)  Huber Score: 7 (2/23/2023  7:01 PM)

## 2023-02-24 NOTE — CONSULTS
Single lumen 18g x 10 cm midline placed to right brachial vein. Max dwell date 3/25/23, Lot# [id number].  Needle advanced into the vessel under real time ultrasound guidance.  Image recorded and saved.

## 2023-02-24 NOTE — NURSING
Anesthesia @ bedside to assume care of pt for trach creation. Pt connected to bedside monitor and ambu bag for transfer. VSS. All consents signed and in pt chart.

## 2023-02-24 NOTE — SUBJECTIVE & OBJECTIVE
Interval History: Failed weaning trial. Hypertensive overnight. Planning for tracheostomy today.     Medications:  Continuous Infusions:   dexmedeTOMIDine (Precedex) infusion (titrating) 0.15 mcg/kg/hr (02/24/23 0600)    propofoL 35 mcg/kg/min (02/24/23 0600)     Scheduled Meds:   acetaminophen  650 mg Per NG tube Q8H    amLODIPine  5 mg Per NG tube Daily    atorvastatin  40 mg Per NG tube QHS    cloNIDine  0.1 mg Per NG tube QHS    enoxaparin  40 mg Subcutaneous Daily    EScitalopram oxalate  20 mg Per NG tube QHS    famotidine  20 mg Per NG tube BID    fluticasone furoate-vilanteroL  1 puff Inhalation Daily    gabapentin  600 mg Per NG tube TID    lactated ringers  500 mL Intravenous Once    mirtazapine  15 mg Per NG tube QHS    mupirocin   Nasal BID    polyethylene glycol  17 g Per NG tube Daily    QUEtiapine  50 mg Per NG tube QHS    sennosides 8.8 mg/5 ml  5 mL Per NG tube BID    sodium phosphate IVPB  30 mmol Intravenous Once    tacrolimus  2 mg Per NG tube Daily AM    And    tacrolimus  1 mg Per NG tube Daily PM     PRN Meds:sodium chloride 0.9%, calcium gluconate IVPB, calcium gluconate IVPB, calcium gluconate IVPB, clonazePAM, dextrose 10%, dextrose 10%, fentaNYL, glucagon (human recombinant), hydrALAZINE, insulin aspart U-100, labetalol, magnesium sulfate IVPB, magnesium sulfate IVPB, ondansetron, potassium chloride **AND** potassium chloride **AND** potassium chloride, sodium phosphate IVPB, sodium phosphate IVPB, sodium phosphate IVPB     Review of patient's allergies indicates:  No Known Allergies  Objective:     Vital Signs (24h Range):  Temp:  [97.9 °F (36.6 °C)-98.9 °F (37.2 °C)] 98 °F (36.7 °C)  Pulse:  [54-94] 59  Resp:  [0-33] 15  SpO2:  [94 %-100 %] 100 %  BP: ()/(50-91) 99/57       Lines/Drains/Airways       Drain  Duration                  Closed/Suction Drain 02/20/23 Left Neck Bulb 19 Fr. 4 days         Trans Pyloric Feeding Tube 02/20/23 1320 orogastric 12 Fr. 3 days         Urethral  Catheter 02/20/23 0945 Non-latex 16 Fr. 3 days              Airway  Duration                  Airway - Non-Surgical 02/20/23 0926 Nasal Janneth 3 days              Peripheral Intravenous Line  Duration                  Peripheral IV - Single Lumen 02/20/23 0726 20 G Left;Posterior Forearm 3 days         Peripheral IV - Single Lumen 02/24/23 0430 18 G Anterior;Right Wrist <1 day                    Physical Exam  Intubated, sedated  Oral cavity with no obvious bleeding though intraoral examination limited.   Left ENMANUEL x1 w/ appropriate output, stripped.    Neck ecchymotic but improved, soft     Significant Labs:  CBC:   Recent Labs   Lab 02/24/23  0414 02/24/23  0601   WBC 5.06  --    RBC 2.87*  --    HGB 8.8*  --    HCT 27.9* 22*   *  --    MCV 97  --    MCH 30.7  --    MCHC 31.5*  --      CMP:   Recent Labs   Lab 02/24/23 0414      CALCIUM 8.7   ALBUMIN 2.8*   PROT 5.5*      K 4.4   CO2 25      BUN 15   CREATININE 0.9   ALKPHOS 41*   ALT 7*   AST 12   BILITOT 0.5       Significant Diagnostics:  None

## 2023-02-24 NOTE — PLAN OF CARE
"      SICU PLAN OF CARE NOTE    Dx: Squamous cell carcinoma of left tonsil    Shift Events: UOP dropped off early in the shift, pt rec'd fluid challenge of 500cc LR by end of shift pt UOP was averaging 100cc/hr, 2 of 3 IV sites infiltrated, one restarted, pt currently has 2 PIV sites    Goals of Care: tracheostomy today    Neuro: Arouses to Voice, Follows Commands, and Moves All Extremities    Vital Signs: /70   Pulse 71   Temp 98 °F (36.7 °C) (Oral)   Resp 15   Ht 5' 10" (1.778 m)   Wt 75.9 kg (167 lb 5.3 oz)   SpO2 98%   BMI 24.01 kg/m²     Respiratory: Ventilator    Diet: NPO and Tube Feeds    Gtts: Propfol and Precedex    Urine Output: Urinary Catheter 985 cc/shift    Drains: ENMANUEL Drain, total output 16 cc / shift     Labs/Accuchecks: accuchecks q6hrs, daily CMP, CBC, mag, phos, renewed type and screen.    Skin: surgical incision, ENMANUEL drain site,no breakdown noted       "

## 2023-02-25 LAB
ALBUMIN SERPL BCP-MCNC: 2.5 G/DL (ref 3.5–5.2)
ALBUMIN SERPL BCP-MCNC: 2.6 G/DL (ref 3.5–5.2)
ALLENS TEST: ABNORMAL
ALP SERPL-CCNC: 39 U/L (ref 55–135)
ALP SERPL-CCNC: 42 U/L (ref 55–135)
ALT SERPL W/O P-5'-P-CCNC: 10 U/L (ref 10–44)
ALT SERPL W/O P-5'-P-CCNC: 5 U/L (ref 10–44)
ANION GAP SERPL CALC-SCNC: 5 MMOL/L (ref 8–16)
ANION GAP SERPL CALC-SCNC: 6 MMOL/L (ref 8–16)
AST SERPL-CCNC: 12 U/L (ref 10–40)
AST SERPL-CCNC: 13 U/L (ref 10–40)
BASOPHILS # BLD AUTO: 0.01 K/UL (ref 0–0.2)
BASOPHILS NFR BLD: 0.2 % (ref 0–1.9)
BILIRUB SERPL-MCNC: 0.3 MG/DL (ref 0.1–1)
BILIRUB SERPL-MCNC: 0.4 MG/DL (ref 0.1–1)
BUN SERPL-MCNC: 17 MG/DL (ref 8–23)
BUN SERPL-MCNC: 18 MG/DL (ref 8–23)
CA-I BLDV-SCNC: 1.19 MMOL/L (ref 1.06–1.42)
CALCIUM SERPL-MCNC: 8.3 MG/DL (ref 8.7–10.5)
CALCIUM SERPL-MCNC: 8.4 MG/DL (ref 8.7–10.5)
CHLORIDE SERPL-SCNC: 107 MMOL/L (ref 95–110)
CHLORIDE SERPL-SCNC: 107 MMOL/L (ref 95–110)
CO2 SERPL-SCNC: 26 MMOL/L (ref 23–29)
CO2 SERPL-SCNC: 27 MMOL/L (ref 23–29)
CREAT SERPL-MCNC: 0.9 MG/DL (ref 0.5–1.4)
CREAT SERPL-MCNC: 0.9 MG/DL (ref 0.5–1.4)
DELSYS: ABNORMAL
DIFFERENTIAL METHOD: ABNORMAL
EOSINOPHIL # BLD AUTO: 0 K/UL (ref 0–0.5)
EOSINOPHIL NFR BLD: 0.2 % (ref 0–8)
ERYTHROCYTE [DISTWIDTH] IN BLOOD BY AUTOMATED COUNT: 12.6 % (ref 11.5–14.5)
ERYTHROCYTE [SEDIMENTATION RATE] IN BLOOD BY WESTERGREN METHOD: 15 MM/H
EST. GFR  (NO RACE VARIABLE): >60 ML/MIN/1.73 M^2
EST. GFR  (NO RACE VARIABLE): >60 ML/MIN/1.73 M^2
FIO2: 30
GLUCOSE SERPL-MCNC: 137 MG/DL (ref 70–110)
GLUCOSE SERPL-MCNC: 153 MG/DL (ref 70–110)
HCO3 UR-SCNC: 26.7 MMOL/L (ref 24–28)
HCT VFR BLD AUTO: 24.7 % (ref 40–54)
HGB BLD-MCNC: 8 G/DL (ref 14–18)
IMM GRANULOCYTES # BLD AUTO: 0.02 K/UL (ref 0–0.04)
IMM GRANULOCYTES NFR BLD AUTO: 0.4 % (ref 0–0.5)
INR PPP: 1 (ref 0.8–1.2)
LYMPHOCYTES # BLD AUTO: 0.5 K/UL (ref 1–4.8)
LYMPHOCYTES NFR BLD: 10.3 % (ref 18–48)
MAGNESIUM SERPL-MCNC: 1.7 MG/DL (ref 1.6–2.6)
MAGNESIUM SERPL-MCNC: 2.1 MG/DL (ref 1.6–2.6)
MCH RBC QN AUTO: 30.3 PG (ref 27–31)
MCHC RBC AUTO-ENTMCNC: 32.4 G/DL (ref 32–36)
MCV RBC AUTO: 94 FL (ref 82–98)
MODE: ABNORMAL
MONOCYTES # BLD AUTO: 0.2 K/UL (ref 0.3–1)
MONOCYTES NFR BLD: 4.8 % (ref 4–15)
NEUTROPHILS # BLD AUTO: 3.8 K/UL (ref 1.8–7.7)
NEUTROPHILS NFR BLD: 84.1 % (ref 38–73)
NRBC BLD-RTO: 0 /100 WBC
PCO2 BLDA: 29.7 MMHG (ref 35–45)
PEEP: 5
PH SMN: 7.56 [PH] (ref 7.35–7.45)
PHOSPHATE SERPL-MCNC: 3.5 MG/DL (ref 2.7–4.5)
PHOSPHATE SERPL-MCNC: 4.5 MG/DL (ref 2.7–4.5)
PLATELET # BLD AUTO: 106 K/UL (ref 150–450)
PMV BLD AUTO: 9.8 FL (ref 9.2–12.9)
PO2 BLDA: 108 MMHG (ref 80–100)
POC BE: 4 MMOL/L
POC SATURATED O2: 99 % (ref 95–100)
POC TCO2: 28 MMOL/L (ref 23–27)
POCT GLUCOSE: 141 MG/DL (ref 70–110)
POCT GLUCOSE: 146 MG/DL (ref 70–110)
POCT GLUCOSE: 147 MG/DL (ref 70–110)
POCT GLUCOSE: 150 MG/DL (ref 70–110)
POCT GLUCOSE: 151 MG/DL (ref 70–110)
POTASSIUM SERPL-SCNC: 4.5 MMOL/L (ref 3.5–5.1)
POTASSIUM SERPL-SCNC: 4.9 MMOL/L (ref 3.5–5.1)
PROT SERPL-MCNC: 5 G/DL (ref 6–8.4)
PROT SERPL-MCNC: 5.1 G/DL (ref 6–8.4)
PROTHROMBIN TIME: 10.8 SEC (ref 9–12.5)
RBC # BLD AUTO: 2.64 M/UL (ref 4.6–6.2)
SAMPLE: ABNORMAL
SITE: ABNORMAL
SODIUM SERPL-SCNC: 138 MMOL/L (ref 136–145)
SODIUM SERPL-SCNC: 140 MMOL/L (ref 136–145)
TACROLIMUS BLD-MCNC: 4.5 NG/ML (ref 5–15)
VT: 480
WBC # BLD AUTO: 4.56 K/UL (ref 3.9–12.7)

## 2023-02-25 PROCEDURE — 93005 ELECTROCARDIOGRAM TRACING: CPT

## 2023-02-25 PROCEDURE — 80053 COMPREHEN METABOLIC PANEL: CPT

## 2023-02-25 PROCEDURE — 84100 ASSAY OF PHOSPHORUS: CPT

## 2023-02-25 PROCEDURE — 83735 ASSAY OF MAGNESIUM: CPT

## 2023-02-25 PROCEDURE — 63600175 PHARM REV CODE 636 W HCPCS: Performed by: STUDENT IN AN ORGANIZED HEALTH CARE EDUCATION/TRAINING PROGRAM

## 2023-02-25 PROCEDURE — 83735 ASSAY OF MAGNESIUM: CPT | Mod: 91

## 2023-02-25 PROCEDURE — 82330 ASSAY OF CALCIUM: CPT

## 2023-02-25 PROCEDURE — 25000003 PHARM REV CODE 250

## 2023-02-25 PROCEDURE — 93010 ELECTROCARDIOGRAM REPORT: CPT | Mod: ,,, | Performed by: INTERNAL MEDICINE

## 2023-02-25 PROCEDURE — 36600 WITHDRAWAL OF ARTERIAL BLOOD: CPT

## 2023-02-25 PROCEDURE — 99233 SBSQ HOSP IP/OBS HIGH 50: CPT | Mod: ,,, | Performed by: ANESTHESIOLOGY

## 2023-02-25 PROCEDURE — 25000242 PHARM REV CODE 250 ALT 637 W/ HCPCS: Performed by: STUDENT IN AN ORGANIZED HEALTH CARE EDUCATION/TRAINING PROGRAM

## 2023-02-25 PROCEDURE — 84100 ASSAY OF PHOSPHORUS: CPT | Mod: 91

## 2023-02-25 PROCEDURE — 99233 PR SUBSEQUENT HOSPITAL CARE,LEVL III: ICD-10-PCS | Mod: ,,, | Performed by: ANESTHESIOLOGY

## 2023-02-25 PROCEDURE — 93010 EKG 12-LEAD: ICD-10-PCS | Mod: ,,, | Performed by: INTERNAL MEDICINE

## 2023-02-25 PROCEDURE — 80197 ASSAY OF TACROLIMUS: CPT

## 2023-02-25 PROCEDURE — 63600175 PHARM REV CODE 636 W HCPCS

## 2023-02-25 PROCEDURE — 27000221 HC OXYGEN, UP TO 24 HOURS

## 2023-02-25 PROCEDURE — 99900035 HC TECH TIME PER 15 MIN (STAT)

## 2023-02-25 PROCEDURE — 85025 COMPLETE CBC W/AUTO DIFF WBC: CPT

## 2023-02-25 PROCEDURE — 94640 AIRWAY INHALATION TREATMENT: CPT

## 2023-02-25 PROCEDURE — 85610 PROTHROMBIN TIME: CPT | Performed by: STUDENT IN AN ORGANIZED HEALTH CARE EDUCATION/TRAINING PROGRAM

## 2023-02-25 PROCEDURE — 82803 BLOOD GASES ANY COMBINATION: CPT

## 2023-02-25 PROCEDURE — 25000003 PHARM REV CODE 250: Performed by: STUDENT IN AN ORGANIZED HEALTH CARE EDUCATION/TRAINING PROGRAM

## 2023-02-25 PROCEDURE — 80053 COMPREHEN METABOLIC PANEL: CPT | Mod: 91

## 2023-02-25 PROCEDURE — 20000000 HC ICU ROOM

## 2023-02-25 PROCEDURE — 99900026 HC AIRWAY MAINTENANCE (STAT)

## 2023-02-25 PROCEDURE — 94003 VENT MGMT INPAT SUBQ DAY: CPT

## 2023-02-25 PROCEDURE — 25000003 PHARM REV CODE 250: Performed by: OTOLARYNGOLOGY

## 2023-02-25 PROCEDURE — 94761 N-INVAS EAR/PLS OXIMETRY MLT: CPT

## 2023-02-25 RX ORDER — OXYCODONE HYDROCHLORIDE 10 MG/1
10 TABLET ORAL
Status: DISCONTINUED | OUTPATIENT
Start: 2023-02-25 | End: 2023-02-27

## 2023-02-25 RX ORDER — HYDROMORPHONE HYDROCHLORIDE 1 MG/ML
0.5 INJECTION, SOLUTION INTRAMUSCULAR; INTRAVENOUS; SUBCUTANEOUS
Status: DISCONTINUED | OUTPATIENT
Start: 2023-02-25 | End: 2023-02-27

## 2023-02-25 RX ORDER — OXYCODONE HYDROCHLORIDE 5 MG/1
5 TABLET ORAL
Status: DISCONTINUED | OUTPATIENT
Start: 2023-02-25 | End: 2023-02-27

## 2023-02-25 RX ADMIN — ACETAMINOPHEN 650 MG: 325 TABLET ORAL at 09:02

## 2023-02-25 RX ADMIN — GABAPENTIN 600 MG: 300 CAPSULE ORAL at 09:02

## 2023-02-25 RX ADMIN — TACROLIMUS 2 MG: 1 CAPSULE ORAL at 08:02

## 2023-02-25 RX ADMIN — LEVALBUTEROL HYDROCHLORIDE 0.63 MG: 0.63 SOLUTION RESPIRATORY (INHALATION) at 07:02

## 2023-02-25 RX ADMIN — SENNOSIDES 5 ML: 8.8 SYRUP ORAL at 08:02

## 2023-02-25 RX ADMIN — DEXMEDETOMIDINE HYDROCHLORIDE 0.1 MCG/KG/HR: 4 INJECTION, SOLUTION INTRAVENOUS at 04:02

## 2023-02-25 RX ADMIN — PROPOFOL 40 MCG/KG/MIN: 10 INJECTION, EMULSION INTRAVENOUS at 01:02

## 2023-02-25 RX ADMIN — FENTANYL CITRATE 25 MCG: 50 INJECTION INTRAMUSCULAR; INTRAVENOUS at 05:02

## 2023-02-25 RX ADMIN — LEVALBUTEROL HYDROCHLORIDE 0.63 MG: 0.63 SOLUTION RESPIRATORY (INHALATION) at 12:02

## 2023-02-25 RX ADMIN — ENOXAPARIN SODIUM 40 MG: 40 INJECTION SUBCUTANEOUS at 04:02

## 2023-02-25 RX ADMIN — ATORVASTATIN CALCIUM 40 MG: 40 TABLET, FILM COATED ORAL at 09:02

## 2023-02-25 RX ADMIN — ACETAMINOPHEN 650 MG: 325 TABLET ORAL at 05:02

## 2023-02-25 RX ADMIN — GABAPENTIN 600 MG: 300 CAPSULE ORAL at 08:02

## 2023-02-25 RX ADMIN — ACETAMINOPHEN 650 MG: 325 TABLET ORAL at 02:02

## 2023-02-25 RX ADMIN — PROPOFOL 35 MCG/KG/MIN: 10 INJECTION, EMULSION INTRAVENOUS at 04:02

## 2023-02-25 RX ADMIN — IPRATROPIUM BROMIDE 0.5 MG: 0.5 SOLUTION RESPIRATORY (INHALATION) at 07:02

## 2023-02-25 RX ADMIN — IPRATROPIUM BROMIDE 0.5 MG: 0.5 SOLUTION RESPIRATORY (INHALATION) at 04:02

## 2023-02-25 RX ADMIN — MUPIROCIN: 20 OINTMENT TOPICAL at 09:02

## 2023-02-25 RX ADMIN — MUPIROCIN: 20 OINTMENT TOPICAL at 08:02

## 2023-02-25 RX ADMIN — MAGNESIUM SULFATE 2 G: 2 INJECTION INTRAVENOUS at 04:02

## 2023-02-25 RX ADMIN — FAMOTIDINE 20 MG: 20 TABLET ORAL at 09:02

## 2023-02-25 RX ADMIN — TACROLIMUS 1 MG: 1 CAPSULE ORAL at 05:02

## 2023-02-25 RX ADMIN — PROPOFOL 40 MCG/KG/MIN: 10 INJECTION, EMULSION INTRAVENOUS at 11:02

## 2023-02-25 RX ADMIN — PROPOFOL 35 MCG/KG/MIN: 10 INJECTION, EMULSION INTRAVENOUS at 06:02

## 2023-02-25 RX ADMIN — FAMOTIDINE 20 MG: 20 TABLET ORAL at 08:02

## 2023-02-25 RX ADMIN — AMLODIPINE BESYLATE 5 MG: 5 TABLET ORAL at 08:02

## 2023-02-25 RX ADMIN — QUETIAPINE FUMARATE 50 MG: 25 TABLET ORAL at 09:02

## 2023-02-25 RX ADMIN — DEXMEDETOMIDINE HYDROCHLORIDE 0.1 MCG/KG/HR: 4 INJECTION, SOLUTION INTRAVENOUS at 09:02

## 2023-02-25 RX ADMIN — IPRATROPIUM BROMIDE 0.5 MG: 0.5 SOLUTION RESPIRATORY (INHALATION) at 12:02

## 2023-02-25 RX ADMIN — LEVALBUTEROL HYDROCHLORIDE 0.63 MG: 0.63 SOLUTION RESPIRATORY (INHALATION) at 04:02

## 2023-02-25 RX ADMIN — MIRTAZAPINE 15 MG: 15 TABLET, FILM COATED ORAL at 09:02

## 2023-02-25 RX ADMIN — SENNOSIDES 5 ML: 8.8 SYRUP ORAL at 09:02

## 2023-02-25 RX ADMIN — GABAPENTIN 600 MG: 300 CAPSULE ORAL at 02:02

## 2023-02-25 RX ADMIN — HYDROMORPHONE HYDROCHLORIDE 0.5 MG: 1 INJECTION, SOLUTION INTRAMUSCULAR; INTRAVENOUS; SUBCUTANEOUS at 08:02

## 2023-02-25 RX ADMIN — POLYETHYLENE GLYCOL 3350 17 G: 17 POWDER, FOR SOLUTION ORAL at 08:02

## 2023-02-25 RX ADMIN — ESCITALOPRAM OXALATE 20 MG: 20 TABLET ORAL at 09:02

## 2023-02-25 NOTE — SUBJECTIVE & OBJECTIVE
Interval History: NAEON. POD1 s/p trach for failure to wean from vent. Doing well, no trach issues.     Medications:  Continuous Infusions:   dexmedeTOMIDine (Precedex) infusion (titrating) 0.1 mcg/kg/hr (02/24/23 1700)    propofoL Stopped (02/25/23 0555)     Scheduled Meds:   acetaminophen  650 mg Per NG tube Q8H    amLODIPine  5 mg Per NG tube Daily    atorvastatin  40 mg Per NG tube QHS    cloNIDine  0.1 mg Per NG tube QHS    enoxaparin  40 mg Subcutaneous Daily    EScitalopram oxalate  20 mg Per NG tube QHS    famotidine  20 mg Per NG tube BID    gabapentin  600 mg Per NG tube TID    ipratropium  0.5 mg Nebulization Q8H    lactated ringers  500 mL Intravenous Once    levalbuterol  0.63 mg Nebulization Q8H    mirtazapine  15 mg Per NG tube QHS    mupirocin   Nasal BID    polyethylene glycol  17 g Per NG tube Daily    QUEtiapine  50 mg Per NG tube QHS    sennosides 8.8 mg/5 ml  5 mL Per NG tube BID    tacrolimus  2 mg Per NG tube Daily AM    And    tacrolimus  1 mg Per NG tube Daily PM     PRN Meds:sodium chloride 0.9%, calcium gluconate IVPB, calcium gluconate IVPB, calcium gluconate IVPB, clonazePAM, dextrose 10%, dextrose 10%, fentaNYL, glucagon (human recombinant), hydrALAZINE, HYDROmorphone, insulin aspart U-100, labetalol, magnesium sulfate IVPB, magnesium sulfate IVPB, ondansetron, oxyCODONE, oxyCODONE, potassium chloride **AND** potassium chloride **AND** potassium chloride, sodium phosphate IVPB, sodium phosphate IVPB, sodium phosphate IVPB     Review of patient's allergies indicates:  No Known Allergies  Objective:     Vital Signs (24h Range):  Temp:  [97.8 °F (36.6 °C)-98.4 °F (36.9 °C)] 98.4 °F (36.9 °C)  Pulse:  [54-73] 73  Resp:  [8-20] 17  SpO2:  [95 %-100 %] 97 %  BP: ()/(51-93) 131/67     Date 02/25/23 0700 - 02/26/23 0659   Shift 4642-1490 3366-1253 2812-0988 24 Hour Total   INTAKE   Shift Total(mL/kg)       OUTPUT   Urine(mL/kg/hr) 75   75   Shift Total(mL/kg) 75(1)   75(1)   Weight (kg)  75.9 75.9 75.9 75.9     Lines/Drains/Airways       Drain  Duration                  Closed/Suction Drain 02/20/23 Left Neck Bulb 19 Fr. 5 days         Trans Pyloric Feeding Tube 02/20/23 1320 nasogastric;Other (comments) 12 Fr. Right nostril 4 days         Urethral Catheter 02/20/23 0945 Non-latex 16 Fr. 4 days              Airway  Duration             Adult Surgical Airway 02/24/23 1658 <1 day              Peripheral Intravenous Line  Duration                  Peripheral IV - Single Lumen 02/20/23 0726 20 G Left;Posterior Forearm 5 days         Peripheral IV - Single Lumen 02/24/23 0430 18 G Anterior;Right Wrist 1 day         Midline Catheter Insertion/Assessment  - Single Lumen 02/24/23 1045 Right brachial vein 18g x 10cm <1 day                    Physical Exam  NAD  Oral cavity with no obvious bleeding though intraoral examination limited.   Left ENMANUEL x1 w/ appropriate output, stripped.    Neck ecchymotic but improved, soft   6-0 cuffed trach in place, secured w sutures and soft collar, on vent  Vent Mode: Spont  Oxygen Concentration (%):  [30-40] 30  Resp Rate Total:  [6.3 br/min-18 br/min] 16 br/min  Vt Set:  [450 mL-480 mL] 450 mL  PEEP/CPAP:  [5 cmH20] 5 cmH20  Pressure Support:  [10 cmH20] 10 cmH20  Mean Airway Pressure:  [7.1 cmH20-8.2 cmH20] 8.2 cmH20      Significant Labs:  BMP:   Recent Labs   Lab 02/25/23 0319   *      CO2 26   BUN 17   CREATININE 0.9   CALCIUM 8.3*   MG 1.7     CBC:   Recent Labs   Lab 02/25/23 0319   WBC 4.56   RBC 2.64*   HGB 8.0*   HCT 24.7*   *   MCV 94   MCH 30.3   MCHC 32.4       Significant Diagnostics:  I have reviewed and interpreted all pertinent imaging results/findings within the past 24 hours.

## 2023-02-25 NOTE — ASSESSMENT & PLAN NOTE
Dereck Centeno is a 62 y.o. male with PMHx of HTN, emphysema, HCV s/p liver transplant in 2015, tobacco abuse (50+ pack years), oral cancer s/p surgery at [facility] in 2019 (no chemo/radiation), and newly diagnosed SCC of the left tonsil. He is now s/p left tonsillectomy and neck dissection by Dr. Templeton on 2/20. Post operative course was c/b development of large hematoma at surgical site. He was taken back to the OR for an emergent neck exploration and hematoma evacuation on 2/20.       Neuro/Psych:   -- Sedation: precedex gtt and propofol gtt  -- Pain: home gabapentin, tylenol, PRN fentanyl   -- Psych: home clonazepam PRN, scheduled mirtazapine, seroquel, lexapro     Cards  -- HDS; not currently on pressors   -- Continue home amlodipine and clonidine   -- PRN labetalol and hydralazine   -- Continue home statin       Pulm:   -- H/o emphysema   -- Trach placed 2/24;   -- Ipratropium nebs scheduled,albuterol nebs PRN for wheezing   -- Goal O2 sat > 88%  -- Wean as able  -- Daily SBT       Renal:  -- Keep auguste for strict I/O  -- BUN/Cr stable 17/0.9  -- UOP 1.8L/24H      FEN / GI:   -- Net +-600cc's/24H  -- Replace lytes as needed  -- Nutrition: NPO + TF  -- mIVF discontinued      ID:   -- Tm: afebrile; WBC 5  -- Abx: none       Heme/Onc:   -- H/H decreased to 8/24.7 from 8.8/27.9; no obvious signs of bleeding  -- H/o thrombocytopenia    -- Platelets slowly down trending since admission; 106 from 89 on 2/23   -- Continue to monitor   -- Daily CBC  -- Home ASA held       Endo:   -- Gluc goal 140-180  -- SSI      Immuno:  -- H/o HCV infection s/p liver transplant in 2015  -- Hepatology consulted  -- Continue home tacrolimus  -- Monitor tacrolimus levels and INR daily   -- Valacyclovir daily per ENT      PPx:   Feeding: NPO + TF  Analgesia/Sedation: PRN fentanyl, home gabapentin, tylenol/ precedex gtt   Thromboembolic prevention: lovenox   HOB >30: yes   Stress Ulcer ppx: famotidine BID   Glucose control: Critical  care goal 140-180 g/dl, ISS    Lines/Drains/Airway: ETT, auguste, PIVx2, NG tube, neck drain x2      Dispo/Code Status/Palliative:   -- SICU / Full Code

## 2023-02-25 NOTE — PROGRESS NOTES
[facility] - Surgical Intensive Care  Otorhinolaryngology-Head & Neck Surgery  Progress Note    Subjective:     Post-Op Info:  Procedure(s) (LRB):  CREATION, TRACHEOSTOMY (N/A)   1 Day Post-Op  Hospital Day: 6     Interval History: NAEON. POD1 s/p trach for failure to wean from vent. Doing well, no trach issues.     Medications:  Continuous Infusions:   dexmedeTOMIDine (Precedex) infusion (titrating) 0.1 mcg/kg/hr (02/24/23 1700)    propofoL Stopped (02/25/23 0555)     Scheduled Meds:   acetaminophen  650 mg Per NG tube Q8H    amLODIPine  5 mg Per NG tube Daily    atorvastatin  40 mg Per NG tube QHS    cloNIDine  0.1 mg Per NG tube QHS    enoxaparin  40 mg Subcutaneous Daily    EScitalopram oxalate  20 mg Per NG tube QHS    famotidine  20 mg Per NG tube BID    gabapentin  600 mg Per NG tube TID    ipratropium  0.5 mg Nebulization Q8H    lactated ringers  500 mL Intravenous Once    levalbuterol  0.63 mg Nebulization Q8H    mirtazapine  15 mg Per NG tube QHS    mupirocin   Nasal BID    polyethylene glycol  17 g Per NG tube Daily    QUEtiapine  50 mg Per NG tube QHS    sennosides 8.8 mg/5 ml  5 mL Per NG tube BID    tacrolimus  2 mg Per NG tube Daily AM    And    tacrolimus  1 mg Per NG tube Daily PM     PRN Meds:sodium chloride 0.9%, calcium gluconate IVPB, calcium gluconate IVPB, calcium gluconate IVPB, clonazePAM, dextrose 10%, dextrose 10%, fentaNYL, glucagon (human recombinant), hydrALAZINE, HYDROmorphone, insulin aspart U-100, labetalol, magnesium sulfate IVPB, magnesium sulfate IVPB, ondansetron, oxyCODONE, oxyCODONE, potassium chloride **AND** potassium chloride **AND** potassium chloride, sodium phosphate IVPB, sodium phosphate IVPB, sodium phosphate IVPB     Review of patient's allergies indicates:  No Known Allergies  Objective:     Vital Signs (24h Range):  Temp:  [97.8 °F (36.6 °C)-98.4 °F (36.9 °C)] 98.4 °F (36.9 °C)  Pulse:  [54-73] 73  Resp:  [8-20] 17  SpO2:  [95 %-100 %] 97 %  BP:  ()/(51-93) 131/67     Date 02/25/23 0700 - 02/26/23 0659   Shift 8511-2319 2184-9384 4608-3689 24 Hour Total   INTAKE   Shift Total(mL/kg)       OUTPUT   Urine(mL/kg/hr) 75   75   Shift Total(mL/kg) 75(1)   75(1)   Weight (kg) 75.9 75.9 75.9 75.9     Lines/Drains/Airways       Drain  Duration                  Closed/Suction Drain 02/20/23 Left Neck Bulb 19 Fr. 5 days         Trans Pyloric Feeding Tube 02/20/23 1320 nasogastric;Other (comments) 12 Fr. Right nostril 4 days         Urethral Catheter 02/20/23 0945 Non-latex 16 Fr. 4 days              Airway  Duration             Adult Surgical Airway 02/24/23 1658 <1 day              Peripheral Intravenous Line  Duration                  Peripheral IV - Single Lumen 02/20/23 0726 20 G Left;Posterior Forearm 5 days         Peripheral IV - Single Lumen 02/24/23 0430 18 G Anterior;Right Wrist 1 day         Midline Catheter Insertion/Assessment  - Single Lumen 02/24/23 1045 Right brachial vein 18g x 10cm <1 day                    Physical Exam  NAD  Oral cavity with no obvious bleeding though intraoral examination limited.   Left ENMANUEL x1 w/ appropriate output, stripped.    Neck ecchymotic but improved, soft   6-0 cuffed trach in place, secured w sutures and soft collar, on vent  Vent Mode: Spont  Oxygen Concentration (%):  [30-40] 30  Resp Rate Total:  [6.3 br/min-18 br/min] 16 br/min  Vt Set:  [450 mL-480 mL] 450 mL  PEEP/CPAP:  [5 cmH20] 5 cmH20  Pressure Support:  [10 cmH20] 10 cmH20  Mean Airway Pressure:  [7.1 cmH20-8.2 cmH20] 8.2 cmH20      Significant Labs:  BMP:   Recent Labs   Lab 02/25/23 0319   *      CO2 26   BUN 17   CREATININE 0.9   CALCIUM 8.3*   MG 1.7     CBC:   Recent Labs   Lab 02/25/23 0319   WBC 4.56   RBC 2.64*   HGB 8.0*   HCT 24.7*   *   MCV 94   MCH 30.3   MCHC 32.4       Significant Diagnostics:  I have reviewed and interpreted all pertinent imaging results/findings within the past 24 hours.    Assessment/Plan:     *  Squamous cell carcinoma of left tonsil  Dereck Centeno is a 62 y.o. 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 On day of surgery pt with expanding hematoma now s/p emergent neck exploration with washout 2/20.     Oral packing removed 2/22. Tracheostomy 2/24/23.     -- Fresh trach care    Supplies at bedside, suction PRN  -- Drain care with strict recording of drain output  -- If concern for neck swelling please call ENT on call ASAP  -- Remainder of care per ICU  -- Please page ENT with any questions or concerns               Daron Nichols MD  Otorhinolaryngology-Head & Neck Surgery  [facility] - Surgical Intensive Care

## 2023-02-25 NOTE — ASSESSMENT & PLAN NOTE
Dereck Centeno is a 62 y.o. 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 On day of surgery pt with expanding hematoma now s/p emergent neck exploration with washout 2/20.     Oral packing removed 2/22. Tracheostomy 2/24/23.     -- Fresh trach care    Supplies at bedside, suction PRN  -- Drain care with strict recording of drain output  -- If concern for neck swelling please call ENT on call ASAP  -- Remainder of care per ICU  -- Please page ENT with any questions or concerns

## 2023-02-25 NOTE — NURSING
Pt complaining of left hand numbness/tingling. Upon assessment notable temperature difference between left and right hand. Radial pulse still palpable and no neurological deficits noted. Sunita SILVERIO notified. STAT US ordered.

## 2023-02-25 NOTE — TREATMENT PLAN
Hepatology Treatment Plan    Dereck Centeno is a 62 y.o. male admitted to hospital 2/20/2023 (Hospital Day: 6) due to Squamous cell carcinoma of left tonsil.     Interval History  Patient is now POD1 s/p trach for failure to wean from vent. Doing well, no trach issues.     LFTs normal    Tac level 4.5    Objective  Temp:  [97.8 °F (36.6 °C)-98.4 °F (36.9 °C)] 98.2 °F (36.8 °C) (02/25 1115)  Pulse:  [54-73] 70 (02/25 1400)  BP: ()/(51-93) 106/57 (02/25 1400)  Resp:  [8-20] 17 (02/25 1400)  SpO2:  [95 %-100 %] 100 % (02/25 1400)      Laboratory    Lab Results   Component Value Date    WBC 4.56 02/25/2023    HGB 8.0 (L) 02/25/2023    HCT 24.7 (L) 02/25/2023    MCV 94 02/25/2023     (L) 02/25/2023       Lab Results   Component Value Date     02/25/2023    K 4.5 02/25/2023     02/25/2023    CO2 27 02/25/2023    BUN 18 02/25/2023    CREATININE 0.9 02/25/2023    CALCIUM 8.4 (L) 02/25/2023       Lab Results   Component Value Date    ALBUMIN 2.6 (L) 02/25/2023    ALT 10 02/25/2023    AST 12 02/25/2023    GGT 32 05/16/2017    ALKPHOS 42 (L) 02/25/2023    BILITOT 0.4 02/25/2023       Lab Results   Component Value Date    INR 1.0 02/25/2023    INR 0.9 02/24/2023    INR 1.0 02/23/2023       Assessment  Dereck Centeno is a 62 y.o. male with history of HCV/alcohol cirrhosis s/p liver transplant in 2015, HCV s/p treatment (with Harvoni in 2016, SVR achieved), hx of oral cancer s/p resection in 2019, suicide attempt in 2018 & tobacco use who presented to [facility] for Squamous cell carcinoma of left tonsil. He underwent left radical tonsillectomy on 2/20/23 that was complicated by expanding hematoma now s/p emergent neck exploration with washout 2/20. He is currently intubated & mechanically ventilated. Currently receiving liquid tacrolimus 2 mg QAM & 1 mg QPM via NG tube. Good allograft function. No concern for infection. Now s/p tracheostomy on 2/24.       Problem List:  HCV/alcohol  cirrhosis s/p liver transplant in 2015  HCV s/p treatment (with Harvoni in 2016, SVR achieved)  Squamous cell carcinoma of left tonsil s/p left radical tonsillectomy on 2/20/23 c/b expanding hematoma       Plan  -  Continue liquid tacrolimus 2 mg QAM & 1 mg QPM via NG tube. Target a trough Prograf level of 3-4.      - Daily CBC, CMP, INR & tacrolimus levels.      - Please alert our team if patient is started on antibiotics or there is concern for infection.     - Plan of care was discussed with primary team    Thank you for involving us in the care of Dereck Centeno. Please call with any additional concerns or questions.    Blake Mari MD, PGY-IV  Gastroenterology Fellow  [facility]

## 2023-02-25 NOTE — PLAN OF CARE
"      SICU PLAN OF CARE NOTE    Dx: Squamous cell carcinoma of left tonsil    Shift Events: see notes for shift events    Goals of Care: SBP<180, MAP >65    Neuro: Sedated, Follows Commands, and Moves All Extremities    Vital Signs: BP (!) 93/53   Pulse 68   Temp 98.3 °F (36.8 °C) (Oral)   Resp 16   Ht 5' 10" (1.778 m)   Wt 75.9 kg (167 lb 5.3 oz)   SpO2 97%   BMI 24.01 kg/m²     Respiratory: Ventilator 30%/5 Peep    Diet: Tube Feeds @ 40 cc/shift    Gtts: Propfol and Precedex    Urine Output: Urinary Catheter 950 cc/shift    Drains: ENMANUEL Drain, total output 10 cc / shift     Labs/Accuchecks: Daily Labs, Accuchecks Q6.    Skin: No new skin breakdown noted.Specialty bed plugged in and working. Neck incision previously documented, LENORA, CDI. Weight shifting assisstance provided Q2hr. Foams placed to heels and sacrum for addition breakdown prevention.      PoC reviewed with pt/family. All questions/concerns addressed. VSS See flowsheets for full assessment.        "

## 2023-02-25 NOTE — PROGRESS NOTES
[facility]  Critical Care - Surgery  Progress Note    Patient Name: Dereck Centeno  MRN: [id number]  Admission Date: 2/20/2023  Hospital Length of Stay: 5 days  Code Status: Full Code  Attending Provider: Alexandre Templeton MD  Primary Care Provider: Eva Reynaga MD   Principal Problem: Squamous cell carcinoma of left tonsil    Subjective:     Hospital/ICU Course:  No notes on file    Interval History/Significant Events: NAEON, Afebrile, VSS.  Now s/p trach placement (2/24).  On rate control overnight.      Follow-up For: Procedure(s) (LRB):  CREATION, TRACHEOSTOMY (N/A)    Post-Operative Day: 1 Day Post-Op    Objective:     Vital Signs (Most Recent):  Temp: 97.8 °F (36.6 °C) (02/25/23 0330)  Pulse: 63 (02/25/23 0530)  Resp: 17 (02/25/23 0530)  BP: 107/63 (02/25/23 0530)  SpO2: 99 % (02/25/23 0530) Vital Signs (24h Range):  Temp:  [97.8 °F (36.6 °C)-98.2 °F (36.8 °C)] 97.8 °F (36.6 °C)  Pulse:  [54-72] 63  Resp:  [14-20] 17  SpO2:  [97 %-100 %] 99 %  BP: ()/(51-89) 107/63     Weight: 75.9 kg (167 lb 5.3 oz)  Body mass index is 24.01 kg/m².      Intake/Output Summary (Last 24 hours) at 2/25/2023 0553  Last data filed at 2/25/2023 0500  Gross per 24 hour   Intake 1622.36 ml   Output 2141 ml   Net -518.64 ml       Physical Exam  Vitals and nursing note reviewed.   Constitutional:       Appearance: Normal appearance.      Interventions: He is sedated, intubated and restrained.   HENT:      Head: Normocephalic and atraumatic.      Nose:      Comments: NG tube sutured in place   Eyes:      Conjunctiva/sclera: Conjunctivae normal.      Pupils: Pupils are equal, round, and reactive to light.   Neck:      Comments: ENMANUEL drain with SS on left side of neck   Surgical incision clean and dry   Fresh trach in place with no signs of bleeding   Cardiovascular:      Rate and Rhythm: Normal rate and regular rhythm.      Pulses: Normal pulses.      Heart sounds: Normal heart sounds.   Pulmonary:       Effort: Pulmonary effort is normal. No respiratory distress. Trach in place  Abdominal:      General: Abdomen is flat. Bowel sounds are normal.      Palpations: Abdomen is soft.   Skin:     General: Skin is warm.     Vents:  Vent Mode: A/C (02/25/23 0455)  Ventilator Initiated: Yes (02/20/23 1443)  Set Rate: 14 BPM (02/25/23 0455)  Vt Set: 450 mL (02/25/23 0455)  Pressure Support: 5 cmH20 (02/23/23 0424)  PEEP/CPAP: 5 cmH20 (02/25/23 0455)  Oxygen Concentration (%): 30 (02/25/23 0530)  Peak Airway Pressure: 14 cmH20 (02/25/23 0455)  Plateau Pressure: 12 cmH20 (02/25/23 0455)  Total Ve: 7.14 L/m (02/25/23 0455)  Negative Inspiratory Force (cm H2O): -25 (02/25/23 0455)  F/VT Ratio<105 (RSBI): (!) 30.55 (02/25/23 0455)    Lines/Drains/Airways       Drain  Duration                  Closed/Suction Drain 02/20/23 Left Neck Bulb 19 Fr. 5 days         Trans Pyloric Feeding Tube 02/20/23 1320 nasogastric;Other (comments) 12 Fr. Right nostril 4 days         Urethral Catheter 02/20/23 0945 Non-latex 16 Fr. 4 days              Airway  Duration             Adult Surgical Airway 02/24/23 1658 <1 day              Peripheral Intravenous Line  Duration                  Peripheral IV - Single Lumen 02/20/23 0726 20 G Left;Posterior Forearm 4 days         Peripheral IV - Single Lumen 02/24/23 0430 18 G Anterior;Right Wrist 1 day         Midline Catheter Insertion/Assessment  - Single Lumen 02/24/23 1045 Right brachial vein 18g x 10cm <1 day                    Significant Labs:    CBC/Anemia Profile:  Recent Labs   Lab 02/24/23  0414 02/24/23  0601 02/25/23  0319   WBC 5.06  --  4.56   HGB 8.8*  --  8.0*   HCT 27.9* 22* 24.7*   *  --  106*   MCV 97  --  94   RDW 13.0  --  12.6        Chemistries:  Recent Labs   Lab 02/23/23  2135 02/24/23  0414 02/25/23  0319    141 138   K 3.3* 4.4 4.9   * 108 107   CO2 22* 25 26   BUN 14 15 17   CREATININE 0.8 0.9 0.9   CALCIUM 6.9* 8.7 8.3*   ALBUMIN  --  2.8* 2.5*   PROT  --   5.5* 5.0*   BILITOT  --  0.5 0.3   ALKPHOS  --  41* 39*   ALT  --  7* 5*   AST  --  12 13   MG 1.4* 2.8* 1.7   PHOS  --  2.4* 4.5       All pertinent labs within the past 24 hours have been reviewed.    Significant Imaging:  I have reviewed all pertinent imaging results/findings within the past 24 hours.    Assessment/Plan:     Oncology  * Squamous cell carcinoma of left tonsil  Dereck Centeno is a 62 y.o. male with PMHx of HTN, emphysema, HCV s/p liver transplant in 2015, tobacco abuse (50+ pack years), oral cancer s/p surgery at [facility] in 2019 (no chemo/radiation), and newly diagnosed SCC of the left tonsil. He is now s/p left tonsillectomy and neck dissection by Dr. Templeton on 2/20. Post operative course was c/b development of large hematoma at surgical site. He was taken back to the OR for an emergent neck exploration and hematoma evacuation on 2/20.       Neuro/Psych:   -- Sedation: precedex gtt and propofol gtt  -- Pain: home gabapentin, tylenol, PRN fentanyl   -- Psych: home clonazepam PRN, scheduled mirtazapine, seroquel, lexapro     Cards  -- HDS; not currently on pressors   -- Continue home amlodipine and clonidine   -- PRN labetalol and hydralazine   -- Continue home statin       Pulm:   -- H/o emphysema   -- Trach placed 2/24;   -- Ipratropium nebs scheduled,albuterol nebs PRN for wheezing   -- Goal O2 sat > 88%  -- Wean as able  -- Daily SBT       Renal:  -- Keep auguste for strict I/O  -- BUN/Cr stable 17/0.9  -- UOP 1.8L/24H      FEN / GI:   -- Net +-600cc's/24H  -- Replace lytes as needed  -- Nutrition: NPO + TF  -- mIVF discontinued      ID:   -- Tm: afebrile; WBC 5  -- Abx: none       Heme/Onc:   -- H/H decreased to 8/24.7 from 8.8/27.9; no obvious signs of bleeding  -- H/o thrombocytopenia    -- Platelets slowly down trending since admission; 106 from 89 on 2/23   -- Continue to monitor   -- Daily CBC  -- Home ASA held       Endo:   -- Gluc goal 140-180  -- SSI      Immuno:  -- H/o HCV  infection s/p liver transplant in 2015  -- Hepatology consulted  -- Continue home tacrolimus  -- Monitor tacrolimus levels and INR daily   -- Valacyclovir daily per ENT      PPx:   Feeding: NPO + TF  Analgesia/Sedation: PRN fentanyl, home gabapentin, tylenol/ precedex gtt   Thromboembolic prevention: lovenox   HOB >30: yes   Stress Ulcer ppx: famotidine BID   Glucose control: Critical care goal 140-180 g/dl, ISS    Lines/Drains/Airway: ETT, auguste, PIVx2, NG tube, neck drain x2      Dispo/Code Status/Palliative:   -- SICU / Full Code        Critical care was time spent personally by me on the following activities: development of treatment plan with patient or surrogate and bedside caregivers, discussions with consultants, evaluation of patient's response to treatment, examination of patient, ordering and performing treatments and interventions, ordering and review of laboratory studies, ordering and review of radiographic studies, pulse oximetry, re-evaluation of patient's condition.  This critical care time did not overlap with that of any other provider or involve time for any procedures.     BASIL Dorsey MD  Critical Care - Surgery  [facility]

## 2023-02-25 NOTE — PLAN OF CARE
"      SICU PLAN OF CARE NOTE    Dx: Squamous cell carcinoma of left tonsil    Shift Events: increased TF as tolerated, weaned sedation so pt vent mode can be changed to spontaneous; failed first wean trail d/t apnea, will attempt again after shift change; propofol off, precedex at 0.05 mcg/kg/hr    Goals of Care: wean vent as tolerated, pain control    Neuro: Sedated, Arouses to Voice, Follows Commands, and Moves All Extremities    Vital Signs: /63   Pulse 63   Temp 97.8 °F (36.6 °C) (Oral)   Resp 17   Ht 5' 10" (1.778 m)   Wt 75.9 kg (167 lb 5.3 oz)   SpO2 99%   BMI 24.01 kg/m²     Respiratory: Ventilator    Diet: NPO and Tube Feeds    Gtts: Propfol and Precedex    Urine Output: Urinary Catheter 765 cc/shift    Drains: ENMANUEL Drain, total output 15 cc / shift    Restraints bilat wrist renew at 2100     Labs/Accuchecks: accuchecks q6h, daily CBC, CMP, mag, phos.    Skin: surgical incision, ENMANUEL insertion site, no breakdown noted          "

## 2023-02-25 NOTE — SUBJECTIVE & OBJECTIVE
Interval History/Significant Events: NAEON, Afebrile, VSS.  Now s/p trach placement (2/24).  On rate control overnight.      Follow-up For: Procedure(s) (LRB):  CREATION, TRACHEOSTOMY (N/A)    Post-Operative Day: 1 Day Post-Op    Objective:     Vital Signs (Most Recent):  Temp: 97.8 °F (36.6 °C) (02/25/23 0330)  Pulse: 63 (02/25/23 0530)  Resp: 17 (02/25/23 0530)  BP: 107/63 (02/25/23 0530)  SpO2: 99 % (02/25/23 0530) Vital Signs (24h Range):  Temp:  [97.8 °F (36.6 °C)-98.2 °F (36.8 °C)] 97.8 °F (36.6 °C)  Pulse:  [54-72] 63  Resp:  [14-20] 17  SpO2:  [97 %-100 %] 99 %  BP: ()/(51-89) 107/63     Weight: 75.9 kg (167 lb 5.3 oz)  Body mass index is 24.01 kg/m².      Intake/Output Summary (Last 24 hours) at 2/25/2023 0553  Last data filed at 2/25/2023 0500  Gross per 24 hour   Intake 1622.36 ml   Output 2141 ml   Net -518.64 ml       Physical Exam  Vitals and nursing note reviewed.   Constitutional:       Appearance: Normal appearance.      Interventions: He is sedated, intubated and restrained.   HENT:      Head: Normocephalic and atraumatic.      Nose:      Comments: NG tube sutured in place   Eyes:      Conjunctiva/sclera: Conjunctivae normal.      Pupils: Pupils are equal, round, and reactive to light.   Neck:      Comments: ENMANUEL drain with SS on left side of neck   Surgical incision clean and dry   Fresh trach in place with no signs of bleeding   Cardiovascular:      Rate and Rhythm: Normal rate and regular rhythm.      Pulses: Normal pulses.      Heart sounds: Normal heart sounds.   Pulmonary:      Effort: Pulmonary effort is normal. No respiratory distress. Trach in place  Abdominal:      General: Abdomen is flat. Bowel sounds are normal.      Palpations: Abdomen is soft.   Skin:     General: Skin is warm.     Vents:  Vent Mode: A/C (02/25/23 0455)  Ventilator Initiated: Yes (02/20/23 1443)  Set Rate: 14 BPM (02/25/23 0455)  Vt Set: 450 mL (02/25/23 0455)  Pressure Support: 5 cmH20 (02/23/23  6608)  PEEP/CPAP: 5 cmH20 (02/25/23 0455)  Oxygen Concentration (%): 30 (02/25/23 0530)  Peak Airway Pressure: 14 cmH20 (02/25/23 0455)  Plateau Pressure: 12 cmH20 (02/25/23 0455)  Total Ve: 7.14 L/m (02/25/23 0455)  Negative Inspiratory Force (cm H2O): -25 (02/25/23 0455)  F/VT Ratio<105 (RSBI): (!) 30.55 (02/25/23 0455)    Lines/Drains/Airways       Drain  Duration                  Closed/Suction Drain 02/20/23 Left Neck Bulb 19 Fr. 5 days         Trans Pyloric Feeding Tube 02/20/23 1320 nasogastric;Other (comments) 12 Fr. Right nostril 4 days         Urethral Catheter 02/20/23 0945 Non-latex 16 Fr. 4 days              Airway  Duration             Adult Surgical Airway 02/24/23 1658 <1 day              Peripheral Intravenous Line  Duration                  Peripheral IV - Single Lumen 02/20/23 0726 20 G Left;Posterior Forearm 4 days         Peripheral IV - Single Lumen 02/24/23 0430 18 G Anterior;Right Wrist 1 day         Midline Catheter Insertion/Assessment  - Single Lumen 02/24/23 1045 Right brachial vein 18g x 10cm <1 day                    Significant Labs:    CBC/Anemia Profile:  Recent Labs   Lab 02/24/23  0414 02/24/23  0601 02/25/23  0319   WBC 5.06  --  4.56   HGB 8.8*  --  8.0*   HCT 27.9* 22* 24.7*   *  --  106*   MCV 97  --  94   RDW 13.0  --  12.6        Chemistries:  Recent Labs   Lab 02/23/23  2135 02/24/23  0414 02/25/23  0319    141 138   K 3.3* 4.4 4.9   * 108 107   CO2 22* 25 26   BUN 14 15 17   CREATININE 0.8 0.9 0.9   CALCIUM 6.9* 8.7 8.3*   ALBUMIN  --  2.8* 2.5*   PROT  --  5.5* 5.0*   BILITOT  --  0.5 0.3   ALKPHOS  --  41* 39*   ALT  --  7* 5*   AST  --  12 13   MG 1.4* 2.8* 1.7   PHOS  --  2.4* 4.5       All pertinent labs within the past 24 hours have been reviewed.    Significant Imaging:  I have reviewed all pertinent imaging results/findings within the past 24 hours.

## 2023-02-25 NOTE — BRIEF OP NOTE
[facility]  Brief Operative Note    SUMMARY     Surgery Date: 2/24/2023     Surgeon(s) and Role:     * Alexandre Templeton MD - Primary     * Marc Cano MD - Resident - Observing    Assisting Surgeon: None    Pre-op Diagnosis:  Squamous cell carcinoma of left tonsil [C09.9]    Post-op Diagnosis:  Post-Op Diagnosis Codes:     * Squamous cell carcinoma of left tonsil [C09.9]    Procedure(s) (LRB):  CREATION, TRACHEOSTOMY (N/A)    Anesthesia: General    Operative Findings: 6-0 cuffed tracheostomy tube placed without complication.     Estimated Blood Loss: * No values recorded between 2/24/2023  3:43 PM and 2/24/2023  4:15 PM *    Estimated Blood Loss has not been documented. EBL = minimal.         Specimens:   Specimen (24h ago, onward)      None            [id number]

## 2023-02-26 LAB
ALBUMIN SERPL BCP-MCNC: 2.6 G/DL (ref 3.5–5.2)
ALP SERPL-CCNC: 38 U/L (ref 55–135)
ALT SERPL W/O P-5'-P-CCNC: 9 U/L (ref 10–44)
ANION GAP SERPL CALC-SCNC: 6 MMOL/L (ref 8–16)
AST SERPL-CCNC: 16 U/L (ref 10–40)
BASOPHILS # BLD AUTO: 0.01 K/UL (ref 0–0.2)
BASOPHILS NFR BLD: 0.2 % (ref 0–1.9)
BILIRUB SERPL-MCNC: 0.3 MG/DL (ref 0.1–1)
BUN SERPL-MCNC: 22 MG/DL (ref 8–23)
CALCIUM SERPL-MCNC: 8.6 MG/DL (ref 8.7–10.5)
CHLORIDE SERPL-SCNC: 108 MMOL/L (ref 95–110)
CO2 SERPL-SCNC: 27 MMOL/L (ref 23–29)
CREAT SERPL-MCNC: 0.9 MG/DL (ref 0.5–1.4)
DIFFERENTIAL METHOD: ABNORMAL
EOSINOPHIL # BLD AUTO: 0.1 K/UL (ref 0–0.5)
EOSINOPHIL NFR BLD: 2.1 % (ref 0–8)
ERYTHROCYTE [DISTWIDTH] IN BLOOD BY AUTOMATED COUNT: 13.3 % (ref 11.5–14.5)
EST. GFR  (NO RACE VARIABLE): >60 ML/MIN/1.73 M^2
GLUCOSE SERPL-MCNC: 120 MG/DL (ref 70–110)
HCT VFR BLD AUTO: 24.6 % (ref 40–54)
HGB BLD-MCNC: 7.9 G/DL (ref 14–18)
IMM GRANULOCYTES # BLD AUTO: 0.03 K/UL (ref 0–0.04)
IMM GRANULOCYTES NFR BLD AUTO: 0.7 % (ref 0–0.5)
INR PPP: 1 (ref 0.8–1.2)
LYMPHOCYTES # BLD AUTO: 0.9 K/UL (ref 1–4.8)
LYMPHOCYTES NFR BLD: 20.9 % (ref 18–48)
MAGNESIUM SERPL-MCNC: 1.7 MG/DL (ref 1.6–2.6)
MCH RBC QN AUTO: 31 PG (ref 27–31)
MCHC RBC AUTO-ENTMCNC: 32.1 G/DL (ref 32–36)
MCV RBC AUTO: 97 FL (ref 82–98)
MONOCYTES # BLD AUTO: 0.4 K/UL (ref 0.3–1)
MONOCYTES NFR BLD: 10.2 % (ref 4–15)
NEUTROPHILS # BLD AUTO: 2.8 K/UL (ref 1.8–7.7)
NEUTROPHILS NFR BLD: 65.9 % (ref 38–73)
NRBC BLD-RTO: 0 /100 WBC
PHOSPHATE SERPL-MCNC: 2.6 MG/DL (ref 2.7–4.5)
PLATELET # BLD AUTO: 135 K/UL (ref 150–450)
PMV BLD AUTO: 10.4 FL (ref 9.2–12.9)
POCT GLUCOSE: 118 MG/DL (ref 70–110)
POCT GLUCOSE: 120 MG/DL (ref 70–110)
POCT GLUCOSE: 134 MG/DL (ref 70–110)
POCT GLUCOSE: 135 MG/DL (ref 70–110)
POCT GLUCOSE: 156 MG/DL (ref 70–110)
POTASSIUM SERPL-SCNC: 4.3 MMOL/L (ref 3.5–5.1)
PROT SERPL-MCNC: 5.3 G/DL (ref 6–8.4)
PROTHROMBIN TIME: 10.5 SEC (ref 9–12.5)
RBC # BLD AUTO: 2.55 M/UL (ref 4.6–6.2)
SODIUM SERPL-SCNC: 141 MMOL/L (ref 136–145)
TACROLIMUS BLD-MCNC: 3 NG/ML (ref 5–15)
WBC # BLD AUTO: 4.21 K/UL (ref 3.9–12.7)

## 2023-02-26 PROCEDURE — 25000003 PHARM REV CODE 250

## 2023-02-26 PROCEDURE — 99233 PR SUBSEQUENT HOSPITAL CARE,LEVL III: ICD-10-PCS | Mod: ,,, | Performed by: ANESTHESIOLOGY

## 2023-02-26 PROCEDURE — 99900035 HC TECH TIME PER 15 MIN (STAT)

## 2023-02-26 PROCEDURE — 94640 AIRWAY INHALATION TREATMENT: CPT

## 2023-02-26 PROCEDURE — 63600175 PHARM REV CODE 636 W HCPCS

## 2023-02-26 PROCEDURE — 63600175 PHARM REV CODE 636 W HCPCS: Performed by: STUDENT IN AN ORGANIZED HEALTH CARE EDUCATION/TRAINING PROGRAM

## 2023-02-26 PROCEDURE — 94003 VENT MGMT INPAT SUBQ DAY: CPT

## 2023-02-26 PROCEDURE — 27000221 HC OXYGEN, UP TO 24 HOURS

## 2023-02-26 PROCEDURE — 25000003 PHARM REV CODE 250: Performed by: STUDENT IN AN ORGANIZED HEALTH CARE EDUCATION/TRAINING PROGRAM

## 2023-02-26 PROCEDURE — 25000003 PHARM REV CODE 250: Performed by: OTOLARYNGOLOGY

## 2023-02-26 PROCEDURE — 94761 N-INVAS EAR/PLS OXIMETRY MLT: CPT

## 2023-02-26 PROCEDURE — 80197 ASSAY OF TACROLIMUS: CPT

## 2023-02-26 PROCEDURE — 85610 PROTHROMBIN TIME: CPT | Performed by: STUDENT IN AN ORGANIZED HEALTH CARE EDUCATION/TRAINING PROGRAM

## 2023-02-26 PROCEDURE — 84100 ASSAY OF PHOSPHORUS: CPT

## 2023-02-26 PROCEDURE — 80053 COMPREHEN METABOLIC PANEL: CPT

## 2023-02-26 PROCEDURE — 99900026 HC AIRWAY MAINTENANCE (STAT)

## 2023-02-26 PROCEDURE — 20000000 HC ICU ROOM

## 2023-02-26 PROCEDURE — 83735 ASSAY OF MAGNESIUM: CPT

## 2023-02-26 PROCEDURE — 85025 COMPLETE CBC W/AUTO DIFF WBC: CPT

## 2023-02-26 PROCEDURE — 27200966 HC CLOSED SUCTION SYSTEM

## 2023-02-26 PROCEDURE — 25000242 PHARM REV CODE 250 ALT 637 W/ HCPCS: Performed by: STUDENT IN AN ORGANIZED HEALTH CARE EDUCATION/TRAINING PROGRAM

## 2023-02-26 PROCEDURE — 99233 SBSQ HOSP IP/OBS HIGH 50: CPT | Mod: ,,, | Performed by: ANESTHESIOLOGY

## 2023-02-26 RX ADMIN — LEVALBUTEROL HYDROCHLORIDE 0.63 MG: 0.63 SOLUTION RESPIRATORY (INHALATION) at 07:02

## 2023-02-26 RX ADMIN — GABAPENTIN 600 MG: 300 CAPSULE ORAL at 03:02

## 2023-02-26 RX ADMIN — MAGNESIUM SULFATE 2 G: 2 INJECTION INTRAVENOUS at 03:02

## 2023-02-26 RX ADMIN — LEVALBUTEROL HYDROCHLORIDE 0.63 MG: 0.63 SOLUTION RESPIRATORY (INHALATION) at 03:02

## 2023-02-26 RX ADMIN — IPRATROPIUM BROMIDE 0.5 MG: 0.5 SOLUTION RESPIRATORY (INHALATION) at 03:02

## 2023-02-26 RX ADMIN — SODIUM PHOSPHATE, MONOBASIC, MONOHYDRATE AND SODIUM PHOSPHATE, DIBASIC, ANHYDROUS 15 MMOL: 142; 276 INJECTION, SOLUTION INTRAVENOUS at 05:02

## 2023-02-26 RX ADMIN — QUETIAPINE FUMARATE 50 MG: 25 TABLET ORAL at 09:02

## 2023-02-26 RX ADMIN — FAMOTIDINE 20 MG: 20 TABLET ORAL at 09:02

## 2023-02-26 RX ADMIN — ENOXAPARIN SODIUM 40 MG: 40 INJECTION SUBCUTANEOUS at 04:02

## 2023-02-26 RX ADMIN — PROPOFOL 35 MCG/KG/MIN: 10 INJECTION, EMULSION INTRAVENOUS at 08:02

## 2023-02-26 RX ADMIN — SENNOSIDES 5 ML: 8.8 SYRUP ORAL at 09:02

## 2023-02-26 RX ADMIN — PROPOFOL 40 MCG/KG/MIN: 10 INJECTION, EMULSION INTRAVENOUS at 05:02

## 2023-02-26 RX ADMIN — ACETAMINOPHEN 650 MG: 325 TABLET ORAL at 09:02

## 2023-02-26 RX ADMIN — MUPIROCIN: 20 OINTMENT TOPICAL at 08:02

## 2023-02-26 RX ADMIN — TACROLIMUS 1 MG: 1 CAPSULE ORAL at 05:02

## 2023-02-26 RX ADMIN — IPRATROPIUM BROMIDE 0.5 MG: 0.5 SOLUTION RESPIRATORY (INHALATION) at 12:02

## 2023-02-26 RX ADMIN — ACETAMINOPHEN 650 MG: 325 TABLET ORAL at 01:02

## 2023-02-26 RX ADMIN — MIRTAZAPINE 15 MG: 15 TABLET, FILM COATED ORAL at 09:02

## 2023-02-26 RX ADMIN — IPRATROPIUM BROMIDE 0.5 MG: 0.5 SOLUTION RESPIRATORY (INHALATION) at 07:02

## 2023-02-26 RX ADMIN — MUPIROCIN: 20 OINTMENT TOPICAL at 09:02

## 2023-02-26 RX ADMIN — DEXMEDETOMIDINE HYDROCHLORIDE 0.6 MCG/KG/HR: 4 INJECTION, SOLUTION INTRAVENOUS at 05:02

## 2023-02-26 RX ADMIN — GABAPENTIN 600 MG: 300 CAPSULE ORAL at 09:02

## 2023-02-26 RX ADMIN — POLYETHYLENE GLYCOL 3350 17 G: 17 POWDER, FOR SOLUTION ORAL at 08:02

## 2023-02-26 RX ADMIN — HYDROMORPHONE HYDROCHLORIDE 0.5 MG: 1 INJECTION, SOLUTION INTRAMUSCULAR; INTRAVENOUS; SUBCUTANEOUS at 01:02

## 2023-02-26 RX ADMIN — LEVALBUTEROL HYDROCHLORIDE 0.63 MG: 0.63 SOLUTION RESPIRATORY (INHALATION) at 12:02

## 2023-02-26 RX ADMIN — ACETAMINOPHEN 650 MG: 325 TABLET ORAL at 05:02

## 2023-02-26 RX ADMIN — AMLODIPINE BESYLATE 5 MG: 5 TABLET ORAL at 08:02

## 2023-02-26 RX ADMIN — ATORVASTATIN CALCIUM 40 MG: 40 TABLET, FILM COATED ORAL at 09:02

## 2023-02-26 RX ADMIN — ESCITALOPRAM OXALATE 20 MG: 20 TABLET ORAL at 09:02

## 2023-02-26 RX ADMIN — GABAPENTIN 600 MG: 300 CAPSULE ORAL at 08:02

## 2023-02-26 RX ADMIN — TACROLIMUS 2 MG: 1 CAPSULE ORAL at 07:02

## 2023-02-26 RX ADMIN — OXYCODONE HYDROCHLORIDE 10 MG: 10 TABLET ORAL at 12:02

## 2023-02-26 RX ADMIN — FAMOTIDINE 20 MG: 20 TABLET ORAL at 08:02

## 2023-02-26 NOTE — ASSESSMENT & PLAN NOTE
Dereck Centeno is a 62 y.o. male with PMHx of HTN, emphysema, HCV s/p liver transplant in 2015, tobacco abuse (50+ pack years), oral cancer s/p surgery at [facility] in 2019 (no chemo/radiation), and newly diagnosed SCC of the left tonsil. He is now s/p left tonsillectomy and neck dissection by Dr. Templeton on 2/20. Post operative course was c/b development of large hematoma at surgical site. He was taken back to the OR for an emergent neck exploration and hematoma evacuation on 2/20.       Neuro/Psych:   -- Sedation: precedex gtt and propofol gtt  -- Pain: home gabapentin, tylenol, PRN fentanyl   -- Psych: home clonazepam PRN, scheduled mirtazapine, seroquel, lexapro     Cards  -- HDS; not currently on pressors   -- Continue home amlodipine and clonidine   -- PRN labetalol and hydralazine   -- Continue home statin       Pulm:   -- H/o emphysema   -- Trach placed 2/24   -- Ipratropium nebs scheduled,albuterol nebs PRN for wheezing   -- Goal O2 sat > 88%  -- Wean as able  --Has been having apneic spells, will try SIMV today.      Renal:  -- Keep auguste for strict I/O  -- BUN/Cr stable 22/0.9  -- UOP 1.9L/24H      FEN / GI:   -- Net -819cc's/24H  -- Replace lytes as needed  -- Nutrition: NPO + TF  -- mIVF discontinued      ID:   -- Tm: afebrile; WBC 4.21   -- Abx: none       Heme/Onc:   -- H/H decreased to 7.9/24.3  -- H/o thrombocytopenia    -- Platelets slowly down trending since admission; 106 from 89 on 2/23   -- Continue to monitor   -- Daily CBC  -- Home ASA held       Endo:   -- Gluc goal 140-180  -- SSI      Immuno:  -- H/o HCV infection s/p liver transplant in 2015  -- Hepatology consulted  -- Continue home tacrolimus  -- Monitor tacrolimus levels and INR daily   -- Valacyclovir daily per ENT      PPx:   Feeding: NPO + TF  Analgesia/Sedation: PRN fentanyl, home gabapentin, tylenol/ precedex gtt   Thromboembolic prevention: lovenox   HOB >30: yes   Stress Ulcer ppx: famotidine BID   Glucose control: Critical  care goal 140-180 g/dl, ISS    Lines/Drains/Airway: ETT, auguste, PIVx2, NG tube, neck drain x2      Dispo/Code Status/Palliative:   -- SICU / Full Code

## 2023-02-26 NOTE — SUBJECTIVE & OBJECTIVE
Interval History/Significant Events: NAOE, on vent. Will plan for SIMV today. At goal tube feeds.     Follow-up For: Procedure(s) (LRB):  CREATION, TRACHEOSTOMY (N/A)    Post-Operative Day: 2 Days Post-Op    Objective:     Vital Signs (Most Recent):  Temp: 98.6 °F (37 °C) (02/26/23 0700)  Pulse: 70 (02/26/23 0731)  Resp: 13 (02/26/23 0731)  BP: 131/71 (02/26/23 0730)  SpO2: 100 % (02/26/23 0731) Vital Signs (24h Range):  Temp:  [98.2 °F (36.8 °C)-99.2 °F (37.3 °C)] 98.6 °F (37 °C)  Pulse:  [61-76] 70  Resp:  [13-24] 13  SpO2:  [93 %-100 %] 100 %  BP: ()/(52-76) 131/71     Weight: 75.9 kg (167 lb 5.3 oz)  Body mass index is 24.01 kg/m².      Intake/Output Summary (Last 24 hours) at 2/26/2023 0810  Last data filed at 2/26/2023 0700  Gross per 24 hour   Intake 1118.46 ml   Output 1820 ml   Net -701.54 ml     Physical Exam  Vitals and nursing note reviewed.   Constitutional:       Appearance: Normal appearance.      Interventions: He is sedated, intubated and restrained.   HENT:      Head: Normocephalic and atraumatic.      Nose:      Comments: NG tube sutured in place   Eyes:      Conjunctiva/sclera: Conjunctivae normal.      Pupils: Pupils are equal, round, and reactive to light.   Neck:      Comments: ENMANUEL drain with SS on left side of neck   Surgical incision clean and dry   Fresh trach in place with no signs of bleeding   Cardiovascular:      Rate and Rhythm: Normal rate and regular rhythm.      Pulses: Normal pulses.      Heart sounds: Normal heart sounds.   Pulmonary:      Effort: Pulmonary effort is normal. No respiratory distress. Trach in place  Abdominal:      General: Abdomen is flat. Bowel sounds are normal.      Palpations: Abdomen is soft.   Skin:     General: Skin is warm.     Vents:  Vent Mode: A/C (02/26/23 0730)  Ventilator Initiated: Yes (02/20/23 1443)  Set Rate: 13 BPM (02/26/23 0730)  Vt Set: 450 mL (02/26/23 0730)  Pressure Support: 10 cmH20 (02/25/23 0728)  PEEP/CPAP: 5 cmH20 (02/26/23  0730)  Oxygen Concentration (%): 30 (02/26/23 0730)  Peak Airway Pressure: 19 cmH20 (02/26/23 0730)  Plateau Pressure: 12 cmH20 (02/26/23 0730)  Total Ve: 6.79 L/m (02/26/23 0730)  Negative Inspiratory Force (cm H2O): -25 (02/26/23 0730)  F/VT Ratio<105 (RSBI): (!) 24.34 (02/26/23 0730)    Lines/Drains/Airways       Drain  Duration                  Closed/Suction Drain 02/20/23 Left Neck Bulb 19 Fr. 6 days         Trans Pyloric Feeding Tube 02/20/23 1320 nasogastric;Other (comments) 12 Fr. Right nostril 5 days         Urethral Catheter 02/20/23 0945 Non-latex 16 Fr. 5 days              Airway  Duration             Adult Surgical Airway 02/24/23 1658 1 day              Peripheral Intravenous Line  Duration                  Peripheral IV - Single Lumen 02/20/23 0726 20 G Left;Posterior Forearm 6 days         Peripheral IV - Single Lumen 02/24/23 0430 18 G Anterior;Right Wrist 2 days         Midline Catheter Insertion/Assessment  - Single Lumen 02/24/23 1045 Right brachial vein 18g x 10cm 1 day                    Significant Labs:    CBC/Anemia Profile:  Recent Labs   Lab 02/25/23  0319 02/26/23  0242   WBC 4.56 4.21   HGB 8.0* 7.9*   HCT 24.7* 24.6*   * 135*   MCV 94 97   RDW 12.6 13.3        Chemistries:  Recent Labs   Lab 02/25/23  0319 02/25/23  1025 02/26/23  0242    140 141   K 4.9 4.5 4.3    107 108   CO2 26 27 27   BUN 17 18 22   CREATININE 0.9 0.9 0.9   CALCIUM 8.3* 8.4* 8.6*   ALBUMIN 2.5* 2.6* 2.6*   PROT 5.0* 5.1* 5.3*   BILITOT 0.3 0.4 0.3   ALKPHOS 39* 42* 38*   ALT 5* 10 9*   AST 13 12 16   MG 1.7 2.1 1.7   PHOS 4.5 3.5 2.6*           Significant Imaging:  I have reviewed all pertinent imaging results/findings within the past 24 hours.

## 2023-02-26 NOTE — OP NOTE
DATE OF PROCEDURE: 2/24/2023     PREOPERATIVE DIAGNOSES:   1. Squamous cell carcinoma of left tonsil status post transoral robotic surgery with left radical tonsillectomy and left neck dissection  2. Respiratory failure    POSTOPERATIVE DIAGNOSES:   Same    SURGEON:  Surgeon(s) and Role:     * Alexandre Templeton MD - Primary     * Marc Cano MD - Resident - Observing      PROCEDURES PERFORMED:   Tracheostomy    ANESTHESIA: General      INDICATIONS FOR PROCEDURE:   Dereck Centeno is a 62 y.o. and status post transoral robotic surgery with left radical tonsillectomy and left neck dissection earlier this week.  Unfortunately, we have not ventilator and he presents today for tracheostomy.    His family was apprised of the risks, benefits and alternatives to surgery.  In spite of the risk inherent to surgery,his family provided informed consent for the aforementioned procedures on his behalf.     PROCEDURE IN DETAIL:  The patient was taken to the operating room and placed on the operating table in the supine position.  General endotracheal anesthesia was induced by the anesthesia team.     An approximate 3 cm incision was marked level of the cricoid cartilage injected with several cc of 1% lidocaine with epinephrine.  The neck was then prepped and draped in standard sterile fashion.      To begin, the skin was incised utilizing the Bovie on cutting current.  Sharp dissection proceeded through the underlying subcutaneous tissue and platysma.  The midline of the neck was identified and divided with the Bovie.  Strap musculature was bluntly elevated off the underlying thyroid gland and retracted laterally.  The thyroid isthmus was bluntly dissected away from the underlying trachea and divided with the Bovie.  The airway was retracted anteriorly and superiorly by placing a hook deep to the cricoid cartilage.  Next, the airway was entered between the 1st and 2nd tracheal rings utilizing a 15 blade.  An  inferiorly based Charmaine flap was fashioned by making downward cuts under tracheostomy utilizing curved Grimaldo scissors.  The flap was secured to the skin of the neck utilizing 3-0 Vicryl.  With the flap secured, the endotracheal tube was withdrawn by anesthesia.  A size 6 cuffed Shiley tracheostomy tube was then placed in the airway.  The cuff was inflated and was attached to the anesthesia circuit.  Placement in the airway was confirmed with return of end-tidal CO2 via the anesthesia circuit.  Tube was then secured the 4 corners with silk suture and circumferentially around the neck with Velcro tie.  He was then handed back to anesthesia and returned to the ICU in satisfactory condition.    There were no intraoperative complications.  I was present for and participated in the entire procedure as dictated above.       ESTIMATED BLOOD LOSS: Minimal    SPECIMENS:   Specimen (24h ago, onward)      None

## 2023-02-26 NOTE — PT/OT/SLP PROGRESS
Occupational Therapy      Patient Name:  Dereck Centeno   MRN:  [id number]    Patient not seen today secondary to Other (Comment) (on sedation medication at time of attempt). Will follow-up as scheduled.    2/26/2023

## 2023-02-26 NOTE — PLAN OF CARE
"SICU PLAN OF CARE NOTE    Dx: Squamous cell carcinoma of left tonsil    Goals of Care:  MAP >65, SBP <180    Vital Signs:  /69 (BP Location: Left arm, Patient Position: Lying)   Pulse 74   Temp 98.5 °F (36.9 °C) (Oral)   Resp 20   Ht 5' 10" (1.778 m)   Wt 75.9 kg (167 lb 5.3 oz)   SpO2 (!) 94%   BMI 24.01 kg/m²     Cardiac:  NSR    Resp:  SpO2 99% on vent (30%, 5 PEEP)    Neuro:  Arouses to Voice, Follows Commands, and Moves All Extremities    Gtts:  Propfol and Precedex    Urine Output:  Urinary Catheter 880 cc/shift    Drains:  ENMANUEL Drain, total output 5 cc / shift    Diet:  NPO and Tube Feeds     Labs/Accuchecks:  daily labs, accuchecks Q6h    Skin:  All skin remains free from new injury. Foams in place, heel boots on, patient turned Q2, waffle mattress inflated, ICU bed working correctly.    Shift Events: See flowsheet for further assessment/details.  Family updated on current condition/plan of care, questions answered, and emotional support provided.  MD updated on current condition, vitals, labs, and gtts.  No new orders received, will continue to monitor.    "

## 2023-02-26 NOTE — PROGRESS NOTES
[facility]  Critical Care - Surgery  Progress Note    Patient Name: Dereck Centeno  MRN: [id number]  Admission Date: 2/20/2023  Hospital Length of Stay: 6 days  Code Status: Full Code  Attending Provider: Alexandre Templeton MD  Primary Care Provider: Eva Reynaga MD   Principal Problem: Squamous cell carcinoma of left tonsil    Subjective:     Hospital/ICU Course:  No notes on file    Interval History/Significant Events: NAOE, on vent. Will plan for SIMV today. At goal tube feeds.     Follow-up For: Procedure(s) (LRB):  CREATION, TRACHEOSTOMY (N/A)    Post-Operative Day: 2 Days Post-Op    Objective:     Vital Signs (Most Recent):  Temp: 98.6 °F (37 °C) (02/26/23 0700)  Pulse: 70 (02/26/23 0731)  Resp: 13 (02/26/23 0731)  BP: 131/71 (02/26/23 0730)  SpO2: 100 % (02/26/23 0731) Vital Signs (24h Range):  Temp:  [98.2 °F (36.8 °C)-99.2 °F (37.3 °C)] 98.6 °F (37 °C)  Pulse:  [61-76] 70  Resp:  [13-24] 13  SpO2:  [93 %-100 %] 100 %  BP: ()/(52-76) 131/71     Weight: 75.9 kg (167 lb 5.3 oz)  Body mass index is 24.01 kg/m².      Intake/Output Summary (Last 24 hours) at 2/26/2023 0810  Last data filed at 2/26/2023 0700  Gross per 24 hour   Intake 1118.46 ml   Output 1820 ml   Net -701.54 ml     Physical Exam  Vitals and nursing note reviewed.   Constitutional:       Appearance: Normal appearance.      Interventions: He is sedated, intubated and restrained.   HENT:      Head: Normocephalic and atraumatic.      Nose:      Comments: NG tube sutured in place   Eyes:      Conjunctiva/sclera: Conjunctivae normal.      Pupils: Pupils are equal, round, and reactive to light.   Neck:      Comments: ENMANUEL drain with SS on left side of neck   Surgical incision clean and dry   Fresh trach in place with no signs of bleeding   Cardiovascular:      Rate and Rhythm: Normal rate and regular rhythm.      Pulses: Normal pulses.      Heart sounds: Normal heart sounds.   Pulmonary:      Effort: Pulmonary effort  is normal. No respiratory distress. Trach in place  Abdominal:      General: Abdomen is flat. Bowel sounds are normal.      Palpations: Abdomen is soft.   Skin:     General: Skin is warm.     Vents:  Vent Mode: A/C (02/26/23 0730)  Ventilator Initiated: Yes (02/20/23 1443)  Set Rate: 13 BPM (02/26/23 0730)  Vt Set: 450 mL (02/26/23 0730)  Pressure Support: 10 cmH20 (02/25/23 0728)  PEEP/CPAP: 5 cmH20 (02/26/23 0730)  Oxygen Concentration (%): 30 (02/26/23 0730)  Peak Airway Pressure: 19 cmH20 (02/26/23 0730)  Plateau Pressure: 12 cmH20 (02/26/23 0730)  Total Ve: 6.79 L/m (02/26/23 0730)  Negative Inspiratory Force (cm H2O): -25 (02/26/23 0730)  F/VT Ratio<105 (RSBI): (!) 24.34 (02/26/23 0730)    Lines/Drains/Airways       Drain  Duration                  Closed/Suction Drain 02/20/23 Left Neck Bulb 19 Fr. 6 days         Trans Pyloric Feeding Tube 02/20/23 1320 nasogastric;Other (comments) 12 Fr. Right nostril 5 days         Urethral Catheter 02/20/23 0945 Non-latex 16 Fr. 5 days              Airway  Duration             Adult Surgical Airway 02/24/23 1658 1 day              Peripheral Intravenous Line  Duration                  Peripheral IV - Single Lumen 02/20/23 0726 20 G Left;Posterior Forearm 6 days         Peripheral IV - Single Lumen 02/24/23 0430 18 G Anterior;Right Wrist 2 days         Midline Catheter Insertion/Assessment  - Single Lumen 02/24/23 1045 Right brachial vein 18g x 10cm 1 day                    Significant Labs:    CBC/Anemia Profile:  Recent Labs   Lab 02/25/23  0319 02/26/23  0242   WBC 4.56 4.21   HGB 8.0* 7.9*   HCT 24.7* 24.6*   * 135*   MCV 94 97   RDW 12.6 13.3        Chemistries:  Recent Labs   Lab 02/25/23  0319 02/25/23  1025 02/26/23  0242    140 141   K 4.9 4.5 4.3    107 108   CO2 26 27 27   BUN 17 18 22   CREATININE 0.9 0.9 0.9   CALCIUM 8.3* 8.4* 8.6*   ALBUMIN 2.5* 2.6* 2.6*   PROT 5.0* 5.1* 5.3*   BILITOT 0.3 0.4 0.3   ALKPHOS 39* 42* 38*   ALT 5* 10 9*   AST  13 12 16   MG 1.7 2.1 1.7   PHOS 4.5 3.5 2.6*           Significant Imaging:  I have reviewed all pertinent imaging results/findings within the past 24 hours.    Assessment/Plan:     Oncology  * Squamous cell carcinoma of left tonsil  Dereck Centeno is a 62 y.o. male with PMHx of HTN, emphysema, HCV s/p liver transplant in 2015, tobacco abuse (50+ pack years), oral cancer s/p surgery at [facility] in 2019 (no chemo/radiation), and newly diagnosed SCC of the left tonsil. He is now s/p left tonsillectomy and neck dissection by Dr. Templeton on 2/20. Post operative course was c/b development of large hematoma at surgical site. He was taken back to the OR for an emergent neck exploration and hematoma evacuation on 2/20.       Neuro/Psych:   -- Sedation: precedex gtt and propofol gtt  -- Pain: home gabapentin, tylenol, PRN fentanyl   -- Psych: home clonazepam PRN, scheduled mirtazapine, seroquel, lexapro     Cards  -- HDS; not currently on pressors   -- Continue home amlodipine and clonidine   -- PRN labetalol and hydralazine   -- Continue home statin       Pulm:   -- H/o emphysema   -- Trach placed 2/24   -- Ipratropium nebs scheduled,albuterol nebs PRN for wheezing   -- Goal O2 sat > 88%  -- Wean as able  --Has been having apneic spells, will try SIMV today.      Renal:  -- Keep auguste for strict I/O  -- BUN/Cr stable 22/0.9  -- UOP 1.9L/24H      FEN / GI:   -- Net -819cc's/24H  -- Replace lytes as needed  -- Nutrition: NPO + TF  -- mIVF discontinued      ID:   -- Tm: afebrile; WBC 4.21   -- Abx: none       Heme/Onc:   -- H/H decreased to 7.9/24.3  -- H/o thrombocytopenia    -- Platelets slowly down trending since admission; 106 from 89 on 2/23   -- Continue to monitor   -- Daily CBC  -- Home ASA held       Endo:   -- Gluc goal 140-180  -- SSI      Immuno:  -- H/o HCV infection s/p liver transplant in 2015  -- Hepatology consulted  -- Continue home tacrolimus  -- Monitor tacrolimus levels and INR daily   -- Valacyclovir  daily per ENT      PPx:   Feeding: NPO + TF  Analgesia/Sedation: PRN fentanyl, home gabapentin, tylenol/ precedex gtt   Thromboembolic prevention: lovenox   HOB >30: yes   Stress Ulcer ppx: famotidine BID   Glucose control: Critical care goal 140-180 g/dl, ISS    Lines/Drains/Airway: ETT, auguste, PIVx2, NG tube, neck drain x2      Dispo/Code Status/Palliative:   -- SICU / Full Code           Critical care was time spent personally by me on the following activities: development of treatment plan with patient or surrogate and bedside caregivers, discussions with consultants, evaluation of patient's response to treatment, examination of patient, ordering and performing treatments and interventions, ordering and review of laboratory studies, ordering and review of radiographic studies, pulse oximetry, re-evaluation of patient's condition.  This critical care time did not overlap with that of any other provider or involve time for any procedures.     Jocelynn Juan MD  Critical Care - Surgery  [facility]

## 2023-02-26 NOTE — SUBJECTIVE & OBJECTIVE
Interval History: NAEON. Resting in bed. Remains on vent.     Medications:  Continuous Infusions:   dexmedeTOMIDine (Precedex) infusion (titrating) 0.1 mcg/kg/hr (02/26/23 0700)    propofoL 35 mcg/kg/min (02/26/23 0814)     Scheduled Meds:   acetaminophen  650 mg Per NG tube Q8H    amLODIPine  5 mg Per NG tube Daily    atorvastatin  40 mg Per NG tube QHS    cloNIDine  0.1 mg Per NG tube QHS    enoxaparin  40 mg Subcutaneous Daily    EScitalopram oxalate  20 mg Per NG tube QHS    famotidine  20 mg Per NG tube BID    gabapentin  600 mg Per NG tube TID    ipratropium  0.5 mg Nebulization Q8H    lactated ringers  500 mL Intravenous Once    levalbuterol  0.63 mg Nebulization Q8H    mirtazapine  15 mg Per NG tube QHS    mupirocin   Nasal BID    polyethylene glycol  17 g Per NG tube Daily    QUEtiapine  50 mg Per NG tube QHS    sennosides 8.8 mg/5 ml  5 mL Per NG tube BID    tacrolimus  2 mg Per NG tube Daily AM    And    tacrolimus  1 mg Per NG tube Daily PM     PRN Meds:sodium chloride 0.9%, calcium gluconate IVPB, calcium gluconate IVPB, calcium gluconate IVPB, clonazePAM, dextrose 10%, dextrose 10%, fentaNYL, glucagon (human recombinant), hydrALAZINE, HYDROmorphone, insulin aspart U-100, labetalol, magnesium sulfate IVPB, magnesium sulfate IVPB, ondansetron, oxyCODONE, oxyCODONE, potassium chloride **AND** potassium chloride **AND** potassium chloride, sodium phosphate IVPB, sodium phosphate IVPB, sodium phosphate IVPB     Review of patient's allergies indicates:  No Known Allergies  Objective:     Vital Signs (24h Range):  Temp:  [98.2 °F (36.8 °C)-99.2 °F (37.3 °C)] 98.6 °F (37 °C)  Pulse:  [61-76] 73  Resp:  [13-24] 13  SpO2:  [93 %-100 %] 100 %  BP: ()/(52-76) 138/72     Date 02/26/23 0700 - 02/27/23 0659   Shift 8701-8227 7271-2798 3720-8324 24 Hour Total   INTAKE   I.V.(mL/kg) 20.2(0.3)   20.2(0.3)   IV Piggyback 83.2   83.2   Shift Total(mL/kg) 103.5(1.4)   103.5(1.4)   OUTPUT   Urine(mL/kg/hr) 200   200    Shift Total(mL/kg) 200(2.6)   200(2.6)   Weight (kg) 75.9 75.9 75.9 75.9     Lines/Drains/Airways       Drain  Duration                  Closed/Suction Drain 02/20/23 Left Neck Bulb 19 Fr. 6 days         Trans Pyloric Feeding Tube 02/20/23 1320 nasogastric;Other (comments) 12 Fr. Right nostril 5 days         Urethral Catheter 02/20/23 0945 Non-latex 16 Fr. 5 days              Airway  Duration             Adult Surgical Airway 02/24/23 1658 1 day              Peripheral Intravenous Line  Duration                  Peripheral IV - Single Lumen 02/20/23 0726 20 G Left;Posterior Forearm 6 days         Peripheral IV - Single Lumen 02/24/23 0430 18 G Anterior;Right Wrist 2 days         Midline Catheter Insertion/Assessment  - Single Lumen 02/24/23 1045 Right brachial vein 18g x 10cm 1 day                    Physical Exam  NAD  Oral cavity with no obvious bleeding though intraoral examination limited.   Left ENMANUEL x1 w/ appropriate output, stripped.    Neck ecchymotic but improved, soft   6-0 cuffed trach in place, secured w sutures and soft collar, on vent  Vent Mode: A/C  Oxygen Concentration (%):  [30] 30  Resp Rate Total:  [13 br/min-27 br/min] 16 br/min  Vt Set:  [450 mL] 450 mL  PEEP/CPAP:  [5 cmH20] 5 cmH20  Mean Airway Pressure:  [6.9 cmH20-10 cmH20] 8.5 cmH20      Significant Labs:  BMP:   Recent Labs   Lab 02/26/23  0242   *      CO2 27   BUN 22   CREATININE 0.9   CALCIUM 8.6*   MG 1.7     CBC:   Recent Labs   Lab 02/26/23  0242   WBC 4.21   RBC 2.55*   HGB 7.9*   HCT 24.6*   *   MCV 97   MCH 31.0   MCHC 32.1       Significant Diagnostics:  I have reviewed and interpreted all pertinent imaging results/findings within the past 24 hours.

## 2023-02-26 NOTE — PROGRESS NOTES
[facility]  Otorhinolaryngology-Head & Neck Surgery  Progress Note    Subjective:     Post-Op Info:  Procedure(s) (LRB):  CREATION, TRACHEOSTOMY (N/A)   2 Days Post-Op  Hospital Day: 7     Interval History: NAEON. Resting in bed. Remains on vent.     Medications:  Continuous Infusions:   dexmedeTOMIDine (Precedex) infusion (titrating) 0.1 mcg/kg/hr (02/26/23 0700)    propofoL 35 mcg/kg/min (02/26/23 0814)     Scheduled Meds:   acetaminophen  650 mg Per NG tube Q8H    amLODIPine  5 mg Per NG tube Daily    atorvastatin  40 mg Per NG tube QHS    cloNIDine  0.1 mg Per NG tube QHS    enoxaparin  40 mg Subcutaneous Daily    EScitalopram oxalate  20 mg Per NG tube QHS    famotidine  20 mg Per NG tube BID    gabapentin  600 mg Per NG tube TID    ipratropium  0.5 mg Nebulization Q8H    lactated ringers  500 mL Intravenous Once    levalbuterol  0.63 mg Nebulization Q8H    mirtazapine  15 mg Per NG tube QHS    mupirocin   Nasal BID    polyethylene glycol  17 g Per NG tube Daily    QUEtiapine  50 mg Per NG tube QHS    sennosides 8.8 mg/5 ml  5 mL Per NG tube BID    tacrolimus  2 mg Per NG tube Daily AM    And    tacrolimus  1 mg Per NG tube Daily PM     PRN Meds:sodium chloride 0.9%, calcium gluconate IVPB, calcium gluconate IVPB, calcium gluconate IVPB, clonazePAM, dextrose 10%, dextrose 10%, fentaNYL, glucagon (human recombinant), hydrALAZINE, HYDROmorphone, insulin aspart U-100, labetalol, magnesium sulfate IVPB, magnesium sulfate IVPB, ondansetron, oxyCODONE, oxyCODONE, potassium chloride **AND** potassium chloride **AND** potassium chloride, sodium phosphate IVPB, sodium phosphate IVPB, sodium phosphate IVPB     Review of patient's allergies indicates:  No Known Allergies  Objective:     Vital Signs (24h Range):  Temp:  [98.2 °F (36.8 °C)-99.2 °F (37.3 °C)] 98.6 °F (37 °C)  Pulse:  [61-76] 73  Resp:  [13-24] 13  SpO2:  [93 %-100 %] 100 %  BP: ()/(52-76) 138/72     Date  02/26/23 0700 - 02/27/23 0659   Shift 7831-2960 0329-2145 5149-6670 24 Hour Total   INTAKE   I.V.(mL/kg) 20.2(0.3)   20.2(0.3)   IV Piggyback 83.2   83.2   Shift Total(mL/kg) 103.5(1.4)   103.5(1.4)   OUTPUT   Urine(mL/kg/hr) 200   200   Shift Total(mL/kg) 200(2.6)   200(2.6)   Weight (kg) 75.9 75.9 75.9 75.9     Lines/Drains/Airways       Drain  Duration                  Closed/Suction Drain 02/20/23 Left Neck Bulb 19 Fr. 6 days         Trans Pyloric Feeding Tube 02/20/23 1320 nasogastric;Other (comments) 12 Fr. Right nostril 5 days         Urethral Catheter 02/20/23 0945 Non-latex 16 Fr. 5 days              Airway  Duration             Adult Surgical Airway 02/24/23 1658 1 day              Peripheral Intravenous Line  Duration                  Peripheral IV - Single Lumen 02/20/23 0726 20 G Left;Posterior Forearm 6 days         Peripheral IV - Single Lumen 02/24/23 0430 18 G Anterior;Right Wrist 2 days         Midline Catheter Insertion/Assessment  - Single Lumen 02/24/23 1045 Right brachial vein 18g x 10cm 1 day                    Physical Exam  NAD  Oral cavity with no obvious bleeding though intraoral examination limited.   Left ENMANUEL x1 w/ appropriate output, stripped.    Neck ecchymotic but improved, soft   6-0 cuffed trach in place, secured w sutures and soft collar, on vent  Vent Mode: A/C  Oxygen Concentration (%):  [30] 30  Resp Rate Total:  [13 br/min-27 br/min] 16 br/min  Vt Set:  [450 mL] 450 mL  PEEP/CPAP:  [5 cmH20] 5 cmH20  Mean Airway Pressure:  [6.9 cmH20-10 cmH20] 8.5 cmH20      Significant Labs:  BMP:   Recent Labs   Lab 02/26/23  0242   *      CO2 27   BUN 22   CREATININE 0.9   CALCIUM 8.6*   MG 1.7     CBC:   Recent Labs   Lab 02/26/23  0242   WBC 4.21   RBC 2.55*   HGB 7.9*   HCT 24.6*   *   MCV 97   MCH 31.0   MCHC 32.1       Significant Diagnostics:  I have reviewed and interpreted all pertinent imaging results/findings within the past 24 hours.    Assessment/Plan:     *  Squamous cell carcinoma of left tonsil  Dereck Centeno is a 62 y.o. 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 On day of surgery pt with expanding hematoma now s/p emergent neck exploration with washout 2/20.     Oral packing removed 2/22. Tracheostomy 2/24/23.     -- Fresh trach care    Supplies at bedside, suction PRN  -- Drain care with strict recording of drain output  -- If concern for neck swelling please call ENT on call ASAP  -- Remainder of care per ICU  -- Please page ENT with any questions or concerns               Daron Nichols MD  Otorhinolaryngology-Head & Neck Surgery  [facility]

## 2023-02-27 ENCOUNTER — ANESTHESIA EVENT (OUTPATIENT)
Dept: INTENSIVE CARE | Facility: HOSPITAL | Age: 63
DRG: 003 | End: 2023-02-27
Payer: MEDICAID

## 2023-02-27 ENCOUNTER — ANESTHESIA (OUTPATIENT)
Dept: INTENSIVE CARE | Facility: HOSPITAL | Age: 63
DRG: 003 | End: 2023-02-27
Payer: MEDICAID

## 2023-02-27 LAB
ABO + RH BLD: NORMAL
ALBUMIN SERPL BCP-MCNC: 2.4 G/DL (ref 3.5–5.2)
ALBUMIN SERPL BCP-MCNC: 2.7 G/DL (ref 3.5–5.2)
ALP SERPL-CCNC: 42 U/L (ref 55–135)
ALP SERPL-CCNC: 50 U/L (ref 55–135)
ALT SERPL W/O P-5'-P-CCNC: 13 U/L (ref 10–44)
ALT SERPL W/O P-5'-P-CCNC: 17 U/L (ref 10–44)
ANION GAP SERPL CALC-SCNC: 12 MMOL/L (ref 8–16)
ANION GAP SERPL CALC-SCNC: 4 MMOL/L (ref 8–16)
APTT BLDCRRT: 25.1 SEC (ref 21–32)
AST SERPL-CCNC: 16 U/L (ref 10–40)
AST SERPL-CCNC: 21 U/L (ref 10–40)
BASOPHILS # BLD AUTO: 0.02 K/UL (ref 0–0.2)
BASOPHILS # BLD AUTO: 0.02 K/UL (ref 0–0.2)
BASOPHILS # BLD AUTO: 0.03 K/UL (ref 0–0.2)
BASOPHILS NFR BLD: 0.2 % (ref 0–1.9)
BASOPHILS NFR BLD: 0.3 % (ref 0–1.9)
BASOPHILS NFR BLD: 0.4 % (ref 0–1.9)
BILIRUB SERPL-MCNC: 0.5 MG/DL (ref 0.1–1)
BILIRUB SERPL-MCNC: 0.5 MG/DL (ref 0.1–1)
BLD GP AB SCN CELLS X3 SERPL QL: NORMAL
BLD PROD TYP BPU: NORMAL
BLD PROD TYP BPU: NORMAL
BLOOD UNIT EXPIRATION DATE: NORMAL
BLOOD UNIT EXPIRATION DATE: NORMAL
BLOOD UNIT TYPE CODE: 6200
BLOOD UNIT TYPE CODE: 6200
BLOOD UNIT TYPE: NORMAL
BLOOD UNIT TYPE: NORMAL
BUN SERPL-MCNC: 24 MG/DL (ref 8–23)
BUN SERPL-MCNC: 26 MG/DL (ref 8–23)
CALCIUM SERPL-MCNC: 8.1 MG/DL (ref 8.7–10.5)
CALCIUM SERPL-MCNC: 8.9 MG/DL (ref 8.7–10.5)
CHLORIDE SERPL-SCNC: 107 MMOL/L (ref 95–110)
CHLORIDE SERPL-SCNC: 107 MMOL/L (ref 95–110)
CO2 SERPL-SCNC: 24 MMOL/L (ref 23–29)
CO2 SERPL-SCNC: 29 MMOL/L (ref 23–29)
CODING SYSTEM: NORMAL
CODING SYSTEM: NORMAL
CREAT SERPL-MCNC: 0.9 MG/DL (ref 0.5–1.4)
CREAT SERPL-MCNC: 1 MG/DL (ref 0.5–1.4)
CROSSMATCH INTERPRETATION: NORMAL
CROSSMATCH INTERPRETATION: NORMAL
DIFFERENTIAL METHOD: ABNORMAL
DISPENSE STATUS: NORMAL
DISPENSE STATUS: NORMAL
EOSINOPHIL # BLD AUTO: 0.1 K/UL (ref 0–0.5)
EOSINOPHIL NFR BLD: 0.9 % (ref 0–8)
EOSINOPHIL NFR BLD: 1 % (ref 0–8)
EOSINOPHIL NFR BLD: 1.3 % (ref 0–8)
ERYTHROCYTE [DISTWIDTH] IN BLOOD BY AUTOMATED COUNT: 13.2 % (ref 11.5–14.5)
ERYTHROCYTE [DISTWIDTH] IN BLOOD BY AUTOMATED COUNT: 13.2 % (ref 11.5–14.5)
ERYTHROCYTE [DISTWIDTH] IN BLOOD BY AUTOMATED COUNT: 13.4 % (ref 11.5–14.5)
EST. GFR  (NO RACE VARIABLE): >60 ML/MIN/1.73 M^2
EST. GFR  (NO RACE VARIABLE): >60 ML/MIN/1.73 M^2
FIBRINOGEN PPP-MCNC: 526 MG/DL (ref 182–400)
GLUCOSE SERPL-MCNC: 147 MG/DL (ref 70–110)
GLUCOSE SERPL-MCNC: 185 MG/DL (ref 70–110)
HCT VFR BLD AUTO: 24.2 % (ref 40–54)
HCT VFR BLD AUTO: 27.3 % (ref 40–54)
HCT VFR BLD AUTO: 33.2 % (ref 40–54)
HGB BLD-MCNC: 10.2 G/DL (ref 14–18)
HGB BLD-MCNC: 7.7 G/DL (ref 14–18)
HGB BLD-MCNC: 8.4 G/DL (ref 14–18)
IMM GRANULOCYTES # BLD AUTO: 0.05 K/UL (ref 0–0.04)
IMM GRANULOCYTES # BLD AUTO: 0.09 K/UL (ref 0–0.04)
IMM GRANULOCYTES # BLD AUTO: 0.21 K/UL (ref 0–0.04)
IMM GRANULOCYTES NFR BLD AUTO: 0.9 % (ref 0–0.5)
IMM GRANULOCYTES NFR BLD AUTO: 0.9 % (ref 0–0.5)
IMM GRANULOCYTES NFR BLD AUTO: 2.4 % (ref 0–0.5)
INR PPP: 1 (ref 0.8–1.2)
INR PPP: 1.1 (ref 0.8–1.2)
LACTATE SERPL-SCNC: 0.6 MMOL/L (ref 0.5–2.2)
LACTATE SERPL-SCNC: 3.1 MMOL/L (ref 0.5–2.2)
LYMPHOCYTES # BLD AUTO: 0.6 K/UL (ref 1–4.8)
LYMPHOCYTES # BLD AUTO: 0.9 K/UL (ref 1–4.8)
LYMPHOCYTES # BLD AUTO: 3.4 K/UL (ref 1–4.8)
LYMPHOCYTES NFR BLD: 16.3 % (ref 18–48)
LYMPHOCYTES NFR BLD: 33.9 % (ref 18–48)
LYMPHOCYTES NFR BLD: 7.1 % (ref 18–48)
MAGNESIUM SERPL-MCNC: 1.4 MG/DL (ref 1.6–2.6)
MAGNESIUM SERPL-MCNC: 1.6 MG/DL (ref 1.6–2.6)
MCH RBC QN AUTO: 30.2 PG (ref 27–31)
MCH RBC QN AUTO: 30.8 PG (ref 27–31)
MCH RBC QN AUTO: 31.2 PG (ref 27–31)
MCHC RBC AUTO-ENTMCNC: 30.7 G/DL (ref 32–36)
MCHC RBC AUTO-ENTMCNC: 30.8 G/DL (ref 32–36)
MCHC RBC AUTO-ENTMCNC: 31.8 G/DL (ref 32–36)
MCV RBC AUTO: 100 FL (ref 82–98)
MCV RBC AUTO: 98 FL (ref 82–98)
MCV RBC AUTO: 98 FL (ref 82–98)
MONOCYTES # BLD AUTO: 0.5 K/UL (ref 0.3–1)
MONOCYTES # BLD AUTO: 0.7 K/UL (ref 0.3–1)
MONOCYTES # BLD AUTO: 1.3 K/UL (ref 0.3–1)
MONOCYTES NFR BLD: 12.5 % (ref 4–15)
MONOCYTES NFR BLD: 12.8 % (ref 4–15)
MONOCYTES NFR BLD: 5.5 % (ref 4–15)
NEUTROPHILS # BLD AUTO: 3.7 K/UL (ref 1.8–7.7)
NEUTROPHILS # BLD AUTO: 5.2 K/UL (ref 1.8–7.7)
NEUTROPHILS # BLD AUTO: 7.2 K/UL (ref 1.8–7.7)
NEUTROPHILS NFR BLD: 51.5 % (ref 38–73)
NEUTROPHILS NFR BLD: 68.3 % (ref 38–73)
NEUTROPHILS NFR BLD: 83.8 % (ref 38–73)
NRBC BLD-RTO: 0 /100 WBC
NUM UNITS TRANS PACKED RBC: NORMAL
PHOSPHATE SERPL-MCNC: 3.1 MG/DL (ref 2.7–4.5)
PHOSPHATE SERPL-MCNC: 5.1 MG/DL (ref 2.7–4.5)
PLATELET # BLD AUTO: 130 K/UL (ref 150–450)
PLATELET # BLD AUTO: 177 K/UL (ref 150–450)
PLATELET # BLD AUTO: 184 K/UL (ref 150–450)
PMV BLD AUTO: 10.2 FL (ref 9.2–12.9)
PMV BLD AUTO: 10.2 FL (ref 9.2–12.9)
PMV BLD AUTO: 9.9 FL (ref 9.2–12.9)
POCT GLUCOSE: 104 MG/DL (ref 70–110)
POCT GLUCOSE: 134 MG/DL (ref 70–110)
POCT GLUCOSE: 191 MG/DL (ref 70–110)
POTASSIUM SERPL-SCNC: 3.7 MMOL/L (ref 3.5–5.1)
POTASSIUM SERPL-SCNC: 4.2 MMOL/L (ref 3.5–5.1)
PROT SERPL-MCNC: 5 G/DL (ref 6–8.4)
PROT SERPL-MCNC: 5.6 G/DL (ref 6–8.4)
PROTHROMBIN TIME: 10.9 SEC (ref 9–12.5)
PROTHROMBIN TIME: 11.1 SEC (ref 9–12.5)
RBC # BLD AUTO: 2.47 M/UL (ref 4.6–6.2)
RBC # BLD AUTO: 2.78 M/UL (ref 4.6–6.2)
RBC # BLD AUTO: 3.31 M/UL (ref 4.6–6.2)
SODIUM SERPL-SCNC: 140 MMOL/L (ref 136–145)
SODIUM SERPL-SCNC: 143 MMOL/L (ref 136–145)
TACROLIMUS BLD-MCNC: 3.5 NG/ML (ref 5–15)
UNIT NUMBER: NORMAL
WBC # BLD AUTO: 10.07 K/UL (ref 3.9–12.7)
WBC # BLD AUTO: 5.45 K/UL (ref 3.9–12.7)
WBC # BLD AUTO: 8.59 K/UL (ref 3.9–12.7)

## 2023-02-27 PROCEDURE — 99900026 HC AIRWAY MAINTENANCE (STAT)

## 2023-02-27 PROCEDURE — 36430 TRANSFUSION BLD/BLD COMPNT: CPT

## 2023-02-27 PROCEDURE — 25000003 PHARM REV CODE 250

## 2023-02-27 PROCEDURE — 25000003 PHARM REV CODE 250: Performed by: OTOLARYNGOLOGY

## 2023-02-27 PROCEDURE — 86920 COMPATIBILITY TEST SPIN: CPT | Performed by: STUDENT IN AN ORGANIZED HEALTH CARE EDUCATION/TRAINING PROGRAM

## 2023-02-27 PROCEDURE — 83605 ASSAY OF LACTIC ACID: CPT | Mod: 91

## 2023-02-27 PROCEDURE — P9017 PLASMA 1 DONOR FRZ W/IN 8 HR: HCPCS | Performed by: STUDENT IN AN ORGANIZED HEALTH CARE EDUCATION/TRAINING PROGRAM

## 2023-02-27 PROCEDURE — 84100 ASSAY OF PHOSPHORUS: CPT | Performed by: STUDENT IN AN ORGANIZED HEALTH CARE EDUCATION/TRAINING PROGRAM

## 2023-02-27 PROCEDURE — 99291 CRITICAL CARE FIRST HOUR: CPT | Mod: ,,, | Performed by: STUDENT IN AN ORGANIZED HEALTH CARE EDUCATION/TRAINING PROGRAM

## 2023-02-27 PROCEDURE — 36620 INSERTION CATHETER ARTERY: CPT

## 2023-02-27 PROCEDURE — P9035 PLATELET PHERES LEUKOREDUCED: HCPCS | Performed by: STUDENT IN AN ORGANIZED HEALTH CARE EDUCATION/TRAINING PROGRAM

## 2023-02-27 PROCEDURE — 83735 ASSAY OF MAGNESIUM: CPT | Mod: 91

## 2023-02-27 PROCEDURE — 20000000 HC ICU ROOM

## 2023-02-27 PROCEDURE — 94003 VENT MGMT INPAT SUBQ DAY: CPT

## 2023-02-27 PROCEDURE — 25500020 PHARM REV CODE 255: Performed by: OTOLARYNGOLOGY

## 2023-02-27 PROCEDURE — 80053 COMPREHEN METABOLIC PANEL: CPT | Performed by: STUDENT IN AN ORGANIZED HEALTH CARE EDUCATION/TRAINING PROGRAM

## 2023-02-27 PROCEDURE — 80053 COMPREHEN METABOLIC PANEL: CPT | Mod: 91

## 2023-02-27 PROCEDURE — 63600175 PHARM REV CODE 636 W HCPCS: Performed by: STUDENT IN AN ORGANIZED HEALTH CARE EDUCATION/TRAINING PROGRAM

## 2023-02-27 PROCEDURE — 63600175 PHARM REV CODE 636 W HCPCS

## 2023-02-27 PROCEDURE — P9016 RBC LEUKOCYTES REDUCED: HCPCS | Performed by: STUDENT IN AN ORGANIZED HEALTH CARE EDUCATION/TRAINING PROGRAM

## 2023-02-27 PROCEDURE — 83605 ASSAY OF LACTIC ACID: CPT | Performed by: STUDENT IN AN ORGANIZED HEALTH CARE EDUCATION/TRAINING PROGRAM

## 2023-02-27 PROCEDURE — 27000221 HC OXYGEN, UP TO 24 HOURS

## 2023-02-27 PROCEDURE — 85025 COMPLETE CBC W/AUTO DIFF WBC: CPT | Mod: 91 | Performed by: OTOLARYNGOLOGY

## 2023-02-27 PROCEDURE — 80197 ASSAY OF TACROLIMUS: CPT

## 2023-02-27 PROCEDURE — 85730 THROMBOPLASTIN TIME PARTIAL: CPT | Performed by: STUDENT IN AN ORGANIZED HEALTH CARE EDUCATION/TRAINING PROGRAM

## 2023-02-27 PROCEDURE — 99291 PR CRITICAL CARE, E/M 30-74 MINUTES: ICD-10-PCS | Mod: ,,, | Performed by: STUDENT IN AN ORGANIZED HEALTH CARE EDUCATION/TRAINING PROGRAM

## 2023-02-27 PROCEDURE — 99900035 HC TECH TIME PER 15 MIN (STAT)

## 2023-02-27 PROCEDURE — 85610 PROTHROMBIN TIME: CPT | Performed by: STUDENT IN AN ORGANIZED HEALTH CARE EDUCATION/TRAINING PROGRAM

## 2023-02-27 PROCEDURE — 25000003 PHARM REV CODE 250: Performed by: STUDENT IN AN ORGANIZED HEALTH CARE EDUCATION/TRAINING PROGRAM

## 2023-02-27 PROCEDURE — 85610 PROTHROMBIN TIME: CPT | Mod: 91 | Performed by: STUDENT IN AN ORGANIZED HEALTH CARE EDUCATION/TRAINING PROGRAM

## 2023-02-27 PROCEDURE — 94761 N-INVAS EAR/PLS OXIMETRY MLT: CPT

## 2023-02-27 PROCEDURE — 25000242 PHARM REV CODE 250 ALT 637 W/ HCPCS: Performed by: STUDENT IN AN ORGANIZED HEALTH CARE EDUCATION/TRAINING PROGRAM

## 2023-02-27 PROCEDURE — 84100 ASSAY OF PHOSPHORUS: CPT | Mod: 91

## 2023-02-27 PROCEDURE — 85384 FIBRINOGEN ACTIVITY: CPT | Performed by: STUDENT IN AN ORGANIZED HEALTH CARE EDUCATION/TRAINING PROGRAM

## 2023-02-27 PROCEDURE — 83735 ASSAY OF MAGNESIUM: CPT | Performed by: STUDENT IN AN ORGANIZED HEALTH CARE EDUCATION/TRAINING PROGRAM

## 2023-02-27 PROCEDURE — 85025 COMPLETE CBC W/AUTO DIFF WBC: CPT | Performed by: STUDENT IN AN ORGANIZED HEALTH CARE EDUCATION/TRAINING PROGRAM

## 2023-02-27 PROCEDURE — 86900 BLOOD TYPING SEROLOGIC ABO: CPT | Performed by: OTOLARYNGOLOGY

## 2023-02-27 PROCEDURE — 94640 AIRWAY INHALATION TREATMENT: CPT

## 2023-02-27 PROCEDURE — 85025 COMPLETE CBC W/AUTO DIFF WBC: CPT | Mod: 91

## 2023-02-27 RX ORDER — SODIUM BICARBONATE 1 MEQ/ML
SYRINGE (ML) INTRAVENOUS CODE/TRAUMA/SEDATION MEDICATION
Status: COMPLETED | OUTPATIENT
Start: 2023-02-27 | End: 2023-02-27

## 2023-02-27 RX ORDER — ATROPINE SULFATE 0.1 MG/ML
INJECTION INTRAVENOUS CODE/TRAUMA/SEDATION MEDICATION
Status: COMPLETED | OUTPATIENT
Start: 2023-02-27 | End: 2023-02-27

## 2023-02-27 RX ORDER — HYDROCODONE BITARTRATE AND ACETAMINOPHEN 500; 5 MG/1; MG/1
TABLET ORAL
Status: DISCONTINUED | OUTPATIENT
Start: 2023-02-27 | End: 2023-02-27

## 2023-02-27 RX ORDER — EPINEPHRINE 0.1 MG/ML
INJECTION INTRAVENOUS CODE/TRAUMA/SEDATION MEDICATION
Status: COMPLETED | OUTPATIENT
Start: 2023-02-27 | End: 2023-02-27

## 2023-02-27 RX ORDER — HYDROCODONE BITARTRATE AND ACETAMINOPHEN 500; 5 MG/1; MG/1
TABLET ORAL
Status: DISCONTINUED | OUTPATIENT
Start: 2023-02-27 | End: 2023-03-06

## 2023-02-27 RX ORDER — MAGNESIUM SULFATE HEPTAHYDRATE 40 MG/ML
4 INJECTION, SOLUTION INTRAVENOUS ONCE
Status: COMPLETED | OUTPATIENT
Start: 2023-02-27 | End: 2023-02-27

## 2023-02-27 RX ORDER — NOREPINEPHRINE BITARTRATE/D5W 4MG/250ML
0-3 PLASTIC BAG, INJECTION (ML) INTRAVENOUS CONTINUOUS
Status: DISCONTINUED | OUTPATIENT
Start: 2023-02-27 | End: 2023-02-27

## 2023-02-27 RX ORDER — ENOXAPARIN SODIUM 100 MG/ML
40 INJECTION SUBCUTANEOUS EVERY 24 HOURS
Status: DISCONTINUED | OUTPATIENT
Start: 2023-02-28 | End: 2023-02-27

## 2023-02-27 RX ADMIN — IPRATROPIUM BROMIDE 0.5 MG: 0.5 SOLUTION RESPIRATORY (INHALATION) at 11:02

## 2023-02-27 RX ADMIN — FENTANYL CITRATE 50 MCG: 50 INJECTION INTRAMUSCULAR; INTRAVENOUS at 11:02

## 2023-02-27 RX ADMIN — FENTANYL CITRATE 50 MCG: 50 INJECTION INTRAMUSCULAR; INTRAVENOUS at 09:02

## 2023-02-27 RX ADMIN — DEXMEDETOMIDINE HYDROCHLORIDE 1.4 MCG/KG/HR: 4 INJECTION, SOLUTION INTRAVENOUS at 06:02

## 2023-02-27 RX ADMIN — FENTANYL CITRATE 50 MCG: 50 INJECTION INTRAMUSCULAR; INTRAVENOUS at 01:02

## 2023-02-27 RX ADMIN — TACROLIMUS 1 MG: 1 CAPSULE ORAL at 06:02

## 2023-02-27 RX ADMIN — GABAPENTIN 600 MG: 300 CAPSULE ORAL at 03:02

## 2023-02-27 RX ADMIN — QUETIAPINE FUMARATE 50 MG: 25 TABLET ORAL at 09:02

## 2023-02-27 RX ADMIN — ATORVASTATIN CALCIUM 40 MG: 40 TABLET, FILM COATED ORAL at 09:02

## 2023-02-27 RX ADMIN — SENNOSIDES 5 ML: 8.8 SYRUP ORAL at 09:02

## 2023-02-27 RX ADMIN — ACETAMINOPHEN 650 MG: 325 TABLET ORAL at 09:02

## 2023-02-27 RX ADMIN — IOHEXOL 100 ML: 350 INJECTION, SOLUTION INTRAVENOUS at 10:02

## 2023-02-27 RX ADMIN — IPRATROPIUM BROMIDE 0.5 MG: 0.5 SOLUTION RESPIRATORY (INHALATION) at 12:02

## 2023-02-27 RX ADMIN — FENTANYL CITRATE 50 MCG: 50 INJECTION INTRAMUSCULAR; INTRAVENOUS at 07:02

## 2023-02-27 RX ADMIN — FAMOTIDINE 20 MG: 20 TABLET ORAL at 09:02

## 2023-02-27 RX ADMIN — LEVALBUTEROL HYDROCHLORIDE 0.63 MG: 0.63 SOLUTION RESPIRATORY (INHALATION) at 07:02

## 2023-02-27 RX ADMIN — LEVALBUTEROL HYDROCHLORIDE 0.63 MG: 0.63 SOLUTION RESPIRATORY (INHALATION) at 03:02

## 2023-02-27 RX ADMIN — ATROPINE SULFATE 1 MG: 0.1 INJECTION INTRAVENOUS at 03:02

## 2023-02-27 RX ADMIN — IPRATROPIUM BROMIDE 0.5 MG: 0.5 SOLUTION RESPIRATORY (INHALATION) at 07:02

## 2023-02-27 RX ADMIN — GABAPENTIN 600 MG: 300 CAPSULE ORAL at 09:02

## 2023-02-27 RX ADMIN — TACROLIMUS 2 MG: 1 CAPSULE ORAL at 10:02

## 2023-02-27 RX ADMIN — DEXMEDETOMIDINE HYDROCHLORIDE 0.6 MCG/KG/HR: 4 INJECTION, SOLUTION INTRAVENOUS at 12:02

## 2023-02-27 RX ADMIN — EPINEPHRINE 1 MG: 0.1 INJECTION, SOLUTION INTRAVENOUS at 03:02

## 2023-02-27 RX ADMIN — IPRATROPIUM BROMIDE 0.5 MG: 0.5 SOLUTION RESPIRATORY (INHALATION) at 03:02

## 2023-02-27 RX ADMIN — POTASSIUM CHLORIDE 40 MEQ: 7.46 INJECTION, SOLUTION INTRAVENOUS at 12:02

## 2023-02-27 RX ADMIN — PROPOFOL 50 MCG/KG/MIN: 10 INJECTION, EMULSION INTRAVENOUS at 09:02

## 2023-02-27 RX ADMIN — ESCITALOPRAM OXALATE 20 MG: 20 TABLET ORAL at 09:02

## 2023-02-27 RX ADMIN — MIRTAZAPINE 15 MG: 15 TABLET, FILM COATED ORAL at 09:02

## 2023-02-27 RX ADMIN — ACETAMINOPHEN 650 MG: 325 TABLET ORAL at 02:02

## 2023-02-27 RX ADMIN — PROPOFOL 50 MCG/KG/MIN: 10 INJECTION, EMULSION INTRAVENOUS at 03:02

## 2023-02-27 RX ADMIN — LEVALBUTEROL HYDROCHLORIDE 0.63 MG: 0.63 SOLUTION RESPIRATORY (INHALATION) at 11:02

## 2023-02-27 RX ADMIN — LEVALBUTEROL HYDROCHLORIDE 0.63 MG: 0.63 SOLUTION RESPIRATORY (INHALATION) at 12:02

## 2023-02-27 RX ADMIN — SODIUM BICARBONATE 50 MEQ: 84 INJECTION INTRAVENOUS at 03:02

## 2023-02-27 RX ADMIN — MAGNESIUM SULFATE 4 G: 2 INJECTION INTRAVENOUS at 09:02

## 2023-02-27 RX ADMIN — HYDROMORPHONE HYDROCHLORIDE 0.5 MG: 1 INJECTION, SOLUTION INTRAMUSCULAR; INTRAVENOUS; SUBCUTANEOUS at 06:02

## 2023-02-27 NOTE — NURSING
"SICU PLAN OF CARE NOTE    Dx: Squamous cell carcinoma of left tonsil    Goals of Care:  MAP >65, SBP <180    Vital Signs:  /64   Pulse 74   Temp 99.7 °F (37.6 °C) (Oral)   Resp (!) 25   Ht 5' 10" (1.778 m)   Wt 75.9 kg (167 lb 5.3 oz)   SpO2 100%   BMI 24.01 kg/m²     Cardiac:  NSR    Resp:  SpO2 100% on vent (100%, 8 PEEP)    Neuro:  Arouses to Voice, Follows Commands, and Moves All Extremities    Gtts:  Propfol and Precedex    Urine Output:  Urinary Catheter 940 cc/shift    Drains:  ENMANUEL Drain, total output 5 cc / shift    Diet:  NPO     Labs/Accuchecks:  daily labs, accucheck Q6h    Skin:  All skin remains free from new injury. Foams in place, heel boots on, patient turned Q2, waffle mattress inflated, ICU bed working correctly.    Shift Events: See notes for shift events       "

## 2023-02-27 NOTE — EICU
Intervention Initiated From:  Bedside    Naheed intervened regarding:  Other    Nurse Notified:  Yes    Doctor Notified:  No    Comments: 0349 eLert noted due to pts hr diallo down to asystole. Cpr in progress. (See code documentation)  0350 Dr Arreaga @ bedside trach removed and #6 ett placed in neck  0353 hr noted 166  0402 ogt tube placed  0417 Dr Templeton and Dr Nichols @ bedside suctioned large amount of blood from mouth and around trach area. Mouth packed w kerlix gauze  0418 jefry placed l radial per Dr Green pt tolerated well  0433 Mark Templeton Bronched pt and surgicell placed around trach site  [id number] Dr Green and Dr Wheeler @ bedside bronch completed pt tolerated well    End time: 0510

## 2023-02-27 NOTE — PROGRESS NOTES
[facility]  Critical Care - Surgery  Progress Note    Patient Name: Dereck Centeno  MRN: [id number]  Admission Date: 2/20/2023  Hospital Length of Stay: 7 days  Code Status: Full Code  Attending Provider: Alexandre Templeton MD  Primary Care Provider: Eva Reynaga MD   Principal Problem: Squamous cell carcinoma of left tonsil    Subjective:     Hospital/ICU Course:  No notes on file    Interval History/Significant Events: On trach collar since 9p last night. Doing well until coughing event incited bleeding at trach/oral cavity. Pt began desatting to the 20s. Code Blue called, anesthesia secured airway, CPR given + ROSC achieved after 2 rounds. ENT called. Bronch performed. ENT packed right side of oropharynx w kerlex, one vessel noted to be bleeding in tracheostoma bed. Controlled with pressure. Pt returned on ventilator. CTA neck ordered.    Follow-up For: Procedure(s) (LRB):  CREATION, TRACHEOSTOMY (N/A)    Post-Operative Day: 3 Days Post-Op    Objective:     Vital Signs (Most Recent):  Temp: 99.7 °F (37.6 °C) (02/26/23 2302)  Pulse: 93 (02/27/23 0458)  Resp: (!) 30 (02/27/23 0458)  BP: 105/64 (02/27/23 0425)  SpO2: 100 % (02/27/23 0458) Vital Signs (24h Range):  Temp:  [98.4 °F (36.9 °C)-99.7 °F (37.6 °C)] 99.7 °F (37.6 °C)  Pulse:  [] 93  Resp:  [9-74] 30  SpO2:  [43 %-100 %] 100 %  BP: ()/() 105/64  Arterial Line BP: (129-165)/(54-67) 159/60     Weight: 75.9 kg (167 lb 5.3 oz)  Body mass index is 24.01 kg/m².      Intake/Output Summary (Last 24 hours) at 2/27/2023 0517  Last data filed at 2/27/2023 0430  Gross per 24 hour   Intake 1790.38 ml   Output 1935 ml   Net -144.62 ml     Physical Exam  Vitals and nursing note reviewed.   Constitutional:       Appearance: Normal appearance.      Interventions: He is sedated, intubated and restrained.   HENT:      Head: Normocephalic and atraumatic.      Nose:      Comments: NG tube sutured in place   Eyes:       Conjunctiva/sclera: Conjunctivae normal.      Pupils: Pupils are equal, round, and reactive to light.   Neck:      Comments: ENMANUEL drain with SS on left side of neck   Surgical incision clean and dry     Cardiovascular:      Rate and Rhythm: Normal rate and regular rhythm.      Pulses: Normal pulses.      Heart sounds: Normal heart sounds.   Pulmonary:      Effort: Pulmonary effort is normal. No respiratory distress. Trach in place  Abdominal:      General: Abdomen is flat. Bowel sounds are normal.      Palpations: Abdomen is soft.   Skin:     General: Skin is warm.     Vents:  Vent Mode: A/C (02/27/23 0458)  Ventilator Initiated: Yes (02/20/23 1443)  Set Rate: 20 BPM (02/27/23 0458)  Vt Set: 450 mL (02/27/23 0458)  Pressure Support: 5 cmH20 (02/26/23 2010)  PEEP/CPAP: 8 cmH20 (02/27/23 0458)  Oxygen Concentration (%): 100 (02/27/23 0458)  Peak Airway Pressure: 27 cmH20 (02/27/23 0458)  Plateau Pressure: 12 cmH20 (02/27/23 0458)  Total Ve: 14.6 L/m (02/27/23 0458)  Negative Inspiratory Force (cm H2O): -25 (02/27/23 0458)  F/VT Ratio<105 (RSBI): (!) 53.76 (02/27/23 0458)    Lines/Drains/Airways       Drain  Duration                  Closed/Suction Drain 02/20/23 Left Neck Bulb 19 Fr. 7 days         Trans Pyloric Feeding Tube 02/20/23 1320 nasogastric;Other (comments) 12 Fr. Right nostril 6 days         Urethral Catheter 02/20/23 0945 Non-latex 16 Fr. 6 days              Airway  Duration                  Airway - Non-Surgical 02/27/23 0345 Endotracheal Tube <1 day       Airway Anesthesia 02/27/23 <1 day              Arterial Line  Duration             Arterial Line 02/27/23 0421 Left Radial <1 day              Peripheral Intravenous Line  Duration                  Peripheral IV - Single Lumen 02/20/23 0726 20 G Left;Posterior Forearm 6 days         Peripheral IV - Single Lumen 02/24/23 0430 18 G Anterior;Right Wrist 3 days         Midline Catheter Insertion/Assessment  - Single Lumen 02/24/23 1045 Right brachial vein 18g  x 10cm 2 days                    Significant Labs:    CBC/Anemia Profile:  Recent Labs   Lab 02/26/23  0242 02/27/23  0237 02/27/23  0348   WBC 4.21 5.45 10.07   HGB 7.9* 8.4* 10.2*   HCT 24.6* 27.3* 33.2*   * 130* 177   MCV 97 98 100*   RDW 13.3 13.2 13.2        Chemistries:  Recent Labs   Lab 02/25/23  1025 02/26/23  0242 02/27/23  0237    141 140   K 4.5 4.3 4.2    108 107   CO2 27 27 29   BUN 18 22 24*   CREATININE 0.9 0.9 0.9   CALCIUM 8.4* 8.6* 8.9   ALBUMIN 2.6* 2.6* 2.7*   PROT 5.1* 5.3* 5.6*   BILITOT 0.4 0.3 0.5   ALKPHOS 42* 38* 42*   ALT 10 9* 13   AST 12 16 16   MG 2.1 1.7 1.6   PHOS 3.5 2.6* 3.1           Significant Imaging:  I have reviewed all pertinent imaging results/findings within the past 24 hours.    Assessment/Plan:     Oncology  * Squamous cell carcinoma of left tonsil  Dereck Centeno is a 62 y.o. male with PMHx of HTN, emphysema, HCV s/p liver transplant in 2015, tobacco abuse (50+ pack years), oral cancer s/p surgery at [facility] in 2019 (no chemo/radiation), and newly diagnosed SCC of the left tonsil. He is now s/p left tonsillectomy and neck dissection by Dr. Templeton on 2/20. Post operative course was c/b development of large hematoma at surgical site. He was taken back to the OR for an emergent neck exploration and hematoma evacuation on 2/20. Trach on 2/24. Code blue on 2/27am, ROSC achieved within 5 min.      Neuro/Psych:   -- Sedation: Propofol gtt  -- Pain: home gabapentin, devin tylenol, PRN fentanyl pushes if agitated  -- Psych: home clonazepam PRN, scheduled mirtazapine, home seroquel, home lexapro     Cards  -- HDS; not currently on pressors   -- Continue clonidine    --hold home amlodipine for inc propofol   -- PRN labetalol and hydralazine   -- Continue home statin       Pulm:   -- H/o emphysema   -- Trach placed 2/24   -- Ipratropium nebs scheduled,albuterol nebs PRN for wheezing   -- Goal O2 sat > 88%  -- Wean as able  -- Was weaned to trach collar 2/26;  reintubated 2/27   -- SBT after scan       Renal:  -- Keep auguste for strict I/O  -- BUN/Cr stable 26/1.0  -- UOP 1.9L/24H      FEN / GI:   -- Net +94.2cc's/24H  -- Replace lytes as needed  -- Nutrition: NPO + TF  -- mIVF discontinued      ID:   -- Tm: afebrile; WBC 8.59  -- Abx: none       Heme/Onc:   -- Infusing PRBC, sp platelets  -- H/H to 7.7/24.2  -- H/o thrombocytopenia    -- Platelets slowly down trending since admission; 106 from 89 on 2/23   -- Continue to monitor   -- Daily CBC  -- Home ASA held   -- Trend lactate      Endo:   -- Gluc goal 140-180  -- SSI      Immuno:  -- H/o HCV infection s/p liver transplant in 2015  -- Hepatology consulted  -- Continue home tacrolimus  -- Monitor tacrolimus levels and INR daily   -- Valacyclovir daily per ENT      PPx:   Feeding: NPO + TF  Analgesia/Sedation: PRN fentanyl, home gabapentin, tylenol/ precedex gtt   Thromboembolic prevention: lovenox   HOB >30: yes   Stress Ulcer ppx: famotidine BID   Glucose control: Critical care goal 140-180 g/dl, ISS    Lines/Drains/Airway: ETT, auguste, PIVx2, NG tube, neck drain x2      Dispo/Code Status/Palliative:   -- SICU / Full Code        Critical care was time spent personally by me on the following activities: development of treatment plan with patient or surrogate and bedside caregivers, discussions with consultants, evaluation of patient's response to treatment, examination of patient, ordering and performing treatments and interventions, ordering and review of laboratory studies, ordering and review of radiographic studies, pulse oximetry, re-evaluation of patient's condition.  This critical care time did not overlap with that of any other provider or involve time for any procedures.     Jocelynn Juan MD  Critical Care - Surgery  [facility]

## 2023-02-27 NOTE — PROGRESS NOTES
[facility]  Otorhinolaryngology-Head & Neck Surgery  Progress Note    Subjective:     Post-Op Info:  Procedure(s) (LRB):  CREATION, TRACHEOSTOMY (N/A)   3 Days Post-Op  Hospital Day: 8     Interval History: Weaned from MV but code blue called this AM for desaturations and bleeding from tracheostomy site, required CPR with ROSC. Evidence of active bleeding from stoma site, controleld with surgicel. ETT placed in tracheal stoma and back on vent, remained stable since event.     Medications:  Continuous Infusions:   propofoL 10 mcg/kg/min (02/27/23 0800)     Scheduled Meds:   acetaminophen  650 mg Per NG tube Q8H    amLODIPine  5 mg Per NG tube Daily    atorvastatin  40 mg Per NG tube QHS    cloNIDine  0.1 mg Per NG tube QHS    EScitalopram oxalate  20 mg Per NG tube QHS    famotidine  20 mg Per NG tube BID    gabapentin  600 mg Per NG tube TID    ipratropium  0.5 mg Nebulization Q8H    levalbuterol  0.63 mg Nebulization Q8H    magnesium sulfate IVPB  4 g Intravenous Once    mirtazapine  15 mg Per NG tube QHS    polyethylene glycol  17 g Per NG tube Daily    QUEtiapine  50 mg Per NG tube QHS    sennosides 8.8 mg/5 ml  5 mL Per NG tube BID    tacrolimus  2 mg Per NG tube Daily AM    And    tacrolimus  1 mg Per NG tube Daily PM     PRN Meds:sodium chloride, sodium chloride, sodium chloride, sodium chloride 0.9%, calcium gluconate IVPB, calcium gluconate IVPB, calcium gluconate IVPB, clonazePAM, dextrose 10%, dextrose 10%, fentaNYL, glucagon (human recombinant), hydrALAZINE, HYDROmorphone, insulin aspart U-100, labetalol, magnesium sulfate IVPB, magnesium sulfate IVPB, ondansetron, oxyCODONE, oxyCODONE, potassium chloride **AND** potassium chloride **AND** potassium chloride, sodium phosphate IVPB, sodium phosphate IVPB, sodium phosphate IVPB     Review of patient's allergies indicates:  No Known Allergies  Objective:     Vital Signs (24h Range):  Temp:  [98.4 °F (36.9 °C)-99.7 °F  (37.6 °C)] 99.2 °F (37.3 °C)  Pulse:  [] 73  Resp:  [9-74] 20  SpO2:  [43 %-100 %] 100 %  BP: ()/() 105/64  Arterial Line BP: ()/(51-67) 115/59     Date 02/27/23 0700 - 02/28/23 0659   Shift 4173-8995 0932-2435 8212-0766 24 Hour Total   INTAKE   I.V.(mL/kg) 62.1(0.8)   62.1(0.8)   Shift Total(mL/kg) 62.1(0.8)   62.1(0.8)   OUTPUT   Urine(mL/kg/hr) 115   115   Drains 10   10   Shift Total(mL/kg) 125(1.6)   125(1.6)   Weight (kg) 75.9 75.9 75.9 75.9     Lines/Drains/Airways       Drain  Duration                  Closed/Suction Drain 02/20/23 Left Neck Bulb 19 Fr. 7 days         Trans Pyloric Feeding Tube 02/20/23 1320 nasogastric;Other (comments) 12 Fr. Right nostril 6 days         Urethral Catheter 02/20/23 0945 Non-latex 16 Fr. 6 days              Airway  Duration                  Airway - Non-Surgical 02/27/23 0345 Endotracheal Tube <1 day       Airway Anesthesia 02/27/23 <1 day              Arterial Line  Duration             Arterial Line 02/27/23 0421 Left Radial <1 day              Peripheral Intravenous Line  Duration                  Peripheral IV - Single Lumen 02/20/23 0726 20 G Left;Posterior Forearm 7 days         Peripheral IV - Single Lumen 02/24/23 0430 18 G Anterior;Right Wrist 3 days         Midline Catheter Insertion/Assessment  - Single Lumen 02/24/23 1045 Right brachial vein 18g x 10cm 2 days                    Physical Exam  NAD  NGT to R nares  Oral cavity with kerlex packing in place and OGT   Left ENMANUEL x1 w/ appropriate output, stripped.    Neck ecchymotic but improved, soft   ETT in place       Significant Labs:  CBC:   Recent Labs   Lab 02/27/23  0452   WBC 8.59   RBC 2.47*   HGB 7.7*   HCT 24.2*      MCV 98   MCH 31.2*   MCHC 31.8*     CMP:   Recent Labs   Lab 02/27/23  0452   *   CALCIUM 8.1*   ALBUMIN 2.4*   PROT 5.0*      K 3.7   CO2 24      BUN 26*   CREATININE 1.0   ALKPHOS 50*   ALT 17   AST 21   BILITOT 0.5     Coagulation:   Recent  Labs   Lab 02/27/23  0452   LABPROT 11.1   INR 1.1   APTT 25.1       Significant Diagnostics:  I have reviewed all pertinent imaging results/findings within the past 24 hours.    Assessment/Plan:     * Squamous cell carcinoma of left tonsil  Dereck Centeno is a 62 y.o. 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 On day of surgery pt with expanding hematoma now s/p emergent neck exploration with washout 2/20.     Oral packing removed 2/22. Tracheostomy 2/24/23.   Code blue on 2/27 for bleeding likely from tracheal stoma site. Required ETT palcement in stoma and return to MV.     -- CTA stat   -- Keep ETT in place/continue MV  -- Drain care with strict recording of drain output  -- If concern for neck swelling please call ENT on call ASAP  -- Remainder of care per ICU   -- Please page ENT with any questions or concerns               Nayla Lomax MD  Otorhinolaryngology-Head & Neck Surgery  [facility]

## 2023-02-27 NOTE — PLAN OF CARE
[facility]  Discharge Reassessment    Primary Care Provider: Eva Reynaga MD    Expected Discharge Date: 3/6/2023    Reassessment (most recent)       Discharge Reassessment - 02/27/23 1153          Discharge Reassessment    Assessment Type Discharge Planning Reassessment     Communicated JUAN with patient/caregiver Date not available/Unable to determine     Discharge Plan A Home with family     Discharge Plan B Rehab     DME Needed Upon Discharge  other (see comments)   TBD    Discharge Barriers Identified None     Why the patient remains in the hospital Requires continued medical care                     Per MD's Note, Weaned from MV but code blue called this AM for desaturations and bleeding from tracheostomy site, required CPR with ROSC. Evidence of active bleeding from stoma site, controleld with surgicel. ETT placed in tracheal stoma and back on vent, remained stable since event.    Eliana Lopez LMSW  Case Management [facility]  Ext: [phone]

## 2023-02-27 NOTE — TREATMENT PLAN
Hepatology Treatment Plan    This is a 62 year old male with PMH significant for cirrhosis secondary to ETOH/Hepatitis C (status post transplant in 01/2015) and head/neck cancer (status post resection in approximately 2019 at [facility] with development of squamous cell carcinoma of left tonsil as of 01/2023) who was admitted to [facility] on 02/20 for left tonsillectomy and neck dissection that was performed on day of admission. Hospital course associated with development of hematoma at surgical site requiring neck exploration and hematoma evacuation on 02/20 with tracheostomy creation on 02/24 and cardiac arrest secondary to hypoxia with concern for on-going bleeding from surgery site. Hepatology following for assistance with immunosuppression.     Interval History  Cardiac arrest this morning secondary to hypoxia with concern for bleeding from tracheostomy site; pending completion of CTA. LFT's normal. Tacrolimus level 3.5 today.     Plan    - Tacrolimus 2 mg in AM and 1 mg in PM.   - Please obtain daily CBC, BMP, LFT, INR, and Tacrolimus level.     Thank you for involving us in the care of Dereck Centeno. Please call with any additional concerns or questions.        Erik Lindsay MD, PGY-V  Gastroenterology Fellow  [facility]

## 2023-02-27 NOTE — PLAN OF CARE
At approximately 3:20 physician was notified of bleeding out of the mouth and trach. Dr. Wheeler was bedside shortly after and notified Dr. Shah, ENT, anesthesia, and CTS service was notified. The patient continued to decompensate requiring bag ventilation. Anesthesia began setting up for an emergent airway when the patient's oxygen saturation began to drop, ultimately leading to asystole. Chest compressions were started immediately, leading to ROSC within 5 minutes. During this time anesthesia was able to secure an airway with a return of adequate oxygen saturation. Around this time ENT arrived and was able to identify and address the suspected bleeding site. Total blood loss was about 900cc's. Patient remains sedated with propofol and mechanically ventilated. CXR was used to confirm tube placements and a-line was placed to better monitor blood pressure.

## 2023-02-27 NOTE — ANESTHESIA PROCEDURE NOTES
Ad Hoc Intubation    Date/Time: 2/27/2023 3:55 AM  Performed by: Abel Arreaga MD  Authorized by: Abel Arreaga MD     Indications:  Respiratory distress, respiratory failure, hypoxemia, hypercapnia, pulmonary toilet and airway protection  Diagnosis:  Left tonsillar SCC  Patient Location:  ICU  Timeout:  2/27/2023 3:50 AM  Procedure Start Time:  2/27/2023 3:50 AM  Procedure End Time:  2/27/2023 3:55 AM  Staff:     Anesthesiologist Present: No    Intubation:     Induction: cardiac arrest.    Intubated:  N/a    Mask Ventilation:  N/a    Attempts:  1    Attempted By:  Resident anesthesiologist    Method of Intubation:  Bougie and ETT into pre-existing tracheostomy    Difficult Airway Encountered?: No      Airway Device:  Oral endotracheal tube    Airway Device Size:  6.0    Style/Cuff Inflation:  Cuffed (inflated to minimal occlusive pressure)    Placement Verified By:  Capnometry, Chest x-ray and Colorimetric ETCO2 device    Complicating Factors:  Bleeding in oropharynx    Findings Post-Intubation:  BS equal bilateral and atraumatic/condition of teeth unchanged  Notes:      Emergent anesthesia call to bedside. Upon arrival, patient being hand bag ventilated via immature tracheostomy. Intermittent pulse ox waveform, satting 90s. Profuse hemorrhage from tracheostomy site and blood pooled in oropharynx with active suctioning. Rapid decompensation with difficulty ventilating, desaturation, and bradycardia, ultimately leading to asystole. Chest compressions initiated by RN. Inserted ETT over bougie via trach to re-establish airway. Copious amounts of siomara blood suctioned from ETT with improvement in oxygenation/ventilation. ROSC achieved s/p 2 rounds of compressions.

## 2023-02-27 NOTE — PT/OT/SLP PROGRESS
Occupational Therapy      Patient Name:  Dereck Centeno   MRN:  [id number]    Patient not seen today secondary to Nsg hold d/t emergent intubation on 2/26. Will follow-up later date.    2/27/2023

## 2023-02-27 NOTE — NURSING
RT and RN in the room. Patient coughed up big blood clot and begin to bleed in and around the trach site. MD called to the bedside. Patient began to desat. RT begin bagging patient. Patient diallo down to asystole. ACLS protocol initiated. EICU called into room for code charting. Anesthesia at bedside. Patient intubated. MD Yokasta at bedside. Emergent a-line placed by RODNEY Nathan MD. Patient bronched per MD. Dr Templeton  packed trach with 2 packets of surgicel.1U platelets and 1U FFP given. MD to order CTA. See flow sheets for more information.

## 2023-02-27 NOTE — SUBJECTIVE & OBJECTIVE
Interval History: Weaned from MV but code blue called this AM for desaturations and bleeding from tracheostomy site, required CPR with ROSC. Evidence of active bleeding from stoma site, controleld with surgicel. ETT placed in tracheal stoma and back on vent, remained stable since event.     Medications:  Continuous Infusions:   propofoL 10 mcg/kg/min (02/27/23 0800)     Scheduled Meds:   acetaminophen  650 mg Per NG tube Q8H    amLODIPine  5 mg Per NG tube Daily    atorvastatin  40 mg Per NG tube QHS    cloNIDine  0.1 mg Per NG tube QHS    EScitalopram oxalate  20 mg Per NG tube QHS    famotidine  20 mg Per NG tube BID    gabapentin  600 mg Per NG tube TID    ipratropium  0.5 mg Nebulization Q8H    levalbuterol  0.63 mg Nebulization Q8H    magnesium sulfate IVPB  4 g Intravenous Once    mirtazapine  15 mg Per NG tube QHS    polyethylene glycol  17 g Per NG tube Daily    QUEtiapine  50 mg Per NG tube QHS    sennosides 8.8 mg/5 ml  5 mL Per NG tube BID    tacrolimus  2 mg Per NG tube Daily AM    And    tacrolimus  1 mg Per NG tube Daily PM     PRN Meds:sodium chloride, sodium chloride, sodium chloride, sodium chloride 0.9%, calcium gluconate IVPB, calcium gluconate IVPB, calcium gluconate IVPB, clonazePAM, dextrose 10%, dextrose 10%, fentaNYL, glucagon (human recombinant), hydrALAZINE, HYDROmorphone, insulin aspart U-100, labetalol, magnesium sulfate IVPB, magnesium sulfate IVPB, ondansetron, oxyCODONE, oxyCODONE, potassium chloride **AND** potassium chloride **AND** potassium chloride, sodium phosphate IVPB, sodium phosphate IVPB, sodium phosphate IVPB     Review of patient's allergies indicates:  No Known Allergies  Objective:     Vital Signs (24h Range):  Temp:  [98.4 °F (36.9 °C)-99.7 °F (37.6 °C)] 99.2 °F (37.3 °C)  Pulse:  [] 73  Resp:  [9-74] 20  SpO2:  [43 %-100 %] 100 %  BP: ()/() 105/64  Arterial Line BP: ()/(51-67) 115/59     Date 02/27/23 0700 - 02/28/23 0659   Shift 7888-7874  7758-5807 2050-8133 24 Hour Total   INTAKE   I.V.(mL/kg) 62.1(0.8)   62.1(0.8)   Shift Total(mL/kg) 62.1(0.8)   62.1(0.8)   OUTPUT   Urine(mL/kg/hr) 115   115   Drains 10   10   Shift Total(mL/kg) 125(1.6)   125(1.6)   Weight (kg) 75.9 75.9 75.9 75.9     Lines/Drains/Airways       Drain  Duration                  Closed/Suction Drain 02/20/23 Left Neck Bulb 19 Fr. 7 days         Trans Pyloric Feeding Tube 02/20/23 1320 nasogastric;Other (comments) 12 Fr. Right nostril 6 days         Urethral Catheter 02/20/23 0945 Non-latex 16 Fr. 6 days              Airway  Duration                  Airway - Non-Surgical 02/27/23 0345 Endotracheal Tube <1 day       Airway Anesthesia 02/27/23 <1 day              Arterial Line  Duration             Arterial Line 02/27/23 0421 Left Radial <1 day              Peripheral Intravenous Line  Duration                  Peripheral IV - Single Lumen 02/20/23 0726 20 G Left;Posterior Forearm 7 days         Peripheral IV - Single Lumen 02/24/23 0430 18 G Anterior;Right Wrist 3 days         Midline Catheter Insertion/Assessment  - Single Lumen 02/24/23 1045 Right brachial vein 18g x 10cm 2 days                    Physical Exam  NAD  NGT to R nares  Oral cavity with kerlex packing in place and OGT   Left ENMANUEL x1 w/ appropriate output, stripped.    Neck ecchymotic but improved, soft   ETT in place       Significant Labs:  CBC:   Recent Labs   Lab 02/27/23  0452   WBC 8.59   RBC 2.47*   HGB 7.7*   HCT 24.2*      MCV 98   MCH 31.2*   MCHC 31.8*     CMP:   Recent Labs   Lab 02/27/23  0452   *   CALCIUM 8.1*   ALBUMIN 2.4*   PROT 5.0*      K 3.7   CO2 24      BUN 26*   CREATININE 1.0   ALKPHOS 50*   ALT 17   AST 21   BILITOT 0.5     Coagulation:   Recent Labs   Lab 02/27/23  0452   LABPROT 11.1   INR 1.1   APTT 25.1       Significant Diagnostics:  I have reviewed all pertinent imaging results/findings within the past 24 hours.

## 2023-02-27 NOTE — ASSESSMENT & PLAN NOTE
Dereck Centeno is a 62 y.o. 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 On day of surgery pt with expanding hematoma now s/p emergent neck exploration with washout 2/20.     Oral packing removed 2/22. Tracheostomy 2/24/23.   Code blue on 2/27 for bleeding likely from tracheal stoma site. Required ETT palcement in stoma and return to MV.     -- CTA stat   -- Keep ETT in place/continue MV  -- Drain care with strict recording of drain output  -- If concern for neck swelling please call ENT on call ASAP  -- Remainder of care per ICU   -- Please page ENT with any questions or concerns

## 2023-02-27 NOTE — PT/OT/SLP PROGRESS
Physical Therapy      Patient Name:  Dereck Centeno   MRN:  [id number]    Patient not seen today secondary to  (pt not seen due to having code called this AM and  being emergently intubated. ). Will follow-up at a later date.    2/27/2023  .

## 2023-02-27 NOTE — ASSESSMENT & PLAN NOTE
Dereck Centeno is a 62 y.o. male with PMHx of HTN, emphysema, HCV s/p liver transplant in 2015, tobacco abuse (50+ pack years), oral cancer s/p surgery at [facility] in 2019 (no chemo/radiation), and newly diagnosed SCC of the left tonsil. He is now s/p left tonsillectomy and neck dissection by Dr. Templeton on 2/20. Post operative course was c/b development of large hematoma at surgical site. He was taken back to the OR for an emergent neck exploration and hematoma evacuation on 2/20. Code blue on 2/27am, ROSC achieved within 5 min.      Neuro/Psych:   -- Sedation: precedex gtt and propofol gtt  -- Pain: home gabapentin, tylenol, PRN fentanyl   -- Psych: home clonazepam PRN, scheduled mirtazapine, seroquel, lexapro     Cards  -- HDS; not currently on pressors   -- Continue home amlodipine and clonidine   -- PRN labetalol and hydralazine   -- Continue home statin       Pulm:   -- H/o emphysema   -- Trach placed 2/24   -- Ipratropium nebs scheduled,albuterol nebs PRN for wheezing   -- Goal O2 sat > 88%  -- Wean as able  -- Was weaned to trach collar 2/26; reintubated 2/27      Renal:  -- Keep auguste for strict I/O  -- BUN/Cr stable 26/1.0  -- UOP 1.9L/24H      FEN / GI:   -- Net +94.2cc's/24H  -- Replace lytes as needed  -- Nutrition: NPO + TF  -- mIVF discontinued      ID:   -- Tm: afebrile; WBC 8.59  -- Abx: none       Heme/Onc:   -- H/H decreased to 7.7/24.2  -- H/o thrombocytopenia    -- Platelets slowly down trending since admission; 106 from 89 on 2/23   -- Continue to monitor   -- Daily CBC  -- Home ASA held       Endo:   -- Gluc goal 140-180  -- SSI      Immuno:  -- H/o HCV infection s/p liver transplant in 2015  -- Hepatology consulted  -- Continue home tacrolimus  -- Monitor tacrolimus levels and INR daily   -- Valacyclovir daily per ENT      PPx:   Feeding: NPO + TF  Analgesia/Sedation: PRN fentanyl, home gabapentin, tylenol/ precedex gtt   Thromboembolic prevention: lovenox   HOB >30: yes   Stress Ulcer  ppx: famotidine BID   Glucose control: Critical care goal 140-180 g/dl, ISS    Lines/Drains/Airway: ETT, auguste, PIVx2, NG tube, neck drain x2      Dispo/Code Status/Palliative:   -- SICU / Full Code

## 2023-02-27 NOTE — SUBJECTIVE & OBJECTIVE
Interval History/Significant Events: On trach collar since 9p last night. Doing well until coughing event incited bleeding at trach/oral cavity. Pt began desatting to the 20s. Code Blue called, anesthesia secured airway, CPR given + ROSC achieved after 2 rounds. ENT called. Bronch performed. ENT packed right side of oropharynx w kerlex, one vessel noted to be bleeding in tracheostoma bed. Controlled with pressure. Pt returned on ventilator. CTA neck ordered.    Follow-up For: Procedure(s) (LRB):  CREATION, TRACHEOSTOMY (N/A)    Post-Operative Day: 3 Days Post-Op    Objective:     Vital Signs (Most Recent):  Temp: 99.7 °F (37.6 °C) (02/26/23 2302)  Pulse: 93 (02/27/23 0458)  Resp: (!) 30 (02/27/23 0458)  BP: 105/64 (02/27/23 0425)  SpO2: 100 % (02/27/23 0458) Vital Signs (24h Range):  Temp:  [98.4 °F (36.9 °C)-99.7 °F (37.6 °C)] 99.7 °F (37.6 °C)  Pulse:  [] 93  Resp:  [9-74] 30  SpO2:  [43 %-100 %] 100 %  BP: ()/() 105/64  Arterial Line BP: (129-165)/(54-67) 159/60     Weight: 75.9 kg (167 lb 5.3 oz)  Body mass index is 24.01 kg/m².      Intake/Output Summary (Last 24 hours) at 2/27/2023 0517  Last data filed at 2/27/2023 0430  Gross per 24 hour   Intake 1790.38 ml   Output 1935 ml   Net -144.62 ml     Physical Exam  Vitals and nursing note reviewed.   Constitutional:       Appearance: Normal appearance.      Interventions: He is sedated, intubated and restrained.   HENT:      Head: Normocephalic and atraumatic.      Nose:      Comments: NG tube sutured in place   Eyes:      Conjunctiva/sclera: Conjunctivae normal.      Pupils: Pupils are equal, round, and reactive to light.   Neck:      Comments: ENMANUEL drain with SS on left side of neck   Surgical incision clean and dry     Cardiovascular:      Rate and Rhythm: Normal rate and regular rhythm.      Pulses: Normal pulses.      Heart sounds: Normal heart sounds.   Pulmonary:      Effort: Pulmonary effort is normal. No respiratory distress. Trach in  place  Abdominal:      General: Abdomen is flat. Bowel sounds are normal.      Palpations: Abdomen is soft.   Skin:     General: Skin is warm.     Vents:  Vent Mode: A/C (02/27/23 0458)  Ventilator Initiated: Yes (02/20/23 1443)  Set Rate: 20 BPM (02/27/23 0458)  Vt Set: 450 mL (02/27/23 0458)  Pressure Support: 5 cmH20 (02/26/23 2010)  PEEP/CPAP: 8 cmH20 (02/27/23 0458)  Oxygen Concentration (%): 100 (02/27/23 0458)  Peak Airway Pressure: 27 cmH20 (02/27/23 0458)  Plateau Pressure: 12 cmH20 (02/27/23 0458)  Total Ve: 14.6 L/m (02/27/23 0458)  Negative Inspiratory Force (cm H2O): -25 (02/27/23 0458)  F/VT Ratio<105 (RSBI): (!) 53.76 (02/27/23 0458)    Lines/Drains/Airways       Drain  Duration                  Closed/Suction Drain 02/20/23 Left Neck Bulb 19 Fr. 7 days         Trans Pyloric Feeding Tube 02/20/23 1320 nasogastric;Other (comments) 12 Fr. Right nostril 6 days         Urethral Catheter 02/20/23 0945 Non-latex 16 Fr. 6 days              Airway  Duration                  Airway - Non-Surgical 02/27/23 0345 Endotracheal Tube <1 day       Airway Anesthesia 02/27/23 <1 day              Arterial Line  Duration             Arterial Line 02/27/23 0421 Left Radial <1 day              Peripheral Intravenous Line  Duration                  Peripheral IV - Single Lumen 02/20/23 0726 20 G Left;Posterior Forearm 6 days         Peripheral IV - Single Lumen 02/24/23 0430 18 G Anterior;Right Wrist 3 days         Midline Catheter Insertion/Assessment  - Single Lumen 02/24/23 1045 Right brachial vein 18g x 10cm 2 days                    Significant Labs:    CBC/Anemia Profile:  Recent Labs   Lab 02/26/23  0242 02/27/23  0237 02/27/23  0348   WBC 4.21 5.45 10.07   HGB 7.9* 8.4* 10.2*   HCT 24.6* 27.3* 33.2*   * 130* 177   MCV 97 98 100*   RDW 13.3 13.2 13.2        Chemistries:  Recent Labs   Lab 02/25/23  1025 02/26/23  0242 02/27/23  0237    141 140   K 4.5 4.3 4.2    108 107   CO2 27 27 29   BUN 18 22  24*   CREATININE 0.9 0.9 0.9   CALCIUM 8.4* 8.6* 8.9   ALBUMIN 2.6* 2.6* 2.7*   PROT 5.1* 5.3* 5.6*   BILITOT 0.4 0.3 0.5   ALKPHOS 42* 38* 42*   ALT 10 9* 13   AST 12 16 16   MG 2.1 1.7 1.6   PHOS 3.5 2.6* 3.1           Significant Imaging:  I have reviewed all pertinent imaging results/findings within the past 24 hours.

## 2023-02-27 NOTE — PLAN OF CARE
Department of Otolaryngology - Head and Neck Surgery  Plan of Care Note    ENT called to bedside STAT concerning patient bleeding bright red blood from around his tracheostoma, within his trach tube, and also coming out of his oral cavity. Pt had been removed from ventillator earlier in the night and was on trach collar with no issues. Nursing suspects a cough incited the event. Per SICU resident, patient was desatting into the 20s and pressures were soft. Code Blue was called and the patient received CPR with ROSC after about 2 rounds (appx 5 min). During this time, the tracheostomy tube was removed and a 6-0 ETT was placed in the stoma, and the patient was placed back on the ventillator.    I proceeded immediately to the bedside with Dr. Templeton. Pooling of blood was noted in the OP. A MacGrath video laryngoscope was used to visualize the oropharynx and the larynx. There did not appear to be any active source of bleeding, just moderate clot formation. The decision was made to pack the R side of the OP w Kerlex.     The tracheostoma bed was carefully examined. There appeared to be a small vessel that was actively bleeding on the posterior lateral right stoma. Manual pressure was held for 5 minutes and Bovie was brought to bedside. Upon further examination, bleeding had ceased without use of the Bovie. The stoma was packed with Surgicell hemostatic agent around the ETT. Bedside bronchoscopy was performed through the ETT, revealing mod amount of blood products within the distal trach to the elton and within the mainstem bronchi, which was removed with suction. The ETT was in good position, perhaps a bit deep. The patient tolerated this well, and sats returned to the 90s when placed back on the vent.    Recs  Recommend CTA of the head and neck STAT  Please page ENT with any further bleeding  Keep ETT in stoma and on vent, and keep patient sedated and comfortable for now    Daron Nichols MD, PGY-III  [phone]

## 2023-02-28 PROBLEM — J95.830: Status: ACTIVE | Noted: 2023-02-28

## 2023-02-28 PROBLEM — G89.18 ACUTE POSTOPERATIVE PAIN: Status: ACTIVE | Noted: 2023-02-28

## 2023-02-28 PROBLEM — E87.8 ELECTROLYTE ABNORMALITY: Status: ACTIVE | Noted: 2023-02-28

## 2023-02-28 PROBLEM — J95.821 ACUTE POSTOPERATIVE RESPIRATORY FAILURE: Status: ACTIVE | Noted: 2023-02-28

## 2023-02-28 LAB
ALBUMIN SERPL BCP-MCNC: 2.6 G/DL (ref 3.5–5.2)
ALLENS TEST: ABNORMAL
ALP SERPL-CCNC: 43 U/L (ref 55–135)
ALT SERPL W/O P-5'-P-CCNC: 20 U/L (ref 10–44)
ANION GAP SERPL CALC-SCNC: 8 MMOL/L (ref 8–16)
AST SERPL-CCNC: 28 U/L (ref 10–40)
BASOPHILS # BLD AUTO: 0.02 K/UL (ref 0–0.2)
BASOPHILS NFR BLD: 0.3 % (ref 0–1.9)
BILIRUB SERPL-MCNC: 0.7 MG/DL (ref 0.1–1)
BLD PROD TYP BPU: NORMAL
BLOOD UNIT EXPIRATION DATE: NORMAL
BLOOD UNIT TYPE CODE: 600
BLOOD UNIT TYPE CODE: 600
BLOOD UNIT TYPE CODE: 6200
BLOOD UNIT TYPE CODE: 6200
BLOOD UNIT TYPE: NORMAL
BUN SERPL-MCNC: 23 MG/DL (ref 8–23)
CALCIUM SERPL-MCNC: 8.5 MG/DL (ref 8.7–10.5)
CHLORIDE SERPL-SCNC: 109 MMOL/L (ref 95–110)
CO2 SERPL-SCNC: 23 MMOL/L (ref 23–29)
CODING SYSTEM: NORMAL
CREAT SERPL-MCNC: 0.8 MG/DL (ref 0.5–1.4)
CROSSMATCH INTERPRETATION: NORMAL
DIFFERENTIAL METHOD: ABNORMAL
DISPENSE STATUS: NORMAL
EOSINOPHIL # BLD AUTO: 0.1 K/UL (ref 0–0.5)
EOSINOPHIL NFR BLD: 1.4 % (ref 0–8)
ERYTHROCYTE [DISTWIDTH] IN BLOOD BY AUTOMATED COUNT: 14 % (ref 11.5–14.5)
EST. GFR  (NO RACE VARIABLE): >60 ML/MIN/1.73 M^2
GLUCOSE SERPL-MCNC: 123 MG/DL (ref 70–110)
HCO3 UR-SCNC: 25.6 MMOL/L (ref 24–28)
HCT VFR BLD AUTO: 24.6 % (ref 40–54)
HCT VFR BLD CALC: 23 %PCV (ref 36–54)
HGB BLD-MCNC: 7.9 G/DL (ref 14–18)
IMM GRANULOCYTES # BLD AUTO: 0.08 K/UL (ref 0–0.04)
IMM GRANULOCYTES NFR BLD AUTO: 1 % (ref 0–0.5)
INR PPP: 1.1 (ref 0.8–1.2)
LYMPHOCYTES # BLD AUTO: 0.7 K/UL (ref 1–4.8)
LYMPHOCYTES NFR BLD: 9 % (ref 18–48)
MAGNESIUM SERPL-MCNC: 1.9 MG/DL (ref 1.6–2.6)
MCH RBC QN AUTO: 30.5 PG (ref 27–31)
MCHC RBC AUTO-ENTMCNC: 32.1 G/DL (ref 32–36)
MCV RBC AUTO: 95 FL (ref 82–98)
MONOCYTES # BLD AUTO: 0.6 K/UL (ref 0.3–1)
MONOCYTES NFR BLD: 8 % (ref 4–15)
NEUTROPHILS # BLD AUTO: 6.1 K/UL (ref 1.8–7.7)
NEUTROPHILS NFR BLD: 80.3 % (ref 38–73)
NRBC BLD-RTO: 0 /100 WBC
NUM UNITS TRANS FFP: NORMAL
PCO2 BLDA: 31.5 MMHG (ref 35–45)
PH SMN: 7.52 [PH] (ref 7.35–7.45)
PHOSPHATE SERPL-MCNC: 2.9 MG/DL (ref 2.7–4.5)
PLATELET # BLD AUTO: 195 K/UL (ref 150–450)
PMV BLD AUTO: 9.5 FL (ref 9.2–12.9)
PO2 BLDA: 97 MMHG (ref 80–100)
POC BE: 3 MMOL/L
POC IONIZED CALCIUM: 1.15 MMOL/L (ref 1.06–1.42)
POC SATURATED O2: 98 % (ref 95–100)
POC TCO2: 27 MMOL/L (ref 23–27)
POCT GLUCOSE: 127 MG/DL (ref 70–110)
POCT GLUCOSE: 127 MG/DL (ref 70–110)
POTASSIUM BLD-SCNC: 4 MMOL/L (ref 3.5–5.1)
POTASSIUM SERPL-SCNC: 4.1 MMOL/L (ref 3.5–5.1)
PROT SERPL-MCNC: 5.5 G/DL (ref 6–8.4)
PROTHROMBIN TIME: 11.2 SEC (ref 9–12.5)
RBC # BLD AUTO: 2.59 M/UL (ref 4.6–6.2)
SAMPLE: ABNORMAL
SITE: ABNORMAL
SODIUM BLD-SCNC: 142 MMOL/L (ref 136–145)
SODIUM SERPL-SCNC: 140 MMOL/L (ref 136–145)
TACROLIMUS BLD-MCNC: 2.7 NG/ML (ref 5–15)
WBC # BLD AUTO: 7.63 K/UL (ref 3.9–12.7)

## 2023-02-28 PROCEDURE — 63600175 PHARM REV CODE 636 W HCPCS

## 2023-02-28 PROCEDURE — 25000003 PHARM REV CODE 250

## 2023-02-28 PROCEDURE — 94003 VENT MGMT INPAT SUBQ DAY: CPT

## 2023-02-28 PROCEDURE — 37799 UNLISTED PX VASCULAR SURGERY: CPT

## 2023-02-28 PROCEDURE — 94640 AIRWAY INHALATION TREATMENT: CPT

## 2023-02-28 PROCEDURE — 27000221 HC OXYGEN, UP TO 24 HOURS

## 2023-02-28 PROCEDURE — 99291 CRITICAL CARE FIRST HOUR: CPT | Mod: ,,, | Performed by: STUDENT IN AN ORGANIZED HEALTH CARE EDUCATION/TRAINING PROGRAM

## 2023-02-28 PROCEDURE — 83735 ASSAY OF MAGNESIUM: CPT

## 2023-02-28 PROCEDURE — 85610 PROTHROMBIN TIME: CPT | Performed by: STUDENT IN AN ORGANIZED HEALTH CARE EDUCATION/TRAINING PROGRAM

## 2023-02-28 PROCEDURE — 80053 COMPREHEN METABOLIC PANEL: CPT

## 2023-02-28 PROCEDURE — 25000242 PHARM REV CODE 250 ALT 637 W/ HCPCS: Performed by: STUDENT IN AN ORGANIZED HEALTH CARE EDUCATION/TRAINING PROGRAM

## 2023-02-28 PROCEDURE — 84100 ASSAY OF PHOSPHORUS: CPT

## 2023-02-28 PROCEDURE — 80197 ASSAY OF TACROLIMUS: CPT

## 2023-02-28 PROCEDURE — 94761 N-INVAS EAR/PLS OXIMETRY MLT: CPT

## 2023-02-28 PROCEDURE — 99900026 HC AIRWAY MAINTENANCE (STAT)

## 2023-02-28 PROCEDURE — 99900035 HC TECH TIME PER 15 MIN (STAT)

## 2023-02-28 PROCEDURE — 20000000 HC ICU ROOM

## 2023-02-28 PROCEDURE — 82803 BLOOD GASES ANY COMBINATION: CPT

## 2023-02-28 PROCEDURE — 99291 PR CRITICAL CARE, E/M 30-74 MINUTES: ICD-10-PCS | Mod: ,,, | Performed by: STUDENT IN AN ORGANIZED HEALTH CARE EDUCATION/TRAINING PROGRAM

## 2023-02-28 PROCEDURE — 25000003 PHARM REV CODE 250: Performed by: OTOLARYNGOLOGY

## 2023-02-28 PROCEDURE — 25000003 PHARM REV CODE 250: Performed by: STUDENT IN AN ORGANIZED HEALTH CARE EDUCATION/TRAINING PROGRAM

## 2023-02-28 PROCEDURE — 85025 COMPLETE CBC W/AUTO DIFF WBC: CPT

## 2023-02-28 RX ORDER — QUETIAPINE FUMARATE 25 MG/1
50 TABLET, FILM COATED ORAL DAILY
Status: COMPLETED | OUTPATIENT
Start: 2023-02-28 | End: 2023-02-28

## 2023-02-28 RX ORDER — ENOXAPARIN SODIUM 100 MG/ML
40 INJECTION SUBCUTANEOUS EVERY 24 HOURS
Status: DISCONTINUED | OUTPATIENT
Start: 2023-02-28 | End: 2023-03-10 | Stop reason: HOSPADM

## 2023-02-28 RX ORDER — QUETIAPINE FUMARATE 25 MG/1
50 TABLET, FILM COATED ORAL ONCE
Status: DISCONTINUED | OUTPATIENT
Start: 2023-02-28 | End: 2023-02-28

## 2023-02-28 RX ADMIN — FENTANYL CITRATE 50 MCG: 50 INJECTION INTRAMUSCULAR; INTRAVENOUS at 10:02

## 2023-02-28 RX ADMIN — FENTANYL CITRATE 50 MCG: 50 INJECTION INTRAMUSCULAR; INTRAVENOUS at 08:02

## 2023-02-28 RX ADMIN — POLYETHYLENE GLYCOL 3350 17 G: 17 POWDER, FOR SOLUTION ORAL at 09:02

## 2023-02-28 RX ADMIN — PROPOFOL 50 MCG/KG/MIN: 10 INJECTION, EMULSION INTRAVENOUS at 01:02

## 2023-02-28 RX ADMIN — PROPOFOL 25 MCG/KG/MIN: 10 INJECTION, EMULSION INTRAVENOUS at 11:02

## 2023-02-28 RX ADMIN — ACETAMINOPHEN 650 MG: 325 TABLET ORAL at 05:02

## 2023-02-28 RX ADMIN — MIRTAZAPINE 15 MG: 15 TABLET, FILM COATED ORAL at 09:02

## 2023-02-28 RX ADMIN — ACETAMINOPHEN 650 MG: 325 TABLET ORAL at 09:02

## 2023-02-28 RX ADMIN — SENNOSIDES 5 ML: 8.8 SYRUP ORAL at 09:02

## 2023-02-28 RX ADMIN — ATORVASTATIN CALCIUM 40 MG: 40 TABLET, FILM COATED ORAL at 09:02

## 2023-02-28 RX ADMIN — CLONIDINE HYDROCHLORIDE 0.1 MG: 0.1 TABLET ORAL at 09:02

## 2023-02-28 RX ADMIN — ACETAMINOPHEN 650 MG: 325 TABLET ORAL at 02:02

## 2023-02-28 RX ADMIN — FENTANYL CITRATE 50 MCG: 50 INJECTION INTRAMUSCULAR; INTRAVENOUS at 01:02

## 2023-02-28 RX ADMIN — QUETIAPINE FUMARATE 50 MG: 25 TABLET ORAL at 09:02

## 2023-02-28 RX ADMIN — LEVALBUTEROL HYDROCHLORIDE 0.63 MG: 0.63 SOLUTION RESPIRATORY (INHALATION) at 11:02

## 2023-02-28 RX ADMIN — FAMOTIDINE 20 MG: 20 TABLET ORAL at 09:02

## 2023-02-28 RX ADMIN — FENTANYL CITRATE 50 MCG: 50 INJECTION INTRAMUSCULAR; INTRAVENOUS at 12:02

## 2023-02-28 RX ADMIN — FENTANYL CITRATE 50 MCG: 50 INJECTION INTRAMUSCULAR; INTRAVENOUS at 03:02

## 2023-02-28 RX ADMIN — PROPOFOL 20 MCG/KG/MIN: 10 INJECTION, EMULSION INTRAVENOUS at 09:02

## 2023-02-28 RX ADMIN — LEVALBUTEROL HYDROCHLORIDE 0.63 MG: 0.63 SOLUTION RESPIRATORY (INHALATION) at 03:02

## 2023-02-28 RX ADMIN — IPRATROPIUM BROMIDE 0.5 MG: 0.5 SOLUTION RESPIRATORY (INHALATION) at 11:02

## 2023-02-28 RX ADMIN — IPRATROPIUM BROMIDE 0.5 MG: 0.5 SOLUTION RESPIRATORY (INHALATION) at 07:02

## 2023-02-28 RX ADMIN — TACROLIMUS 1 MG: 1 CAPSULE ORAL at 05:02

## 2023-02-28 RX ADMIN — GABAPENTIN 600 MG: 300 CAPSULE ORAL at 09:02

## 2023-02-28 RX ADMIN — IPRATROPIUM BROMIDE 0.5 MG: 0.5 SOLUTION RESPIRATORY (INHALATION) at 03:02

## 2023-02-28 RX ADMIN — QUETIAPINE FUMARATE 50 MG: 25 TABLET ORAL at 02:02

## 2023-02-28 RX ADMIN — ENOXAPARIN SODIUM 40 MG: 40 INJECTION SUBCUTANEOUS at 05:02

## 2023-02-28 RX ADMIN — GABAPENTIN 600 MG: 300 CAPSULE ORAL at 02:02

## 2023-02-28 RX ADMIN — TACROLIMUS 2 MG: 1 CAPSULE ORAL at 08:02

## 2023-02-28 RX ADMIN — LEVALBUTEROL HYDROCHLORIDE 0.63 MG: 0.63 SOLUTION RESPIRATORY (INHALATION) at 07:02

## 2023-02-28 RX ADMIN — PROPOFOL 50 MCG/KG/MIN: 10 INJECTION, EMULSION INTRAVENOUS at 05:02

## 2023-02-28 RX ADMIN — ESCITALOPRAM OXALATE 20 MG: 20 TABLET ORAL at 09:02

## 2023-02-28 NOTE — PLAN OF CARE
"SICU PLAN OF CARE NOTE    Dx: Squamous cell carcinoma of left tonsil    Goals of Care:  MAP >65, SBP <180    Vital Signs:  BP (!) 144/68 (BP Location: Left arm, Patient Position: Lying)   Pulse 76   Temp 99.2 °F (37.3 °C) (Oral)   Resp 20   Ht 5' 10" (1.778 m)   Wt 75.9 kg (167 lb 5.3 oz)   SpO2 98%   BMI 24.01 kg/m²     Cardiac:  NSR    Resp:  SpO2 98% on vent (40%, 5 PEEP)    Neuro:  Arouses to Voice, Follows Commands, and Moves All Extremities    Gtts:  Propfol    Urine Output:  Urinary Catheter 950 cc/shift    Drains:  ENMANUEL Drain, total output 10 cc / shift    Diet:  NPO     Labs/Accuchecks:  daily labs, accucheck Q6h    Skin:  All skin remains free from new injury, foams in place, heel boots on, patient turned Q2, waffle mattress inflated, ICU bed working correctly.    Shift Events: See flowsheet for further assessment/details. Family updated on current condition/plan of care, questions answered, and emotional support provided.  MD updated on current condition, vitals, labs, and gtts.  No new orders received, will continue to monitor.    "

## 2023-02-28 NOTE — SUBJECTIVE & OBJECTIVE
Interval History: CTA completed, no evidence of bleeding.     Medications:  Continuous Infusions:   propofoL 50 mcg/kg/min (02/28/23 0600)     Scheduled Meds:   acetaminophen  650 mg Per NG tube Q8H    atorvastatin  40 mg Per NG tube QHS    cloNIDine  0.1 mg Per NG tube QHS    EScitalopram oxalate  20 mg Per NG tube QHS    famotidine  20 mg Per NG tube BID    gabapentin  600 mg Per NG tube TID    ipratropium  0.5 mg Nebulization Q8H    levalbuterol  0.63 mg Nebulization Q8H    mirtazapine  15 mg Per NG tube QHS    polyethylene glycol  17 g Per NG tube Daily    QUEtiapine  50 mg Per NG tube QHS    sennosides 8.8 mg/5 ml  5 mL Per NG tube BID    tacrolimus  2 mg Per NG tube Daily AM    And    tacrolimus  1 mg Per NG tube Daily PM     PRN Meds:sodium chloride, sodium chloride 0.9%, calcium gluconate IVPB, calcium gluconate IVPB, calcium gluconate IVPB, clonazePAM, dextrose 10%, dextrose 10%, fentaNYL, glucagon (human recombinant), hydrALAZINE, insulin aspart U-100, labetalol, magnesium sulfate IVPB, magnesium sulfate IVPB, ondansetron, potassium chloride **AND** potassium chloride **AND** potassium chloride, sodium phosphate IVPB, sodium phosphate IVPB, sodium phosphate IVPB     Review of patient's allergies indicates:  No Known Allergies  Objective:     Vital Signs (24h Range):  Temp:  [98.6 °F (37 °C)-99.6 °F (37.6 °C)] 99.2 °F (37.3 °C)  Pulse:  [62-89] 78  Resp:  [20-27] 20  SpO2:  [96 %-100 %] 98 %  BP: (104-154)/(58-71) 147/67  Arterial Line BP: (100-157)/(50-69) 145/62       Lines/Drains/Airways       Drain  Duration                  Closed/Suction Drain 02/20/23 Left Neck Bulb 19 Fr. 8 days         Trans Pyloric Feeding Tube 02/20/23 1320 nasogastric;Other (comments) 12 Fr. Right nostril 7 days         Urethral Catheter 02/20/23 0945 Non-latex 16 Fr. 7 days         NG/OG Tube 02/27/23 0400 orogastric Center mouth 1 day              Airway  Duration                  Airway - Non-Surgical 02/27/23 6423  Endotracheal Tube 1 day       Airway Anesthesia 02/27/23 1 day              Arterial Line  Duration             Arterial Line 02/27/23 0421 Left Radial 1 day              Peripheral Intravenous Line  Duration                  Peripheral IV - Single Lumen 02/24/23 0430 18 G Anterior;Right Wrist 4 days         Midline Catheter Insertion/Assessment  - Single Lumen 02/24/23 1045 Right brachial vein 18g x 10cm 3 days                    Physical Exam  NAD  NGT to R nares  Oral cavity with kerlex packing in place and OGT   Left ENMANUEL x1 w/ appropriate output, stripped.    Neck ecchymotic but improved, soft   ETT in place to stoma, hemostatic      Significant Labs:  CBC:   Recent Labs   Lab 02/28/23  0236   WBC 7.63   RBC 2.59*   HGB 7.9*   HCT 24.6*      MCV 95   MCH 30.5   MCHC 32.1     CMP:   Recent Labs   Lab 02/28/23 0236   *   CALCIUM 8.5*   ALBUMIN 2.6*   PROT 5.5*      K 4.1   CO2 23      BUN 23   CREATININE 0.8   ALKPHOS 43*   ALT 20   AST 28   BILITOT 0.7       Significant Diagnostics:  CT: I have reviewed all pertinent results/findings within the past 24 hours and my personal findings are:  No extravasation indicating major bleed source

## 2023-02-28 NOTE — PROGRESS NOTES
[facility] - [facility]ensive [facility]  Critical Care - Surgery  Progress Note    Patient Name: Dereck Centeno  MRN: [id number]  Admission Date: 2/20/2023  Hospital Length of Stay: 8 days  Code Status: Full Code  Attending Provider: Alexandre Templeton MD  Primary Care Provider: Eva Reynaga MD   Principal Problem: Squamous cell carcinoma of left tonsil    Subjective:     Hospital/ICU Course:  No notes on file    Interval History/Significant Events: NAOE. S/p 1upRBCs yesterday. ETT in place. On vent. Lovenox held yesterday. CTA without evidence of bleed near tracheostomy site.     Follow-up For: Procedure(s) (LRB):  CREATION, TRACHEOSTOMY (N/A)    Post-Operative Day: 4 Days Post-Op    Objective:     Vital Signs (Most Recent):  Temp: 99.2 °F (37.3 °C) (02/28/23 0302)  Pulse: 78 (02/28/23 0600)  Resp: 20 (02/28/23 0317)  BP: (!) 147/67 (02/28/23 0600)  SpO2: 98 % (02/28/23 0600)   Vital Signs (24h Range):  Temp:  [98.6 °F (37 °C)-99.6 °F (37.6 °C)] 99.2 °F (37.3 °C)  Pulse:  [62-89] 78  Resp:  [20-27] 20  SpO2:  [96 %-100 %] 98 %  BP: (104-154)/(58-71) 147/67  Arterial Line BP: (100-157)/(50-69) 145/62     Weight: 75.9 kg (167 lb 5.3 oz)  Body mass index is 24.01 kg/m².      Intake/Output Summary (Last 24 hours) at 2/28/2023 0638  Last data filed at 2/28/2023 0600  Gross per 24 hour   Intake 1365.35 ml   Output 2270 ml   Net -904.65 ml       Physical Exam  Vitals and nursing note reviewed.   Constitutional:       Appearance: Normal appearance.      Interventions: He is sedated, intubated and restrained.   HENT:      Head: Normocephalic and atraumatic.      Nose:      Comments: NG tube sutured in place   Eyes:      Conjunctiva/sclera: Conjunctivae normal.      Pupils: Pupils are equal, round, and reactive to light.   Neck:      Comments: ENMANUEL drain with SS on left side of neck   Surgical incision clean and dry   Cardiovascular:      Rate and Rhythm: Normal rate and regular rhythm.      Pulses: Normal pulses.      Heart  sounds: Normal heart sounds.   Pulmonary:      Effort: Pulmonary effort is normal. No respiratory distress. He is intubated.      Comments: ETT in place   Abdominal:      General: Abdomen is flat. Bowel sounds are normal.      Palpations: Abdomen is soft.   Skin:     General: Skin is warm.       Vents:  Vent Mode: A/C (02/28/23 0355)  Ventilator Initiated: Yes (02/20/23 1443)  Set Rate: 20 BPM (02/28/23 0355)  Vt Set: 450 mL (02/28/23 0355)  Pressure Support: 5 cmH20 (02/26/23 2010)  PEEP/CPAP: 5 cmH20 (02/28/23 0355)  Oxygen Concentration (%): 40 (02/28/23 0600)  Peak Airway Pressure: 21 cmH20 (02/28/23 0355)  Plateau Pressure: 12 cmH20 (02/28/23 0355)  Total Ve: 10.4 L/m (02/28/23 0355)  Negative Inspiratory Force (cm H2O): -25 (02/28/23 0355)  F/VT Ratio<105 (RSBI): (!) 38.76 (02/27/23 2340)    Lines/Drains/Airways       Drain  Duration                  Closed/Suction Drain 02/20/23 Left Neck Bulb 19 Fr. 8 days         Trans Pyloric Feeding Tube 02/20/23 1320 nasogastric;Other (comments) 12 Fr. Right nostril 7 days         Urethral Catheter 02/20/23 0945 Non-latex 16 Fr. 7 days         NG/OG Tube 02/27/23 0400 orogastric Center mouth 1 day              Airway  Duration                  Airway - Non-Surgical 02/27/23 0345 Endotracheal Tube 1 day       Airway Anesthesia 02/27/23 1 day              Arterial Line  Duration             Arterial Line 02/27/23 0421 Left Radial 1 day              Peripheral Intravenous Line  Duration                  Peripheral IV - Single Lumen 02/24/23 0430 18 G Anterior;Right Wrist 4 days         Midline Catheter Insertion/Assessment  - Single Lumen 02/24/23 1045 Right brachial vein 18g x 10cm 3 days                    Significant Labs:    CBC/Anemia Profile:  Recent Labs   Lab 02/27/23  0348 02/27/23  0452 02/28/23  0236   WBC 10.07 8.59 7.63   HGB 10.2* 7.7* 7.9*   HCT 33.2* 24.2* 24.6*    184 195   * 98 95   RDW 13.2 13.4 14.0        Chemistries:  Recent Labs   Lab  02/27/23  0237 02/27/23  0452 02/28/23  0236    143 140   K 4.2 3.7 4.1    107 109   CO2 29 24 23   BUN 24* 26* 23   CREATININE 0.9 1.0 0.8   CALCIUM 8.9 8.1* 8.5*   ALBUMIN 2.7* 2.4* 2.6*   PROT 5.6* 5.0* 5.5*   BILITOT 0.5 0.5 0.7   ALKPHOS 42* 50* 43*   ALT 13 17 20   AST 16 21 28   MG 1.6 1.4* 1.9   PHOS 3.1 5.1* 2.9       All pertinent labs within the past 24 hours have been reviewed.    Significant Imaging:  I have reviewed all pertinent imaging results/findings within the past 24 hours.    Assessment/Plan:     Oncology  * Squamous cell carcinoma of left tonsil  Dereck Centeno is a 62 y.o. male with PMHx of HTN, emphysema, HCV s/p liver transplant in 2015, tobacco abuse (50+ pack years), oral cancer s/p surgery at [facility] in 2019 (no chemo/radiation), and newly diagnosed SCC of the left tonsil. He is now s/p left tonsillectomy and neck dissection by Dr. Templeton on 2/20. Post operative course was c/b development of large hematoma at surgical site. He was taken back to the OR for an emergent neck exploration and hematoma evacuation on 2/20. Code blue on 2/27am, ROSC achieved within 5 min.      Neuro/Psych:   -- Sedation: propofol gtt  -- Pain: home gabapentin, tylenol, PRN fentanyl   -- Psych: home clonazepam PRN, scheduled mirtazapine, seroquel, lexapro     Cards  -- HDS; not currently on pressors   -- Continue home amlodipine and clonidine   -- PRN labetalol and hydralazine   -- Continue home statin       Pulm:   -- H/o emphysema   -- Trach placed 2/24.  -- Ipratropium nebs scheduled,albuterol nebs PRN for wheezing   -- Goal O2 sat > 88%  -- Wean as able  -- Was weaned to trach collar 2/26; reintubated 2/27      Renal:  -- Keep auguste for strict I/O  -- BUN/Cr stable 27/0.8  -- UOP 2L/24H      FEN / GI:   -- Net -904cc's/24H  -- Replace lytes as needed  -- Nutrition: NPO + TF  -- mIVF discontinued      ID:   -- Tm: afebrile; WBC 7.6  -- Abx: none       Heme/Onc:   -- H/H 7.9/24.6  -- H/o  thrombocytopenia    -- Platelets slowly down trending since admission; 106 from 89 on 2/23   -- Continue to monitor   -- Daily CBC  -- Home ASA held       Endo:   -- Gluc goal 140-180  -- SSI      Immuno:  -- H/o HCV infection s/p liver transplant in 2015  -- Hepatology consulted  -- Continue home tacrolimus  -- Monitor tacrolimus levels and INR daily   -- Valacyclovir daily per ENT      PPx:   Feeding: NPO + TF  Analgesia/Sedation: PRN fentanyl, home gabapentin, tylenol/ precedex gtt   Thromboembolic prevention: lovenox   HOB >30: yes   Stress Ulcer ppx: famotidine BID   Glucose control: Critical care goal 140-180 g/dl, ISS    Lines/Drains/Airway: ETT, auguste, PIVx2, NG tube, neck drain x1      Dispo/Code Status/Palliative:   -- SICU / Full Code    Critical care was time spent personally by me on the following activities: development of treatment plan with patient or surrogate and bedside caregivers, discussions with consultants, evaluation of patient's response to treatment, examination of patient, ordering and performing treatments and interventions, ordering and review of laboratory studies, ordering and review of radiographic studies, pulse oximetry, re-evaluation of patient's condition.  This critical care time did not overlap with that of any other provider or involve time for any procedures.     BETH TORRES MD  Critical Care - Surgery  [facility]

## 2023-02-28 NOTE — PLAN OF CARE
Plan of care reviewed with pt and family. CT head/neck complete. Vent settings AC 40% peep 5. Pt follows commands and moves all extremities. Pain controlled with prn medication. Prop at 50mcg/kg/min. Good uop. 1 unit PRBC given

## 2023-02-28 NOTE — SUBJECTIVE & OBJECTIVE
Interval History/Significant Events: NAOE. S/p 1upRBCs yesterday. ETT in place. On vent. Lovenox held yesterday. CTA without evidence of bleed near tracheostomy site.     Follow-up For: Procedure(s) (LRB):  CREATION, TRACHEOSTOMY (N/A)    Post-Operative Day: 4 Days Post-Op    Objective:     Vital Signs (Most Recent):  Temp: 99.2 °F (37.3 °C) (02/28/23 0302)  Pulse: 78 (02/28/23 0600)  Resp: 20 (02/28/23 0317)  BP: (!) 147/67 (02/28/23 0600)  SpO2: 98 % (02/28/23 0600)   Vital Signs (24h Range):  Temp:  [98.6 °F (37 °C)-99.6 °F (37.6 °C)] 99.2 °F (37.3 °C)  Pulse:  [62-89] 78  Resp:  [20-27] 20  SpO2:  [96 %-100 %] 98 %  BP: (104-154)/(58-71) 147/67  Arterial Line BP: (100-157)/(50-69) 145/62     Weight: 75.9 kg (167 lb 5.3 oz)  Body mass index is 24.01 kg/m².      Intake/Output Summary (Last 24 hours) at 2/28/2023 0638  Last data filed at 2/28/2023 0600  Gross per 24 hour   Intake 1365.35 ml   Output 2270 ml   Net -904.65 ml       Physical Exam  Vitals and nursing note reviewed.   Constitutional:       Appearance: Normal appearance.      Interventions: He is sedated, intubated and restrained.   HENT:      Head: Normocephalic and atraumatic.      Nose:      Comments: NG tube sutured in place   Eyes:      Conjunctiva/sclera: Conjunctivae normal.      Pupils: Pupils are equal, round, and reactive to light.   Neck:      Comments: ENMANUEL drain with SS on left side of neck   Surgical incision clean and dry   Cardiovascular:      Rate and Rhythm: Normal rate and regular rhythm.      Pulses: Normal pulses.      Heart sounds: Normal heart sounds.   Pulmonary:      Effort: Pulmonary effort is normal. No respiratory distress. He is intubated.      Comments: ETT in place   Abdominal:      General: Abdomen is flat. Bowel sounds are normal.      Palpations: Abdomen is soft.   Skin:     General: Skin is warm.       Vents:  Vent Mode: A/C (02/28/23 0355)  Ventilator Initiated: Yes (02/20/23 1443)  Set Rate: 20 BPM (02/28/23 0355)  Vt  Set: 450 mL (02/28/23 0355)  Pressure Support: 5 cmH20 (02/26/23 2010)  PEEP/CPAP: 5 cmH20 (02/28/23 0355)  Oxygen Concentration (%): 40 (02/28/23 0600)  Peak Airway Pressure: 21 cmH20 (02/28/23 0355)  Plateau Pressure: 12 cmH20 (02/28/23 0355)  Total Ve: 10.4 L/m (02/28/23 0355)  Negative Inspiratory Force (cm H2O): -25 (02/28/23 0355)  F/VT Ratio<105 (RSBI): (!) 38.76 (02/27/23 2340)    Lines/Drains/Airways       Drain  Duration                  Closed/Suction Drain 02/20/23 Left Neck Bulb 19 Fr. 8 days         Trans Pyloric Feeding Tube 02/20/23 1320 nasogastric;Other (comments) 12 Fr. Right nostril 7 days         Urethral Catheter 02/20/23 0945 Non-latex 16 Fr. 7 days         NG/OG Tube 02/27/23 0400 orogastric Center mouth 1 day              Airway  Duration                  Airway - Non-Surgical 02/27/23 0345 Endotracheal Tube 1 day       Airway Anesthesia 02/27/23 1 day              Arterial Line  Duration             Arterial Line 02/27/23 0421 Left Radial 1 day              Peripheral Intravenous Line  Duration                  Peripheral IV - Single Lumen 02/24/23 0430 18 G Anterior;Right Wrist 4 days         Midline Catheter Insertion/Assessment  - Single Lumen 02/24/23 1045 Right brachial vein 18g x 10cm 3 days                    Significant Labs:    CBC/Anemia Profile:  Recent Labs   Lab 02/27/23  0348 02/27/23  0452 02/28/23  0236   WBC 10.07 8.59 7.63   HGB 10.2* 7.7* 7.9*   HCT 33.2* 24.2* 24.6*    184 195   * 98 95   RDW 13.2 13.4 14.0        Chemistries:  Recent Labs   Lab 02/27/23  0237 02/27/23  0452 02/28/23  0236    143 140   K 4.2 3.7 4.1    107 109   CO2 29 24 23   BUN 24* 26* 23   CREATININE 0.9 1.0 0.8   CALCIUM 8.9 8.1* 8.5*   ALBUMIN 2.7* 2.4* 2.6*   PROT 5.6* 5.0* 5.5*   BILITOT 0.5 0.5 0.7   ALKPHOS 42* 50* 43*   ALT 13 17 20   AST 16 21 28   MG 1.6 1.4* 1.9   PHOS 3.1 5.1* 2.9       All pertinent labs within the past 24 hours have been reviewed.    Significant  Imaging:  I have reviewed all pertinent imaging results/findings within the past 24 hours.

## 2023-02-28 NOTE — PROGRESS NOTES
[facility] - Surgical Intensive Care  Otorhinolaryngology-Head & Neck Surgery  Progress Note    Subjective:     Post-Op Info:  Procedure(s) (LRB):  CREATION, TRACHEOSTOMY (N/A)   4 Days Post-Op  Hospital Day: 9     Interval History: CTA completed, no evidence of bleeding.     Medications:  Continuous Infusions:   propofoL 50 mcg/kg/min (02/28/23 0600)     Scheduled Meds:   acetaminophen  650 mg Per NG tube Q8H    atorvastatin  40 mg Per NG tube QHS    cloNIDine  0.1 mg Per NG tube QHS    EScitalopram oxalate  20 mg Per NG tube QHS    famotidine  20 mg Per NG tube BID    gabapentin  600 mg Per NG tube TID    ipratropium  0.5 mg Nebulization Q8H    levalbuterol  0.63 mg Nebulization Q8H    mirtazapine  15 mg Per NG tube QHS    polyethylene glycol  17 g Per NG tube Daily    QUEtiapine  50 mg Per NG tube QHS    sennosides 8.8 mg/5 ml  5 mL Per NG tube BID    tacrolimus  2 mg Per NG tube Daily AM    And    tacrolimus  1 mg Per NG tube Daily PM     PRN Meds:sodium chloride, sodium chloride 0.9%, calcium gluconate IVPB, calcium gluconate IVPB, calcium gluconate IVPB, clonazePAM, dextrose 10%, dextrose 10%, fentaNYL, glucagon (human recombinant), hydrALAZINE, insulin aspart U-100, labetalol, magnesium sulfate IVPB, magnesium sulfate IVPB, ondansetron, potassium chloride **AND** potassium chloride **AND** potassium chloride, sodium phosphate IVPB, sodium phosphate IVPB, sodium phosphate IVPB     Review of patient's allergies indicates:  No Known Allergies  Objective:     Vital Signs (24h Range):  Temp:  [98.6 °F (37 °C)-99.6 °F (37.6 °C)] 99.2 °F (37.3 °C)  Pulse:  [62-89] 78  Resp:  [20-27] 20  SpO2:  [96 %-100 %] 98 %  BP: (104-154)/(58-71) 147/67  Arterial Line BP: (100-157)/(50-69) 145/62       Lines/Drains/Airways       Drain  Duration                  Closed/Suction Drain 02/20/23 Left Neck Bulb 19 Fr. 8 days         Trans Pyloric Feeding Tube 02/20/23 1320 nasogastric;Other (comments) 12 Fr. Right nostril  7 days         Urethral Catheter 02/20/23 0945 Non-latex 16 Fr. 7 days         NG/OG Tube 02/27/23 0400 orogastric Center mouth 1 day              Airway  Duration                  Airway - Non-Surgical 02/27/23 0345 Endotracheal Tube 1 day       Airway Anesthesia 02/27/23 1 day              Arterial Line  Duration             Arterial Line 02/27/23 0421 Left Radial 1 day              Peripheral Intravenous Line  Duration                  Peripheral IV - Single Lumen 02/24/23 0430 18 G Anterior;Right Wrist 4 days         Midline Catheter Insertion/Assessment  - Single Lumen 02/24/23 1045 Right brachial vein 18g x 10cm 3 days                    Physical Exam  NAD  NGT to R nares  Oral cavity with kerlex packing in place and OGT   Left ENMANUEL x1 w/ appropriate output, stripped.    Neck ecchymotic but improved, soft   ETT in place to stoma, hemostatic      Significant Labs:  CBC:   Recent Labs   Lab 02/28/23  0236   WBC 7.63   RBC 2.59*   HGB 7.9*   HCT 24.6*      MCV 95   MCH 30.5   MCHC 32.1     CMP:   Recent Labs   Lab 02/28/23  0236   *   CALCIUM 8.5*   ALBUMIN 2.6*   PROT 5.5*      K 4.1   CO2 23      BUN 23   CREATININE 0.8   ALKPHOS 43*   ALT 20   AST 28   BILITOT 0.7       Significant Diagnostics:  CT: I have reviewed all pertinent results/findings within the past 24 hours and my personal findings are:  No extravasation indicating major bleed source    Assessment/Plan:     * Squamous cell carcinoma of left tonsil  Dereck Centeno is a 62 y.o. 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 On day of surgery pt with expanding hematoma s/p emergent neck exploration with washout 2/20.     Oral packing removed 2/22. Tracheostomy 2/24/23.   Code blue on 2/27 for bleeding likely from tracheal stoma site. Required ETT placement in stoma and  replacement of oral packing. CTA completed 2/27 without extravasation.     -- Will keep ETT in place    - Exchange for tracheostomy once hemostasis is ensured  -- Oral packing in place   - Will remove once hemostasis is ensured   -- Drain care with strict recording of drain output  -- Remainder of care per ICU   -- Please page ENT with any questions or concerns               Nayla Lomax MD  Otorhinolaryngology-Head & Neck Surgery  [facility] - Surgical Intensive Care

## 2023-02-28 NOTE — ASSESSMENT & PLAN NOTE
Dereck Centeno is a 62 y.o. 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 On day of surgery pt with expanding hematoma s/p emergent neck exploration with washout 2/20.     Oral packing removed 2/22. Tracheostomy 2/24/23.   Code blue on 2/27 for bleeding likely from tracheal stoma site. Required ETT placement in stoma and replacement of oral packing. CTA completed 2/27 without extravasation.     -- Will keep ETT in place    - Exchange for tracheostomy once hemostasis is ensured  -- Oral packing in place   - Will remove once hemostasis is ensured   -- Drain care with strict recording of drain output  -- Remainder of care per ICU   -- Please page ENT with any questions or concerns

## 2023-02-28 NOTE — TREATMENT PLAN
Hepatology Treatment Plan    This is a 62 year old male with PMH significant for cirrhosis secondary to ETOH/Hepatitis C (status post transplant in 01/2015) and head/neck cancer (status post resection in approximately 2019 at [facility] with development of squamous cell carcinoma of left tonsil as of 01/2023) who was admitted to [facility] on 02/20 for left tonsillectomy and neck dissection that was performed on day of admission. Hospital course associated with development of hematoma at surgical site requiring neck exploration and hematoma evacuation on 02/20 with tracheostomy creation on 02/24 and cardiac arrest on 02/27 secondary to hypoxia with concern for on-going bleeding from surgery site. Hepatology following for assistance with immunosuppression.     Interval History  CTA completed yesterday without evidence of active bleeding. LFT's remain normal. Tacrolimus level 2.7 today.     Plan    - Tacrolimus 2 mg in AM and 1 mg in PM.   - Please obtain daily CBC, BMP, LFT, INR, and Tacrolimus level.     Thank you for involving us in the care of Dereck Centeno. Please call with any additional concerns or questions.        Erik Lindsay MD, PGY-V  Gastroenterology Fellow  [facility]

## 2023-02-28 NOTE — PT/OT/SLP PROGRESS
Physical Therapy      Patient Name:  Dereck Centeno   MRN:  [id number]    Patient not seen today secondary to  (pt not seen due to not being medically stable pt has  ET tube  in place awaiting exchange for tracheostomy when medically stable.). Will follow-up at  a later date.    2/28/2023  .

## 2023-02-28 NOTE — ASSESSMENT & PLAN NOTE
Dereck Centeno is a 62 y.o. male with PMHx of HTN, emphysema, HCV s/p liver transplant in 2015, tobacco abuse (50+ pack years), oral cancer s/p surgery at [facility] in 2019 (no chemo/radiation), and newly diagnosed SCC of the left tonsil. He is now s/p left tonsillectomy and neck dissection by Dr. Templeton on 2/20. Post operative course was c/b development of large hematoma at surgical site. He was taken back to the OR for an emergent neck exploration and hematoma evacuation on 2/20. Code blue on 2/27am, ROSC achieved within 5 min.      Neuro/Psych:   -- Sedation: propofol gtt  -- Pain: home gabapentin, tylenol, PRN fentanyl   -- Psych: home clonazepam PRN, scheduled mirtazapine, seroquel, lexapro     Cards  -- HDS; not currently on pressors   -- Continue home amlodipine and clonidine   -- PRN labetalol and hydralazine   -- Continue home statin       Pulm:   -- H/o emphysema   -- Trach placed 2/24.   -- Ipratropium nebs scheduled,albuterol nebs PRN for wheezing   -- Goal O2 sat > 88%  -- Wean as able  -- Was weaned to trach collar 2/26; reintubated 2/27. ETT replaced 2/28.   --SBT failed 2/28       Renal:  -- Keep auguste for strict I/O  -- BUN/Cr stable 23/0.8  -- UOP 2L/24H      FEN / GI:   -- Net -904cc's/24H  -- Replace lytes as needed  -- Nutrition: NPO + TF restarted. Dobhoff for feeds.   -- mIVF discontinued      ID:   -- Tm: afebrile; WBC 7.6  -- Abx: none       Heme/Onc:   -- H/H 7.9/24.6  -s/p 1upRBCs 2/27  -- H/o thrombocytopenia    -- Platelets slowly down trending since admission; 106 from 89 on 2/23   -- Continue to monitor   -- Daily CBC  -- Home ASA held       Endo:   -- Gluc goal 140-180  -- SSI      Immuno:  -- H/o HCV infection s/p liver transplant in 2015  -- Hepatology consulted  -- Continue home tacrolimus  -- Monitor tacrolimus levels and INR daily   -- Valacyclovir daily per ENT      PPx:   Feeding: NPO + TF  Analgesia/Sedation: PRN fentanyl, home gabapentin, tylenol/ precedex gtt    Thromboembolic prevention: lovenox   HOB >30: yes   Stress Ulcer ppx: famotidine BID   Glucose control: Critical care goal 140-180 g/dl, ISS    Lines/Drains/Airway: ETT, auguste, PIVx2, NG tube, neck drain x1      Dispo/Code Status/Palliative:   -- SICU / Full Code

## 2023-02-28 NOTE — PT/OT/SLP PROGRESS
Occupational Therapy      Patient Name:  Dereck Centeno   MRN:  [id number]    Patient not seen today secondary to not medically appropriate for  progressive mobility 2* ETT in place awaiting exchange for tracheostomy once hemostasis is ensured per chart  2/28/2023

## 2023-03-01 LAB
ALBUMIN SERPL BCP-MCNC: 2.6 G/DL (ref 3.5–5.2)
ALLENS TEST: ABNORMAL
ALP SERPL-CCNC: 45 U/L (ref 55–135)
ALT SERPL W/O P-5'-P-CCNC: 20 U/L (ref 10–44)
ANION GAP SERPL CALC-SCNC: 6 MMOL/L (ref 8–16)
AST SERPL-CCNC: 25 U/L (ref 10–40)
BASOPHILS # BLD AUTO: 0.02 K/UL (ref 0–0.2)
BASOPHILS NFR BLD: 0.3 % (ref 0–1.9)
BILIRUB SERPL-MCNC: 0.6 MG/DL (ref 0.1–1)
BUN SERPL-MCNC: 22 MG/DL (ref 8–23)
CALCIUM SERPL-MCNC: 8.6 MG/DL (ref 8.7–10.5)
CHLORIDE SERPL-SCNC: 108 MMOL/L (ref 95–110)
CO2 SERPL-SCNC: 27 MMOL/L (ref 23–29)
CREAT SERPL-MCNC: 0.9 MG/DL (ref 0.5–1.4)
DIFFERENTIAL METHOD: ABNORMAL
EOSINOPHIL # BLD AUTO: 0.2 K/UL (ref 0–0.5)
EOSINOPHIL NFR BLD: 3.3 % (ref 0–8)
ERYTHROCYTE [DISTWIDTH] IN BLOOD BY AUTOMATED COUNT: 13.6 % (ref 11.5–14.5)
EST. GFR  (NO RACE VARIABLE): >60 ML/MIN/1.73 M^2
GLUCOSE SERPL-MCNC: 151 MG/DL (ref 70–110)
HCO3 UR-SCNC: 29 MMOL/L (ref 24–28)
HCT VFR BLD AUTO: 25.2 % (ref 40–54)
HCT VFR BLD CALC: 26 %PCV (ref 36–54)
HGB BLD-MCNC: 7.9 G/DL (ref 14–18)
IMM GRANULOCYTES # BLD AUTO: 0.04 K/UL (ref 0–0.04)
IMM GRANULOCYTES NFR BLD AUTO: 0.7 % (ref 0–0.5)
INR PPP: 1 (ref 0.8–1.2)
LYMPHOCYTES # BLD AUTO: 0.7 K/UL (ref 1–4.8)
LYMPHOCYTES NFR BLD: 11.1 % (ref 18–48)
MAGNESIUM SERPL-MCNC: 1.6 MG/DL (ref 1.6–2.6)
MCH RBC QN AUTO: 29.6 PG (ref 27–31)
MCHC RBC AUTO-ENTMCNC: 31.3 G/DL (ref 32–36)
MCV RBC AUTO: 94 FL (ref 82–98)
MONOCYTES # BLD AUTO: 0.5 K/UL (ref 0.3–1)
MONOCYTES NFR BLD: 8.9 % (ref 4–15)
NEUTROPHILS # BLD AUTO: 4.6 K/UL (ref 1.8–7.7)
NEUTROPHILS NFR BLD: 75.7 % (ref 38–73)
NRBC BLD-RTO: 0 /100 WBC
PCO2 BLDA: 39.5 MMHG (ref 35–45)
PH SMN: 7.47 [PH] (ref 7.35–7.45)
PHOSPHATE SERPL-MCNC: 2.5 MG/DL (ref 2.7–4.5)
PLATELET # BLD AUTO: 188 K/UL (ref 150–450)
PMV BLD AUTO: 9.3 FL (ref 9.2–12.9)
PO2 BLDA: 121 MMHG (ref 80–100)
POC BE: 5 MMOL/L
POC IONIZED CALCIUM: 1.2 MMOL/L (ref 1.06–1.42)
POC SATURATED O2: 99 % (ref 95–100)
POC TCO2: 30 MMOL/L (ref 23–27)
POCT GLUCOSE: 136 MG/DL (ref 70–110)
POCT GLUCOSE: 146 MG/DL (ref 70–110)
POCT GLUCOSE: 156 MG/DL (ref 70–110)
POCT GLUCOSE: 162 MG/DL (ref 70–110)
POCT GLUCOSE: 176 MG/DL (ref 70–110)
POCT GLUCOSE: 187 MG/DL (ref 70–110)
POTASSIUM BLD-SCNC: 4 MMOL/L (ref 3.5–5.1)
POTASSIUM SERPL-SCNC: 4 MMOL/L (ref 3.5–5.1)
PROT SERPL-MCNC: 5.8 G/DL (ref 6–8.4)
PROTHROMBIN TIME: 10.9 SEC (ref 9–12.5)
RBC # BLD AUTO: 2.67 M/UL (ref 4.6–6.2)
SAMPLE: ABNORMAL
SITE: ABNORMAL
SODIUM BLD-SCNC: 141 MMOL/L (ref 136–145)
SODIUM SERPL-SCNC: 141 MMOL/L (ref 136–145)
TACROLIMUS BLD-MCNC: 2.9 NG/ML (ref 5–15)
TRIGL SERPL-MCNC: 124 MG/DL (ref 30–150)
WBC # BLD AUTO: 6.1 K/UL (ref 3.9–12.7)

## 2023-03-01 PROCEDURE — 94003 VENT MGMT INPAT SUBQ DAY: CPT

## 2023-03-01 PROCEDURE — 99900035 HC TECH TIME PER 15 MIN (STAT)

## 2023-03-01 PROCEDURE — 99291 PR CRITICAL CARE, E/M 30-74 MINUTES: ICD-10-PCS | Mod: ,,, | Performed by: STUDENT IN AN ORGANIZED HEALTH CARE EDUCATION/TRAINING PROGRAM

## 2023-03-01 PROCEDURE — 84100 ASSAY OF PHOSPHORUS: CPT

## 2023-03-01 PROCEDURE — 85610 PROTHROMBIN TIME: CPT | Performed by: STUDENT IN AN ORGANIZED HEALTH CARE EDUCATION/TRAINING PROGRAM

## 2023-03-01 PROCEDURE — 94761 N-INVAS EAR/PLS OXIMETRY MLT: CPT

## 2023-03-01 PROCEDURE — 94640 AIRWAY INHALATION TREATMENT: CPT

## 2023-03-01 PROCEDURE — 99291 CRITICAL CARE FIRST HOUR: CPT | Mod: ,,, | Performed by: STUDENT IN AN ORGANIZED HEALTH CARE EDUCATION/TRAINING PROGRAM

## 2023-03-01 PROCEDURE — 80053 COMPREHEN METABOLIC PANEL: CPT

## 2023-03-01 PROCEDURE — 97165 OT EVAL LOW COMPLEX 30 MIN: CPT

## 2023-03-01 PROCEDURE — 97535 SELF CARE MNGMENT TRAINING: CPT

## 2023-03-01 PROCEDURE — 84478 ASSAY OF TRIGLYCERIDES: CPT

## 2023-03-01 PROCEDURE — 25000003 PHARM REV CODE 250

## 2023-03-01 PROCEDURE — 25000242 PHARM REV CODE 250 ALT 637 W/ HCPCS: Performed by: STUDENT IN AN ORGANIZED HEALTH CARE EDUCATION/TRAINING PROGRAM

## 2023-03-01 PROCEDURE — 27000221 HC OXYGEN, UP TO 24 HOURS

## 2023-03-01 PROCEDURE — 63600175 PHARM REV CODE 636 W HCPCS

## 2023-03-01 PROCEDURE — 82803 BLOOD GASES ANY COMBINATION: CPT

## 2023-03-01 PROCEDURE — 97530 THERAPEUTIC ACTIVITIES: CPT

## 2023-03-01 PROCEDURE — 25000003 PHARM REV CODE 250: Performed by: STUDENT IN AN ORGANIZED HEALTH CARE EDUCATION/TRAINING PROGRAM

## 2023-03-01 PROCEDURE — 80197 ASSAY OF TACROLIMUS: CPT

## 2023-03-01 PROCEDURE — 97161 PT EVAL LOW COMPLEX 20 MIN: CPT

## 2023-03-01 PROCEDURE — 83735 ASSAY OF MAGNESIUM: CPT

## 2023-03-01 PROCEDURE — 36600 WITHDRAWAL OF ARTERIAL BLOOD: CPT

## 2023-03-01 PROCEDURE — 20000000 HC ICU ROOM

## 2023-03-01 PROCEDURE — 85025 COMPLETE CBC W/AUTO DIFF WBC: CPT

## 2023-03-01 PROCEDURE — 25000242 PHARM REV CODE 250 ALT 637 W/ HCPCS

## 2023-03-01 PROCEDURE — 63600175 PHARM REV CODE 636 W HCPCS: Performed by: STUDENT IN AN ORGANIZED HEALTH CARE EDUCATION/TRAINING PROGRAM

## 2023-03-01 PROCEDURE — 25000003 PHARM REV CODE 250: Performed by: OTOLARYNGOLOGY

## 2023-03-01 PROCEDURE — 99900026 HC AIRWAY MAINTENANCE (STAT)

## 2023-03-01 RX ORDER — GABAPENTIN 250 MG/5ML
600 SOLUTION ORAL 3 TIMES DAILY
Status: DISCONTINUED | OUTPATIENT
Start: 2023-03-01 | End: 2023-03-06

## 2023-03-01 RX ORDER — OXYCODONE HCL 5 MG/5 ML
5 SOLUTION, ORAL ORAL EVERY 4 HOURS PRN
Status: DISCONTINUED | OUTPATIENT
Start: 2023-03-01 | End: 2023-03-02

## 2023-03-01 RX ORDER — OXYCODONE HCL 5 MG/5 ML
5 SOLUTION, ORAL ORAL EVERY 4 HOURS PRN
Status: DISCONTINUED | OUTPATIENT
Start: 2023-03-01 | End: 2023-03-01

## 2023-03-01 RX ORDER — OXYCODONE HYDROCHLORIDE 10 MG/1
10 TABLET ORAL EVERY 6 HOURS PRN
Status: DISCONTINUED | OUTPATIENT
Start: 2023-03-01 | End: 2023-03-01

## 2023-03-01 RX ORDER — OXYCODONE HCL 5 MG/5 ML
10 SOLUTION, ORAL ORAL EVERY 4 HOURS PRN
Status: DISCONTINUED | OUTPATIENT
Start: 2023-03-01 | End: 2023-03-02

## 2023-03-01 RX ORDER — DEXMEDETOMIDINE HYDROCHLORIDE 4 UG/ML
0-1.4 INJECTION, SOLUTION INTRAVENOUS CONTINUOUS
Status: DISCONTINUED | OUTPATIENT
Start: 2023-03-01 | End: 2023-03-03

## 2023-03-01 RX ORDER — SCOLOPAMINE TRANSDERMAL SYSTEM 1 MG/1
1 PATCH, EXTENDED RELEASE TRANSDERMAL
Status: DISCONTINUED | OUTPATIENT
Start: 2023-03-01 | End: 2023-03-10 | Stop reason: HOSPADM

## 2023-03-01 RX ORDER — OXYCODONE HCL 5 MG/5 ML
5 SOLUTION, ORAL ORAL EVERY 6 HOURS PRN
Status: DISCONTINUED | OUTPATIENT
Start: 2023-03-01 | End: 2023-03-01

## 2023-03-01 RX ADMIN — DEXMEDETOMIDINE HYDROCHLORIDE 0.1 MCG/KG/HR: 4 INJECTION, SOLUTION INTRAVENOUS at 09:03

## 2023-03-01 RX ADMIN — OXYCODONE HYDROCHLORIDE 5 MG: 5 SOLUTION ORAL at 10:03

## 2023-03-01 RX ADMIN — ENOXAPARIN SODIUM 40 MG: 40 INJECTION SUBCUTANEOUS at 04:03

## 2023-03-01 RX ADMIN — FENTANYL CITRATE 50 MCG: 50 INJECTION INTRAMUSCULAR; INTRAVENOUS at 09:03

## 2023-03-01 RX ADMIN — OXYCODONE HYDROCHLORIDE 10 MG: 5 SOLUTION ORAL at 04:03

## 2023-03-01 RX ADMIN — FAMOTIDINE 20 MG: 20 TABLET ORAL at 08:03

## 2023-03-01 RX ADMIN — FENTANYL CITRATE 50 MCG: 50 INJECTION INTRAMUSCULAR; INTRAVENOUS at 04:03

## 2023-03-01 RX ADMIN — ACETAMINOPHEN 650 MG: 325 TABLET ORAL at 05:03

## 2023-03-01 RX ADMIN — GABAPENTIN 600 MG: 300 CAPSULE ORAL at 08:03

## 2023-03-01 RX ADMIN — IPRATROPIUM BROMIDE 0.5 MG: 0.5 SOLUTION RESPIRATORY (INHALATION) at 03:03

## 2023-03-01 RX ADMIN — LEVALBUTEROL HYDROCHLORIDE 0.63 MG: 0.63 SOLUTION RESPIRATORY (INHALATION) at 07:03

## 2023-03-01 RX ADMIN — GABAPENTIN 600 MG: 250 SOLUTION ORAL at 02:03

## 2023-03-01 RX ADMIN — ATORVASTATIN CALCIUM 40 MG: 40 TABLET, FILM COATED ORAL at 09:03

## 2023-03-01 RX ADMIN — SCOPALAMINE 1 PATCH: 1 PATCH, EXTENDED RELEASE TRANSDERMAL at 01:03

## 2023-03-01 RX ADMIN — SENNOSIDES 5 ML: 8.8 SYRUP ORAL at 08:03

## 2023-03-01 RX ADMIN — TACROLIMUS 1 MG: 1 CAPSULE ORAL at 05:03

## 2023-03-01 RX ADMIN — FENTANYL CITRATE 50 MCG: 50 INJECTION INTRAMUSCULAR; INTRAVENOUS at 01:03

## 2023-03-01 RX ADMIN — POLYETHYLENE GLYCOL 3350 17 G: 17 POWDER, FOR SOLUTION ORAL at 08:03

## 2023-03-01 RX ADMIN — LEVALBUTEROL HYDROCHLORIDE 0.63 MG: 0.63 SOLUTION RESPIRATORY (INHALATION) at 03:03

## 2023-03-01 RX ADMIN — IPRATROPIUM BROMIDE 0.5 MG: 0.5 SOLUTION RESPIRATORY (INHALATION) at 07:03

## 2023-03-01 RX ADMIN — GABAPENTIN 600 MG: 250 SOLUTION ORAL at 09:03

## 2023-03-01 RX ADMIN — QUETIAPINE FUMARATE 50 MG: 25 TABLET ORAL at 09:03

## 2023-03-01 RX ADMIN — FENTANYL CITRATE 50 MCG: 50 INJECTION INTRAMUSCULAR; INTRAVENOUS at 02:03

## 2023-03-01 RX ADMIN — FENTANYL CITRATE 50 MCG: 50 INJECTION INTRAMUSCULAR; INTRAVENOUS at 07:03

## 2023-03-01 RX ADMIN — CLONIDINE HYDROCHLORIDE 0.1 MG: 0.1 TABLET ORAL at 09:03

## 2023-03-01 RX ADMIN — MIRTAZAPINE 15 MG: 15 TABLET, FILM COATED ORAL at 09:03

## 2023-03-01 RX ADMIN — ESCITALOPRAM OXALATE 20 MG: 20 TABLET ORAL at 09:03

## 2023-03-01 RX ADMIN — OXYCODONE HYDROCHLORIDE 5 MG: 5 SOLUTION ORAL at 01:03

## 2023-03-01 RX ADMIN — PROPOFOL 20 MCG/KG/MIN: 10 INJECTION, EMULSION INTRAVENOUS at 05:03

## 2023-03-01 RX ADMIN — FAMOTIDINE 20 MG: 20 TABLET ORAL at 09:03

## 2023-03-01 RX ADMIN — OXYCODONE HYDROCHLORIDE 10 MG: 5 SOLUTION ORAL at 11:03

## 2023-03-01 RX ADMIN — OXYCODONE HYDROCHLORIDE 10 MG: 5 SOLUTION ORAL at 08:03

## 2023-03-01 RX ADMIN — SENNOSIDES 5 ML: 8.8 SYRUP ORAL at 09:03

## 2023-03-01 RX ADMIN — ACETAMINOPHEN 650 MG: 325 TABLET ORAL at 01:03

## 2023-03-01 RX ADMIN — MAGNESIUM SULFATE 2 G: 2 INJECTION INTRAVENOUS at 05:03

## 2023-03-01 RX ADMIN — ACETAMINOPHEN 650 MG: 325 TABLET ORAL at 09:03

## 2023-03-01 RX ADMIN — IPRATROPIUM BROMIDE 0.5 MG: 0.5 SOLUTION RESPIRATORY (INHALATION) at 11:03

## 2023-03-01 RX ADMIN — TACROLIMUS 2 MG: 1 CAPSULE ORAL at 07:03

## 2023-03-01 RX ADMIN — SODIUM PHOSPHATE, MONOBASIC, MONOHYDRATE AND SODIUM PHOSPHATE, DIBASIC, ANHYDROUS 15 MMOL: 142; 276 INJECTION, SOLUTION INTRAVENOUS at 05:03

## 2023-03-01 RX ADMIN — LEVALBUTEROL HYDROCHLORIDE 0.63 MG: 0.63 SOLUTION RESPIRATORY (INHALATION) at 11:03

## 2023-03-01 NOTE — SUBJECTIVE & OBJECTIVE
Interval History/Significant Events: Febrile this AM to 101.3. Continuing to decrease sedation. SBT today.    Follow-up For: Procedure(s) (LRB):  CREATION, TRACHEOSTOMY (N/A)    Post-Operative Day: 6 Days Post-Op    Objective:     Vital Signs (Most Recent):  Temp: (!) 101.3 °F (38.5 °C) (03/01/23 0530)  Pulse: 78 (03/01/23 0735)  Resp: 12 (03/01/23 0745)  BP: (!) 148/72 (03/01/23 0700)  SpO2: 97 % (03/01/23 0735)   Vital Signs (24h Range):  Temp:  [98 °F (36.7 °C)-101.3 °F (38.5 °C)] 101.3 °F (38.5 °C)  Pulse:  [71-90] 78  Resp:  [12-22] 12  SpO2:  [93 %-100 %] 97 %  BP: (115-179)/(65-85) 148/72  Arterial Line BP: (158-174)/(65-72) 158/65     Weight: 75.9 kg (167 lb 5.3 oz)  Body mass index is 24.01 kg/m².      Intake/Output Summary (Last 24 hours) at 3/1/2023 0753  Last data filed at 3/1/2023 0700  Gross per 24 hour   Intake 1203.08 ml   Output 2683 ml   Net -1479.92 ml       Physical Exam  Vitals and nursing note reviewed.   Constitutional:       Appearance: Normal appearance.      Interventions: He is sedated, intubated and restrained.   HENT:      Head: Normocephalic and atraumatic.      Nose:      Comments: NG tube sutured in place   Eyes:      Conjunctiva/sclera: Conjunctivae normal.      Pupils: Pupils are equal, round, and reactive to light.   Neck:      Comments: ENMANUEL drain with SS on left side of neck   Surgical incision clean and dry   Cardiovascular:      Rate and Rhythm: Normal rate and regular rhythm.      Pulses: Normal pulses.      Heart sounds: Normal heart sounds.   Pulmonary:      Effort: Pulmonary effort is normal. No respiratory distress. He is intubated.      Comments: ETT in place   Abdominal:      General: Abdomen is flat. Bowel sounds are normal.      Palpations: Abdomen is soft.   Skin:     General: Skin is warm.       Vents:  Vent Mode: Spont (03/01/23 0735)  Ventilator Initiated: Yes (02/20/23 1443)  Set Rate: 20 BPM (03/01/23 0735)  Vt Set: 450 mL (03/01/23 0735)  Pressure Support: 10  cmH20 (03/01/23 0735)  PEEP/CPAP: 5 cmH20 (03/01/23 0735)  Oxygen Concentration (%): 35 (03/01/23 0735)  Peak Airway Pressure: 16 cmH20 (03/01/23 0735)  Plateau Pressure: 12 cmH20 (03/01/23 0735)  Total Ve: 9.66 L/m (03/01/23 0735)  Negative Inspiratory Force (cm H2O): -25 (03/01/23 0735)  F/VT Ratio<105 (RSBI): (!) 42.92 (02/28/23 2327)    Lines/Drains/Airways       Drain  Duration                  Closed/Suction Drain 02/20/23 Left Neck Bulb 19 Fr. 9 days         Trans Pyloric Feeding Tube 02/20/23 1320 nasogastric;Other (comments) 12 Fr. Right nostril 8 days         Urethral Catheter 02/20/23 0945 Non-latex 16 Fr. 8 days         NG/OG Tube 02/27/23 0400 orogastric Center mouth 2 days              Airway  Duration             Adult Surgical Airway 02/28/23 Shiley Cuffed 6.0/ 75mm 1 day              Peripheral Intravenous Line  Duration                  Midline Catheter Insertion/Assessment  - Single Lumen 02/24/23 1045 Right brachial vein 18g x 10cm 4 days         Peripheral IV - Single Lumen 03/01/23 0610 20 G Anterior;Right Forearm <1 day                    Significant Labs:    CBC/Anemia Profile:  Recent Labs   Lab 02/28/23  0236 02/28/23  0355 03/01/23  0301 03/01/23  0322   WBC 7.63  --  6.10  --    HGB 7.9*  --  7.9*  --    HCT 24.6* 23* 25.2* 26*     --  188  --    MCV 95  --  94  --    RDW 14.0  --  13.6  --         Chemistries:  Recent Labs   Lab 02/28/23  0236 03/01/23  0301    141   K 4.1 4.0    108   CO2 23 27   BUN 23 22   CREATININE 0.8 0.9   CALCIUM 8.5* 8.6*   ALBUMIN 2.6* 2.6*   PROT 5.5* 5.8*   BILITOT 0.7 0.6   ALKPHOS 43* 45*   ALT 20 20   AST 28 25   MG 1.9 1.6   PHOS 2.9 2.5*       None    Significant Imaging:  I have reviewed all pertinent imaging results/findings within the past 24 hours.

## 2023-03-01 NOTE — ASSESSMENT & PLAN NOTE
Dereck Centeno is a 62 y.o. 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 On day of surgery pt with expanding hematoma s/p emergent neck exploration with washout 2/20.     Oral packing removed 2/22. Tracheostomy 2/24/23.   Code blue on 2/27 for bleeding likely from tracheal stoma site. Required ETT placement in stoma and replacement of oral packing. CTA completed 2/27 without extravasation.   ETT replaced with tracheostomy and oral packing removed 2/28.     Remains hemostatic.     -- Continue to wean from vent   -- Drain care with strict recording of drain output  -- Remainder of care per ICU   -- Please page ENT with any questions or concerns

## 2023-03-01 NOTE — PLAN OF CARE
Recommendations     1.) TF recommendations:          - if propofol continues, recommend TF of Impact Peptide 1.5@40ml/hr to provide 1440kcal, 90gPRO, and 739ml FF           - if Propofol is d/c'd recommend increasing TF to Impact Peptide 1.5@ 45ml/hr to provide 1620kcal, 101gPRO, and 832ml of FF.       2.)  RD to monitor.        Goals: To meet % of EEN/EPN by next RD f/u  Nutrition Goal Status: goal met  Communication of RD Recs:  (POC)

## 2023-03-01 NOTE — NURSING
"      SICU PLAN OF CARE NOTE    Dx: Squamous cell carcinoma of left tonsil    Shift Events: SBT throughout shift, patient transitioned to trach collar while in Precedex. Patient last 10 minutes on trach collar before becoming tachycardic, SOB, and anxious. Patient placed back on SBT vent setting. Patient to edge of bed with PT/OT.     Goals of Care: Transition from vent to trach collar.    Neuro: AAO x4, Follows Commands, and Moves All Extremities    Vital Signs: /62   Pulse 75   Temp 99.8 °F (37.7 °C) (Oral)   Resp 20   Ht 5' 10" (1.778 m)   Wt 75.9 kg (167 lb 5.3 oz)   SpO2 95%   BMI 24.01 kg/m²     Respiratory: Ventilator 35%/Peep 5    Diet: Tube Feeds at goal 45 mL/hr    Gtts: Precedex    Urine Output: Urinary Catheter 865 cc/shift    Drains: ENMANUEL Drain, total output 6 cc / shift    Labs/Accuchecks: Daily/ Q6 Accu checks.    Skin: Left neck staples c/d/i      "

## 2023-03-01 NOTE — PROGRESS NOTES
[facility] - [facility] Int[facility]  Otorhinolaryngology-Head & Neck Surgery  Progress Note    Subjective:     Post-Op Info:  Procedure(s) (LRB):  CREATION, TRACHEOSTOMY (N/A)   5 Days Post-Op  Hospital Day: 10     Interval History: ETT replaced with tracheostomy and oral packing removed yesterday. Has significant secretions resulting in desaturations with resolution after suctioning. No evidence of bleeding. Unable to wean from vent. Tmax to 101.3    Medications:  Continuous Infusions:   propofoL Stopped (03/01/23 0601)     Scheduled Meds:   acetaminophen  650 mg Per NG tube Q8H    atorvastatin  40 mg Per NG tube QHS    cloNIDine  0.1 mg Per NG tube QHS    enoxaparin  40 mg Subcutaneous Daily    EScitalopram oxalate  20 mg Per NG tube QHS    famotidine  20 mg Per NG tube BID    gabapentin  600 mg Per NG tube TID    ipratropium  0.5 mg Nebulization Q8H    levalbuterol  0.63 mg Nebulization Q8H    mirtazapine  15 mg Per NG tube QHS    polyethylene glycol  17 g Per NG tube Daily    QUEtiapine  50 mg Per NG tube QHS    scopolamine  1 patch Transdermal Q3 Days    sennosides 8.8 mg/5 ml  5 mL Per NG tube BID    tacrolimus  2 mg Per NG tube Daily AM    And    tacrolimus  1 mg Per NG tube Daily PM     PRN Meds:sodium chloride, sodium chloride 0.9%, calcium gluconate IVPB, calcium gluconate IVPB, calcium gluconate IVPB, dextrose 10%, dextrose 10%, fentaNYL, glucagon (human recombinant), hydrALAZINE, insulin aspart U-100, labetalol, magnesium sulfate IVPB, magnesium sulfate IVPB, ondansetron, potassium chloride **AND** potassium chloride **AND** potassium chloride, sodium phosphate IVPB, sodium phosphate IVPB, sodium phosphate IVPB     Review of patient's allergies indicates:  No Known Allergies  Objective:     Vital Signs (24h Range):  Temp:  [98 °F (36.7 °C)-101.3 °F (38.5 °C)] 99 °F (37.2 °C)  Pulse:  [71-90] 85  Resp:  [12-22] 12  SpO2:  [93 %-100 %] 95 %  BP: (115-179)/(65-85) 154/75  Arterial Line BP:  (158)/(65) 158/65     Date 03/01/23 0700 - 03/02/23 0659   Shift 8446-6827 5792-7732 9867-3487 24 Hour Total   INTAKE   I.V.(mL/kg) 23.3(0.3)   23.3(0.3)   NG/GT 40   40   IV Piggyback 166.6   166.6   Shift Total(mL/kg) 229.8(3)   229.8(3)   OUTPUT   Urine(mL/kg/hr) 150   150   Shift Total(mL/kg) 150(2)   150(2)   Weight (kg) 75.9 75.9 75.9 75.9     Lines/Drains/Airways       Drain  Duration                  Closed/Suction Drain 02/20/23 Left Neck Bulb 19 Fr. 9 days         Trans Pyloric Feeding Tube 02/20/23 1320 nasogastric;Other (comments) 12 Fr. Right nostril 8 days         Urethral Catheter 02/20/23 0945 Non-latex 16 Fr. 8 days         NG/OG Tube 02/27/23 0400 orogastric Center mouth 2 days              Airway  Duration             Adult Surgical Airway 02/28/23 Shiley Cuffed 6.0/ 75mm 1 day              Peripheral Intravenous Line  Duration                  Midline Catheter Insertion/Assessment  - Single Lumen 02/24/23 1045 Right brachial vein 18g x 10cm 4 days         Peripheral IV - Single Lumen 03/01/23 0610 20 G Anterior;Right Forearm <1 day                    Physical Exam  NAD  NGT to R nares  Oral cavity with OGT in place, dark output but no active bleeding   Left ENMANUEL x1 w/ appropriate output, stripped.    Neck soft, ecchymosis nearly resolved   Tracheostomy in place, on MV. No bleeding on suctioning or around trach.     Significant Labs:  CBC:   Recent Labs   Lab 03/01/23  0301 03/01/23  0322   WBC 6.10  --    RBC 2.67*  --    HGB 7.9*  --    HCT 25.2* 26*     --    MCV 94  --    MCH 29.6  --    MCHC 31.3*  --      CMP:   Recent Labs   Lab 03/01/23  0301   *   CALCIUM 8.6*   ALBUMIN 2.6*   PROT 5.8*      K 4.0   CO2 27      BUN 22   CREATININE 0.9   ALKPHOS 45*   ALT 20   AST 25   BILITOT 0.6       Significant Diagnostics:  I have reviewed all pertinent imaging results/findings within the past 24 hours.    Assessment/Plan:     * Squamous cell carcinoma of left tonsil  Dereck  Antwan Centeno is a 62 y.o. 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 On day of surgery pt with expanding hematoma s/p emergent neck exploration with washout 2/20.     Oral packing removed 2/22. Tracheostomy 2/24/23.   Code blue on 2/27 for bleeding likely from tracheal stoma site. Required ETT placement in stoma and replacement of oral packing. CTA completed 2/27 without extravasation.   ETT replaced with tracheostomy and oral packing removed 2/28.     Remains hemostatic.     -- Continue to wean from vent   -- Drain care with strict recording of drain output  -- Remainder of care per ICU   -- Please page ENT with any questions or concerns               Nayla Lomax MD  Otorhinolaryngology-Head & Neck Surgery  [facility] - [facility]

## 2023-03-01 NOTE — PT/OT/SLP EVAL
Physical Therapy  Co-Evaluation and treatment with OT    Patient Name:  Dereck Centeno   MRN:  [id number]    Recommendations:     Discharge Recommendations: home health PT   Discharge Equipment Recommendations:  (will determine DME needs closer to discharge)   Barriers to discharge: None    Assessment:     Dereck Centeno is a 62 y.o. male admitted with a medical diagnosis of Squamous cell carcinoma of left tonsil.  He presents with the following impairments/functional limitations: impaired endurance, impaired functional mobility, gait instability, impaired balance, decreased safety awareness, pain pt tolerated treatment well but pain limited functional mobility. Pt will benefit from cont skilled PT 3x/wk to progress physically. Pt should be able to discharge home with HHPT and DME as needed when medically stable. Pt is s/p neck dissection, muscle flap rotation, robotic transoral surgery, L tonsillectomy, exploration of neck with evacuation of hematoma, 2/20, trach 2/24/23.    Rehab Prognosis: Good; patient would benefit from acute skilled PT services to address these deficits and reach maximum level of function.    Recent Surgery: Procedure(s) (LRB):  CREATION, TRACHEOSTOMY (N/A) 5 Days Post-Op    Plan:     During this hospitalization, patient to be seen 3 x/week to address the identified rehab impairments via gait training, therapeutic activities and progress toward the following goals:    Plan of Care Expires:  03/19/23    Subjective     Chief Complaint: pt mouthed that he was in pain.   Patient/Family Comments/goals: pt mouthed that he wanted to go home.   Pain/Comfort:  Pain Rating 1: 8/10 (neck)  Pain Addressed 1: Pre-medicate for activity, Distraction  Pain Rating Post-Intervention 1:  (see above)    Patients cultural, spiritual, Samaritan conflicts given the current situation: no    Living Environment:  Pt is retired and lives with his brother, sister in law and his step father in 1 story with 3 steps  "and B handrail.  Prior to admission, patients level of function was independent .  Equipment used at home: none.  DME owned (not currently used): none.  Upon discharge, patient will have assistance from family.    Objective:     Communicated with nurse prior to session.  Patient found supine with telemetry, blood pressure cuff, pulse ox (continuous), auguste catheter, ventilator, Tracheostomy, SCD, PICC line, ENMANUEL drain, restraints (B Marco Ab boots, [place] tube, B wrist restraints)  upon PT entry to room.    General Precautions: Standard, fall  Orthopedic Precautions:    Braces:    Respiratory Status:  trach to vent  Respiratory therapist approved treatment session on current vent settings.     Exams:  Cognitive Exam:  Patient is oriented to Person, Place, Time, and Situation pt mouthed answers.   RLE ROM: WFL  RLE Strength: WFL  LLE ROM: WFL  LLE Strength: WFL    Functional Mobility:  Bed Mobility:   pt needed verbal cues for hand placement and sequencing for functional mobility. Respiratory therapist approved treatment session on current vent settings.   Rolling Left:  minimum assistance  Supine to Sit: minimum assistance  Sit to Supine: minimum assistance    Transfers:     Sit to Stand:   minimal assistance with hand-held assist    Gait: pt received gait training ~ 2 side steps to head of bed with min assist.     Balance: pt sat on EOB x 15 min with SBA.     Due to pt complex medical condition, the skill of 2 licensed therapists is needed to maximize treatment session and progression towards goals  Pt white board updated with current therapists name and level of mobility assistance needed.         AM-PAC 6 CLICK MOBILITY  Total Score:15       Treatment & Education:  Pt received verbal instructions in role of PT and POC. Pt mouthed "yes" that he understood.     Patient left supine with all lines intact, call button in reach, and RN  present.    GOALS:   Multidisciplinary Problems       Physical Therapy Goals          " Problem: Physical Therapy    Goal Priority Disciplines Outcome Goal Variances Interventions   Physical Therapy Goal     PT, PT/OT Ongoing, Progressing     Description: Goals to be met by: 3/22/23     Patient will increase functional independence with mobility by performin. Supine to sit with Modified Blanco  2. Sit to stand transfer with Modified Blanco  3. Gait  x 150 feet with Supervision using AD if needed. .   4. Ascend/descend 3 stair with bilateral Handrails Contact Guard Assistance .                          History:     Past Medical History:   Diagnosis Date    Alcohol withdrawal seizure     Alcoholic cirrhosis     Anxiety     Depression     GERD (gastroesophageal reflux disease)     Hepatitis C     HTN (hypertension), benign     Hyperlipidemia     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 2023    Tobacco use        Past Surgical History:   Procedure Laterality Date    COLONOSCOPY N/A 2018    Procedure: COLONOSCOPY;  Surgeon: Conor Castro MD;  Location: [facility];  Service: Endoscopy;  Laterality: N/A;    DIRECT LARYNGOSCOPY N/A 2023    Procedure: LARYNGOSCOPY, DIRECT;  Surgeon: Alexandre Templeton MD;  Location: [facility] OR [address] [place] [address];  Service: ENT;  Laterality: N/A;    DISSECTION OF NECK Left 2023    Procedure: DISSECTION, NECK;  Surgeon: Alexandre Templeton MD;  Location: [facility] OR [address] [place] [address];  Service: ENT;  Laterality: Left;    ESOPHAGEAL VARICE LIGATION      ESOPHAGOGASTRODUODENOSCOPY N/A 2018    Procedure: ESOPHAGOGASTRODUODENOSCOPY (EGD);  Surgeon: Conor Castro MD;  Location: [facility];  Service: Endoscopy;  Laterality: N/A;    EVACUATION OF HEMATOMA Left 2023    Procedure: EVACUATION, HEMATOMA;  Surgeon: Alexandre Templeton MD;  [facility];  Service: ENT;  Laterality: Left;    LARYNGOSCOPY N/A 2023    Procedure: LARYNGOSCOPY;  Surgeon: Guzman Alfonso MD;  Location: [facility] OR [address] [place] [address];  Service: ENT;  Laterality: N/A;   0124-[id number]    LIVER TRANSPLANT      NECK EXPLORATION Left 2/20/2023    Procedure: EXPLORATION, NECK;  Surgeon: Alexandre Templeton MD;  Location: [facility] OR [address] [place] [address];  Service: ENT;  Laterality: Left;    ROBOT-ASSISTED TRANSORAL SURGERY Left 2/20/2023    Procedure: ROBOTIC TRANSORAL SURGERY (TORS);  Surgeon: Alexandre Templeton MD;  Location: [facility] OR [address] [place] [address];  Service: ENT;  Laterality: Left;    TIPS PROCEDURE      TRACHEOSTOMY N/A 2/24/2023    Procedure: CREATION, TRACHEOSTOMY;  Surgeon: Alexandre Templeton MD;  Location: [facility] OR [address] [place] [address];  Service: ENT;  Laterality: N/A;       Time Tracking:     PT Received On: 03/01/23  PT Start Time: 1243     PT Stop Time: 1314  PT Total Time (min): 31 min     Billable Minutes: Evaluation 8 min  and Therapeutic Activity 23 min       03/01/2023

## 2023-03-01 NOTE — PROGRESS NOTES
"[facility] - Surgical Intensive Care  Adult Nutrition  Progress Note    SUMMARY       Recommendations    1.) TF recommendations:         - if propofol continues, recommend TF of Impact Peptide 1.5@40ml/hr to provide 1440kcal, 90gPRO, and 739ml FF          - if Propofol is d/c'd recommend increasing TF to Impact Peptide 1.5@ 45ml/hr to provide 1620kcal, 101gPRO, and 832ml of FF.      2.)  RD to monitor.      Goals: To meet % of EEN/EPN by next RD f/u  Nutrition Goal Status: goal met  Communication of RD Recs:  (POC)    Assessment and Plan    Nutrition Problem  Increased PRO/energy needs     Related to (etiology):   Physiological needs      Signs and Symptoms (as evidenced by):   CA of tonsil     Interventions/Recommendations (treatment strategy):  Collaboration of nutritional care with other providers.  EN     Nutrition Diagnosis Status:   Continues    Reason for Assessment    Reason For Assessment: RD follow-up  Diagnosis: cancer diagnosis/related complications (CA of left tonsil)  Relevant Medical History: alcohol cirrhosis, HTN, GERD, HLD, s/p liver transplant 2015  Interdisciplinary Rounds: did not attend  General Information Comments: RD f/u: Pt seen by RD. Pt remains sedated and on vent. NFPE not appropriate at this time. TF running at the ordered goal rate. RD spoke with RN, RN stated that there are no tolerance issues with feed.  Nutrition Discharge Planning: as per medical course    Nutrition Risk Screen    Nutrition Risk Screen: tube feeding or parenteral nutrition    Nutrition/Diet History    Spiritual, Cultural Beliefs, Anglican Practices, Values that Affect Care: no    Anthropometrics    Temp: 99.2 °F (37.3 °C)  Height: 5' 10" (177.8 cm)  Height (inches): 70 in  Weight: 75.9 kg (167 lb 5.3 oz)  Weight (lb): 167.33 lb  Ideal Body Weight (IBW), Male: 166 lb  % Ideal Body Weight, Male (lb): 97.28 %  BMI (Calculated): 23.3  BMI Grade: 18.5-24.9 - normal     Lab/Procedures/Meds    Pertinent Labs " Reviewed: reviewed  Pertinent Labs Comments: gluc: 151, Ca: 8.5, phos: 8.6, PRO: 5.8, alb: 2.6  Pertinent Medications Reviewed: reviewed  Pertinent Medications Comments: tacrolimus, sennosides, famotidine, enoxaparin, statin, clonidine, precedex, propofol    Estimated/Assessed Needs    Weight Used For Calorie Calculations: 75.9 kg (167 lb 5.3 oz)  Energy Calorie Requirements (kcal): 1555- 2021kcal (MSJx1-1.3)  Energy Need Method: Emmet-St Jeor  Protein Requirements: 91- 106g (1.2-1.4g/kg)  Weight Used For Protein Calculations: 75.9 kg (167 lb 5.3 oz)  Fluid Requirements (mL): 1ml/1kcal or per MD  Estimated Fluid Requirement Method: RDA Method  RDA Method (mL): 1555     Nutrition Prescription Ordered    Current Diet Order: NPO  Current Nutrition Support Formula Ordered: Impact Peptide 1.5  Current Nutrition Support Rate Ordered: 40 (ml)  Current Nutrition Support Frequency Ordered: ml/hr x24hrs    Evaluation of Received Nutrient/Fluid Intake    Enteral Calories (kcal): 1440  Enteral Protein (gm): 90  Enteral (Free Water) Fluid (mL): 736  Other Calories (kcal): 267 (from 242.8ml of propofol)  Total Calories (kcal): 1707  % Kcal Needs: 100%  % Protein Needs: 100%  I/O: +3300ml since 2/24  Energy Calories Required: meeting needs  Protein Required: meeting needs  Fluid Required:  (as per MD)  Total Fluid Intake (mL/kg): as per MD  Comments: -  Tolerance: tolerating  % Intake of Estimated Energy Needs: 75 - 100 %  % Meal Intake: NPO    Nutrition Risk    Level of Risk/Frequency of Follow-up:  (RD to f/u x1/week)     Monitor and Evaluation    Food and Nutrient Intake: energy intake, enteral nutrition intake  Food and Nutrient Adminstration: diet order, enteral and parenteral nutrition administration  Physical Activity and Function: nutrition-related ADLs and IADLs  Anthropometric Measurements: height/length, weight, weight change, body mass index  Biochemical Data, Medical Tests and Procedures: electrolyte and renal  panel, gastrointestinal profile, glucose/endocrine profile, inflammatory profile, lipid profile  Nutrition-Focused Physical Findings: overall appearance, skin     Nutrition Follow-Up    RD Follow-up?: Yes

## 2023-03-01 NOTE — PLAN OF CARE
"      SICU PLAN OF CARE NOTE    Dx: Squamous cell carcinoma of left tonsil    Vital Signs: /71   Pulse 87   Temp 98 °F (36.7 °C) (Oral)   Resp (!) 22   Ht 5' 10" (1.778 m)   Wt 75.9 kg (167 lb 5.3 oz)   SpO2 98%   BMI 24.01 kg/m²     SHIFT EVENTS:  VSS / No acute events this shift.     Neuro: Arouses to Voice, Follows Commands, and Moves All Extremities    Respiratory: Ventilator    Cardiac: NSR; HR 80s    Diet: Tube Feeds @ goal 40 cc/hr    Gtts: Propofol     Urine Output: Urinary Catheter 1125 ml/shift    Drains:     ENMANUEL Drain, total output 5 cc / shift    NG/OG Tube 200 cc /  shift    Restraints good until 3/1 1114; No injuries noted    Labs: daily     Accuchecks: Q6.    SKIN NOTE:  Skin: No skin breakdown or skin tears noted at this time.    Skin precautions maintained including:  Sacrum and heels with foam dressing in place for pressure protection. Frequent weight shift assistance provided Q2 hr to prevent breakdown. Bed plugged in and mattress inflated; Adhesive use limited. Heels elevated off bed. Pressure points protected and positioning supports utilized.  Skin-to-device areas padded. Skin-to-skin areas padded    POC reviewed with patient and family; questions and concerns addressed. See flow sheets for full assessment details.     "

## 2023-03-01 NOTE — TREATMENT PLAN
Hepatology Treatment Plan    This is a 62 year old male with PMH significant for cirrhosis secondary to ETOH/Hepatitis C (status post transplant in 01/2015) and head/neck cancer (status post resection in approximately 2019 at [facility] with development of squamous cell carcinoma of left tonsil as of 01/2023) who was admitted to [facility] on 02/20 for left tonsillectomy and neck dissection that was performed on day of admission. Hospital course associated with development of hematoma at surgical site requiring neck exploration and hematoma evacuation on 02/20 with tracheostomy creation on 02/24 and cardiac arrest on 02/27 secondary to hypoxia with concern for on-going bleeding from surgery site (however CTA negative for active bleeding). Hepatology following for assistance with immunosuppression.     Interval History  Vital signs notable for fever (Tmax 101.3) overnight. LFT's remain normal. Tacrolimus level 2.9 today.     Plan    - Tacrolimus 2 mg in AM and 1 mg in PM.   - Please obtain daily CBC, BMP, LFT, INR, and Tacrolimus level.     Thank you for involving us in the care of Dereck Centeno. Please call with any additional concerns or questions.        Erik Lindsay MD, PGY-V  Gastroenterology Fellow  [facility]

## 2023-03-01 NOTE — NURSING
MD called to bedside after patient placed on trach collar. Patient began to become very restless, short of breath, and HR to 170's. Patient placed back on vent on SBT. Will cont to monitor.

## 2023-03-01 NOTE — PLAN OF CARE
Problem: Occupational Therapy  Goal: Occupational Therapy Goal  Description: Goals to be met by: 3/15/23     Patient will increase functional independence with ADLs by performing:    Feeding with Supervision.  UE Dressing with Stand-by Assistance.  LE Dressing with Contact Guard Assistance.  Grooming while standing with Stand-by Assistance.  Toileting from toilet with Contact Guard Assistance for hygiene and clothing management.   Sitting at edge of bed x15 minutes with Supervision.    Outcome: Ongoing, Progressing

## 2023-03-01 NOTE — ASSESSMENT & PLAN NOTE
Dereck Centeno is a 62 y.o. male with PMHx of HTN, emphysema, HCV s/p liver transplant in 2015, tobacco abuse (50+ pack years), oral cancer s/p surgery at [facility] in 2019 (no chemo/radiation), and newly diagnosed SCC of the left tonsil. He is now s/p left tonsillectomy and neck dissection by Dr. Templeton on 2/20. Post operative course was c/b development of large hematoma at surgical site. He was taken back to the OR for an emergent neck exploration and hematoma evacuation on 2/20. Code blue on 2/27am, ROSC achieved within 5 min.      Neuro/Psych:   -- Sedation: propofol gtt  -- Pain: home gabapentin, tylenol, PRN fentanyl   -- Psych: home clonazepam PRN, scheduled mirtazapine, seroquel, lexapro     Cards  -- HDS; not currently on pressors   -- Continue home amlodipine and clonidine   -- PRN labetalol and hydralazine   -- Continue home statin       Pulm:   -- H/o emphysema   -- Trach placed 2/24.   -- Ipratropium nebs scheduled,albuterol nebs PRN for wheezing   -- Goal O2 sat > 88%  -- Wean as able  -- Was weaned to trach collar 2/26; reintubated 2/27. ETT replaced 2/28.   --SBT failed 2/28       Renal:  -- Keep auguste for strict I/O  -- BUN/Cr stable 22/0.9  -- UOP 2.3L/24H      FEN / GI:   -- Net -1.6cc's/24H  -- Replace lytes as needed  -- Nutrition: NPO + TF restarted. Dobhoff for feeds.   -- mIVF discontinued      ID:   -- Tm: Febrile on 3/1 to 101.3; WBC 6.1  -- Abx: none       Heme/Onc:   -- H/H 7.9/25.2  -s/p 1upRBCs 2/27  -- H/o thrombocytopenia    -- Platelets stable   -- Continue to monitor   -- Daily CBC  -- Home ASA held       Endo:   -- Gluc goal 140-180  -- SSI      Immuno:  -- H/o HCV infection s/p liver transplant in 2015  -- Hepatology consulted  -- Continue home tacrolimus  -- Monitor tacrolimus levels and INR daily   -- Valacyclovir daily per ENT      PPx:   Feeding: NPO + TF  Analgesia/Sedation: PRN fentanyl, home gabapentin, tylenol/ precedex gtt   Thromboembolic prevention: lovenox   HOB  >30: yes   Stress Ulcer ppx: famotidine BID   Glucose control: Critical care goal 140-180 g/dl, ISS    Lines/Drains/Airway: ETT, auguste, PIVx2, NG tube, neck drain x1      Dispo/Code Status/Palliative:   -- SICU / Full Code

## 2023-03-01 NOTE — SUBJECTIVE & OBJECTIVE
Interval History: ETT replaced with tracheostomy and oral packing removed yesterday. Has significant secretions resulting in desaturations with resolution after suctioning. No evidence of bleeding. Unable to wean from vent. Tmax to 101.3    Medications:  Continuous Infusions:   propofoL Stopped (03/01/23 0601)     Scheduled Meds:   acetaminophen  650 mg Per NG tube Q8H    atorvastatin  40 mg Per NG tube QHS    cloNIDine  0.1 mg Per NG tube QHS    enoxaparin  40 mg Subcutaneous Daily    EScitalopram oxalate  20 mg Per NG tube QHS    famotidine  20 mg Per NG tube BID    gabapentin  600 mg Per NG tube TID    ipratropium  0.5 mg Nebulization Q8H    levalbuterol  0.63 mg Nebulization Q8H    mirtazapine  15 mg Per NG tube QHS    polyethylene glycol  17 g Per NG tube Daily    QUEtiapine  50 mg Per NG tube QHS    scopolamine  1 patch Transdermal Q3 Days    sennosides 8.8 mg/5 ml  5 mL Per NG tube BID    tacrolimus  2 mg Per NG tube Daily AM    And    tacrolimus  1 mg Per NG tube Daily PM     PRN Meds:sodium chloride, sodium chloride 0.9%, calcium gluconate IVPB, calcium gluconate IVPB, calcium gluconate IVPB, dextrose 10%, dextrose 10%, fentaNYL, glucagon (human recombinant), hydrALAZINE, insulin aspart U-100, labetalol, magnesium sulfate IVPB, magnesium sulfate IVPB, ondansetron, potassium chloride **AND** potassium chloride **AND** potassium chloride, sodium phosphate IVPB, sodium phosphate IVPB, sodium phosphate IVPB     Review of patient's allergies indicates:  No Known Allergies  Objective:     Vital Signs (24h Range):  Temp:  [98 °F (36.7 °C)-101.3 °F (38.5 °C)] 99 °F (37.2 °C)  Pulse:  [71-90] 85  Resp:  [12-22] 12  SpO2:  [93 %-100 %] 95 %  BP: (115-179)/(65-85) 154/75  Arterial Line BP: (158)/(65) 158/65     Date 03/01/23 0700 - 03/02/23 0659   Shift 3240-2642 5738-6945 7332-7009 24 Hour Total   INTAKE   I.V.(mL/kg) 23.3(0.3)   23.3(0.3)   NG/GT 40   40   IV Piggyback 166.6   166.6   Shift Total(mL/kg) 229.8(3)    229.8(3)   OUTPUT   Urine(mL/kg/hr) 150   150   Shift Total(mL/kg) 150(2)   150(2)   Weight (kg) 75.9 75.9 75.9 75.9     Lines/Drains/Airways       Drain  Duration                  Closed/Suction Drain 02/20/23 Left Neck Bulb 19 Fr. 9 days         Trans Pyloric Feeding Tube 02/20/23 1320 nasogastric;Other (comments) 12 Fr. Right nostril 8 days         Urethral Catheter 02/20/23 0945 Non-latex 16 Fr. 8 days         NG/OG Tube 02/27/23 0400 orogastric Center mouth 2 days              Airway  Duration             Adult Surgical Airway 02/28/23 Shiley Cuffed 6.0/ 75mm 1 day              Peripheral Intravenous Line  Duration                  Midline Catheter Insertion/Assessment  - Single Lumen 02/24/23 1045 Right brachial vein 18g x 10cm 4 days         Peripheral IV - Single Lumen 03/01/23 0610 20 G Anterior;Right Forearm <1 day                    Physical Exam  NAD  NGT to R nares  Oral cavity with OGT in place, dark output but no active bleeding   Left ENMANUEL x1 w/ appropriate output, stripped.    Neck soft, ecchymosis nearly resolved   Tracheostomy in place, on MV. No bleeding on suctioning or around trach.     Significant Labs:  CBC:   Recent Labs   Lab 03/01/23  0301 03/01/23  0322   WBC 6.10  --    RBC 2.67*  --    HGB 7.9*  --    HCT 25.2* 26*     --    MCV 94  --    MCH 29.6  --    MCHC 31.3*  --      CMP:   Recent Labs   Lab 03/01/23  0301   *   CALCIUM 8.6*   ALBUMIN 2.6*   PROT 5.8*      K 4.0   CO2 27      BUN 22   CREATININE 0.9   ALKPHOS 45*   ALT 20   AST 25   BILITOT 0.6       Significant Diagnostics:  I have reviewed all pertinent imaging results/findings within the past 24 hours.

## 2023-03-01 NOTE — PT/OT/SLP EVAL
Occupational Therapy   Co-Evaluation/Treatment    Name: Dereck Centeno  MRN: [id number]  Admitting Diagnosis: Squamous cell carcinoma of left tonsil  Recent Surgery: Procedure(s) (LRB):  CREATION, TRACHEOSTOMY (N/A) 5 Days Post-Op    Recommendations:     Discharge Recommendations: home health OT    Assessment:     Dereck Centeno is a 62 y.o. male with a medical diagnosis of Squamous cell carcinoma of left tonsil. Performance deficits affecting function: weakness, impaired endurance, impaired self care skills, impaired functional mobility, decreased coordination, decreased upper extremity function, decreased lower extremity function, pain, impaired coordination, impaired fine motor.  Pt tolerated therapy well and VSS throughout session. Pt needed encouragement and increased time to complete activities. Pt anticipated to progress well and would benefit from home health OT when medically appropriate to D/C. Co-eval completed to optimize functional performance and safety given pt impaired tolerance for activity in the setting of the ICU.     Rehab Prognosis: Good; patient would benefit from acute skilled OT services to address these deficits and reach maximum level of function.       Plan:     Patient to be seen 3 x/week to address the above listed problems via self-care/home management, therapeutic activities, therapeutic exercises  Plan of Care Expires: 04/03/23  Plan of Care Reviewed with: patient    Subjective     Pt agreeable to therapy.     Occupational Profile:  Living Environment: Pt lives with brother, DELMER and step-father in [facility] with 3 OZ with B handrails. Pt uses walk-in shower with shower chair and grab bars.  Previous level of function: Pt I in all ADLs PTA and ambulating with no AD PTA.   Roles and Routines: Pt retired and not driving.   Equipment Used at Home: none  Assistance upon Discharge: Pt will have assistance from step-father upon D/C.     Pain/Comfort:  Pain Rating 1: 8/10  Location 1:  neck  Pain Addressed 1: Reposition, Distraction  Pt expressed pain increase during activity.    Patients cultural, spiritual, Congregational conflicts given the current situation: no    Objective:     Communicated with: nsg prior to session.  Patient found supine with blood pressure cuff, auguste catheter, NG tube, peripheral IV, pulse ox (continuous), telemetry, ventilator, Tracheostomy, ENMANUEL drain upon OT entry to room. Nsg completed 1 deep suction during therapy. RT notified prior to session and in agreement to no change in vent settings.     General Precautions: Standard, fall  Respiratory Status:  vent via trach    Occupational Performance:    Bed Mobility:    Patient completed Scooting/Bridging with contact guard assistance  Patient completed Supine to Sit with minimum assistance  Patient completed Sit to Supine with minimum assistance    Functional Mobility/Transfers:  Patient completed Sit <> Stand Transfer with minimum assistance  with  no assistive device   Pt completed 2 L side steps with MIN A.   Functional Mobility: Pt sat EOB for 15 mins with SBA.     Activities of Daily Living:  Grooming: Pt attempted washing face but total assistance was needed at EOB d/t pt fatigue and pain  Toileting: Pt needed total assistance standing at bed for hygiene management     Cognitive/Visual Perceptual:  Pt AxO x 4.  Pt vision intact.     Physical Exam:  Pt UE ROM and strength WFL.  Pt has good  strength.     AMPAC 6 Click ADL:  AMPAC Total Score: 14    Treatment & Education:  Ed pt with OT POC and safety with functional mobility and ADL skills.    Patient left supine with all lines intact, call button in reach, nsg notified, and nsg present    GOALS:   Multidisciplinary Problems       Occupational Therapy Goals          Problem: Occupational Therapy    Goal Priority Disciplines Outcome Interventions   Occupational Therapy Goal     OT, PT/OT Ongoing, Progressing    Description: Goals to be met by: 3/15/23     Patient will  increase functional independence with ADLs by performing:    Feeding with Supervision.  UE Dressing with Stand-by Assistance.  LE Dressing with Contact Guard Assistance.  Grooming while standing with Stand-by Assistance.  Toileting from toilet with Contact Guard Assistance for hygiene and clothing management.   Sitting at edge of bed x15 minutes with Supervision.                         History:     Past Medical History:   Diagnosis Date    Alcohol withdrawal seizure     Alcoholic cirrhosis     Anxiety     Depression     GERD (gastroesophageal reflux disease)     Hepatitis C     HTN (hypertension), benign     Hyperlipidemia     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 02/01/2023    Tobacco use          Past Surgical History:   Procedure Laterality Date    COLONOSCOPY N/A 6/14/2018    Procedure: COLONOSCOPY;  Surgeon: Conor Castro MD;  Location: [facility];  Service: Endoscopy;  Laterality: N/A;    DIRECT LARYNGOSCOPY N/A 2/20/2023    Procedure: LARYNGOSCOPY, DIRECT;  Surgeon: Alexandre Templeton MD;  Location: [address];  Service: ENT;  Laterality: N/A;    DISSECTION OF NECK Left 2/20/2023    Procedure: DISSECTION, NECK;  Surgeon: Alexandre Templeton MD;  Location: [address];  Service: ENT;  Laterality: Left;    ESOPHAGEAL VARICE LIGATION      ESOPHAGOGASTRODUODENOSCOPY N/A 6/14/2018    Procedure: ESOPHAGOGASTRODUODENOSCOPY (EGD);  Surgeon: Conor Castro MD;  Location: [facility];  Service: Endoscopy;  Laterality: N/A;    EVACUATION OF HEMATOMA Left 2/20/2023    Procedure: EVACUATION, HEMATOMA;  Surgeon: Alexandre Templeton MD;  Location: [address];  Service: ENT;  Laterality: Left;    LARYNGOSCOPY N/A 1/31/2023    Procedure: LARYNGOSCOPY;  Surgeon: Guzman Alfonso MD;  Location: [address];  Service: ENT;  Laterality: N/A;  [id number]    LIVER TRANSPLANT      NECK EXPLORATION Left 2/20/2023    Procedure: EXPLORATION, NECK;  Surgeon: Alexandre Templeton MD;  Location: [facility];  Service: ENT;  Laterality: Left;    ROBOT-ASSISTED TRANSORAL SURGERY Left 2/20/2023    Procedure: ROBOTIC TRANSORAL SURGERY (TORS);  Surgeon: Alexandre Templeton MD;  [facility];  Service: ENT;  Laterality: Left;    TIPS PROCEDURE      TRACHEOSTOMY N/A 2/24/2023    Procedure: CREATION, TRACHEOSTOMY;  Surgeon: Alexandre Templeton MD;  Location: [facility] OR [address] [place] [address];  Service: ENT;  Laterality: N/A;       Time Tracking:     OT Date of Treatment: 03/01/23  OT Start Time: 1245  OT Stop Time: 1315  OT Total Time (min): 30 min    Billable Minutes:Evaluation 10  Self Care/Home Management 20    3/1/2023

## 2023-03-01 NOTE — PROGRESS NOTES
Patient lasted roughly 10 minutes on trach collar trial before becoming very agitated, HR 180s, MD called to bedside. Patient put back on ventilator on spontaneous settings and agitation and vitals resolved. Vitals stable. Patient on documented settings.

## 2023-03-01 NOTE — PLAN OF CARE
Problem: Physical Therapy  Goal: Physical Therapy Goal  Description: Goals to be met by: 3/22/23     Patient will increase functional independence with mobility by performin. Supine to sit with Modified Lea  2. Sit to stand transfer with Modified Lea  3. Gait  x 150 feet with Supervision using AD if needed. .   4. Ascend/descend 3 stair with bilateral Handrails Contact Guard Assistance .     Outcome: Ongoing, Progressing   Evaluation completed and goals appropriate. 3/1/2023

## 2023-03-01 NOTE — PROGRESS NOTES
[facility] - [facility]  Critical Care - Surgery  Progress Note    Patient Name: Dereck Centeno  MRN: [id number]  Admission Date: 2/20/2023  Hospital Length of Stay: 9 days  Code Status: Full Code  Attending Provider: Alexandre Templeton MD  Primary Care Provider: Eva Reynaga MD   Principal Problem: Squamous cell carcinoma of left tonsil    Subjective:     Hospital/ICU Course:  No notes on file    Interval History/Significant Events: Febrile this AM to 101.3. Continuing to decrease sedation. SBT today.    Follow-up For: Procedure(s) (LRB):  CREATION, TRACHEOSTOMY (N/A)    Post-Operative Day: 6 Days Post-Op    Objective:     Vital Signs (Most Recent):  Temp: (!) 101.3 °F (38.5 °C) (03/01/23 0530)  Pulse: 78 (03/01/23 0735)  Resp: 12 (03/01/23 0745)  BP: (!) 148/72 (03/01/23 0700)  SpO2: 97 % (03/01/23 0735)   Vital Signs (24h Range):  Temp:  [98 °F (36.7 °C)-101.3 °F (38.5 °C)] 101.3 °F (38.5 °C)  Pulse:  [71-90] 78  Resp:  [12-22] 12  SpO2:  [93 %-100 %] 97 %  BP: (115-179)/(65-85) 148/72  Arterial Line BP: (158-174)/(65-72) 158/65     Weight: 75.9 kg (167 lb 5.3 oz)  Body mass index is 24.01 kg/m².      Intake/Output Summary (Last 24 hours) at 3/1/2023 0753  Last data filed at 3/1/2023 0700  Gross per 24 hour   Intake 1203.08 ml   Output 2683 ml   Net -1479.92 ml       Physical Exam  Vitals and nursing note reviewed.   Constitutional:       Appearance: Normal appearance.      Interventions: He is sedated, intubated and restrained.   HENT:      Head: Normocephalic and atraumatic.      Nose:      Comments: NG tube sutured in place   Eyes:      Conjunctiva/sclera: Conjunctivae normal.      Pupils: Pupils are equal, round, and reactive to light.   Neck:      Comments: ENMANUEL drain with SS on left side of neck   Surgical incision clean and dry   Cardiovascular:      Rate and Rhythm: Normal rate and regular rhythm.      Pulses: Normal pulses.      Heart sounds: Normal heart sounds.   Pulmonary:      Effort:  Pulmonary effort is normal. No respiratory distress. He is intubated.      Comments: ETT in place   Abdominal:      General: Abdomen is flat. Bowel sounds are normal.      Palpations: Abdomen is soft.   Skin:     General: Skin is warm.       Vents:  Vent Mode: Spont (03/01/23 0735)  Ventilator Initiated: Yes (02/20/23 1443)  Set Rate: 20 BPM (03/01/23 0735)  Vt Set: 450 mL (03/01/23 0735)  Pressure Support: 10 cmH20 (03/01/23 0735)  PEEP/CPAP: 5 cmH20 (03/01/23 0735)  Oxygen Concentration (%): 35 (03/01/23 0735)  Peak Airway Pressure: 16 cmH20 (03/01/23 0735)  Plateau Pressure: 12 cmH20 (03/01/23 0735)  Total Ve: 9.66 L/m (03/01/23 0735)  Negative Inspiratory Force (cm H2O): -25 (03/01/23 0735)  F/VT Ratio<105 (RSBI): (!) 42.92 (02/28/23 2327)    Lines/Drains/Airways       Drain  Duration                  Closed/Suction Drain 02/20/23 Left Neck Bulb 19 Fr. 9 days         Trans Pyloric Feeding Tube 02/20/23 1320 nasogastric;Other (comments) 12 Fr. Right nostril 8 days         Urethral Catheter 02/20/23 0945 Non-latex 16 Fr. 8 days         NG/OG Tube 02/27/23 0400 orogastric Center mouth 2 days              Airway  Duration             Adult Surgical Airway 02/28/23 Shiley Cuffed 6.0/ 75mm 1 day              Peripheral Intravenous Line  Duration                  Midline Catheter Insertion/Assessment  - Single Lumen 02/24/23 1045 Right brachial vein 18g x 10cm 4 days         Peripheral IV - Single Lumen 03/01/23 0610 20 G Anterior;Right Forearm <1 day                    Significant Labs:    CBC/Anemia Profile:  Recent Labs   Lab 02/28/23  0236 02/28/23  0355 03/01/23  0301 03/01/23  0322   WBC 7.63  --  6.10  --    HGB 7.9*  --  7.9*  --    HCT 24.6* 23* 25.2* 26*     --  188  --    MCV 95  --  94  --    RDW 14.0  --  13.6  --         Chemistries:  Recent Labs   Lab 02/28/23  0236 03/01/23  0301    141   K 4.1 4.0    108   CO2 23 27   BUN 23 22   CREATININE 0.8 0.9   CALCIUM 8.5* 8.6*   ALBUMIN 2.6*  2.6*   PROT 5.5* 5.8*   BILITOT 0.7 0.6   ALKPHOS 43* 45*   ALT 20 20   AST 28 25   MG 1.9 1.6   PHOS 2.9 2.5*       None    Significant Imaging:  I have reviewed all pertinent imaging results/findings within the past 24 hours.    Assessment/Plan:     Oncology  * Squamous cell carcinoma of left tonsil  Dereck Centeno is a 62 y.o. male with PMHx of HTN, emphysema, HCV s/p liver transplant in 2015, tobacco abuse (50+ pack years), oral cancer s/p surgery at [facility] in 2019 (no chemo/radiation), and newly diagnosed SCC of the left tonsil. He is now s/p left tonsillectomy and neck dissection by Dr. Templeton on 2/20. Post operative course was c/b development of large hematoma at surgical site. He was taken back to the OR for an emergent neck exploration and hematoma evacuation on 2/20. Code blue on 2/27am, ROSC achieved within 5 min.      Neuro/Psych:   -- Sedation: propofol gtt  -- Pain: home gabapentin, tylenol, PRN fentanyl   -- Psych: home clonazepam PRN, scheduled mirtazapine, seroquel, lexapro     Cards  -- HDS; not currently on pressors   -- Continue home amlodipine and clonidine   -- PRN labetalol and hydralazine   -- Continue home statin       Pulm:   -- H/o emphysema   -- Trach placed 2/24.   -- Ipratropium nebs scheduled,albuterol nebs PRN for wheezing   -- Goal O2 sat > 88%  -- Wean as able  -- Was weaned to trach collar 2/26; reintubated 2/27. ETT replaced 2/28.   --SBT failed 2/28       Renal:  -- Keep auguste for strict I/O  -- BUN/Cr stable 22/0.9  -- UOP 2.3L/24H      FEN / GI:   -- Net -1.6cc's/24H  -- Replace lytes as needed  -- Nutrition: NPO + TF restarted. Dobhoff for feeds.   -- mIVF discontinued      ID:   -- Tm: Febrile on 3/1 to 101.3; WBC 6.1  -- Abx: none       Heme/Onc:   -- H/H 7.9/25.2  -s/p 1upRBCs 2/27  -- H/o thrombocytopenia    -- Platelets stable   -- Continue to monitor   -- Daily CBC  -- Home ASA held       Endo:   -- Gluc goal 140-180  -- SSI      Immuno:  -- H/o HCV infection s/p  liver transplant in 2015  -- Hepatology consulted  -- Continue home tacrolimus  -- Monitor tacrolimus levels and INR daily   -- Valacyclovir daily per ENT      PPx:   Feeding: NPO + TF  Analgesia/Sedation: PRN fentanyl, home gabapentin, tylenol/ precedex gtt   Thromboembolic prevention: lovenox   HOB >30: yes   Stress Ulcer ppx: famotidine BID   Glucose control: Critical care goal 140-180 g/dl, ISS    Lines/Drains/Airway: ETT, auguste, PIVx2, NG tube, neck drain x1      Dispo/Code Status/Palliative:   -- SICU / Full Code          Critical secondary to Patient has a condition that poses threat to life and bodily function: SCC status post surgery requiring prolonged intubation     Critical care was time spent personally by me on the following activities: development of treatment plan with patient or surrogate and bedside caregivers, discussions with consultants, evaluation of patient's response to treatment, examination of patient, ordering and performing treatments and interventions, ordering and review of laboratory studies, ordering and review of radiographic studies, pulse oximetry, re-evaluation of patient's condition.  This critical care time did not overlap with that of any other provider or involve time for any procedures.     Uriel Chowdary MD  Critical Care - Surgery  [facility] - [facility]

## 2023-03-02 LAB
ALBUMIN SERPL BCP-MCNC: 2.6 G/DL (ref 3.5–5.2)
ALP SERPL-CCNC: 49 U/L (ref 55–135)
ALT SERPL W/O P-5'-P-CCNC: 20 U/L (ref 10–44)
ANION GAP SERPL CALC-SCNC: 6 MMOL/L (ref 8–16)
AST SERPL-CCNC: 20 U/L (ref 10–40)
BASOPHILS # BLD AUTO: 0.02 K/UL (ref 0–0.2)
BASOPHILS NFR BLD: 0.4 % (ref 0–1.9)
BILIRUB SERPL-MCNC: 0.5 MG/DL (ref 0.1–1)
BUN SERPL-MCNC: 33 MG/DL (ref 8–23)
CALCIUM SERPL-MCNC: 8.8 MG/DL (ref 8.7–10.5)
CHLORIDE SERPL-SCNC: 106 MMOL/L (ref 95–110)
CO2 SERPL-SCNC: 30 MMOL/L (ref 23–29)
CREAT SERPL-MCNC: 0.9 MG/DL (ref 0.5–1.4)
DIFFERENTIAL METHOD: ABNORMAL
EOSINOPHIL # BLD AUTO: 0.2 K/UL (ref 0–0.5)
EOSINOPHIL NFR BLD: 3 % (ref 0–8)
ERYTHROCYTE [DISTWIDTH] IN BLOOD BY AUTOMATED COUNT: 13.2 % (ref 11.5–14.5)
EST. GFR  (NO RACE VARIABLE): >60 ML/MIN/1.73 M^2
GLUCOSE SERPL-MCNC: 139 MG/DL (ref 70–110)
HCT VFR BLD AUTO: 24.2 % (ref 40–54)
HGB BLD-MCNC: 7.7 G/DL (ref 14–18)
IMM GRANULOCYTES # BLD AUTO: 0.04 K/UL (ref 0–0.04)
IMM GRANULOCYTES NFR BLD AUTO: 0.8 % (ref 0–0.5)
INR PPP: 1 (ref 0.8–1.2)
LYMPHOCYTES # BLD AUTO: 0.7 K/UL (ref 1–4.8)
LYMPHOCYTES NFR BLD: 13.2 % (ref 18–48)
MAGNESIUM SERPL-MCNC: 1.7 MG/DL (ref 1.6–2.6)
MCH RBC QN AUTO: 30.8 PG (ref 27–31)
MCHC RBC AUTO-ENTMCNC: 31.8 G/DL (ref 32–36)
MCV RBC AUTO: 97 FL (ref 82–98)
MONOCYTES # BLD AUTO: 0.5 K/UL (ref 0.3–1)
MONOCYTES NFR BLD: 10 % (ref 4–15)
NEUTROPHILS # BLD AUTO: 3.6 K/UL (ref 1.8–7.7)
NEUTROPHILS NFR BLD: 72.6 % (ref 38–73)
NRBC BLD-RTO: 0 /100 WBC
PHOSPHATE SERPL-MCNC: 3 MG/DL (ref 2.7–4.5)
PLATELET # BLD AUTO: 165 K/UL (ref 150–450)
PMV BLD AUTO: 9.3 FL (ref 9.2–12.9)
POCT GLUCOSE: 125 MG/DL (ref 70–110)
POCT GLUCOSE: 143 MG/DL (ref 70–110)
POCT GLUCOSE: 150 MG/DL (ref 70–110)
POCT GLUCOSE: 153 MG/DL (ref 70–110)
POCT GLUCOSE: 161 MG/DL (ref 70–110)
POCT GLUCOSE: 95 MG/DL (ref 70–110)
POTASSIUM SERPL-SCNC: 3.8 MMOL/L (ref 3.5–5.1)
PROT SERPL-MCNC: 5.7 G/DL (ref 6–8.4)
PROTHROMBIN TIME: 10.9 SEC (ref 9–12.5)
RBC # BLD AUTO: 2.5 M/UL (ref 4.6–6.2)
SODIUM SERPL-SCNC: 142 MMOL/L (ref 136–145)
TACROLIMUS BLD-MCNC: 3 NG/ML (ref 5–15)
WBC # BLD AUTO: 5 K/UL (ref 3.9–12.7)

## 2023-03-02 PROCEDURE — 63600175 PHARM REV CODE 636 W HCPCS: Performed by: STUDENT IN AN ORGANIZED HEALTH CARE EDUCATION/TRAINING PROGRAM

## 2023-03-02 PROCEDURE — 20000000 HC ICU ROOM

## 2023-03-02 PROCEDURE — 25000003 PHARM REV CODE 250: Performed by: OTOLARYNGOLOGY

## 2023-03-02 PROCEDURE — 80053 COMPREHEN METABOLIC PANEL: CPT

## 2023-03-02 PROCEDURE — 99233 PR SUBSEQUENT HOSPITAL CARE,LEVL III: ICD-10-PCS | Mod: ,,, | Performed by: STUDENT IN AN ORGANIZED HEALTH CARE EDUCATION/TRAINING PROGRAM

## 2023-03-02 PROCEDURE — 25000242 PHARM REV CODE 250 ALT 637 W/ HCPCS: Performed by: STUDENT IN AN ORGANIZED HEALTH CARE EDUCATION/TRAINING PROGRAM

## 2023-03-02 PROCEDURE — 25000003 PHARM REV CODE 250

## 2023-03-02 PROCEDURE — 94003 VENT MGMT INPAT SUBQ DAY: CPT

## 2023-03-02 PROCEDURE — 25000003 PHARM REV CODE 250: Performed by: STUDENT IN AN ORGANIZED HEALTH CARE EDUCATION/TRAINING PROGRAM

## 2023-03-02 PROCEDURE — 99233 SBSQ HOSP IP/OBS HIGH 50: CPT | Mod: ,,, | Performed by: STUDENT IN AN ORGANIZED HEALTH CARE EDUCATION/TRAINING PROGRAM

## 2023-03-02 PROCEDURE — 99900035 HC TECH TIME PER 15 MIN (STAT)

## 2023-03-02 PROCEDURE — 25000242 PHARM REV CODE 250 ALT 637 W/ HCPCS

## 2023-03-02 PROCEDURE — 94761 N-INVAS EAR/PLS OXIMETRY MLT: CPT

## 2023-03-02 PROCEDURE — 85610 PROTHROMBIN TIME: CPT | Performed by: STUDENT IN AN ORGANIZED HEALTH CARE EDUCATION/TRAINING PROGRAM

## 2023-03-02 PROCEDURE — 27000221 HC OXYGEN, UP TO 24 HOURS

## 2023-03-02 PROCEDURE — 80197 ASSAY OF TACROLIMUS: CPT

## 2023-03-02 PROCEDURE — 94640 AIRWAY INHALATION TREATMENT: CPT

## 2023-03-02 PROCEDURE — 84100 ASSAY OF PHOSPHORUS: CPT

## 2023-03-02 PROCEDURE — 99291 CRITICAL CARE FIRST HOUR: CPT | Mod: ,,, | Performed by: STUDENT IN AN ORGANIZED HEALTH CARE EDUCATION/TRAINING PROGRAM

## 2023-03-02 PROCEDURE — 25500020 PHARM REV CODE 255: Performed by: OTOLARYNGOLOGY

## 2023-03-02 PROCEDURE — 63600175 PHARM REV CODE 636 W HCPCS

## 2023-03-02 PROCEDURE — 99291 PR CRITICAL CARE, E/M 30-74 MINUTES: ICD-10-PCS | Mod: ,,, | Performed by: STUDENT IN AN ORGANIZED HEALTH CARE EDUCATION/TRAINING PROGRAM

## 2023-03-02 PROCEDURE — 83735 ASSAY OF MAGNESIUM: CPT

## 2023-03-02 PROCEDURE — 99900026 HC AIRWAY MAINTENANCE (STAT)

## 2023-03-02 PROCEDURE — 85025 COMPLETE CBC W/AUTO DIFF WBC: CPT

## 2023-03-02 RX ORDER — QUETIAPINE FUMARATE 25 MG/1
50 TABLET, FILM COATED ORAL EVERY 6 HOURS PRN
Status: DISCONTINUED | OUTPATIENT
Start: 2023-03-02 | End: 2023-03-03

## 2023-03-02 RX ORDER — OXYCODONE HCL 5 MG/5 ML
10 SOLUTION, ORAL ORAL EVERY 4 HOURS PRN
Status: CANCELLED | OUTPATIENT
Start: 2023-03-02

## 2023-03-02 RX ORDER — MIRTAZAPINE 15 MG/1
15 TABLET, ORALLY DISINTEGRATING ORAL NIGHTLY
Status: DISCONTINUED | OUTPATIENT
Start: 2023-03-02 | End: 2023-03-06

## 2023-03-02 RX ORDER — GUAIFENESIN 100 MG/5ML
200 SOLUTION ORAL EVERY 4 HOURS PRN
Status: DISCONTINUED | OUTPATIENT
Start: 2023-03-02 | End: 2023-03-02

## 2023-03-02 RX ORDER — ESCITALOPRAM OXALATE 5 MG/5ML
20 SOLUTION ORAL NIGHTLY
Status: DISCONTINUED | OUTPATIENT
Start: 2023-03-02 | End: 2023-03-06

## 2023-03-02 RX ORDER — GUAIFENESIN 100 MG/5ML
200 SOLUTION ORAL EVERY 4 HOURS PRN
Status: DISCONTINUED | OUTPATIENT
Start: 2023-03-02 | End: 2023-03-03

## 2023-03-02 RX ORDER — FAMOTIDINE 40 MG/5ML
20 POWDER, FOR SUSPENSION ORAL 2 TIMES DAILY
Status: DISCONTINUED | OUTPATIENT
Start: 2023-03-02 | End: 2023-03-04

## 2023-03-02 RX ORDER — BENZONATATE 100 MG/1
100 CAPSULE ORAL 3 TIMES DAILY PRN
Status: DISCONTINUED | OUTPATIENT
Start: 2023-03-02 | End: 2023-03-03

## 2023-03-02 RX ORDER — OXYCODONE HCL 5 MG/5 ML
5 SOLUTION, ORAL ORAL EVERY 4 HOURS PRN
Status: DISCONTINUED | OUTPATIENT
Start: 2023-03-02 | End: 2023-03-06

## 2023-03-02 RX ADMIN — ACETAMINOPHEN 650 MG: 325 TABLET ORAL at 09:03

## 2023-03-02 RX ADMIN — IPRATROPIUM BROMIDE 0.5 MG: 0.5 SOLUTION RESPIRATORY (INHALATION) at 07:03

## 2023-03-02 RX ADMIN — LEVALBUTEROL HYDROCHLORIDE 0.63 MG: 0.63 SOLUTION RESPIRATORY (INHALATION) at 07:03

## 2023-03-02 RX ADMIN — TACROLIMUS 2 MG: 1 CAPSULE ORAL at 08:03

## 2023-03-02 RX ADMIN — MAGNESIUM SULFATE 2 G: 2 INJECTION INTRAVENOUS at 04:03

## 2023-03-02 RX ADMIN — ENOXAPARIN SODIUM 40 MG: 40 INJECTION SUBCUTANEOUS at 04:03

## 2023-03-02 RX ADMIN — MIRTAZAPINE 15 MG: 15 TABLET, ORALLY DISINTEGRATING ORAL at 09:03

## 2023-03-02 RX ADMIN — OXYCODONE HYDROCHLORIDE 5 MG: 5 SOLUTION ORAL at 07:03

## 2023-03-02 RX ADMIN — ACETAMINOPHEN 650 MG: 325 TABLET ORAL at 01:03

## 2023-03-02 RX ADMIN — SENNOSIDES 5 ML: 8.8 SYRUP ORAL at 08:03

## 2023-03-02 RX ADMIN — POLYETHYLENE GLYCOL 3350 17 G: 17 POWDER, FOR SOLUTION ORAL at 08:03

## 2023-03-02 RX ADMIN — GUAIFENESIN 200 MG: 200 SOLUTION ORAL at 11:03

## 2023-03-02 RX ADMIN — IPRATROPIUM BROMIDE 0.5 MG: 0.5 SOLUTION RESPIRATORY (INHALATION) at 03:03

## 2023-03-02 RX ADMIN — GABAPENTIN 600 MG: 250 SOLUTION ORAL at 03:03

## 2023-03-02 RX ADMIN — GABAPENTIN 600 MG: 250 SOLUTION ORAL at 09:03

## 2023-03-02 RX ADMIN — TACROLIMUS 1 MG: 1 CAPSULE ORAL at 05:03

## 2023-03-02 RX ADMIN — FAMOTIDINE 20 MG: 40 POWDER, FOR SUSPENSION ORAL at 09:03

## 2023-03-02 RX ADMIN — OXYCODONE HYDROCHLORIDE 5 MG: 5 SOLUTION ORAL at 11:03

## 2023-03-02 RX ADMIN — QUETIAPINE FUMARATE 50 MG: 25 TABLET ORAL at 10:03

## 2023-03-02 RX ADMIN — ATORVASTATIN CALCIUM 40 MG: 40 TABLET, FILM COATED ORAL at 09:03

## 2023-03-02 RX ADMIN — IOHEXOL 75 ML: 350 INJECTION, SOLUTION INTRAVENOUS at 08:03

## 2023-03-02 RX ADMIN — LEVALBUTEROL HYDROCHLORIDE 0.63 MG: 0.63 SOLUTION RESPIRATORY (INHALATION) at 03:03

## 2023-03-02 RX ADMIN — POTASSIUM CHLORIDE 40 MEQ: 7.46 INJECTION, SOLUTION INTRAVENOUS at 04:03

## 2023-03-02 RX ADMIN — GABAPENTIN 600 MG: 250 SOLUTION ORAL at 08:03

## 2023-03-02 RX ADMIN — OXYCODONE HYDROCHLORIDE 5 MG: 5 SOLUTION ORAL at 10:03

## 2023-03-02 RX ADMIN — CLONIDINE HYDROCHLORIDE 0.1 MG: 0.1 TABLET ORAL at 09:03

## 2023-03-02 RX ADMIN — ACETAMINOPHEN 650 MG: 325 TABLET ORAL at 05:03

## 2023-03-02 RX ADMIN — DEXMEDETOMIDINE HYDROCHLORIDE 0.4 MCG/KG/HR: 4 INJECTION, SOLUTION INTRAVENOUS at 01:03

## 2023-03-02 RX ADMIN — FAMOTIDINE 20 MG: 20 TABLET ORAL at 08:03

## 2023-03-02 RX ADMIN — ESCITALOPRAM OXALATE 20 MG: 5 SOLUTION ORAL at 09:03

## 2023-03-02 RX ADMIN — OXYCODONE HYDROCHLORIDE 10 MG: 5 SOLUTION ORAL at 05:03

## 2023-03-02 RX ADMIN — QUETIAPINE FUMARATE 50 MG: 25 TABLET ORAL at 09:03

## 2023-03-02 NOTE — ASSESSMENT & PLAN NOTE
Patient is a 62 year old male with h/o history of HCV/alcohol cirrhosis (s/p liver transplant, 2015, on immunosuppression), oral cancer (s/p resection,  2019) SI, tobacco abuse, HTN, emphysema, GERD, BPH who presented with squamous cell carcinoma of left tonsil s/p radical tonsillectomy on 2/20 c/b expanding hematoma now s/p emergent neck exploration with washout, trach 2/24, and respiratory code 2/27 with ROSC after 5 minutes. General surgery consulted for enteral access.     - Patient seen and examined. Labs and imaging reviewed. Case discussed with staff on call, Dr. Nick  - Please obtain CT a/p given history of liver transplant and cirrhosis   - Remainder of care per primary team  - Please contact general surgery with any questions, concerns, or clinical status changes

## 2023-03-02 NOTE — TREATMENT PLAN
Hepatology Treatment Plan    This is a 62 year old male with PMH significant for cirrhosis secondary to ETOH/Hepatitis C (status post transplant in 01/2015) and head/neck cancer (status post resection in approximately 2019 at [facility] with development of squamous cell carcinoma of left tonsil as of 01/2023) who was admitted to [facility] on 02/20 for left tonsillectomy and neck dissection that was performed on day of admission. Hospital course associated with development of hematoma at surgical site requiring neck exploration and hematoma evacuation on 02/20 with tracheostomy creation on 02/24 and cardiac arrest on 02/27 secondary to hypoxia with concern for on-going bleeding from surgery site (however CTA negative for active bleeding). Hepatology following for assistance with immunosuppression.     Interval History  Vital signs remain stable without need for vasopressor support. LFT's remain normal. Tacrolimus level 3 today.     Plan  - Tacrolimus 2 mg in AM and 1 mg in PM.   - Please obtain daily CBC, BMP, LFT, INR, and Tacrolimus level.     Thank you for involving us in the care of Dereck Centeno. Please call with any additional concerns or questions.        Erik Lindsay MD, PGY-V  Gastroenterology Fellow  [facility]

## 2023-03-02 NOTE — SUBJECTIVE & OBJECTIVE
No current facility-administered medications on file prior to encounter.     Current Outpatient Medications on File Prior to Encounter   Medication Sig    albuterol (PROVENTIL/VENTOLIN HFA) 90 mcg/actuation inhaler Inhale 2 puffs into the lungs every 6 (six) hours as needed for Wheezing or Shortness of Breath.    amLODIPine (NORVASC) 5 MG tablet Take 1 tablet (5 mg total) by mouth once daily.    aspirin (ECOTRIN) 81 MG EC tablet Take 1 tablet (81 mg total) by mouth once daily. (Patient taking differently: Take 81 mg by mouth every evening.)    atorvastatin (LIPITOR) 40 MG tablet Take 40 mg by mouth every evening.    calcium carbonate (OS-KRISTEL) 600 mg calcium (1,500 mg) Tab Take 600 mg by mouth once.    cloNIDine (CATAPRES) 0.1 MG tablet Take 1 tablet (0.1 mg total) by mouth 2 (two) times daily. (Patient taking differently: Take 0.1 mg by mouth every evening.)    docusate sodium (COLACE) 100 MG capsule Take 100 mg by mouth 3 (three) times daily as needed for Constipation.    escitalopram oxalate (LEXAPRO) 20 MG tablet Take 1 tablet (20 mg total) by mouth once daily. (Patient taking differently: Take 20 mg by mouth every evening.)    famotidine (PEPCID) 20 MG tablet Take 1 tablet (20 mg total) by mouth every evening.    gabapentin (NEURONTIN) 400 MG capsule Take 1 capsule (400 mg total) by mouth 3 (three) times daily. (Patient taking differently: Take 600 mg by mouth 3 (three) times daily.)    HYDROcodone-acetaminophen (NORCO) 5-325 mg per tablet Take 1 tablet by mouth every 6 (six) hours as needed for Pain.    magnesium oxide (MAG-OX) 400 mg tablet Take 400 mg by mouth once daily.    mirtazapine (REMERON) 15 MG tablet Take 1 tablet (15 mg total) by mouth every evening.    multivitamin (THERAGRAN) tablet Take 1 tablet by mouth once daily.    tacrolimus (PROGRAF) 1 MG Cap Take 2 capsules (2 mg total) by mouth every morning AND 1 capsule (1 mg total) every evening.    traMADol (ULTRAM) 50 mg tablet Take 50 mg by mouth  every 6 (six) hours as needed for Pain.    vitamin D 1000 units Tab Take 2 tablets (2,000 Units total) by mouth once daily.    alendronate (FOSAMAX) 70 MG tablet Take 70 mg by mouth every 7 days. SATURDAYS    hepatitis A virus vaccine, PF, (HAVRIX) 1,440 Kimberlee unit/mL Susp Inject 1 mL into the muscle once.    QUEtiapine (SEROQUEL) 25 MG Tab Take 50 mg by mouth every evening.    valACYclovir (VALTREX) 1000 MG tablet Take 1 tablet (1,000 mg total) by mouth 3 (three) times daily. 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 02/01/2023    Tobacco use      Past Surgical History:   Procedure Laterality Date    COLONOSCOPY N/A 6/14/2018    Procedure: COLONOSCOPY;  Surgeon: Conor Castro MD;  Location: [facility];  Service: Endoscopy;  Laterality: N/A;    DIRECT LARYNGOSCOPY N/A 2/20/2023    Procedure: LARYNGOSCOPY, DIRECT;  Surgeon: Alexandre Templeton MD;  Location: [address];  Service: ENT;  Laterality: N/A;    DISSECTION OF NECK Left 2/20/2023    Procedure: DISSECTION, NECK;  Surgeon: Alexandre Templeton MD;  Location: [address];  Service: ENT;  Laterality: Left;    ESOPHAGEAL VARICE LIGATION      ESOPHAGOGASTRODUODENOSCOPY N/A 6/14/2018    Procedure: ESOPHAGOGASTRODUODENOSCOPY (EGD);  Surgeon: Conor Castro MD;  Location: [facility];  Service: Endoscopy;  Laterality: N/A;    EVACUATION OF HEMATOMA Left 2/20/2023    Procedure: EVACUATION, HEMATOMA;  Surgeon: Alexandre Templeton MD;  Location: [facility] OR [address] [place] [address];  Service: ENT;  Laterality: Left;    LARYNGOSCOPY N/A 1/31/2023    Procedure: LARYNGOSCOPY;  Surgeon: Guzman Alfonso MD;  Location: [facility] OR [address] [place] [address];  Service: ENT;  Laterality:  N/A;  0124-[id number]    LIVER TRANSPLANT      NECK EXPLORATION Left 2/20/2023    Procedure: EXPLORATION, NECK;  Surgeon: Alexandre Templeton MD;  Location[facility];  Service: ENT;  Laterality: Left;    ROBOT-ASSISTED TRANSORAL SURGERY Left 2/20/2023    Procedure: ROBOTIC TRANSORAL SURGERY (TORS);  Surgeon: Alexandre Templeton MD;  Location: [facility] OR 2ND FLR;  Service: ENT;  Laterality: Left;    TIPS PROCEDURE      TRACHEOSTOMY N/A 2/24/2023    Procedure: CREATION, TRACHEOSTOMY;  Surgeon: Alexandre Templeton MD;  Location: [facility] OR 2ND FLR;  Service: ENT;  Laterality: N/A;     Family History       Problem Relation (Age of Onset)    Alcohol abuse Mother, Father    Cancer Mother          Tobacco Use    Smoking status: Every Day     Packs/day: 0.50     Years: 50.00     Pack years: 25.00     Types: Cigarettes    Smokeless tobacco: Former     Types: Chew   Substance and Sexual Activity    Alcohol use: Not Currently    Drug use: No    Sexual activity: Not Currently     Review of Systems   Constitutional:  Negative for chills and fever.   Respiratory:  Negative for cough and shortness of breath.    Cardiovascular:  Negative for chest pain.   Gastrointestinal:  Negative for abdominal pain, diarrhea, nausea and vomiting.   Musculoskeletal:  Positive for neck pain.   Neurological:  Negative for dizziness and headaches.   All other systems reviewed and are negative.  Objective:     Vital Signs (Most Recent):  Temp: 99.4 °F (37.4 °C) (03/02/23 1500)  Pulse: 75 (03/02/23 1544)  Resp: (!) 28 (03/02/23 1544)  BP: (!) 110/55 (03/02/23 1500)  SpO2: 99 % (03/02/23 1544)   Vital Signs (24h Range):  Temp:  [98.3 °F (36.8 °C)-99.4 °F (37.4 °C)] 99.4 °F (37.4 °C)  Pulse:  [64-82] 75  Resp:  [12-32] 28  SpO2:  [95 %-100 %] 99 %  BP: ()/(55-70) 110/55     Weight: 75.9 kg (167 lb 5.3 oz)  Body mass index is 24.01 kg/m².    Physical Exam  Vitals and nursing note reviewed.   Constitutional:       General: He is not in acute  distress.     Appearance: He is not ill-appearing or diaphoretic.      Comments: NGT in place, TF running   HENT:      Head: Normocephalic and atraumatic.      Mouth/Throat:      Mouth: Mucous membranes are moist.      Pharynx: Oropharynx is clear.   Eyes:      Extraocular Movements: Extraocular movements intact.      Conjunctiva/sclera: Conjunctivae normal.   Neck:      Comments: Incisions c/d/I  Trach collar  Cardiovascular:      Rate and Rhythm: Normal rate.   Pulmonary:      Effort: Pulmonary effort is normal. No respiratory distress.      Comments: Vent Mode: Spont  Oxygen Concentration (%):  [35-40] 35  Resp Rate Total:  [7.4 br/min-29 br/min] 13 br/min  PEEP/CPAP:  [5 cmH20] 5 cmH20  Pressure Support:  [6 cmH20-8 cmH20] 6 cmH20  Mean Airway Pressure:  [7.6 eyU63-26 cmH20] 14 cmH20  Abdominal:      General: There is no distension.      Palpations: Abdomen is soft.      Tenderness: There is no abdominal tenderness. There is no guarding or rebound.      Comments: Well healed chevron scar noted  No peritonitic signs    Musculoskeletal:         General: No deformity.   Skin:     General: Skin is warm and dry.      Coloration: Skin is not jaundiced.   Neurological:      Mental Status: He is alert.       Significant Labs:  I have reviewed all pertinent lab results within the past 24 hours.    Significant Diagnostics:  I have reviewed all pertinent imaging results/findings within the past 24 hours.

## 2023-03-02 NOTE — CONSULTS
[facility]  General Surgery  Consult Note    Patient Name: Dereck Centeno  MRN: [id number]  Code Status: Full Code  Admission Date: 2/20/2023  Hospital Length of Stay: 10 days  Attending Physician: Alexandre Templeton MD  Primary Care Provider: Eva Reynaga MD    Patient information was obtained from patient, past medical records and primary team.     Inpatient consult to General Surgery  Consult performed by: Sven Clark PA-C  Consult ordered by: Sven Clark PA-C  Reason for consult: PEG  Assessment/Recommendations: * Squamous cell carcinoma of left tonsil  Patient is a 62 year old male with h/o history of HCV/alcohol cirrhosis (s/p liver transplant, 2015, on immunosuppression), oral cancer (s/p resection,  2019) SI, tobacco abuse, HTN, emphysema, GERD, BPH who presented with squamous cell carcinoma of left tonsil s/p radical tonsillectomy on 2/20 c/b expanding hematoma now s/p emergent neck exploration with washout, trach 2/24, and respiratory code 2/27 with ROSC after 5 minutes. General surgery consulted for enteral access.     - Patient seen and examined. Labs and imaging reviewed. Case discussed with staff on call, Dr. Nick  - Please obtain CT a/p given history of liver transplant and cirrhosis   - Remainder of care per primary team  - Please contact general surgery with any questions, concerns, or clinical status changes        Subjective:     Principal Problem: Squamous cell carcinoma of left tonsil    History of Present Illness: Patient is a 62 year old male with h/o history of HCV/alcohol cirrhosis (s/p liver transplant, 2015, on immunosuppression), oral cancer (s/p resection,  2019) SI, tobacco abuse, HTN, emphysema, GERD, BPH who presented with squamous cell carcinoma of left tonsil s/p radical tonsillectomy on 2/20 c/b expanding hematoma now s/p emergent neck exploration with washout, trach 2/24, and respiratory code 2/27 with ROSC after 5 minutes. General  surgery consulted for enteral access.   He has been on trach collar for today. Tolerating tube feeds via NGT. Denies fever, chills, abdominal pain, n/v.   Not on AC   Previous abd surgeries: liver transplant (2015), denies others  No recent abdominal imaging      No current facility-administered medications on file prior to encounter.     Current Outpatient Medications on File Prior to Encounter   Medication Sig    albuterol (PROVENTIL/VENTOLIN HFA) 90 mcg/actuation inhaler Inhale 2 puffs into the lungs every 6 (six) hours as needed for Wheezing or Shortness of Breath.    amLODIPine (NORVASC) 5 MG tablet Take 1 tablet (5 mg total) by mouth once daily.    aspirin (ECOTRIN) 81 MG EC tablet Take 1 tablet (81 mg total) by mouth once daily. (Patient taking differently: Take 81 mg by mouth every evening.)    atorvastatin (LIPITOR) 40 MG tablet Take 40 mg by mouth every evening.    calcium carbonate (OS-KRISTEL) 600 mg calcium (1,500 mg) Tab Take 600 mg by mouth once.    cloNIDine (CATAPRES) 0.1 MG tablet Take 1 tablet (0.1 mg total) by mouth 2 (two) times daily. (Patient taking differently: Take 0.1 mg by mouth every evening.)    docusate sodium (COLACE) 100 MG capsule Take 100 mg by mouth 3 (three) times daily as needed for Constipation.    escitalopram oxalate (LEXAPRO) 20 MG tablet Take 1 tablet (20 mg total) by mouth once daily. (Patient taking differently: Take 20 mg by mouth every evening.)    famotidine (PEPCID) 20 MG tablet Take 1 tablet (20 mg total) by mouth every evening.    gabapentin (NEURONTIN) 400 MG capsule Take 1 capsule (400 mg total) by mouth 3 (three) times daily. (Patient taking differently: Take 600 mg by mouth 3 (three) times daily.)    HYDROcodone-acetaminophen (NORCO) 5-325 mg per tablet Take 1 tablet by mouth every 6 (six) hours as needed for Pain.    magnesium oxide (MAG-OX) 400 mg tablet Take 400 mg by mouth once daily.    mirtazapine (REMERON) 15 MG tablet Take 1 tablet (15 mg total)  by mouth every evening.    multivitamin (THERAGRAN) tablet Take 1 tablet by mouth once daily.    tacrolimus (PROGRAF) 1 MG Cap Take 2 capsules (2 mg total) by mouth every morning AND 1 capsule (1 mg total) every evening.    traMADol (ULTRAM) 50 mg tablet Take 50 mg by mouth every 6 (six) hours as needed for Pain.    vitamin D 1000 units Tab Take 2 tablets (2,000 Units total) by mouth once daily.    alendronate (FOSAMAX) 70 MG tablet Take 70 mg by mouth every 7 days. SATURDAYS    hepatitis A virus vaccine, PF, (HAVRIX) 1,440 Kimberlee unit/mL Susp Inject 1 mL into the muscle once.    QUEtiapine (SEROQUEL) 25 MG Tab Take 50 mg by mouth every evening.    valACYclovir (VALTREX) 1000 MG tablet Take 1 tablet (1,000 mg total) by mouth 3 (three) times daily. 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 02/01/2023    Tobacco use      Past Surgical History:   Procedure Laterality Date    COLONOSCOPY N/A 6/14/2018    Procedure: COLONOSCOPY;  Surgeon: Conor Castro MD;  Location: [facility];  Service: Endoscopy;  Laterality: N/A;    DIRECT LARYNGOSCOPY N/A 2/20/2023    Procedure: LARYNGOSCOPY, DIRECT;  Surgeon: Alexandre Templeton MD;  Location: [address];  Service: ENT;  Laterality: N/A;    DISSECTION OF NECK Left 2/20/2023    Procedure: DISSECTION, NECK;  Surgeon: Alexandre Templeton MD;  Location: [address];  Service: ENT;  Laterality: Left;    ESOPHAGEAL VARICE LIGATION      ESOPHAGOGASTRODUODENOSCOPY N/A 6/14/2018    Procedure: ESOPHAGOGASTRODUODENOSCOPY (EGD);  Surgeon: Conor Castro MD;  Location: [facility];  Service: Endoscopy;  Laterality: N/A;    EVACUATION OF  HEMATOMA Left 2/20/2023    Procedure: EVACUATION, HEMATOMA;  Surgeon: Alexandre Templeton MD;  Location: [facility] OR 2ND FLR;  Service: ENT;  Laterality: Left;    LARYNGOSCOPY N/A 1/31/2023    Procedure: LARYNGOSCOPY;  Surgeon: Guzman Alfonso MD;  Location: [facility] OR 2ND FLR;  Service: ENT;  Laterality: N/A;  0124-DQJ    LIVER TRANSPLANT      NECK EXPLORATION Left 2/20/2023    Procedure: EXPLORATION, NECK;  Surgeon: Alexandre Templeton MD;  Location: [facility] 2ND FLR;  Service: ENT;  Laterality: Left;    ROBOT-ASSISTED TRANSORAL SURGERY Left 2/20/2023    Procedure: ROBOTIC TRANSORAL SURGERY (TORS);  Surgeon: Alexandre Templeton MD;  Location: [facility] OR 2ND FLR;  Service: ENT;  Laterality: Left;    TIPS PROCEDURE      TRACHEOSTOMY N/A 2/24/2023    Procedure: CREATION, TRACHEOSTOMY;  Surgeon: Alexandre Templeton MD;  Location: [facility] 2ND FLR;  Service: ENT;  Laterality: N/A;     Family History       Problem Relation (Age of Onset)    Alcohol abuse Mother, Father    Cancer Mother          Tobacco Use    Smoking status: Every Day     Packs/day: 0.50     Years: 50.00     Pack years: 25.00     Types: Cigarettes    Smokeless tobacco: Former     Types: Chew   Substance and Sexual Activity    Alcohol use: Not Currently    Drug use: No    Sexual activity: Not Currently     Review of Systems   Constitutional:  Negative for chills and fever.   Respiratory:  Negative for cough and shortness of breath.    Cardiovascular:  Negative for chest pain.   Gastrointestinal:  Negative for abdominal pain, diarrhea, nausea and vomiting.   Musculoskeletal:  Positive for neck pain.   Neurological:  Negative for dizziness and headaches.   All other systems reviewed and are negative.  Objective:     Vital Signs (Most Recent):  Temp: 99.4 °F (37.4 °C) (03/02/23 1500)  Pulse: 75 (03/02/23 1544)  Resp: (!) 28 (03/02/23 1544)  BP: (!) 110/55 (03/02/23 1500)  SpO2: 99 % (03/02/23 1544)   Vital Signs (24h Range):  Temp:  [98.3 °F (36.8  °C)-99.4 °F (37.4 °C)] 99.4 °F (37.4 °C)  Pulse:  [64-82] 75  Resp:  [12-32] 28  SpO2:  [95 %-100 %] 99 %  BP: ()/(55-70) 110/55     Weight: 75.9 kg (167 lb 5.3 oz)  Body mass index is 24.01 kg/m².    Physical Exam  Vitals and nursing note reviewed.   Constitutional:       General: He is not in acute distress.     Appearance: He is not ill-appearing or diaphoretic.      Comments: NGT in place, TF running   HENT:      Head: Normocephalic and atraumatic.      Mouth/Throat:      Mouth: Mucous membranes are moist.      Pharynx: Oropharynx is clear.   Eyes:      Extraocular Movements: Extraocular movements intact.      Conjunctiva/sclera: Conjunctivae normal.   Neck:      Comments: Incisions c/d/I  Trach collar  Cardiovascular:      Rate and Rhythm: Normal rate.   Pulmonary:      Effort: Pulmonary effort is normal. No respiratory distress.      Comments: Vent Mode: Spont  Oxygen Concentration (%):  [35-40] 35  Resp Rate Total:  [7.4 br/min-29 br/min] 13 br/min  PEEP/CPAP:  [5 cmH20] 5 cmH20  Pressure Support:  [6 cmH20-8 cmH20] 6 cmH20  Mean Airway Pressure:  [7.6 ktU79-08 cmH20] 14 cmH20  Abdominal:      General: There is no distension.      Palpations: Abdomen is soft.      Tenderness: There is no abdominal tenderness. There is no guarding or rebound.      Comments: Well healed chevron scar noted  No peritonitic signs    Musculoskeletal:         General: No deformity.   Skin:     General: Skin is warm and dry.      Coloration: Skin is not jaundiced.   Neurological:      Mental Status: He is alert.       Significant Labs:  I have reviewed all pertinent lab results within the past 24 hours.    Significant Diagnostics:  I have reviewed all pertinent imaging results/findings within the past 24 hours.      Assessment/Plan:     * Squamous cell carcinoma of left tonsil  Patient is a 62 year old male with h/o history of HCV/alcohol cirrhosis (s/p liver transplant, 2015, on immunosuppression), oral cancer (s/p resection,   2019) SI, tobacco abuse, HTN, emphysema, GERD, BPH who presented with squamous cell carcinoma of left tonsil s/p radical tonsillectomy on 2/20 c/b expanding hematoma now s/p emergent neck exploration with washout, trach 2/24, and respiratory code 2/27 with ROSC after 5 minutes. General surgery consulted for enteral access.     - Patient seen and examined. Labs and imaging reviewed. Case discussed with staff on call, Dr. Nick  - Please obtain CT a/p given history of liver transplant and cirrhosis   - Remainder of care per primary team  - Please contact general surgery with any questions, concerns, or clinical status changes        VTE Risk Mitigation (From admission, onward)         Ordered     enoxaparin injection 40 mg  Daily         02/28/23 0805     IP VTE HIGH RISK PATIENT  Once         02/20/23 1447                Thank you for your consult. I will follow-up with patient. Please contact us if you have any additional questions.    Sven Clark PA-C  General Surgery  [facility] - [facility]

## 2023-03-02 NOTE — HPI
Patient is a 62 year old male with h/o history of HCV/alcohol cirrhosis (s/p liver transplant, 2015, on immunosuppression), oral cancer (s/p resection,  2019) SI, tobacco abuse, HTN, emphysema, GERD, BPH who presented with squamous cell carcinoma of left tonsil s/p radical tonsillectomy on 2/20 c/b expanding hematoma now s/p emergent neck exploration with washout, trach 2/24, and respiratory code 2/27 with ROSC after 5 minutes. General surgery consulted for enteral access.   He has been on trach collar for today. Tolerating tube feeds via NGT. Denies fever, chills, abdominal pain, n/v.   Not on AC   Previous abd surgeries: liver transplant (2015), denies others  No recent abdominal imaging

## 2023-03-02 NOTE — SUBJECTIVE & OBJECTIVE
Interval History/Significant Events: NAEON. VSS. Didn't tolerate trach collar trial yesterday, became agitated. Tolerating SBT vent settings well, will reattempt trach collar again today.     Follow-up For: Procedure(s) (LRB):  CREATION, TRACHEOSTOMY (N/A)    Post-Operative Day: 6 Days Post-Op    Objective:     Vital Signs (Most Recent):  Temp: 98.4 °F (36.9 °C) (03/02/23 0315)  Pulse: 73 (03/02/23 0800)  Resp: 17 (03/02/23 0800)  BP: 123/63 (03/02/23 0800)  SpO2: 96 % (03/02/23 0800) Vital Signs (24h Range):  Temp:  [98.3 °F (36.8 °C)-99.8 °F (37.7 °C)] 98.4 °F (36.9 °C)  Pulse:  [] 73  Resp:  [12-32] 17  SpO2:  [95 %-100 %] 96 %  BP: ()/(55-80) 123/63     Weight: 75.9 kg (167 lb 5.3 oz)  Body mass index is 24.01 kg/m².      Intake/Output Summary (Last 24 hours) at 3/2/2023 0910  Last data filed at 3/2/2023 0900  Gross per 24 hour   Intake 1543.39 ml   Output 1542 ml   Net 1.39 ml       Physical Exam  Vitals and nursing note reviewed.   Constitutional:       Appearance: Normal appearance.      Interventions: He is sedated, intubated and restrained.   HENT:      Head: Normocephalic and atraumatic.      Nose:      Comments: NG tube sutured in place   Eyes:      Conjunctiva/sclera: Conjunctivae normal.      Pupils: Pupils are equal, round, and reactive to light.   Neck:      Comments: ENMANUEL drain with SS on left side of neck   Surgical incision clean and dry   Cardiovascular:      Rate and Rhythm: Normal rate and regular rhythm.      Pulses: Normal pulses.      Heart sounds: Normal heart sounds.   Pulmonary:      Effort: Pulmonary effort is normal. No respiratory distress. He is intubated.      Comments: ETT in place   Abdominal:      General: Abdomen is flat. Bowel sounds are normal.      Palpations: Abdomen is soft.   Skin:     General: Skin is warm.     Vents:  Vent Mode: Spont (03/02/23 0733)  Ventilator Initiated: Yes (02/20/23 1443)  Set Rate: 20 BPM (03/01/23 0735)  Vt Set: 450 mL (03/01/23  0735)  Pressure Support: 8 cmH20 (03/02/23 0733)  PEEP/CPAP: 5 cmH20 (03/02/23 0733)  Oxygen Concentration (%): 35 (03/02/23 0800)  Peak Airway Pressure: 14 cmH20 (03/02/23 0733)  Plateau Pressure: 12 cmH20 (03/02/23 0733)  Total Ve: 10.4 L/m (03/02/23 0733)  Negative Inspiratory Force (cm H2O): -25 (03/02/23 0733)  F/VT Ratio<105 (RSBI): (!) 30.83 (03/02/23 0733)    Lines/Drains/Airways       Drain  Duration                  Closed/Suction Drain 02/20/23 Left Neck Bulb 19 Fr. 10 days         Trans Pyloric Feeding Tube 02/20/23 1320 nasogastric;Other (comments) 12 Fr. Right nostril 9 days         Urethral Catheter 02/20/23 0945 Non-latex 16 Fr. 9 days              Airway  Duration             Adult Surgical Airway 02/28/23 Shiley Cuffed 6.0/ 75mm 2 days              Peripheral Intravenous Line  Duration                  Midline Catheter Insertion/Assessment  - Single Lumen 02/24/23 1045 Right brachial vein 18g x 10cm 5 days         Peripheral IV - Single Lumen 03/01/23 0610 20 G Anterior;Right Forearm 1 day                    Significant Labs:    CBC/Anemia Profile:  Recent Labs   Lab 03/01/23  0301 03/01/23  0322 03/02/23  0337   WBC 6.10  --  5.00   HGB 7.9*  --  7.7*   HCT 25.2* 26* 24.2*     --  165   MCV 94  --  97   RDW 13.6  --  13.2        Chemistries:  Recent Labs   Lab 03/01/23  0301 03/02/23  0337    142   K 4.0 3.8    106   CO2 27 30*   BUN 22 33*   CREATININE 0.9 0.9   CALCIUM 8.6* 8.8   ALBUMIN 2.6* 2.6*   PROT 5.8* 5.7*   BILITOT 0.6 0.5   ALKPHOS 45* 49*   ALT 20 20   AST 25 20   MG 1.6 1.7   PHOS 2.5* 3.0       None    Significant Imaging:  I have reviewed all pertinent imaging results/findings within the past 24 hours.  I have reviewed and interpreted all pertinent imaging results/findings within the past 24 hours.

## 2023-03-02 NOTE — NURSING
"      SICU PLAN OF CARE NOTE    Dx: Squamous cell carcinoma of left tonsil    Shift Events: patient comfort achieved using PRN pain medications and precedex gtt. Vent settings spontaneous throughout shift, tolerated well    Goals of Care: wean vent settings as able. PT/OT    Neuro: Arouses to Voice, Follows Commands, and Moves All Extremities    Vital Signs: BP (!) 101/57 (BP Location: Left arm, Patient Position: Lying)   Pulse 70   Temp 98.4 °F (36.9 °C) (Oral)   Resp 18   Ht 5' 10" (1.778 m)   Wt 75.9 kg (167 lb 5.3 oz)   SpO2 96%   BMI 24.01 kg/m²     Respiratory: Ventilator SBT 35% PEEP 5    Diet: NPO and Tube Feeds; Impact Peptide 1.5 @ goal, 45 cc/hr (off propofol)    Gtts: Precedex 0.3 mcg/kg/hr    Urine Output: Urinary Catheter 575 cc/shift    Drains: ENMANUEL Drain, total output 6 cc / shift    Restraints: bilat soft wrist restraints charted Q2 hours per policy, see flow sheets for details     Labs/Accuchecks: all labs trended, reviewed, replaced as needed.    Skin: all skin monitored for redness and breakdown throughout shift. Patient repositioned Q2 hours. Waffle inflated. ICU bed plugged in and working       "

## 2023-03-02 NOTE — PROGRESS NOTES
[facility]  Critical Care - Surgery  Progress Note    Patient Name: Dereck Centeno  MRN: [id number]  Admission Date: 2/20/2023  Hospital Length of Stay: 10 days  Code Status: Full Code  Attending Provider: Alexandre Templeton MD  Primary Care Provider: Eva Reynaga MD   Principal Problem: Squamous cell carcinoma of left tonsil    Subjective:     Hospital/ICU Course:  Admitted to ICU after tonsillectomy and neck dissection. Febrile on 3/2 without signs of infection. Patient failed trach collar on 3/2.       Interval History/Significant Events: NAEON. VSS. Didn't tolerate trach collar trial yesterday, became agitated. Tolerating SBT vent settings well, will reattempt trach collar again today.     Follow-up For: Procedure(s) (LRB):  CREATION, TRACHEOSTOMY (N/A)    Post-Operative Day: 6 Days Post-Op    Objective:     Vital Signs (Most Recent):  Temp: 98.4 °F (36.9 °C) (03/02/23 0315)  Pulse: 73 (03/02/23 0800)  Resp: 17 (03/02/23 0800)  BP: 123/63 (03/02/23 0800)  SpO2: 96 % (03/02/23 0800) Vital Signs (24h Range):  Temp:  [98.3 °F (36.8 °C)-99.8 °F (37.7 °C)] 98.4 °F (36.9 °C)  Pulse:  [] 73  Resp:  [12-32] 17  SpO2:  [95 %-100 %] 96 %  BP: ()/(55-80) 123/63     Weight: 75.9 kg (167 lb 5.3 oz)  Body mass index is 24.01 kg/m².      Intake/Output Summary (Last 24 hours) at 3/2/2023 0910  Last data filed at 3/2/2023 0900  Gross per 24 hour   Intake 1543.39 ml   Output 1542 ml   Net 1.39 ml       Physical Exam  Vitals and nursing note reviewed.   Constitutional:       Appearance: Normal appearance.      Interventions: He is sedated, intubated and restrained.   HENT:      Head: Normocephalic and atraumatic.      Nose:      Comments: NG tube sutured in place   Eyes:      Conjunctiva/sclera: Conjunctivae normal.      Pupils: Pupils are equal, round, and reactive to light.   Neck:      Comments: ENMANUEL drain with SS on left side of neck   Surgical incision clean and dry   Cardiovascular:       Rate and Rhythm: Normal rate and regular rhythm.      Pulses: Normal pulses.      Heart sounds: Normal heart sounds.   Pulmonary:      Effort: Pulmonary effort is normal. No respiratory distress. He is intubated.      Comments: ETT in place   Abdominal:      General: Abdomen is flat. Bowel sounds are normal.      Palpations: Abdomen is soft.   Skin:     General: Skin is warm.     Vents:  Vent Mode: Spont (03/02/23 0733)  Ventilator Initiated: Yes (02/20/23 1443)  Set Rate: 20 BPM (03/01/23 0735)  Vt Set: 450 mL (03/01/23 0735)  Pressure Support: 8 cmH20 (03/02/23 0733)  PEEP/CPAP: 5 cmH20 (03/02/23 0733)  Oxygen Concentration (%): 35 (03/02/23 0800)  Peak Airway Pressure: 14 cmH20 (03/02/23 0733)  Plateau Pressure: 12 cmH20 (03/02/23 0733)  Total Ve: 10.4 L/m (03/02/23 0733)  Negative Inspiratory Force (cm H2O): -25 (03/02/23 0733)  F/VT Ratio<105 (RSBI): (!) 30.83 (03/02/23 0733)    Lines/Drains/Airways       Drain  Duration                  Closed/Suction Drain 02/20/23 Left Neck Bulb 19 Fr. 10 days         Trans Pyloric Feeding Tube 02/20/23 1320 nasogastric;Other (comments) 12 Fr. Right nostril 9 days         Urethral Catheter 02/20/23 0945 Non-latex 16 Fr. 9 days              Airway  Duration             Adult Surgical Airway 02/28/23 Shiley Cuffed 6.0/ 75mm 2 days              Peripheral Intravenous Line  Duration                  Midline Catheter Insertion/Assessment  - Single Lumen 02/24/23 1045 Right brachial vein 18g x 10cm 5 days         Peripheral IV - Single Lumen 03/01/23 0610 20 G Anterior;Right Forearm 1 day                    Significant Labs:    CBC/Anemia Profile:  Recent Labs   Lab 03/01/23  0301 03/01/23  0322 03/02/23  0337   WBC 6.10  --  5.00   HGB 7.9*  --  7.7*   HCT 25.2* 26* 24.2*     --  165   MCV 94  --  97   RDW 13.6  --  13.2        Chemistries:  Recent Labs   Lab 03/01/23  0301 03/02/23  0337    142   K 4.0 3.8    106   CO2 27 30*   BUN 22 33*   CREATININE 0.9  0.9   CALCIUM 8.6* 8.8   ALBUMIN 2.6* 2.6*   PROT 5.8* 5.7*   BILITOT 0.6 0.5   ALKPHOS 45* 49*   ALT 20 20   AST 25 20   MG 1.6 1.7   PHOS 2.5* 3.0       None    Significant Imaging:  I have reviewed all pertinent imaging results/findings within the past 24 hours.  I have reviewed and interpreted all pertinent imaging results/findings within the past 24 hours.    Assessment/Plan:     Oncology  * Squamous cell carcinoma of left tonsil  Dereck Centeno is a 62 y.o. male with PMHx of HTN, emphysema, HCV s/p liver transplant in 2015, tobacco abuse (50+ pack years), oral cancer s/p surgery at [facility] in 2019 (no chemo/radiation), and newly diagnosed SCC of the left tonsil. He is now s/p left tonsillectomy and neck dissection by Dr. Templeton on 2/20. Post operative course was c/b development of large hematoma at surgical site. He was taken back to the OR for an emergent neck exploration and hematoma evacuation on 2/20. Code blue on 2/27am, ROSC achieved within 5 min.      Neuro/Psych:   -- Sedation: Precedex gtt 0.3 mcg/kg/hr  -- Pain: home gabapentin, tylenol, PRN fentanyl, oxy PRN  -- Psych: home clonazepam PRN, scheduled mirtazapine, seroquel, lexapro     Cards  -- HDS; not currently on pressors   -- Continue home amlodipine and clonidine   -- PRN labetalol and hydralazine   -- Continue home statin       Pulm:   -- H/o emphysema   -- Trach placed 2/24.   -- Ipratropium nebs scheduled,albuterol nebs PRN for wheezing   -- Goal O2 sat > 88%  -- Wean as able  -- Was weaned to trach collar 2/26; reintubated 2/27. ETT replaced 2/28.   -- SBT failed 2/28 and 3/1, patient became agitated and calmed down after being placed back on SBT setting.   -- Will reattempt trach collar today, with increased precedex gtt rate beforehand to help w/ agitation.   -- Seroquel PRN for agitation      Renal:  -- Will consider exchanging Torres for condom cath pending trach collar trial.   -- BUN/Cr stable.  -- UOP 1.5 L/24H      FEN / GI:   -- Net  -150cc's/24H  -- Replace lytes as needed  -- Nutrition: NPO + TF. Dobhoff for feeds.   -- No mIVF     ID:   -- Tm: Febrile on 3/1 to 101.3; WBC 6.1. Afebrile since then.   -- Abx: none       Heme/Onc:   -- H/H stable  -- s/p 1upRBCs 2/27  -- H/o thrombocytopenia    -- Platelets stable   -- Continue to monitor   -- Daily CBC  -- Home ASA held       Endo:   -- Gluc goal 140-180  -- SSI      Immuno:  -- H/o HCV infection s/p liver transplant in 2015  -- Hepatology consulted  -- Continue home tacrolimus  -- Monitor tacrolimus levels and INR daily   -- Valacyclovir daily per ENT      PPx:   Feeding: NPO + TF  Analgesia/Sedation: PRN fentanyl, home gabapentin, tylenol/precedex gtt  Thromboembolic prevention: Lovenox   HOB >30: yes   Stress Ulcer ppx: famotidine BID  Glucose control: Critical care goal 140-180 g/dl, ISS    Lines/Drains/Airway: ETT, auguste, PIVx2, NG tube, neck drain x1      Dispo/Code Status/Palliative:   -- SICU / Full Code           Critical Care Daily Checklist:    A: Awake: RASS Goal/Actual Goal: RASS Goal: 0-->alert and calm  Actual: Portillo Agitation Sedation Scale (RASS): Alert and calm   B: Spontaneous Breathing Trial Performed? Spon. Breathing Trial Initiated?: Initiated (03/01/23 4432)   C: SAT & SBT Coordinated?  Yes                      D: Delirium: CAM-ICU Overall CAM-ICU: Negative   E: Early Mobility Performed? Yes   F: Feeding Goal: Goals: To meet % of EEN/EPN by next RD f/u  Status: Nutrition Goal Status: goal met   Current Diet Order   Procedures    Diet NPO      AS: Analgesia/Sedation Precedex gtt   T: Thromboembolic Prophylaxis Lovenox   H: HOB > 300 Yes   U: Stress Ulcer Prophylaxis (if needed) Famotidine BID   G: Glucose Control SSI   B: Bowel Function Stool Occurrence: 1   I: Indwelling Catheter (Lines & Auguste) Necessity ETT, auguste, PIVx2, NG tube, neck drain x1   D: De-escalation of Antimicrobials/Pharmacotherapies N/A    Plan for the day/ETD Trach Collar trial    Code  Status:  Family/Goals of Care: Full Code       Critical secondary to Patient has a condition that poses threat to life and bodily function: Severe Respiratory Distress     Critical care was time spent personally by me on the following activities: development of treatment plan with patient or surrogate and bedside caregivers, discussions with consultants, evaluation of patient's response to treatment, examination of patient, ordering and performing treatments and interventions, ordering and review of laboratory studies, ordering and review of radiographic studies, pulse oximetry, re-evaluation of patient's condition.  This critical care time did not overlap with that of any other provider or involve time for any procedures.     Evelina Fontana MD  Critical Care - Surgery  [facility]

## 2023-03-02 NOTE — PROGRESS NOTES
[facility]  Otorhinolaryngology-Head & Neck Surgery  Progress Note    Subjective:     Post-Op Info:  Procedure(s) (LRB):  CREATION, TRACHEOSTOMY (N/A)   6 Days Post-Op  Hospital Day: 11     Interval History: Weaned to trach collar yesterday but became tachycardic to 180s, placed back on MV. On spontaneous this AM.     Medications:  Continuous Infusions:   dexmedeTOMIDine (Precedex) infusion (titrating) 0.3 mcg/kg/hr (03/02/23 0600)     Scheduled Meds:   acetaminophen  650 mg Per NG tube Q8H    atorvastatin  40 mg Per NG tube QHS    cloNIDine  0.1 mg Per NG tube QHS    enoxaparin  40 mg Subcutaneous Daily    EScitalopram oxalate  20 mg Per NG tube QHS    famotidine  20 mg Per NG tube BID    gabapentin  600 mg Per NG tube TID    ipratropium  0.5 mg Nebulization Q8H    levalbuterol  0.63 mg Nebulization Q8H    mirtazapine  15 mg Per NG tube QHS    polyethylene glycol  17 g Per NG tube Daily    QUEtiapine  50 mg Per NG tube QHS    scopolamine  1 patch Transdermal Q3 Days    sennosides 8.8 mg/5 ml  5 mL Per NG tube BID    tacrolimus  2 mg Per NG tube Daily AM    And    tacrolimus  1 mg Per NG tube Daily PM     PRN Meds:sodium chloride, sodium chloride 0.9%, calcium gluconate IVPB, calcium gluconate IVPB, calcium gluconate IVPB, dextrose 10%, dextrose 10%, glucagon (human recombinant), hydrALAZINE, insulin aspart U-100, labetalol, magnesium sulfate IVPB, magnesium sulfate IVPB, ondansetron, oxyCODONE, oxyCODONE, potassium chloride **AND** potassium chloride **AND** potassium chloride, sodium phosphate IVPB, sodium phosphate IVPB, sodium phosphate IVPB     Review of patient's allergies indicates:  No Known Allergies  Objective:     Vital Signs (24h Range):  Temp:  [98.3 °F (36.8 °C)-99.8 °F (37.7 °C)] 98.4 °F (36.9 °C)  Pulse:  [] 70  Resp:  [12-32] 18  SpO2:  [94 %-100 %] 96 %  BP: ()/(55-80) 101/57       Lines/Drains/Airways       Drain  Duration                   Closed/Suction Drain 02/20/23 Left Neck Bulb 19 Fr. 10 days         Trans Pyloric Feeding Tube 02/20/23 1320 nasogastric;Other (comments) 12 Fr. Right nostril 9 days         Urethral Catheter 02/20/23 0945 Non-latex 16 Fr. 9 days              Airway  Duration             Adult Surgical Airway 02/28/23 Shiley Cuffed 6.0/ 75mm 2 days              Peripheral Intravenous Line  Duration                  Midline Catheter Insertion/Assessment  - Single Lumen 02/24/23 1045 Right brachial vein 18g x 10cm 5 days         Peripheral IV - Single Lumen 03/01/23 0610 20 G Anterior;Right Forearm 1 day                    Physical Exam  NAD  NGT to R nares  Oral cavity with clear secretions    Left ENMANUEL x1 w/ appropriate output, stripped.    Neck soft, ecchymosis nearly resolved   Tracheostomy in place, on MV. No bleeding on suctioning or around trach.        Significant Labs:  CBC:   Recent Labs   Lab 03/02/23  0337   WBC 5.00   RBC 2.50*   HGB 7.7*   HCT 24.2*      MCV 97   MCH 30.8   MCHC 31.8*     CMP:   Recent Labs   Lab 03/02/23  0337   *   CALCIUM 8.8   ALBUMIN 2.6*   PROT 5.7*      K 3.8   CO2 30*      BUN 33*   CREATININE 0.9   ALKPHOS 49*   ALT 20   AST 20   BILITOT 0.5       Significant Diagnostics:  None    Assessment/Plan:     * Squamous cell carcinoma of left tonsil  Dereck Centeno is a 62 y.o. 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 On day of surgery pt with expanding hematoma s/p emergent neck exploration with washout 2/20.     Oral packing removed 2/22. Tracheostomy 2/24/23.   Code blue on 2/27 for bleeding likely from tracheal stoma site. Required ETT placement in stoma and replacement of oral packing. CTA completed 2/27 without extravasation.   ETT replaced with tracheostomy and oral packing removed 2/28.     Remains hemostatic.     --  Continue to wean from vent   -- Drain care with strict recording of drain output  -- Remainder of care per ICU   -- Please page ENT with any questions or concerns               Nayla Lomax MD  Otorhinolaryngology-Head & Neck Surgery  [facility]

## 2023-03-02 NOTE — SUBJECTIVE & OBJECTIVE
Interval History: Weaned to trach collar yesterday but became tachycardic to 180s, placed back on MV. On spontaneous this AM.     Medications:  Continuous Infusions:   dexmedeTOMIDine (Precedex) infusion (titrating) 0.3 mcg/kg/hr (03/02/23 0600)     Scheduled Meds:   acetaminophen  650 mg Per NG tube Q8H    atorvastatin  40 mg Per NG tube QHS    cloNIDine  0.1 mg Per NG tube QHS    enoxaparin  40 mg Subcutaneous Daily    EScitalopram oxalate  20 mg Per NG tube QHS    famotidine  20 mg Per NG tube BID    gabapentin  600 mg Per NG tube TID    ipratropium  0.5 mg Nebulization Q8H    levalbuterol  0.63 mg Nebulization Q8H    mirtazapine  15 mg Per NG tube QHS    polyethylene glycol  17 g Per NG tube Daily    QUEtiapine  50 mg Per NG tube QHS    scopolamine  1 patch Transdermal Q3 Days    sennosides 8.8 mg/5 ml  5 mL Per NG tube BID    tacrolimus  2 mg Per NG tube Daily AM    And    tacrolimus  1 mg Per NG tube Daily PM     PRN Meds:sodium chloride, sodium chloride 0.9%, calcium gluconate IVPB, calcium gluconate IVPB, calcium gluconate IVPB, dextrose 10%, dextrose 10%, glucagon (human recombinant), hydrALAZINE, insulin aspart U-100, labetalol, magnesium sulfate IVPB, magnesium sulfate IVPB, ondansetron, oxyCODONE, oxyCODONE, potassium chloride **AND** potassium chloride **AND** potassium chloride, sodium phosphate IVPB, sodium phosphate IVPB, sodium phosphate IVPB     Review of patient's allergies indicates:  No Known Allergies  Objective:     Vital Signs (24h Range):  Temp:  [98.3 °F (36.8 °C)-99.8 °F (37.7 °C)] 98.4 °F (36.9 °C)  Pulse:  [] 70  Resp:  [12-32] 18  SpO2:  [94 %-100 %] 96 %  BP: ()/(55-80) 101/57       Lines/Drains/Airways       Drain  Duration                  Closed/Suction Drain 02/20/23 Left Neck Bulb 19 Fr. 10 days         Trans Pyloric Feeding Tube 02/20/23 1320 nasogastric;Other (comments) 12 Fr. Right nostril 9 days         Urethral Catheter 02/20/23 0945 Non-latex 16 Fr. 9 days               Airway  Duration             Adult Surgical Airway 02/28/23 Shiley Cuffed 6.0/ 75mm 2 days              Peripheral Intravenous Line  Duration                  Midline Catheter Insertion/Assessment  - Single Lumen 02/24/23 1045 Right brachial vein 18g x 10cm 5 days         Peripheral IV - Single Lumen 03/01/23 0610 20 G Anterior;Right Forearm 1 day                    Physical Exam  NAD  NGT to R nares  Oral cavity with clear secretions    Left ENMANUEL x1 w/ appropriate output, stripped.    Neck soft, ecchymosis nearly resolved   Tracheostomy in place, on MV. No bleeding on suctioning or around trach.        Significant Labs:  CBC:   Recent Labs   Lab 03/02/23  0337   WBC 5.00   RBC 2.50*   HGB 7.7*   HCT 24.2*      MCV 97   MCH 30.8   MCHC 31.8*     CMP:   Recent Labs   Lab 03/02/23 0337   *   CALCIUM 8.8   ALBUMIN 2.6*   PROT 5.7*      K 3.8   CO2 30*      BUN 33*   CREATININE 0.9   ALKPHOS 49*   ALT 20   AST 20   BILITOT 0.5       Significant Diagnostics:  None

## 2023-03-02 NOTE — ASSESSMENT & PLAN NOTE
Dereck Centeno is a 62 y.o. male with PMHx of HTN, emphysema, HCV s/p liver transplant in 2015, tobacco abuse (50+ pack years), oral cancer s/p surgery at [facility] in 2019 (no chemo/radiation), and newly diagnosed SCC of the left tonsil. He is now s/p left tonsillectomy and neck dissection by Dr. Templeton on 2/20. Post operative course was c/b development of large hematoma at surgical site. He was taken back to the OR for an emergent neck exploration and hematoma evacuation on 2/20. Code blue on 2/27am, ROSC achieved within 5 min.      Neuro/Psych:   -- Sedation: Precedex gtt 0.3 mcg/kg/hr  -- Pain: home gabapentin, tylenol, PRN fentanyl, oxy PRN  -- Psych: home clonazepam PRN, scheduled mirtazapine, seroquel, lexapro     Cards  -- HDS; not currently on pressors   -- Continue home amlodipine and clonidine   -- PRN labetalol and hydralazine   -- Continue home statin       Pulm:   -- H/o emphysema   -- Trach placed 2/24.   -- Ipratropium nebs scheduled,albuterol nebs PRN for wheezing   -- Goal O2 sat > 88%  -- Wean as able  -- Was weaned to trach collar 2/26; reintubated 2/27. ETT replaced 2/28.   -- SBT failed 2/28 and 3/1, patient became agitated and calmed down after being placed back on SBT setting.   -- Will reattempt trach collar today, with increased precedex gtt rate beforehand to help w/ agitation.   -- Seroquel PRN for agitation      Renal:  -- Will consider exchanging Torres for condom cath pending trach collar trial.   -- BUN/Cr stable.  -- UOP 1.5 L/24H      FEN / GI:   -- Net -150cc's/24H  -- Replace lytes as needed  -- Nutrition: NPO + TF. Dobhoff for feeds.   -- No mIVF     ID:   -- Tm: Febrile on 3/1 to 101.3; WBC 6.1. Afebrile since then.   -- Abx: none       Heme/Onc:   -- H/H stable  -- s/p 1upRBCs 2/27  -- H/o thrombocytopenia    -- Platelets stable   -- Continue to monitor   -- Daily CBC  -- Home ASA held       Endo:   -- Gluc goal 140-180  -- SSI      Immuno:  -- H/o HCV infection s/p liver  transplant in 2015  -- Hepatology consulted  -- Continue home tacrolimus  -- Monitor tacrolimus levels and INR daily   -- Valacyclovir daily per ENT      PPx:   Feeding: NPO + TF  Analgesia/Sedation: PRN fentanyl, home gabapentin, tylenol/precedex gtt  Thromboembolic prevention: Lovenox   HOB >30: yes   Stress Ulcer ppx: famotidine BID  Glucose control: Critical care goal 140-180 g/dl, ISS    Lines/Drains/Airway: ETT, auguste, PIVx2, NG tube, neck drain x1      Dispo/Code Status/Palliative:   -- SICU / Full Code

## 2023-03-02 NOTE — PLAN OF CARE
[facility]  Discharge Reassessment    Primary Care Provider: Eva Reynaga MD    Expected Discharge Date: 3/6/2023    Reassessment (most recent)       Discharge Reassessment - 03/02/23 0907          Discharge Reassessment    Assessment Type Discharge Planning Reassessment     Communicated JUAN with patient/caregiver Date not available/Unable to determine     Discharge Plan A Long-term acute care facility (LTAC)     Discharge Plan B Rehab     DME Needed Upon Discharge  other (see comments)   TBD    Discharge Barriers Identified None     Why the patient remains in the hospital Requires continued medical care                      Per MD's Note,  IJEOMA. VSS. Patient failed trach collar trial yesterday, returned to SBT vent settings, PS 8, PEEP 5. Transitioned from propofol to precedex gtt, 0.3 mcg/kg/min. Appears comfortable this AM. Responding to questions with head nods appropriately. Plans to trial trach collar again today.         Eliana Lopez LMSW  Case Management [facility]  Ext: [phone]

## 2023-03-02 NOTE — SUBJECTIVE & OBJECTIVE
Interval History/Significant Events: NAEON. VSS. Patient failed trach collar trial yesterday, returned to SBT vent settings, PS 8, PEEP 5. Transitioned from propofol to precedex gtt, 0.3 mcg/kg/min. Appears comfortable this AM. Responding to questions with head nods appropriately.     Follow-up For: Procedure(s) (LRB):  CREATION, TRACHEOSTOMY (N/A)    Post-Operative Day: 6 Days Post-Op    Objective:     Vital Signs (Most Recent):  Temp: 98.4 °F (36.9 °C) (03/02/23 0315)  Pulse: 70 (03/02/23 0600)  Resp: 18 (03/02/23 0600)  BP: (!) 101/57 (03/02/23 0600)  SpO2: 96 % (03/02/23 0600)   Vital Signs (24h Range):  Temp:  [98.3 °F (36.8 °C)-99.8 °F (37.7 °C)] 98.4 °F (36.9 °C)  Pulse:  [] 70  Resp:  [12-32] 18  SpO2:  [94 %-100 %] 96 %  BP: ()/(55-80) 101/57     Weight: 75.9 kg (167 lb 5.3 oz)  Body mass index is 24.01 kg/m².      Intake/Output Summary (Last 24 hours) at 3/2/2023 0701  Last data filed at 3/2/2023 0600  Gross per 24 hour   Intake 1704.64 ml   Output 1552 ml   Net 152.64 ml       Physical Exam  Vitals and nursing note reviewed.   Constitutional:       Appearance: Normal appearance.      Interventions: He is sedated, intubated and restrained.   HENT:      Head: Normocephalic and atraumatic.      Nose:      Comments: NG tube sutured in place   Eyes:      Conjunctiva/sclera: Conjunctivae normal.      Pupils: Pupils are equal, round, and reactive to light.   Neck:      Comments: ENMANUEL drain with SS on left side of neck   Surgical incision clean and dry   Cardiovascular:      Rate and Rhythm: Normal rate and regular rhythm.      Pulses: Normal pulses.      Heart sounds: Normal heart sounds.   Pulmonary:      Effort: Pulmonary effort is normal. No respiratory distress. He is intubated.      Comments: ETT in place   Abdominal:      General: Abdomen is flat. Bowel sounds are normal.      Palpations: Abdomen is soft.   Skin:     General: Skin is warm.           Vents:  Vent Mode: Spont (03/02/23  0311)  Ventilator Initiated: Yes (02/20/23 1443)  Set Rate: 20 BPM (03/01/23 0735)  Vt Set: 450 mL (03/01/23 0735)  Pressure Support: 8 cmH20 (03/02/23 0311)  PEEP/CPAP: 5 cmH20 (03/02/23 0311)  Oxygen Concentration (%): 35 (03/02/23 0600)  Peak Airway Pressure: 14 cmH20 (03/02/23 0311)  Plateau Pressure: 12 cmH20 (03/02/23 0311)  Total Ve: 9.8 L/m (03/02/23 0311)  Negative Inspiratory Force (cm H2O): -25 (03/02/23 0311)  F/VT Ratio<105 (RSBI): (!) 43.18 (03/02/23 0311)    Lines/Drains/Airways       Drain  Duration                  Closed/Suction Drain 02/20/23 Left Neck Bulb 19 Fr. 10 days         Trans Pyloric Feeding Tube 02/20/23 1320 nasogastric;Other (comments) 12 Fr. Right nostril 9 days         Urethral Catheter 02/20/23 0945 Non-latex 16 Fr. 9 days              Airway  Duration             Adult Surgical Airway 02/28/23 Shiley Cuffed 6.0/ 75mm 2 days              Peripheral Intravenous Line  Duration                  Midline Catheter Insertion/Assessment  - Single Lumen 02/24/23 1045 Right brachial vein 18g x 10cm 5 days         Peripheral IV - Single Lumen 03/01/23 0610 20 G Anterior;Right Forearm 1 day                    Significant Labs:    CBC/Anemia Profile:  Recent Labs   Lab 03/01/23 0301 03/01/23 0322 03/02/23 0337   WBC 6.10  --  5.00   HGB 7.9*  --  7.7*   HCT 25.2* 26* 24.2*     --  165   MCV 94  --  97   RDW 13.6  --  13.2        Chemistries:  Recent Labs   Lab 03/01/23 0301 03/02/23  0337    142   K 4.0 3.8    106   CO2 27 30*   BUN 22 33*   CREATININE 0.9 0.9   CALCIUM 8.6* 8.8   ALBUMIN 2.6* 2.6*   PROT 5.8* 5.7*   BILITOT 0.6 0.5   ALKPHOS 45* 49*   ALT 20 20   AST 25 20   MG 1.6 1.7   PHOS 2.5* 3.0       None    Significant Imaging:  I have reviewed all pertinent imaging results/findings within the past 24 hours.  I have reviewed and interpreted all pertinent imaging results/findings within the past 24 hours.

## 2023-03-02 NOTE — HOSPITAL COURSE
Admitted to ICU after tonsillectomy and neck dissection. Febrile on 3/2 without signs of infection. Patient failed trach collar on 3/2. Patient developed productive cough overnight without fever. CXR pending.

## 2023-03-02 NOTE — ASSESSMENT & PLAN NOTE
Dereck Centeno is a 62 y.o. male with PMHx of HTN, emphysema, HCV s/p liver transplant in 2015, tobacco abuse (50+ pack years), oral cancer s/p surgery at [facility] in 2019 (no chemo/radiation), and newly diagnosed SCC of the left tonsil. He is now s/p left tonsillectomy and neck dissection by Dr. Templeton on 2/20. Post operative course was c/b development of large hematoma at surgical site. He was taken back to the OR for an emergent neck exploration and hematoma evacuation on 2/20. Code blue on 2/27am, ROSC achieved within 5 min.      Neuro/Psych:   -- Sedation: Precedex gtt 0.3 mcg/kg/hr  -- Pain: home gabapentin, tylenol, PRN fentanyl   -- Psych: home clonazepam PRN, scheduled mirtazapine, seroquel, lexapro     Cards  -- HDS; not currently on pressors   -- Continue home amlodipine and clonidine   -- PRN labetalol and hydralazine   -- Continue home statin       Pulm:   -- H/o emphysema   -- Trach placed 2/24.   -- Ipratropium nebs scheduled,albuterol nebs PRN for wheezing   -- Goal O2 sat > 88%  -- Wean as able  -- Was weaned to trach collar 2/26; reintubated 2/27. ETT replaced 2/28.   -- SBT failed 2/28 and 3/1, patient became agitated and calmed down after being placed back on SBT setting.   -- Will reattempt trach collar today, with increased precedex gtt rate beforehand to help w/ agitation.       Renal:  -- Keep auguste for strict I/O  -- BUN/Cr stable.  -- UOP 1.5 L/24H      FEN / GI:   -- Net -150cc's/24H  -- Replace lytes as needed  -- Nutrition: NPO + TF. Dobhoff for feeds.   -- No mIVF     ID:   -- Tm: Febrile on 3/1 to 101.3; WBC 6.1. Afebrile since then.   -- Abx: none       Heme/Onc:   -- H/H 7.9/25.2  -s/p 1upRBCs 2/27  -- H/o thrombocytopenia    -- Platelets stable   -- Continue to monitor   -- Daily CBC  -- Home ASA held       Endo:   -- Gluc goal 140-180  -- SSI      Immuno:  -- H/o HCV infection s/p liver transplant in 2015  -- Hepatology consulted  -- Continue home tacrolimus  -- Monitor  tacrolimus levels and INR daily   -- Valacyclovir daily per ENT      PPx:   Feeding: NPO + TF  Analgesia/Sedation: PRN fentanyl, home gabapentin, tylenol/precedex gtt  Thromboembolic prevention: Lovenox   HOB >30: yes   Stress Ulcer ppx: famotidine BID  Glucose control: Critical care goal 140-180 g/dl, ISS    Lines/Drains/Airway: ETT, auguste, PIVx2, NG tube, neck drain x1      Dispo/Code Status/Palliative:   -- SICU / Full Code

## 2023-03-03 PROBLEM — C10.2: Status: ACTIVE | Noted: 2023-03-03

## 2023-03-03 LAB
ALBUMIN SERPL BCP-MCNC: 2.6 G/DL (ref 3.5–5.2)
ALP SERPL-CCNC: 60 U/L (ref 55–135)
ALT SERPL W/O P-5'-P-CCNC: 23 U/L (ref 10–44)
ANION GAP SERPL CALC-SCNC: 6 MMOL/L (ref 8–16)
AST SERPL-CCNC: 24 U/L (ref 10–40)
BASOPHILS # BLD AUTO: 0.02 K/UL (ref 0–0.2)
BASOPHILS NFR BLD: 0.4 % (ref 0–1.9)
BILIRUB SERPL-MCNC: 0.5 MG/DL (ref 0.1–1)
BUN SERPL-MCNC: 34 MG/DL (ref 8–23)
CALCIUM SERPL-MCNC: 9.1 MG/DL (ref 8.7–10.5)
CHLORIDE SERPL-SCNC: 105 MMOL/L (ref 95–110)
CO2 SERPL-SCNC: 30 MMOL/L (ref 23–29)
CREAT SERPL-MCNC: 0.9 MG/DL (ref 0.5–1.4)
DIFFERENTIAL METHOD: ABNORMAL
EOSINOPHIL # BLD AUTO: 0.2 K/UL (ref 0–0.5)
EOSINOPHIL NFR BLD: 3.3 % (ref 0–8)
ERYTHROCYTE [DISTWIDTH] IN BLOOD BY AUTOMATED COUNT: 13.1 % (ref 11.5–14.5)
EST. GFR  (NO RACE VARIABLE): >60 ML/MIN/1.73 M^2
FINAL PATHOLOGIC DIAGNOSIS: NORMAL
FROZEN SECTION DIAGNOSIS: NORMAL
FROZEN SECTION FOOTNOTE: NORMAL
GLUCOSE SERPL-MCNC: 146 MG/DL (ref 70–110)
GROSS: NORMAL
HCT VFR BLD AUTO: 26.2 % (ref 40–54)
HGB BLD-MCNC: 7.9 G/DL (ref 14–18)
IMM GRANULOCYTES # BLD AUTO: 0.04 K/UL (ref 0–0.04)
IMM GRANULOCYTES NFR BLD AUTO: 0.7 % (ref 0–0.5)
INR PPP: 1.1 (ref 0.8–1.2)
LYMPHOCYTES # BLD AUTO: 0.8 K/UL (ref 1–4.8)
LYMPHOCYTES NFR BLD: 15.4 % (ref 18–48)
Lab: NORMAL
MAGNESIUM SERPL-MCNC: 1.7 MG/DL (ref 1.6–2.6)
MCH RBC QN AUTO: 29.8 PG (ref 27–31)
MCHC RBC AUTO-ENTMCNC: 30.2 G/DL (ref 32–36)
MCV RBC AUTO: 99 FL (ref 82–98)
MICROSCOPIC EXAM: NORMAL
MONOCYTES # BLD AUTO: 0.6 K/UL (ref 0.3–1)
MONOCYTES NFR BLD: 10.4 % (ref 4–15)
NEUTROPHILS # BLD AUTO: 3.8 K/UL (ref 1.8–7.7)
NEUTROPHILS NFR BLD: 69.8 % (ref 38–73)
NRBC BLD-RTO: 0 /100 WBC
PHOSPHATE SERPL-MCNC: 3.2 MG/DL (ref 2.7–4.5)
PLATELET # BLD AUTO: 186 K/UL (ref 150–450)
PMV BLD AUTO: 9.6 FL (ref 9.2–12.9)
POCT GLUCOSE: 139 MG/DL (ref 70–110)
POCT GLUCOSE: 150 MG/DL (ref 70–110)
POCT GLUCOSE: 152 MG/DL (ref 70–110)
POTASSIUM SERPL-SCNC: 4.2 MMOL/L (ref 3.5–5.1)
PROT SERPL-MCNC: 6 G/DL (ref 6–8.4)
PROTHROMBIN TIME: 11.3 SEC (ref 9–12.5)
RBC # BLD AUTO: 2.65 M/UL (ref 4.6–6.2)
SODIUM SERPL-SCNC: 141 MMOL/L (ref 136–145)
TACROLIMUS BLD-MCNC: 3.2 NG/ML (ref 5–15)
WBC # BLD AUTO: 5.4 K/UL (ref 3.9–12.7)

## 2023-03-03 PROCEDURE — 25000003 PHARM REV CODE 250: Performed by: STUDENT IN AN ORGANIZED HEALTH CARE EDUCATION/TRAINING PROGRAM

## 2023-03-03 PROCEDURE — 27000221 HC OXYGEN, UP TO 24 HOURS

## 2023-03-03 PROCEDURE — 25000242 PHARM REV CODE 250 ALT 637 W/ HCPCS: Performed by: STUDENT IN AN ORGANIZED HEALTH CARE EDUCATION/TRAINING PROGRAM

## 2023-03-03 PROCEDURE — 97530 THERAPEUTIC ACTIVITIES: CPT

## 2023-03-03 PROCEDURE — 25000003 PHARM REV CODE 250: Performed by: OTOLARYNGOLOGY

## 2023-03-03 PROCEDURE — 80053 COMPREHEN METABOLIC PANEL: CPT

## 2023-03-03 PROCEDURE — 85610 PROTHROMBIN TIME: CPT | Performed by: STUDENT IN AN ORGANIZED HEALTH CARE EDUCATION/TRAINING PROGRAM

## 2023-03-03 PROCEDURE — 99291 PR CRITICAL CARE, E/M 30-74 MINUTES: ICD-10-PCS | Mod: ,,, | Performed by: STUDENT IN AN ORGANIZED HEALTH CARE EDUCATION/TRAINING PROGRAM

## 2023-03-03 PROCEDURE — 99900035 HC TECH TIME PER 15 MIN (STAT)

## 2023-03-03 PROCEDURE — 84100 ASSAY OF PHOSPHORUS: CPT

## 2023-03-03 PROCEDURE — 20000000 HC ICU ROOM

## 2023-03-03 PROCEDURE — 63600175 PHARM REV CODE 636 W HCPCS: Performed by: STUDENT IN AN ORGANIZED HEALTH CARE EDUCATION/TRAINING PROGRAM

## 2023-03-03 PROCEDURE — 25000003 PHARM REV CODE 250

## 2023-03-03 PROCEDURE — 97116 GAIT TRAINING THERAPY: CPT

## 2023-03-03 PROCEDURE — 94761 N-INVAS EAR/PLS OXIMETRY MLT: CPT

## 2023-03-03 PROCEDURE — 92610 EVALUATE SWALLOWING FUNCTION: CPT

## 2023-03-03 PROCEDURE — 94003 VENT MGMT INPAT SUBQ DAY: CPT

## 2023-03-03 PROCEDURE — 27200966 HC CLOSED SUCTION SYSTEM

## 2023-03-03 PROCEDURE — 51798 US URINE CAPACITY MEASURE: CPT

## 2023-03-03 PROCEDURE — 85025 COMPLETE CBC W/AUTO DIFF WBC: CPT

## 2023-03-03 PROCEDURE — 63600175 PHARM REV CODE 636 W HCPCS

## 2023-03-03 PROCEDURE — 80197 ASSAY OF TACROLIMUS: CPT

## 2023-03-03 PROCEDURE — 94640 AIRWAY INHALATION TREATMENT: CPT

## 2023-03-03 PROCEDURE — 99900026 HC AIRWAY MAINTENANCE (STAT)

## 2023-03-03 PROCEDURE — 92597 ORAL SPEECH DEVICE EVAL: CPT

## 2023-03-03 PROCEDURE — 99291 CRITICAL CARE FIRST HOUR: CPT | Mod: ,,, | Performed by: STUDENT IN AN ORGANIZED HEALTH CARE EDUCATION/TRAINING PROGRAM

## 2023-03-03 PROCEDURE — 83735 ASSAY OF MAGNESIUM: CPT

## 2023-03-03 RX ORDER — ACETAMINOPHEN 650 MG/20.3ML
650 LIQUID ORAL EVERY 8 HOURS
Status: DISCONTINUED | OUTPATIENT
Start: 2023-03-03 | End: 2023-03-06

## 2023-03-03 RX ORDER — QUETIAPINE FUMARATE 25 MG/1
50 TABLET, FILM COATED ORAL EVERY 12 HOURS PRN
Status: DISCONTINUED | OUTPATIENT
Start: 2023-03-03 | End: 2023-03-06

## 2023-03-03 RX ORDER — QUETIAPINE FUMARATE 25 MG/1
50 TABLET, FILM COATED ORAL 2 TIMES DAILY
Status: DISCONTINUED | OUTPATIENT
Start: 2023-03-03 | End: 2023-03-06

## 2023-03-03 RX ORDER — HYDROMORPHONE HYDROCHLORIDE 1 MG/ML
0.5 INJECTION, SOLUTION INTRAMUSCULAR; INTRAVENOUS; SUBCUTANEOUS
Status: DISCONTINUED | OUTPATIENT
Start: 2023-03-03 | End: 2023-03-03

## 2023-03-03 RX ORDER — GUAIFENESIN 100 MG/5ML
200 SOLUTION ORAL EVERY 4 HOURS PRN
Status: DISCONTINUED | OUTPATIENT
Start: 2023-03-03 | End: 2023-03-06

## 2023-03-03 RX ORDER — HYDROMORPHONE HYDROCHLORIDE 1 MG/ML
0.5 INJECTION, SOLUTION INTRAMUSCULAR; INTRAVENOUS; SUBCUTANEOUS ONCE
Status: COMPLETED | OUTPATIENT
Start: 2023-03-03 | End: 2023-03-03

## 2023-03-03 RX ADMIN — LEVALBUTEROL HYDROCHLORIDE 0.63 MG: 0.63 SOLUTION RESPIRATORY (INHALATION) at 12:03

## 2023-03-03 RX ADMIN — OXYCODONE HYDROCHLORIDE 5 MG: 5 SOLUTION ORAL at 12:03

## 2023-03-03 RX ADMIN — IPRATROPIUM BROMIDE 0.5 MG: 0.5 SOLUTION RESPIRATORY (INHALATION) at 12:03

## 2023-03-03 RX ADMIN — ENOXAPARIN SODIUM 40 MG: 40 INJECTION SUBCUTANEOUS at 04:03

## 2023-03-03 RX ADMIN — FAMOTIDINE 20 MG: 40 POWDER, FOR SUSPENSION ORAL at 08:03

## 2023-03-03 RX ADMIN — POLYETHYLENE GLYCOL 3350 17 G: 17 POWDER, FOR SOLUTION ORAL at 08:03

## 2023-03-03 RX ADMIN — OXYCODONE HYDROCHLORIDE 5 MG: 5 SOLUTION ORAL at 08:03

## 2023-03-03 RX ADMIN — ATORVASTATIN CALCIUM 40 MG: 40 TABLET, FILM COATED ORAL at 08:03

## 2023-03-03 RX ADMIN — IPRATROPIUM BROMIDE 0.5 MG: 0.5 SOLUTION RESPIRATORY (INHALATION) at 03:03

## 2023-03-03 RX ADMIN — QUETIAPINE FUMARATE 50 MG: 25 TABLET ORAL at 08:03

## 2023-03-03 RX ADMIN — CLONIDINE HYDROCHLORIDE 0.1 MG: 0.1 TABLET ORAL at 08:03

## 2023-03-03 RX ADMIN — HYDROMORPHONE HYDROCHLORIDE 0.5 MG: 1 INJECTION, SOLUTION INTRAMUSCULAR; INTRAVENOUS; SUBCUTANEOUS at 03:03

## 2023-03-03 RX ADMIN — GABAPENTIN 600 MG: 250 SOLUTION ORAL at 08:03

## 2023-03-03 RX ADMIN — MIRTAZAPINE 15 MG: 15 TABLET, ORALLY DISINTEGRATING ORAL at 08:03

## 2023-03-03 RX ADMIN — LEVALBUTEROL HYDROCHLORIDE 0.63 MG: 0.63 SOLUTION RESPIRATORY (INHALATION) at 07:03

## 2023-03-03 RX ADMIN — HYDROMORPHONE HYDROCHLORIDE 0.5 MG: 1 INJECTION, SOLUTION INTRAMUSCULAR; INTRAVENOUS; SUBCUTANEOUS at 02:03

## 2023-03-03 RX ADMIN — ACETAMINOPHEN 650 MG: 325 TABLET ORAL at 02:03

## 2023-03-03 RX ADMIN — GABAPENTIN 600 MG: 250 SOLUTION ORAL at 02:03

## 2023-03-03 RX ADMIN — ACETAMINOPHEN 650 MG: 325 TABLET ORAL at 06:03

## 2023-03-03 RX ADMIN — DEXMEDETOMIDINE HYDROCHLORIDE 0.4 MCG/KG/HR: 4 INJECTION, SOLUTION INTRAVENOUS at 01:03

## 2023-03-03 RX ADMIN — SENNOSIDES 5 ML: 8.8 SYRUP ORAL at 08:03

## 2023-03-03 RX ADMIN — OXYCODONE HYDROCHLORIDE 5 MG: 5 SOLUTION ORAL at 03:03

## 2023-03-03 RX ADMIN — OXYCODONE HYDROCHLORIDE 5 MG: 5 SOLUTION ORAL at 04:03

## 2023-03-03 RX ADMIN — MAGNESIUM SULFATE 2 G: 2 INJECTION INTRAVENOUS at 04:03

## 2023-03-03 RX ADMIN — ESCITALOPRAM OXALATE 20 MG: 5 SOLUTION ORAL at 08:03

## 2023-03-03 RX ADMIN — IPRATROPIUM BROMIDE 0.5 MG: 0.5 SOLUTION RESPIRATORY (INHALATION) at 07:03

## 2023-03-03 RX ADMIN — ACETAMINOPHEN 650 MG: 650 SOLUTION ORAL at 10:03

## 2023-03-03 RX ADMIN — GUAIFENESIN 200 MG: 200 SOLUTION ORAL at 03:03

## 2023-03-03 RX ADMIN — TACROLIMUS 1 MG: 1 CAPSULE ORAL at 06:03

## 2023-03-03 RX ADMIN — TACROLIMUS 2 MG: 1 CAPSULE ORAL at 08:03

## 2023-03-03 RX ADMIN — LEVALBUTEROL HYDROCHLORIDE 0.63 MG: 0.63 SOLUTION RESPIRATORY (INHALATION) at 03:03

## 2023-03-03 NOTE — PROGRESS NOTES
[facility]  Critical Care - Surgery  Progress Note    Patient Name: Dereck Centeno  MRN: [id number]  Admission Date: 2/20/2023  Hospital Length of Stay: 11 days  Code Status: Full Code  Attending Provider: Alexandre Templeton MD  Primary Care Provider: Eva Reynaga MD   Principal Problem: Squamous cell carcinoma of left tonsil    Subjective:     Hospital/ICU Course:  Admitted to ICU after tonsillectomy and neck dissection. Febrile on 3/2 without signs of infection. Patient failed trach collar on 3/2. Patient developed productive cough overnight without fever. CXR pending.       Interval History/Significant Events: Tolerated trach collar overnight. On 0.4 Precedex. Overnight reports of productive cough. Afebrile. Guanfacine added for cough and congestion.     Follow-up For: Procedure(s) (LRB):  CREATION, TRACHEOSTOMY (N/A)    Post-Operative Day: 7 Days Post-Op    Objective:     Vital Signs (Most Recent):  Temp: 98.7 °F (37.1 °C) (03/03/23 0300)  Pulse: 69 (03/03/23 0332)  Resp: 20 (03/03/23 0332)  BP: 125/66 (03/03/23 0300)  SpO2: 98 % (03/03/23 0332) Vital Signs (24h Range):  Temp:  [98.6 °F (37 °C)-99.4 °F (37.4 °C)] 98.7 °F (37.1 °C)  Pulse:  [60-84] 69  Resp:  [14-28] 20  SpO2:  [95 %-100 %] 98 %  BP: (100-158)/(55-71) 125/66     Weight: 75.9 kg (167 lb 5.3 oz)  Body mass index is 24.01 kg/m².      Intake/Output Summary (Last 24 hours) at 3/3/2023 0346  Last data filed at 3/3/2023 0300  Gross per 24 hour   Intake 1865.29 ml   Output 1483 ml   Net 382.29 ml       Physical Exam  Vitals and nursing note reviewed.   Constitutional:       Appearance: Normal appearance.      Interventions: He is sedated and restrained.   HENT:      Head: Normocephalic and atraumatic.      Nose:      Comments: NG tube sutured in place   Eyes:      Conjunctiva/sclera: Conjunctivae normal.      Pupils: Pupils are equal, round, and reactive to light.   Neck:      Comments: ENMANUEL drain with SS on left side of neck    Surgical incision clean and dry   Cardiovascular:      Rate and Rhythm: Normal rate and regular rhythm.      Pulses: Normal pulses.      Heart sounds: Normal heart sounds.   Pulmonary:      Effort: Pulmonary effort is normal. No respiratory distress.      Comments: Trach in place  Abdominal:      General: Abdomen is flat. Bowel sounds are normal.      Palpations: Abdomen is soft.   Skin:     General: Skin is warm.       Vents:  Vent Mode: Spont (03/02/23 1937)  Ventilator Initiated: Yes (02/20/23 1443)  Set Rate: 20 BPM (03/01/23 0735)  Vt Set: 450 mL (03/01/23 0735)  Pressure Support: 6 cmH20 (03/02/23 1937)  PEEP/CPAP: 5 cmH20 (03/02/23 1937)  Oxygen Concentration (%): 40 (03/03/23 0332)  Peak Airway Pressure: 29 cmH20 (03/02/23 1937)  Plateau Pressure: 12 cmH20 (03/02/23 1937)  Total Ve: 5.5 L/m (03/02/23 1937)  Negative Inspiratory Force (cm H2O): -25 (03/02/23 1937)  F/VT Ratio<105 (RSBI): (!) 27.51 (03/02/23 1343)    Lines/Drains/Airways       Drain  Duration                  Trans Pyloric Feeding Tube 02/20/23 1320 nasogastric;Other (comments) 12 Fr. Right nostril 10 days         Urethral Catheter 02/20/23 0945 Non-latex 16 Fr. 10 days              Airway  Duration             Adult Surgical Airway 02/28/23 Shiley Cuffed 6.0/ 75mm 3 days              Peripheral Intravenous Line  Duration                  Midline Catheter Insertion/Assessment  - Single Lumen 02/24/23 1045 Right brachial vein 18g x 10cm 6 days         Peripheral IV - Single Lumen 03/01/23 0610 20 G Anterior;Right Forearm 1 day                    Significant Labs:    CBC/Anemia Profile:  Recent Labs   Lab 03/02/23 0337   WBC 5.00   HGB 7.7*   HCT 24.2*      MCV 97   RDW 13.2        Chemistries:  Recent Labs   Lab 03/02/23 0337      K 3.8      CO2 30*   BUN 33*   CREATININE 0.9   CALCIUM 8.8   ALBUMIN 2.6*   PROT 5.7*   BILITOT 0.5   ALKPHOS 49*   ALT 20   AST 20   MG 1.7   PHOS 3.0       All pertinent labs within the past  24 hours have been reviewed.    Significant Imaging:  I have reviewed all pertinent imaging results/findings within the past 24 hours.    Assessment/Plan:     Oncology  * Squamous cell carcinoma of left tonsil  Dereck Centeno is a 62 y.o. male with PMHx of HTN, emphysema, HCV s/p liver transplant in 2015, tobacco abuse (50+ pack years), oral cancer s/p surgery at [facility] in 2019 (no chemo/radiation), and newly diagnosed SCC of the left tonsil. He is now s/p left tonsillectomy and neck dissection by Dr. Templeton on 2/20. Post operative course was c/b development of large hematoma at surgical site. He was taken back to the OR for an emergent neck exploration and hematoma evacuation on 2/20. Code blue on 2/27am, ROSC achieved within 5 min.      Neuro/Psych:   -- Sedation: Precedex gtt, wean as tolerated   -- Pain: home gabapentin, tylenol, PRN fentanyl, oxy PRN,   -- Psych: home clonazepam PRN, scheduled mirtazapine, seroquel, lexapro     Cards  -- HDS; not currently on pressors   -- Continue home clonidine   -held home amlodipine in setting of borderline hypotension  -- PRN labetalol and hydralazine   -- Continue home statin       Pulm:   -- H/o emphysema   -- Trach placed 2/24.   -- Ipratropium nebs scheduled,albuterol nebs PRN for wheezing   -- Goal O2 sat > 88%  -- Wean as able  -- Was weaned to trach collar 2/26; reintubated 2/27. ETT replaced w portia 2/28.   -- Tolerating trach collar currently        Renal:  -- Dc auguste   -- BUN/Cr 34/0.9  -- UOP 1.4 L/24H      FEN / GI:   -- Net +200cc's/24H  -- Replace lytes as needed  -- Nutrition: NPO + TF. Dobhoff for feeds. At goal.   --PEG consult to gen surgery   -- Cta showing transverse colon between the stomach + abdominal wall   -- Speech consult for swallow   -- No mIVF     ID:   -- Tm: afebrile; WBC 5.4.   -- Abx: none       Heme/Onc:   -- H/H stable 7.9/26.2  -- H/o thrombocytopenia    -- Platelets stable   -- Continue to monitor   -- Daily CBC  -- Home ASA held        Endo:   -- Gluc goal 140-180  -- SSI      Immuno:  -- H/o HCV infection s/p liver transplant in 2015  -- Hepatology consulted  -- Continue home tacrolimus  -- Monitor tacrolimus levels and INR daily   -- Valacyclovir daily per ENT      PPx:   Feeding: NPO + TF  Analgesia/Sedation: PRN fentanyl, home gabapentin, tylenol/precedex gtt  Thromboembolic prevention: Lovenox   HOB >30: yes   Stress Ulcer ppx: famotidine BID  Glucose control: Critical care goal 140-180 g/dl, ISS    Lines/Drains/Airway: trach, PIVx2, NG tube, neck drain x1, dc auguste       Dispo/Code Status/Palliative:   -- SICU / Full Code           Critical care was time spent personally by me on the following activities: development of treatment plan with patient or surrogate and bedside caregivers, discussions with consultants, evaluation of patient's response to treatment, examination of patient, ordering and performing treatments and interventions, ordering and review of laboratory studies, ordering and review of radiographic studies, pulse oximetry, re-evaluation of patient's condition.  This critical care time did not overlap with that of any other provider or involve time for any procedures.     Jocelynn Juan MD  Critical Care - Surgery  [facility]

## 2023-03-03 NOTE — PT/OT/SLP PROGRESS
Occupational Therapy   Treatment    Name: Dereck Centeno  MRN: [id number]  Admitting Diagnosis:  Squamous cell carcinoma of left tonsil  7 Days Post-Op    Recommendations:     Discharge Recommendations: home health OT  Discharge Equipment Recommendations:   (TBD closer to d/c)  Barriers to discharge:  None    Assessment:     Dereck Centeno is a 62 y.o. male with a medical diagnosis of Squamous cell carcinoma of left tonsil.  He presents with performance deficits affecting function are weakness, impaired endurance, impaired self care skills, impaired functional mobility, gait instability, impaired balance, decreased safety awareness, impaired cardiopulmonary response to activity. Pt demo improved mobility today's date. Pt with increased pain in neck and intermittent desatting to upper 80s (able to recover with rest). Pt participates well and is motivated to regain functional independence in mobility and self care.    -Co-tx with PT performed due to need for education and assistance from two skilled therapy disciplines at pt's current functional level.     Rehab Prognosis:  Good; patient would benefit from acute skilled OT services to address these deficits and reach maximum level of function.       Plan:     Patient to be seen 3 x/week to address the above listed problems via self-care/home management, therapeutic activities, therapeutic exercises  Plan of Care Expires: 04/03/23  Plan of Care Reviewed with: patient    Subjective     Pain/Comfort:  Pain Rating 1: 7/10  Location - Orientation 1: generalized  Location 1: neck  Pain Addressed 1: Pre-medicate for activity, Distraction, Cessation of Activity  Pain Rating Post-Intervention 1: 7/10    Objective:     Communicated with: RN prior to session.  Patient found up in chair with pulse ox (continuous), telemetry, blood pressure cuff, PICC line, SCD, oxygen upon OT entry to room.    General Precautions: Standard, aspiration, fall    Orthopedic  Precautions:N/A  Braces: N/A  Respiratory Status:  trach collar - 35%, 9L     Occupational Performance:     Bed Mobility:    Patient completed Supine to Sit with contact guard assistance     Functional Mobility/Transfers:  Patient completed Sit <> Stand Transfer with contact guard assistance  with  hand-held assist   Patient completed Bed <> Chair Transfer using Step Transfer technique with minimum assistance with hand-held assist  Functional Mobility: Min A, HHA    Activities of Daily Living:  Lower Body Dressing: maximal assistance socks      AMPAC 6 Click ADL: 14    Treatment & Education:  Pt ed re OT role, POC, safety.  Pt performed mobility with assistance and requiring increased assistance for dressing tasks due to pain and lines.    Patient left up in chair with all lines intact, call button in reach, and RN notified    GOALS:   Multidisciplinary Problems       Occupational Therapy Goals          Problem: Occupational Therapy    Goal Priority Disciplines Outcome Interventions   Occupational Therapy Goal     OT, PT/OT Ongoing, Progressing    Description: Goals to be met by: 3/15/23     Patient will increase functional independence with ADLs by performing:    Feeding with Supervision.  UE Dressing with Stand-by Assistance.  LE Dressing with Contact Guard Assistance.  Grooming while standing with Stand-by Assistance.  Toileting from toilet with Contact Guard Assistance for hygiene and clothing management.   Sitting at edge of bed x15 minutes with Supervision.                         Time Tracking:     OT Date of Treatment: 03/03/23  OT Start Time: 1404  OT Stop Time: 1425  OT Total Time (min): 21 min    Billable Minutes:Therapeutic Activity 21 minutes    OT/LENORA: OT          3/3/2023

## 2023-03-03 NOTE — SUBJECTIVE & OBJECTIVE
Interval History/Significant Events: Tolerated trach collar overnight. On 0.4 Precedex. Overnight reports of productive cough. Afebrile. Guanfacine added for cough and congestion.     Follow-up For: Procedure(s) (LRB):  CREATION, TRACHEOSTOMY (N/A)    Post-Operative Day: 7 Days Post-Op    Objective:     Vital Signs (Most Recent):  Temp: 98.7 °F (37.1 °C) (03/03/23 0300)  Pulse: 69 (03/03/23 0332)  Resp: 20 (03/03/23 0332)  BP: 125/66 (03/03/23 0300)  SpO2: 98 % (03/03/23 0332) Vital Signs (24h Range):  Temp:  [98.6 °F (37 °C)-99.4 °F (37.4 °C)] 98.7 °F (37.1 °C)  Pulse:  [60-84] 69  Resp:  [14-28] 20  SpO2:  [95 %-100 %] 98 %  BP: (100-158)/(55-71) 125/66     Weight: 75.9 kg (167 lb 5.3 oz)  Body mass index is 24.01 kg/m².      Intake/Output Summary (Last 24 hours) at 3/3/2023 0346  Last data filed at 3/3/2023 0300  Gross per 24 hour   Intake 1865.29 ml   Output 1483 ml   Net 382.29 ml       Physical Exam  Vitals and nursing note reviewed.   Constitutional:       Appearance: Normal appearance.      Interventions: He is sedated and restrained.   HENT:      Head: Normocephalic and atraumatic.      Nose:      Comments: NG tube sutured in place   Eyes:      Conjunctiva/sclera: Conjunctivae normal.      Pupils: Pupils are equal, round, and reactive to light.   Neck:      Comments: ENMANUEL drain with SS on left side of neck   Surgical incision clean and dry   Cardiovascular:      Rate and Rhythm: Normal rate and regular rhythm.      Pulses: Normal pulses.      Heart sounds: Normal heart sounds.   Pulmonary:      Effort: Pulmonary effort is normal. No respiratory distress.      Comments: Trach in place  Abdominal:      General: Abdomen is flat. Bowel sounds are normal.      Palpations: Abdomen is soft.   Skin:     General: Skin is warm.       Vents:  Vent Mode: Spont (03/02/23 1937)  Ventilator Initiated: Yes (02/20/23 1443)  Set Rate: 20 BPM (03/01/23 0735)  Vt Set: 450 mL (03/01/23 0735)  Pressure Support: 6 cmH20 (03/02/23  1937)  PEEP/CPAP: 5 cmH20 (03/02/23 1937)  Oxygen Concentration (%): 40 (03/03/23 0332)  Peak Airway Pressure: 29 cmH20 (03/02/23 1937)  Plateau Pressure: 12 cmH20 (03/02/23 1937)  Total Ve: 5.5 L/m (03/02/23 1937)  Negative Inspiratory Force (cm H2O): -25 (03/02/23 1937)  F/VT Ratio<105 (RSBI): (!) 27.51 (03/02/23 1343)    Lines/Drains/Airways       Drain  Duration                  Trans Pyloric Feeding Tube 02/20/23 1320 nasogastric;Other (comments) 12 Fr. Right nostril 10 days         Urethral Catheter 02/20/23 0945 Non-latex 16 Fr. 10 days              Airway  Duration             Adult Surgical Airway 02/28/23 Shiley Cuffed 6.0/ 75mm 3 days              Peripheral Intravenous Line  Duration                  Midline Catheter Insertion/Assessment  - Single Lumen 02/24/23 1045 Right brachial vein 18g x 10cm 6 days         Peripheral IV - Single Lumen 03/01/23 0610 20 G Anterior;Right Forearm 1 day                    Significant Labs:    CBC/Anemia Profile:  Recent Labs   Lab 03/02/23 0337   WBC 5.00   HGB 7.7*   HCT 24.2*      MCV 97   RDW 13.2        Chemistries:  Recent Labs   Lab 03/02/23 0337      K 3.8      CO2 30*   BUN 33*   CREATININE 0.9   CALCIUM 8.8   ALBUMIN 2.6*   PROT 5.7*   BILITOT 0.5   ALKPHOS 49*   ALT 20   AST 20   MG 1.7   PHOS 3.0       All pertinent labs within the past 24 hours have been reviewed.    Significant Imaging:  I have reviewed all pertinent imaging results/findings within the past 24 hours.

## 2023-03-03 NOTE — ASSESSMENT & PLAN NOTE
Dereck Centeno is a 62 y.o. 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 On day of surgery pt with expanding hematoma s/p emergent neck exploration with washout 2/20.     Oral packing removed 2/22. Tracheostomy 2/24/23.   Code blue on 2/27 for bleeding likely from tracheal stoma site. Required ETT placement in stoma and replacement of oral packing. CTA completed 2/27 without extravasation.   ETT replaced with tracheostomy and oral packing removed 2/28.     Remains hemostatic.     -- Weaned from vent  -- PEG vs PO trial  -- Remainder of care per ICU   -- Please page ENT with any questions or concerns

## 2023-03-03 NOTE — ASSESSMENT & PLAN NOTE
Dereck Centeno is a 62 y.o. male with PMHx of HTN, emphysema, HCV s/p liver transplant in 2015, tobacco abuse (50+ pack years), oral cancer s/p surgery at [facility] in 2019 (no chemo/radiation), and newly diagnosed SCC of the left tonsil. He is now s/p left tonsillectomy and neck dissection by Dr. Templeton on 2/20. Post operative course was c/b development of large hematoma at surgical site. He was taken back to the OR for an emergent neck exploration and hematoma evacuation on 2/20. Code blue on 2/27am, ROSC achieved within 5 min.      Neuro/Psych:   -- Sedation: Precedex gtt, wean as tolerated   -- Pain: home gabapentin, tylenol, PRN fentanyl, oxy PRN,   -- Psych: home clonazepam PRN, scheduled mirtazapine, seroquel, lexapro     Cards  -- HDS; not currently on pressors   -- Continue home clonidine   -held home amlodipine in setting of borderline hypotension  -- PRN labetalol and hydralazine   -- Continue home statin       Pulm:   -- H/o emphysema   -- Trach placed 2/24.   -- Ipratropium nebs scheduled,albuterol nebs PRN for wheezing   -- Goal O2 sat > 88%  -- Wean as able  -- Was weaned to trach collar 2/26; reintubated 2/27. ETT replaced w portia 2/28.   -- Tolerating trach collar currently        Renal:  -- Dc auguste   -- BUN/Cr 34/0.9  -- UOP 1.4 L/24H      FEN / GI:   -- Net +200cc's/24H  -- Replace lytes as needed  -- Nutrition: NPO + TF. Dobhoff for feeds. At goal.   --PEG consult to gen surgery   -- Cta showing transverse colon between the stomach + abdominal wall  -- No mIVF     ID:   -- Tm: afebrile; WBC 5.4.   -- Abx: none       Heme/Onc:   -- H/H stable 7.9/26.2  -- H/o thrombocytopenia    -- Platelets stable   -- Continue to monitor   -- Daily CBC  -- Home ASA held       Endo:   -- Gluc goal 140-180  -- SSI      Immuno:  -- H/o HCV infection s/p liver transplant in 2015  -- Hepatology consulted  -- Continue home tacrolimus  -- Monitor tacrolimus levels and INR daily   -- Valacyclovir daily per ENT       PPx:   Feeding: NPO + TF  Analgesia/Sedation: PRN fentanyl, home gabapentin, tylenol/precedex gtt  Thromboembolic prevention: Lovenox   HOB >30: yes   Stress Ulcer ppx: famotidine BID  Glucose control: Critical care goal 140-180 g/dl, ISS    Lines/Drains/Airway: trach, PIVx2, NG tube, neck drain x1, dc auguste       Dispo/Code Status/Palliative:   -- SICU / Full Code

## 2023-03-03 NOTE — PROGRESS NOTES
[facility]  Otorhinolaryngology-Head & Neck Surgery  Progress Note    Subjective:     Post-Op Info:  Procedure(s) (LRB):  CREATION, TRACHEOSTOMY (N/A)   7 Days Post-Op  Hospital Day: 12     Interval History: Successfully weaned from MV. On T piece. No evidence of bleeding, still with moderate secretions.     Medications:  Continuous Infusions:   dexmedeTOMIDine (Precedex) infusion (titrating) 0.4 mcg/kg/hr (03/03/23 0600)     Scheduled Meds:   acetaminophen  650 mg Per NG tube Q8H    atorvastatin  40 mg Per NG tube QHS    cloNIDine  0.1 mg Per NG tube QHS    enoxaparin  40 mg Subcutaneous Daily    EScitalopram oxalate  20 mg Per NG tube QHS    famotidine  20 mg Per NG tube BID    gabapentin  600 mg Per NG tube TID    ipratropium  0.5 mg Nebulization Q8H    levalbuterol  0.63 mg Nebulization Q8H    mirtazapine  15 mg Per NG tube Nightly    polyethylene glycol  17 g Per NG tube Daily    QUEtiapine  50 mg Per NG tube QHS    scopolamine  1 patch Transdermal Q3 Days    sennosides 8.8 mg/5 ml  5 mL Per NG tube BID    tacrolimus  2 mg Per NG tube Daily AM    And    tacrolimus  1 mg Per NG tube Daily PM     PRN Meds:sodium chloride, sodium chloride 0.9%, benzonatate, calcium gluconate IVPB, calcium gluconate IVPB, calcium gluconate IVPB, dextrose 10%, dextrose 10%, glucagon (human recombinant), guaiFENesin 100 mg/5 ml, hydrALAZINE, insulin aspart U-100, labetalol, magnesium sulfate IVPB, magnesium sulfate IVPB, ondansetron, oxyCODONE, potassium chloride **AND** potassium chloride **AND** potassium chloride, QUEtiapine, sodium phosphate IVPB, sodium phosphate IVPB, sodium phosphate IVPB     Review of patient's allergies indicates:  No Known Allergies  Objective:     Vital Signs (24h Range):  Temp:  [98.6 °F (37 °C)-99.4 °F (37.4 °C)] 98.7 °F (37.1 °C)  Pulse:  [60-84] 68  Resp:  [14-28] 17  SpO2:  [95 %-100 %] 100 %  BP: (100-158)/(55-71) 122/64       Lines/Drains/Airways       Drain   Duration                  Trans Pyloric Feeding Tube 02/20/23 1320 nasogastric;Other (comments) 12 Fr. Right nostril 10 days              Airway  Duration             Adult Surgical Airway 02/28/23 Shiley Cuffed 6.0/ 75mm 3 days              Peripheral Intravenous Line  Duration                  Midline Catheter Insertion/Assessment  - Single Lumen 02/24/23 1045 Right brachial vein 18g x 10cm 6 days         Peripheral IV - Single Lumen 03/01/23 0610 20 G Anterior;Right Forearm 2 days                    Physical Exam  NAD  Oral cavity with clear secretions . Postop site healing with appropriate granulation, hemostatic   Neck soft, ecchymosis nearly resolved   Tracheostomy in place, on trach collar. No bleeding on suctioning or around trach.     Significant Labs:  CBC:   Recent Labs   Lab 03/03/23  0334   WBC 5.40   RBC 2.65*   HGB 7.9*   HCT 26.2*      MCV 99*   MCH 29.8   MCHC 30.2*     CMP:   Recent Labs   Lab 03/03/23  0334   *   CALCIUM 9.1   ALBUMIN 2.6*   PROT 6.0      K 4.2   CO2 30*      BUN 34*   CREATININE 0.9   ALKPHOS 60   ALT 23   AST 24   BILITOT 0.5       Significant Diagnostics:  None    Assessment/Plan:     * Squamous cell carcinoma of left tonsil  Dereck Centeno is a 62 y.o. 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 On day of surgery pt with expanding hematoma s/p emergent neck exploration with washout 2/20.     Oral packing removed 2/22. Tracheostomy 2/24/23.   Code blue on 2/27 for bleeding likely from tracheal stoma site. Required ETT placement in stoma and replacement of oral packing. CTA completed 2/27 without extravasation.   ETT replaced with tracheostomy and oral packing removed 2/28.     Remains hemostatic.     -- Weaned from vent  -- PEG vs PO trial  -- Remainder of care per ICU   -- Please page ENT with any  questions or concerns               Nayla Lomax MD  Otorhinolaryngology-Head & Neck Surgery  [facility]

## 2023-03-03 NOTE — PT/OT/SLP EVAL
Speech Language Pathology Evaluation  One Way Speaking Valve Evaluation    Patient Name:  Dereck Centeno   MRN:  [id number]  Admitting Diagnosis: Squamous cell carcinoma of left tonsil    IMPORTANT  CAUTION: CUFF MUST ALWAYS BE DEFLATED WHEN VALVE IN USE    Recommendations:                 General Recommendations:  Dysphagia therapy and Speech/language therapy  Diet recommendations:  NPO, NPO   Aspiration Precautions: Strict aspiration precautions   General Precautions: Standard, aspiration, fall  Communication strategies:  One way speaking valve    History:     Past Medical History:   Diagnosis Date    Alcohol withdrawal seizure     Alcoholic cirrhosis     Anxiety     Depression     GERD (gastroesophageal reflux disease)     Hepatitis C     HTN (hypertension), benign     Hyperlipidemia     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 02/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    Tobacco use        Past Surgical History:   Procedure Laterality Date    COLONOSCOPY N/A 6/14/2018    Procedure: COLONOSCOPY;  Surgeon: Conor Castro MD;  Location: [facility];  Service: Endoscopy;  Laterality: N/A;    DIRECT LARYNGOSCOPY N/A 2/20/2023    Procedure: LARYNGOSCOPY, DIRECT;  Surgeon: Alexandre Templeton MD;  [facility];  Service: ENT;  Laterality: N/A;    DISSECTION OF NECK Left 2/20/2023    Procedure: DISSECTION, NECK;  Surgeon: Alexandre Templeton MD;  Location: [facility] OR [address] [place] [address];  Service: ENT;  Laterality: Left;    ESOPHAGEAL VARICE LIGATION      ESOPHAGOGASTRODUODENOSCOPY N/A 6/14/2018    Procedure: ESOPHAGOGASTRODUODENOSCOPY (EGD);  Surgeon: Conor Castro MD;  Location: [facility];  Service: Endoscopy;  Laterality: N/A;    EVACUATION OF HEMATOMA Left 2/20/2023    Procedure: EVACUATION, HEMATOMA;  Surgeon: Alexandre Templeton MD;  [facility];  Service: ENT;  Laterality: Left;    LARYNGOSCOPY N/A 1/31/2023    Procedure: LARYNGOSCOPY;  Surgeon: Guzman Alfonso MD;  Location: [facility] OR [address] [place] [address];  Service: ENT;   Laterality: N/A;  [id number]    LIVER TRANSPLANT      NECK EXPLORATION Left 2/20/2023    Procedure: EXPLORATION, NECK;  Surgeon: Alexandre Templeton MD;  Location: [facility] OR [address] [place] [address];  Service: ENT;  Laterality: Left;    ROBOT-ASSISTED TRANSORAL SURGERY Left 2/20/2023    Procedure: ROBOTIC TRANSORAL SURGERY (TORS);  Surgeon: Alexandre Templeton MD;  [facility];  Service: ENT;  Laterality: Left;    TIPS PROCEDURE      TRACHEOSTOMY N/A 2/24/2023    Procedure: CREATION, TRACHEOSTOMY;  Surgeon: Alexandre Templeton MD;  [facility];  Service: ENT;  Laterality: N/A;       Subjective     Awake/alert    Objective:     Level of Alertness:  Alert  Cooperative    Secretion Management:  Pt with copious secretions at trach hub upon entry.     Pain/Comfort:  Pain Rating 1: 0/10  Pain Rating Post-Intervention 1: 0/10    Respiratory Status: trach collar    Oral Musculature Evaluation  Oral Musculature: WFL  Dentition: scattered dentition  Oral Labial Strength and Mobility: [facility]  Lingual Strength and Mobility: [facility]    Passy Vickie Speaking Valve  Trach size/type: Shiley 6 cuff deflated  Able to phonate around trach:yes  Vocal quality with digital finger occlusion:wet, decreased intensity  O2 sats at rest:96%  O2 sats with PMSV in place:95%  Vocal quality with valve in place: coarse, adequate intensity  Back pressure upon removal: minimal, pt coughed valve off x1  Minutes PMSV worn:6 minutes  Education topics addressed: cleaning valve, one-way technology, anatomy of trach, precautions with cuffed trach, removal of valve prior to sleeping    Bedside swallow study  Thin Liquids x3 cup, puree x2  Oral Phase: WFL  Pharyngeal Phase: multiple spontaneous swallows with all trials  throat clearing  and wet vocal quality after swallow post thin liquids      Assessment:     Dereck Cenetno is a 62 y.o. male with an SLP diagnosis of Dysphagia and One Way Speaking Valve Training.      Goals:   Multidisciplinary Problems        SLP Goals          Problem: SLP    Goal Priority Disciplines Outcome   SLP Goal     SLP    Description: Speech Language Pathology Goals  Goals expected to be met by 3/10    1. Pt will tolerate PMSV for all waking hours with >92% spO2 to increase phonation, respiration and swallowing aspects  2. Pt/family will don/doff PMSV x1 during session with min cues  3. Pt will vocalize with PMSV in place to increase verbal expression.   4. Pt will participate in ongoing swallow assessment                               Plan:     Patient to be seen:  4 x/week   Plan of Care reviewed with:  patient   SLP Follow-Up:  Yes       Discharge recommendations:    TBD    Time Tracking:     SLP Treatment Date:   03/03/23  Speech Start Time:  1355  Speech Stop Time:  1405     Speech Total Time (min):  10 min    Billable Minutes: Eval Swallow and Oral Function 5 and Evaluation Use/Fit Voiced Prosthetic 5    03/03/2023

## 2023-03-03 NOTE — PLAN OF CARE
Plan of Care:    CT abd/pelvis done 3/2/23 reviewed. PEG procedure cannot be done safely given that there is not a good window seen on imaging so patient would need an open G-tube for feeds. Recommend po trial if primary team desires. If not, we can plan for an open gastrostomy tube placement early next week. Consent to be obtained closer to surgery date.      Mariana Nick MD  General Surgery and Surgical Critical Care  [facility]

## 2023-03-03 NOTE — TREATMENT PLAN
Hepatology Treatment Plan    This is a 62 year old male with PMH significant for cirrhosis secondary to ETOH/Hepatitis C (status post transplant in 01/2015) and head/neck cancer (status post resection in approximately 2019 at [facility] with development of squamous cell carcinoma of left tonsil as of 01/2023) who was admitted to [facility] on 02/20 for left tonsillectomy and neck dissection that was performed on day of admission. Hospital course associated with development of hematoma at surgical site requiring neck exploration and hematoma evacuation on 02/20 with tracheostomy creation on 02/24 and cardiac arrest on 02/27 secondary to hypoxia with concern for on-going bleeding from surgery site (however CTA negative for active bleeding). Hepatology following for assistance with immunosuppression.     Interval History  Vital signs remain stable without need for vasopressor support. LFT's remain normal. Tacrolimus level 3.2 today.     Plan  - Tacrolimus 2 mg in AM and 1 mg in PM.   - Please obtain daily CBC, BMP, LFT, INR, and Tacrolimus level.     Thank you for involving us in the care of Dereck Centeno. Please call with any additional concerns or questions.        Erik Lindsay MD, PGY-V  Gastroenterology Fellow  [facility]

## 2023-03-03 NOTE — PLAN OF CARE
Problem: Physical Therapy  Goal: Physical Therapy Goal  Description: Goals to be met by: 3/22/23     Patient will increase functional independence with mobility by performin. Supine to sit with Modified [place]  2. Sit to stand transfer with Modified [place]  3. Gait  x 150 feet with Supervision using AD if needed. .   4. Ascend/descend 3 stair with bilateral Handrails Contact Guard Assistance .     Outcome: Ongoing, Progressing   Goals remain appropriate. 3/3/2023

## 2023-03-03 NOTE — PROGRESS NOTES
[facility] - Surgical Intensive Care  General Surgery  Progress Note    Subjective:     History of Present Illness:  Patient is a 62 year old male with h/o history of HCV/alcohol cirrhosis (s/p liver transplant, 2015, on immunosuppression), oral cancer (s/p resection,  2019) SI, tobacco abuse, HTN, emphysema, GERD, BPH who presented with squamous cell carcinoma of left tonsil s/p radical tonsillectomy on 2/20 c/b expanding hematoma now s/p emergent neck exploration with washout, trach 2/24, and respiratory code 2/27 with ROSC after 5 minutes. General surgery consulted for enteral access.   He has been on trach collar for today. Tolerating tube feeds via NGT. Denies fever, chills, abdominal pain, n/v.   Not on AC   Previous abd surgeries: liver transplant (2015), denies others  No recent abdominal imaging      Post-Op Info:  Procedure(s) (LRB):  CREATION, TRACHEOSTOMY (N/A)   7 Days Post-Op     Interval History: No acute events overnight. HDS on 10L.     Medications:  Continuous Infusions:  Scheduled Meds:   acetaminophen  650 mg Per NG tube Q8H    atorvastatin  40 mg Per NG tube QHS    cloNIDine  0.1 mg Per NG tube QHS    enoxaparin  40 mg Subcutaneous Daily    EScitalopram oxalate  20 mg Per NG tube QHS    famotidine  20 mg Per NG tube BID    gabapentin  600 mg Per NG tube TID    ipratropium  0.5 mg Nebulization Q8H    levalbuterol  0.63 mg Nebulization Q8H    mirtazapine  15 mg Per NG tube Nightly    polyethylene glycol  17 g Per NG tube Daily    QUEtiapine  50 mg Per NG tube BID    scopolamine  1 patch Transdermal Q3 Days    sennosides 8.8 mg/5 ml  5 mL Per NG tube BID    tacrolimus  2 mg Per NG tube Daily AM    And    tacrolimus  1 mg Per NG tube Daily PM     PRN Meds:sodium chloride, sodium chloride 0.9%, calcium gluconate IVPB, calcium gluconate IVPB, calcium gluconate IVPB, dextrose 10%, dextrose 10%, glucagon (human recombinant), guaiFENesin 100 mg/5 ml, hydrALAZINE, insulin aspart U-100,  labetalol, magnesium sulfate IVPB, magnesium sulfate IVPB, ondansetron, oxyCODONE, potassium chloride **AND** potassium chloride **AND** potassium chloride, QUEtiapine, sodium phosphate IVPB, sodium phosphate IVPB, sodium phosphate IVPB     Review of patient's allergies indicates:  No Known Allergies  Objective:     Vital Signs (Most Recent):  Temp: 98.6 °F (37 °C) (03/03/23 0700)  Pulse: 73 (03/03/23 0800)  Resp: (!) 22 (03/03/23 0840)  BP: 125/64 (03/03/23 0800)  SpO2: 98 % (03/03/23 0800)   Vital Signs (24h Range):  Temp:  [98.6 °F (37 °C)-99.4 °F (37.4 °C)] 98.6 °F (37 °C)  Pulse:  [60-84] 73  Resp:  [14-28] 22  SpO2:  [95 %-100 %] 98 %  BP: (100-158)/(55-71) 125/64     Weight: 75.9 kg (167 lb 5.3 oz)  Body mass index is 24.01 kg/m².    Intake/Output - Last 3 Shifts         03/01 0700  03/02 0659 03/02 0700  03/03 0659 03/03 0700  03/04 0659    I.V. (mL/kg) 170.3 (2.2) 274.2 (3.6)     NG/GT 1360 1260 90    IV Piggyback 321 261.1     Total Intake(mL/kg) 1851.2 (24.4) 1795.3 (23.7) 90 (1.2)    Urine (mL/kg/hr) 1590 (0.9) 1630 (0.9)     Drains 112 0     Stool 0      Total Output 1702 1630     Net +149.2 +165.3 +90           Stool Occurrence 1 x              Physical Exam  Vitals and nursing note reviewed.   Constitutional:       General: He is not in acute distress.     Appearance: He is not ill-appearing or diaphoretic.      Comments: NGT in place, TF running   HENT:      Head: Normocephalic and atraumatic.      Mouth/Throat:      Mouth: Mucous membranes are moist.      Pharynx: Oropharynx is clear.   Eyes:      Extraocular Movements: Extraocular movements intact.      Conjunctiva/sclera: Conjunctivae normal.   Neck:      Comments: Incisions c/d/I  Trach collar  Cardiovascular:      Rate and Rhythm: Normal rate.   Pulmonary:      Effort: Pulmonary effort is normal. No respiratory distress.   Abdominal:      General: There is no distension.      Palpations: Abdomen is soft.      Tenderness: There is no abdominal  tenderness. There is no guarding or rebound.      Comments: Well healed chevron scar noted  No peritonitic signs    Musculoskeletal:         General: No deformity.   Skin:     General: Skin is warm and dry.      Coloration: Skin is not jaundiced.   Neurological:      Mental Status: He is alert.       Significant Labs:  I have reviewed all pertinent lab results within the past 24 hours.  CBC:   Recent Labs   Lab 03/03/23  0334   WBC 5.40   RBC 2.65*   HGB 7.9*   HCT 26.2*      MCV 99*   MCH 29.8   MCHC 30.2*     CMP:   Recent Labs   Lab 03/03/23  0334   *   CALCIUM 9.1   ALBUMIN 2.6*   PROT 6.0      K 4.2   CO2 30*      BUN 34*   CREATININE 0.9   ALKPHOS 60   ALT 23   AST 24   BILITOT 0.5       Significant Diagnostics:  I have reviewed all pertinent imaging results/findings within the past 24 hours.    Assessment/Plan:     * Squamous cell carcinoma of left tonsil  Patient is a 62 year old male with h/o history of HCV/alcohol cirrhosis (s/p liver transplant, 2015, on immunosuppression), oral cancer (s/p resection,  2019) SI, tobacco abuse, HTN, emphysema, GERD, BPH who presented with squamous cell carcinoma of left tonsil s/p radical tonsillectomy on 2/20 c/b expanding hematoma now s/p emergent neck exploration with washout, trach 2/24, and respiratory code 2/27 with ROSC after 5 minutes. General surgery consulted for enteral access.     - CT scan showed interposition of transverse colon between abdominal wall and stomach. Patient is not a candidate for PEG and would require open G-tube for enteral access.  - Would recommend a speech consult to evaluate ability to swallow prior to planning surgical intervention.   - Remainder of care per primary team  - Please contact general surgery with any questions, concerns, or clinical status changes          Carmela Hare MD  General Surgery  [facility] - Surgical Intensive [facility]

## 2023-03-03 NOTE — SUBJECTIVE & OBJECTIVE
Interval History: Successfully weaned from MV. On T piece. No evidence of bleeding, still with moderate secretions.     Medications:  Continuous Infusions:   dexmedeTOMIDine (Precedex) infusion (titrating) 0.4 mcg/kg/hr (03/03/23 0600)     Scheduled Meds:   acetaminophen  650 mg Per NG tube Q8H    atorvastatin  40 mg Per NG tube QHS    cloNIDine  0.1 mg Per NG tube QHS    enoxaparin  40 mg Subcutaneous Daily    EScitalopram oxalate  20 mg Per NG tube QHS    famotidine  20 mg Per NG tube BID    gabapentin  600 mg Per NG tube TID    ipratropium  0.5 mg Nebulization Q8H    levalbuterol  0.63 mg Nebulization Q8H    mirtazapine  15 mg Per NG tube Nightly    polyethylene glycol  17 g Per NG tube Daily    QUEtiapine  50 mg Per NG tube QHS    scopolamine  1 patch Transdermal Q3 Days    sennosides 8.8 mg/5 ml  5 mL Per NG tube BID    tacrolimus  2 mg Per NG tube Daily AM    And    tacrolimus  1 mg Per NG tube Daily PM     PRN Meds:sodium chloride, sodium chloride 0.9%, benzonatate, calcium gluconate IVPB, calcium gluconate IVPB, calcium gluconate IVPB, dextrose 10%, dextrose 10%, glucagon (human recombinant), guaiFENesin 100 mg/5 ml, hydrALAZINE, insulin aspart U-100, labetalol, magnesium sulfate IVPB, magnesium sulfate IVPB, ondansetron, oxyCODONE, potassium chloride **AND** potassium chloride **AND** potassium chloride, QUEtiapine, sodium phosphate IVPB, sodium phosphate IVPB, sodium phosphate IVPB     Review of patient's allergies indicates:  No Known Allergies  Objective:     Vital Signs (24h Range):  Temp:  [98.6 °F (37 °C)-99.4 °F (37.4 °C)] 98.7 °F (37.1 °C)  Pulse:  [60-84] 68  Resp:  [14-28] 17  SpO2:  [95 %-100 %] 100 %  BP: (100-158)/(55-71) 122/64       Lines/Drains/Airways       Drain  Duration                  Trans Pyloric Feeding Tube 02/20/23 1320 nasogastric;Other (comments) 12 Fr. Right nostril 10 days              Airway  Duration             Adult Surgical Airway 02/28/23 Christopher Cuffed 6.0/ 75mm 3 days               Peripheral Intravenous Line  Duration                  Midline Catheter Insertion/Assessment  - Single Lumen 02/24/23 1045 Right brachial vein 18g x 10cm 6 days         Peripheral IV - Single Lumen 03/01/23 0610 20 G Anterior;Right Forearm 2 days                    Physical Exam  NAD  Oral cavity with clear secretions . Postop site healing with appropriate granulation, hemostatic   Neck soft, ecchymosis nearly resolved   Tracheostomy in place, on trach collar. No bleeding on suctioning or around trach.     Significant Labs:  CBC:   Recent Labs   Lab 03/03/23  0334   WBC 5.40   RBC 2.65*   HGB 7.9*   HCT 26.2*      MCV 99*   MCH 29.8   MCHC 30.2*     CMP:   Recent Labs   Lab 03/03/23  0334   *   CALCIUM 9.1   ALBUMIN 2.6*   PROT 6.0      K 4.2   CO2 30*      BUN 34*   CREATININE 0.9   ALKPHOS 60   ALT 23   AST 24   BILITOT 0.5       Significant Diagnostics:  None

## 2023-03-03 NOTE — SUBJECTIVE & OBJECTIVE
Interval History: No acute events overnight. HDS on 10L.     Medications:  Continuous Infusions:  Scheduled Meds:   acetaminophen  650 mg Per NG tube Q8H    atorvastatin  40 mg Per NG tube QHS    cloNIDine  0.1 mg Per NG tube QHS    enoxaparin  40 mg Subcutaneous Daily    EScitalopram oxalate  20 mg Per NG tube QHS    famotidine  20 mg Per NG tube BID    gabapentin  600 mg Per NG tube TID    ipratropium  0.5 mg Nebulization Q8H    levalbuterol  0.63 mg Nebulization Q8H    mirtazapine  15 mg Per NG tube Nightly    polyethylene glycol  17 g Per NG tube Daily    QUEtiapine  50 mg Per NG tube BID    scopolamine  1 patch Transdermal Q3 Days    sennosides 8.8 mg/5 ml  5 mL Per NG tube BID    tacrolimus  2 mg Per NG tube Daily AM    And    tacrolimus  1 mg Per NG tube Daily PM     PRN Meds:sodium chloride, sodium chloride 0.9%, calcium gluconate IVPB, calcium gluconate IVPB, calcium gluconate IVPB, dextrose 10%, dextrose 10%, glucagon (human recombinant), guaiFENesin 100 mg/5 ml, hydrALAZINE, insulin aspart U-100, labetalol, magnesium sulfate IVPB, magnesium sulfate IVPB, ondansetron, oxyCODONE, potassium chloride **AND** potassium chloride **AND** potassium chloride, QUEtiapine, sodium phosphate IVPB, sodium phosphate IVPB, sodium phosphate IVPB     Review of patient's allergies indicates:  No Known Allergies  Objective:     Vital Signs (Most Recent):  Temp: 98.6 °F (37 °C) (03/03/23 0700)  Pulse: 73 (03/03/23 0800)  Resp: (!) 22 (03/03/23 0840)  BP: 125/64 (03/03/23 0800)  SpO2: 98 % (03/03/23 0800)   Vital Signs (24h Range):  Temp:  [98.6 °F (37 °C)-99.4 °F (37.4 °C)] 98.6 °F (37 °C)  Pulse:  [60-84] 73  Resp:  [14-28] 22  SpO2:  [95 %-100 %] 98 %  BP: (100-158)/(55-71) 125/64     Weight: 75.9 kg (167 lb 5.3 oz)  Body mass index is 24.01 kg/m².    Intake/Output - Last 3 Shifts         03/01 0700  03/02 0659 03/02 0700 03/03 0659 03/03 0700 03/04 0659    I.V. (mL/kg) 170.3 (2.2) 274.2 (3.6)     NG/GT 1360 1260 90    IV  Piggyback 321 261.1     Total Intake(mL/kg) 1851.2 (24.4) 1795.3 (23.7) 90 (1.2)    Urine (mL/kg/hr) 1590 (0.9) 1630 (0.9)     Drains 112 0     Stool 0      Total Output 1702 1630     Net +149.2 +165.3 +90           Stool Occurrence 1 x              Physical Exam  Vitals and nursing note reviewed.   Constitutional:       General: He is not in acute distress.     Appearance: He is not ill-appearing or diaphoretic.      Comments: NGT in place, TF running   HENT:      Head: Normocephalic and atraumatic.      Mouth/Throat:      Mouth: Mucous membranes are moist.      Pharynx: Oropharynx is clear.   Eyes:      Extraocular Movements: Extraocular movements intact.      Conjunctiva/sclera: Conjunctivae normal.   Neck:      Comments: Incisions c/d/I  Trach collar  Cardiovascular:      Rate and Rhythm: Normal rate.   Pulmonary:      Effort: Pulmonary effort is normal. No respiratory distress.   Abdominal:      General: There is no distension.      Palpations: Abdomen is soft.      Tenderness: There is no abdominal tenderness. There is no guarding or rebound.      Comments: Well healed chevron scar noted  No peritonitic signs    Musculoskeletal:         General: No deformity.   Skin:     General: Skin is warm and dry.      Coloration: Skin is not jaundiced.   Neurological:      Mental Status: He is alert.       Significant Labs:  I have reviewed all pertinent lab results within the past 24 hours.  CBC:   Recent Labs   Lab 03/03/23  0334   WBC 5.40   RBC 2.65*   HGB 7.9*   HCT 26.2*      MCV 99*   MCH 29.8   MCHC 30.2*     CMP:   Recent Labs   Lab 03/03/23  0334   *   CALCIUM 9.1   ALBUMIN 2.6*   PROT 6.0      K 4.2   CO2 30*      BUN 34*   CREATININE 0.9   ALKPHOS 60   ALT 23   AST 24   BILITOT 0.5       Significant Diagnostics:  I have reviewed all pertinent imaging results/findings within the past 24 hours.

## 2023-03-03 NOTE — PT/OT/SLP PROGRESS
Physical Therapy  co-Treatment with OT    Patient Name:  Dereck Centeno   MRN:  [id number]    Recommendations:     Discharge Recommendations: home health PT  Discharge Equipment Recommendations:  (will determine DME Needs closer to discharge)  Barriers to discharge: Decreased caregiver support family will not be able to assist at current functional level.     Assessment:     Dereck Centeno is a 62 y.o. male admitted with a medical diagnosis of Squamous cell carcinoma of left tonsil.  He presents with the following impairments/functional limitations: weakness, impaired endurance, impaired functional mobility, gait instability, impaired balance, decreased safety awareness pt tolerated treatment better being able to begin gait training. Pt will benefit from cont skilled PT 3x/wk to progress physically. Pt is below functional level, increased burden of care and increased risk of falls. Pt is s/p L tonsillectomy, neck dissection, 2/20, neck exploration 2/20, trach 2/4/23.    Rehab Prognosis: Good; patient would benefit from acute skilled PT services to address these deficits and reach maximum level of function.    Recent Surgery: Procedure(s) (LRB):  CREATION, TRACHEOSTOMY (N/A) 7 Days Post-Op    Plan:     During this hospitalization, patient to be seen 3 x/week to address the identified rehab impairments via gait training, therapeutic activities and progress toward the following goals:    Plan of Care Expires:  03/19/23    Subjective     Chief Complaint: pt c/o pain during treatment.   Patient/Family Comments/goals: to get better and go home.   Pain/Comfort:  Pain Rating 1: 7/10 (throat)  Pain Addressed 1: Reposition, Distraction  Pain Rating Post-Intervention 1: 7/10 (throat)      Objective:     Communicated with nurse prior to session.  Patient found supine with telemetry, oxygen, pulse ox (continuous), PICC line, SCD, blood pressure cuff (Freeburn tube,) upon PT entry to room.     General Precautions: Standard,  fall  Orthopedic Precautions:    Braces:    Respiratory Status:  trach collar 35% 8L     Functional Mobility:  Bed Mobility:  pt needed verbal cues hand placement and sequencing for functional mobility.    Rolling Right: contact guard assistance  Supine to Sit: contact guard assistance    Transfers:     Sit to Stand:  contact guard assistance with hand-held assist    Gait: pt received gait training ~ 8 ft with min assist.     Balance: pt sat on EOB  with SBA.       Due to pt complex medical condition, the skill of 2 licensed therapists is needed to maximize treatment session and progression towards goals  Pt white board updated with current therapists name and level of mobility assistance needed.       AM-PAC 6 CLICK MOBILITY  Turning over in bed (including adjusting bedclothes, sheets and blankets)?: 3  Sitting down on and standing up from a chair with arms (e.g., wheelchair, bedside commode, etc.): 3  Moving from lying on back to sitting on the side of the bed?: 3  Moving to and from a bed to a chair (including a wheelchair)?: 3  Need to walk in hospital room?: 3  Climbing 3-5 steps with a railing?: 1  Basic Mobility Total Score: 16       Treatment & Education:  Pt received verbal instructions in role of PT and POC.  Pt verbally expressed understanding of such.     Patient left up in chair with all lines intact, call button in reach, and RN notified..    GOALS:   Multidisciplinary Problems       Physical Therapy Goals          Problem: Physical Therapy    Goal Priority Disciplines Outcome Goal Variances Interventions   Physical Therapy Goal     PT, PT/OT Ongoing, Progressing     Description: Goals to be met by: 3/22/23     Patient will increase functional independence with mobility by performin. Supine to sit with Modified [place]  2. Sit to stand transfer with Modified [place]  3. Gait  x 150 feet with Supervision using AD if needed. .   4. Ascend/descend 3 stair with bilateral Handrails Contact  Guard Assistance .                          Time Tracking:     PT Received On: 03/03/23  PT Start Time: 1404     PT Stop Time: 1424  PT Total Time (min): 20 min     Billable Minutes: Gait Training 20 min    Treatment Type: Re-evaluation, Treatment (with OT)  PT/PTA: PT     PTA Visit Number: 0     03/03/2023

## 2023-03-03 NOTE — PLAN OF CARE
Problem: Adult Inpatient Plan of Care  Goal: Plan of Care Review  Outcome: Ongoing, Progressing  Goal: Patient-Specific Goal (Individualized)  Outcome: Ongoing, Progressing  Goal: Absence of Hospital-Acquired Illness or Injury  Outcome: Ongoing, Progressing  Goal: Optimal Comfort and Wellbeing  Outcome: Ongoing, Progressing  Goal: Readiness for Transition of Care  Outcome: Ongoing, Progressing     Problem: Infection  Goal: Absence of Infection Signs and Symptoms  Outcome: Ongoing, Progressing     Problem: Fall Injury Risk  Goal: Absence of Fall and Fall-Related Injury  Outcome: Ongoing, Progressing     Problem: Restraint, Nonbehavioral (Nonviolent)  Goal: Absence of Harm or Injury  Outcome: Ongoing, Progressing     Problem: Communication Impairment (Mechanical Ventilation, Invasive)  Goal: Effective Communication  Outcome: Ongoing, Progressing     Problem: Device-Related Complication Risk (Mechanical Ventilation, Invasive)  Goal: Optimal Device Function  Outcome: Ongoing, Progressing     Problem: Inability to Wean (Mechanical Ventilation, Invasive)  Goal: Mechanical Ventilation Liberation  Outcome: Ongoing, Progressing     Problem: Nutrition Impairment (Mechanical Ventilation, Invasive)  Goal: Optimal Nutrition Delivery  Outcome: Ongoing, Progressing     Problem: Skin and Tissue Injury (Mechanical Ventilation, Invasive)  Goal: Absence of Device-Related Skin and Tissue Injury  Outcome: Ongoing, Progressing     Problem: Ventilator-Induced Lung Injury (Mechanical Ventilation, Invasive)  Goal: Absence of Ventilator-Induced Lung Injury  Outcome: Ongoing, Progressing     Problem: Communication Impairment (Artificial Airway)  Goal: Effective Communication  Outcome: Ongoing, Progressing     Problem: Device-Related Complication Risk (Artificial Airway)  Goal: Optimal Device Function  Outcome: Ongoing, Progressing     Problem: Skin and Tissue Injury (Artificial Airway)  Goal: Absence of Device-Related Skin or Tissue  Injury  Outcome: Ongoing, Progressing     Problem: Noninvasive Ventilation Acute  Goal: Effective Unassisted Ventilation and Oxygenation  Outcome: Ongoing, Progressing     Problem: Skin Injury Risk Increased  Goal: Skin Health and Integrity  Outcome: Ongoing, Progressing

## 2023-03-03 NOTE — PLAN OF CARE
Problem: Occupational Therapy  Goal: Occupational Therapy Goal  Description: Goals to be met by: 3/15/23     Patient will increase functional independence with ADLs by performing:    Feeding with Supervision.  UE Dressing with Stand-by Assistance.  LE Dressing with Contact Guard Assistance.  Grooming while standing with Stand-by Assistance.  Toileting from toilet with Contact Guard Assistance for hygiene and clothing management.   Sitting at edge of bed x15 minutes with Supervision.    Outcome: Ongoing, Progressing     Goals remain appropriate. Cont POC.

## 2023-03-03 NOTE — ASSESSMENT & PLAN NOTE
Patient is a 62 year old male with h/o history of HCV/alcohol cirrhosis (s/p liver transplant, 2015, on immunosuppression), oral cancer (s/p resection,  2019) SI, tobacco abuse, HTN, emphysema, GERD, BPH who presented with squamous cell carcinoma of left tonsil s/p radical tonsillectomy on 2/20 c/b expanding hematoma now s/p emergent neck exploration with washout, trach 2/24, and respiratory code 2/27 with ROSC after 5 minutes. General surgery consulted for enteral access.     - CT scan showed interposition of transverse colon between abdominal wall and stomach. Patient is not a candidate for PEG and would require open G-tube for enteral access.  - Would recommend a speech consult to evaluate ability to swallow prior to planning surgical intervention.   - Remainder of care per primary team  - Please contact general surgery with any questions, concerns, or clinical status changes

## 2023-03-04 LAB
ALBUMIN SERPL BCP-MCNC: 2.8 G/DL (ref 3.5–5.2)
ALP SERPL-CCNC: 64 U/L (ref 55–135)
ALT SERPL W/O P-5'-P-CCNC: 25 U/L (ref 10–44)
ANION GAP SERPL CALC-SCNC: 7 MMOL/L (ref 8–16)
AST SERPL-CCNC: 27 U/L (ref 10–40)
BASOPHILS # BLD AUTO: 0.02 K/UL (ref 0–0.2)
BASOPHILS NFR BLD: 0.2 % (ref 0–1.9)
BILIRUB SERPL-MCNC: 0.6 MG/DL (ref 0.1–1)
BUN SERPL-MCNC: 27 MG/DL (ref 8–23)
CA-I BLDV-SCNC: 1.1 MMOL/L (ref 1.06–1.42)
CALCIUM SERPL-MCNC: 9 MG/DL (ref 8.7–10.5)
CHLORIDE SERPL-SCNC: 104 MMOL/L (ref 95–110)
CO2 SERPL-SCNC: 26 MMOL/L (ref 23–29)
CREAT SERPL-MCNC: 0.8 MG/DL (ref 0.5–1.4)
DIFFERENTIAL METHOD: ABNORMAL
EOSINOPHIL # BLD AUTO: 0.2 K/UL (ref 0–0.5)
EOSINOPHIL NFR BLD: 2.4 % (ref 0–8)
ERYTHROCYTE [DISTWIDTH] IN BLOOD BY AUTOMATED COUNT: 12.8 % (ref 11.5–14.5)
EST. GFR  (NO RACE VARIABLE): >60 ML/MIN/1.73 M^2
GLUCOSE SERPL-MCNC: 136 MG/DL (ref 70–110)
HCT VFR BLD AUTO: 27.5 % (ref 40–54)
HGB BLD-MCNC: 8.8 G/DL (ref 14–18)
IMM GRANULOCYTES # BLD AUTO: 0.04 K/UL (ref 0–0.04)
IMM GRANULOCYTES NFR BLD AUTO: 0.5 % (ref 0–0.5)
LYMPHOCYTES # BLD AUTO: 0.7 K/UL (ref 1–4.8)
LYMPHOCYTES NFR BLD: 8.7 % (ref 18–48)
MAGNESIUM SERPL-MCNC: 1.5 MG/DL (ref 1.6–2.6)
MCH RBC QN AUTO: 30.7 PG (ref 27–31)
MCHC RBC AUTO-ENTMCNC: 32 G/DL (ref 32–36)
MCV RBC AUTO: 96 FL (ref 82–98)
MONOCYTES # BLD AUTO: 0.7 K/UL (ref 0.3–1)
MONOCYTES NFR BLD: 7.9 % (ref 4–15)
NEUTROPHILS # BLD AUTO: 6.8 K/UL (ref 1.8–7.7)
NEUTROPHILS NFR BLD: 80.3 % (ref 38–73)
NRBC BLD-RTO: 0 /100 WBC
PHOSPHATE SERPL-MCNC: 2.5 MG/DL (ref 2.7–4.5)
PLATELET # BLD AUTO: 264 K/UL (ref 150–450)
PMV BLD AUTO: 9.4 FL (ref 9.2–12.9)
POCT GLUCOSE: 135 MG/DL (ref 70–110)
POCT GLUCOSE: 151 MG/DL (ref 70–110)
POTASSIUM SERPL-SCNC: 3.8 MMOL/L (ref 3.5–5.1)
PROT SERPL-MCNC: 6.1 G/DL (ref 6–8.4)
RBC # BLD AUTO: 2.87 M/UL (ref 4.6–6.2)
SODIUM SERPL-SCNC: 137 MMOL/L (ref 136–145)
TACROLIMUS BLD-MCNC: 3.4 NG/ML (ref 5–15)
WBC # BLD AUTO: 8.43 K/UL (ref 3.9–12.7)

## 2023-03-04 PROCEDURE — 27000221 HC OXYGEN, UP TO 24 HOURS

## 2023-03-04 PROCEDURE — 99900035 HC TECH TIME PER 15 MIN (STAT)

## 2023-03-04 PROCEDURE — 25000003 PHARM REV CODE 250: Performed by: STUDENT IN AN ORGANIZED HEALTH CARE EDUCATION/TRAINING PROGRAM

## 2023-03-04 PROCEDURE — 63600175 PHARM REV CODE 636 W HCPCS: Performed by: STUDENT IN AN ORGANIZED HEALTH CARE EDUCATION/TRAINING PROGRAM

## 2023-03-04 PROCEDURE — 83735 ASSAY OF MAGNESIUM: CPT

## 2023-03-04 PROCEDURE — 20600001 HC STEP DOWN PRIVATE ROOM

## 2023-03-04 PROCEDURE — 84100 ASSAY OF PHOSPHORUS: CPT

## 2023-03-04 PROCEDURE — 94640 AIRWAY INHALATION TREATMENT: CPT

## 2023-03-04 PROCEDURE — 25000003 PHARM REV CODE 250: Mod: TB,JG | Performed by: STUDENT IN AN ORGANIZED HEALTH CARE EDUCATION/TRAINING PROGRAM

## 2023-03-04 PROCEDURE — 25000003 PHARM REV CODE 250

## 2023-03-04 PROCEDURE — 25000242 PHARM REV CODE 250 ALT 637 W/ HCPCS: Performed by: STUDENT IN AN ORGANIZED HEALTH CARE EDUCATION/TRAINING PROGRAM

## 2023-03-04 PROCEDURE — 80053 COMPREHEN METABOLIC PANEL: CPT

## 2023-03-04 PROCEDURE — 51798 US URINE CAPACITY MEASURE: CPT

## 2023-03-04 PROCEDURE — 25000003 PHARM REV CODE 250: Performed by: OTOLARYNGOLOGY

## 2023-03-04 PROCEDURE — 82330 ASSAY OF CALCIUM: CPT | Performed by: STUDENT IN AN ORGANIZED HEALTH CARE EDUCATION/TRAINING PROGRAM

## 2023-03-04 PROCEDURE — 99233 PR SUBSEQUENT HOSPITAL CARE,LEVL III: ICD-10-PCS | Mod: ,,, | Performed by: STUDENT IN AN ORGANIZED HEALTH CARE EDUCATION/TRAINING PROGRAM

## 2023-03-04 PROCEDURE — 27200966 HC CLOSED SUCTION SYSTEM

## 2023-03-04 PROCEDURE — 99233 SBSQ HOSP IP/OBS HIGH 50: CPT | Mod: ,,, | Performed by: STUDENT IN AN ORGANIZED HEALTH CARE EDUCATION/TRAINING PROGRAM

## 2023-03-04 PROCEDURE — 63600175 PHARM REV CODE 636 W HCPCS

## 2023-03-04 PROCEDURE — 85025 COMPLETE CBC W/AUTO DIFF WBC: CPT

## 2023-03-04 PROCEDURE — 80197 ASSAY OF TACROLIMUS: CPT

## 2023-03-04 PROCEDURE — 31615 TRCHEOBRNCHSC EST TRACHS INC: CPT

## 2023-03-04 PROCEDURE — 94761 N-INVAS EAR/PLS OXIMETRY MLT: CPT

## 2023-03-04 RX ORDER — FAMOTIDINE 40 MG/5ML
20 POWDER, FOR SUSPENSION ORAL DAILY
Status: DISCONTINUED | OUTPATIENT
Start: 2023-03-05 | End: 2023-03-06

## 2023-03-04 RX ADMIN — QUETIAPINE FUMARATE 50 MG: 25 TABLET ORAL at 08:03

## 2023-03-04 RX ADMIN — FAMOTIDINE 20 MG: 40 POWDER, FOR SUSPENSION ORAL at 08:03

## 2023-03-04 RX ADMIN — MIRTAZAPINE 15 MG: 15 TABLET, ORALLY DISINTEGRATING ORAL at 10:03

## 2023-03-04 RX ADMIN — GABAPENTIN 600 MG: 250 SOLUTION ORAL at 08:03

## 2023-03-04 RX ADMIN — SENNOSIDES 5 ML: 8.8 SYRUP ORAL at 08:03

## 2023-03-04 RX ADMIN — SENNOSIDES 5 ML: 8.8 SYRUP ORAL at 10:03

## 2023-03-04 RX ADMIN — OXYCODONE HYDROCHLORIDE 5 MG: 5 SOLUTION ORAL at 05:03

## 2023-03-04 RX ADMIN — CALCIUM GLUCONATE 1 G: 20 INJECTION, SOLUTION INTRAVENOUS at 05:03

## 2023-03-04 RX ADMIN — SCOPALAMINE 1 PATCH: 1 PATCH, EXTENDED RELEASE TRANSDERMAL at 01:03

## 2023-03-04 RX ADMIN — GABAPENTIN 600 MG: 250 SOLUTION ORAL at 03:03

## 2023-03-04 RX ADMIN — LEVALBUTEROL HYDROCHLORIDE 0.63 MG: 0.63 SOLUTION RESPIRATORY (INHALATION) at 03:03

## 2023-03-04 RX ADMIN — QUETIAPINE FUMARATE 50 MG: 25 TABLET ORAL at 10:03

## 2023-03-04 RX ADMIN — ACETAMINOPHEN 650 MG: 650 SOLUTION ORAL at 03:03

## 2023-03-04 RX ADMIN — OXYCODONE HYDROCHLORIDE 5 MG: 5 SOLUTION ORAL at 11:03

## 2023-03-04 RX ADMIN — ACETAMINOPHEN 650 MG: 650 SOLUTION ORAL at 10:03

## 2023-03-04 RX ADMIN — TACROLIMUS 1 MG: 1 CAPSULE ORAL at 05:03

## 2023-03-04 RX ADMIN — LEVALBUTEROL HYDROCHLORIDE 0.63 MG: 0.63 SOLUTION RESPIRATORY (INHALATION) at 07:03

## 2023-03-04 RX ADMIN — ENOXAPARIN SODIUM 40 MG: 40 INJECTION SUBCUTANEOUS at 04:03

## 2023-03-04 RX ADMIN — CLONIDINE HYDROCHLORIDE 0.1 MG: 0.1 TABLET ORAL at 10:03

## 2023-03-04 RX ADMIN — MAGNESIUM SULFATE 2 G: 2 INJECTION INTRAVENOUS at 05:03

## 2023-03-04 RX ADMIN — IPRATROPIUM BROMIDE 0.5 MG: 0.5 SOLUTION RESPIRATORY (INHALATION) at 07:03

## 2023-03-04 RX ADMIN — POTASSIUM BICARBONATE 35 MEQ: 391 TABLET, EFFERVESCENT ORAL at 05:03

## 2023-03-04 RX ADMIN — IPRATROPIUM BROMIDE 0.5 MG: 0.5 SOLUTION RESPIRATORY (INHALATION) at 12:03

## 2023-03-04 RX ADMIN — GABAPENTIN 600 MG: 250 SOLUTION ORAL at 10:03

## 2023-03-04 RX ADMIN — ACETAMINOPHEN 650 MG: 650 SOLUTION ORAL at 05:03

## 2023-03-04 RX ADMIN — ATORVASTATIN CALCIUM 40 MG: 40 TABLET, FILM COATED ORAL at 10:03

## 2023-03-04 RX ADMIN — IPRATROPIUM BROMIDE 0.5 MG: 0.5 SOLUTION RESPIRATORY (INHALATION) at 03:03

## 2023-03-04 RX ADMIN — SODIUM PHOSPHATE, MONOBASIC, MONOHYDRATE AND SODIUM PHOSPHATE, DIBASIC, ANHYDROUS 15 MMOL: 142; 276 INJECTION, SOLUTION INTRAVENOUS at 08:03

## 2023-03-04 RX ADMIN — LEVALBUTEROL HYDROCHLORIDE 0.63 MG: 0.63 SOLUTION RESPIRATORY (INHALATION) at 12:03

## 2023-03-04 RX ADMIN — TACROLIMUS 2 MG: 1 CAPSULE ORAL at 08:03

## 2023-03-04 RX ADMIN — POLYETHYLENE GLYCOL 3350 17 G: 17 POWDER, FOR SOLUTION ORAL at 08:03

## 2023-03-04 NOTE — ASSESSMENT & PLAN NOTE
Dereck Centeno is a 62 y.o. male with PMHx of HTN, emphysema, HCV s/p liver transplant in 2015, tobacco abuse (50+ pack years), oral cancer s/p surgery at [facility] in 2019 (no chemo/radiation), and newly diagnosed SCC of the left tonsil. He is now s/p left tonsillectomy and neck dissection by Dr. Templeton on 2/20. Post operative course was c/b development of large hematoma at surgical site. He was taken back to the OR for an emergent neck exploration and hematoma evacuation on 2/20. Code blue on 2/27am, ROSC achieved within 5 min.      Neuro/Psych:   -- Sedation: none  -- Pain: home gabapentin, tylenol, PRN fentanyl, oxy PRN,   -- Psych: home clonazepam PRN, scheduled mirtazapine, seroquel, lexapro     Cards  -- HDS; not currently on pressors   -- Continue home clonidine   -held home amlodipine in setting of borderline hypotension  -- PRN labetalol and hydralazine   -- Continue home statin       Pulm:   -- H/o emphysema   -- Trach placed 2/24.   -- Ipratropium nebs scheduled,albuterol nebs PRN for wheezing   -- Goal O2 sat > 88%  -- Wean as able  -- Was weaned to trach collar 2/26; reintubated 2/27. ETT replaced w portia 2/28.   -- Tolerating trach collar currently        Renal:  -- BUN/Cr 27/0.8  -- UOP 1.4 L/24H      FEN / GI:   -- Net -455cc/24H  -- Replace lytes as needed  -- Nutrition: NPO + TF. Dobhoff for feeds. At goal.   --PEG consult to gen surgery   -- Cta showing transverse colon between the stomach + abdominal wall   -- Gen surg states patient would need open g-tube given no safe window for PEG tube placement   -- No mIVF     ID:   -- Tm: afebrile; WBC 8.4  -- Abx: none       Heme/Onc:   -- H/H stable 8.8/27.5  -- H/o thrombocytopenia    -- Platelets stable   -- Continue to monitor   -- Daily CBC  -- Home ASA held       Endo:   -- Gluc goal 140-180  -- SSI      Immuno:  -- H/o HCV infection s/p liver transplant in 2015  -- Hepatology consulted  -- Continue home tacrolimus  -- Monitor tacrolimus levels  and INR daily   -- Valacyclovir daily per ENT      PPx:   Feeding: NPO + TF  Analgesia/Sedation: PRN fentanyl, home gabapentin, tylenol/precedex gtt  Thromboembolic prevention: Lovenox   HOB >30: yes   Stress Ulcer ppx: famotidine BID  Glucose control: Critical care goal 140-180 g/dl, ISS    Lines/Drains/Airway: trach, PIVx2, NG tube       Dispo/Code Status/Palliative:   -- SICU / Full Code

## 2023-03-04 NOTE — SUBJECTIVE & OBJECTIVE
Interval History: NAEON. Tolerating TF via NGT.     Medications:  Continuous Infusions:  Scheduled Meds:   acetaminophen  650 mg Per J Tube Q8H    atorvastatin  40 mg Per NG tube QHS    cloNIDine  0.1 mg Per NG tube QHS    enoxaparin  40 mg Subcutaneous Daily    EScitalopram oxalate  20 mg Per NG tube QHS    [START ON 3/5/2023] famotidine  20 mg Per NG tube Daily    gabapentin  600 mg Per NG tube TID    ipratropium  0.5 mg Nebulization Q8H    levalbuterol  0.63 mg Nebulization Q8H    mirtazapine  15 mg Per NG tube Nightly    polyethylene glycol  17 g Per NG tube Daily    QUEtiapine  50 mg Per NG tube BID    scopolamine  1 patch Transdermal Q3 Days    sennosides 8.8 mg/5 ml  5 mL Per NG tube BID    tacrolimus  2 mg Per NG tube Daily AM    And    tacrolimus  1 mg Per NG tube Daily PM     PRN Meds:sodium chloride, sodium chloride 0.9%, calcium gluconate IVPB, calcium gluconate IVPB, calcium gluconate IVPB, dextrose 10%, dextrose 10%, glucagon (human recombinant), guaiFENesin 100 mg/5 ml, hydrALAZINE, insulin aspart U-100, labetalol, magnesium sulfate IVPB, magnesium sulfate IVPB, ondansetron, oxyCODONE, potassium chloride **AND** potassium chloride **AND** potassium chloride, QUEtiapine, sodium phosphate IVPB, sodium phosphate IVPB, sodium phosphate IVPB     Review of patient's allergies indicates:  No Known Allergies  Objective:     Vital Signs (24h Range):  Temp:  [98.4 °F (36.9 °C)-99.8 °F (37.7 °C)] 98.4 °F (36.9 °C)  Pulse:  [79-99] 91  Resp:  [9-27] 21  SpO2:  [91 %-100 %] 91 %  BP: (126-166)/(62-80) 153/72     Date 03/04/23 0700 - 03/05/23 0659   Shift 4117-0421 4171-4200 2085-3888 24 Hour Total   INTAKE   I.V.(mL/kg) 113.2(1.5)   113.2(1.5)   NG/   240   IV Piggyback 49.5   49.5   Shift Total(mL/kg) 402.8(5.3)   402.8(5.3)   OUTPUT   Shift Total(mL/kg)       Weight (kg) 75.9 75.9 75.9 75.9     Lines/Drains/Airways       Drain  Duration                  Trans Pyloric Feeding Tube 02/20/23 5108  nasogastric;Other (comments) 12 Fr. Right nostril 11 days              Airway  Duration             Adult Surgical Airway 02/28/23 Shiley Cuffed 6.0/ 75mm 4 days              Peripheral Intravenous Line  Duration                  Midline Catheter Insertion/Assessment  - Single Lumen 02/24/23 1045 Right brachial vein 18g x 10cm 8 days         Peripheral IV - Single Lumen 03/01/23 0610 20 G Anterior;Right Forearm 3 days                    Physical Exam  NAD  Oral cavity with clear secretions . Postop site healing with appropriate granulation, hemostatic   Neck soft, ecchymosis nearly resolved   Tracheostomy in place, on trach collar. No bleeding on suctioning or around trach.     Significant Labs:  CBC:   Recent Labs   Lab 03/04/23  0413   WBC 8.43   RBC 2.87*   HGB 8.8*   HCT 27.5*      MCV 96   MCH 30.7   MCHC 32.0     CMP:   Recent Labs   Lab 03/04/23  0413   *   CALCIUM 9.0   ALBUMIN 2.8*   PROT 6.1      K 3.8   CO2 26      BUN 27*   CREATININE 0.8   ALKPHOS 64   ALT 25   AST 27   BILITOT 0.6       Significant Diagnostics:  None

## 2023-03-04 NOTE — PROGRESS NOTES
[facility] - [facility]  Critical Care - Surgery  Progress Note    Patient Name: Dereck Centeno  MRN: [id number]  Admission Date: 2/20/2023  Hospital Length of Stay: 12 days  Code Status: Full Code  Attending Provider: Alexandre Templeton MD  Primary Care Provider: Eva Reynaga MD   Principal Problem: Squamous cell carcinoma of left tonsil    Subjective:     Hospital/ICU Course:  Admitted to ICU after tonsillectomy and neck dissection. Febrile on 3/2 without signs of infection. Patient failed trach collar on 3/2. Patient developed productive cough overnight without fever. CXR pending.       Interval History/Significant Events: NAEON. Patient had good UOP w/o Torres ~825 cc. OOB most of yesterday as well as this AM. Comfortable on trach collar.     Follow-up For: Procedure(s) (LRB):  CREATION, TRACHEOSTOMY (N/A)    Post-Operative Day: 8 Days Post-Op    Objective:     Vital Signs (Most Recent):  Temp: 98.4 °F (36.9 °C) (03/04/23 0230)  Pulse: 96 (03/04/23 0430)  Resp: 16 (03/04/23 0539)  BP: (!) 154/76 (03/04/23 0200)  SpO2: (!) 94 % (03/04/23 0430)   Vital Signs (24h Range):  Temp:  [98.4 °F (36.9 °C)-99.8 °F (37.7 °C)] 98.4 °F (36.9 °C)  Pulse:  [68-99] 96  Resp:  [10-25] 16  SpO2:  [91 %-100 %] 94 %  BP: (122-162)/(61-80) 154/76     Weight: 75.9 kg (167 lb 5.3 oz)  Body mass index is 24.01 kg/m².      Intake/Output Summary (Last 24 hours) at 3/4/2023 0542  Last data filed at 3/4/2023 0300  Gross per 24 hour   Intake 1103.59 ml   Output 1666 ml   Net -562.41 ml       Physical Exam  Vitals and nursing note reviewed.   Constitutional:       General: He is awake.      Appearance: Normal appearance.   HENT:      Head: Normocephalic and atraumatic.      Nose:      Comments: NG tube sutured in place   Eyes:      Conjunctiva/sclera: Conjunctivae normal.      Pupils: Pupils are equal, round, and reactive to light.   Neck:      Comments: ENMANUEL drain with SS on left side of neck   Surgical incision clean and dry    Cardiovascular:      Rate and Rhythm: Normal rate and regular rhythm.      Pulses: Normal pulses.      Heart sounds: Normal heart sounds.   Pulmonary:      Effort: Pulmonary effort is normal. No respiratory distress.      Comments: Trach in place  Abdominal:      General: Abdomen is flat. Bowel sounds are normal.      Palpations: Abdomen is soft.   Skin:     General: Skin is warm.   Neurological:      Mental Status: He is alert.   Psychiatric:         Behavior: Behavior is cooperative.       Vents:  Vent Mode: Spont (03/02/23 1937)  Ventilator Initiated: Yes (02/20/23 1443)  Set Rate: 20 BPM (03/01/23 0735)  Vt Set: 450 mL (03/01/23 0735)  Pressure Support: 6 cmH20 (03/02/23 1937)  PEEP/CPAP: 5 cmH20 (03/02/23 1937)  Oxygen Concentration (%): 28 (03/04/23 0427)  Peak Airway Pressure: 29 cmH20 (03/02/23 1937)  Plateau Pressure: 12 cmH20 (03/02/23 1937)  Total Ve: 5.5 L/m (03/02/23 1937)  Negative Inspiratory Force (cm H2O): -25 (03/02/23 1937)  F/VT Ratio<105 (RSBI): (!) 27.51 (03/02/23 1343)    Lines/Drains/Airways       Drain  Duration                  Trans Pyloric Feeding Tube 02/20/23 1320 nasogastric;Other (comments) 12 Fr. Right nostril 11 days              Airway  Duration             Adult Surgical Airway 02/28/23 Shiley Cuffed 6.0/ 75mm 4 days              Peripheral Intravenous Line  Duration                  Midline Catheter Insertion/Assessment  - Single Lumen 02/24/23 1045 Right brachial vein 18g x 10cm 7 days         Peripheral IV - Single Lumen 03/01/23 0610 20 G Anterior;Right Forearm 2 days                    Significant Labs:    CBC/Anemia Profile:  Recent Labs   Lab 03/03/23  0334 03/04/23  0413   WBC 5.40 8.43   HGB 7.9* 8.8*   HCT 26.2* 27.5*    264   MCV 99* 96   RDW 13.1 12.8        Chemistries:  Recent Labs   Lab 03/03/23  0334 03/04/23  0413    137   K 4.2 3.8    104   CO2 30* 26   BUN 34* 27*   CREATININE 0.9 0.8   CALCIUM 9.1 9.0   ALBUMIN 2.6* 2.8*   PROT 6.0 6.1    BILITOT 0.5 0.6   ALKPHOS 60 64   ALT 23 25   AST 24 27   MG 1.7 1.5*   PHOS 3.2 2.5*       None    Significant Imaging:  I have reviewed all pertinent imaging results/findings within the past 24 hours.  I have reviewed and interpreted all pertinent imaging results/findings within the past 24 hours.  Assessment/Plan:     Oncology  * Squamous cell carcinoma of left tonsil  Dereck Centeno is a 62 y.o. male with PMHx of HTN, emphysema, HCV s/p liver transplant in 2015, tobacco abuse (50+ pack years), oral cancer s/p surgery at [facility] in 2019 (no chemo/radiation), and newly diagnosed SCC of the left tonsil. He is now s/p left tonsillectomy and neck dissection by Dr. Templeton on 2/20. Post operative course was c/b development of large hematoma at surgical site. He was taken back to the OR for an emergent neck exploration and hematoma evacuation on 2/20. Code blue on 2/27am, ROSC achieved within 5 min.      Neuro/Psych:   -- Sedation: none  -- Pain: home gabapentin, tylenol, PRN fentanyl, oxy PRN,   -- Psych: home clonazepam PRN, scheduled mirtazapine, seroquel, lexapro     Cards  -- HDS; not currently on pressors   -- Continue home clonidine   -held home amlodipine in setting of borderline hypotension  -- PRN labetalol and hydralazine   -- Continue home statin       Pulm:   -- H/o emphysema   -- Trach placed 2/24.   -- Ipratropium nebs scheduled,albuterol nebs PRN for wheezing   -- Goal O2 sat > 88%  -- Wean as able  -- Was weaned to trach collar 2/26; reintubated 2/27. ETT replaced w portia 2/28.   -- Tolerating trach collar currently        Renal:  -- BUN/Cr 27/0.8  -- UOP 1.4 L/24H      FEN / GI:   -- Net -455cc/24H  -- Replace lytes as needed  -- Nutrition: NPO + TF. Dobhoff for feeds. At goal.   --PEG consult to gen surgery   -- Cta showing transverse colon between the stomach + abdominal wall   -- Gen surg states patient would need open g-tube given no safe window for PEG tube placement   -- No mIVF     ID:   -- Tm:  afebrile; WBC 8.4  -- Abx: none       Heme/Onc:   -- H/H stable 8.8/27.5  -- H/o thrombocytopenia    -- Platelets stable   -- Continue to monitor   -- Daily CBC  -- Home ASA held       Endo:   -- Gluc goal 140-180  -- SSI      Immuno:  -- H/o HCV infection s/p liver transplant in 2015  -- Hepatology consulted  -- Continue home tacrolimus  -- Monitor tacrolimus levels and INR daily   -- Valacyclovir daily per ENT      PPx:   Feeding: NPO + TF  Analgesia/Sedation: PRN fentanyl, home gabapentin, tylenol  Thromboembolic prevention: Lovenox   HOB >30: yes   Stress Ulcer ppx: famotidine BID  Glucose control: Critical care goal 140-180 g/dl, ISS    Lines/Drains/Airway: trach, PIVx2, NG tube       Dispo/Code Status/Palliative:   --OK for stepdown from SICU standpoint           Critical care was time spent personally by me on the following activities: development of treatment plan with patient or surrogate and bedside caregivers, discussions with consultants, evaluation of patient's response to treatment, examination of patient, ordering and performing treatments and interventions, ordering and review of laboratory studies, ordering and review of radiographic studies, pulse oximetry, re-evaluation of patient's condition.  This critical care time did not overlap with that of any other provider or involve time for any procedures.     BETH TORRES MD  Critical Care - Surgery  [facility] - [facility]

## 2023-03-04 NOTE — SUBJECTIVE & OBJECTIVE
Interval History/Significant Events: NAEON. Patient had good UOP w/o Torres ~825 cc. OOB most of yesterday as well as this AM. Comfortable on trach collar.     Follow-up For: Procedure(s) (LRB):  CREATION, TRACHEOSTOMY (N/A)    Post-Operative Day: 8 Days Post-Op    Objective:     Vital Signs (Most Recent):  Temp: 98.4 °F (36.9 °C) (03/04/23 0230)  Pulse: 96 (03/04/23 0430)  Resp: 16 (03/04/23 0539)  BP: (!) 154/76 (03/04/23 0200)  SpO2: (!) 94 % (03/04/23 0430)   Vital Signs (24h Range):  Temp:  [98.4 °F (36.9 °C)-99.8 °F (37.7 °C)] 98.4 °F (36.9 °C)  Pulse:  [68-99] 96  Resp:  [10-25] 16  SpO2:  [91 %-100 %] 94 %  BP: (122-162)/(61-80) 154/76     Weight: 75.9 kg (167 lb 5.3 oz)  Body mass index is 24.01 kg/m².      Intake/Output Summary (Last 24 hours) at 3/4/2023 0542  Last data filed at 3/4/2023 0300  Gross per 24 hour   Intake 1103.59 ml   Output 1666 ml   Net -562.41 ml       Physical Exam  Vitals and nursing note reviewed.   Constitutional:       General: He is awake.      Appearance: Normal appearance.   HENT:      Head: Normocephalic and atraumatic.      Nose:      Comments: NG tube sutured in place   Eyes:      Conjunctiva/sclera: Conjunctivae normal.      Pupils: Pupils are equal, round, and reactive to light.   Neck:      Comments: ENMANUEL drain with SS on left side of neck   Surgical incision clean and dry   Cardiovascular:      Rate and Rhythm: Normal rate and regular rhythm.      Pulses: Normal pulses.      Heart sounds: Normal heart sounds.   Pulmonary:      Effort: Pulmonary effort is normal. No respiratory distress.      Comments: Trach in place  Abdominal:      General: Abdomen is flat. Bowel sounds are normal.      Palpations: Abdomen is soft.   Skin:     General: Skin is warm.   Neurological:      Mental Status: He is alert.   Psychiatric:         Behavior: Behavior is cooperative.       Vents:  Vent Mode: Spont (03/02/23 1937)  Ventilator Initiated: Yes (02/20/23 1443)  Set Rate: 20 BPM (03/01/23  0735)  Vt Set: 450 mL (03/01/23 0735)  Pressure Support: 6 cmH20 (03/02/23 1937)  PEEP/CPAP: 5 cmH20 (03/02/23 1937)  Oxygen Concentration (%): 28 (03/04/23 0427)  Peak Airway Pressure: 29 cmH20 (03/02/23 1937)  Plateau Pressure: 12 cmH20 (03/02/23 1937)  Total Ve: 5.5 L/m (03/02/23 1937)  Negative Inspiratory Force (cm H2O): -25 (03/02/23 1937)  F/VT Ratio<105 (RSBI): (!) 27.51 (03/02/23 1343)    Lines/Drains/Airways       Drain  Duration                  Trans Pyloric Feeding Tube 02/20/23 1320 nasogastric;Other (comments) 12 Fr. Right nostril 11 days              Airway  Duration             Adult Surgical Airway 02/28/23 Shiley Cuffed 6.0/ 75mm 4 days              Peripheral Intravenous Line  Duration                  Midline Catheter Insertion/Assessment  - Single Lumen 02/24/23 1045 Right brachial vein 18g x 10cm 7 days         Peripheral IV - Single Lumen 03/01/23 0610 20 G Anterior;Right Forearm 2 days                    Significant Labs:    CBC/Anemia Profile:  Recent Labs   Lab 03/03/23  0334 03/04/23  0413   WBC 5.40 8.43   HGB 7.9* 8.8*   HCT 26.2* 27.5*    264   MCV 99* 96   RDW 13.1 12.8        Chemistries:  Recent Labs   Lab 03/03/23  0334 03/04/23  0413    137   K 4.2 3.8    104   CO2 30* 26   BUN 34* 27*   CREATININE 0.9 0.8   CALCIUM 9.1 9.0   ALBUMIN 2.6* 2.8*   PROT 6.0 6.1   BILITOT 0.5 0.6   ALKPHOS 60 64   ALT 23 25   AST 24 27   MG 1.7 1.5*   PHOS 3.2 2.5*       None    Significant Imaging:  I have reviewed all pertinent imaging results/findings within the past 24 hours.  I have reviewed and interpreted all pertinent imaging results/findings within the past 24 hours.

## 2023-03-04 NOTE — ASSESSMENT & PLAN NOTE
Dereck Cneteno is a 62 y.o. 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 On day of surgery pt with expanding hematoma s/p emergent neck exploration with washout 2/20.     Oral packing removed 2/22. Tracheostomy 2/24/23.   Code blue on 2/27 for bleeding likely from tracheal stoma site. Required ETT placement in stoma and replacement of oral packing. CTA completed 2/27 without extravasation.   ETT replaced with tracheostomy and oral packing removed 2/28.     Remains hemostatic.     -- Weaned from vent  -- PEG vs PO trial  -- OK to step down to floor  -- Please page ENT with any questions or concerns

## 2023-03-04 NOTE — PROGRESS NOTES
[facility]  Otorhinolaryngology-Head & Neck Surgery  Progress Note    Subjective:     Post-Op Info:  Procedure(s) (LRB):  CREATION, TRACHEOSTOMY (N/A)   8 Days Post-Op  Hospital Day: 13     Interval History: NAEON. Tolerating TF via NGT.     Medications:  Continuous Infusions:  Scheduled Meds:   acetaminophen  650 mg Per J Tube Q8H    atorvastatin  40 mg Per NG tube QHS    cloNIDine  0.1 mg Per NG tube QHS    enoxaparin  40 mg Subcutaneous Daily    EScitalopram oxalate  20 mg Per NG tube QHS    [START ON 3/5/2023] famotidine  20 mg Per NG tube Daily    gabapentin  600 mg Per NG tube TID    ipratropium  0.5 mg Nebulization Q8H    levalbuterol  0.63 mg Nebulization Q8H    mirtazapine  15 mg Per NG tube Nightly    polyethylene glycol  17 g Per NG tube Daily    QUEtiapine  50 mg Per NG tube BID    scopolamine  1 patch Transdermal Q3 Days    sennosides 8.8 mg/5 ml  5 mL Per NG tube BID    tacrolimus  2 mg Per NG tube Daily AM    And    tacrolimus  1 mg Per NG tube Daily PM     PRN Meds:sodium chloride, sodium chloride 0.9%, calcium gluconate IVPB, calcium gluconate IVPB, calcium gluconate IVPB, dextrose 10%, dextrose 10%, glucagon (human recombinant), guaiFENesin 100 mg/5 ml, hydrALAZINE, insulin aspart U-100, labetalol, magnesium sulfate IVPB, magnesium sulfate IVPB, ondansetron, oxyCODONE, potassium chloride **AND** potassium chloride **AND** potassium chloride, QUEtiapine, sodium phosphate IVPB, sodium phosphate IVPB, sodium phosphate IVPB     Review of patient's allergies indicates:  No Known Allergies  Objective:     Vital Signs (24h Range):  Temp:  [98.4 °F (36.9 °C)-99.8 °F (37.7 °C)] 98.4 °F (36.9 °C)  Pulse:  [79-99] 91  Resp:  [9-27] 21  SpO2:  [91 %-100 %] 91 %  BP: (126-166)/(62-80) 153/72     Date 03/04/23 0700 - 03/05/23 0659   Shift 5595-3864 9555-0067 8368-2972 24 Hour Total   INTAKE   I.V.(mL/kg) 113.2(1.5)   113.2(1.5)   NG/   240   IV Piggyback 49.5   49.5    Shift Total(mL/kg) 402.8(5.3)   402.8(5.3)   OUTPUT   Shift Total(mL/kg)       Weight (kg) 75.9 75.9 75.9 75.9     Lines/Drains/Airways       Drain  Duration                  Trans Pyloric Feeding Tube 02/20/23 1320 nasogastric;Other (comments) 12 Fr. Right nostril 11 days              Airway  Duration             Adult Surgical Airway 02/28/23 Shiley Cuffed 6.0/ 75mm 4 days              Peripheral Intravenous Line  Duration                  Midline Catheter Insertion/Assessment  - Single Lumen 02/24/23 1045 Right brachial vein 18g x 10cm 8 days         Peripheral IV - Single Lumen 03/01/23 0610 20 G Anterior;Right Forearm 3 days                    Physical Exam  NAD  Oral cavity with clear secretions . Postop site healing with appropriate granulation, hemostatic   Neck soft, ecchymosis nearly resolved   Tracheostomy in place, on trach collar. No bleeding on suctioning or around trach.     Significant Labs:  CBC:   Recent Labs   Lab 03/04/23  0413   WBC 8.43   RBC 2.87*   HGB 8.8*   HCT 27.5*      MCV 96   MCH 30.7   MCHC 32.0     CMP:   Recent Labs   Lab 03/04/23  0413   *   CALCIUM 9.0   ALBUMIN 2.8*   PROT 6.1      K 3.8   CO2 26      BUN 27*   CREATININE 0.8   ALKPHOS 64   ALT 25   AST 27   BILITOT 0.6       Significant Diagnostics:  None    Assessment/Plan:     * Squamous cell carcinoma of left tonsil  Dereck Centeno is a 62 y.o. 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 On day of surgery pt with expanding hematoma s/p emergent neck exploration with washout 2/20.     Oral packing removed 2/22. Tracheostomy 2/24/23.   Code blue on 2/27 for bleeding likely from tracheal stoma site. Required ETT placement in stoma and replacement of oral packing. CTA completed 2/27 without extravasation.   ETT replaced with  tracheostomy and oral packing removed 2/28.     Remains hemostatic.     -- Weaned from vent  -- PEG vs PO trial  -- OK to step down to floor  -- Please page ENT with any questions or concerns               Zeyad Berger MD  Otorhinolaryngology-Head & Neck Surgery  [facility] - [facility]

## 2023-03-04 NOTE — TREATMENT PLAN
Hepatology Treatment Plan    This is a 62 year old male with PMH significant for cirrhosis secondary to ETOH/Hepatitis C (status post transplant in 01/2015) and head/neck cancer (status post resection in approximately 2019 at [facility] with development of squamous cell carcinoma of left tonsil as of 01/2023) who was admitted to [facility] on 02/20 for left tonsillectomy and neck dissection that was performed on day of admission. Hospital course associated with development of hematoma at surgical site requiring neck exploration and hematoma evacuation on 02/20 with tracheostomy creation on 02/24 and cardiac arrest on 02/27 secondary to hypoxia with concern for on-going bleeding from surgery site (however CTA negative for active bleeding). Hepatology following for assistance with immunosuppression.     Interval History  Vital signs remain stable without need for vasopressor support. LFT's remain normal. Tacrolimus level 3.4 today.     Plan  - Tacrolimus 2 mg in AM and 1 mg in PM.   - Please obtain daily CBC, BMP, LFT, INR, and Tacrolimus level.     Thank you for involving us in the care of Dereck Centeno. Please call with any additional concerns or questions.        Erik Lindsay MD, PGY-V  Gastroenterology Fellow  [facility]

## 2023-03-04 NOTE — NURSING
Pt transferred to rm 1012 on CCM with all belongings and PCT and RN. SBAR given to ROMY Rodrigues. All questions answered. Care relinquished

## 2023-03-05 LAB
ALBUMIN SERPL BCP-MCNC: 2.8 G/DL (ref 3.5–5.2)
ALP SERPL-CCNC: 66 U/L (ref 55–135)
ALT SERPL W/O P-5'-P-CCNC: 30 U/L (ref 10–44)
ANION GAP SERPL CALC-SCNC: 8 MMOL/L (ref 8–16)
AST SERPL-CCNC: 31 U/L (ref 10–40)
BASOPHILS # BLD AUTO: 0.03 K/UL (ref 0–0.2)
BASOPHILS NFR BLD: 0.4 % (ref 0–1.9)
BILIRUB SERPL-MCNC: 0.5 MG/DL (ref 0.1–1)
BUN SERPL-MCNC: 21 MG/DL (ref 8–23)
CALCIUM SERPL-MCNC: 9.1 MG/DL (ref 8.7–10.5)
CHLORIDE SERPL-SCNC: 106 MMOL/L (ref 95–110)
CO2 SERPL-SCNC: 24 MMOL/L (ref 23–29)
CREAT SERPL-MCNC: 0.8 MG/DL (ref 0.5–1.4)
DIFFERENTIAL METHOD: ABNORMAL
EOSINOPHIL # BLD AUTO: 0.2 K/UL (ref 0–0.5)
EOSINOPHIL NFR BLD: 2.5 % (ref 0–8)
ERYTHROCYTE [DISTWIDTH] IN BLOOD BY AUTOMATED COUNT: 12.7 % (ref 11.5–14.5)
EST. GFR  (NO RACE VARIABLE): >60 ML/MIN/1.73 M^2
GLUCOSE SERPL-MCNC: 124 MG/DL (ref 70–110)
HCT VFR BLD AUTO: 28.2 % (ref 40–54)
HGB BLD-MCNC: 9 G/DL (ref 14–18)
IMM GRANULOCYTES # BLD AUTO: 0.06 K/UL (ref 0–0.04)
IMM GRANULOCYTES NFR BLD AUTO: 0.8 % (ref 0–0.5)
INR PPP: 1.1 (ref 0.8–1.2)
LYMPHOCYTES # BLD AUTO: 0.9 K/UL (ref 1–4.8)
LYMPHOCYTES NFR BLD: 10.9 % (ref 18–48)
MAGNESIUM SERPL-MCNC: 1.5 MG/DL (ref 1.6–2.6)
MCH RBC QN AUTO: 30 PG (ref 27–31)
MCHC RBC AUTO-ENTMCNC: 31.9 G/DL (ref 32–36)
MCV RBC AUTO: 94 FL (ref 82–98)
MONOCYTES # BLD AUTO: 0.8 K/UL (ref 0.3–1)
MONOCYTES NFR BLD: 9.4 % (ref 4–15)
NEUTROPHILS # BLD AUTO: 6.1 K/UL (ref 1.8–7.7)
NEUTROPHILS NFR BLD: 76 % (ref 38–73)
NRBC BLD-RTO: 0 /100 WBC
PHOSPHATE SERPL-MCNC: 3.2 MG/DL (ref 2.7–4.5)
PLATELET # BLD AUTO: 327 K/UL (ref 150–450)
PMV BLD AUTO: 9.1 FL (ref 9.2–12.9)
POCT GLUCOSE: 128 MG/DL (ref 70–110)
POCT GLUCOSE: 142 MG/DL (ref 70–110)
POCT GLUCOSE: 171 MG/DL (ref 70–110)
POCT GLUCOSE: 183 MG/DL (ref 70–110)
POTASSIUM SERPL-SCNC: 3.8 MMOL/L (ref 3.5–5.1)
PROT SERPL-MCNC: 6.1 G/DL (ref 6–8.4)
PROTHROMBIN TIME: 11.9 SEC (ref 9–12.5)
RBC # BLD AUTO: 3 M/UL (ref 4.6–6.2)
SODIUM SERPL-SCNC: 138 MMOL/L (ref 136–145)
TACROLIMUS BLD-MCNC: 2.9 NG/ML (ref 5–15)
WBC # BLD AUTO: 7.96 K/UL (ref 3.9–12.7)

## 2023-03-05 PROCEDURE — 99900035 HC TECH TIME PER 15 MIN (STAT)

## 2023-03-05 PROCEDURE — 27000221 HC OXYGEN, UP TO 24 HOURS

## 2023-03-05 PROCEDURE — 94761 N-INVAS EAR/PLS OXIMETRY MLT: CPT

## 2023-03-05 PROCEDURE — 25000003 PHARM REV CODE 250: Performed by: STUDENT IN AN ORGANIZED HEALTH CARE EDUCATION/TRAINING PROGRAM

## 2023-03-05 PROCEDURE — 20600001 HC STEP DOWN PRIVATE ROOM

## 2023-03-05 PROCEDURE — 85025 COMPLETE CBC W/AUTO DIFF WBC: CPT

## 2023-03-05 PROCEDURE — 25000003 PHARM REV CODE 250: Performed by: OTOLARYNGOLOGY

## 2023-03-05 PROCEDURE — 99900026 HC AIRWAY MAINTENANCE (STAT)

## 2023-03-05 PROCEDURE — 80197 ASSAY OF TACROLIMUS: CPT

## 2023-03-05 PROCEDURE — 63600175 PHARM REV CODE 636 W HCPCS

## 2023-03-05 PROCEDURE — 25000242 PHARM REV CODE 250 ALT 637 W/ HCPCS: Performed by: STUDENT IN AN ORGANIZED HEALTH CARE EDUCATION/TRAINING PROGRAM

## 2023-03-05 PROCEDURE — 25000003 PHARM REV CODE 250

## 2023-03-05 PROCEDURE — 51798 US URINE CAPACITY MEASURE: CPT

## 2023-03-05 PROCEDURE — 80053 COMPREHEN METABOLIC PANEL: CPT

## 2023-03-05 PROCEDURE — 94640 AIRWAY INHALATION TREATMENT: CPT

## 2023-03-05 PROCEDURE — 85610 PROTHROMBIN TIME: CPT | Performed by: STUDENT IN AN ORGANIZED HEALTH CARE EDUCATION/TRAINING PROGRAM

## 2023-03-05 PROCEDURE — 83735 ASSAY OF MAGNESIUM: CPT

## 2023-03-05 PROCEDURE — 27200966 HC CLOSED SUCTION SYSTEM

## 2023-03-05 PROCEDURE — 36415 COLL VENOUS BLD VENIPUNCTURE: CPT | Performed by: STUDENT IN AN ORGANIZED HEALTH CARE EDUCATION/TRAINING PROGRAM

## 2023-03-05 PROCEDURE — 84100 ASSAY OF PHOSPHORUS: CPT

## 2023-03-05 RX ORDER — LANOLIN ALCOHOL/MO/W.PET/CERES
400 CREAM (GRAM) TOPICAL 2 TIMES DAILY
Status: COMPLETED | OUTPATIENT
Start: 2023-03-05 | End: 2023-03-05

## 2023-03-05 RX ADMIN — IPRATROPIUM BROMIDE 0.5 MG: 0.5 SOLUTION RESPIRATORY (INHALATION) at 03:03

## 2023-03-05 RX ADMIN — SENNOSIDES 5 ML: 8.8 SYRUP ORAL at 09:03

## 2023-03-05 RX ADMIN — ACETAMINOPHEN 650 MG: 650 SOLUTION ORAL at 09:03

## 2023-03-05 RX ADMIN — ESCITALOPRAM OXALATE 20 MG: 5 SOLUTION ORAL at 12:03

## 2023-03-05 RX ADMIN — QUETIAPINE FUMARATE 50 MG: 25 TABLET ORAL at 09:03

## 2023-03-05 RX ADMIN — LEVALBUTEROL HYDROCHLORIDE 0.63 MG: 0.63 SOLUTION RESPIRATORY (INHALATION) at 12:03

## 2023-03-05 RX ADMIN — Medication 400 MG: at 09:03

## 2023-03-05 RX ADMIN — CLONIDINE HYDROCHLORIDE 0.1 MG: 0.1 TABLET ORAL at 09:03

## 2023-03-05 RX ADMIN — ACETAMINOPHEN 650 MG: 650 SOLUTION ORAL at 06:03

## 2023-03-05 RX ADMIN — ENOXAPARIN SODIUM 40 MG: 40 INJECTION SUBCUTANEOUS at 05:03

## 2023-03-05 RX ADMIN — GABAPENTIN 600 MG: 250 SOLUTION ORAL at 09:03

## 2023-03-05 RX ADMIN — TACROLIMUS 2 MG: 1 CAPSULE ORAL at 07:03

## 2023-03-05 RX ADMIN — FAMOTIDINE 20 MG: 40 POWDER, FOR SUSPENSION ORAL at 09:03

## 2023-03-05 RX ADMIN — ATORVASTATIN CALCIUM 40 MG: 40 TABLET, FILM COATED ORAL at 09:03

## 2023-03-05 RX ADMIN — LEVALBUTEROL HYDROCHLORIDE 0.63 MG: 0.63 SOLUTION RESPIRATORY (INHALATION) at 03:03

## 2023-03-05 RX ADMIN — OXYCODONE HYDROCHLORIDE 5 MG: 5 SOLUTION ORAL at 06:03

## 2023-03-05 RX ADMIN — LEVALBUTEROL HYDROCHLORIDE 0.63 MG: 0.63 SOLUTION RESPIRATORY (INHALATION) at 07:03

## 2023-03-05 RX ADMIN — GABAPENTIN 600 MG: 250 SOLUTION ORAL at 02:03

## 2023-03-05 RX ADMIN — TACROLIMUS 1 MG: 1 CAPSULE ORAL at 05:03

## 2023-03-05 RX ADMIN — OXYCODONE HYDROCHLORIDE 5 MG: 5 SOLUTION ORAL at 11:03

## 2023-03-05 RX ADMIN — MIRTAZAPINE 15 MG: 15 TABLET, ORALLY DISINTEGRATING ORAL at 09:03

## 2023-03-05 RX ADMIN — ACETAMINOPHEN 650 MG: 650 SOLUTION ORAL at 02:03

## 2023-03-05 RX ADMIN — IPRATROPIUM BROMIDE 0.5 MG: 0.5 SOLUTION RESPIRATORY (INHALATION) at 12:03

## 2023-03-05 RX ADMIN — IPRATROPIUM BROMIDE 0.5 MG: 0.5 SOLUTION RESPIRATORY (INHALATION) at 07:03

## 2023-03-05 RX ADMIN — OXYCODONE HYDROCHLORIDE 5 MG: 5 SOLUTION ORAL at 09:03

## 2023-03-05 NOTE — PLAN OF CARE
Pt a&o4, all safety precautions in place, urinal and commode at bedside, tube feeds running at goal, VS stable, pain controlled, trach cares completed throughout shift. No other concerns at this time.   Problem: Adult Inpatient Plan of Care  Goal: Plan of Care Review  Outcome: Ongoing, Progressing  Goal: Patient-Specific Goal (Individualized)  Outcome: Ongoing, Progressing  Goal: Absence of Hospital-Acquired Illness or Injury  Outcome: Ongoing, Progressing  Goal: Optimal Comfort and Wellbeing  Outcome: Ongoing, Progressing  Goal: Readiness for Transition of Care  Outcome: Ongoing, Progressing     Problem: Infection  Goal: Absence of Infection Signs and Symptoms  Outcome: Ongoing, Progressing     Problem: Fall Injury Risk  Goal: Absence of Fall and Fall-Related Injury  Outcome: Ongoing, Progressing     Problem: Restraint, Nonbehavioral (Nonviolent)  Goal: Absence of Harm or Injury  Outcome: Ongoing, Progressing     Problem: Communication Impairment (Mechanical Ventilation, Invasive)  Goal: Effective Communication  Outcome: Ongoing, Progressing     Problem: Device-Related Complication Risk (Mechanical Ventilation, Invasive)  Goal: Optimal Device Function  Outcome: Ongoing, Progressing     Problem: Inability to Wean (Mechanical Ventilation, Invasive)  Goal: Mechanical Ventilation Liberation  Outcome: Ongoing, Progressing     Problem: Nutrition Impairment (Mechanical Ventilation, Invasive)  Goal: Optimal Nutrition Delivery  Outcome: Ongoing, Progressing     Problem: Skin and Tissue Injury (Mechanical Ventilation, Invasive)  Goal: Absence of Device-Related Skin and Tissue Injury  Outcome: Ongoing, Progressing     Problem: Ventilator-Induced Lung Injury (Mechanical Ventilation, Invasive)  Goal: Absence of Ventilator-Induced Lung Injury  Outcome: Ongoing, Progressing     Problem: Communication Impairment (Artificial Airway)  Goal: Effective Communication  Outcome: Ongoing, Progressing     Problem: Device-Related  Complication Risk (Artificial Airway)  Goal: Optimal Device Function  Outcome: Ongoing, Progressing     Problem: Skin and Tissue Injury (Artificial Airway)  Goal: Absence of Device-Related Skin or Tissue Injury  Outcome: Ongoing, Progressing     Problem: Noninvasive Ventilation Acute  Goal: Effective Unassisted Ventilation and Oxygenation  Outcome: Ongoing, Progressing     Problem: Skin Injury Risk Increased  Goal: Skin Health and Integrity  Outcome: Ongoing, Progressing

## 2023-03-05 NOTE — SUBJECTIVE & OBJECTIVE
Interval History: NAEO. Briefly hypertensive. Pain controlled. Tolerating feeds via NGT. Eager to try po diet.    Medications:  Continuous Infusions:  Scheduled Meds:   acetaminophen  650 mg Per J Tube Q8H    atorvastatin  40 mg Per NG tube QHS    cloNIDine  0.1 mg Per NG tube QHS    enoxaparin  40 mg Subcutaneous Daily    EScitalopram oxalate  20 mg Per NG tube QHS    famotidine  20 mg Per NG tube Daily    gabapentin  600 mg Per NG tube TID    ipratropium  0.5 mg Nebulization Q8H    levalbuterol  0.63 mg Nebulization Q8H    magnesium oxide  400 mg Per NG tube BID    mirtazapine  15 mg Per NG tube Nightly    polyethylene glycol  17 g Per NG tube Daily    QUEtiapine  50 mg Per NG tube BID    scopolamine  1 patch Transdermal Q3 Days    sennosides 8.8 mg/5 ml  5 mL Per NG tube BID    tacrolimus  2 mg Per NG tube Daily AM    And    tacrolimus  1 mg Per NG tube Daily PM     PRN Meds:sodium chloride, sodium chloride 0.9%, calcium gluconate IVPB, calcium gluconate IVPB, calcium gluconate IVPB, dextrose 10%, dextrose 10%, glucagon (human recombinant), guaiFENesin 100 mg/5 ml, hydrALAZINE, insulin aspart U-100, labetalol, magnesium sulfate IVPB, magnesium sulfate IVPB, ondansetron, oxyCODONE, potassium chloride **AND** potassium chloride **AND** potassium chloride, QUEtiapine, sodium phosphate IVPB, sodium phosphate IVPB, sodium phosphate IVPB     Review of patient's allergies indicates:  No Known Allergies  Objective:     Vital Signs (24h Range):  Temp:  [97.6 °F (36.4 °C)-100 °F (37.8 °C)] 98.8 °F (37.1 °C)  Pulse:  [75-96] 83  Resp:  [16-82] 20  SpO2:  [87 %-98 %] 92 %  BP: (128-178)/(59-90) 137/72       Lines/Drains/Airways       Drain  Duration                  Trans Pyloric Feeding Tube 02/20/23 1320 nasogastric;Other (comments) 12 Fr. Right nostril 12 days              Airway  Duration             Adult Surgical Airway 02/28/23 Shiley Cuffed 6.0/ 75mm 5 days              Peripheral Intravenous Line  Duration                   Midline Catheter Insertion/Assessment  - Single Lumen 02/24/23 1045 Right brachial vein 18g x 10cm 9 days         Peripheral IV - Single Lumen 03/01/23 0610 20 G Anterior;Right Forearm 4 days                  Physical Exam  NAD  NGT sutured in place  Oral cavity with clear secretions . Postop site healing with appropriate granulation, hemostatic   Neck soft, ecchymosis nearly resolved   Tracheostomy in place, on trach collar. No bleeding on suctioning or around trach.     Significant Labs:  Recent Lab Results         03/05/23  0649   03/05/23  0345   03/04/23  2352   03/04/23  1336        Albumin   2.8           Alkaline Phosphatase   66           ALT   30           Anion Gap   8           AST   31           Baso #   0.03           Basophil %   0.4           BILIRUBIN TOTAL   0.5  Comment: For infants and newborns, interpretation of results should be based  on gestational age, weight and in agreement with clinical  observations.    Premature Infant recommended reference ranges:  Up to 24 hours.............<8.0 mg/dL  Up to 48 hours............<12.0 mg/dL  3-5 days..................<15.0 mg/dL  6-29 days.................<15.0 mg/dL             BUN   21           Calcium   9.1           Chloride   106           CO2   24           Creatinine   0.8           Differential Method   Automated           eGFR   >60.0           Eos #   0.2           Eosinophil %   2.5           Glucose   124           Gran # (ANC)   6.1           Gran %   76.0           Hematocrit   28.2           Hemoglobin   9.0           Immature Grans (Abs)   0.06  Comment: Mild elevation in immature granulocytes is non specific and   can be seen in a variety of conditions including stress response,   acute inflammation, trauma and pregnancy. Correlation with other   laboratory and clinical findings is essential.             Immature Granulocytes   0.8           INR   1.1  Comment: Coumadin Therapy:  2.0 - 3.0 for INR for all indicators except  mechanical heart valves  and antiphospholipid syndromes which should use 2.5 - 3.5.             Lymph #   0.9           Lymph %   10.9           Magnesium   1.5           MCH   30.0           MCHC   31.9           MCV   94           Mono #   0.8           Mono %   9.4           MPV   9.1           nRBC   0           Phosphorus   3.2           Platelets   327           POCT Glucose 171     183   135       Potassium   3.8           PROTEIN TOTAL   6.1           Protime   11.9           RBC   3.00           RDW   12.7           Sodium   138           Tacrolimus Lvl   2.9  Comment: Testing performed by a chemiluminescent microparticle   immunoassay on the [employer] i System.    CAUTION: No firm therapeutic range exists for tacrolimus in whole   blood. The   complexity of the clinical state, individual differences in   sensitivity to   immunosuppressive and nephrotoxic effects of tacrolimus,   co-administration   of other immunosuppressants, type of transplant, time post-transplant   and a   number of other factors contribute to different requirements for   optimal   blood levels of tacrolimus. Therefore, individual tacrolimus values   cannot   be used as the sole indicator for making changes in treatment regimen   and   each patient should be thoroughly evaluated clinically before changes   in   treatment regimens are made. Each user must establish his or her own   ranges   based on clinical experience.  Therapeutic ranges vary according to the commercial test used, and   therefore   should be established for each commercial test. Values obtained with   different assay methods cannot be used interchangeably due to   differences in   assay methods and cross-reactivity with metabolites, nor should   correction   factors be applied. Therefore, consistent use of one assay for   individual   patients is recommended.             WBC   7.96                   Significant Diagnostics:  None new

## 2023-03-05 NOTE — PROGRESS NOTES
[facility] - [facility]  Otorhinolaryngology-Head & Neck Surgery  Progress Note    Subjective:     Post-Op Info:  Procedure(s) (LRB):  CREATION, TRACHEOSTOMY (N/A)   9 Days Post-Op  Hospital Day: 14     Interval History: NAEO. Briefly hypertensive. Pain controlled. Tolerating feeds via NGT. Eager to try po diet.    Medications:  Continuous Infusions:  Scheduled Meds:   acetaminophen  650 mg Per J Tube Q8H    atorvastatin  40 mg Per NG tube QHS    cloNIDine  0.1 mg Per NG tube QHS    enoxaparin  40 mg Subcutaneous Daily    EScitalopram oxalate  20 mg Per NG tube QHS    famotidine  20 mg Per NG tube Daily    gabapentin  600 mg Per NG tube TID    ipratropium  0.5 mg Nebulization Q8H    levalbuterol  0.63 mg Nebulization Q8H    magnesium oxide  400 mg Per NG tube BID    mirtazapine  15 mg Per NG tube Nightly    polyethylene glycol  17 g Per NG tube Daily    QUEtiapine  50 mg Per NG tube BID    scopolamine  1 patch Transdermal Q3 Days    sennosides 8.8 mg/5 ml  5 mL Per NG tube BID    tacrolimus  2 mg Per NG tube Daily AM    And    tacrolimus  1 mg Per NG tube Daily PM     PRN Meds:sodium chloride, sodium chloride 0.9%, calcium gluconate IVPB, calcium gluconate IVPB, calcium gluconate IVPB, dextrose 10%, dextrose 10%, glucagon (human recombinant), guaiFENesin 100 mg/5 ml, hydrALAZINE, insulin aspart U-100, labetalol, magnesium sulfate IVPB, magnesium sulfate IVPB, ondansetron, oxyCODONE, potassium chloride **AND** potassium chloride **AND** potassium chloride, QUEtiapine, sodium phosphate IVPB, sodium phosphate IVPB, sodium phosphate IVPB     Review of patient's allergies indicates:  No Known Allergies  Objective:     Vital Signs (24h Range):  Temp:  [97.6 °F (36.4 °C)-100 °F (37.8 °C)] 98.8 °F (37.1 °C)  Pulse:  [75-96] 83  Resp:  [16-82] 20  SpO2:  [87 %-98 %] 92 %  BP: (128-178)/(59-90) 137/72       Lines/Drains/Airways       Drain  Duration                  Trans Pyloric Feeding Tube 02/20/23 1320  nasogastric;Other (comments) 12 Fr. Right nostril 12 days              Airway  Duration             Adult Surgical Airway 02/28/23 Shiley Cuffed 6.0/ 75mm 5 days              Peripheral Intravenous Line  Duration                  Midline Catheter Insertion/Assessment  - Single Lumen 02/24/23 1045 Right brachial vein 18g x 10cm 9 days         Peripheral IV - Single Lumen 03/01/23 0610 20 G Anterior;Right Forearm 4 days                  Physical Exam  NAD  NGT sutured in place  Oral cavity with clear secretions . Postop site healing with appropriate granulation, hemostatic   Neck soft, ecchymosis nearly resolved   Tracheostomy in place, on trach collar. No bleeding on suctioning or around trach.     Significant Labs:  Recent Lab Results         03/05/23  0649   03/05/23  0345   03/04/23  2352   03/04/23  1336        Albumin   2.8           Alkaline Phosphatase   66           ALT   30           Anion Gap   8           AST   31           Baso #   0.03           Basophil %   0.4           BILIRUBIN TOTAL   0.5  Comment: For infants and newborns, interpretation of results should be based  on gestational age, weight and in agreement with clinical  observations.    Premature Infant recommended reference ranges:  Up to 24 hours.............<8.0 mg/dL  Up to 48 hours............<12.0 mg/dL  3-5 days..................<15.0 mg/dL  6-29 days.................<15.0 mg/dL             BUN   21           Calcium   9.1           Chloride   106           CO2   24           Creatinine   0.8           Differential Method   Automated           eGFR   >60.0           Eos #   0.2           Eosinophil %   2.5           Glucose   124           Gran # (ANC)   6.1           Gran %   76.0           Hematocrit   28.2           Hemoglobin   9.0           Immature Grans (Abs)   0.06  Comment: Mild elevation in immature granulocytes is non specific and   can be seen in a variety of conditions including stress response,   acute inflammation, trauma  and pregnancy. Correlation with other   laboratory and clinical findings is essential.             Immature Granulocytes   0.8           INR   1.1  Comment: Coumadin Therapy:  2.0 - 3.0 for INR for all indicators except mechanical heart valves  and antiphospholipid syndromes which should use 2.5 - 3.5.             Lymph #   0.9           Lymph %   10.9           Magnesium   1.5           MCH   30.0           MCHC   31.9           MCV   94           Mono #   0.8           Mono %   9.4           MPV   9.1           nRBC   0           Phosphorus   3.2           Platelets   327           POCT Glucose 171     183   135       Potassium   3.8           PROTEIN TOTAL   6.1           Protime   11.9           RBC   3.00           RDW   12.7           Sodium   138           Tacrolimus Lvl   2.9  Comment: Testing performed by a chemiluminescent microparticle   immunoassay on the [employer] i System.    CAUTION: No firm therapeutic range exists for tacrolimus in whole   blood. The   complexity of the clinical state, individual differences in   sensitivity to   immunosuppressive and nephrotoxic effects of tacrolimus,   co-administration   of other immunosuppressants, type of transplant, time post-transplant   and a   number of other factors contribute to different requirements for   optimal   blood levels of tacrolimus. Therefore, individual tacrolimus values   cannot   be used as the sole indicator for making changes in treatment regimen   and   each patient should be thoroughly evaluated clinically before changes   in   treatment regimens are made. Each user must establish his or her own   ranges   based on clinical experience.  Therapeutic ranges vary according to the commercial test used, and   therefore   should be established for each commercial test. Values obtained with   different assay methods cannot be used interchangeably due to   differences in   assay methods and cross-reactivity with metabolites, nor should    correction   factors be applied. Therefore, consistent use of one assay for   individual   patients is recommended.             WBC   7.96                   Significant Diagnostics:  None new    Assessment/Plan:     * Squamous cell carcinoma of left tonsil  Dereck Centeno is a 62 y.o. 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 On day of surgery pt with expanding hematoma s/p emergent neck exploration with washout 2/20. Oral packing removed 2/22. Tracheostomy 2/24/23. Code blue on 2/27 for bleeding likely from tracheal stoma site. Required ETT placement in stoma and replacement of oral packing. CTA completed 2/27 without extravasation.   ETT replaced with tracheostomy and oral packing removed 2/28.     Remains hemostatic.     -- Stepped down to floor  -- On room air/trach collar  -- continue NGT feeds until works more with SPT   -- Hold off on G-tube for now  -- Labs and tacrolimus dosing per hepatology  -- replace lytes prn  -- prn pain meds  -- Please page ENT with any questions or concerns               Rm Diego MD  Otorhinolaryngology-Head & Neck Surgery  [facility]

## 2023-03-05 NOTE — RESPIRATORY THERAPY
RAPID RESPONSE RESPIRATORY THERAPY PROACTIVE NOTE           Time of visit: 943     Code Status: Full Code   : 1960  Bed: 2/2 A:   MRN: [id number]  Time spent at the bedside: < 15 min    SITUATION    Evaluated patient for: LDA Check     BACKGROUND    Patient has a past medical history of Alcohol withdrawal seizure, Alcoholic cirrhosis, Anxiety, Depression, GERD (gastroesophageal reflux disease), Hepatitis C, HTN (hypertension), benign, Hyperlipidemia, 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, and Tobacco use.  Clinically Significant Surgical Hx: tracheostomy    24 Hours Vitals Range:  Temp:  [97.6 °F (36.4 °C)-100 °F (37.8 °C)]   Pulse:  [75-96]   Resp:  [16-82]   BP: (128-178)/(59-90)   SpO2:  [87 %-98 %]     Labs:    Recent Labs     23  0334 23  0413 23  0345    137 138   K 4.2 3.8 3.8    104 106   CO2 30* 26 24   CREATININE 0.9 0.8 0.8   * 136* 124*   PHOS 3.2 2.5* 3.2   MG 1.7 1.5* 1.5*        No results for input(s): PH, PCO2, PO2, HCO3, POCSATURATED, BE in the last 72 hours.    ASSESSMENT/INTERVENTIONS  Pt asleep for duration of visit, no s/s of distress noted on exam, no intervention necessary.       Last VS   Temp: 98.8 °F (37.1 °C) (740)  Pulse: 83 (740)  Resp: 20 (740)  BP: 137/72 (740)  SpO2: 92 % (740)      Extra trachs at bedside: 6.0 & 4.0 Shiley Cuffed  Level of Consciousness: Level of Consciousness (AVPU): alert  Respiratory Effort: Respiratory Effort: Normal, Unlabored Expansion/Accessory Muscle Usage: Expansion/Accessory Muscles/Retractions: no use of accessory muscles, no retractions, expansion symmetric  All Lung Field Breath Sounds: All Lung Fields Breath Sounds: Anterior:, Lateral:, diminished  LALO Breath Sounds: coarse  LLL Breath Sounds: coarse  RUL Breath Sounds: coarse  RML Breath Sounds: coarse  RLL Breath Sounds: coarse  O2 Device/Concentration: room air  Surgical airway: Yes, Type: Shiley Size: 6,  cuffed  Ambu at bedside: Ambu bag with the patient?: Yes, Adult Ambu     Active Orders   Respiratory Care    Inhalation Treatment Q8H     Frequency: Q8H     Number of Occurrences: Until Specified    POCT ARTERIAL BLOOD GAS Blood Gas     Frequency: PRN     Number of Occurrences: Until Specified     Order Comments: Notify Physician if: see parameters below.       Order Questions:      Component: Blood Gas    Pulse Oximetry Q4H     Frequency: Q4H     Number of Occurrences: Until Specified     Order Comments: Pt on 5L/28%      Routine tracheostomy care     Frequency: BID     Number of Occurrences: Until Specified       RECOMMENDATIONS    We recommend: RRT Recs: Continue POC per primary team.      FOLLOW-UP    Please call back the Rapid Response RT, Power Son, RRT at x [phone] for any questions or concerns.

## 2023-03-05 NOTE — ASSESSMENT & PLAN NOTE
Dereck Centeno is a 62 y.o. 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 On day of surgery pt with expanding hematoma s/p emergent neck exploration with washout 2/20. Oral packing removed 2/22. Tracheostomy 2/24/23. Code blue on 2/27 for bleeding likely from tracheal stoma site. Required ETT placement in stoma and replacement of oral packing. CTA completed 2/27 without extravasation.   ETT replaced with tracheostomy and oral packing removed 2/28.     Remains hemostatic.     -- Stepped down to floor  -- On room air/trach collar  -- continue NGT feeds until works more with SPT   -- Hold off on G-tube for now  -- Labs and tacrolimus dosing per hepatology  -- replace lytes prn  -- prn pain meds  -- Please page ENT with any questions or concerns

## 2023-03-05 NOTE — PLAN OF CARE
POC reviewed. AAOx4, VSS on RA with no complaints of SOB, headaches, or dizziness. Thick tracheal secretions, patient able to independently sit up and output secretions. Up to bedside commode, urine and bowel output, adequate. NPO with nutrition provided by Impact Peptide 1.5 continuous tube feeds at 45 (goal) per JANAY NGT, tolerated. All medications administered per JANAY NGT, tolerated. No complaints of nausea or vomiting. Pain controlled by prn and scheduled medications. Blood glucose monitored q6h, no insulin coverage required. Telemetry monitored NSR. Resting with side rails up and call light within reach. Continuing to monitor patient status.

## 2023-03-05 NOTE — TREATMENT PLAN
Hepatology Treatment Plan    This is a 62 year old male with PMH significant for cirrhosis secondary to ETOH/Hepatitis C (status post transplant in 01/2015) and head/neck cancer (status post resection in approximately 2019 at [facility] with development of squamous cell carcinoma of left tonsil as of 01/2023) who was admitted to [facility] on 02/20 for left tonsillectomy and neck dissection that was performed on day of admission. Hospital course associated with development of hematoma at surgical site requiring neck exploration and hematoma evacuation on 02/20 with tracheostomy creation on 02/24 and cardiac arrest on 02/27 secondary to hypoxia with concern for on-going bleeding from surgical site (however CTA negative for active bleeding). Hepatology following for assistance with immunosuppression.     Interval History  Vital signs remain stable on room air. LFT's remain normal. Tacrolimus level 2.9 today.     Plan  - Tacrolimus 2 mg in AM and 1 mg in PM.   - Please obtain daily CBC, BMP, LFT, INR, and Tacrolimus level.     Thank you for involving us in the care of Dereck Centeno. Please call with any additional concerns or questions.        Erik Lindsay MD, PGY-V  Gastroenterology Fellow  [facility]

## 2023-03-06 LAB
ALBUMIN SERPL BCP-MCNC: 2.9 G/DL (ref 3.5–5.2)
ALP SERPL-CCNC: 62 U/L (ref 55–135)
ALT SERPL W/O P-5'-P-CCNC: 33 U/L (ref 10–44)
ANION GAP SERPL CALC-SCNC: 7 MMOL/L (ref 8–16)
AST SERPL-CCNC: 28 U/L (ref 10–40)
BASOPHILS # BLD AUTO: 0.04 K/UL (ref 0–0.2)
BASOPHILS NFR BLD: 0.6 % (ref 0–1.9)
BILIRUB SERPL-MCNC: 0.5 MG/DL (ref 0.1–1)
BUN SERPL-MCNC: 28 MG/DL (ref 8–23)
CALCIUM SERPL-MCNC: 9.3 MG/DL (ref 8.7–10.5)
CHLORIDE SERPL-SCNC: 104 MMOL/L (ref 95–110)
CO2 SERPL-SCNC: 26 MMOL/L (ref 23–29)
CREAT SERPL-MCNC: 0.8 MG/DL (ref 0.5–1.4)
DIFFERENTIAL METHOD: ABNORMAL
EOSINOPHIL # BLD AUTO: 0.2 K/UL (ref 0–0.5)
EOSINOPHIL NFR BLD: 3.3 % (ref 0–8)
ERYTHROCYTE [DISTWIDTH] IN BLOOD BY AUTOMATED COUNT: 12.8 % (ref 11.5–14.5)
EST. GFR  (NO RACE VARIABLE): >60 ML/MIN/1.73 M^2
GLUCOSE SERPL-MCNC: 135 MG/DL (ref 70–110)
HCT VFR BLD AUTO: 28.2 % (ref 40–54)
HGB BLD-MCNC: 9.2 G/DL (ref 14–18)
IMM GRANULOCYTES # BLD AUTO: 0.06 K/UL (ref 0–0.04)
IMM GRANULOCYTES NFR BLD AUTO: 0.8 % (ref 0–0.5)
INR PPP: 1.1 (ref 0.8–1.2)
LYMPHOCYTES # BLD AUTO: 0.8 K/UL (ref 1–4.8)
LYMPHOCYTES NFR BLD: 10.9 % (ref 18–48)
MAGNESIUM SERPL-MCNC: 1.6 MG/DL (ref 1.6–2.6)
MCH RBC QN AUTO: 30.1 PG (ref 27–31)
MCHC RBC AUTO-ENTMCNC: 32.6 G/DL (ref 32–36)
MCV RBC AUTO: 92 FL (ref 82–98)
MONOCYTES # BLD AUTO: 0.6 K/UL (ref 0.3–1)
MONOCYTES NFR BLD: 8.8 % (ref 4–15)
NEUTROPHILS # BLD AUTO: 5.4 K/UL (ref 1.8–7.7)
NEUTROPHILS NFR BLD: 75.6 % (ref 38–73)
NRBC BLD-RTO: 0 /100 WBC
PHOSPHATE SERPL-MCNC: 2.8 MG/DL (ref 2.7–4.5)
PLATELET # BLD AUTO: 343 K/UL (ref 150–450)
PMV BLD AUTO: 9.1 FL (ref 9.2–12.9)
POCT GLUCOSE: 138 MG/DL (ref 70–110)
POCT GLUCOSE: 144 MG/DL (ref 70–110)
POTASSIUM SERPL-SCNC: 3.8 MMOL/L (ref 3.5–5.1)
PROT SERPL-MCNC: 6.4 G/DL (ref 6–8.4)
PROTHROMBIN TIME: 11.6 SEC (ref 9–12.5)
RBC # BLD AUTO: 3.06 M/UL (ref 4.6–6.2)
SODIUM SERPL-SCNC: 137 MMOL/L (ref 136–145)
TACROLIMUS BLD-MCNC: 3 NG/ML (ref 5–15)
WBC # BLD AUTO: 7.18 K/UL (ref 3.9–12.7)

## 2023-03-06 PROCEDURE — 25000242 PHARM REV CODE 250 ALT 637 W/ HCPCS: Performed by: STUDENT IN AN ORGANIZED HEALTH CARE EDUCATION/TRAINING PROGRAM

## 2023-03-06 PROCEDURE — 63600175 PHARM REV CODE 636 W HCPCS

## 2023-03-06 PROCEDURE — 99900031 HC PATIENT EDUCATION (STAT)

## 2023-03-06 PROCEDURE — 25000003 PHARM REV CODE 250: Performed by: STUDENT IN AN ORGANIZED HEALTH CARE EDUCATION/TRAINING PROGRAM

## 2023-03-06 PROCEDURE — 97116 GAIT TRAINING THERAPY: CPT | Mod: CQ

## 2023-03-06 PROCEDURE — 94761 N-INVAS EAR/PLS OXIMETRY MLT: CPT

## 2023-03-06 PROCEDURE — 80197 ASSAY OF TACROLIMUS: CPT

## 2023-03-06 PROCEDURE — 51798 US URINE CAPACITY MEASURE: CPT

## 2023-03-06 PROCEDURE — 63600175 PHARM REV CODE 636 W HCPCS: Performed by: STUDENT IN AN ORGANIZED HEALTH CARE EDUCATION/TRAINING PROGRAM

## 2023-03-06 PROCEDURE — 99900026 HC AIRWAY MAINTENANCE (STAT)

## 2023-03-06 PROCEDURE — 94640 AIRWAY INHALATION TREATMENT: CPT

## 2023-03-06 PROCEDURE — 99900035 HC TECH TIME PER 15 MIN (STAT)

## 2023-03-06 PROCEDURE — 83735 ASSAY OF MAGNESIUM: CPT

## 2023-03-06 PROCEDURE — 84100 ASSAY OF PHOSPHORUS: CPT

## 2023-03-06 PROCEDURE — 85610 PROTHROMBIN TIME: CPT | Performed by: STUDENT IN AN ORGANIZED HEALTH CARE EDUCATION/TRAINING PROGRAM

## 2023-03-06 PROCEDURE — 80053 COMPREHEN METABOLIC PANEL: CPT

## 2023-03-06 PROCEDURE — 36415 COLL VENOUS BLD VENIPUNCTURE: CPT | Performed by: STUDENT IN AN ORGANIZED HEALTH CARE EDUCATION/TRAINING PROGRAM

## 2023-03-06 PROCEDURE — 92526 ORAL FUNCTION THERAPY: CPT

## 2023-03-06 PROCEDURE — 85025 COMPLETE CBC W/AUTO DIFF WBC: CPT

## 2023-03-06 PROCEDURE — 20600001 HC STEP DOWN PRIVATE ROOM

## 2023-03-06 PROCEDURE — 97535 SELF CARE MNGMENT TRAINING: CPT

## 2023-03-06 PROCEDURE — 25000003 PHARM REV CODE 250

## 2023-03-06 PROCEDURE — 97530 THERAPEUTIC ACTIVITIES: CPT | Mod: CQ

## 2023-03-06 PROCEDURE — 25000003 PHARM REV CODE 250: Performed by: OTOLARYNGOLOGY

## 2023-03-06 RX ORDER — FAMOTIDINE 40 MG/5ML
20 POWDER, FOR SUSPENSION ORAL DAILY
Status: DISCONTINUED | OUTPATIENT
Start: 2023-03-07 | End: 2023-03-10 | Stop reason: HOSPADM

## 2023-03-06 RX ORDER — QUETIAPINE FUMARATE 25 MG/1
50 TABLET, FILM COATED ORAL 2 TIMES DAILY
Status: DISCONTINUED | OUTPATIENT
Start: 2023-03-06 | End: 2023-03-10 | Stop reason: HOSPADM

## 2023-03-06 RX ORDER — ESCITALOPRAM OXALATE 5 MG/5ML
20 SOLUTION ORAL NIGHTLY
Status: DISCONTINUED | OUTPATIENT
Start: 2023-03-06 | End: 2023-03-10 | Stop reason: HOSPADM

## 2023-03-06 RX ORDER — MIRTAZAPINE 15 MG/1
15 TABLET, ORALLY DISINTEGRATING ORAL NIGHTLY
Status: DISCONTINUED | OUTPATIENT
Start: 2023-03-06 | End: 2023-03-10 | Stop reason: HOSPADM

## 2023-03-06 RX ORDER — ACETAMINOPHEN 650 MG/20.3ML
650 LIQUID ORAL EVERY 8 HOURS
Status: DISCONTINUED | OUTPATIENT
Start: 2023-03-06 | End: 2023-03-10 | Stop reason: HOSPADM

## 2023-03-06 RX ORDER — GABAPENTIN 250 MG/5ML
600 SOLUTION ORAL 3 TIMES DAILY
Status: DISCONTINUED | OUTPATIENT
Start: 2023-03-06 | End: 2023-03-10 | Stop reason: HOSPADM

## 2023-03-06 RX ORDER — CLONIDINE HYDROCHLORIDE 0.1 MG/1
0.1 TABLET ORAL NIGHTLY
Status: DISCONTINUED | OUTPATIENT
Start: 2023-03-06 | End: 2023-03-10 | Stop reason: HOSPADM

## 2023-03-06 RX ORDER — ATORVASTATIN CALCIUM 40 MG/1
40 TABLET, FILM COATED ORAL NIGHTLY
Status: DISCONTINUED | OUTPATIENT
Start: 2023-03-06 | End: 2023-03-10 | Stop reason: HOSPADM

## 2023-03-06 RX ORDER — HYDROMORPHONE HYDROCHLORIDE 1 MG/ML
0.5 INJECTION, SOLUTION INTRAMUSCULAR; INTRAVENOUS; SUBCUTANEOUS EVERY 6 HOURS PRN
Status: DISCONTINUED | OUTPATIENT
Start: 2023-03-06 | End: 2023-03-10 | Stop reason: HOSPADM

## 2023-03-06 RX ORDER — SENNOSIDES 8.8 MG/5ML
5 LIQUID ORAL 2 TIMES DAILY
Status: DISCONTINUED | OUTPATIENT
Start: 2023-03-06 | End: 2023-03-10 | Stop reason: HOSPADM

## 2023-03-06 RX ORDER — OXYCODONE HCL 5 MG/5 ML
5 SOLUTION, ORAL ORAL EVERY 4 HOURS PRN
Status: DISCONTINUED | OUTPATIENT
Start: 2023-03-06 | End: 2023-03-10 | Stop reason: HOSPADM

## 2023-03-06 RX ORDER — QUETIAPINE FUMARATE 25 MG/1
50 TABLET, FILM COATED ORAL EVERY 12 HOURS PRN
Status: DISCONTINUED | OUTPATIENT
Start: 2023-03-06 | End: 2023-03-10 | Stop reason: HOSPADM

## 2023-03-06 RX ORDER — GUAIFENESIN 100 MG/5ML
200 SOLUTION ORAL EVERY 4 HOURS PRN
Status: DISCONTINUED | OUTPATIENT
Start: 2023-03-06 | End: 2023-03-10 | Stop reason: HOSPADM

## 2023-03-06 RX ADMIN — POLYETHYLENE GLYCOL 3350 17 G: 17 POWDER, FOR SOLUTION ORAL at 09:03

## 2023-03-06 RX ADMIN — OXYCODONE HYDROCHLORIDE 5 MG: 5 SOLUTION ORAL at 11:03

## 2023-03-06 RX ADMIN — TACROLIMUS 1 MG: 1 CAPSULE ORAL at 06:03

## 2023-03-06 RX ADMIN — QUETIAPINE FUMARATE 50 MG: 25 TABLET ORAL at 09:03

## 2023-03-06 RX ADMIN — LEVALBUTEROL HYDROCHLORIDE 0.63 MG: 0.63 SOLUTION RESPIRATORY (INHALATION) at 01:03

## 2023-03-06 RX ADMIN — MIRTAZAPINE 15 MG: 15 TABLET, ORALLY DISINTEGRATING ORAL at 08:03

## 2023-03-06 RX ADMIN — HYDROMORPHONE HYDROCHLORIDE 0.5 MG: 1 INJECTION, SOLUTION INTRAMUSCULAR; INTRAVENOUS; SUBCUTANEOUS at 08:03

## 2023-03-06 RX ADMIN — GABAPENTIN 600 MG: 250 SOLUTION ORAL at 02:03

## 2023-03-06 RX ADMIN — OXYCODONE HYDROCHLORIDE 5 MG: 5 SOLUTION ORAL at 03:03

## 2023-03-06 RX ADMIN — IPRATROPIUM BROMIDE 0.5 MG: 0.5 SOLUTION RESPIRATORY (INHALATION) at 01:03

## 2023-03-06 RX ADMIN — FAMOTIDINE 20 MG: 40 POWDER, FOR SUSPENSION ORAL at 09:03

## 2023-03-06 RX ADMIN — ESCITALOPRAM OXALATE 20 MG: 5 SOLUTION ORAL at 03:03

## 2023-03-06 RX ADMIN — IPRATROPIUM BROMIDE 0.5 MG: 0.5 SOLUTION RESPIRATORY (INHALATION) at 08:03

## 2023-03-06 RX ADMIN — LEVALBUTEROL HYDROCHLORIDE 0.63 MG: 0.63 SOLUTION RESPIRATORY (INHALATION) at 08:03

## 2023-03-06 RX ADMIN — QUETIAPINE FUMARATE 50 MG: 25 TABLET ORAL at 08:03

## 2023-03-06 RX ADMIN — GABAPENTIN 600 MG: 250 SOLUTION ORAL at 09:03

## 2023-03-06 RX ADMIN — ACETAMINOPHEN 650 MG: 650 SOLUTION ORAL at 02:03

## 2023-03-06 RX ADMIN — LEVALBUTEROL HYDROCHLORIDE 0.63 MG: 0.63 SOLUTION RESPIRATORY (INHALATION) at 03:03

## 2023-03-06 RX ADMIN — ENOXAPARIN SODIUM 40 MG: 40 INJECTION SUBCUTANEOUS at 06:03

## 2023-03-06 RX ADMIN — TACROLIMUS 2 MG: 1 CAPSULE ORAL at 09:03

## 2023-03-06 RX ADMIN — ACETAMINOPHEN 650 MG: 650 SOLUTION ORAL at 05:03

## 2023-03-06 RX ADMIN — GABAPENTIN 600 MG: 250 SOLUTION ORAL at 08:03

## 2023-03-06 RX ADMIN — SENNOSIDES 5 ML: 8.8 SYRUP ORAL at 09:03

## 2023-03-06 RX ADMIN — IPRATROPIUM BROMIDE 0.5 MG: 0.5 SOLUTION RESPIRATORY (INHALATION) at 03:03

## 2023-03-06 NOTE — PT/OT/SLP PROGRESS
Speech Language Pathology Treatment    Patient Name:  Dereck Centeno   MRN:  [id number]  Admitting Diagnosis: Squamous cell carcinoma of left tonsil    Recommendations:                 General Recommendations:  Dysphagia therapy and Speech/language therapy  Diet recommendations:  NPO, Liquid Diet Level: Full liquids   Aspiration Precautions: Small bites/sips and Standard aspiration precautions   General Precautions: Standard, aspiration, fall  Communication strategies:  none    Subjective     Awake/alert    Pain/Comfort:  Pain Rating 1: 0/10  Pain Rating Post-Intervention 1: 0/10    Respiratory Status: Room air    Objective:     Has the patient been evaluated by SLP for swallowing?   Yes  Keep patient NPO? No     Pt tolerated PMSV throughout session without s/s of respiratory distress. Pt stated he is still having copious thick secretions, but overall vocal quality is clear. He tolerated thin liquids x4 via cup and puree x2 with mild delay in swallow initiation and multiple swallows. No overt s/s of airway compromise noted throughout PO trials. SLP provided ongoing education regarding diet recs of full liquid diet, swallow precautions and need for PMSV wear during PO intake. Pt verbalized understanding. Recommend full liquid diet at this time, SLP communicated recs with MD.     Assessment:     Dereck Centeno is a 62 y.o. male with an SLP diagnosis of Dysphagia.      Goals:   Multidisciplinary Problems       SLP Goals          Problem: SLP    Goal Priority Disciplines Outcome   SLP Goal     SLP    Description: Speech Language Pathology Goals  Goals expected to be met by 3/10    1. Pt will tolerate PMSV for all waking hours with >92% spO2 to increase phonation, respiration and swallowing aspects  2. Pt/family will don/doff PMSV x1 during session with min cues  3. Pt will vocalize with PMSV in place to increase verbal expression.   4. Pt will participate in ongoing swallow assessment                                Plan:     Patient to be seen:  4 x/week   Plan of Care reviewed with:  patient   SLP Follow-Up:  Yes       Discharge recommendations:    TBD      Time Tracking:     SLP Treatment Date:   03/06/23  Speech Start Time:  0927  Speech Stop Time:  0936     Speech Total Time (min):  9 min    Billable Minutes: Treatment Swallowing Dysfunction 9    03/06/2023

## 2023-03-06 NOTE — SUBJECTIVE & OBJECTIVE
Interval History: NAEON    Medications:  Continuous Infusions:  Scheduled Meds:   acetaminophen  650 mg Per J Tube Q8H    atorvastatin  40 mg Per NG tube QHS    cloNIDine  0.1 mg Per NG tube QHS    enoxaparin  40 mg Subcutaneous Daily    EScitalopram oxalate  20 mg Per NG tube QHS    famotidine  20 mg Per NG tube Daily    gabapentin  600 mg Per NG tube TID    ipratropium  0.5 mg Nebulization Q8H    levalbuterol  0.63 mg Nebulization Q8H    mirtazapine  15 mg Per NG tube Nightly    polyethylene glycol  17 g Per NG tube Daily    QUEtiapine  50 mg Per NG tube BID    scopolamine  1 patch Transdermal Q3 Days    sennosides 8.8 mg/5 ml  5 mL Per NG tube BID    tacrolimus  2 mg Per NG tube Daily AM    And    tacrolimus  1 mg Per NG tube Daily PM     PRN Meds:sodium chloride, sodium chloride 0.9%, calcium gluconate IVPB, calcium gluconate IVPB, calcium gluconate IVPB, dextrose 10%, dextrose 10%, glucagon (human recombinant), guaiFENesin 100 mg/5 ml, hydrALAZINE, insulin aspart U-100, labetalol, magnesium sulfate IVPB, magnesium sulfate IVPB, ondansetron, oxyCODONE, potassium chloride **AND** potassium chloride **AND** potassium chloride, QUEtiapine, sodium phosphate IVPB, sodium phosphate IVPB, sodium phosphate IVPB     Review of patient's allergies indicates:  No Known Allergies  Objective:     Vital Signs (24h Range):  Temp:  [97.8 °F (36.6 °C)-99.6 °F (37.6 °C)] 99.1 °F (37.3 °C)  Pulse:  [75-89] 77  Resp:  [16-20] 16  SpO2:  [92 %-98 %] 97 %  BP: (121-159)/(62-76) 150/70       Lines/Drains/Airways       Drain  Duration                  Trans Pyloric Feeding Tube 02/20/23 1320 nasogastric;Other (comments) 12 Fr. Right nostril 13 days              Airway  Duration             Adult Surgical Airway 02/28/23 Suriley Cuffed 6.0/ 75mm 6 days              Peripheral Intravenous Line  Duration                  Peripheral IV - Single Lumen 03/01/23 0610 20 G Anterior;Right Forearm 5 days                    Physical Exam  NAD  NGT  sutured in place  Oral cavity with clear secretions . Postop site healing with appropriate granulation, hemostatic   Neck soft, ecchymosis nearly resolved   Tracheostomy in place, on trach collar. No bleeding on suctioning or around trach.     Significant Labs:  CBC:   Recent Labs   Lab 03/06/23  0501   WBC 7.18   RBC 3.06*   HGB 9.2*   HCT 28.2*      MCV 92   MCH 30.1   MCHC 32.6     CMP:   Recent Labs   Lab 03/06/23  0501   *   CALCIUM 9.3   ALBUMIN 2.9*   PROT 6.4      K 3.8   CO2 26      BUN 28*   CREATININE 0.8   ALKPHOS 62   ALT 33   AST 28   BILITOT 0.5       Significant Diagnostics:  None

## 2023-03-06 NOTE — PT/OT/SLP PROGRESS
Occupational Therapy   Co-Treatment  Co-treatment with PT for maximal pt participation, safety, and activity tolerance     Name: Dereck Centeno  MRN: [id number]  Admitting Diagnosis:  Squamous cell carcinoma of left tonsil  10 Days Post-Op    Recommendations:     Discharge Recommendations: home health OT  Discharge Equipment Recommendations:  other (see comments) (TBD)  Barriers to discharge:  None    Assessment:     Dereck Centeno is a 62 y.o. male with a medical diagnosis of Squamous cell carcinoma of left tonsil.  He presents with impaired ADL and mobility performance deficits. Pt found upright in bed and agreeable for therapy. Pt required SBA for bed mobility and mobilized for standing ADLs and toileting. Pt would benefit from continued OT skilled services 3x/wk to improve daily living skills to optimize QOL.  Pt is recommended to discharge to OT at this time.   Performance deficits affecting function are weakness, impaired functional mobility, impaired endurance, gait instability, impaired balance, impaired self care skills, decreased upper extremity function, decreased coordination, decreased safety awareness, impaired cardiopulmonary response to activity.     Rehab Prognosis:  Good; patient would benefit from acute skilled OT services to address these deficits and reach maximum level of function.       Plan:     Patient to be seen 3 x/week to address the above listed problems via self-care/home management, therapeutic activities, therapeutic exercises  Plan of Care Expires: 04/03/23  Plan of Care Reviewed with: patient    Subjective     Pain/Comfort:  Pain Rating 1: 0/10  Pain Rating Post-Intervention 1: 0/10    Objective:     Communicated with: RN prior to session.  Patient found HOB elevated with telemetry, PICC line, SCD upon OT entry to room.    General Precautions: Standard, fall, aspiration    Orthopedic Precautions:N/A  Braces: N/A  Respiratory Status: Room air     Occupational Performance:      Bed Mobility:    Patient completed Rolling/Turning to Right with stand by assistance  Patient completed Scooting/Bridging with stand by assistance  Patient completed Supine to Sit with stand by assistance     Functional Mobility/Transfers:  Patient completed Sit <> Stand Transfer with stand by assistance  with  rolling walker   Patient completed Bed <> Chair Transfer using Step Transfer technique with stand by assistance with rolling walker  Functional Mobility: Pt stood and mobilized in room and into restroom for ~3 minutes of standing ADLs at sink and seated pericare on standard commode    Activities of Daily Living:  Grooming: contact guard assistance washing face and brushing teeth at sink  Upper Body Dressing: minimum assistance donning gown at EOB       [facility] 6 Click ADL: 14    Treatment & Education:  Pt educated on role of occupational therapy, POC, and safety during ADLs and functional mobility. Pt and OT discussed importance of safe, continued mobility to optimize daily living skills. Pt verbalized understanding. White board updated during session. Pt given instruction to call for medical staff/nurse for assistance.       Patient left up in chair with all lines intact, call button in reach, and RN notified    GOALS:   Multidisciplinary Problems       Occupational Therapy Goals          Problem: Occupational Therapy    Goal Priority Disciplines Outcome Interventions   Occupational Therapy Goal     OT, PT/OT Ongoing, Progressing    Description: Goals to be met by: 3/15/23     Patient will increase functional independence with ADLs by performing:    Feeding with Supervision.  UE Dressing with Stand-by Assistance.  LE Dressing with Contact Guard Assistance.  Grooming while standing with Stand-by Assistance.  Toileting from toilet with Contact Guard Assistance for hygiene and clothing management.   Sitting at edge of bed x15 minutes with Supervision.                         Time Tracking:     OT Date of  Treatment: 03/06/23  OT Start Time: 1034  OT Stop Time: 1057  OT Total Time (min): 23 min    Billable Minutes:Self Care/Home Management 23 min      OT/LENORA: OT          3/6/2023

## 2023-03-06 NOTE — TREATMENT PLAN
Hepatology Treatment Plan    This is a 62 year old male with PMH significant for cirrhosis secondary to ETOH/Hepatitis C (status post transplant in 01/2015) and head/neck cancer (status post resection in approximately 2019 at [facility] with development of squamous cell carcinoma of left tonsil as of 01/2023) who was admitted to [facility] on 02/20 for left tonsillectomy and neck dissection that was performed on day of admission. Hospital course associated with development of hematoma at surgical site requiring neck exploration and hematoma evacuation on 02/20 with tracheostomy creation on 02/24 and cardiac arrest on 02/27 secondary to hypoxia with concern for on-going bleeding from surgical site (however CTA negative for active bleeding). Hepatology following for assistance with immunosuppression.     Interval History  Vital signs remain stable on room air. LFT's remain normal. Tacrolimus level 3 today.     Plan  - Tacrolimus 2 mg in AM and 1 mg in PM.   - Please obtain daily CBC, BMP, LFT, INR, and Tacrolimus level.     Thank you for involving us in the care of Dereck Centeno. Please call with any additional concerns or questions.        Erik Lindsay MD, PGY-V  Gastroenterology Fellow  [facility]

## 2023-03-06 NOTE — PLAN OF CARE
[facility]  Discharge Reassessment    Primary Care Provider: Eva Reynaga MD    Expected Discharge Date: 3/8/2023    Reassessment (most recent)       Discharge Reassessment - 03/06/23 1125          Discharge Reassessment    Assessment Type Discharge Planning Reassessment     Discharge Plan A Long-term acute care facility (LTAC)     Discharge Plan B Home     DME Needed Upon Discharge  respiratory supplies;other (see comments)   Possible nutrition supplies - depends on speech/swallow study    Discharge Barriers Identified Underinsured   Medicaid could be a barrier to post acute needs    Why the patient remains in the hospital Requires continued medical care                   Dania Boles RN, CM   Ext: [phone]

## 2023-03-06 NOTE — PT/OT/SLP PROGRESS
"Physical Therapy Treatment  Co-Treat with OT    Patient Name:  Dereck Centeno   MRN:  [id number]    Recommendations:     Discharge Recommendations: home health PT  Discharge Equipment Recommendations:  (will determine DME Needs closer to discharge)  Barriers to discharge:  Decreased caregiver support family will not be able to assist at current functional level.    Assessment:     Dereck Centeno is a 62 y.o. male admitted with a medical diagnosis of Squamous cell carcinoma of left tonsil.  He presents with the following impairments/functional limitations: weakness, impaired endurance, impaired self care skills, impaired functional mobility, gait instability, impaired balance, decreased safety awareness, impaired cardiopulmonary response to activity Pt demonstrates improved amb with use of RW today. No bouts of instability, however did C/O increased general fatigue following amb and self-care tasks. Pt will continue to benefit from continued skilled PT to improve upon functional mobility and to progress toward stated goals.    Rehab Prognosis: Good; patient would benefit from acute skilled PT services to address these deficits and reach maximum level of function.    Recent Surgery: Procedure(s) (LRB):  CREATION, TRACHEOSTOMY (N/A) 10 Days Post-Op    Plan:     During this hospitalization, patient to be seen 3 x/week to address the identified rehab impairments via gait training, therapeutic activities, therapeutic exercises, neuromuscular re-education and progress toward the following goals:    Plan of Care Expires:  03/19/23    Subjective     Chief Complaint: "I'd like to try the walker.      Objective:     Communicated with RN prior to session.  Patient found HOB elevated with telemetry, PICC line, SCD upon PT entry to room.     General Precautions: Standard, aspiration, fall  Orthopedic Precautions: N/A  Braces: N/A  Respiratory Status:  trach collar 35% 8L     Functional Mobility:  Bed Mobility:     Rolling " Right: stand by assistance  Scooting: stand by assistance  Supine to Sit: stand by assistance  Transfers:     Sit to Stand:  stand by assistance with rolling walker  Bed to Chair: stand by assistance with  rolling walker  using  Stand Pivot  Gait: amb 2 trials; Trial 1: 10 feet; Trial 2: 20 ft with RW CGA.      AM-PAC 6 CLICK MOBILITY  Turning over in bed (including adjusting bedclothes, sheets and blankets)?: 3  Sitting down on and standing up from a chair with arms (e.g., wheelchair, bedside commode, etc.): 3  Moving from lying on back to sitting on the side of the bed?: 3  Moving to and from a bed to a chair (including a wheelchair)?: 3  Need to walk in hospital room?: 3  Climbing 3-5 steps with a railing?: 1  Basic Mobility Total Score: 16    Patient left up in chair with all lines intact and call button in reach..    GOALS:   Multidisciplinary Problems       Physical Therapy Goals          Problem: Physical Therapy    Goal Priority Disciplines Outcome Goal Variances Interventions   Physical Therapy Goal     PT, PT/OT Ongoing, Progressing     Description: Goals to be met by: 3/22/23     Patient will increase functional independence with mobility by performin. Supine to sit with Modified [place]  2. Sit to stand transfer with Modified [place]  3. Gait  x 150 feet with Supervision using AD if needed. .   4. Ascend/descend 3 stair with bilateral Handrails Contact Guard Assistance .                          Time Tracking:     PT Received On: 23  PT Start Time: 1034     PT Stop Time: 1057  PT Total Time (min): 23 min     Billable Minutes: Gait Training 12 and Therapeutic Activity 11    Treatment Type: Treatment  PT/PTA: PTA     PTA Visit Number: 1     2023

## 2023-03-06 NOTE — PROGRESS NOTES
[facility]  Otorhinolaryngology-Head & Neck Surgery  Progress Note    Subjective:     Post-Op Info:  Procedure(s) (LRB):  CREATION, TRACHEOSTOMY (N/A)   10 Days Post-Op  Hospital Day: 15     Interval History: IJEOMA    Medications:  Continuous Infusions:  Scheduled Meds:   acetaminophen  650 mg Per J Tube Q8H    atorvastatin  40 mg Per NG tube QHS    cloNIDine  0.1 mg Per NG tube QHS    enoxaparin  40 mg Subcutaneous Daily    EScitalopram oxalate  20 mg Per NG tube QHS    famotidine  20 mg Per NG tube Daily    gabapentin  600 mg Per NG tube TID    ipratropium  0.5 mg Nebulization Q8H    levalbuterol  0.63 mg Nebulization Q8H    mirtazapine  15 mg Per NG tube Nightly    polyethylene glycol  17 g Per NG tube Daily    QUEtiapine  50 mg Per NG tube BID    scopolamine  1 patch Transdermal Q3 Days    sennosides 8.8 mg/5 ml  5 mL Per NG tube BID    tacrolimus  2 mg Per NG tube Daily AM    And    tacrolimus  1 mg Per NG tube Daily PM     PRN Meds:sodium chloride, sodium chloride 0.9%, calcium gluconate IVPB, calcium gluconate IVPB, calcium gluconate IVPB, dextrose 10%, dextrose 10%, glucagon (human recombinant), guaiFENesin 100 mg/5 ml, hydrALAZINE, insulin aspart U-100, labetalol, magnesium sulfate IVPB, magnesium sulfate IVPB, ondansetron, oxyCODONE, potassium chloride **AND** potassium chloride **AND** potassium chloride, QUEtiapine, sodium phosphate IVPB, sodium phosphate IVPB, sodium phosphate IVPB     Review of patient's allergies indicates:  No Known Allergies  Objective:     Vital Signs (24h Range):  Temp:  [97.8 °F (36.6 °C)-99.6 °F (37.6 °C)] 99.1 °F (37.3 °C)  Pulse:  [75-89] 77  Resp:  [16-20] 16  SpO2:  [92 %-98 %] 97 %  BP: (121-159)/(62-76) 150/70       Lines/Drains/Airways       Drain  Duration                  Trans Pyloric Feeding Tube 02/20/23 1320 nasogastric;Other (comments) 12 Fr. Right nostril 13 days              Airway  Duration             Adult Surgical Airway 02/28/23 Christopher  Cuffed 6.0/ 75mm 6 days              Peripheral Intravenous Line  Duration                  Peripheral IV - Single Lumen 03/01/23 0610 20 G Anterior;Right Forearm 5 days                    Physical Exam  NAD  NGT sutured in place  Oral cavity with clear secretions . Postop site healing with appropriate granulation, hemostatic   Neck soft, ecchymosis nearly resolved   Tracheostomy in place, on trach collar. No bleeding on suctioning or around trach.     Significant Labs:  CBC:   Recent Labs   Lab 03/06/23  0501   WBC 7.18   RBC 3.06*   HGB 9.2*   HCT 28.2*      MCV 92   MCH 30.1   MCHC 32.6     CMP:   Recent Labs   Lab 03/06/23  0501   *   CALCIUM 9.3   ALBUMIN 2.9*   PROT 6.4      K 3.8   CO2 26      BUN 28*   CREATININE 0.8   ALKPHOS 62   ALT 33   AST 28   BILITOT 0.5       Significant Diagnostics:  None    Assessment/Plan:     * Squamous cell carcinoma of left tonsil  Dereck Centeno is a 62 y.o. 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 On day of surgery pt with expanding hematoma s/p emergent neck exploration with washout 2/20. Oral packing removed 2/22. Tracheostomy 2/24/23. Code blue on 2/27 for bleeding likely from tracheal stoma site. Required ETT placement in stoma and replacement of oral packing. CTA completed 2/27 without extravasation.   ETT replaced with tracheostomy and oral packing removed 2/28.     Remains hemostatic.    -- On room air/trach collar  -- continue NGT feeds until works more with SPT   -- Hold off on G-tube for now  -- Labs and tacrolimus dosing per hepatology  -- replace lytes prn  -- prn pain meds  -- Please page ENT with any questions or concerns               Nayla Lomax MD  Otorhinolaryngology-Head & Neck Surgery  [facility]

## 2023-03-06 NOTE — RESPIRATORY THERAPY
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  Clinically Significant Surgical Hx: tracheostomy    24 Hours Vitals Range:  Temp:  [97.8 °F (36.6 °C)-99.6 °F (37.6 °C)]   Pulse:  [75-89]   Resp:  [16-20]   BP: (121-159)/(62-76)   SpO2:  [94 %-98 %]     Labs:    Recent Labs     23  0413 23  0345 23  0501    138 137   K 3.8 3.8 3.8    106 104   CO2 26 24 26   CREATININE 0.8 0.8 0.8   * 124* 135*   PHOS 2.5* 3.2 2.8   MG 1.5* 1.5* 1.6        No results for input(s): PH, PCO2, PO2, HCO3, POCSATURATED, BE in the last 72 hours.    ASSESSMENT/INTERVENTIONS  Patient resting. All supplies available. Obturator HOB.       Last VS   Temp: 98.2 °F (36.8 °C) (1156)  Pulse: 84 (1156)  Resp: 20 (1156)  BP: 141/75 (1156)  SpO2: 96 % (1156)      Extra trachs at bedside: 4,6,8 shiley  Level of Consciousness: Level of Consciousness (AVPU): alert  Respiratory Effort: Respiratory Effort: Normal, Unlabored Expansion/Accessory Muscle Usage: Expansion/Accessory Muscles/Retractions: no use of accessory muscles, no retractions, expansion symmetric  All Lung Field Breath Sounds: All Lung Fields Breath Sounds: Anterior:, Lateral:, coarse  LALO Breath Sounds: coarse  LLL Breath Sounds: coarse  RUL Breath Sounds: coarse  RML Breath Sounds: coarse  RLL Breath Sounds: coarse  O2 Device/Concentration: RA  Surgical airway: Yes, Type: Shiley Size: 6, cuffed  Ambu at bedside: Ambu bag with the patient?: Yes,  Adult Ambu     Active Orders   Respiratory Care    Inhalation Treatment Q8H     Frequency: Q8H     Number of Occurrences: Until Specified    POCT ARTERIAL BLOOD GAS Blood Gas     Frequency: PRN     Number of Occurrences: Until Specified     Order Comments: Notify Physician if: see parameters below.       Order Questions:      Component: Blood Gas    Pulse Oximetry Q4H     Frequency: Q4H     Number of Occurrences: Until Specified     Order Comments: Pt on 5L/28%      Routine tracheostomy care     Frequency: BID     Number of Occurrences: Until Specified       RECOMMENDATIONS    We recommend: RRT Recs: Continue POC per primary team.      FOLLOW-UP    Please call back the Rapid Response RT, Vangie Shukla, RRT at x [phone] for any questions or concerns.

## 2023-03-06 NOTE — ASSESSMENT & PLAN NOTE
Dereck Centeno is a 62 y.o. 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 On day of surgery pt with expanding hematoma s/p emergent neck exploration with washout 2/20. Oral packing removed 2/22. Tracheostomy 2/24/23. Code blue on 2/27 for bleeding likely from tracheal stoma site. Required ETT placement in stoma and replacement of oral packing. CTA completed 2/27 without extravasation.   ETT replaced with tracheostomy and oral packing removed 2/28.     Remains hemostatic.    -- On room air/trach collar  -- continue NGT feeds until works more with SPT   -- Hold off on G-tube for now  -- Labs and tacrolimus dosing per hepatology  -- replace lytes prn  -- prn pain meds  -- Please page ENT with any questions or concerns

## 2023-03-06 NOTE — NURSING
"Pt up in chair resp even no acute distress TF maintained with "0" residual, trach maintained with valve pt arnold well will cont to monitor  "

## 2023-03-07 LAB
ALBUMIN SERPL BCP-MCNC: 3.1 G/DL (ref 3.5–5.2)
ALBUMIN SERPL BCP-MCNC: 3.3 G/DL (ref 3.5–5.2)
ALP SERPL-CCNC: 68 U/L (ref 55–135)
ALP SERPL-CCNC: 72 U/L (ref 55–135)
ALT SERPL W/O P-5'-P-CCNC: 35 U/L (ref 10–44)
ALT SERPL W/O P-5'-P-CCNC: 36 U/L (ref 10–44)
ANION GAP SERPL CALC-SCNC: 9 MMOL/L (ref 8–16)
AST SERPL-CCNC: 29 U/L (ref 10–40)
AST SERPL-CCNC: 33 U/L (ref 10–40)
BASOPHILS # BLD AUTO: 0.05 K/UL (ref 0–0.2)
BASOPHILS NFR BLD: 0.5 % (ref 0–1.9)
BILIRUB DIRECT SERPL-MCNC: 0.4 MG/DL (ref 0.1–0.3)
BILIRUB SERPL-MCNC: 0.8 MG/DL (ref 0.1–1)
BILIRUB SERPL-MCNC: 0.9 MG/DL (ref 0.1–1)
BUN SERPL-MCNC: 27 MG/DL (ref 8–23)
CALCIUM SERPL-MCNC: 9.4 MG/DL (ref 8.7–10.5)
CHLORIDE SERPL-SCNC: 106 MMOL/L (ref 95–110)
CO2 SERPL-SCNC: 23 MMOL/L (ref 23–29)
CREAT SERPL-MCNC: 0.8 MG/DL (ref 0.5–1.4)
DIFFERENTIAL METHOD: ABNORMAL
EOSINOPHIL # BLD AUTO: 0.3 K/UL (ref 0–0.5)
EOSINOPHIL NFR BLD: 2.7 % (ref 0–8)
ERYTHROCYTE [DISTWIDTH] IN BLOOD BY AUTOMATED COUNT: 13.1 % (ref 11.5–14.5)
EST. GFR  (NO RACE VARIABLE): >60 ML/MIN/1.73 M^2
GLUCOSE SERPL-MCNC: 115 MG/DL (ref 70–110)
HCT VFR BLD AUTO: 31.3 % (ref 40–54)
HGB BLD-MCNC: 9.9 G/DL (ref 14–18)
IMM GRANULOCYTES # BLD AUTO: 0.07 K/UL (ref 0–0.04)
IMM GRANULOCYTES NFR BLD AUTO: 0.7 % (ref 0–0.5)
INR PPP: 1.2 (ref 0.8–1.2)
LYMPHOCYTES # BLD AUTO: 1.1 K/UL (ref 1–4.8)
LYMPHOCYTES NFR BLD: 11.5 % (ref 18–48)
MAGNESIUM SERPL-MCNC: 1.5 MG/DL (ref 1.6–2.6)
MCH RBC QN AUTO: 29.4 PG (ref 27–31)
MCHC RBC AUTO-ENTMCNC: 31.6 G/DL (ref 32–36)
MCV RBC AUTO: 93 FL (ref 82–98)
MONOCYTES # BLD AUTO: 0.9 K/UL (ref 0.3–1)
MONOCYTES NFR BLD: 9.2 % (ref 4–15)
NEUTROPHILS # BLD AUTO: 7.3 K/UL (ref 1.8–7.7)
NEUTROPHILS NFR BLD: 75.4 % (ref 38–73)
NRBC BLD-RTO: 0 /100 WBC
PHOSPHATE SERPL-MCNC: 3.2 MG/DL (ref 2.7–4.5)
PLATELET # BLD AUTO: 433 K/UL (ref 150–450)
PMV BLD AUTO: 8.8 FL (ref 9.2–12.9)
POTASSIUM SERPL-SCNC: 4 MMOL/L (ref 3.5–5.1)
PROT SERPL-MCNC: 6.8 G/DL (ref 6–8.4)
PROT SERPL-MCNC: 7 G/DL (ref 6–8.4)
PROTHROMBIN TIME: 12 SEC (ref 9–12.5)
RBC # BLD AUTO: 3.37 M/UL (ref 4.6–6.2)
SODIUM SERPL-SCNC: 138 MMOL/L (ref 136–145)
TACROLIMUS BLD-MCNC: 2.3 NG/ML (ref 5–15)
WBC # BLD AUTO: 9.69 K/UL (ref 3.9–12.7)

## 2023-03-07 PROCEDURE — 27201112

## 2023-03-07 PROCEDURE — 83735 ASSAY OF MAGNESIUM: CPT

## 2023-03-07 PROCEDURE — 80197 ASSAY OF TACROLIMUS: CPT

## 2023-03-07 PROCEDURE — 25000003 PHARM REV CODE 250

## 2023-03-07 PROCEDURE — 25000003 PHARM REV CODE 250: Performed by: STUDENT IN AN ORGANIZED HEALTH CARE EDUCATION/TRAINING PROGRAM

## 2023-03-07 PROCEDURE — 25000242 PHARM REV CODE 250 ALT 637 W/ HCPCS: Performed by: STUDENT IN AN ORGANIZED HEALTH CARE EDUCATION/TRAINING PROGRAM

## 2023-03-07 PROCEDURE — 85025 COMPLETE CBC W/AUTO DIFF WBC: CPT

## 2023-03-07 PROCEDURE — 99900026 HC AIRWAY MAINTENANCE (STAT)

## 2023-03-07 PROCEDURE — 63600175 PHARM REV CODE 636 W HCPCS

## 2023-03-07 PROCEDURE — 85610 PROTHROMBIN TIME: CPT | Performed by: STUDENT IN AN ORGANIZED HEALTH CARE EDUCATION/TRAINING PROGRAM

## 2023-03-07 PROCEDURE — 63600175 PHARM REV CODE 636 W HCPCS: Performed by: STUDENT IN AN ORGANIZED HEALTH CARE EDUCATION/TRAINING PROGRAM

## 2023-03-07 PROCEDURE — 99900035 HC TECH TIME PER 15 MIN (STAT)

## 2023-03-07 PROCEDURE — 84100 ASSAY OF PHOSPHORUS: CPT

## 2023-03-07 PROCEDURE — 92526 ORAL FUNCTION THERAPY: CPT

## 2023-03-07 PROCEDURE — 36415 COLL VENOUS BLD VENIPUNCTURE: CPT | Performed by: STUDENT IN AN ORGANIZED HEALTH CARE EDUCATION/TRAINING PROGRAM

## 2023-03-07 PROCEDURE — 80076 HEPATIC FUNCTION PANEL: CPT | Performed by: STUDENT IN AN ORGANIZED HEALTH CARE EDUCATION/TRAINING PROGRAM

## 2023-03-07 PROCEDURE — 80053 COMPREHEN METABOLIC PANEL: CPT

## 2023-03-07 PROCEDURE — 94640 AIRWAY INHALATION TREATMENT: CPT

## 2023-03-07 PROCEDURE — 20600001 HC STEP DOWN PRIVATE ROOM

## 2023-03-07 PROCEDURE — 27200966 HC CLOSED SUCTION SYSTEM

## 2023-03-07 PROCEDURE — 94761 N-INVAS EAR/PLS OXIMETRY MLT: CPT

## 2023-03-07 RX ADMIN — CLONIDINE HYDROCHLORIDE 0.1 MG: 0.1 TABLET ORAL at 08:03

## 2023-03-07 RX ADMIN — LEVALBUTEROL HYDROCHLORIDE 0.63 MG: 0.63 SOLUTION RESPIRATORY (INHALATION) at 03:03

## 2023-03-07 RX ADMIN — GABAPENTIN 600 MG: 250 SOLUTION ORAL at 08:03

## 2023-03-07 RX ADMIN — TACROLIMUS 2 MG: 5 CAPSULE ORAL at 08:03

## 2023-03-07 RX ADMIN — HYDROMORPHONE HYDROCHLORIDE 0.5 MG: 1 INJECTION, SOLUTION INTRAMUSCULAR; INTRAVENOUS; SUBCUTANEOUS at 11:03

## 2023-03-07 RX ADMIN — IPRATROPIUM BROMIDE 0.5 MG: 0.5 SOLUTION RESPIRATORY (INHALATION) at 12:03

## 2023-03-07 RX ADMIN — IPRATROPIUM BROMIDE 0.5 MG: 0.5 SOLUTION RESPIRATORY (INHALATION) at 07:03

## 2023-03-07 RX ADMIN — SENNOSIDES 5 ML: 8.8 SYRUP ORAL at 09:03

## 2023-03-07 RX ADMIN — QUETIAPINE FUMARATE 50 MG: 25 TABLET ORAL at 09:03

## 2023-03-07 RX ADMIN — HYDROMORPHONE HYDROCHLORIDE 0.5 MG: 1 INJECTION, SOLUTION INTRAMUSCULAR; INTRAVENOUS; SUBCUTANEOUS at 04:03

## 2023-03-07 RX ADMIN — LEVALBUTEROL HYDROCHLORIDE 0.63 MG: 0.63 SOLUTION RESPIRATORY (INHALATION) at 12:03

## 2023-03-07 RX ADMIN — POLYETHYLENE GLYCOL 3350 17 G: 17 POWDER, FOR SOLUTION ORAL at 09:03

## 2023-03-07 RX ADMIN — OXYCODONE HYDROCHLORIDE 5 MG: 5 SOLUTION ORAL at 08:03

## 2023-03-07 RX ADMIN — FAMOTIDINE 20 MG: 40 POWDER, FOR SUSPENSION ORAL at 09:03

## 2023-03-07 RX ADMIN — QUETIAPINE FUMARATE 50 MG: 25 TABLET ORAL at 08:03

## 2023-03-07 RX ADMIN — GABAPENTIN 600 MG: 250 SOLUTION ORAL at 02:03

## 2023-03-07 RX ADMIN — HYDROMORPHONE HYDROCHLORIDE 0.5 MG: 1 INJECTION, SOLUTION INTRAMUSCULAR; INTRAVENOUS; SUBCUTANEOUS at 12:03

## 2023-03-07 RX ADMIN — ATORVASTATIN CALCIUM 40 MG: 40 TABLET, FILM COATED ORAL at 08:03

## 2023-03-07 RX ADMIN — ENOXAPARIN SODIUM 40 MG: 40 INJECTION SUBCUTANEOUS at 05:03

## 2023-03-07 RX ADMIN — OXYCODONE HYDROCHLORIDE 5 MG: 5 SOLUTION ORAL at 12:03

## 2023-03-07 RX ADMIN — SCOPALAMINE 1 PATCH: 1 PATCH, EXTENDED RELEASE TRANSDERMAL at 01:03

## 2023-03-07 RX ADMIN — LEVALBUTEROL HYDROCHLORIDE 0.63 MG: 0.63 SOLUTION RESPIRATORY (INHALATION) at 07:03

## 2023-03-07 RX ADMIN — TACROLIMUS 2 MG: 5 CAPSULE ORAL at 05:03

## 2023-03-07 RX ADMIN — ACETAMINOPHEN 650 MG: 650 SOLUTION ORAL at 02:03

## 2023-03-07 RX ADMIN — IPRATROPIUM BROMIDE 0.5 MG: 0.5 SOLUTION RESPIRATORY (INHALATION) at 03:03

## 2023-03-07 RX ADMIN — ESCITALOPRAM OXALATE 20 MG: 5 SOLUTION ORAL at 11:03

## 2023-03-07 RX ADMIN — MIRTAZAPINE 15 MG: 15 TABLET, ORALLY DISINTEGRATING ORAL at 08:03

## 2023-03-07 RX ADMIN — TACROLIMUS 1 MG: 1 CAPSULE ORAL at 06:03

## 2023-03-07 RX ADMIN — GABAPENTIN 600 MG: 250 SOLUTION ORAL at 09:03

## 2023-03-07 NOTE — PROGRESS NOTES
"  [facility]  Adult Nutrition  Consult Note    SUMMARY     Recommendations    1.) Continue Full Liquid diet - ADAT to SLP/MD    2) RD added Boost Plus with meals for additional calories/protein    3) If TF reinitiated/ warranted:   - Impact Peptide 1.5@ 45ml/hr to provide 1620kcal, 101gPRO, and 832ml of FF.      4.)  RD to monitor.    Goals: To meet % of EEN/EPN by next RD f/u  Nutrition Goal Status: progressing towards goal  Communication of RD Recs:  (POC)    Assessment and Plan    Nutrition Problem  Increased PRO/energy needs     Related to (etiology):   Physiological needs      Signs and Symptoms (as evidenced by):   CA of tonsil     Interventions/Recommendations (treatment strategy):  Collaboration of nutritional care with other providers.  EN     Nutrition Diagnosis Status:   Continues    Reason for Assessment    Reason For Assessment: RD follow-up  Diagnosis: cancer diagnosis/related complications (CA of left tonsil)  Relevant Medical History: alcohol cirrhosis, HTN, GERD, HLD, s/p liver transplant 2015  Interdisciplinary Rounds: did not attend  General Information Comments: Pt seen for F/U. Extubated, on full liquid diet. Consumed fruit and apple juice at breakfast. Denies N/V/D/C. TF not running. RD spoke with RN, RN stated that pt's TF was found to be disconnected, eats well when sitting up properly w/ assistance when needed. Would benefit from seeing speech. NFPE not appropriate at this time. RD following  Nutrition Discharge Planning: as per medical course    Nutrition Risk Screen    Nutrition Risk Screen: tube feeding or parenteral nutrition, reduced oral intake over the last month    Nutrition/Diet History    Spiritual, Cultural Beliefs, Orthodox Practices, Values that Affect Care: no    Anthropometrics    Temp: 98.9 °F (37.2 °C)  Height: 5' 10" (177.8 cm)  Height (inches): 70 in  Weight: 75.9 kg (167 lb 5.3 oz)  Weight (lb): 167.33 lb  Ideal Body Weight (IBW), Male: 166 lb  % Ideal Body " Weight, Male (lb): 97.28 %  BMI (Calculated): 23.3  BMI Grade: 18.5-24.9 - normal       Lab/Procedures/Meds    Pertinent Labs Reviewed: reviewed  Pertinent Labs Comments: H/H: 9.9/31.3, MCHC: 31.6, BUN:27, Glu: 115, Ma.5  Pertinent Medications Reviewed: reviewed  Pertinent Medications Comments: atorvastatin, enoxaparin, famotidine, gabapentin, polyethylene glycol, prograf      Estimated/Assessed Needs    Weight Used For Calorie Calculations: 75.9 kg (167 lb 5.3 oz)  Energy Calorie Requirements (kcal): 1555- 2021kcal (MSJx1-1.3)  Energy Need Method: [place]-St Jeor  Protein Requirements: 91- 106g (1.2-1.4g/kg)  Weight Used For Protein Calculations: 75.9 kg (167 lb 5.3 oz)  Fluid Requirements (mL): 1ml/1kcal or per MD  Estimated Fluid Requirement Method: RDA Method  RDA Method (mL): 1555         Nutrition Prescription Ordered    Current Diet Order: Full Liquid diet  Current Nutrition Support Formula Ordered: Impact Peptide 1.5  Current Nutrition Support Rate Ordered: 45 (ml)  Current Nutrition Support Frequency Ordered: ml/24 hours    Evaluation of Received Nutrient/Fluid Intake    Enteral Calories (kcal): 0  Enteral Protein (gm): 0  Enteral (Free Water) Fluid (mL): 0  Other Calories (kcal): 0  Total Calories (kcal): 0  % Kcal Needs: 15  % Protein Needs: 15  I/O: +648 ml since   Energy Calories Required: not meeting needs  Protein Required: not meeting needs  Fluid Required:  (as per MD)  Total Fluid Intake (mL/kg): as per MD  Comments: LBM 3/6  Tolerance: tolerating  % Intake of Estimated Energy Needs: 0 - 25 %  % Meal Intake: 0 - 25 %    Nutrition Risk    Level of Risk/Frequency of Follow-up:  (RD to f/u x1/week)       Monitor and Evaluation    Food and Nutrient Intake: energy intake, enteral nutrition intake  Food and Nutrient Adminstration: diet order, enteral and parenteral nutrition administration  Physical Activity and Function: nutrition-related ADLs and IADLs  Anthropometric Measurements:  height/length, weight, weight change, body mass index  Biochemical Data, Medical Tests and Procedures: electrolyte and renal panel, gastrointestinal profile, glucose/endocrine profile, inflammatory profile, lipid profile  Nutrition-Focused Physical Findings: overall appearance, skin       Nutrition Follow-Up    RD Follow-up?: Yes    Leigh Hebert, Registration Eligible, Provisional LDN

## 2023-03-07 NOTE — NURSING
1949 TF's stopped per MD order; pt refusing new PIV    2042 pt stating that hes not able to take meds orally; stating that it comes through his trach, instructed pt to wear speaking valve during PO intake. SISTER IN LAW at bedside, ASKING THAT DOCTORS CALL HER in AM, [phone], notified MD of refusal of nightly meds    0407 currently looking for continuous pulse ox, there arent any available on the floor at this moment, EMILY Maldonado aware and is looking for another available device.         END OF SHIFT NOTE  Pt rested well throughout shift, no distress at this time, mild complaints of pain, prn meds given. TFs remain off, incision to neck  WNL with no changes. Suction at bedside, encouraged pt to utilize yanker suction at bedside. ;VSS, explained safety precautions. And encouraged pt to call for assistance OOB, cardiac monitoring in place, bed in lowest position, side rails up x2. Call light within reach, personal items within reach.       0610 MDs at bedside, removed gosia tube.       Charmaine Bonilla, SANTIAGON RN

## 2023-03-07 NOTE — SUBJECTIVE & OBJECTIVE
Interval History: NGT no longer in stomach overnight. Tolerating PO but patient concerned food is coming out of trach. Also with run of vtach which resolved, patient asymptomatic.     Medications:  Continuous Infusions:  Scheduled Meds:   acetaminophen  650 mg Oral Q8H    atorvastatin  40 mg Oral QHS    cloNIDine  0.1 mg Oral QHS    enoxaparin  40 mg Subcutaneous Daily    EScitalopram oxalate  20 mg Oral QHS    famotidine  20 mg Oral Daily    gabapentin  600 mg Oral TID    ipratropium  0.5 mg Nebulization Q8H    levalbuterol  0.63 mg Nebulization Q8H    mirtazapine  15 mg Oral Nightly    polyethylene glycol  17 g Per NG tube Daily    QUEtiapine  50 mg Oral BID    scopolamine  1 patch Transdermal Q3 Days    sennosides 8.8 mg/5 ml  5 mL Oral BID    tacrolimus  2 mg Oral Daily AM    And    tacrolimus  1 mg Oral Daily PM     PRN Meds:guaiFENesin 100 mg/5 ml, hydrALAZINE, HYDROmorphone, labetalol, ondansetron, oxyCODONE, QUEtiapine     Review of patient's allergies indicates:  No Known Allergies  Objective:     Vital Signs (24h Range):  Temp:  [96.9 °F (36.1 °C)-98.8 °F (37.1 °C)] 98.8 °F (37.1 °C)  Pulse:  [76-97] 86  Resp:  [16-21] 20  SpO2:  [90 %-98 %] 98 %  BP: (141-170)/(72-82) 147/79       Lines/Drains/Airways       Airway  Duration             Adult Surgical Airway 02/28/23 Shiley Cuffed 6.0/ 75mm 7 days              Peripheral Intravenous Line  Duration                  Peripheral IV - Single Lumen 03/01/23 0610 20 G Anterior;Right Forearm 6 days                    Physical Exam  NAD  NGT sutured in place  Oral cavity with clear secretions . Postop site healing with appropriate granulation, hemostatic   Neck soft, ecchymosis nearly resolved   Tracheostomy in place, on trach collar. No bleeding on suctioning or around trach.     Significant Labs:  CBC:   Recent Labs   Lab 03/07/23  0342   WBC 9.69   RBC 3.37*   HGB 9.9*   HCT 31.3*      MCV 93   MCH 29.4   MCHC 31.6*     CMP:   Recent Labs   Lab  03/07/23  0342   *   CALCIUM 9.4   ALBUMIN 3.1*   PROT 6.8      K 4.0   CO2 23      BUN 27*   CREATININE 0.8   ALKPHOS 68   ALT 35   AST 29   BILITOT 0.8       Significant Diagnostics:  I have reviewed all pertinent imaging results/findings within the past 24 hours.

## 2023-03-07 NOTE — TREATMENT PLAN
Hepatology Treatment Plan    This is a 62 year old male with PMH significant for cirrhosis secondary to ETOH/Hepatitis C (status post transplant in 01/2015) and head/neck cancer (status post resection in approximately 2019 at [facility] with development of squamous cell carcinoma of left tonsil as of 01/2023) who was admitted to [facility] on 02/20 for left tonsillectomy and neck dissection that was performed on day of admission. Hospital course associated with development of hematoma at surgical site requiring neck exploration and hematoma evacuation on 02/20 with tracheostomy creation on 02/24 and cardiac arrest on 02/27 secondary to hypoxia with concern for on-going bleeding from surgical site (however CTA negative for active bleeding). Hepatology following for assistance with immunosuppression.     Interval History  Vital signs remain stable on room air. LFT's remain normal. Tacrolimus level 2.3 today.     Plan  - Tacrolimus 2 mg in AM and 1 mg in PM.   - Please obtain daily CBC, BMP, LFT, INR, and Tacrolimus level.     Thank you for involving us in the care of Dereck Centeno. Please call with any additional concerns or questions.        Erik Lindsay MD, PGY-V  Gastroenterology Fellow  [facility]

## 2023-03-07 NOTE — ASSESSMENT & PLAN NOTE
Dereck Centeno is a 62 y.o. 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 On day of surgery pt with expanding hematoma s/p emergent neck exploration with washout 2/20. Oral packing removed 2/22. Tracheostomy 2/24/23. Code blue on 2/27 for bleeding likely from tracheal stoma site. Required ETT placement in stoma and replacement of oral packing. CTA completed 2/27 without extravasation.   ETT replaced with tracheostomy and oral packing removed 2/28.       -- On room air/trach collar   - Plan for trach exchange pending PO intake/need for G-tube   -- Full liquid diet   - Will keep NGT out for now   - If does not have improved PO intake, plan for G-tube   -- Labs and tacrolimus dosing per hepatology  -- replace lytes prn  -- prn pain meds  -- Please page ENT with any questions or concerns

## 2023-03-07 NOTE — PLAN OF CARE
Problem: Adult Inpatient Plan of Care  Goal: Plan of Care Review  Outcome: Ongoing, Progressing     Problem: Infection  Goal: Absence of Infection Signs and Symptoms  Outcome: Ongoing, Progressing     Problem: Restraint, Nonbehavioral (Nonviolent)  Goal: Absence of Harm or Injury  Outcome: Ongoing, Progressing

## 2023-03-07 NOTE — PHYSICIAN QUERY
PT Name: Dereck Centeno  MR #: [id number]     Documentation Clarification      CDS/: Alanna JUNG, RN              Contact information: [email or web address]    This form is a permanent document in the medical record.     Query Date: March 7, 2023    By submitting this query, we are merely seeking further clarification of documentation. Please utilize your independent clinical judgment when addressing the question(s) below.    The Medical Record reflects the following:    Supporting Clinical Findings Location in Medical Record      Bedside bronchoscopy was performed through the ETT, revealing mod amount of blood products within the distal trach to the elton and within the mainstem bronchi, which was removed with suction.      ENT plan of care 2/27                                                                            Provider, please provide diagnosis or diagnoses associated with above clinical findings.    [   ]  Right Mainstem Bronchi     [   ]  Left Mainstem Bronchi     [   ]  Both Right and Left Mainstem Bronchi     [   x]  Other (please specify): ______unknown______

## 2023-03-07 NOTE — PT/OT/SLP PROGRESS
Speech Language Pathology Treatment    Patient Name:  Dereck Centeno   MRN:  [id number]  Admitting Diagnosis: Squamous cell carcinoma of left tonsil    Recommendations:                 General Recommendations:  Dysphagia therapy and Speech/language therapy  Diet recommendations:  NPO, Liquid Diet Level: Full liquids   Aspiration Precautions: Small bites/sips and Standard aspiration precautions   General Precautions: Standard, aspiration, fall  Communication strategies:  none    Subjective     Awake/alert    Pain/Comfort:  Pain Rating 1: 0/10  Pain Rating Post-Intervention 1: 0/10    Respiratory Status: Room air    Objective:     Has the patient been evaluated by SLP for swallowing?   Yes  Keep patient NPO? No     Pt tolerated PMSV throughout session without s/s of respiratory distress. Pt stated he is still having copious thick secretions, but overall vocal quality is clear. He tolerated thin liquids x4 via cup multiple swallows and mild facial grimace as swallows continued. No overt s/s of airway compromise noted throughout PO trials. SLP provided ongoing education regarding diet recs of full liquid diet, swallow precautions and need for PMSV wear during PO intake. Pt verbalized understanding. Recommend full liquid diet at this time, SLP communicated recs with MD.     Assessment:     Dereck Centeno is a 62 y.o. male with an SLP diagnosis of Dysphagia.      Goals:   Multidisciplinary Problems       SLP Goals          Problem: SLP    Goal Priority Disciplines Outcome   SLP Goal     SLP    Description: Speech Language Pathology Goals  Goals expected to be met by 3/10    1. Pt will tolerate PMSV for all waking hours with >92% spO2 to increase phonation, respiration and swallowing aspects  2. Pt/family will don/doff PMSV x1 during session with min cues  3. Pt will vocalize with PMSV in place to increase verbal expression.   4. Pt will participate in ongoing swallow assessment                               Plan:      Patient to be seen:  4 x/week   Plan of Care reviewed with:  patient   SLP Follow-Up:  Yes       Discharge recommendations:    TBD      Time Tracking:     SLP Treatment Date:   03/07/23  Speech Start Time:  1256  Speech Stop Time:  1303     Speech Total Time (min):  7 min    Billable Minutes: Treatment Swallowing Dysfunction 7    03/07/2023

## 2023-03-07 NOTE — PROGRESS NOTES
[facility]  Otorhinolaryngology-Head & Neck Surgery  Progress Note    Subjective:     Post-Op Info:  Procedure(s) (LRB):  CREATION, TRACHEOSTOMY (N/A)   11 Days Post-Op  Hospital Day: 16     Interval History: NGT no longer in stomach overnight. Tolerating PO but patient concerned food is coming out of trach. Also with run of vtach which resolved, patient asymptomatic.     Medications:  Continuous Infusions:  Scheduled Meds:   acetaminophen  650 mg Oral Q8H    atorvastatin  40 mg Oral QHS    cloNIDine  0.1 mg Oral QHS    enoxaparin  40 mg Subcutaneous Daily    EScitalopram oxalate  20 mg Oral QHS    famotidine  20 mg Oral Daily    gabapentin  600 mg Oral TID    ipratropium  0.5 mg Nebulization Q8H    levalbuterol  0.63 mg Nebulization Q8H    mirtazapine  15 mg Oral Nightly    polyethylene glycol  17 g Per NG tube Daily    QUEtiapine  50 mg Oral BID    scopolamine  1 patch Transdermal Q3 Days    sennosides 8.8 mg/5 ml  5 mL Oral BID    tacrolimus  2 mg Oral Daily AM    And    tacrolimus  1 mg Oral Daily PM     PRN Meds:guaiFENesin 100 mg/5 ml, hydrALAZINE, HYDROmorphone, labetalol, ondansetron, oxyCODONE, QUEtiapine     Review of patient's allergies indicates:  No Known Allergies  Objective:     Vital Signs (24h Range):  Temp:  [96.9 °F (36.1 °C)-98.8 °F (37.1 °C)] 98.8 °F (37.1 °C)  Pulse:  [76-97] 86  Resp:  [16-21] 20  SpO2:  [90 %-98 %] 98 %  BP: (141-170)/(72-82) 147/79       Lines/Drains/Airways       Airway  Duration             Adult Surgical Airway 02/28/23 Shiley Cuffed 6.0/ 75mm 7 days              Peripheral Intravenous Line  Duration                  Peripheral IV - Single Lumen 03/01/23 0610 20 G Anterior;Right Forearm 6 days                    Physical Exam  NAD  NGT sutured in place  Oral cavity with clear secretions . Postop site healing with appropriate granulation, hemostatic   Neck soft, ecchymosis nearly resolved   Tracheostomy in place, on trach collar. No bleeding on suctioning or around  trach.     Significant Labs:  CBC:   Recent Labs   Lab 03/07/23  0342   WBC 9.69   RBC 3.37*   HGB 9.9*   HCT 31.3*      MCV 93   MCH 29.4   MCHC 31.6*     CMP:   Recent Labs   Lab 03/07/23  0342   *   CALCIUM 9.4   ALBUMIN 3.1*   PROT 6.8      K 4.0   CO2 23      BUN 27*   CREATININE 0.8   ALKPHOS 68   ALT 35   AST 29   BILITOT 0.8       Significant Diagnostics:  I have reviewed all pertinent imaging results/findings within the past 24 hours.    Tracheostomy Tube Exchange Procedure Note  The patient was brought supine. The cuff was confirmed down. The tracheostomy tube was removed. There was a clear view to the trachea and evidence of good healing tissue. A 6-0 cuffless Shiley tracheostomy tube was inserted into the stoma. The patient tolerated the procedure with no obvious complications.    Assessment/Plan:     * Squamous cell carcinoma of left tonsil  Dereck Centeno is a 62 y.o. 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 On day of surgery pt with expanding hematoma s/p emergent neck exploration with washout 2/20. Oral packing removed 2/22. Tracheostomy 2/24/23. Code blue on 2/27 for bleeding likely from tracheal stoma site. Required ETT placement in stoma and replacement of oral packing. CTA completed 2/27 without extravasation. ETT replaced with tracheostomy and oral packing removed 2/28. Changed to cuffless 6-0 Shiley on 3/7.    -- On room air/trach collar  -- Full liquid diet   - Will keep NGT out for now   - If does not have improved PO intake, plan for G-tube   -- Labs and tacrolimus dosing per hepatology  -- replace lytes prn  -- prn pain meds  -- Please page ENT with any questions or concerns     Moncho Gonzalez MD  Otorhinolaryngology-Head & Neck Surgery  [facility]

## 2023-03-07 NOTE — NURSING
Tele called pt had 6 beat run Vtach, and quickly returned to NSR. VS documented  skin warm to touch,  noted secretions are thin t an colored vs thick white secretions ENT paged for further orders and assessment. KUB order given for verification of keofeed, no residual noted

## 2023-03-07 NOTE — NURSING
Pt doing well today, tolerated meals, pt neds reinforcement to sit up when taking meds or eating meals  to prevent aspiration, pt is on full liquid

## 2023-03-07 NOTE — PLAN OF CARE
Recommendations    1.) Continue Full Liquid diet - ADAT to SLP/MD    2) RD added Boost Plus with meals for additional calories/protein    3) If TF reinitiated/ warranted:   - Impact Peptide 1.5@ 45ml/hr to provide 1620kcal, 101gPRO, and 832ml of FF.      4.)  RD to monitor.    Goals: To meet % of EEN/EPN by next RD f/u  Nutrition Goal Status: progressing towards goal  Communication of RD Recs:  (POC)

## 2023-03-07 NOTE — RESPIRATORY THERAPY
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  Clinically Significant Surgical Hx: tracheostomy    24 Hours Vitals Range:  Temp:  [96.9 °F (36.1 °C)-98.9 °F (37.2 °C)]   Pulse:  [69-97]   Resp:  [16-21]   BP: (141-170)/(72-82)   SpO2:  [88 %-98 %]     Labs:    Recent Labs     23  0345 23  0501 23  0342    137 138   K 3.8 3.8 4.0    104 106   CO2 24 26 23   CREATININE 0.8 0.8 0.8   * 135* 115*   PHOS 3.2 2.8 3.2   MG 1.5* 1.6 1.5*        No results for input(s): PH, PCO2, PO2, HCO3, POCSATURATED, BE in the last 72 hours.    ASSESSMENT/INTERVENTIONS  Patient resting comfortably. No respiratory concerns at this time.      Last VS   Temp: 98.9 °F (37.2 °C) (831)  Pulse: 82 (831)  Resp: 20 (831)  BP: 159/76 (831)  SpO2: 88 % (831)      Extra trachs at bedside: 4.0 cuffed Shiley, 6.0 cuffed Shiley  Level of Consciousness: Level of Consciousness (AVPU): alert  Respiratory Effort: Respiratory Effort: Unlabored Expansion/Accessory Muscle Usage: Expansion/Accessory Muscles/Retractions: no retractions, no use of accessory muscles  All Lung Field Breath Sounds: All Lung Fields Breath Sounds: coarse  LALO Breath Sounds: coarse  LLL Breath Sounds: coarse  RUL Breath Sounds: coarse  RML Breath Sounds: coarse  RLL Breath Sounds: coarse  O2 Device/Concentration: RA  Surgical airway: Yes, Type: Shiley Size: 6, cuffed  Ambu at bedside: Ambu bag with the patient?: Yes, Adult Ambu      Active Orders   Respiratory Care    Inhalation Treatment Q8H     Frequency: Q8H     Number of Occurrences: Until Specified    POCT ARTERIAL BLOOD GAS Blood Gas     Frequency: PRN     Number of Occurrences: Until Specified     Order Comments: Notify Physician if: see parameters below.       Order Questions:      Component: Blood Gas    Pulse Oximetry Q4H     Frequency: Q4H     Number of Occurrences: Until Specified     Order Comments: Pt on 5L/28%      Routine tracheostomy care     Frequency: BID     Number of Occurrences: Until Specified       RECOMMENDATIONS    We recommend: RRT Recs: Continue POC per primary team.      FOLLOW-UP    Please call back the Rapid Response RT, Eva Shaffer RRT at x [phone] for any questions or concerns.

## 2023-03-08 DIAGNOSIS — C10.2 MALIGNANT NEOPLASM OF LATERAL WALL OF OROPHARYNX: ICD-10-CM

## 2023-03-08 DIAGNOSIS — C09.9 SQUAMOUS CELL CARCINOMA OF LEFT TONSIL: Primary | ICD-10-CM

## 2023-03-08 LAB
ALBUMIN SERPL BCP-MCNC: 3.2 G/DL (ref 3.5–5.2)
ALBUMIN SERPL BCP-MCNC: 3.2 G/DL (ref 3.5–5.2)
ALP SERPL-CCNC: 75 U/L (ref 55–135)
ALP SERPL-CCNC: 75 U/L (ref 55–135)
ALT SERPL W/O P-5'-P-CCNC: 39 U/L (ref 10–44)
ALT SERPL W/O P-5'-P-CCNC: 39 U/L (ref 10–44)
ANION GAP SERPL CALC-SCNC: 8 MMOL/L (ref 8–16)
AST SERPL-CCNC: 32 U/L (ref 10–40)
AST SERPL-CCNC: 32 U/L (ref 10–40)
BASOPHILS # BLD AUTO: 0.04 K/UL (ref 0–0.2)
BASOPHILS NFR BLD: 0.5 % (ref 0–1.9)
BILIRUB DIRECT SERPL-MCNC: 0.3 MG/DL (ref 0.1–0.3)
BILIRUB SERPL-MCNC: 0.7 MG/DL (ref 0.1–1)
BILIRUB SERPL-MCNC: 0.7 MG/DL (ref 0.1–1)
BUN SERPL-MCNC: 28 MG/DL (ref 8–23)
CALCIUM SERPL-MCNC: 9.3 MG/DL (ref 8.7–10.5)
CHLORIDE SERPL-SCNC: 106 MMOL/L (ref 95–110)
CO2 SERPL-SCNC: 25 MMOL/L (ref 23–29)
CREAT SERPL-MCNC: 0.9 MG/DL (ref 0.5–1.4)
DIFFERENTIAL METHOD: ABNORMAL
EOSINOPHIL # BLD AUTO: 0.4 K/UL (ref 0–0.5)
EOSINOPHIL NFR BLD: 4.6 % (ref 0–8)
ERYTHROCYTE [DISTWIDTH] IN BLOOD BY AUTOMATED COUNT: 13.2 % (ref 11.5–14.5)
EST. GFR  (NO RACE VARIABLE): >60 ML/MIN/1.73 M^2
GLUCOSE SERPL-MCNC: 119 MG/DL (ref 70–110)
HCT VFR BLD AUTO: 32.1 % (ref 40–54)
HGB BLD-MCNC: 10.2 G/DL (ref 14–18)
IMM GRANULOCYTES # BLD AUTO: 0.07 K/UL (ref 0–0.04)
IMM GRANULOCYTES NFR BLD AUTO: 0.8 % (ref 0–0.5)
INR PPP: 1.2 (ref 0.8–1.2)
LYMPHOCYTES # BLD AUTO: 1.1 K/UL (ref 1–4.8)
LYMPHOCYTES NFR BLD: 12.9 % (ref 18–48)
MAGNESIUM SERPL-MCNC: 1.8 MG/DL (ref 1.6–2.6)
MCH RBC QN AUTO: 29.2 PG (ref 27–31)
MCHC RBC AUTO-ENTMCNC: 31.8 G/DL (ref 32–36)
MCV RBC AUTO: 92 FL (ref 82–98)
MONOCYTES # BLD AUTO: 0.8 K/UL (ref 0.3–1)
MONOCYTES NFR BLD: 9.5 % (ref 4–15)
NEUTROPHILS # BLD AUTO: 6.2 K/UL (ref 1.8–7.7)
NEUTROPHILS NFR BLD: 71.7 % (ref 38–73)
NRBC BLD-RTO: 0 /100 WBC
PHOSPHATE SERPL-MCNC: 3.9 MG/DL (ref 2.7–4.5)
PLATELET # BLD AUTO: 457 K/UL (ref 150–450)
PMV BLD AUTO: 8.7 FL (ref 9.2–12.9)
POTASSIUM SERPL-SCNC: 3.9 MMOL/L (ref 3.5–5.1)
PROT SERPL-MCNC: 6.9 G/DL (ref 6–8.4)
PROT SERPL-MCNC: 6.9 G/DL (ref 6–8.4)
PROTHROMBIN TIME: 12.7 SEC (ref 9–12.5)
RBC # BLD AUTO: 3.49 M/UL (ref 4.6–6.2)
SODIUM SERPL-SCNC: 139 MMOL/L (ref 136–145)
TACROLIMUS BLD-MCNC: 4.1 NG/ML (ref 5–15)
WBC # BLD AUTO: 8.7 K/UL (ref 3.9–12.7)

## 2023-03-08 PROCEDURE — 97535 SELF CARE MNGMENT TRAINING: CPT

## 2023-03-08 PROCEDURE — 80053 COMPREHEN METABOLIC PANEL: CPT

## 2023-03-08 PROCEDURE — 84100 ASSAY OF PHOSPHORUS: CPT

## 2023-03-08 PROCEDURE — 85610 PROTHROMBIN TIME: CPT | Performed by: STUDENT IN AN ORGANIZED HEALTH CARE EDUCATION/TRAINING PROGRAM

## 2023-03-08 PROCEDURE — 25000242 PHARM REV CODE 250 ALT 637 W/ HCPCS: Performed by: STUDENT IN AN ORGANIZED HEALTH CARE EDUCATION/TRAINING PROGRAM

## 2023-03-08 PROCEDURE — 99900026 HC AIRWAY MAINTENANCE (STAT)

## 2023-03-08 PROCEDURE — 25000003 PHARM REV CODE 250: Performed by: STUDENT IN AN ORGANIZED HEALTH CARE EDUCATION/TRAINING PROGRAM

## 2023-03-08 PROCEDURE — 80197 ASSAY OF TACROLIMUS: CPT

## 2023-03-08 PROCEDURE — 99900031 HC PATIENT EDUCATION (STAT)

## 2023-03-08 PROCEDURE — 36415 COLL VENOUS BLD VENIPUNCTURE: CPT | Performed by: STUDENT IN AN ORGANIZED HEALTH CARE EDUCATION/TRAINING PROGRAM

## 2023-03-08 PROCEDURE — 63600175 PHARM REV CODE 636 W HCPCS: Performed by: STUDENT IN AN ORGANIZED HEALTH CARE EDUCATION/TRAINING PROGRAM

## 2023-03-08 PROCEDURE — 97530 THERAPEUTIC ACTIVITIES: CPT | Mod: CQ

## 2023-03-08 PROCEDURE — 97530 THERAPEUTIC ACTIVITIES: CPT

## 2023-03-08 PROCEDURE — 63600175 PHARM REV CODE 636 W HCPCS

## 2023-03-08 PROCEDURE — 92526 ORAL FUNCTION THERAPY: CPT

## 2023-03-08 PROCEDURE — 83735 ASSAY OF MAGNESIUM: CPT

## 2023-03-08 PROCEDURE — 80076 HEPATIC FUNCTION PANEL: CPT | Performed by: STUDENT IN AN ORGANIZED HEALTH CARE EDUCATION/TRAINING PROGRAM

## 2023-03-08 PROCEDURE — 20600001 HC STEP DOWN PRIVATE ROOM

## 2023-03-08 PROCEDURE — 85025 COMPLETE CBC W/AUTO DIFF WBC: CPT

## 2023-03-08 PROCEDURE — 99900035 HC TECH TIME PER 15 MIN (STAT)

## 2023-03-08 PROCEDURE — 94761 N-INVAS EAR/PLS OXIMETRY MLT: CPT

## 2023-03-08 PROCEDURE — 97116 GAIT TRAINING THERAPY: CPT | Mod: CQ

## 2023-03-08 PROCEDURE — 94640 AIRWAY INHALATION TREATMENT: CPT

## 2023-03-08 RX ORDER — MAGNESIUM SULFATE HEPTAHYDRATE 40 MG/ML
2 INJECTION, SOLUTION INTRAVENOUS ONCE
Status: COMPLETED | OUTPATIENT
Start: 2023-03-08 | End: 2023-03-08

## 2023-03-08 RX ADMIN — QUETIAPINE FUMARATE 50 MG: 25 TABLET ORAL at 08:03

## 2023-03-08 RX ADMIN — OXYCODONE HYDROCHLORIDE 5 MG: 5 SOLUTION ORAL at 06:03

## 2023-03-08 RX ADMIN — GABAPENTIN 600 MG: 250 SOLUTION ORAL at 08:03

## 2023-03-08 RX ADMIN — LEVALBUTEROL HYDROCHLORIDE 0.63 MG: 0.63 SOLUTION RESPIRATORY (INHALATION) at 05:03

## 2023-03-08 RX ADMIN — IPRATROPIUM BROMIDE 0.5 MG: 0.5 SOLUTION RESPIRATORY (INHALATION) at 09:03

## 2023-03-08 RX ADMIN — OXYCODONE HYDROCHLORIDE 5 MG: 5 SOLUTION ORAL at 10:03

## 2023-03-08 RX ADMIN — ENOXAPARIN SODIUM 40 MG: 40 INJECTION SUBCUTANEOUS at 05:03

## 2023-03-08 RX ADMIN — HYDROMORPHONE HYDROCHLORIDE 0.5 MG: 1 INJECTION, SOLUTION INTRAMUSCULAR; INTRAVENOUS; SUBCUTANEOUS at 07:03

## 2023-03-08 RX ADMIN — SENNOSIDES 5 ML: 8.8 SYRUP ORAL at 08:03

## 2023-03-08 RX ADMIN — GABAPENTIN 600 MG: 250 SOLUTION ORAL at 10:03

## 2023-03-08 RX ADMIN — ATORVASTATIN CALCIUM 40 MG: 40 TABLET, FILM COATED ORAL at 08:03

## 2023-03-08 RX ADMIN — FAMOTIDINE 20 MG: 40 POWDER, FOR SUSPENSION ORAL at 08:03

## 2023-03-08 RX ADMIN — MIRTAZAPINE 15 MG: 15 TABLET, ORALLY DISINTEGRATING ORAL at 08:03

## 2023-03-08 RX ADMIN — TACROLIMUS 1 MG: 1 CAPSULE ORAL at 05:03

## 2023-03-08 RX ADMIN — ESCITALOPRAM OXALATE 20 MG: 5 SOLUTION ORAL at 10:03

## 2023-03-08 RX ADMIN — MAGNESIUM SULFATE 2 G: 2 INJECTION INTRAVENOUS at 05:03

## 2023-03-08 RX ADMIN — ACETAMINOPHEN 650 MG: 650 SOLUTION ORAL at 02:03

## 2023-03-08 RX ADMIN — GABAPENTIN 600 MG: 250 SOLUTION ORAL at 02:03

## 2023-03-08 RX ADMIN — TACROLIMUS 2 MG: 5 CAPSULE ORAL at 08:03

## 2023-03-08 RX ADMIN — CLONIDINE HYDROCHLORIDE 0.1 MG: 0.1 TABLET ORAL at 08:03

## 2023-03-08 RX ADMIN — IPRATROPIUM BROMIDE 0.5 MG: 0.5 SOLUTION RESPIRATORY (INHALATION) at 05:03

## 2023-03-08 RX ADMIN — OXYCODONE HYDROCHLORIDE 5 MG: 5 SOLUTION ORAL at 02:03

## 2023-03-08 RX ADMIN — LEVALBUTEROL HYDROCHLORIDE 0.63 MG: 0.63 SOLUTION RESPIRATORY (INHALATION) at 09:03

## 2023-03-08 NOTE — PT/OT/SLP PROGRESS
Occupational Therapy   Co-Treatment  Co-treatment with PT for maximal pt participation, safety, and activity tolerance       Name: Dereck Centeno  MRN: [id number]  Admitting Diagnosis:  Squamous cell carcinoma of left tonsil  12 Days Post-Op    Recommendations:     Discharge Recommendations: home health OT  Discharge Equipment Recommendations:  other (see comments) (TBD)  Barriers to discharge:  None    Assessment:     Dereck Centeno is a 62 y.o. male with a medical diagnosis of Squamous cell carcinoma of left tonsil.  He presents with impaired ADL and mobility performance deficits. Pt found upright in bed and agreeable for therapy with encouragement. Pt session focused on bed mobility, EOB ADLs, and hallway ambulation. Pt demonstrated functional mobility training to simulate household and community environment gait training during session. Pt tolerated ~5 minutes total using RW with 1 small LOB, self corrected, and good visual search and navigation strategies.  Pt was left upright in chair and voiced appreciation today eager to improve fx'l endurance. Pt would benefit from continued OT skilled services 3x/wk to improve daily living skills to optimize QOL.  Pt is recommended to discharge to OT  at this time.  Performance deficits affecting function are weakness, impaired functional mobility, impaired endurance, gait instability, impaired balance, impaired self care skills, decreased lower extremity function, decreased upper extremity function, impaired cardiopulmonary response to activity.     Rehab Prognosis:  Good; patient would benefit from acute skilled OT services to address these deficits and reach maximum level of function.       Plan:     Patient to be seen 3 x/week to address the above listed problems via self-care/home management, therapeutic activities, therapeutic exercises  Plan of Care Expires: 04/03/23  Plan of Care Reviewed with: patient    Subjective     Pain/Comfort:  Pain Rating 1: other  (see comments) (throat pain not ranked)  Location - Orientation 1: generalized  Location 1: neck  Pain Addressed 1: Reposition, Distraction, Cessation of Activity  Pain Rating Post-Intervention 1: 0/10    Objective:     Communicated with: RN prior to session.  Patient found HOB elevated with telemetry, pulse ox (continuous), Tracheostomy upon OT entry to room.    General Precautions: Standard, fall, aspiration    Orthopedic Precautions:N/A  Braces: N/A  Respiratory Status: Room air     Occupational Performance:     Bed Mobility:    Patient completed Rolling/Turning to Right with stand by assistance  Patient completed Scooting/Bridging with stand by assistance  Patient completed Supine to Sit with stand by assistance     Functional Mobility/Transfers:  Patient completed Sit <> Stand Transfer with stand by assistance  with  rolling walker   Patient completed Bed <> Chair Transfer using Step Transfer technique with contact guard assistance with rolling walker  Functional Mobility: Pt required SBA-CGA for mobility in room and into hallway tolerating ~5 minutes total. 1 LOB noted, self corrected 2/2 fatigue.     Activities of Daily Living:  Grooming: setup A at EOB to wash face, brush teeth, and comb hair       [facility] 6 Click ADL: 14    Treatment & Education:  Pt educated on role of occupational therapy, POC, and safety during ADLs and functional mobility. Pt and OT discussed importance of safe, continued mobility to optimize daily living skills. Pt verbalized understanding.   White board updated during session. Pt given instruction to call for medical staff/nurse for assistance.       Patient left up in chair with all lines intact, call button in reach, and RN notified    GOALS:   Multidisciplinary Problems       Occupational Therapy Goals          Problem: Occupational Therapy    Goal Priority Disciplines Outcome Interventions   Occupational Therapy Goal     OT, PT/OT Ongoing, Progressing    Description: Goals to be met  by: 3/15/23     Patient will increase functional independence with ADLs by performing:    Feeding with Supervision.  UE Dressing with Stand-by Assistance.  LE Dressing with Contact Guard Assistance.  Grooming while standing with Stand-by Assistance.  Toileting from toilet with Contact Guard Assistance for hygiene and clothing management.   Sitting at edge of bed x15 minutes with Supervision.                         Time Tracking:     OT Date of Treatment: 03/08/23  OT Start Time: 1328  OT Stop Time: 1359  OT Total Time (min): 31 min    Billable Minutes:Self Care/Home Management 15 min  Therapeutic Activity 16 min    OT/LENORA: OT          3/8/2023

## 2023-03-08 NOTE — PT/OT/SLP PROGRESS
Speech Language Pathology Treatment    Patient Name:  Dereck Centeno   MRN:  [id number]  Admitting Diagnosis: Squamous cell carcinoma of left tonsil    Recommendations:                 General Recommendations:  Dysphagia therapy and Speech/language therapy  Diet recommendations:  Pureed diet , Liquid Diet Level: Thin  Aspiration Precautions: Small bites/sips and Standard aspiration precautions   General Precautions: Standard, aspiration, fall  Communication strategies:  none    Subjective     Awake/alert    Pain/Comfort:  Pain Rating 1: 0/10  Pain Rating Post-Intervention 1: 0/10    Respiratory Status: Room air    Objective:     Has the patient been evaluated by SLP for swallowing?   Yes  Keep patient NPO? No     Pt twith trach capped upon entry with clear vocal quality upon entry. He tolerated thin liquids soft solids x2 with multiple effortful swallows and globus sensation. No overt s/s of airway compromise noted throughout PO trials; however pt with c/o soft solids being difficult. SLP provided ongoing education regarding diet recs pureed diet/thin liuquids, swallow precautions and SLP POC. Pt verbalized understanding. Recommend pureed diet/thin liquids at this time, SLP communicated recs with MD.     Assessment:     Dereck Centeno is a 62 y.o. male with an SLP diagnosis of Dysphagia.      Goals:   Multidisciplinary Problems       SLP Goals          Problem: SLP    Goal Priority Disciplines Outcome   SLP Goal     SLP    Description: Speech Language Pathology Goals  Goals expected to be met by 3/10    1. Pt will tolerate PMSV for all waking hours with >92% spO2 to increase phonation, respiration and swallowing aspects  2. Pt/family will don/doff PMSV x1 during session with min cues  3. Pt will vocalize with PMSV in place to increase verbal expression.   4. Pt will participate in ongoing swallow assessment                               Plan:     Patient to be seen:  4 x/week   Plan of Care reviewed with:   patient   SLP Follow-Up:  Yes       Discharge recommendations:    TBD      Time Tracking:     SLP Treatment Date:   03/08/23  Speech Start Time:  0838  Speech Stop Time:  0846     Speech Total Time (min):  8 min    Billable Minutes: Treatment Swallowing Dysfunction 8    03/08/2023

## 2023-03-08 NOTE — ASSESSMENT & PLAN NOTE
Dereck Centeno is a 62 y.o. 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 On day of surgery pt with expanding hematoma s/p emergent neck exploration with washout 2/20. Oral packing removed 2/22. Tracheostomy 2/24/23. Code blue on 2/27 for bleeding likely from tracheal stoma site. Required ETT placement in stoma and replacement of oral packing. CTA completed 2/27 without extravasation.   ETT replaced with tracheostomy and oral packing removed 2/28.     -- On room air/trach collar   - Capping trial today  -- Full liquid diet   - Tolerating intake   -- Labs and tacrolimus dosing per hepatology  -- replace lytes prn  -- prn pain meds  -- Please page ENT with any questions or concerns

## 2023-03-08 NOTE — PLAN OF CARE
Pt remain stable, speaking valve on entire shift, tolerating fluids by mouth, pain relived with prn meds, safety maintained.  Problem: Adult Inpatient Plan of Care  Goal: Plan of Care Review  Outcome: Ongoing, Progressing     Problem: Fall Injury Risk  Goal: Absence of Fall and Fall-Related Injury  Outcome: Ongoing, Progressing     Problem: Communication Impairment (Mechanical Ventilation, Invasive)  Goal: Effective Communication  Outcome: Ongoing, Progressing     Problem: Communication Impairment (Artificial Airway)  Goal: Effective Communication  Outcome: Ongoing, Progressing     Problem: Skin and Tissue Injury (Artificial Airway)  Goal: Absence of Device-Related Skin or Tissue Injury  Outcome: Ongoing, Progressing

## 2023-03-08 NOTE — RESPIRATORY THERAPY
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  Clinically Significant Surgical Hx: tracheostomy    24 Hours Vitals Range:  Temp:  [98.3 °F (36.8 °C)-99.1 °F (37.3 °C)]   Pulse:  [82-95]   Resp:  [16-20]   BP: (115-136)/(62-78)   SpO2:  [88 %-94 %]     Labs:    Recent Labs     23  0501 23  0342 23  0214    138 139   K 3.8 4.0 3.9    106 106   CO2 26 23 25   CREATININE 0.8 0.8 0.9   * 115* 119*   PHOS 2.8 3.2 3.9   MG 1.6 1.5* 1.8        No results for input(s): PH, PCO2, PO2, HCO3, POCSATURATED, BE in the last 72 hours.    ASSESSMENT/INTERVENTIONS  Patient resting comfortably. No respiratory concerns at this time.      Last VS   Temp: 99.1 °F (37.3 °C) (746)  Pulse: 88 (910)  Resp: 19 (910)  BP: 119/68 (746)  SpO2: 94 % (910)      Extra trachs at bedside: 4.0 cuffed Shiley, 6.0 cuffed Shiley  Level of Consciousness: Level of Consciousness (AVPU): alert  Respiratory Effort: Respiratory Effort: Unlabored Expansion/Accessory Muscle Usage: Expansion/Accessory Muscles/Retractions: expansion symmetric  All Lung Field Breath Sounds: All Lung Fields Breath Sounds: Anterior:, Posterior:, Lateral:, diminished  LALO Breath Sounds: coarse  LLL Breath Sounds: coarse  RUL Breath Sounds: coarse  RML Breath Sounds: coarse  RLL Breath Sounds: coarse  O2 Device/Concentration: RA  Surgical airway: Yes, Type: Shiley Size: 6, uncuffed  Ambu at bedside: Ambu bag with the patient?: Yes,  Adult Ambu     Active Orders   Respiratory Care    Inhalation Treatment Q8H     Frequency: Q8H     Number of Occurrences: Until Specified    POCT ARTERIAL BLOOD GAS Blood Gas     Frequency: PRN     Number of Occurrences: Until Specified     Order Comments: Notify Physician if: see parameters below.       Order Questions:      Component: Blood Gas    Pulse Oximetry Q4H     Frequency: Q4H     Number of Occurrences: Until Specified     Order Comments: Pt on 5L/28%      Routine tracheostomy care     Frequency: BID     Number of Occurrences: Until Specified       RECOMMENDATIONS    We recommend: RRT Recs: Continue POC per primary team.      FOLLOW-UP    Please call back the Rapid Response RT, Eva Shaffer RRT at x [phone] for any questions or concerns.

## 2023-03-08 NOTE — PLAN OF CARE
- A/Ox4, RA, VSS, pain well controlled with PRN medications. Call light within reach, safety precautions in place.  - Trach capped throughout day, no issues.   - Upgraded to pureed diet   - PT/OT   Problem: Adult Inpatient Plan of Care  Goal: Plan of Care Review  Outcome: Ongoing, Progressing     Problem: Infection  Goal: Absence of Infection Signs and Symptoms  Outcome: Ongoing, Progressing     Problem: Fall Injury Risk  Goal: Absence of Fall and Fall-Related Injury  Outcome: Ongoing, Progressing     Problem: Skin and Tissue Injury (Mechanical Ventilation, Invasive)  Goal: Absence of Device-Related Skin and Tissue Injury  Outcome: Ongoing, Progressing     Problem: Skin and Tissue Injury (Artificial Airway)  Goal: Absence of Device-Related Skin or Tissue Injury  Outcome: Ongoing, Progressing

## 2023-03-08 NOTE — PROGRESS NOTES
[facility] - [facility]  Otorhinolaryngology-Head & Neck Surgery  Progress Note    Subjective:     Post-Op Info:  Procedure(s) (LRB):  CREATION, TRACHEOSTOMY (N/A)   12 Days Post-Op  Hospital Day: 17     Interval History: NAEON. S/P trach exchange. Doing well with PMV.     Medications:  Continuous Infusions:  Scheduled Meds:   acetaminophen  650 mg Oral Q8H    atorvastatin  40 mg Oral QHS    cloNIDine  0.1 mg Oral QHS    enoxaparin  40 mg Subcutaneous Daily    EScitalopram oxalate  20 mg Oral QHS    famotidine  20 mg Oral Daily    gabapentin  600 mg Oral TID    ipratropium  0.5 mg Nebulization Q8H    levalbuterol  0.63 mg Nebulization Q8H    mirtazapine  15 mg Oral Nightly    polyethylene glycol  17 g Per NG tube Daily    QUEtiapine  50 mg Oral BID    scopolamine  1 patch Transdermal Q3 Days    sennosides 8.8 mg/5 ml  5 mL Oral BID    tacrolimus  2 mg Oral Daily AM    And    tacrolimus  1 mg Oral Daily PM     PRN Meds:guaiFENesin 100 mg/5 ml, hydrALAZINE, HYDROmorphone, labetalol, ondansetron, oxyCODONE, QUEtiapine     Review of patient's allergies indicates:  No Known Allergies  Objective:     Vital Signs (24h Range):  Temp:  [98.3 °F (36.8 °C)-99.1 °F (37.3 °C)] 99.1 °F (37.3 °C)  Pulse:  [82-95] 84  Resp:  [16-20] 20  SpO2:  [88 %-94 %] 92 %  BP: (115-159)/(62-78) 119/68       Lines/Drains/Airways       Airway  Duration             Adult Surgical Airway 02/28/23 Christopher Uncuffed 6.0/ 75mm 8 days              Peripheral Intravenous Line  Duration                  Peripheral IV - Single Lumen 03/01/23 0610 20 G Anterior;Right Forearm 7 days                    Physical Exam  NAD  Oral cavity with clear secretions . Postop site healed in L oropharynx.  Neck soft, ecchymosis nearly resolved   Tracheostomy in place with PMV  Cap placed over tracheostomy     Significant Labs:  CBC:   Recent Labs   Lab 03/08/23  0214   WBC 8.70   RBC 3.49*   HGB 10.2*   HCT 32.1*   *   MCV 92   MCH 29.2   MCHC 31.8*      CMP:   Recent Labs   Lab 03/08/23  0214   *   CALCIUM 9.3   ALBUMIN 3.2*  3.2*   PROT 6.9  6.9      K 3.9   CO2 25      BUN 28*   CREATININE 0.9   ALKPHOS 75  75   ALT 39  39   AST 32  32   BILITOT 0.7  0.7       Significant Diagnostics:  None    Assessment/Plan:     * Squamous cell carcinoma of left tonsil  Dereck Centeno is a 62 y.o. 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 On day of surgery pt with expanding hematoma s/p emergent neck exploration with washout 2/20. Oral packing removed 2/22. Tracheostomy 2/24/23. Code blue on 2/27 for bleeding likely from tracheal stoma site. Required ETT placement in stoma and replacement of oral packing. CTA completed 2/27 without extravasation.   ETT replaced with tracheostomy and oral packing removed 2/28.     -- On room air/trach collar   - Capping trial today  -- Full liquid diet   - Tolerating intake   -- Labs and tacrolimus dosing per hepatology  -- replace lytes prn  -- prn pain meds  -- Please page ENT with any questions or concerns               Nayla Lomax MD  Otorhinolaryngology-Head & Neck Surgery  [facility]

## 2023-03-08 NOTE — PT/OT/SLP PROGRESS
Physical Therapy Treatment    Patient Name:  Dereck Centeno   MRN:  [id number]    Recommendations:     Discharge Recommendations: home health PT  Discharge Equipment Recommendations:  (will determine DME Needs closer to discharge)  Barriers to discharge: Decreased caregiver support    Assessment:     Dereck Centeno is a 62 y.o. male admitted with a medical diagnosis of Squamous cell carcinoma of left tonsil.  He presents with the following impairments/functional limitations: weakness, impaired endurance, impaired functional mobility, impaired self care skills, gait instability, impaired balance, decreased safety awareness, impaired cardiopulmonary response to activity. Pt cooperative with therapy today. Pt required SBA w/ RW for transfers, and ambulated 110ft with RW and CGA for safety. No LOB noted.     Rehab Prognosis: Good; patient would benefit from acute skilled PT services to address these deficits and reach maximum level of function.    Recent Surgery: Procedure(s) (LRB):  CREATION, TRACHEOSTOMY (N/A) 12 Days Post-Op    Plan:     During this hospitalization, patient to be seen 3 x/week to address the identified rehab impairments via gait training, therapeutic activities, therapeutic exercises, neuromuscular re-education and progress toward the following goals:    Plan of Care Expires:  03/19/23    Subjective     Chief Complaint: none stated  Patient/Family Comments/goals: none stated  Pain/Comfort:  Pain Rating 1: 0/10  Pain Rating Post-Intervention 1: 0/10      Objective:     Communicated with nursing prior to session.  Patient found HOB elevated with  (all lines intact) upon PT entry to room.     General Precautions: Standard, aspiration, fall  Orthopedic Precautions: N/A  Braces: N/A  Respiratory Status: Room air     Functional Mobility:  Bed Mobility:  Scooting: stand by assistance  Supine to Sit: stand by assistance  Transfers:  Sit to Stand:  stand by assistance with rolling walker  Gait: 110ft  with RW and CGA for safety. Pt required 1 brief standing resting break during trial. Pt with slow yuan, but no LOB noted.       AM-PAC 6 CLICK MOBILITY  Turning over in bed (including adjusting bedclothes, sheets and blankets)?: 3  Sitting down on and standing up from a chair with arms (e.g., wheelchair, bedside commode, etc.): 3  Moving from lying on back to sitting on the side of the bed?: 3  Moving to and from a bed to a chair (including a wheelchair)?: 3  Need to walk in hospital room?: 3  Climbing 3-5 steps with a railing?: 1  Basic Mobility Total Score: 16       Treatment & Education:  -Pt sat EOB with SBA while brushing teeth, combing hair, and washing face.   -All questions/concerns answered within PTA scope of practice.   -Pt donned  socks at start of session w/o assistance    Patient left up in chair with all lines intact, call button in reach, and nursing notified..    GOALS:   Multidisciplinary Problems       Physical Therapy Goals          Problem: Physical Therapy    Goal Priority Disciplines Outcome Goal Variances Interventions   Physical Therapy Goal     PT, PT/OT Ongoing, Progressing     Description: Goals to be met by: 3/22/23     Patient will increase functional independence with mobility by performin. Supine to sit with Modified [place]  2. Sit to stand transfer with Modified [place]  3. Gait  x 150 feet with Supervision using AD if needed. .   4. Ascend/descend 3 stair with bilateral Handrails Contact Guard Assistance .                          Time Tracking:     PT Received On: 23  PT Start Time: 1330     PT Stop Time: 1354  PT Total Time (min): 24 min     Billable Minutes: Gait Training 10 and Therapeutic Activity 13    Treatment Type: Treatment  PT/PTA: PTA     PTA Visit Number: 2     2023

## 2023-03-08 NOTE — SUBJECTIVE & OBJECTIVE
Interval History: NAEON. S/P trach exchange. Doing well with PMV.     Medications:  Continuous Infusions:  Scheduled Meds:   acetaminophen  650 mg Oral Q8H    atorvastatin  40 mg Oral QHS    cloNIDine  0.1 mg Oral QHS    enoxaparin  40 mg Subcutaneous Daily    EScitalopram oxalate  20 mg Oral QHS    famotidine  20 mg Oral Daily    gabapentin  600 mg Oral TID    ipratropium  0.5 mg Nebulization Q8H    levalbuterol  0.63 mg Nebulization Q8H    mirtazapine  15 mg Oral Nightly    polyethylene glycol  17 g Per NG tube Daily    QUEtiapine  50 mg Oral BID    scopolamine  1 patch Transdermal Q3 Days    sennosides 8.8 mg/5 ml  5 mL Oral BID    tacrolimus  2 mg Oral Daily AM    And    tacrolimus  1 mg Oral Daily PM     PRN Meds:guaiFENesin 100 mg/5 ml, hydrALAZINE, HYDROmorphone, labetalol, ondansetron, oxyCODONE, QUEtiapine     Review of patient's allergies indicates:  No Known Allergies  Objective:     Vital Signs (24h Range):  Temp:  [98.3 °F (36.8 °C)-99.1 °F (37.3 °C)] 99.1 °F (37.3 °C)  Pulse:  [82-95] 84  Resp:  [16-20] 20  SpO2:  [88 %-94 %] 92 %  BP: (115-159)/(62-78) 119/68       Lines/Drains/Airways       Airway  Duration             Adult Surgical Airway 02/28/23 Shiley Uncuffed 6.0/ 75mm 8 days              Peripheral Intravenous Line  Duration                  Peripheral IV - Single Lumen 03/01/23 0610 20 G Anterior;Right Forearm 7 days                    Physical Exam  NAD  Oral cavity with clear secretions . Postop site healed in L oropharynx.  Neck soft, ecchymosis nearly resolved   Tracheostomy in place with PMV  Cap placed over tracheostomy     Significant Labs:  CBC:   Recent Labs   Lab 03/08/23 0214   WBC 8.70   RBC 3.49*   HGB 10.2*   HCT 32.1*   *   MCV 92   MCH 29.2   MCHC 31.8*     CMP:   Recent Labs   Lab 03/08/23 0214   *   CALCIUM 9.3   ALBUMIN 3.2*  3.2*   PROT 6.9  6.9      K 3.9   CO2 25      BUN 28*   CREATININE 0.9   ALKPHOS 75  75   ALT 39  39   AST 32  32    BILITOT 0.7  0.7       Significant Diagnostics:  None

## 2023-03-08 NOTE — TREATMENT PLAN
Hepatology Treatment Plan    This is a 62 year old male with PMH significant for cirrhosis secondary to ETOH/Hepatitis C (status post transplant in 01/2015) and head/neck cancer (status post resection in approximately 2019 at [facility] with development of squamous cell carcinoma of left tonsil as of 01/2023) who was admitted to [facility] on 02/20 for left tonsillectomy and neck dissection that was performed on day of admission. Hospital course associated with development of hematoma at surgical site requiring neck exploration and hematoma evacuation on 02/20 with tracheostomy creation on 02/24 and cardiac arrest on 02/27 secondary to hypoxia with concern for on-going bleeding from surgical site (however CTA negative for active bleeding). Hepatology following for assistance with immunosuppression.     Interval History  Vital signs remain stable on room air. LFT's remain normal. Tacrolimus level 4.1 today.     Plan  - Tacrolimus 2 mg in AM and 1 mg in PM.   - Please obtain daily CBC, BMP, LFT, INR, and Tacrolimus level.     Thank you for involving us in the care of Dereck Centeno. Please call with any additional concerns or questions.        Erik Lindsay MD, PGY-V  Gastroenterology Fellow  [facility]

## 2023-03-09 LAB
ALBUMIN SERPL BCP-MCNC: 3.1 G/DL (ref 3.5–5.2)
ALBUMIN SERPL BCP-MCNC: 3.1 G/DL (ref 3.5–5.2)
ALP SERPL-CCNC: 82 U/L (ref 55–135)
ALP SERPL-CCNC: 82 U/L (ref 55–135)
ALT SERPL W/O P-5'-P-CCNC: 38 U/L (ref 10–44)
ALT SERPL W/O P-5'-P-CCNC: 38 U/L (ref 10–44)
ANION GAP SERPL CALC-SCNC: 7 MMOL/L (ref 8–16)
AST SERPL-CCNC: 30 U/L (ref 10–40)
AST SERPL-CCNC: 30 U/L (ref 10–40)
BASOPHILS # BLD AUTO: 0.04 K/UL (ref 0–0.2)
BASOPHILS NFR BLD: 0.5 % (ref 0–1.9)
BILIRUB DIRECT SERPL-MCNC: 0.4 MG/DL (ref 0.1–0.3)
BILIRUB SERPL-MCNC: 0.8 MG/DL (ref 0.1–1)
BILIRUB SERPL-MCNC: 0.8 MG/DL (ref 0.1–1)
BUN SERPL-MCNC: 32 MG/DL (ref 8–23)
CALCIUM SERPL-MCNC: 9 MG/DL (ref 8.7–10.5)
CHLORIDE SERPL-SCNC: 104 MMOL/L (ref 95–110)
CO2 SERPL-SCNC: 24 MMOL/L (ref 23–29)
CREAT SERPL-MCNC: 0.8 MG/DL (ref 0.5–1.4)
DIFFERENTIAL METHOD: ABNORMAL
EOSINOPHIL # BLD AUTO: 0.3 K/UL (ref 0–0.5)
EOSINOPHIL NFR BLD: 4.5 % (ref 0–8)
ERYTHROCYTE [DISTWIDTH] IN BLOOD BY AUTOMATED COUNT: 13.1 % (ref 11.5–14.5)
EST. GFR  (NO RACE VARIABLE): >60 ML/MIN/1.73 M^2
GLUCOSE SERPL-MCNC: 125 MG/DL (ref 70–110)
HCT VFR BLD AUTO: 29.5 % (ref 40–54)
HGB BLD-MCNC: 9.3 G/DL (ref 14–18)
IMM GRANULOCYTES # BLD AUTO: 0.05 K/UL (ref 0–0.04)
IMM GRANULOCYTES NFR BLD AUTO: 0.7 % (ref 0–0.5)
INR PPP: 1.2 (ref 0.8–1.2)
LYMPHOCYTES # BLD AUTO: 0.9 K/UL (ref 1–4.8)
LYMPHOCYTES NFR BLD: 12.5 % (ref 18–48)
MAGNESIUM SERPL-MCNC: 2 MG/DL (ref 1.6–2.6)
MCH RBC QN AUTO: 28.6 PG (ref 27–31)
MCHC RBC AUTO-ENTMCNC: 31.5 G/DL (ref 32–36)
MCV RBC AUTO: 91 FL (ref 82–98)
MONOCYTES # BLD AUTO: 0.7 K/UL (ref 0.3–1)
MONOCYTES NFR BLD: 10 % (ref 4–15)
NEUTROPHILS # BLD AUTO: 5.3 K/UL (ref 1.8–7.7)
NEUTROPHILS NFR BLD: 71.8 % (ref 38–73)
NRBC BLD-RTO: 0 /100 WBC
PHOSPHATE SERPL-MCNC: 3.4 MG/DL (ref 2.7–4.5)
PLATELET # BLD AUTO: 465 K/UL (ref 150–450)
PMV BLD AUTO: 8.6 FL (ref 9.2–12.9)
POTASSIUM SERPL-SCNC: 3.7 MMOL/L (ref 3.5–5.1)
PROT SERPL-MCNC: 6.5 G/DL (ref 6–8.4)
PROT SERPL-MCNC: 6.5 G/DL (ref 6–8.4)
PROTHROMBIN TIME: 12.4 SEC (ref 9–12.5)
RBC # BLD AUTO: 3.25 M/UL (ref 4.6–6.2)
SODIUM SERPL-SCNC: 135 MMOL/L (ref 136–145)
TACROLIMUS BLD-MCNC: 3.4 NG/ML (ref 5–15)
WBC # BLD AUTO: 7.38 K/UL (ref 3.9–12.7)

## 2023-03-09 PROCEDURE — 99900026 HC AIRWAY MAINTENANCE (STAT)

## 2023-03-09 PROCEDURE — 20600001 HC STEP DOWN PRIVATE ROOM

## 2023-03-09 PROCEDURE — 80053 COMPREHEN METABOLIC PANEL: CPT

## 2023-03-09 PROCEDURE — 25000242 PHARM REV CODE 250 ALT 637 W/ HCPCS: Performed by: STUDENT IN AN ORGANIZED HEALTH CARE EDUCATION/TRAINING PROGRAM

## 2023-03-09 PROCEDURE — 85610 PROTHROMBIN TIME: CPT | Performed by: STUDENT IN AN ORGANIZED HEALTH CARE EDUCATION/TRAINING PROGRAM

## 2023-03-09 PROCEDURE — 80076 HEPATIC FUNCTION PANEL: CPT | Performed by: STUDENT IN AN ORGANIZED HEALTH CARE EDUCATION/TRAINING PROGRAM

## 2023-03-09 PROCEDURE — 63600175 PHARM REV CODE 636 W HCPCS: Performed by: STUDENT IN AN ORGANIZED HEALTH CARE EDUCATION/TRAINING PROGRAM

## 2023-03-09 PROCEDURE — 94761 N-INVAS EAR/PLS OXIMETRY MLT: CPT

## 2023-03-09 PROCEDURE — 25000003 PHARM REV CODE 250: Performed by: STUDENT IN AN ORGANIZED HEALTH CARE EDUCATION/TRAINING PROGRAM

## 2023-03-09 PROCEDURE — 92526 ORAL FUNCTION THERAPY: CPT

## 2023-03-09 PROCEDURE — 83735 ASSAY OF MAGNESIUM: CPT

## 2023-03-09 PROCEDURE — 63600175 PHARM REV CODE 636 W HCPCS

## 2023-03-09 PROCEDURE — 36415 COLL VENOUS BLD VENIPUNCTURE: CPT | Performed by: STUDENT IN AN ORGANIZED HEALTH CARE EDUCATION/TRAINING PROGRAM

## 2023-03-09 PROCEDURE — 94640 AIRWAY INHALATION TREATMENT: CPT

## 2023-03-09 PROCEDURE — 80197 ASSAY OF TACROLIMUS: CPT

## 2023-03-09 PROCEDURE — 84100 ASSAY OF PHOSPHORUS: CPT

## 2023-03-09 PROCEDURE — 99900035 HC TECH TIME PER 15 MIN (STAT)

## 2023-03-09 PROCEDURE — 85025 COMPLETE CBC W/AUTO DIFF WBC: CPT

## 2023-03-09 RX ADMIN — OXYCODONE HYDROCHLORIDE 5 MG: 5 SOLUTION ORAL at 11:03

## 2023-03-09 RX ADMIN — OXYCODONE HYDROCHLORIDE 5 MG: 5 SOLUTION ORAL at 05:03

## 2023-03-09 RX ADMIN — HYDROMORPHONE HYDROCHLORIDE 0.5 MG: 1 INJECTION, SOLUTION INTRAMUSCULAR; INTRAVENOUS; SUBCUTANEOUS at 08:03

## 2023-03-09 RX ADMIN — HYDROMORPHONE HYDROCHLORIDE 0.5 MG: 1 INJECTION, SOLUTION INTRAMUSCULAR; INTRAVENOUS; SUBCUTANEOUS at 03:03

## 2023-03-09 RX ADMIN — LEVALBUTEROL HYDROCHLORIDE 0.63 MG: 0.63 SOLUTION RESPIRATORY (INHALATION) at 01:03

## 2023-03-09 RX ADMIN — LEVALBUTEROL HYDROCHLORIDE 0.63 MG: 0.63 SOLUTION RESPIRATORY (INHALATION) at 11:03

## 2023-03-09 RX ADMIN — ACETAMINOPHEN 650 MG: 650 SOLUTION ORAL at 09:03

## 2023-03-09 RX ADMIN — OXYCODONE HYDROCHLORIDE 5 MG: 5 SOLUTION ORAL at 06:03

## 2023-03-09 RX ADMIN — IPRATROPIUM BROMIDE 0.5 MG: 0.5 SOLUTION RESPIRATORY (INHALATION) at 01:03

## 2023-03-09 RX ADMIN — QUETIAPINE FUMARATE 50 MG: 25 TABLET ORAL at 08:03

## 2023-03-09 RX ADMIN — TACROLIMUS 2 MG: 5 CAPSULE ORAL at 08:03

## 2023-03-09 RX ADMIN — MIRTAZAPINE 15 MG: 15 TABLET, ORALLY DISINTEGRATING ORAL at 08:03

## 2023-03-09 RX ADMIN — ATORVASTATIN CALCIUM 40 MG: 40 TABLET, FILM COATED ORAL at 08:03

## 2023-03-09 RX ADMIN — GABAPENTIN 600 MG: 250 SOLUTION ORAL at 03:03

## 2023-03-09 RX ADMIN — GABAPENTIN 600 MG: 250 SOLUTION ORAL at 08:03

## 2023-03-09 RX ADMIN — LEVALBUTEROL HYDROCHLORIDE 0.63 MG: 0.63 SOLUTION RESPIRATORY (INHALATION) at 12:03

## 2023-03-09 RX ADMIN — LEVALBUTEROL HYDROCHLORIDE 0.63 MG: 0.63 SOLUTION RESPIRATORY (INHALATION) at 07:03

## 2023-03-09 RX ADMIN — IPRATROPIUM BROMIDE 0.5 MG: 0.5 SOLUTION RESPIRATORY (INHALATION) at 11:03

## 2023-03-09 RX ADMIN — ENOXAPARIN SODIUM 40 MG: 40 INJECTION SUBCUTANEOUS at 05:03

## 2023-03-09 RX ADMIN — CLONIDINE HYDROCHLORIDE 0.1 MG: 0.1 TABLET ORAL at 08:03

## 2023-03-09 RX ADMIN — TACROLIMUS 1 MG: 1 CAPSULE ORAL at 05:03

## 2023-03-09 RX ADMIN — IPRATROPIUM BROMIDE 0.5 MG: 0.5 SOLUTION RESPIRATORY (INHALATION) at 07:03

## 2023-03-09 RX ADMIN — FAMOTIDINE 20 MG: 40 POWDER, FOR SUSPENSION ORAL at 08:03

## 2023-03-09 RX ADMIN — IPRATROPIUM BROMIDE 0.5 MG: 0.5 SOLUTION RESPIRATORY (INHALATION) at 12:03

## 2023-03-09 RX ADMIN — SENNOSIDES 5 ML: 8.8 SYRUP ORAL at 08:03

## 2023-03-09 NOTE — PLAN OF CARE
POC reviewed with pt and family.  Pt A & O x 4, adequate urine output via urinal. Vital signs stable on room air. Pain controlled with prn pain meds.

## 2023-03-09 NOTE — PT/OT/SLP PROGRESS
Speech Language Pathology Treatment    Patient Name:  Dereck Centeno   MRN:  [id number]  Admitting Diagnosis: Squamous cell carcinoma of left tonsil    Recommendations:                 General Recommendations:  Dysphagia therapy and Speech/language therapy  Diet recommendations:  Pureed diet , Liquid Diet Level: Thin  Aspiration Precautions: Small bites/sips and Standard aspiration precautions   General Precautions: Standard, aspiration, fall  Communication strategies:  none    Subjective     Awake/alert    Pain/Comfort:  Pain Rating 1: 0/10  Pain Rating Post-Intervention 1: 0/10    Respiratory Status: Room air    Objective:     Has the patient been evaluated by SLP for swallowing?   Yes  Keep patient NPO? No     Pt with trach fully decannulated upon entry with stoma coverings in place. Pt with clear vocal quality with intermittent air/leak escape from healing stoma. . Pt tegaderm seal around stoma appearing to be loose and inflating during conversational speech. RN notified upon SLP exit to re-seal.  Pt holding covering over stoma when swallowing and talking intermittently. Pt tolerated  small single sips of thin thin liquids  x5. No overt s/s of airway compromise noted throughout PO trials. Pt also observed to consume teaspoon of purees x2 with effortful swallow and a liquid wash. Pt not interested in soft solids trials with preference to remains on smooth foods and continue with boost supplemental nutritious beverages.  SLP provided ongoing education regarding diet recs pureed diet/thin liuquids, swallow precautions and SLP POC. Speech to continue to follow.     Assessment:     Dereck Centeno is a 62 y.o. male with an SLP diagnosis of Dysphagia.      Goals:   Multidisciplinary Problems       SLP Goals          Problem: SLP    Goal Priority Disciplines Outcome   SLP Goal     SLP    Description: Speech Language Pathology Goals  Goals expected to be met by 3/10    1. Pt will tolerate PMSV for all waking hours  with >92% spO2 to increase phonation, respiration and swallowing aspects  2. Pt/family will don/doff PMSV x1 during session with min cues  3. Pt will vocalize with PMSV in place to increase verbal expression.   4. Pt will participate in ongoing swallow assessment                               Plan:     Patient to be seen:  4 x/week   Plan of Care reviewed with:  patient   SLP Follow-Up:  Yes       Discharge recommendations:    TBD      Time Tracking:     SLP Treatment Date:   03/09/23  Speech Start Time:  0834  Speech Stop Time:  0846     Speech Total Time (min):  12 min    Billable Minutes: Treatment Swallowing Dysfunction 12    03/09/2023

## 2023-03-09 NOTE — PROGRESS NOTES
[facility] - [facility]  Otorhinolaryngology-Head & Neck Surgery  Progress Note    Subjective:     Post-Op Info:  Procedure(s) (LRB):  CREATION, TRACHEOSTOMY (N/A)   13 Days Post-Op  Hospital Day: 18     Interval History: Remained capped overnight, no issues breathing. Some difficulty with pureed diet but trialing well.     Medications:  Continuous Infusions:  Scheduled Meds:   acetaminophen  650 mg Oral Q8H    atorvastatin  40 mg Oral QHS    cloNIDine  0.1 mg Oral QHS    enoxaparin  40 mg Subcutaneous Daily    EScitalopram oxalate  20 mg Oral QHS    famotidine  20 mg Oral Daily    gabapentin  600 mg Oral TID    ipratropium  0.5 mg Nebulization Q8H    levalbuterol  0.63 mg Nebulization Q8H    mirtazapine  15 mg Oral Nightly    polyethylene glycol  17 g Per NG tube Daily    QUEtiapine  50 mg Oral BID    scopolamine  1 patch Transdermal Q3 Days    sennosides 8.8 mg/5 ml  5 mL Oral BID    tacrolimus  2 mg Oral Daily AM    And    tacrolimus  1 mg Oral Daily PM     PRN Meds:guaiFENesin 100 mg/5 ml, hydrALAZINE, HYDROmorphone, labetalol, ondansetron, oxyCODONE, QUEtiapine     Review of patient's allergies indicates:  No Known Allergies  Objective:     Vital Signs (24h Range):  Temp:  [96.4 °F (35.8 °C)-98.4 °F (36.9 °C)] 96.4 °F (35.8 °C)  Pulse:  [78-98] 86  Resp:  [15-20] 16  SpO2:  [93 %-98 %] 97 %  BP: (112-135)/(63-75) 135/69       Lines/Drains/Airways       Airway  Duration             Adult Surgical Airway 02/28/23 Christopher Uncuffed 6.0/ 75mm 9 days              Peripheral Intravenous Line  Duration                  Peripheral IV - Single Lumen 03/01/23 0610 20 G Anterior;Right Forearm 8 days                    Physical Exam  NAD  Oral cavity with clear secretions . Postop site healed in L oropharynx.  Neck soft, ecchymosis nearly resolved   Tracheostomy in place with PMV  Cap placed over tracheostomy. Trach removed and occlusive dressing placed     Significant Labs:  CBC:   Recent Labs   Lab 03/09/23  0506    WBC 7.38   RBC 3.25*   HGB 9.3*   HCT 29.5*   *   MCV 91   MCH 28.6   MCHC 31.5*     CMP:   Recent Labs   Lab 03/09/23  0507   *   CALCIUM 9.0   ALBUMIN 3.1*  3.1*   PROT 6.5  6.5   *   K 3.7   CO2 24      BUN 32*   CREATININE 0.8   ALKPHOS 82  82   ALT 38  38   AST 30  30   BILITOT 0.8  0.8       Significant Diagnostics:  None    Assessment/Plan:     * Squamous cell carcinoma of left tonsil  Dereck Centeno is a 62 y.o. 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 On day of surgery pt with expanding hematoma s/p emergent neck exploration with washout 2/20. Oral packing removed 2/22. Tracheostomy 2/24/23. Code blue on 2/27 for bleeding likely from tracheal stoma site. Required ETT placement in stoma and replacement of oral packing. CTA completed 2/27 without extravasation.   ETT replaced with tracheostomy and oral packing removed 2/28.     Decannulated 3/9.     -- Pureed diet    - Tolerating intake   -- Labs and tacrolimus dosing per hepatology  -- replace lytes prn  -- prn pain meds  -- Please page ENT with any questions or concerns     Dispo: Anticipate discharge tomorrow               Nayla Lomax MD  Otorhinolaryngology-Head & Neck Surgery  [facility]

## 2023-03-09 NOTE — TREATMENT PLAN
Hepatology Treatment Plan    This is a 62 year old male with PMH significant for cirrhosis secondary to ETOH/Hepatitis C (status post transplant in 01/2015) and head/neck cancer (status post resection in approximately 2019 at [facility] with development of squamous cell carcinoma of left tonsil as of 01/2023) who was admitted to [facility] on 02/20 for left tonsillectomy and neck dissection that was performed on day of admission. Hospital course associated with development of hematoma at surgical site requiring neck exploration and hematoma evacuation on 02/20 with tracheostomy creation on 02/24 and cardiac arrest on 02/27 secondary to hypoxia with concern for on-going bleeding from surgical site (however CTA negative for active bleeding). Hepatology following for assistance with immunosuppression.     Interval History  Anticipate plan for discharge tomorrow. Vital signs remain stable on room air. LFT's remain normal. Tacrolimus level 3.4 today.     Plan  - Tacrolimus 2 mg in AM and 1 mg in PM.   - Please obtain daily CBC, BMP, LFT, INR, and Tacrolimus level.     Thank you for involving us in the care of Dereck Centeno. Please call with any additional concerns or questions.        Erik Lindsay MD, PGY-V  Gastroenterology Fellow  [facility]

## 2023-03-09 NOTE — PLAN OF CARE
Problem: Adult Inpatient Plan of Care  Goal: Plan of Care Review  Outcome: Ongoing, Not Progressing  Goal: Patient-Specific Goal (Individualized)  Outcome: Ongoing, Not Progressing  Goal: Absence of Hospital-Acquired Illness or Injury  Outcome: Ongoing, Not Progressing  Goal: Optimal Comfort and Wellbeing  Outcome: Ongoing, Not Progressing  Goal: Readiness for Transition of Care  Outcome: Ongoing, Not Progressing     Problem: Infection  Goal: Absence of Infection Signs and Symptoms  Outcome: Ongoing, Not Progressing     Problem: Fall Injury Risk  Goal: Absence of Fall and Fall-Related Injury  Outcome: Ongoing, Not Progressing     Problem: Restraint, Nonbehavioral (Nonviolent)  Goal: Absence of Harm or Injury  Outcome: Ongoing, Not Progressing     Problem: Communication Impairment (Mechanical Ventilation, Invasive)  Goal: Effective Communication  Outcome: Ongoing, Not Progressing     Problem: Device-Related Complication Risk (Mechanical Ventilation, Invasive)  Goal: Optimal Device Function  Outcome: Ongoing, Not Progressing     Problem: Inability to Wean (Mechanical Ventilation, Invasive)  Goal: Mechanical Ventilation Liberation  Outcome: Ongoing, Not Progressing     Problem: Nutrition Impairment (Mechanical Ventilation, Invasive)  Goal: Optimal Nutrition Delivery  Outcome: Ongoing, Not Progressing     Problem: Skin and Tissue Injury (Mechanical Ventilation, Invasive)  Goal: Absence of Device-Related Skin and Tissue Injury  Outcome: Ongoing, Not Progressing     Problem: Ventilator-Induced Lung Injury (Mechanical Ventilation, Invasive)  Goal: Absence of Ventilator-Induced Lung Injury  Outcome: Ongoing, Not Progressing     Problem: Communication Impairment (Artificial Airway)  Goal: Effective Communication  Outcome: Ongoing, Not Progressing     Problem: Device-Related Complication Risk (Artificial Airway)  Goal: Optimal Device Function  Outcome: Ongoing, Not Progressing     Problem: Skin and Tissue Injury (Artificial  Airway)  Goal: Absence of Device-Related Skin or Tissue Injury  Outcome: Ongoing, Not Progressing     Problem: Noninvasive Ventilation Acute  Goal: Effective Unassisted Ventilation and Oxygenation  Outcome: Ongoing, Not Progressing     Problem: Skin Injury Risk Increased  Goal: Skin Health and Integrity  Outcome: Ongoing, Not Progressing

## 2023-03-09 NOTE — SUBJECTIVE & OBJECTIVE
Interval History: Remained capped overnight, no issues breathing. Some difficulty with pureed diet but trialing well.     Medications:  Continuous Infusions:  Scheduled Meds:   acetaminophen  650 mg Oral Q8H    atorvastatin  40 mg Oral QHS    cloNIDine  0.1 mg Oral QHS    enoxaparin  40 mg Subcutaneous Daily    EScitalopram oxalate  20 mg Oral QHS    famotidine  20 mg Oral Daily    gabapentin  600 mg Oral TID    ipratropium  0.5 mg Nebulization Q8H    levalbuterol  0.63 mg Nebulization Q8H    mirtazapine  15 mg Oral Nightly    polyethylene glycol  17 g Per NG tube Daily    QUEtiapine  50 mg Oral BID    scopolamine  1 patch Transdermal Q3 Days    sennosides 8.8 mg/5 ml  5 mL Oral BID    tacrolimus  2 mg Oral Daily AM    And    tacrolimus  1 mg Oral Daily PM     PRN Meds:guaiFENesin 100 mg/5 ml, hydrALAZINE, HYDROmorphone, labetalol, ondansetron, oxyCODONE, QUEtiapine     Review of patient's allergies indicates:  No Known Allergies  Objective:     Vital Signs (24h Range):  Temp:  [96.4 °F (35.8 °C)-98.4 °F (36.9 °C)] 96.4 °F (35.8 °C)  Pulse:  [78-98] 86  Resp:  [15-20] 16  SpO2:  [93 %-98 %] 97 %  BP: (112-135)/(63-75) 135/69       Lines/Drains/Airways       Airway  Duration             Adult Surgical Airway 02/28/23 Suriley Uncuffed 6.0/ 75mm 9 days              Peripheral Intravenous Line  Duration                  Peripheral IV - Single Lumen 03/01/23 0610 20 G Anterior;Right Forearm 8 days                    Physical Exam  NAD  Oral cavity with clear secretions . Postop site healed in L oropharynx.  Neck soft, ecchymosis nearly resolved   Tracheostomy in place with PMV  Cap placed over tracheostomy. Trach removed and occlusive dressing placed     Significant Labs:  CBC:   Recent Labs   Lab 03/09/23  0506   WBC 7.38   RBC 3.25*   HGB 9.3*   HCT 29.5*   *   MCV 91   MCH 28.6   MCHC 31.5*     CMP:   Recent Labs   Lab 03/09/23  0507   *   CALCIUM 9.0   ALBUMIN 3.1*  3.1*   PROT 6.5  6.5   *   K  3.7   CO2 24      BUN 32*   CREATININE 0.8   ALKPHOS 82  82   ALT 38  38   AST 30  30   BILITOT 0.8  0.8       Significant Diagnostics:  None

## 2023-03-09 NOTE — ASSESSMENT & PLAN NOTE
Dereck Centeno is a 62 y.o. 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 On day of surgery pt with expanding hematoma s/p emergent neck exploration with washout 2/20. Oral packing removed 2/22. Tracheostomy 2/24/23. Code blue on 2/27 for bleeding likely from tracheal stoma site. Required ETT placement in stoma and replacement of oral packing. CTA completed 2/27 without extravasation.   ETT replaced with tracheostomy and oral packing removed 2/28.     Decannulated 3/9.     -- Pureed diet    - Tolerating intake   -- Labs and tacrolimus dosing per hepatology  -- replace lytes prn  -- prn pain meds  -- Please page ENT with any questions or concerns     Dispo: Anticipate discharge tomorrow

## 2023-03-09 NOTE — PLAN OF CARE
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6]  Yes      Resolved Hospital Problems   No resolved problems to display.       Follow Up Appointments:  Future Appointments   Date Time Provider Department Center   3/20/2023  3:00 PM Lloyd Frederick Jr., MD [facility] RAD ONC [facility]   4/10/2023  8:10 AM HOSPITAL LAB, [facility] LAB [facility] LAB [facility]       Allergies:Review of patient's allergies indicates:  No Known Allergies    Medications: Review discharge medications with patient and family and provide education.      I have seen and examined this patient within the last 30 days. My clinical findings that support the need for the home health skilled services and home bound status are the following:no   Weakness/numbness causing balance and gait disturbance due to Malignancy/Cancer making it taxing to leave home.     Diet:   soft diet    Labs:  None    Referrals/ Consults  Physical Therapy  to evaluate and treat. Evaluate for home safety and equipment needs; Establish/upgrade home exercise program. Perform / instruct on therapeutic exercises, gait training, transfer training, and Range of Motion.  Occupational Therapy to evaluate and treat. Evaluate home environment for safety and equipment needs. Perform/Instruct on transfers, ADL training, ROM, and therapeutic exercises.  Speech Therapy  to evaluate and treat for  Swallowing.    Activities:   activity as tolerated    Nursing:   Agency to admit patient within 24 hours of hospital discharge unless specified on physician order or at patient request    SN to complete comprehensive assessment including routine vital signs. Instruct on disease process and s/s of complications to report to MD. Review/verify medication list sent home with the patient at time of discharge  and instruct patient/caregiver as needed. Frequency may be adjusted depending on start of care date.     Skilled nurse to perform up to 3 visits PRN for symptoms related to diagnosis    Notify MD if SBP > 160 or < 90; DBP > 90 or < 50; HR > 120 or < 50; Temp > 101; O2 < 88%; Other:   None    Ok to schedule additional visits based on staff availability and patient request on consecutive days within the home health episode.    Miscellaneous   None    Home Health Aide:  Physical Therapy Three times weekly, Occupational Therapy Three times weekly, and Speech Language Pathology Three times weekly    Wound Care Orders  no    I certify that this patient is confined to his home and needs physical therapy, speech therapy, and occupational therapy.

## 2023-03-10 VITALS
BODY MASS INDEX: 23.91 KG/M2 | WEIGHT: 167 LBS | DIASTOLIC BLOOD PRESSURE: 81 MMHG | SYSTOLIC BLOOD PRESSURE: 152 MMHG | HEIGHT: 70 IN | TEMPERATURE: 97 F | OXYGEN SATURATION: 98 % | RESPIRATION RATE: 18 BRPM | HEART RATE: 80 BPM

## 2023-03-10 LAB
ALBUMIN SERPL BCP-MCNC: 3.1 G/DL (ref 3.5–5.2)
ALBUMIN SERPL BCP-MCNC: 3.1 G/DL (ref 3.5–5.2)
ALP SERPL-CCNC: 95 U/L (ref 55–135)
ALP SERPL-CCNC: 95 U/L (ref 55–135)
ALT SERPL W/O P-5'-P-CCNC: 39 U/L (ref 10–44)
ALT SERPL W/O P-5'-P-CCNC: 39 U/L (ref 10–44)
ANION GAP SERPL CALC-SCNC: 8 MMOL/L (ref 8–16)
AST SERPL-CCNC: 39 U/L (ref 10–40)
AST SERPL-CCNC: 39 U/L (ref 10–40)
BASOPHILS # BLD AUTO: 0.05 K/UL (ref 0–0.2)
BASOPHILS NFR BLD: 0.7 % (ref 0–1.9)
BILIRUB DIRECT SERPL-MCNC: 0.3 MG/DL (ref 0.1–0.3)
BILIRUB SERPL-MCNC: 0.6 MG/DL (ref 0.1–1)
BILIRUB SERPL-MCNC: 0.6 MG/DL (ref 0.1–1)
BUN SERPL-MCNC: 24 MG/DL (ref 8–23)
CALCIUM SERPL-MCNC: 9.1 MG/DL (ref 8.7–10.5)
CHLORIDE SERPL-SCNC: 105 MMOL/L (ref 95–110)
CO2 SERPL-SCNC: 22 MMOL/L (ref 23–29)
CREAT SERPL-MCNC: 0.8 MG/DL (ref 0.5–1.4)
DIFFERENTIAL METHOD: ABNORMAL
EOSINOPHIL # BLD AUTO: 0.3 K/UL (ref 0–0.5)
EOSINOPHIL NFR BLD: 4.1 % (ref 0–8)
ERYTHROCYTE [DISTWIDTH] IN BLOOD BY AUTOMATED COUNT: 13 % (ref 11.5–14.5)
EST. GFR  (NO RACE VARIABLE): >60 ML/MIN/1.73 M^2
GLUCOSE SERPL-MCNC: 106 MG/DL (ref 70–110)
HCT VFR BLD AUTO: 31.7 % (ref 40–54)
HGB BLD-MCNC: 9.5 G/DL (ref 14–18)
IMM GRANULOCYTES # BLD AUTO: 0.06 K/UL (ref 0–0.04)
IMM GRANULOCYTES NFR BLD AUTO: 0.8 % (ref 0–0.5)
INR PPP: 1.2 (ref 0.8–1.2)
LYMPHOCYTES # BLD AUTO: 0.9 K/UL (ref 1–4.8)
LYMPHOCYTES NFR BLD: 11.8 % (ref 18–48)
MAGNESIUM SERPL-MCNC: 1.8 MG/DL (ref 1.6–2.6)
MCH RBC QN AUTO: 28.3 PG (ref 27–31)
MCHC RBC AUTO-ENTMCNC: 30 G/DL (ref 32–36)
MCV RBC AUTO: 94 FL (ref 82–98)
MONOCYTES # BLD AUTO: 0.7 K/UL (ref 0.3–1)
MONOCYTES NFR BLD: 9.3 % (ref 4–15)
NEUTROPHILS # BLD AUTO: 5.5 K/UL (ref 1.8–7.7)
NEUTROPHILS NFR BLD: 73.3 % (ref 38–73)
NRBC BLD-RTO: 0 /100 WBC
PHOSPHATE SERPL-MCNC: 3.7 MG/DL (ref 2.7–4.5)
PLATELET # BLD AUTO: 461 K/UL (ref 150–450)
PMV BLD AUTO: 8.8 FL (ref 9.2–12.9)
POTASSIUM SERPL-SCNC: 3.7 MMOL/L (ref 3.5–5.1)
PROT SERPL-MCNC: 6.4 G/DL (ref 6–8.4)
PROT SERPL-MCNC: 6.4 G/DL (ref 6–8.4)
PROTHROMBIN TIME: 12.2 SEC (ref 9–12.5)
RBC # BLD AUTO: 3.36 M/UL (ref 4.6–6.2)
SODIUM SERPL-SCNC: 135 MMOL/L (ref 136–145)
TACROLIMUS BLD-MCNC: 3.8 NG/ML (ref 5–15)
WBC # BLD AUTO: 7.52 K/UL (ref 3.9–12.7)

## 2023-03-10 PROCEDURE — 94761 N-INVAS EAR/PLS OXIMETRY MLT: CPT

## 2023-03-10 PROCEDURE — 94640 AIRWAY INHALATION TREATMENT: CPT

## 2023-03-10 PROCEDURE — 80053 COMPREHEN METABOLIC PANEL: CPT

## 2023-03-10 PROCEDURE — 25000003 PHARM REV CODE 250

## 2023-03-10 PROCEDURE — 25000003 PHARM REV CODE 250: Performed by: STUDENT IN AN ORGANIZED HEALTH CARE EDUCATION/TRAINING PROGRAM

## 2023-03-10 PROCEDURE — 85610 PROTHROMBIN TIME: CPT | Performed by: STUDENT IN AN ORGANIZED HEALTH CARE EDUCATION/TRAINING PROGRAM

## 2023-03-10 PROCEDURE — 85025 COMPLETE CBC W/AUTO DIFF WBC: CPT

## 2023-03-10 PROCEDURE — 83735 ASSAY OF MAGNESIUM: CPT

## 2023-03-10 PROCEDURE — 25000242 PHARM REV CODE 250 ALT 637 W/ HCPCS: Performed by: STUDENT IN AN ORGANIZED HEALTH CARE EDUCATION/TRAINING PROGRAM

## 2023-03-10 PROCEDURE — 80197 ASSAY OF TACROLIMUS: CPT

## 2023-03-10 PROCEDURE — 80076 HEPATIC FUNCTION PANEL: CPT | Performed by: STUDENT IN AN ORGANIZED HEALTH CARE EDUCATION/TRAINING PROGRAM

## 2023-03-10 PROCEDURE — 97116 GAIT TRAINING THERAPY: CPT | Mod: CQ

## 2023-03-10 PROCEDURE — 36415 COLL VENOUS BLD VENIPUNCTURE: CPT | Performed by: STUDENT IN AN ORGANIZED HEALTH CARE EDUCATION/TRAINING PROGRAM

## 2023-03-10 PROCEDURE — 84100 ASSAY OF PHOSPHORUS: CPT

## 2023-03-10 PROCEDURE — 92526 ORAL FUNCTION THERAPY: CPT

## 2023-03-10 RX ORDER — HYDROCODONE BITARTRATE AND ACETAMINOPHEN 5; 325 MG/1; MG/1
1 TABLET ORAL EVERY 6 HOURS PRN
Qty: 20 TABLET | Refills: 0 | Status: SHIPPED | OUTPATIENT
Start: 2023-03-10 | End: 2023-03-16 | Stop reason: SDUPTHER

## 2023-03-10 RX ORDER — ONDANSETRON 4 MG/1
4 TABLET, ORALLY DISINTEGRATING ORAL EVERY 6 HOURS PRN
Qty: 20 TABLET | Refills: 0 | Status: SHIPPED | OUTPATIENT
Start: 2023-03-10

## 2023-03-10 RX ADMIN — SENNOSIDES 5 ML: 8.8 SYRUP ORAL at 08:03

## 2023-03-10 RX ADMIN — OXYCODONE HYDROCHLORIDE 5 MG: 5 SOLUTION ORAL at 05:03

## 2023-03-10 RX ADMIN — GABAPENTIN 600 MG: 250 SOLUTION ORAL at 08:03

## 2023-03-10 RX ADMIN — SCOPALAMINE 1 PATCH: 1 PATCH, EXTENDED RELEASE TRANSDERMAL at 01:03

## 2023-03-10 RX ADMIN — FAMOTIDINE 20 MG: 40 POWDER, FOR SUSPENSION ORAL at 08:03

## 2023-03-10 RX ADMIN — POLYETHYLENE GLYCOL 3350 17 G: 17 POWDER, FOR SOLUTION ORAL at 08:03

## 2023-03-10 RX ADMIN — IPRATROPIUM BROMIDE 0.5 MG: 0.5 SOLUTION RESPIRATORY (INHALATION) at 08:03

## 2023-03-10 RX ADMIN — TACROLIMUS 2 MG: 5 CAPSULE ORAL at 08:03

## 2023-03-10 RX ADMIN — QUETIAPINE FUMARATE 50 MG: 25 TABLET ORAL at 08:03

## 2023-03-10 RX ADMIN — LEVALBUTEROL HYDROCHLORIDE 0.63 MG: 0.63 SOLUTION RESPIRATORY (INHALATION) at 08:03

## 2023-03-10 RX ADMIN — ACETAMINOPHEN 650 MG: 650 SOLUTION ORAL at 05:03

## 2023-03-10 NOTE — PT/OT/SLP PROGRESS
"Physical Therapy Treatment    Patient Name:  Dereck Centeno   MRN:  [id number]    Recommendations:     Discharge Recommendations: home health PT  Discharge Equipment Recommendations:  (will determine DME Needs closer to discharge)  Barriers to discharge: Decreased caregiver support    Assessment:     Dereck Centeno is a 62 y.o. male admitted with a medical diagnosis of Squamous cell carcinoma of left tonsil.  He presents with the following impairments/functional limitations: weakness, impaired endurance, impaired functional mobility, impaired self care skills, impaired balance, gait instability, decreased safety awareness, impaired cardiopulmonary response to activity. Pt (I) with bed mobility, SPV no AD for transfers, and ambulated 120ft no AD with SBA.  Continue with PT POC    Rehab Prognosis: Good; patient would benefit from acute skilled PT services to address these deficits and reach maximum level of function.    Recent Surgery: Procedure(s) (LRB):  CREATION, TRACHEOSTOMY (N/A) 14 Days Post-Op    Plan:     During this hospitalization, patient to be seen 3 x/week to address the identified rehab impairments via gait training, therapeutic activities, therapeutic exercises, neuromuscular re-education and progress toward the following goals:    Plan of Care Expires:  03/19/23    Subjective     "I am waiting on nurse to get out of here."  Pain/Comfort:  Pain Rating 1: 0/10  Pain Rating Post-Intervention 1: 0/10      Objective:     Communicated with nursing prior to session.  Patient found ambulatory in room/bowen with  (no active lines) upon PT entry to room.     General Precautions: Standard, aspiration, fall  Orthopedic Precautions: N/A  Braces: N/A  Respiratory Status: Room air     Functional Mobility:  Bed Mobility:  Supine to Sit: independence  Transfers:  Sit to Stand:  supervision with no AD  Gait: ~120 ft no AD with SPV      AM-PAC 6 CLICK MOBILITY  Turning over in bed (including adjusting bedclothes, " sheets and blankets)?: 4  Sitting down on and standing up from a chair with arms (e.g., wheelchair, bedside commode, etc.): 3  Moving from lying on back to sitting on the side of the bed?: 4  Moving to and from a bed to a chair (including a wheelchair)?: 3  Need to walk in hospital room?: 3  Climbing 3-5 steps with a railing?: 1  Basic Mobility Total Score: 18       Treatment & Education:  -Pt w/o any questions/concerns on this date.     Patient left ambulatory in room/bowen with  visitor  present..    GOALS:   Multidisciplinary Problems       Physical Therapy Goals          Problem: Physical Therapy    Goal Priority Disciplines Outcome Goal Variances Interventions   Physical Therapy Goal     PT, PT/OT Ongoing, Progressing     Description: Goals to be met by: 3/22/23     Patient will increase functional independence with mobility by performin. Supine to sit with Modified [place]  2. Sit to stand transfer with Modified [place]  3. Gait  x 150 feet with Supervision using AD if needed. .   4. Ascend/descend 3 stair with bilateral Handrails Contact Guard Assistance .                          Time Tracking:     PT Received On: 03/10/23  PT Start Time: 943     PT Stop Time: 951  PT Total Time (min): 8 min     Billable Minutes: Gait Training 8    Treatment Type: Treatment  PT/PTA: PTA     PTA Visit Number: 3     03/10/2023

## 2023-03-10 NOTE — NURSING
Pt discharged to home.  He is awake, alert and oriented X3. No distress noted.  Dressing to anterior throat is clean dry and intact.  Discharge instructions, prescription and med admin info given to Pt.  F/u appt info given to Pt.  Periph IV removed.  No heart monitor equipment present.  Pt is waiting in room for bedside meds to arrive and his ride.

## 2023-03-10 NOTE — ASSESSMENT & PLAN NOTE
Dereck Centeno is a 62 y.o. 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 On day of surgery pt with expanding hematoma s/p emergent neck exploration with washout 2/20. Oral packing removed 2/22. Tracheostomy 2/24/23. Code blue on 2/27 for bleeding likely from tracheal stoma site. Required ETT placement in stoma and replacement of oral packing. CTA completed 2/27 without extravasation.   ETT replaced with tracheostomy and oral packing removed 2/28.     Decannulated 3/9.     -- Pureed diet  -- Labs and tacrolimus dosing per hepatology  -- Pain meds as needed  -- Please page ENT with any questions or concerns     Dispo: Discharge today

## 2023-03-10 NOTE — SUBJECTIVE & OBJECTIVE
Interval History: No acute events over night. Tolerating diet without difficulty. Breathing comfortably.    Medications:  Continuous Infusions:  Scheduled Meds:   acetaminophen  650 mg Oral Q8H    atorvastatin  40 mg Oral QHS    cloNIDine  0.1 mg Oral QHS    enoxaparin  40 mg Subcutaneous Daily    EScitalopram oxalate  20 mg Oral QHS    famotidine  20 mg Oral Daily    gabapentin  600 mg Oral TID    ipratropium  0.5 mg Nebulization Q8H    levalbuterol  0.63 mg Nebulization Q8H    mirtazapine  15 mg Oral Nightly    polyethylene glycol  17 g Per NG tube Daily    QUEtiapine  50 mg Oral BID    scopolamine  1 patch Transdermal Q3 Days    sennosides 8.8 mg/5 ml  5 mL Oral BID    tacrolimus  2 mg Oral Daily AM    And    tacrolimus  1 mg Oral Daily PM     PRN Meds:guaiFENesin 100 mg/5 ml, hydrALAZINE, HYDROmorphone, labetalol, ondansetron, oxyCODONE, QUEtiapine     Review of patient's allergies indicates:  No Known Allergies  Objective:     Vital Signs (24h Range):  Temp:  [96.5 °F (35.8 °C)-99.7 °F (37.6 °C)] 96.5 °F (35.8 °C)  Pulse:  [74-94] 80  Resp:  [16-20] 18  SpO2:  [94 %-98 %] 98 %  BP: (112-158)/(62-81) 152/81       Lines/Drains/Airways       Peripheral Intravenous Line  Duration                  Peripheral IV - Single Lumen 03/09/23 1510 20 G;1 3/4 in Right;Anterior Forearm <1 day                    Physical Exam  NAD  Oral cavity widely patent.  Neck soft. Incision clean, dry and intact. Staples removed.  Occlusive dressing in place. Normal work of breathing.

## 2023-03-10 NOTE — PROGRESS NOTES
[facility] Cuellar - [facility]  Otorhinolaryngology-Head & Neck Surgery  Progress Note    Subjective:     Post-Op Info:  Procedure(s) (LRB):  CREATION, TRACHEOSTOMY (N/A)   14 Days Post-Op  Hospital Day: 19     Interval History: No acute events over night. Tolerating diet without difficulty. Breathing comfortably.    Medications:  Continuous Infusions:  Scheduled Meds:   acetaminophen  650 mg Oral Q8H    atorvastatin  40 mg Oral QHS    cloNIDine  0.1 mg Oral QHS    enoxaparin  40 mg Subcutaneous Daily    EScitalopram oxalate  20 mg Oral QHS    famotidine  20 mg Oral Daily    gabapentin  600 mg Oral TID    ipratropium  0.5 mg Nebulization Q8H    levalbuterol  0.63 mg Nebulization Q8H    mirtazapine  15 mg Oral Nightly    polyethylene glycol  17 g Per NG tube Daily    QUEtiapine  50 mg Oral BID    scopolamine  1 patch Transdermal Q3 Days    sennosides 8.8 mg/5 ml  5 mL Oral BID    tacrolimus  2 mg Oral Daily AM    And    tacrolimus  1 mg Oral Daily PM     PRN Meds:guaiFENesin 100 mg/5 ml, hydrALAZINE, HYDROmorphone, labetalol, ondansetron, oxyCODONE, QUEtiapine     Review of patient's allergies indicates:  No Known Allergies  Objective:     Vital Signs (24h Range):  Temp:  [96.5 °F (35.8 °C)-99.7 °F (37.6 °C)] 96.5 °F (35.8 °C)  Pulse:  [74-94] 80  Resp:  [16-20] 18  SpO2:  [94 %-98 %] 98 %  BP: (112-158)/(62-81) 152/81       Lines/Drains/Airways       Peripheral Intravenous Line  Duration                  Peripheral IV - Single Lumen 03/09/23 1510 20 G;1 3/4 in Right;Anterior Forearm <1 day                    Physical Exam  NAD  Oral cavity widely patent.  Neck soft. Incision clean, dry and intact. Staples removed.  Occlusive dressing in place. Normal work of breathing.        Assessment/Plan:     * Squamous cell carcinoma of left tonsil  Dereck Centeno is a 62 y.o. 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 On day of surgery pt with expanding hematoma s/p emergent neck exploration with washout 2/20. Oral packing removed 2/22. Tracheostomy 2/24/23. Code blue on 2/27 for bleeding likely from tracheal stoma site. Required ETT placement in stoma and replacement of oral packing. CTA completed 2/27 without extravasation.   ETT replaced with tracheostomy and oral packing removed 2/28.     Decannulated 3/9.     -- Pureed diet  -- Labs and tacrolimus dosing per hepatology  -- Pain meds as needed  -- Please page ENT with any questions or concerns     Dispo: Discharge today              Ulices Anton MD  Otorhinolaryngology-Head & Neck Surgery  [facility]

## 2023-03-10 NOTE — PT/OT/SLP PROGRESS
Speech Language Pathology Treatment    Patient Name:  Dereck Centeno   MRN:  [id number]  Admitting Diagnosis: Squamous cell carcinoma of left tonsil    Recommendations:                 General Recommendations:  Dysphagia therapy and Speech/language therapy  Diet recommendations:  Pureed diet , Liquid Diet Level: Thin  Aspiration Precautions: Small bites/sips and Standard aspiration precautions   General Precautions: Standard, aspiration, fall  Communication strategies:  none    Subjective     Awake/alert  MD and RN at the bedside     Pain/Comfort:   No pain pre/post     Respiratory Status: Room air    Objective:     Has the patient been evaluated by SLP for swallowing?   Yes  Keep patient NPO? No     Pt with trach fully decannulated. Pt with clear vocal quality with intermittent air/leak escape from healing stoma. Pt tegaderm seal around stoma appearing to be loose and inflating during conversational speech. MD at the bedside and aware and pt pending discharge later this date. Pt holding covering over stoma when swallowing and talking intermittently. Pt tolerated  small single sips of thin thin liquids  paired with whole pills this date. No overt s/s of airway compromise noted throughout PO trials. SLP discussed with pt at length types of foods t\o consume at home and to continue with boost supplemental nutritious beverages.  SLP provided ongoing education regarding s, swallow precautions and SLP POC.      Assessment:     Dereck Centeno is a 62 y.o. male with an SLP diagnosis of Dysphagia.      Goals:   Multidisciplinary Problems       SLP Goals          Problem: SLP    Goal Priority Disciplines Outcome   SLP Goal     SLP    Description: Speech Language Pathology Goals  Goals expected to be met by 3/10    1. Pt will tolerate PMSV for all waking hours with >92% spO2 to increase phonation, respiration and swallowing aspects  2. Pt/family will don/doff PMSV x1 during session with min cues  3. Pt will vocalize with  PMSV in place to increase verbal expression.   4. Pt will participate in ongoing swallow assessment                               Plan:     Patient to be seen:  4 x/week   Plan of Care reviewed with:  patient   SLP Follow-Up:  Yes       Discharge recommendations:    TBD      Time Tracking:     SLP Treatment Date:   03/10/23  Speech Start Time:  0820  Speech Stop Time:  0833     Speech Total Time (min):  13 min    Billable Minutes: Treatment Swallowing Dysfunction 13    03/10/2023

## 2023-03-10 NOTE — PLAN OF CARE
[facility]  Discharge Final Note    Primary Care Provider: Eva Reynaga MD    Expected Discharge Date: 3/10/2023    Final Discharge Note (most recent)       Final Note - 03/10/23 1022          Final Note    Assessment Type Final Discharge Note     Anticipated Discharge Disposition Home or Self Care        Post-Acute Status    Post-Acute Authorization Home Health   Patient's insurance does not auth PT/OT through home health                  CM sent [employer] message to the clinic to schedule the post op appointment     Dania Boles RN, CM   Ext: [phone]

## 2023-03-10 NOTE — DISCHARGE SUMMARY
[facility]  Otorhinolaryngology-Head & Neck Surgery  Discharge Summary      Patient Name: Dereck Centeno  MRN: [id number]  Admission Date: 2/20/2023  Hospital Length of Stay: 18 days  Discharge Date and Time:  03/10/2023 9:03 AM  Attending Physician: Alexandre Templeton MD   Discharging Provider: Ulices Anton MD  Primary Care Provider: Eva Reynaga MD     HPI: 62 year old male with history of left oropharyngeal SCCa s/p left radical tonsillectomy via TORS and left modified neck dissection (levels II - IV).     Procedure(s) (LRB):  CREATION, TRACHEOSTOMY (N/A)     Hospital Course: On day of surgery patient had expanding hematoma s/p emergent neck exploration and washout on 2/20. Trach placed on 2/24/23. Code blue on 2/27/23 with bleed from tracheal stoma, which required ETT placement into tracheostomy. Weaned from vent and stepped down to floor. Tolerating pureed diet without difficulty. Decannulated and occlusive dressing placed.    Consults:   Consults (From admission, onward)          Status Ordering Provider     Inpatient consult to Midline team  Once        Provider:  (Not yet assigned)    Completed ALEXANDRE TEMPLETON     Inpatient consult to General Surgery  Once        Provider:  (Not yet assigned)    Completed CLINT JASON     Inpatient consult to Midline team  Once        Provider:  (Not yet assigned)    Completed RAMIREZ AU     Inpatient consult to Hepatology  Once        Provider:  Blake Mari MD    Completed RAMIREZ AU     Inpatient consult to Registered Dietitian/Nutritionist  Once        Provider:  (Not yet assigned)    Completed CLINT MAYNARD            Pending Diagnostic Studies:       None          Final Active Diagnoses:    Diagnosis Date Noted POA    PRINCIPAL PROBLEM:  Squamous cell carcinoma of left tonsil [C09.9] 02/01/2023 Yes    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 [C10.2] 03/03/2023 Yes    Acute postoperative pain [G89.18] 02/28/2023 Yes     Acute postoperative respiratory failure [J95.821] 02/28/2023 Yes    Electrolyte abnormality [E87.8] 02/28/2023 Yes    Postoperative hemorrhage involving respiratory system following respiratory system procedure [J95.830] 02/28/2023 Yes    Centrilobular emphysema [J43.2] 02/15/2023 Yes    GERD (gastroesophageal reflux disease) [K21.9] 02/15/2023 Yes    BPH (benign prostatic hyperplasia) [N40.0] 02/15/2023 Yes    Tobacco abuse [Z72.0] 09/29/2015 Yes    Liver transplant 1/11/2015 for HCV [Z94.4] 01/13/2015 Not Applicable     Chronic    Immunosuppression [D84.9] 01/12/2015 Yes    HTN (hypertension) [I10] 01/12/2015 Yes    Thrombocytopenia [D69.6] 04/29/2014 Yes      Problems Resolved During this Admission:      Discharged Condition: good    Disposition: Home or Self Care      Patient Instructions:      Reason for not Ordering Smoking Cessation Referral     Order Specific Question Answer Comments   Reason for not ordering: Patient refused      Reason for not Prescribing Nicotine Replacement     Order Specific Question Answer Comments   Reason for not Prescribing: Patient refused      Medications:  Reconciled Home Medications:      Medication List        START taking these medications      ondansetron 4 MG Tbdl  Commonly known as: ZOFRAN-ODT  Dissolve 1 tablet (4 mg total) by mouth every 6 (six) hours as needed.            CHANGE how you take these medications      aspirin 81 MG EC tablet  Commonly known as: ECOTRIN  Take 1 tablet (81 mg total) by mouth once daily.  What changed: when to take this     cloNIDine 0.1 MG tablet  Commonly known as: CATAPRES  Take 1 tablet (0.1 mg total) by mouth 2 (two) times daily.  What changed: when to take this     EScitalopram oxalate 20 MG tablet  Commonly known as: LEXAPRO  Take 1 tablet (20 mg total) by mouth once daily.  What changed: when to take this     gabapentin 400 MG capsule  Commonly known as: NEURONTIN  Take 1 capsule (400 mg total) by mouth 3 (three) times daily.  What  changed: how much to take            CONTINUE taking these medications      albuterol 90 mcg/actuation inhaler  Commonly known as: PROVENTIL/VENTOLIN HFA  Inhale 2 puffs into the lungs every 6 (six) hours as needed for Wheezing or Shortness of Breath.     alendronate 70 MG tablet  Commonly known as: FOSAMAX  Take 70 mg by mouth every 7 days. SATURDAYS     amLODIPine 5 MG tablet  Commonly known as: NORVASC  Take 1 tablet (5 mg total) by mouth once daily.     ANORO ELLIPTA 62.5-25 mcg/actuation Dsdv  Generic drug: umeclidinium-vilanteroL  Inhale into the lungs. Controller     atorvastatin 40 MG tablet  Commonly known as: LIPITOR  Take 40 mg by mouth every evening.     calcium carbonate 600 mg calcium (1,500 mg) Tab  Commonly known as: OS-KRISTEL  Take 600 mg by mouth once.     clonazePAM 0.5 MG tablet  Commonly known as: KlonoPIN  Take 0.5 mg by mouth 2 (two) times daily as needed for Anxiety.     docusate sodium 100 MG capsule  Commonly known as: COLACE  Take 100 mg by mouth 3 (three) times daily as needed for Constipation.     famotidine 20 MG tablet  Commonly known as: PEPCID  Take 1 tablet (20 mg total) by mouth every evening.     HYDROcodone-acetaminophen 5-325 mg per tablet  Commonly known as: NORCO  Take 1 tablet by mouth every 6 (six) hours as needed for Pain.     magnesium oxide 400 mg (241.3 mg magnesium) tablet  Commonly known as: MAG-OX  Take 400 mg by mouth once daily.     mirtazapine 15 MG tablet  Commonly known as: REMERON  Take 1 tablet (15 mg total) by mouth every evening.     multivitamin tablet  Commonly known as: THERAGRAN  Take 1 tablet by mouth once daily.     QUEtiapine 25 MG Tab  Commonly known as: SEROQUEL  Take 50 mg by mouth every evening.     tacrolimus 1 MG Cap  Commonly known as: PROGRAF  Take 2 capsules (2 mg total) by mouth every morning AND 1 capsule (1 mg total) every evening.     traMADoL 50 mg tablet  Commonly known as: ULTRAM  Take 50 mg by mouth every 6 (six) hours as needed for  Pain.     valACYclovir 1000 MG tablet  Commonly known as: VALTREX  Take 1 tablet (1,000 mg total) by mouth 3 (three) times daily. for 7 days     vitamin D 1000 units Tab  Commonly known as: VITAMIN D3  Take 2 tablets (2,000 Units total) by mouth once daily.            STOP taking these medications      hepatitis A virus vaccine (PF) 1,440 LISSY unit/mL Susp  Commonly known as: HAVRIX              Ulices Anton MD  Otorhinolaryngology-Head & Neck Surgery  [facility]

## 2023-03-10 NOTE — NURSING
Patient lying in bed at bedside report. Patient denies pain as he was just medicated. Vital signs obtained and stable. Temp 99.5. See MAR. Patient is on room air. Able to swallow pills whole and was drinking a carbonated beverage. Junet slept for a short time. Family in room and helped with  washing patients hair. Urinal in room. Call light at bedside. Patient will call with any concerns or needs.

## 2023-03-14 ENCOUNTER — PATIENT OUTREACH (OUTPATIENT)
Dept: ADMINISTRATIVE | Facility: CLINIC | Age: 63
End: 2023-03-14
Payer: MEDICAID

## 2023-03-14 DIAGNOSIS — C09.9 SQUAMOUS CELL CARCINOMA OF LEFT TONSIL: Primary | ICD-10-CM

## 2023-03-14 NOTE — PROGRESS NOTES
C3 nurse spoke with Dereck Centeno([employer]-LAW/CAREGIVER) for a TCC post hospital discharge follow up call. The patient reports does not have a scheduled HOSFU appointment. C3 nurse was unable to schedule HOSFU appointment for Non[facility] PCP. Patient advised to contact their PCP to schedule a HOSPFU within 5-7 days. Pt family requesting asst w/selecting [facility] based PCP.

## 2023-03-16 ENCOUNTER — OFFICE VISIT (OUTPATIENT)
Dept: OTOLARYNGOLOGY | Facility: CLINIC | Age: 63
End: 2023-03-16
Payer: MEDICAID

## 2023-03-16 VITALS
BODY MASS INDEX: 22.38 KG/M2 | HEIGHT: 70 IN | HEART RATE: 74 BPM | DIASTOLIC BLOOD PRESSURE: 56 MMHG | SYSTOLIC BLOOD PRESSURE: 102 MMHG | WEIGHT: 156.31 LBS

## 2023-03-16 DIAGNOSIS — C09.9 SQUAMOUS CELL CARCINOMA OF LEFT TONSIL: Primary | ICD-10-CM

## 2023-03-16 DIAGNOSIS — M54.2 NECK PAIN: ICD-10-CM

## 2023-03-16 PROCEDURE — 4010F ACE/ARB THERAPY RXD/TAKEN: CPT | Mod: CPTII,,, | Performed by: NURSE PRACTITIONER

## 2023-03-16 PROCEDURE — 3008F PR BODY MASS INDEX (BMI) DOCUMENTED: ICD-10-PCS | Mod: CPTII,,, | Performed by: NURSE PRACTITIONER

## 2023-03-16 PROCEDURE — 99999 PR PBB SHADOW E&M-EST. PATIENT-LVL V: CPT | Mod: PBBFAC,,, | Performed by: NURSE PRACTITIONER

## 2023-03-16 PROCEDURE — 1159F MED LIST DOCD IN RCRD: CPT | Mod: CPTII,,, | Performed by: NURSE PRACTITIONER

## 2023-03-16 PROCEDURE — 3074F PR MOST RECENT SYSTOLIC BLOOD PRESSURE < 130 MM HG: ICD-10-PCS | Mod: CPTII,,, | Performed by: NURSE PRACTITIONER

## 2023-03-16 PROCEDURE — 99999 PR PBB SHADOW E&M-EST. PATIENT-LVL V: ICD-10-PCS | Mod: PBBFAC,,, | Performed by: NURSE PRACTITIONER

## 2023-03-16 PROCEDURE — 3074F SYST BP LT 130 MM HG: CPT | Mod: CPTII,,, | Performed by: NURSE PRACTITIONER

## 2023-03-16 PROCEDURE — 3078F DIAST BP <80 MM HG: CPT | Mod: CPTII,,, | Performed by: NURSE PRACTITIONER

## 2023-03-16 PROCEDURE — 99024 POSTOP FOLLOW-UP VISIT: CPT | Mod: ,,, | Performed by: NURSE PRACTITIONER

## 2023-03-16 PROCEDURE — 3078F PR MOST RECENT DIASTOLIC BLOOD PRESSURE < 80 MM HG: ICD-10-PCS | Mod: CPTII,,, | Performed by: NURSE PRACTITIONER

## 2023-03-16 PROCEDURE — 99215 OFFICE O/P EST HI 40 MIN: CPT | Mod: PBBFAC | Performed by: NURSE PRACTITIONER

## 2023-03-16 PROCEDURE — 3008F BODY MASS INDEX DOCD: CPT | Mod: CPTII,,, | Performed by: NURSE PRACTITIONER

## 2023-03-16 PROCEDURE — 1159F PR MEDICATION LIST DOCUMENTED IN MEDICAL RECORD: ICD-10-PCS | Mod: CPTII,,, | Performed by: NURSE PRACTITIONER

## 2023-03-16 PROCEDURE — 4010F PR ACE/ARB THEARPY RXD/TAKEN: ICD-10-PCS | Mod: CPTII,,, | Performed by: NURSE PRACTITIONER

## 2023-03-16 PROCEDURE — 99024 PR POST-OP FOLLOW-UP VISIT: ICD-10-PCS | Mod: ,,, | Performed by: NURSE PRACTITIONER

## 2023-03-16 RX ORDER — HYDROCODONE BITARTRATE AND ACETAMINOPHEN 5; 325 MG/1; MG/1
1 TABLET ORAL EVERY 6 HOURS PRN
Qty: 30 TABLET | Refills: 0 | Status: SHIPPED | OUTPATIENT
Start: 2023-03-16 | End: 2023-03-26 | Stop reason: SDUPTHER

## 2023-03-16 RX ORDER — UMECLIDINIUM BROMIDE AND VILANTEROL TRIFENATATE 62.5; 25 UG/1; UG/1
1 POWDER RESPIRATORY (INHALATION) DAILY
Qty: 60 EACH | Refills: 0 | Status: SHIPPED | OUTPATIENT
Start: 2023-03-16

## 2023-03-16 NOTE — PROGRESS NOTES
CC: Left tonsil cancer    Oncology History   Squamous cell carcinoma of left tonsil   1/31/2023 Biopsy    EUA/DL by Dr. 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 Alfonso  Date Presented to Tumor Board: 02/02/23  Specialties Present: Medical Oncology; Radiation Oncology; Pathology; Radiology; Head and Neck; Nutrition; Navigation; Speech Pathology  Presentation at Cancer Conference: Prospective  Cancer Type: Head and neck cancer  Recommended Plan Note: surgery, adjuvant radiation           2/20/2023 Surgery    Diagnostic direct laryngoscopy  Left modified neck dissection of levels 2, 3 and 4    Left radical tonsillectomy        2/26/2023 Surgery    Tracheostomy- dacannulated            HPI   62 y.o. male returns for a post op visit. He has been doing well since surgery. Swallowing is improving.     He would like to see pain management for chronic neck pain.    Review of Systems   Constitutional: Negative for fatigue and unexpected weight change.   HENT: Per HPI.  Eyes: Negative for visual disturbance.   Respiratory: Negative for shortness of breath, hemoptysis   Cardiovascular: Negative for chest pain and palpitations.   Musculoskeletal: Negative for decreased ROM, back pain.   Skin: Negative for rash, sunburn, itching.   Neurological: Negative for dizziness and seizures.   Hematological: Negative for adenopathy. Does not bruise/bleed easily.   Endocrine: Negative for rapid weight loss/weight gain, heat/cold intolerance.     Past Medical History   Patient Active Problem List   Diagnosis    Chronic back pain    Thrombocytopenia    Anxiety disorder    Generalized weakness     Hyperglycemia    Adrenal cortical steroids causing adverse effect in therapeutic use    Immunosuppression    HTN (hypertension)    Liver transplant 1/11/2015 for HCV    Tobacco abuse    History of malignant neoplasm of oral cavity    Substance induced mood disorder    Dysphagia    Polyp of colon    Osteoporosis    Squamous cell carcinoma of left tonsil    Centrilobular emphysema    GERD (gastroesophageal reflux disease)    Adrenal adenoma, left    BPH (benign prostatic hyperplasia)    Acute postoperative pain    Acute postoperative respiratory failure    Electrolyte abnormality    Postoperative hemorrhage involving respiratory system following respiratory system procedure    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           Past Surgical History   Past Surgical History:   Procedure Laterality Date    COLONOSCOPY N/A 6/14/2018    Procedure: COLONOSCOPY;  Surgeon: Conor Castro MD;  Location[facility] ENDO;  Service: Endoscopy;  Laterality: N/A;    DIRECT LARYNGOSCOPY N/A 2/20/2023    Procedure: LARYNGOSCOPY, DIRECT;  Surgeon: Alexandre Templeton MD;  Location: [facility] OR [address] [place] [address];  Service: ENT;  Laterality: N/A;    DISSECTION OF NECK Left 2/20/2023    Procedure: DISSECTION, NECK;  Surgeon: Alexandre Templeton MD;  [facility];  Service: ENT;  Laterality: Left;    ESOPHAGEAL VARICE LIGATION      ESOPHAGOGASTRODUODENOSCOPY N/A 6/14/2018    Procedure: ESOPHAGOGASTRODUODENOSCOPY (EGD);  Surgeon: Conor Castro MD;  Location: [facility];  Service: Endoscopy;  Laterality: N/A;    EVACUATION OF HEMATOMA Left 2/20/2023    Procedure: EVACUATION, HEMATOMA;  Surgeon: Alexandre Templeton MD;  [facility];  Service: ENT;  Laterality: Left;    LARYNGOSCOPY N/A 1/31/2023    Procedure: LARYNGOSCOPY;  Surgeon: Guzman Alfonso MD;  [facility];  Service: ENT;  Laterality: N/A;  [id number]    LIVER TRANSPLANT      NECK EXPLORATION Left 2/20/2023    Procedure: EXPLORATION, NECK;   Surgeon: Alexandre Templeton MD;  [facility];  Service: ENT;  Laterality: Left;    ROBOT-ASSISTED TRANSORAL SURGERY Left 2/20/2023    Procedure: ROBOTIC TRANSORAL SURGERY (TORS);  Surgeon: Alexandre Templeton MD;  Location: [facility] OR 2ND FLR;  Service: ENT;  Laterality: Left;    TIPS PROCEDURE      TRACHEOSTOMY N/A 2/24/2023    Procedure: CREATION, TRACHEOSTOMY;  Surgeon: Alexandre Templeton MD;  [facility];  Service: ENT;  Laterality: N/A;         Family History   Family History   Problem Relation Age of Onset    Cancer Mother     Alcohol abuse Mother     Alcohol abuse Father            Social History   .  Social History     Socioeconomic History    Marital status: Single   Tobacco Use    Smoking status: Every Day     Packs/day: 0.50     Years: 50.00     Pack years: 25.00     Types: Cigarettes    Smokeless tobacco: Former     Types: Chew   Substance and Sexual Activity    Alcohol use: Not Currently    Drug use: No    Sexual activity: Not Currently         Allergies   Review of patient's allergies indicates:  No Known Allergies        Physical Exam     Vitals:    03/16/23 1327   BP: (!) 102/56   Pulse: 74         Body mass index is 22.43 kg/m².      General: AOx3, NAD   Respiratory:  Symmetric chest rise, normal effort  Oral Cavity:  Oral Tongue mobile, no lesions noted. Hard Palate WNL. No buccal or FOM lesions.  Oropharynx:  No masses/lesions of the posterior pharyngeal wall. L tonsil with ulcerated lesion. Soft palate without masses. Midline uvula.   Neck: Well-healed R neck scar.   2 cm firm, mobile L level II neck mass. No thyromegaly or thyroid nodules.  Normal range of motion.    Face: House Brackmann I bilaterally.     NP: No lesions of posterior wall  OP: No lesions of posterior wall or BOT. BOT is soft to palpation  Larynx: No lesions of glottic or supraglottic larynx. Normal vocal fold mobility   HP: No lesions of pyriform sinuses or postcricoid region  Mirror examination  was performed.    Neck CT reviewed.    Assessment/Plan  Problem List Items Addressed This Visit          Oncology    Squamous cell carcinoma of left tonsil - Primary     Healing well. Discussed path report and need for radiation. Referral placed to rad onc. Referral also placed to pain mgmt. Questions answered. RTC after completing treatment.         Relevant Orders    Ambulatory referral/consult to Radiation Oncology

## 2023-03-17 ENCOUNTER — TELEPHONE (OUTPATIENT)
Dept: OTOLARYNGOLOGY | Facility: CLINIC | Age: 63
End: 2023-03-17
Payer: MEDICAID

## 2023-03-17 LAB
DNA RANGE(S) EXAMINED NAR: NORMAL
GENE DIS ANL INTERP-IMP: POSITIVE
GENE DIS ASSESSED: NORMAL
GENE MUT TESTED BLD/T: 5.3 M/MB
MSI CA SPEC-IMP: NORMAL
REASON FOR STUDY: NORMAL
TEMPUS FUSIONADDENDUM: NORMAL
TEMPUS PORTAL: NORMAL

## 2023-03-19 NOTE — PROGRESS NOTES
[facility] Consult Note    Referring provider: Alexandre Templeton MD    Diagnosis:  Dereck Centeno is a 62 y.o. 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 He has a history of right sided oral cavity cancer treated with resection and right neck dissection (Dr. Pantoja).      Oncologic History:  He has a history of tobacco use, (50+ pack yr) liver transplant in 2015 for HCV, on tacrolimus. He had a right oral cavity caner treated 4 years ago with Dr. Pantoja with composite resection and neck dissection without flap-based reconstruction, no radiation or chemotherapy.   1/27/23: CT neck with left sided cervical adenopathy.   1/31/23: DL and biopsy   Op note: mobile tonsil, no involvement of soft palate or BOT  Path: invasive squamous cell carcinoma of the left tonsil (p16+)  2/2/23: CT chest without mets  2/20/23: DL, left modified neck dissection (2,3,4) and left radical tonsillectomy  Op note: ulcerated lesion of the left tonsillar fossa, extending down the lateral pharyngeal wall to the level of the hyoid. No involvement of BOT or soft palate. Abnormal lymph nodes within level 2A. superior and lateral margins were notable for high-grade dysplasia.  Additional margins were then resected and sent to pathology for frozen section analysis and were found to be negative for dysplasia or carcinoma.  Path:   Left tonsil with a p16+, 3.5 cm left tonsil cancer, 8mm DOI. Margins negative but 0.2 cm to deep margin. -PNI, -LVSI  2/2 LN+ in left level II, largest 1.7cm with no FRANCISCO. Further left neck dissection with 0/29 LN+  2/20/23: exploration of the left neck with for post op bleed  2/26/23: Tracheostomy        History of Present Illness:  Dereck Centeno presents today to discuss adjuvant radiotherapy for his tonsil cancer.    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 He has quit  smoking, last cigarette the day before his Tors. He has throat pain but is able to eat soft foods (some ground meat). Weight grossly stable.  He is edentulous. Denies otalgia, facial numbness or weakenss. He has some numbness in the left ear from surgery. History of drug and alcohol use, denies use of either at this time. He presents accompanied by his sister in law.     Review of Systems:  ROS as above    Social History:  Social History     Tobacco Use    Smoking status: Every Day     Packs/day: 0.50     Years: 50.00     Pack years: 25.00     Types: Cigarettes    Smokeless tobacco: Former     Types: Chew   Substance Use Topics    Alcohol use: Not Currently    Drug use: No       Past Medical History:  Past Medical History:   Diagnosis Date    Alcohol withdrawal seizure     Alcoholic cirrhosis     Anxiety     Depression     GERD (gastroesophageal reflux disease)     Hepatitis C     HTN (hypertension), benign     Hyperlipidemia     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 02/01/2023    Tobacco use        Past Surgical History:   Procedure Laterality Date    COLONOSCOPY N/A 6/14/2018    Procedure: COLONOSCOPY;  Surgeon: Conor Castro MD;  Location: [facility];  Service: Endoscopy;  Laterality: N/A;    DIRECT LARYNGOSCOPY N/A 2/20/2023    Procedure: LARYNGOSCOPY, DIRECT;  Surgeon: Alexandre Templeton MD;  Location: [facility] OR [address] [place] [address];  Service: ENT;  Laterality: N/A;    DISSECTION OF NECK Left 2/20/2023    Procedure: DISSECTION, NECK;  Surgeon: Alexandre Templeton MD;  Location: [address];  Service: ENT;  Laterality: Left;    ESOPHAGEAL VARICE LIGATION      ESOPHAGOGASTRODUODENOSCOPY N/A 6/14/2018    Procedure: ESOPHAGOGASTRODUODENOSCOPY (EGD);  Surgeon: Conor Castro MD;  Location: [facility];  Service: Endoscopy;  Laterality: N/A;    EVACUATION OF HEMATOMA Left 2/20/2023    Procedure: EVACUATION, HEMATOMA;  Surgeon: Alexandre Templeton MD;  Location: [facility] OR [address] [place] [address];  Service: ENT;  Laterality:  Left;    LARYNGOSCOPY N/A 1/31/2023    Procedure: LARYNGOSCOPY;  Surgeon: Guzman Alfonso MD;  Location: [facility] OR [facility] F[facility];  Service: ENT;  Laterality: N/A;  0124-[id number]    LIVER TRANSPLANT      NECK EXPLORATION Left 2/20/2023    Procedure: EXPLORATION, NECK;  Surgeon: Alexandre Templeton MD;  Location: [facility] OR 2ND FLR;  Service: ENT;  Laterality: Left;    ROBOT-ASSISTED TRANSORAL SURGERY Left 2/20/2023    Procedure: ROBOTIC TRANSORAL SURGERY (TORS);  Surgeon: Alexandre Templeton MD;  Location: [facility] OR 2ND FLR;  Service: ENT;  Laterality: Left;    TIPS PROCEDURE      TRACHEOSTOMY N/A 2/24/2023    Procedure: CREATION, TRACHEOSTOMY;  Surgeon: Alexandre Templeton MD;  [facility];  Service: ENT;  Laterality: N/A;       Cancer-related family history includes Cancer in his mother.    Medications:  Current Outpatient Medications on File Prior to Visit   Medication Sig Dispense Refill    albuterol (PROVENTIL/VENTOLIN HFA) 90 mcg/actuation inhaler Inhale 2 puffs into the lungs every 6 (six) hours as needed for Wheezing or Shortness of Breath.      alendronate (FOSAMAX) 70 MG tablet Take 70 mg by mouth every 7 days. SATURDAYS      amLODIPine (NORVASC) 5 MG tablet Take 1 tablet (5 mg total) by mouth once daily. 30 tablet 0    aspirin (ECOTRIN) 81 MG EC tablet Take 1 tablet (81 mg total) by mouth once daily. (Patient not taking: Reported on 3/14/2023) 30 tablet 0    atorvastatin (LIPITOR) 40 MG tablet Take 40 mg by mouth every evening.      calcium carbonate (OS-KRISTEL) 600 mg calcium (1,500 mg) Tab Take 600 mg by mouth once.      clonazePAM (KLONOPIN) 0.5 MG tablet Take 0.5 mg by mouth 2 (two) times daily as needed for Anxiety.      cloNIDine (CATAPRES) 0.1 MG tablet Take 1 tablet (0.1 mg total) by mouth 2 (two) times daily. (Patient taking differently: Take 0.1 mg by mouth every evening.) 60 tablet 0    docusate sodium (COLACE) 100 MG capsule Take 100 mg by mouth 3 (three) times daily as needed for Constipation.    "   escitalopram oxalate (LEXAPRO) 20 MG tablet Take 1 tablet (20 mg total) by mouth once daily. (Patient taking differently: Take 20 mg by mouth every evening.) 30 tablet 0    famotidine (PEPCID) 20 MG tablet Take 1 tablet (20 mg total) by mouth every evening. 30 tablet 0    gabapentin (NEURONTIN) 400 MG capsule Take 1 capsule (400 mg total) by mouth 3 (three) times daily. (Patient taking differently: Take 600 mg by mouth 3 (three) times daily.) 90 capsule 0    HYDROcodone-acetaminophen (NORCO) 5-325 mg per tablet Take 1 tablet by mouth every 6 (six) hours as needed for Pain. 30 tablet 0    magnesium oxide (MAG-OX) 400 mg tablet Take 400 mg by mouth once daily.      mirtazapine (REMERON) 15 MG tablet Take 1 tablet (15 mg total) by mouth every evening. 30 tablet 0    multivitamin (THERAGRAN) tablet Take 1 tablet by mouth once daily.      ondansetron (ZOFRAN-ODT) 4 MG TbDL Dissolve 1 tablet (4 mg total) by mouth every 6 (six) hours as needed. (Patient not taking: Reported on 3/14/2023) 20 tablet 0    QUEtiapine (SEROQUEL) 25 MG Tab Take 50 mg by mouth every evening.  3    tacrolimus (PROGRAF) 1 MG Cap Take 2 capsules (2 mg total) by mouth every morning AND 1 capsule (1 mg total) every evening. 90 capsule 11    umeclidinium-vilanteroL (ANORO ELLIPTA) 62.5-25 mcg/actuation DsDv Inhale 1 puff into the lungs Daily. Controller 60 each 0    valACYclovir (VALTREX) 1000 MG tablet Take 1 tablet (1,000 mg total) by mouth 3 (three) times daily. for 7 days 21 tablet 0    vitamin D 1000 units Tab Take 2 tablets (2,000 Units total) by mouth once daily. 60 tablet 5     No current facility-administered medications on file prior to visit.       Allergies:  Review of patient's allergies indicates:  No Known Allergies    Exam:  Vitals:    03/20/23 1502   BP: 121/67   BP Location: Right arm   Patient Position: Sitting   Pulse: 76   Resp: 16   SpO2: 100%   Weight: 73.1 kg (161 lb 3.2 oz)   Height: 5' 11.5" (1.816 m)     Constitutional: " Pleasant 62 y.o. male in no acute distress.  Well nourished. Well groomed.   HEENT: s/p radical tonsillectomy with post op changes extending from anterior pillar to the lateral posterior pharyngeal wall. No facial asymmetry. Sensation intact in V1-3  S/p left neck dissection, healing well. No cervical adenopathy palpable.   Cardiovascular: Upper extremities warm to touch  Lungs: No audible wheezing.  Normal effort. **  Musculoskeletal: No gross MSK deformities. Ambulates well  Skin: No rashes appreciated.  Psych: Alert and oriented with appropriate mood and affect.  Neuro:   Grossly normal.    Data Review:    Independent Interpretation of Test(s): Ct neck from 1/27/23 was personally reviewed    Assessment:  Dereck Centeno is a 62 y.o. 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 He has a history of right sided oral cavity cancer treated with resection and right neck dissection (Dr. Pantoja).  ECOG: (1) Restricted in physically strenuous activity, ambulatory and able to do work of light nature    Possibility of pregnancy: N/A  History of prior irradiation: No  History of prior systemic anti-cancer therapy: No  History of collagen vascular disease: No  Implanted electronic device (pacer/defib/nerve stimulator): No    Dental Evaluation: edentulous  Speech Therapy: referred  Nutrition: referred  PEG tube: discussed, will defer at this time  Smoking Cessation: has quit but he would like patches to assist in cessation. Patches sent and referral to CTTS placed.      Plan:  Treatment options were discussed with the patient including adjuvant radiation therapy, adjuvant systemic therapy, and no adjuvant therapy.  We discussed the goals of treatment.  The risks, benefits, scheduling, alternatives to and rationale of radiation therapy were explained in detail.    After this discussion, he elected to proceed with adjuvant radiation.    A CT simulation will be performed on 3/24/23 to begin the planning process for the patient's radiation therapy.       HEAD & NECK INFORMED CONSENT: Acute and delayed side effects of head and neck radiation, including but not limited to fatigue, redness of the skin, desquamation, dysphagia, odynophagia, mucositis, long term difficulties with swallowing, feeding tube dependency, fibrosis, xerostomia, hypothyroidism, infection as well as rare but catastrophic injury to the spinal cord, mandible, brainstem, and carotid arteries were discussed with the patient in great detail today.    I reviewed the rationale and goal of the proposed treatment course. The radiotherapeutic procedure with associated side effects and potential complications were explained. He had the opportunity to ask questions which were answered to the best of my knowledge. The patient voiced understanding of the above and has elected to proceed with treatment. Consent form was signed and the patient was given our contact information should further questions arise.      Radiation Treatment Details:   I plan to treat the operative bed to a dose of 60-66 Gy in 30-33 fractions (given close margins), the left neck to 60 Gy in 30-33 fractions and the right neck to 54-60 Gy in 30-33 fractions given  AJCC7 N2b, tobacco use (Zack et al Oral oncology 2014), immunosuppression, and possible aberrant drainage from prior neck dissection. Will use IMRT with SIB technique.       Lloyd Frederick MD  Radiation Oncology

## 2023-03-20 ENCOUNTER — PES CALL (OUTPATIENT)
Dept: ADMINISTRATIVE | Facility: CLINIC | Age: 63
End: 2023-03-20
Payer: MEDICAID

## 2023-03-20 ENCOUNTER — OFFICE VISIT (OUTPATIENT)
Dept: RADIATION ONCOLOGY | Facility: CLINIC | Age: 63
End: 2023-03-20
Payer: MEDICAID

## 2023-03-20 VITALS
HEART RATE: 76 BPM | RESPIRATION RATE: 16 BRPM | SYSTOLIC BLOOD PRESSURE: 121 MMHG | HEIGHT: 72 IN | OXYGEN SATURATION: 100 % | DIASTOLIC BLOOD PRESSURE: 67 MMHG | BODY MASS INDEX: 21.83 KG/M2 | WEIGHT: 161.19 LBS

## 2023-03-20 DIAGNOSIS — C09.9 SQUAMOUS CELL CARCINOMA OF LEFT TONSIL: ICD-10-CM

## 2023-03-20 DIAGNOSIS — Z72.0 TOBACCO USE: Primary | ICD-10-CM

## 2023-03-20 PROCEDURE — 4010F ACE/ARB THERAPY RXD/TAKEN: CPT | Mod: CPTII,,, | Performed by: STUDENT IN AN ORGANIZED HEALTH CARE EDUCATION/TRAINING PROGRAM

## 2023-03-20 PROCEDURE — 99205 OFFICE O/P NEW HI 60 MIN: CPT | Mod: S$PBB,,, | Performed by: STUDENT IN AN ORGANIZED HEALTH CARE EDUCATION/TRAINING PROGRAM

## 2023-03-20 PROCEDURE — 1159F PR MEDICATION LIST DOCUMENTED IN MEDICAL RECORD: ICD-10-PCS | Mod: CPTII,,, | Performed by: STUDENT IN AN ORGANIZED HEALTH CARE EDUCATION/TRAINING PROGRAM

## 2023-03-20 PROCEDURE — 1111F DSCHRG MED/CURRENT MED MERGE: CPT | Mod: CPTII,,, | Performed by: STUDENT IN AN ORGANIZED HEALTH CARE EDUCATION/TRAINING PROGRAM

## 2023-03-20 PROCEDURE — 99205 PR OFFICE/OUTPT VISIT, NEW, LEVL V, 60-74 MIN: ICD-10-PCS | Mod: S$PBB,,, | Performed by: STUDENT IN AN ORGANIZED HEALTH CARE EDUCATION/TRAINING PROGRAM

## 2023-03-20 PROCEDURE — 1159F MED LIST DOCD IN RCRD: CPT | Mod: CPTII,,, | Performed by: STUDENT IN AN ORGANIZED HEALTH CARE EDUCATION/TRAINING PROGRAM

## 2023-03-20 PROCEDURE — 99215 OFFICE O/P EST HI 40 MIN: CPT | Mod: PBBFAC | Performed by: STUDENT IN AN ORGANIZED HEALTH CARE EDUCATION/TRAINING PROGRAM

## 2023-03-20 PROCEDURE — 3008F BODY MASS INDEX DOCD: CPT | Mod: CPTII,,, | Performed by: STUDENT IN AN ORGANIZED HEALTH CARE EDUCATION/TRAINING PROGRAM

## 2023-03-20 PROCEDURE — 4010F PR ACE/ARB THEARPY RXD/TAKEN: ICD-10-PCS | Mod: CPTII,,, | Performed by: STUDENT IN AN ORGANIZED HEALTH CARE EDUCATION/TRAINING PROGRAM

## 2023-03-20 PROCEDURE — 99999 PR PBB SHADOW E&M-EST. PATIENT-LVL V: CPT | Mod: PBBFAC,,, | Performed by: STUDENT IN AN ORGANIZED HEALTH CARE EDUCATION/TRAINING PROGRAM

## 2023-03-20 PROCEDURE — 3078F PR MOST RECENT DIASTOLIC BLOOD PRESSURE < 80 MM HG: ICD-10-PCS | Mod: CPTII,,, | Performed by: STUDENT IN AN ORGANIZED HEALTH CARE EDUCATION/TRAINING PROGRAM

## 2023-03-20 PROCEDURE — 3078F DIAST BP <80 MM HG: CPT | Mod: CPTII,,, | Performed by: STUDENT IN AN ORGANIZED HEALTH CARE EDUCATION/TRAINING PROGRAM

## 2023-03-20 PROCEDURE — 99999 PR PBB SHADOW E&M-EST. PATIENT-LVL V: ICD-10-PCS | Mod: PBBFAC,,, | Performed by: STUDENT IN AN ORGANIZED HEALTH CARE EDUCATION/TRAINING PROGRAM

## 2023-03-20 PROCEDURE — 1111F PR DISCHARGE MEDS RECONCILED W/ CURRENT OUTPATIENT MED LIST: ICD-10-PCS | Mod: CPTII,,, | Performed by: STUDENT IN AN ORGANIZED HEALTH CARE EDUCATION/TRAINING PROGRAM

## 2023-03-20 PROCEDURE — 3074F SYST BP LT 130 MM HG: CPT | Mod: CPTII,,, | Performed by: STUDENT IN AN ORGANIZED HEALTH CARE EDUCATION/TRAINING PROGRAM

## 2023-03-20 PROCEDURE — 3008F PR BODY MASS INDEX (BMI) DOCUMENTED: ICD-10-PCS | Mod: CPTII,,, | Performed by: STUDENT IN AN ORGANIZED HEALTH CARE EDUCATION/TRAINING PROGRAM

## 2023-03-20 PROCEDURE — 3074F PR MOST RECENT SYSTOLIC BLOOD PRESSURE < 130 MM HG: ICD-10-PCS | Mod: CPTII,,, | Performed by: STUDENT IN AN ORGANIZED HEALTH CARE EDUCATION/TRAINING PROGRAM

## 2023-03-20 RX ORDER — IBUPROFEN 200 MG
1 TABLET ORAL DAILY
Qty: 28 PATCH | Refills: 0 | Status: SHIPPED | OUTPATIENT
Start: 2023-03-20

## 2023-03-21 ENCOUNTER — TELEPHONE (OUTPATIENT)
Dept: SPEECH THERAPY | Facility: HOSPITAL | Age: 63
End: 2023-03-21
Payer: MEDICAID

## 2023-03-23 ENCOUNTER — OFFICE VISIT (OUTPATIENT)
Dept: HOME HEALTH SERVICES | Facility: CLINIC | Age: 63
End: 2023-03-23
Payer: MEDICAID

## 2023-03-23 DIAGNOSIS — C09.9 SQUAMOUS CELL CARCINOMA OF LEFT TONSIL: ICD-10-CM

## 2023-03-23 PROCEDURE — 1160F RVW MEDS BY RX/DR IN RCRD: CPT | Mod: CPTII,S$GLB,, | Performed by: NURSE PRACTITIONER

## 2023-03-23 PROCEDURE — 3079F DIAST BP 80-89 MM HG: CPT | Mod: CPTII,S$GLB,, | Performed by: NURSE PRACTITIONER

## 2023-03-23 PROCEDURE — 99495 TRANSJ CARE MGMT MOD F2F 14D: CPT | Mod: S$GLB,,, | Performed by: NURSE PRACTITIONER

## 2023-03-23 PROCEDURE — 3077F PR MOST RECENT SYSTOLIC BLOOD PRESSURE >= 140 MM HG: ICD-10-PCS | Mod: CPTII,S$GLB,, | Performed by: NURSE PRACTITIONER

## 2023-03-23 PROCEDURE — 1111F PR DISCHARGE MEDS RECONCILED W/ CURRENT OUTPATIENT MED LIST: ICD-10-PCS | Mod: CPTII,S$GLB,, | Performed by: NURSE PRACTITIONER

## 2023-03-23 PROCEDURE — 4010F PR ACE/ARB THEARPY RXD/TAKEN: ICD-10-PCS | Mod: CPTII,S$GLB,, | Performed by: NURSE PRACTITIONER

## 2023-03-23 PROCEDURE — 1159F PR MEDICATION LIST DOCUMENTED IN MEDICAL RECORD: ICD-10-PCS | Mod: CPTII,S$GLB,, | Performed by: NURSE PRACTITIONER

## 2023-03-23 PROCEDURE — 1160F PR REVIEW ALL MEDS BY PRESCRIBER/CLIN PHARMACIST DOCUMENTED: ICD-10-PCS | Mod: CPTII,S$GLB,, | Performed by: NURSE PRACTITIONER

## 2023-03-23 PROCEDURE — 3079F PR MOST RECENT DIASTOLIC BLOOD PRESSURE 80-89 MM HG: ICD-10-PCS | Mod: CPTII,S$GLB,, | Performed by: NURSE PRACTITIONER

## 2023-03-23 PROCEDURE — 4010F ACE/ARB THERAPY RXD/TAKEN: CPT | Mod: CPTII,S$GLB,, | Performed by: NURSE PRACTITIONER

## 2023-03-23 PROCEDURE — 1111F DSCHRG MED/CURRENT MED MERGE: CPT | Mod: CPTII,S$GLB,, | Performed by: NURSE PRACTITIONER

## 2023-03-23 PROCEDURE — 1159F MED LIST DOCD IN RCRD: CPT | Mod: CPTII,S$GLB,, | Performed by: NURSE PRACTITIONER

## 2023-03-23 PROCEDURE — 99495 TCM SERVICES (MODERATE COMPLEXITY): ICD-10-PCS | Mod: S$GLB,,, | Performed by: NURSE PRACTITIONER

## 2023-03-23 PROCEDURE — 3077F SYST BP >= 140 MM HG: CPT | Mod: CPTII,S$GLB,, | Performed by: NURSE PRACTITIONER

## 2023-03-24 ENCOUNTER — TELEPHONE (OUTPATIENT)
Dept: RADIATION ONCOLOGY | Facility: CLINIC | Age: 63
End: 2023-03-24
Payer: MEDICAID

## 2023-03-24 NOTE — TELEPHONE ENCOUNTER
Followup call after no show for simulation appt  Family member states he has decided to not have treatment  Dr Frederick notified

## 2023-03-27 ENCOUNTER — DOCUMENTATION ONLY (OUTPATIENT)
Dept: RADIATION ONCOLOGY | Facility: CLINIC | Age: 63
End: 2023-03-27
Payer: MEDICAID

## 2023-03-27 NOTE — PROGRESS NOTES
Mr. Centeno did not show for CT sim, family told nursing he didn't want to undergo adjuvant RT.    I called and spoke with him today. We discussed rationale for RT, and that standard of care is to proceed with radiotherapy given multiple risk factors for recurrence: close margins, multiple LN, immunosuppression, etc. He is aware of the risks of recurrence but does not want to undergo RT, citing risk of toxicity. Recurrence will be symptomatic and possibly fatal. Also discussed the window for adjuvant, 6-8 weeks post op.  He ultimately elected to defer radiotherapy but he is willing to follow with close surveillance.     Lloyd Frederick MD  Radiation Oncology

## 2023-03-28 ENCOUNTER — PATIENT MESSAGE (OUTPATIENT)
Dept: OTOLARYNGOLOGY | Facility: CLINIC | Age: 63
End: 2023-03-28
Payer: MEDICAID

## 2023-03-28 DIAGNOSIS — C09.9 SQUAMOUS CELL CARCINOMA OF LEFT TONSIL: Primary | ICD-10-CM

## 2023-03-29 ENCOUNTER — TELEPHONE (OUTPATIENT)
Dept: SMOKING CESSATION | Facility: CLINIC | Age: 63
End: 2023-03-29
Payer: MEDICAID

## 2023-03-29 NOTE — TELEPHONE ENCOUNTER
Successful contact with relative of patient in regards to missing today's tobacco cessation intake session. Relative states, pt will quit with the help of spiritual counseling. Provided them with contact information to schedule an appointment if any further assistance from the program is needed in the future.

## 2023-03-30 ENCOUNTER — TELEPHONE (OUTPATIENT)
Dept: SMOKING CESSATION | Facility: CLINIC | Age: 63
End: 2023-03-30
Payer: MEDICAID

## 2023-04-11 ENCOUNTER — TELEPHONE (OUTPATIENT)
Dept: HEMATOLOGY/ONCOLOGY | Facility: CLINIC | Age: 63
End: 2023-04-11
Payer: MEDICAID

## 2023-04-11 NOTE — TELEPHONE ENCOUNTER
Called patient, spoke with Dianne, discussed [employer] financial assistance and sent mychart information so they can discuss later this evening together for additional coverage to minimize any out of pocket cost if not fully covered by insurance.

## 2023-04-13 ENCOUNTER — TELEPHONE (OUTPATIENT)
Dept: TRANSPLANT | Facility: CLINIC | Age: 63
End: 2023-04-13
Payer: MEDICAID

## 2023-04-13 NOTE — LETTER
April 13, 2023    Dereck Centeno  [address]  [facility] [address]          Dear Dereck Centeno:  MRN: [id number]    This is a follow up to your recent labs, your lab results were stable.  There are no medicine changes.  Please have your labs drawn again on 7/10/23.      If you cannot have your labs drawn on the scheduled date, it is your responsibility to call the transplant department to reschedule.  Please call [phone] and ask to speak to Dolly CERNA   for all scheduling requests.     Sincerely,    Bina Preston, RN,BSN,CCTC    Your Liver Transplant Coordinator    [facility]  [address] [place] [id number]  [phone]

## 2023-04-13 NOTE — TELEPHONE ENCOUNTER
Labs reviewed and are stable, continue q 3 months. Letter sent.     ----- Message from Mathew Finney MD sent at 4/13/2023  2:07 PM CDT -----  Results reviewed

## 2023-04-25 ENCOUNTER — TELEPHONE (OUTPATIENT)
Dept: PALLIATIVE MEDICINE | Facility: CLINIC | Age: 63
End: 2023-04-25
Payer: MEDICAID

## 2023-05-01 ENCOUNTER — PATIENT MESSAGE (OUTPATIENT)
Dept: ADMINISTRATIVE | Facility: OTHER | Age: 63
End: 2023-05-01
Payer: MEDICAID

## 2023-06-05 PROBLEM — G89.18 ACUTE POSTOPERATIVE PAIN: Status: RESOLVED | Noted: 2023-02-28 | Resolved: 2023-06-05

## 2023-06-05 PROBLEM — J95.821 ACUTE POSTOPERATIVE RESPIRATORY FAILURE: Status: RESOLVED | Noted: 2023-02-28 | Resolved: 2023-06-05

## 2023-06-07 VITALS
TEMPERATURE: 99 F | SYSTOLIC BLOOD PRESSURE: 142 MMHG | DIASTOLIC BLOOD PRESSURE: 80 MMHG | HEART RATE: 71 BPM | OXYGEN SATURATION: 98 % | RESPIRATION RATE: 18 BRPM

## 2023-06-07 NOTE — PROGRESS NOTES
[facility]  Transition of Care Home Visit    Visit Date: 3/23/2023  Encounter Provider: Mindi Washington   PCP:  Eva Reynaga MD    PRESENTING HISTORY      Patient ID: Dereck Centeno is a 62 y.o. male.    Consult Requested By:  Charito Cleary  Reason for Consult:  Hospital Follow Up.    Dereck is being seen at home due to being seen at home due to physical debility that presents a taxing effort to leave the home, to mitigate high risk of hospital readmission and/or due to the limited availability of reliable or safe options for transportation to the point of access to the provider. Prior to treatment on this visit the chart was reviewed and patient verbal consent was obtained.      Chief Complaint: Transitional Care        History of Present Illness: Mr. Dereck Centeno is a 62 y.o. male who was recently admitted to the hospital from 2/20/2023 - 3/10/2023.    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      Procedure(s) (LRB):  CREATION, TRACHEOSTOMY (N/A)      Hospital Course: On day of surgery patient had expanding hematoma s/p emergent neck exploration and washout on 2/20. Trach placed on 2/24/23. Code blue on 2/27/23 with bleed from tracheal stoma, which required ETT placement into tracheostomy. Weaned from vent and stepped down to floor. Tolerating pureed diet without difficulty. Decannulated and occlusive dressing placed.  ___________________________________________________________________    Today:    HPI:  Dereck Centeno was seen at home for a hospital follow up. He reports he is doing well. Eating well. He does still have fatigue with activity. Seen ambulating on his own. He does not have home health or utilize DME since hospital discharge. He has followed up with radiation oncology this week. He states he has decided not to undergo radiation at this time. He has chronic  pain for which he has recently been referred to pain management. He is scheduled to see his mental health provider tomorrow. Denies needs at this time other than narcotics.       Review of Systems   Constitutional:  Positive for activity change and fatigue. Negative for chills and fever.   HENT:  Negative for congestion and trouble swallowing.    Eyes:  Negative for visual disturbance.   Respiratory:  Negative for shortness of breath.    Cardiovascular:  Negative for chest pain.   Gastrointestinal:  Negative for abdominal pain and nausea.   Genitourinary:  Negative for dysuria.   Musculoskeletal:  Negative for arthralgias.   Skin:  Negative for wound.   Neurological:  Negative for headaches.   Psychiatric/Behavioral:  Negative for confusion.      Assessments:  Environmental: single story home, elevated  Functional Status: independent  Safety: no concerns  Nutritional: adequate  Home Health/DME/Supplies: none    PAST HISTORY:     Past Medical History:   Diagnosis Date    Alcohol withdrawal seizure     Alcoholic cirrhosis     Anxiety     Depression     GERD (gastroesophageal reflux disease)     Hepatitis C     HTN (hypertension), benign     Hyperlipidemia     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 02/01/2023    Tobacco use        Past Surgical History:   Procedure Laterality Date    COLONOSCOPY N/A 6/14/2018    Procedure: COLONOSCOPY;  Surgeon: Conor Castro MD;  Location: [facility];  Service: Endoscopy;  Laterality: N/A;    DIRECT LARYNGOSCOPY N/A 2/20/2023    Procedure: LARYNGOSCOPY, DIRECT;  Surgeon: Alexandre Templeton MD;  Location: [address];  Service: ENT;  Laterality: N/A;    DISSECTION OF NECK Left 2/20/2023    Procedure: DISSECTION, NECK;  Surgeon: Alexandre Templeton MD;  Location: [address];  Service: ENT;  Laterality: Left;    ESOPHAGEAL VARICE LIGATION      ESOPHAGOGASTRODUODENOSCOPY N/A 6/14/2018    Procedure: ESOPHAGOGASTRODUODENOSCOPY (EGD);  Surgeon: Conor Castro MD;   Location: [facility] ENDO;  Service: Endoscopy;  Laterality: N/A;    EVACUATION OF HEMATOMA Left 2/20/2023    Procedure: EVACUATION, HEMATOMA;  Surgeon: Alexandre Templeton MD;  Location: [facility];  Service: ENT;  Laterality: Left;    LARYNGOSCOPY N/A 1/31/2023    Procedure: LARYNGOSCOPY;  Surgeon: Guzman Alfonso MD;  Location: [facility];  Service: ENT;  Laterality: N/A;  [id number]    LIVER TRANSPLANT      NECK EXPLORATION Left 2/20/2023    Procedure: EXPLORATION, NECK;  Surgeon: Alexandre Templeton MD;  Location: [facility];  Service: ENT;  Laterality: Left;    ROBOT-ASSISTED TRANSORAL SURGERY Left 2/20/2023    Procedure: ROBOTIC TRANSORAL SURGERY (TORS);  Surgeon: Alexandre Templeton MD;  Location: [facility];  Service: ENT;  Laterality: Left;    TIPS PROCEDURE      TRACHEOSTOMY N/A 2/24/2023    Procedure: CREATION, TRACHEOSTOMY;  Surgeon: Alexandre Templeton MD;  Location: [facility];  Service: ENT;  Laterality: N/A;       Family History   Problem Relation Age of Onset    Cancer Mother     Alcohol abuse Mother     Alcohol abuse Father        Social History     Socioeconomic History    Marital status: Single   Tobacco Use    Smoking status: Every Day     Packs/day: 0.50     Years: 50.00     Pack years: 25.00     Types: Cigarettes    Smokeless tobacco: Former     Types: Chew   Substance and Sexual Activity    Alcohol use: Not Currently    Drug use: No    Sexual activity: Not Currently       MEDICATIONS & ALLERGIES:     Current Outpatient Medications on File Prior to Visit   Medication Sig Dispense Refill    albuterol (PROVENTIL/VENTOLIN HFA) 90 mcg/actuation inhaler Inhale 2 puffs into the lungs every 6 (six) hours as needed for Wheezing or Shortness of Breath.      alendronate (FOSAMAX) 70 MG tablet Take 70 mg by mouth every 7 days. SATURDAYS      amLODIPine (NORVASC) 5 MG tablet Take 1 tablet (5 mg total) by mouth once daily. 30 tablet 0    aspirin (ECOTRIN) 81 MG EC tablet Take 1 tablet  (81 mg total) by mouth once daily. (Patient not taking: Reported on 3/14/2023) 30 tablet 0    atorvastatin (LIPITOR) 40 MG tablet Take 40 mg by mouth every evening.      calcium carbonate (OS-KRISTEL) 600 mg calcium (1,500 mg) Tab Take 600 mg by mouth once.      clonazePAM (KLONOPIN) 0.5 MG tablet Take 0.5 mg by mouth 2 (two) times daily as needed for Anxiety.      cloNIDine (CATAPRES) 0.1 MG tablet Take 1 tablet (0.1 mg total) by mouth 2 (two) times daily. (Patient taking differently: Take 0.1 mg by mouth every evening.) 60 tablet 0    docusate sodium (COLACE) 100 MG capsule Take 100 mg by mouth 3 (three) times daily as needed for Constipation.      escitalopram oxalate (LEXAPRO) 20 MG tablet Take 1 tablet (20 mg total) by mouth once daily. (Patient taking differently: Take 20 mg by mouth every evening.) 30 tablet 0    famotidine (PEPCID) 20 MG tablet Take 1 tablet (20 mg total) by mouth every evening. 30 tablet 0    gabapentin (NEURONTIN) 400 MG capsule Take 1 capsule (400 mg total) by mouth 3 (three) times daily. (Patient taking differently: Take 600 mg by mouth 3 (three) times daily.) 90 capsule 0    magnesium oxide (MAG-OX) 400 mg tablet Take 400 mg by mouth once daily.      mirtazapine (REMERON) 15 MG tablet Take 1 tablet (15 mg total) by mouth every evening. 30 tablet 0    multivitamin (THERAGRAN) tablet Take 1 tablet by mouth once daily.      nicotine (NICODERM CQ) 21 mg/24 hr Place 1 patch onto the skin once daily. 28 patch 0    ondansetron (ZOFRAN-ODT) 4 MG TbDL Dissolve 1 tablet (4 mg total) by mouth every 6 (six) hours as needed. (Patient not taking: Reported on 3/14/2023) 20 tablet 0    QUEtiapine (SEROQUEL) 25 MG Tab Take 50 mg by mouth every evening.  3    tacrolimus (PROGRAF) 1 MG Cap Take 2 capsules (2 mg total) by mouth every morning AND 1 capsule (1 mg total) every evening. 90 capsule 11    umeclidinium-vilanteroL (ANORO ELLIPTA) 62.5-25 mcg/actuation DsDv Inhale 1 puff into the lungs Daily.  Controller 60 each 0    valACYclovir (VALTREX) 1000 MG tablet Take 1 tablet (1,000 mg total) by mouth 3 (three) times daily. for 7 days 21 tablet 0    vitamin D 1000 units Tab Take 2 tablets (2,000 Units total) by mouth once daily. 60 tablet 5     No current facility-administered medications on file prior to visit.        Review of patient's allergies indicates:  No Known Allergies    OBJECTIVE:     Vital Signs:  Vitals:    03/23/23 0800   BP: (!) 142/80   Pulse: 71   Resp: 18   Temp: 98.6 °F (37 °C)     There is no height or weight on file to calculate BMI.     Physical Exam:  Physical Exam  Vitals and nursing note reviewed.   Constitutional:       General: He is not in acute distress.  HENT:      Head: Normocephalic and atraumatic.      Nose: Nose normal.      Mouth/Throat:      Mouth: Mucous membranes are moist.   Eyes:      Pupils: Pupils are equal, round, and reactive to light.   Cardiovascular:      Rate and Rhythm: Normal rate and regular rhythm.      Pulses: Normal pulses.      Heart sounds: Normal heart sounds.   Pulmonary:      Effort: Pulmonary effort is normal.      Breath sounds: Normal breath sounds.   Abdominal:      General: Bowel sounds are normal.      Palpations: Abdomen is soft.   Musculoskeletal:         General: Normal range of motion.      Cervical back: Normal range of motion.   Skin:     General: Skin is warm and dry.   Neurological:      Mental Status: He is alert and oriented to person, place, and time.   Psychiatric:         Behavior: Behavior normal.         Thought Content: Thought content normal.       Laboratory  Lab Results   Component Value Date    WBC 5.42 04/10/2023    HGB 12.7 (L) 04/10/2023    HCT 40.1 04/10/2023    MCV 87 04/10/2023     (L) 04/10/2023     Lab Results   Component Value Date    INR 1.2 03/10/2023    INR 1.2 03/09/2023    INR 1.2 03/08/2023     Lab Results   Component Value Date    HGBA1C 5.0 01/27/2022     No results for input(s): POCTGLUCOSE in the last  72 hours.    TRANSITION OF CARE:     [facility] Contact Note: yes, date 3/14/23    Family and/or Caretaker present at visit?  Yes.  Diagnostic tests reviewed/disposition: No diagnosic tests pending after this hospitalization.  Disease/illness education: discussed radiation therapy  Home health/community services discussion/referrals: Patient does not have home health established from hospital visit.  They do not need home health.  If needed, we will set up home health for the patient.   Establishment or re-establishment of referral orders for community resources: No other necessary community resources.   Discussion with other health care providers: No discussion with other health care providers necessary.     Transition of Care Visit:  I have reviewed and updated the history and problem list.  I have reconciled the medication list.  I have discussed the hospitalization and current medical issues, prognosis and plans with the patient/family.  I  spent more than 50% of time discussing the care with the patient/family.  Total Face-to-Face Encounter: 60 minutes.    Medications Reconciliation:   I have reconciled the patient's home medications and discharge medications with the patient/family. I have updated all changes.  Refer to After-Visit Medication List.    ASSESSMENT & PLAN:       Problem List Items Addressed This Visit          Oncology    Squamous cell carcinoma of left tonsil     -f/u with PCP and oncology  -does not want radiation therapy  -f/u with pain management for chronic pain     Were controlled substances prescribed?  No    Instructions for the patient:    Take all medication as prescribed  Activity as tolerated  Keep all upcoming appts   Questions elicited and answered.  Contact information provided for any changes in condition or concerns      Scheduled Follow-up :  Future Appointments   Date Time Provider Department Center   6/16/2023 11:00 AM GAIL Jim[facility]nez   7/10/2023   8:00 AM HOSPITAL LAB, [facility]   7/18/2023  9:30 AM Mathew Finney MD [facility]       After Visit Medication List :     Medication List            Accurate as of March 23, 2023 11:59 PM. If you have any questions, ask your nurse or doctor.                CHANGE how you take these medications      cloNIDine 0.1 MG tablet  Commonly known as: CATAPRES  Take 1 tablet (0.1 mg total) by mouth 2 (two) times daily.  What changed: when to take this     EScitalopram oxalate 20 MG tablet  Commonly known as: LEXAPRO  Take 1 tablet (20 mg total) by mouth once daily.  What changed: when to take this     gabapentin 400 MG capsule  Commonly known as: NEURONTIN  Take 1 capsule (400 mg total) by mouth 3 (three) times daily.  What changed: how much to take            CONTINUE taking these medications      albuterol 90 mcg/actuation inhaler  Commonly known as: PROVENTIL/VENTOLIN HFA     alendronate 70 MG tablet  Commonly known as: FOSAMAX     amLODIPine 5 MG tablet  Commonly known as: NORVASC  Take 1 tablet (5 mg total) by mouth once daily.     ANORO ELLIPTA 62.5-25 mcg/actuation Dsdv  Generic drug: umeclidinium-vilanteroL  Inhale 1 puff into the lungs Daily. Controller     aspirin 81 MG EC tablet  Commonly known as: ECOTRIN  Take 1 tablet (81 mg total) by mouth once daily.     atorvastatin 40 MG tablet  Commonly known as: LIPITOR     calcium carbonate 600 mg calcium (1,500 mg) Tab  Commonly known as: OS-KRISTEL     clonazePAM 0.5 MG tablet  Commonly known as: KlonoPIN     docusate sodium 100 MG capsule  Commonly known as: COLACE     famotidine 20 MG tablet  Commonly known as: PEPCID  Take 1 tablet (20 mg total) by mouth every evening.     HYDROcodone-acetaminophen 5-325 mg per tablet  Commonly known as: NORCO  Take 1 tablet by mouth every 6 (six) hours as needed for Pain.     magnesium oxide 400 mg (241.3 mg magnesium) tablet  Commonly known as: MAG-OX     mirtazapine 15 MG tablet  Commonly known  as: REMERON  Take 1 tablet (15 mg total) by mouth every evening.     multivitamin tablet  Commonly known as: THERAGRAN  Take 1 tablet by mouth once daily.     nicotine 21 mg/24 hr  Commonly known as: NICODERM CQ  Place 1 patch onto the skin once daily.     ondansetron 4 MG Tbdl  Commonly known as: ZOFRAN-ODT  Dissolve 1 tablet (4 mg total) by mouth every 6 (six) hours as needed.     QUEtiapine 25 MG Tab  Commonly known as: SEROQUEL     tacrolimus 1 MG Cap  Commonly known as: PROGRAF  Take 2 capsules (2 mg total) by mouth every morning AND 1 capsule (1 mg total) every evening.     valACYclovir 1000 MG tablet  Commonly known as: VALTREX  Take 1 tablet (1,000 mg total) by mouth 3 (three) times daily. for 7 days     vitamin D 1000 units Tab  Commonly known as: VITAMIN D3  Take 2 tablets (2,000 Units total) by mouth once daily.              Signature: Mindi Washington NP=

## 2023-06-16 ENCOUNTER — OFFICE VISIT (OUTPATIENT)
Dept: GASTROENTEROLOGY | Facility: CLINIC | Age: 63
End: 2023-06-16
Payer: MEDICAID

## 2023-06-16 VITALS
HEIGHT: 72 IN | OXYGEN SATURATION: 98 % | WEIGHT: 166.13 LBS | DIASTOLIC BLOOD PRESSURE: 70 MMHG | SYSTOLIC BLOOD PRESSURE: 128 MMHG | HEART RATE: 69 BPM | BODY MASS INDEX: 22.5 KG/M2

## 2023-06-16 DIAGNOSIS — Z86.010 PERSONAL HISTORY OF COLONIC POLYPS: ICD-10-CM

## 2023-06-16 DIAGNOSIS — K21.9 GASTROESOPHAGEAL REFLUX DISEASE, UNSPECIFIED WHETHER ESOPHAGITIS PRESENT: Primary | ICD-10-CM

## 2023-06-16 PROBLEM — R13.10 DYSPHAGIA: Status: RESOLVED | Noted: 2018-06-14 | Resolved: 2023-06-16

## 2023-06-16 PROCEDURE — 3074F PR MOST RECENT SYSTOLIC BLOOD PRESSURE < 130 MM HG: ICD-10-PCS | Mod: CPTII,,, | Performed by: NURSE PRACTITIONER

## 2023-06-16 PROCEDURE — 1160F RVW MEDS BY RX/DR IN RCRD: CPT | Mod: CPTII,,, | Performed by: NURSE PRACTITIONER

## 2023-06-16 PROCEDURE — 4010F PR ACE/ARB THEARPY RXD/TAKEN: ICD-10-PCS | Mod: CPTII,,, | Performed by: NURSE PRACTITIONER

## 2023-06-16 PROCEDURE — 99213 OFFICE O/P EST LOW 20 MIN: CPT | Mod: S$PBB,,, | Performed by: NURSE PRACTITIONER

## 2023-06-16 PROCEDURE — 3078F PR MOST RECENT DIASTOLIC BLOOD PRESSURE < 80 MM HG: ICD-10-PCS | Mod: CPTII,,, | Performed by: NURSE PRACTITIONER

## 2023-06-16 PROCEDURE — 99215 OFFICE O/P EST HI 40 MIN: CPT | Mod: PBBFAC,PO | Performed by: NURSE PRACTITIONER

## 2023-06-16 PROCEDURE — 99999 PR PBB SHADOW E&M-EST. PATIENT-LVL V: CPT | Mod: PBBFAC,,, | Performed by: NURSE PRACTITIONER

## 2023-06-16 PROCEDURE — 1159F MED LIST DOCD IN RCRD: CPT | Mod: CPTII,,, | Performed by: NURSE PRACTITIONER

## 2023-06-16 PROCEDURE — 99213 PR OFFICE/OUTPT VISIT, EST, LEVL III, 20-29 MIN: ICD-10-PCS | Mod: S$PBB,,, | Performed by: NURSE PRACTITIONER

## 2023-06-16 PROCEDURE — 1159F PR MEDICATION LIST DOCUMENTED IN MEDICAL RECORD: ICD-10-PCS | Mod: CPTII,,, | Performed by: NURSE PRACTITIONER

## 2023-06-16 PROCEDURE — 4010F ACE/ARB THERAPY RXD/TAKEN: CPT | Mod: CPTII,,, | Performed by: NURSE PRACTITIONER

## 2023-06-16 PROCEDURE — 3074F SYST BP LT 130 MM HG: CPT | Mod: CPTII,,, | Performed by: NURSE PRACTITIONER

## 2023-06-16 PROCEDURE — 3008F BODY MASS INDEX DOCD: CPT | Mod: CPTII,,, | Performed by: NURSE PRACTITIONER

## 2023-06-16 PROCEDURE — 1160F PR REVIEW ALL MEDS BY PRESCRIBER/CLIN PHARMACIST DOCUMENTED: ICD-10-PCS | Mod: CPTII,,, | Performed by: NURSE PRACTITIONER

## 2023-06-16 PROCEDURE — 99999 PR PBB SHADOW E&M-EST. PATIENT-LVL V: ICD-10-PCS | Mod: PBBFAC,,, | Performed by: NURSE PRACTITIONER

## 2023-06-16 PROCEDURE — 3008F PR BODY MASS INDEX (BMI) DOCUMENTED: ICD-10-PCS | Mod: CPTII,,, | Performed by: NURSE PRACTITIONER

## 2023-06-16 PROCEDURE — 3078F DIAST BP <80 MM HG: CPT | Mod: CPTII,,, | Performed by: NURSE PRACTITIONER

## 2023-06-16 RX ORDER — TAMSULOSIN HYDROCHLORIDE 0.4 MG/1
CAPSULE ORAL
COMMUNITY
Start: 2022-08-31

## 2023-06-16 RX ORDER — AMMONIUM LACTATE 12 G/100G
CREAM TOPICAL
COMMUNITY
Start: 2022-08-31

## 2023-06-16 RX ORDER — TRAMADOL HYDROCHLORIDE 50 MG/1
TABLET ORAL
COMMUNITY

## 2023-06-16 RX ORDER — HYDROXYZINE PAMOATE 50 MG/1
CAPSULE ORAL
COMMUNITY
Start: 2022-07-08

## 2023-06-16 RX ORDER — BUPROPION HYDROCHLORIDE 300 MG/1
TABLET ORAL
COMMUNITY

## 2023-06-16 RX ORDER — BUTALBITAL, ACETAMINOPHEN AND CAFFEINE 50; 325; 40 MG/1; MG/1; MG/1
TABLET ORAL
COMMUNITY

## 2023-06-16 RX ORDER — LISINOPRIL 10 MG/1
TABLET ORAL
COMMUNITY
Start: 2022-11-03

## 2023-07-12 ENCOUNTER — TELEPHONE (OUTPATIENT)
Dept: TRANSPLANT | Facility: CLINIC | Age: 63
End: 2023-07-12
Payer: MEDICAID

## 2023-07-12 NOTE — LETTER
July 12, 2023    Dereck Centeno  [address]  [facility] [address]          Dear Dereck Centeno:  MRN: [id number]    This is a follow up to your recent labs, your lab results were stable.  There are no medicine changes.  Please have your labs drawn again on 10/2/23.      If you cannot have your labs drawn on the scheduled date, it is your responsibility to call the transplant department to reschedule.  Please call [phone] and ask to speak to Dolly CERNA   for all scheduling requests.     Sincerely,    Bina Preston, RN,BSN,CCTC     Your Liver Transplant Coordinator    [facility]  [address] [place] [id number]  [phone]

## 2023-07-12 NOTE — TELEPHONE ENCOUNTER
Labs reviewed and are stable, continue q 3 months. Letter sent.       ----- Message from Mathew Finney MD sent at 7/11/2023  3:36 PM CDT -----  Results reviewed

## 2023-10-03 ENCOUNTER — TELEPHONE (OUTPATIENT)
Dept: TRANSPLANT | Facility: CLINIC | Age: 63
End: 2023-10-03
Payer: MEDICAID

## 2023-10-03 NOTE — TELEPHONE ENCOUNTER
Informed pt and family , creatinine is elevated. Pt's brother states he had covid last week and has not been hydrating as well as he should. He will inform him to increase fluids and will repeat labs Friday 10/6.       ----- Message from Mathew Finney MD sent at 10/2/2023  1:43 PM CDT -----  Oral hydration and repeat on Thursday  Results reviewed

## 2023-10-06 ENCOUNTER — TELEPHONE (OUTPATIENT)
Dept: TRANSPLANT | Facility: CLINIC | Age: 63
End: 2023-10-06
Payer: MEDICAID

## 2023-10-06 DIAGNOSIS — R79.89 HIGH CREATININE: ICD-10-CM

## 2023-10-06 DIAGNOSIS — Z94.4 STATUS POST LIVER TRANSPLANT: Primary | ICD-10-CM

## 2023-10-06 NOTE — TELEPHONE ENCOUNTER
Writer returned patient call, stating concern about kidney function in lab results. Informed that nephrology consult is being placed.            ----- Message from Wily Jimenez sent at 10/6/2023  2:06 PM CDT -----  Regarding: Consult/Advisory  Contact: Dianne,Patient's sister in law  Consult/Advisory    Name Of Caller: DiannePatient's sister in law      Contact Preference: [phone] (Mobile      Nature of call: Patient's sister in law and brother calling to speak to coordinator regarding patient's test results. Please call back to explain.

## 2023-10-09 ENCOUNTER — TELEPHONE (OUTPATIENT)
Dept: TRANSPLANT | Facility: CLINIC | Age: 63
End: 2023-10-09
Payer: MEDICAID

## 2023-10-11 ENCOUNTER — TELEPHONE (OUTPATIENT)
Dept: TRANSPLANT | Facility: CLINIC | Age: 63
End: 2023-10-11
Payer: MEDICAID

## 2023-10-11 ENCOUNTER — OFFICE VISIT (OUTPATIENT)
Dept: TRANSPLANT | Facility: CLINIC | Age: 63
End: 2023-10-11
Payer: MEDICAID

## 2023-10-11 VITALS
RESPIRATION RATE: 16 BRPM | SYSTOLIC BLOOD PRESSURE: 159 MMHG | BODY MASS INDEX: 23.39 KG/M2 | DIASTOLIC BLOOD PRESSURE: 78 MMHG | TEMPERATURE: 97 F | HEIGHT: 70 IN | WEIGHT: 163.38 LBS | OXYGEN SATURATION: 100 % | HEART RATE: 72 BPM

## 2023-10-11 DIAGNOSIS — I10 PRIMARY HYPERTENSION: ICD-10-CM

## 2023-10-11 DIAGNOSIS — N40.1 BENIGN PROSTATIC HYPERPLASIA WITH NOCTURIA: ICD-10-CM

## 2023-10-11 DIAGNOSIS — N17.9 ACUTE KIDNEY INJURY SUPERIMPOSED ON CKD: Primary | ICD-10-CM

## 2023-10-11 DIAGNOSIS — R35.1 BENIGN PROSTATIC HYPERPLASIA WITH NOCTURIA: ICD-10-CM

## 2023-10-11 DIAGNOSIS — D84.821 IMMUNODEFICIENCY DUE TO LONG TERM DRUG THERAPY: ICD-10-CM

## 2023-10-11 DIAGNOSIS — Z79.899 IMMUNODEFICIENCY DUE TO LONG TERM DRUG THERAPY: ICD-10-CM

## 2023-10-11 DIAGNOSIS — N18.9 ACUTE KIDNEY INJURY SUPERIMPOSED ON CKD: Primary | ICD-10-CM

## 2023-10-11 LAB
BILIRUB UR QL STRIP: NEGATIVE
CLARITY UR REFRACT.AUTO: CLEAR
COLOR UR AUTO: YELLOW
CREAT UR-MCNC: 57 MG/DL (ref 23–375)
GLUCOSE UR QL STRIP: NEGATIVE
HGB UR QL STRIP: NEGATIVE
KETONES UR QL STRIP: NEGATIVE
LEUKOCYTE ESTERASE UR QL STRIP: NEGATIVE
NITRITE UR QL STRIP: NEGATIVE
PH UR STRIP: 5 [PH] (ref 5–8)
PROT UR QL STRIP: NEGATIVE
PROT UR-MCNC: <7 MG/DL (ref 0–15)
PROT/CREAT UR: NORMAL MG/G{CREAT} (ref 0–0.2)
SODIUM UR-SCNC: 23 MMOL/L (ref 20–250)
SP GR UR STRIP: 1 (ref 1–1.03)
URN SPEC COLLECT METH UR: NORMAL

## 2023-10-11 PROCEDURE — 84300 ASSAY OF URINE SODIUM: CPT | Performed by: STUDENT IN AN ORGANIZED HEALTH CARE EDUCATION/TRAINING PROGRAM

## 2023-10-11 PROCEDURE — 99999 PR PBB SHADOW E&M-EST. PATIENT-LVL V: CPT | Mod: PBBFAC,,, | Performed by: STUDENT IN AN ORGANIZED HEALTH CARE EDUCATION/TRAINING PROGRAM

## 2023-10-11 PROCEDURE — 1160F PR REVIEW ALL MEDS BY PRESCRIBER/CLIN PHARMACIST DOCUMENTED: ICD-10-PCS | Mod: CPTII,,, | Performed by: STUDENT IN AN ORGANIZED HEALTH CARE EDUCATION/TRAINING PROGRAM

## 2023-10-11 PROCEDURE — 3077F PR MOST RECENT SYSTOLIC BLOOD PRESSURE >= 140 MM HG: ICD-10-PCS | Mod: CPTII,,, | Performed by: STUDENT IN AN ORGANIZED HEALTH CARE EDUCATION/TRAINING PROGRAM

## 2023-10-11 PROCEDURE — 3078F PR MOST RECENT DIASTOLIC BLOOD PRESSURE < 80 MM HG: ICD-10-PCS | Mod: CPTII,,, | Performed by: STUDENT IN AN ORGANIZED HEALTH CARE EDUCATION/TRAINING PROGRAM

## 2023-10-11 PROCEDURE — 82570 ASSAY OF URINE CREATININE: CPT | Performed by: STUDENT IN AN ORGANIZED HEALTH CARE EDUCATION/TRAINING PROGRAM

## 2023-10-11 PROCEDURE — 3066F PR DOCUMENTATION OF TREATMENT FOR NEPHROPATHY: ICD-10-PCS | Mod: CPTII,,, | Performed by: STUDENT IN AN ORGANIZED HEALTH CARE EDUCATION/TRAINING PROGRAM

## 2023-10-11 PROCEDURE — 1160F RVW MEDS BY RX/DR IN RCRD: CPT | Mod: CPTII,,, | Performed by: STUDENT IN AN ORGANIZED HEALTH CARE EDUCATION/TRAINING PROGRAM

## 2023-10-11 PROCEDURE — 81003 URINALYSIS AUTO W/O SCOPE: CPT | Performed by: STUDENT IN AN ORGANIZED HEALTH CARE EDUCATION/TRAINING PROGRAM

## 2023-10-11 PROCEDURE — 99205 OFFICE O/P NEW HI 60 MIN: CPT | Mod: S$PBB,,, | Performed by: STUDENT IN AN ORGANIZED HEALTH CARE EDUCATION/TRAINING PROGRAM

## 2023-10-11 PROCEDURE — 3077F SYST BP >= 140 MM HG: CPT | Mod: CPTII,,, | Performed by: STUDENT IN AN ORGANIZED HEALTH CARE EDUCATION/TRAINING PROGRAM

## 2023-10-11 PROCEDURE — 3078F DIAST BP <80 MM HG: CPT | Mod: CPTII,,, | Performed by: STUDENT IN AN ORGANIZED HEALTH CARE EDUCATION/TRAINING PROGRAM

## 2023-10-11 PROCEDURE — 99215 OFFICE O/P EST HI 40 MIN: CPT | Mod: PBBFAC | Performed by: STUDENT IN AN ORGANIZED HEALTH CARE EDUCATION/TRAINING PROGRAM

## 2023-10-11 PROCEDURE — 3066F NEPHROPATHY DOC TX: CPT | Mod: CPTII,,, | Performed by: STUDENT IN AN ORGANIZED HEALTH CARE EDUCATION/TRAINING PROGRAM

## 2023-10-11 PROCEDURE — 3008F BODY MASS INDEX DOCD: CPT | Mod: CPTII,,, | Performed by: STUDENT IN AN ORGANIZED HEALTH CARE EDUCATION/TRAINING PROGRAM

## 2023-10-11 PROCEDURE — 99999 PR PBB SHADOW E&M-EST. PATIENT-LVL V: ICD-10-PCS | Mod: PBBFAC,,, | Performed by: STUDENT IN AN ORGANIZED HEALTH CARE EDUCATION/TRAINING PROGRAM

## 2023-10-11 PROCEDURE — 1159F MED LIST DOCD IN RCRD: CPT | Mod: CPTII,,, | Performed by: STUDENT IN AN ORGANIZED HEALTH CARE EDUCATION/TRAINING PROGRAM

## 2023-10-11 PROCEDURE — 4010F PR ACE/ARB THEARPY RXD/TAKEN: ICD-10-PCS | Mod: CPTII,,, | Performed by: STUDENT IN AN ORGANIZED HEALTH CARE EDUCATION/TRAINING PROGRAM

## 2023-10-11 PROCEDURE — 4010F ACE/ARB THERAPY RXD/TAKEN: CPT | Mod: CPTII,,, | Performed by: STUDENT IN AN ORGANIZED HEALTH CARE EDUCATION/TRAINING PROGRAM

## 2023-10-11 PROCEDURE — 3008F PR BODY MASS INDEX (BMI) DOCUMENTED: ICD-10-PCS | Mod: CPTII,,, | Performed by: STUDENT IN AN ORGANIZED HEALTH CARE EDUCATION/TRAINING PROGRAM

## 2023-10-11 PROCEDURE — 1159F PR MEDICATION LIST DOCUMENTED IN MEDICAL RECORD: ICD-10-PCS | Mod: CPTII,,, | Performed by: STUDENT IN AN ORGANIZED HEALTH CARE EDUCATION/TRAINING PROGRAM

## 2023-10-11 PROCEDURE — 99205 PR OFFICE/OUTPT VISIT, NEW, LEVL V, 60-74 MIN: ICD-10-PCS | Mod: S$PBB,,, | Performed by: STUDENT IN AN ORGANIZED HEALTH CARE EDUCATION/TRAINING PROGRAM

## 2023-10-11 RX ORDER — AMLODIPINE BESYLATE 10 MG/1
10 TABLET ORAL DAILY
Qty: 90 TABLET | Refills: 3 | Status: SHIPPED | OUTPATIENT
Start: 2023-10-11 | End: 2023-10-11

## 2023-10-11 RX ORDER — AMLODIPINE BESYLATE 10 MG/1
10 TABLET ORAL NIGHTLY
Qty: 90 TABLET | Refills: 3 | Status: SHIPPED | OUTPATIENT
Start: 2023-10-11 | End: 2024-10-10

## 2023-10-11 NOTE — ASSESSMENT & PLAN NOTE
Tac elevated but not true trough and unlikely cause of current dysfunction of kidneys  Monitor for toxicity

## 2023-10-11 NOTE — LETTER
October 11, 2023        Maryana Kearns  [address]  [facility] [address]  Phone: [phone]  Fax: [phone]             [facility]  [address]  [facility] [address]  Phone: [phone]   Patient: Dereck Centeno   MR Number: [id number]   YOB: 1960   Date of Visit: 10/11/2023       Dear Dr. Maryana Kearns    Thank you for referring Dereck Centeno to me for evaluation. Attached you will find relevant portions of my assessment and plan of care.    If you have questions, please do not hesitate to call me. I look forward to following Dereck Centeno along with you.    Sincerely,    Carlos Fair Jr., MD    Enclosure    If you would like to receive this communication electronically, please contact [email or web address] or [phone] to request [employer] Link access.    [employer] Link is a tool which provides read-only access to select patient information with whom you have a relationship. Its easy to use and provides real time access to review your patients record including encounter summaries, notes, results, and demographic information.    If you feel you have received this communication in error or would no longer like to receive these types of communications, please e-mail [email or web address]

## 2023-10-11 NOTE — PROGRESS NOTES
Nephrology Clinic Note   10/11/2023    Chief Complaint   Patient presents with    Acute Renal Failure      History of present illness:  Patient is a 62 y.o. male.   Presents to the clinic today for medical conditions listed below.  Problem Noted   Acute Kidney Injury Superimposed On Ckd 10/11/2023    Worsening of kidney function since 2023. At that time had oral surgery for tonsil SCC. Caregiver in room says he almost  at that time. If the decline in kidney function at that time was related I would not have expected continued and more rapid recent progression.  Blood pressure not well controlled, but shouldn't be causing his current more rapid decline in kidney function  On alendronate, NSAIDS can cause FSGS, AIN, LISETH  No evidence of heart failure and no orthopnea  Has LUTS, though PVR in clinic today was 10cc     Immunodeficiency Due to Long Term Drug Therapy 2015   Htn (Hypertension) 2015     Review of Systems   Constitutional: Negative.    HENT: Negative.     Eyes: Negative.    Respiratory: Negative.     Cardiovascular: Negative.    Gastrointestinal: Negative.    Genitourinary:  Positive for dysuria, frequency and urgency.   Musculoskeletal: Negative.    Skin: Negative.    Neurological: Negative.    Endo/Heme/Allergies: Negative.    Psychiatric/Behavioral: Negative.         History:  Past Medical History:   Diagnosis Date    Acute kidney injury superimposed on CKD 10/11/2023    Alcohol withdrawal seizure     Alcoholic cirrhosis     Anxiety     Depression     GERD (gastroesophageal reflux disease)     Hepatitis C     HTN (hypertension), benign     Hyperlipidemia     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 2023    Tobacco use       Past Surgical History:   Procedure Laterality Date    COLONOSCOPY N/A 2018    Procedure: COLONOSCOPY;  Surgeon: Conor Castro MD;  Location: [facility];  Service: Endoscopy;  Laterality: N/A;    DIRECT LARYNGOSCOPY N/A 2023    Procedure:  LARYNGOSCOPY, DIRECT;  Surgeon: Alexandre Templeton MD;  Location: [facility] OR 2ND FLR;  Service: ENT;  Laterality: N/A;    DISSECTION OF NECK Left 2/20/2023    Procedure: DISSECTION, NECK;  Surgeon: Alexandre Templeton MD;  Location: [facility] OR 2ND FLR;  Service: ENT;  Laterality: Left;    ESOPHAGEAL VARICE LIGATION      ESOPHAGOGASTRODUODENOSCOPY N/A 6/14/2018    Procedure: ESOPHAGOGASTRODUODENOSCOPY (EGD);  Surgeon: Conor Castro MD;  Location: [facility] ENDO;  Service: Endoscopy;  Laterality: N/A;    EVACUATION OF HEMATOMA Left 2/20/2023    Procedure: EVACUATION, HEMATOMA;  Surgeon: Alexandre Templeton MD;  [facility];  Service: ENT;  Laterality: Left;    LARYNGOSCOPY N/A 1/31/2023    Procedure: LARYNGOSCOPY;  Surgeon: Guzman Alfonso MD;  [facility];  Service: ENT;  Laterality: N/A;  [id number]    LIVER TRANSPLANT      NECK EXPLORATION Left 2/20/2023    Procedure: EXPLORATION, NECK;  Surgeon: Alexandre Templeton MD;  [facility];  Service: ENT;  Laterality: Left;    ROBOT-ASSISTED TRANSORAL SURGERY Left 2/20/2023    Procedure: ROBOTIC TRANSORAL SURGERY (TORS);  Surgeon: Alexandre Templeton MD;  [facility];  Service: ENT;  Laterality: Left;    TIPS PROCEDURE      TRACHEOSTOMY N/A 2/24/2023    Procedure: CREATION, TRACHEOSTOMY;  Surgeon: Alexandre Templeton MD;  [facility];  Service: ENT;  Laterality: N/A;        Current Outpatient Medications:     albuterol (PROVENTIL/VENTOLIN HFA) 90 mcg/actuation inhaler, Inhale 2 puffs into the lungs every 6 (six) hours as needed for Wheezing or Shortness of Breath., Disp: , Rfl:     alendronate (FOSAMAX) 70 MG tablet, Take 70 mg by mouth every 7 days. SATURDAYS, Disp: , Rfl:     ammonium lactate 12 % Crea, ammonium lactate 12 % topical cream  APPLY 1 APPLICATION EVERY DAY BY TOPICAL ROUTE., Disp: , Rfl:     atorvastatin (LIPITOR) 40 MG tablet, Take 40 mg by mouth every evening., Disp: , Rfl:     buPROPion  (WELLBUTRIN XL) 300 MG 24 hr tablet, bupropion HCl  mg 24 hr tablet, extended release  TAKE 1 TABLET EVERY DAY BY ORAL ROUTE IN THE MORNING FOR 30 DAYS., Disp: , Rfl:     butalbital-acetaminophen-caffeine -40 mg (FIORICET, ESGIC) -40 mg per tablet, butalbital-acetaminophen-caffeine 50 mg-325 mg-40 mg tablet  TAKE 1 TABLET EVERY 4 HOURS BY ORAL ROUTE AS NEEDED., Disp: , Rfl:     calcium carbonate (OS-KRISTEL) 600 mg calcium (1,500 mg) Tab, Take 600 mg by mouth once., Disp: , Rfl:     clonazePAM (KLONOPIN) 0.5 MG tablet, Take 0.5 mg by mouth 2 (two) times daily as needed for Anxiety., Disp: , Rfl:     docusate sodium (COLACE) 100 MG capsule, Take 100 mg by mouth 3 (three) times daily as needed for Constipation., Disp: , Rfl:     HYDROcodone-acetaminophen (NORCO) 5-325 mg per tablet, Take 1 tablet by mouth every 6 (six) hours as needed for Pain., Disp: 30 tablet, Rfl: 0    hydrOXYzine pamoate (VISTARIL) 50 MG Cap, hydroxyzine pamoate 50 mg capsule  TAKE 2 CAPSULE BY MOUTH TWO TIMES DAILY AS NEEDED ANXIETY, Disp: , Rfl:     lisinopriL 10 MG tablet, lisinopril 10 mg tablet  Take 1 tablet every day by oral route., Disp: , Rfl:     magnesium oxide (MAG-OX) 400 mg tablet, Take 400 mg by mouth once daily., Disp: , Rfl:     multivitamin (THERAGRAN) tablet, Take 1 tablet by mouth once daily., Disp: , Rfl:     nicotine (NICODERM CQ) 21 mg/24 hr, Place 1 patch onto the skin once daily., Disp: 28 patch, Rfl: 0    ondansetron (ZOFRAN-ODT) 4 MG TbDL, Dissolve 1 tablet (4 mg total) by mouth every 6 (six) hours as needed., Disp: 20 tablet, Rfl: 0    QUEtiapine (SEROQUEL) 25 MG Tab, Take 50 mg by mouth every evening., Disp: , Rfl: 3    tacrolimus (PROGRAF) 1 MG Cap, Take 2 capsules (2 mg total) by mouth every morning AND 1 capsule (1 mg total) every evening., Disp: 90 capsule, Rfl: 11    tamsulosin (FLOMAX) 0.4 mg Cap, tamsulosin 0.4 mg capsule  TAKE 1 CAPSULE EVERY DAY BY ORAL ROUTE., Disp: , Rfl:      umeclidinium-vilanteroL (ANORO ELLIPTA) 62.5-25 mcg/actuation DsDv, Inhale 1 puff into the lungs Daily. Controller, Disp: 60 each, Rfl: 0    vitamin D 1000 units Tab, Take 2 tablets (2,000 Units total) by mouth once daily., Disp: 60 tablet, Rfl: 5    amLODIPine (NORVASC) 10 MG tablet, Take 1 tablet (10 mg total) by mouth nightly., Disp: 90 tablet, Rfl: 3    aspirin (ECOTRIN) 81 MG EC tablet, Take 1 tablet (81 mg total) by mouth once daily. (Patient not taking: Reported on 3/14/2023), Disp: 30 tablet, Rfl: 0    escitalopram oxalate (LEXAPRO) 20 MG tablet, Take 1 tablet (20 mg total) by mouth once daily. (Patient taking differently: Take 20 mg by mouth every evening.), Disp: 30 tablet, Rfl: 0    famotidine (PEPCID) 20 MG tablet, Take 1 tablet (20 mg total) by mouth every evening., Disp: 30 tablet, Rfl: 0    gabapentin (NEURONTIN) 400 MG capsule, Take 1 capsule (400 mg total) by mouth 3 (three) times daily. (Patient taking differently: Take 600 mg by mouth 3 (three) times daily.), Disp: 90 capsule, Rfl: 0    mirtazapine (REMERON) 15 MG tablet, Take 1 tablet (15 mg total) by mouth every evening., Disp: 30 tablet, Rfl: 0    traMADoL (ULTRAM) 50 mg tablet, tramadol 50 mg tablet  TAKE 1 TABLET EVERY 8 (EIGHT) HOURS AS NEEDED BY MOUTH FOR PAIN, Disp: , Rfl:     valACYclovir (VALTREX) 1000 MG tablet, Take 1 tablet (1,000 mg total) by mouth 3 (three) times daily. for 7 days, Disp: 21 tablet, Rfl: 0  Review of patient's allergies indicates:  No Known Allergies   Social History     Tobacco Use    Smoking status: Every Day     Current packs/day: 0.50     Average packs/day: 0.5 packs/day for 50.0 years (25.0 ttl pk-yrs)     Types: Cigarettes    Smokeless tobacco: Former     Types: Chew   Substance Use Topics    Alcohol use: Not Currently      Family History   Problem Relation Age of Onset    Cancer Mother     Alcohol abuse Mother     Alcohol abuse Father         Physical Exam :  Vitals:    10/11/23 1443   BP: (!) 159/78   Pulse:  72   Resp: 16   Temp: 97.3 °F (36.3 °C)     Physical Exam  Vitals and nursing note reviewed.   Constitutional:       General: He is not in acute distress.     Appearance: He is ill-appearing.   Eyes:      General: No scleral icterus.  Cardiovascular:      Rate and Rhythm: Normal rate.   Pulmonary:      Effort: Pulmonary effort is normal. No respiratory distress.   Musculoskeletal:      Right lower leg: No edema.      Left lower leg: No edema.   Skin:     Coloration: Skin is not jaundiced.   Neurological:      Mental Status: He is alert.         The following labs reviewed   Lab Results   Component Value Date    HGB 13.4 (L) 10/06/2023     10/06/2023    K 4.7 10/06/2023    CREATININE 2.10 (H) 10/06/2023    PHOS 3.7 03/10/2023    [id number] 36 01/27/2022    IRON 139 01/05/2015        Assessment:    1. Acute kidney injury superimposed on CKD    2. Primary hypertension    3. Immunodeficiency due to long term drug therapy    4. Benign prostatic hyperplasia with nocturia        Plan:    Acute kidney injury superimposed on CKD  CMP, mag, phos, UA, UPCR, Jasper  Retro US  Further workup pending above studies  Unfortunately patient and caregiver unable to give very accurate medical history and do not know medications except for his IS drugs. Also, they plan to move out of state in 1 month which will make coordination of care very difficult as they requested to transfer care at future residence    Immunodeficiency due to long term drug therapy  Tac elevated but not true trough and unlikely cause of current dysfunction of kidneys  Monitor for toxicity    HTN (hypertension)  Restarted and increased amlodipine which he was supposed to be on    Follow up in about 4 weeks (around 11/8/2023).     I spent a total of 64 minutes on the day of the visit.      Orders Placed This Encounter   Procedures    US Retroperitoneal Complete    COMPREHENSIVE METABOLIC PANEL    Magnesium    PHOSPHORUS    Urinalysis    Protein / creatinine  ratio, urine    Sodium, urine, random     Medications Discontinued During This Encounter   Medication Reason    cloNIDine (CATAPRES) 0.1 MG tablet     amLODIPine (NORVASC) 5 MG tablet     amLODIPine (NORVASC) 10 MG tablet       Future Appointments   Date Time Provider Department Center   10/13/2023  8:10 AM HOSPITAL LAB, [facility] LAB [facility] LAB [facility]   10/13/2023  5:45 PM [facility] OIC-US1 MASTER [facility]       Carlos Fair

## 2023-10-11 NOTE — TELEPHONE ENCOUNTER
Called pt's sister in law to reduce dose of Tacrolimus but she states pt took prior to lab. Informed her pt needs to hydrate well. She states she will take him back to labs on 10/13 and make sure he does a fasting lab.       ----- Message from Mathew Finney MD sent at 10/11/2023 10:06 AM CDT -----  Reduce tacro to 1mg BID  Results reviewed

## 2023-10-11 NOTE — ASSESSMENT & PLAN NOTE
CMP, mag, phos, UA, UPCR, Jasper  Retro US  Further workup pending above studies  Unfortunately patient and caregiver unable to give very accurate medical history and do not know medications except for his IS drugs. Also, they plan to move out of state in 1 month which will make coordination of care very difficult as they requested to transfer care at future residence

## 2023-10-13 ENCOUNTER — HOSPITAL ENCOUNTER (OUTPATIENT)
Dept: RADIOLOGY | Facility: HOSPITAL | Age: 63
Discharge: HOME | End: 2023-10-13
Attending: STUDENT IN AN ORGANIZED HEALTH CARE EDUCATION/TRAINING PROGRAM
Payer: MEDICAID

## 2023-10-13 DIAGNOSIS — N18.9 ACUTE KIDNEY INJURY SUPERIMPOSED ON CKD: ICD-10-CM

## 2023-10-13 DIAGNOSIS — I10 PRIMARY HYPERTENSION: ICD-10-CM

## 2023-10-13 DIAGNOSIS — N17.9 ACUTE KIDNEY INJURY SUPERIMPOSED ON CKD: ICD-10-CM

## 2023-10-13 PROCEDURE — 76770 US EXAM ABDO BACK WALL COMP: CPT | Mod: TC

## 2023-10-13 PROCEDURE — 76770 US EXAM ABDO BACK WALL COMP: CPT | Mod: 26,,, | Performed by: RADIOLOGY

## 2023-10-13 PROCEDURE — 76770 US RETROPERITONEAL COMPLETE: ICD-10-PCS | Mod: 26,,, | Performed by: RADIOLOGY

## 2023-10-19 ENCOUNTER — TELEPHONE (OUTPATIENT)
Dept: TRANSPLANT | Facility: CLINIC | Age: 63
End: 2023-10-19
Payer: MEDICAID

## 2023-10-19 NOTE — TELEPHONE ENCOUNTER
Labs reviewed and are stable, continue q 3 months. Letter sent.         ----- Message from Mathew Finney MD sent at 10/18/2023  9:23 AM CDT -----  Results reviewed

## 2023-10-19 NOTE — LETTER
October 19, 2023    Dereck Centeno  [address]  [facility] [address]          Dear Dereck Centeno:  MRN: [id number]    This is a follow up to your recent labs, your lab results were stable.  There are no medicine changes.  Please have your labs drawn again on 1/16/24.      If you cannot have your labs drawn on the scheduled date, it is your responsibility to call the transplant department to reschedule.  Please call [phone] and ask to speak to Dolly CERNA   for all scheduling requests.     Sincerely,    Bina Preston, RN,BSN,CCTC     Your Liver Transplant Coordinator    [facility]  [address] [place] [address]  [phone]

## 2023-10-22 DIAGNOSIS — Z94.4 STATUS POST LIVER TRANSPLANT: Chronic | ICD-10-CM

## 2023-10-22 RX ORDER — TACROLIMUS 1 MG/1
CAPSULE ORAL
Qty: 90 CAPSULE | Refills: 11 | Status: SHIPPED | OUTPATIENT
Start: 2023-10-22 | End: 2024-01-11 | Stop reason: SDUPTHER

## 2023-11-29 ENCOUNTER — TELEPHONE (OUTPATIENT)
Dept: TRANSPLANT | Facility: CLINIC | Age: 63
End: 2023-11-29
Payer: MEDICAID

## 2023-11-30 ENCOUNTER — TELEPHONE (OUTPATIENT)
Dept: TRANSPLANT | Facility: CLINIC | Age: 63
End: 2023-11-30
Payer: MEDICAID

## 2024-01-11 DIAGNOSIS — Z94.4 STATUS POST LIVER TRANSPLANT: Chronic | ICD-10-CM

## 2024-01-11 RX ORDER — TACROLIMUS 1 MG/1
CAPSULE ORAL
Qty: 90 CAPSULE | Refills: 11 | Status: SHIPPED | OUTPATIENT
Start: 2024-01-11

## 2024-01-11 NOTE — TELEPHONE ENCOUNTER
Patient's DELMER called office to request script be sent to local pharmacy. Pt and family have moved to [place], she is in the process of transferring his care to a transplant center closer. Pt has PCP appt on 1/16 and then will ask for referral for transplant hepatologist.         ----- Message from Kenya Alexander sent at 1/11/2024 10:13 AM CST -----  Regarding: Speak to Nurse Curran  Contact: PT  [phone]  The patient called requesting to speak to Nurse Curran. PT states he moved to [facility]  No further information provided      Patient can be contacted @# [phone]

## 2024-01-11 NOTE — TELEPHONE ENCOUNTER
Left voicemail for pt to return call.     ----- Message from Ibis Pollard sent at 1/11/2024  2:30 PM CST -----  Regarding: Patient advice  Contact: Pt  [phone]                Name of Caller: Dereck Chaudhry Preference:   [phone]     Nature of Call:   Requesting a call back to discuss recent move

## 2024-01-15 PROBLEM — N18.9 ACUTE KIDNEY INJURY SUPERIMPOSED ON CKD: Status: RESOLVED | Noted: 2023-10-11 | Resolved: 2024-01-15

## 2024-01-15 PROBLEM — N17.9 ACUTE KIDNEY INJURY SUPERIMPOSED ON CKD: Status: RESOLVED | Noted: 2023-10-11 | Resolved: 2024-01-15

## 2024-02-23 ENCOUNTER — TELEPHONE (OUTPATIENT)
Dept: TRANSPLANT | Facility: CLINIC | Age: 64
End: 2024-02-23
Payer: MEDICAID

## 2024-02-27 ENCOUNTER — TELEPHONE (OUTPATIENT)
Dept: TRANSPLANT | Facility: CLINIC | Age: 64
End: 2024-02-27
Payer: MEDICAID

## 2024-02-29 ENCOUNTER — TELEPHONE (OUTPATIENT)
Dept: TRANSPLANT | Facility: CLINIC | Age: 64
End: 2024-02-29
Payer: MEDICAID

## 2024-02-29 NOTE — TELEPHONE ENCOUNTER
----- Message from Charisse Campbell MA sent at 2/28/2024  5:33 PM CST -----  Regarding: FW: Consult/Advisory  Contact: Omar Centeno, Patient's Brother    ----- Message -----  From: Wily Jimenez  Sent: 2/28/2024   4:54 PM CST  To: [facility]-Liver Transplant Non-Clinical  Subject: Consult/Advisory                                 Consult/Advisory    Name Of Caller:  Omar Centeno, Patient's Brother      Contact Preference: [phone] (brother's number    Nature of call: Patient's brother calling to speak to Dolly to send standing orders to [facility] [place] [address]. Also please send recommendations for a Liver doctor in the area as they have moved.

## 2024-03-07 ENCOUNTER — TELEPHONE (OUTPATIENT)
Dept: TRANSPLANT | Facility: CLINIC | Age: 64
End: 2024-03-07
Payer: MEDICAID

## 2024-03-07 NOTE — LETTER
March 7, 2024    Dereck Centeno  [address] [address]          Dear Dereck Centeno:  MRN: [id number]    Your lab work was due to be drawn on 2/19/24.  We contacted your lab and were unable to get test results.  It is very important to get your labs drawn as scheduled.  We cannot monitor you for rejection, infections, or drug toxicity side effects without lab results.  Please call us at [phone] as soon as possible to let us know when you plan to have labs drawn.    Sincerely,    Bina Preston, RN,BSN,CCTC    Your Liver Transplant Coordinator    [facility]  [address] [place] [address]  [phone]

## 2024-03-07 NOTE — TELEPHONE ENCOUNTER
Contacted pts labs, pt has not gone after multiple requests. Will send missed lab letter.    Poorly Controlled/not at goal with recent hyperglycemia due to steroid use/illness  For now, increase V-Go to 5 clicks (10 units) before each meal   Check BG 3-4x per day and send log in two weeks  Now that she is off steroids, blood sugars should begin to improve  IF blood sugars less than 566 can resume 4 clicks before meals   Avoid Giving bolus dose/clicks at bedtime which may result in severe overnight hypoglycemia  Due to being on intensive insulin therapy,  she at high risk of severe hypoglycemia  Severe hypoglycemia can result in falls, injury, coma, seizure, or death

## 2024-03-19 LAB
EXT ALBUMIN: 3.6
EXT ALKALINE PHOSPHATASE: 71
EXT ALT: 26
EXT AST: 19
EXT BILIRUBIN TOTAL: 0.5
EXT BUN: 12
EXT CALCIUM: 8.9
EXT CHLORIDE: 104
EXT CO2: 28
EXT CREATININE: 1.27 MG/DL
EXT EOSINOPHIL%: 2.9
EXT GLUCOSE: 84
EXT HEMATOCRIT: 37.9
EXT HEMOGLOBIN: 12.4
EXT LYMPH%: 23.9
EXT MONOCYTES%: 8.3
EXT PLATELETS: 165
EXT POTASSIUM: 5.1
EXT PROTEIN TOTAL: 6.8
EXT SEGS%: 64.1
EXT SODIUM: 140 MMOL/L
EXT TACROLIMUS LVL: 4
EXT WBC: 5.2

## 2024-03-26 ENCOUNTER — TELEPHONE (OUTPATIENT)
Dept: TRANSPLANT | Facility: CLINIC | Age: 64
End: 2024-03-26
Payer: MEDICAID

## 2024-03-26 NOTE — TELEPHONE ENCOUNTER
Labs reviewed and are stable, continue labs q 6 months. Will continue to urge pt to find a local transplant center to follow up with. Letter sent.       ----- Message from Mathew Finney MD sent at 3/26/2024  2:39 PM CDT -----  Results reviewed

## 2024-03-26 NOTE — LETTER
March 26, 2024    Dereck Centeno  [address]  [place] [address]          Dear Dereck Centeno:  MRN: [id number]    This is a follow up to your recent labs, your lab results were stable.  There are no medicine changes.  Please have your labs drawn again on 9/23/24.    Please continue to try and get established with a local liver transplant doctor to manage your care.     If you cannot have your labs drawn on the scheduled date, it is your responsibility to call the transplant department to reschedule.  Please call [phone] and ask to speak to Dolly CERNA -  for all scheduling requests.     Sincerely,    Bina Preston, RN,BSN,CCTC    Your Liver Transplant Coordinator    [facility]  [facility] [place], [place] [id number] [phone]

## 2024-04-03 PROBLEM — Z12.11 SCREENING FOR COLONIC NEOPLASIA: Status: ACTIVE | Noted: 2024-04-03

## 2024-04-03 PROBLEM — R19.5 HEME + STOOL: Status: ACTIVE | Noted: 2024-04-03

## 2024-04-15 ENCOUNTER — PATIENT MESSAGE (OUTPATIENT)
Dept: GASTROENTEROLOGY | Facility: CLINIC | Age: 64
End: 2024-04-15
Payer: MEDICAID

## 2024-05-07 ENCOUNTER — AMBULATORY SURGICAL CENTER (OUTPATIENT)
Dept: URBAN - NONMETROPOLITAN AREA SURGERY 4 | Facility: SURGERY | Age: 64
End: 2024-05-07
Payer: MEDICARE

## 2024-05-07 ENCOUNTER — OFFICE (OUTPATIENT)
Dept: URBAN - METROPOLITAN AREA PATHOLOGY 16 | Facility: PATHOLOGY | Age: 64
End: 2024-05-07
Payer: MEDICARE

## 2024-05-07 VITALS
OXYGEN SATURATION: 100 % | DIASTOLIC BLOOD PRESSURE: 80 MMHG | SYSTOLIC BLOOD PRESSURE: 109 MMHG | SYSTOLIC BLOOD PRESSURE: 107 MMHG | SYSTOLIC BLOOD PRESSURE: 132 MMHG | WEIGHT: 140 LBS | DIASTOLIC BLOOD PRESSURE: 63 MMHG | OXYGEN SATURATION: 98 % | OXYGEN SATURATION: 96 % | RESPIRATION RATE: 12 BRPM | HEART RATE: 72 BPM | TEMPERATURE: 98.1 F | RESPIRATION RATE: 9 BRPM | RESPIRATION RATE: 16 BRPM | RESPIRATION RATE: 15 BRPM | OXYGEN SATURATION: 98 % | DIASTOLIC BLOOD PRESSURE: 80 MMHG | HEART RATE: 74 BPM | SYSTOLIC BLOOD PRESSURE: 109 MMHG | DIASTOLIC BLOOD PRESSURE: 69 MMHG | RESPIRATION RATE: 15 BRPM | SYSTOLIC BLOOD PRESSURE: 96 MMHG | SYSTOLIC BLOOD PRESSURE: 111 MMHG | DIASTOLIC BLOOD PRESSURE: 65 MMHG | SYSTOLIC BLOOD PRESSURE: 132 MMHG | SYSTOLIC BLOOD PRESSURE: 96 MMHG | HEART RATE: 74 BPM | HEART RATE: 67 BPM | OXYGEN SATURATION: 94 % | SYSTOLIC BLOOD PRESSURE: 111 MMHG | RESPIRATION RATE: 12 BRPM | HEART RATE: 72 BPM | OXYGEN SATURATION: 100 % | DIASTOLIC BLOOD PRESSURE: 61 MMHG | TEMPERATURE: 98.1 F | HEART RATE: 66 BPM | RESPIRATION RATE: 12 BRPM | HEART RATE: 70 BPM | RESPIRATION RATE: 16 BRPM | HEART RATE: 70 BPM | DIASTOLIC BLOOD PRESSURE: 61 MMHG | SYSTOLIC BLOOD PRESSURE: 96 MMHG | OXYGEN SATURATION: 97 % | OXYGEN SATURATION: 99 % | DIASTOLIC BLOOD PRESSURE: 71 MMHG | DIASTOLIC BLOOD PRESSURE: 69 MMHG | OXYGEN SATURATION: 97 % | HEIGHT: 66 IN | HEART RATE: 74 BPM | TEMPERATURE: 98.1 F | DIASTOLIC BLOOD PRESSURE: 75 MMHG | RESPIRATION RATE: 9 BRPM | OXYGEN SATURATION: 98 % | SYSTOLIC BLOOD PRESSURE: 107 MMHG | WEIGHT: 140 LBS | OXYGEN SATURATION: 100 % | DIASTOLIC BLOOD PRESSURE: 65 MMHG | SYSTOLIC BLOOD PRESSURE: 130 MMHG | HEART RATE: 72 BPM | RESPIRATION RATE: 18 BRPM | SYSTOLIC BLOOD PRESSURE: 119 MMHG | SYSTOLIC BLOOD PRESSURE: 130 MMHG | HEART RATE: 71 BPM | RESPIRATION RATE: 15 BRPM | OXYGEN SATURATION: 96 % | SYSTOLIC BLOOD PRESSURE: 130 MMHG | RESPIRATION RATE: 9 BRPM | HEART RATE: 67 BPM | HEART RATE: 71 BPM | OXYGEN SATURATION: 99 % | HEART RATE: 66 BPM | DIASTOLIC BLOOD PRESSURE: 75 MMHG | DIASTOLIC BLOOD PRESSURE: 71 MMHG | OXYGEN SATURATION: 94 % | SYSTOLIC BLOOD PRESSURE: 111 MMHG | SYSTOLIC BLOOD PRESSURE: 107 MMHG | OXYGEN SATURATION: 97 % | DIASTOLIC BLOOD PRESSURE: 71 MMHG | DIASTOLIC BLOOD PRESSURE: 63 MMHG | HEIGHT: 66 IN | HEART RATE: 71 BPM | OXYGEN SATURATION: 99 % | DIASTOLIC BLOOD PRESSURE: 63 MMHG | HEART RATE: 66 BPM | SYSTOLIC BLOOD PRESSURE: 119 MMHG | DIASTOLIC BLOOD PRESSURE: 80 MMHG | HEART RATE: 67 BPM | DIASTOLIC BLOOD PRESSURE: 69 MMHG | RESPIRATION RATE: 16 BRPM | HEART RATE: 70 BPM | SYSTOLIC BLOOD PRESSURE: 119 MMHG | WEIGHT: 140 LBS | SYSTOLIC BLOOD PRESSURE: 132 MMHG | HEIGHT: 66 IN | DIASTOLIC BLOOD PRESSURE: 65 MMHG | RESPIRATION RATE: 18 BRPM | OXYGEN SATURATION: 96 % | DIASTOLIC BLOOD PRESSURE: 75 MMHG | RESPIRATION RATE: 18 BRPM | SYSTOLIC BLOOD PRESSURE: 109 MMHG | DIASTOLIC BLOOD PRESSURE: 61 MMHG | OXYGEN SATURATION: 94 %

## 2024-05-07 DIAGNOSIS — R19.5 OTHER FECAL ABNORMALITIES: ICD-10-CM

## 2024-05-07 DIAGNOSIS — Z12.11 ENCOUNTER FOR SCREENING FOR MALIGNANT NEOPLASM OF COLON: ICD-10-CM

## 2024-05-07 DIAGNOSIS — K63.5 POLYP OF COLON: ICD-10-CM

## 2024-05-07 DIAGNOSIS — K57.30 DIVERTICULOSIS OF LARGE INTESTINE WITHOUT PERFORATION OR ABS: ICD-10-CM

## 2024-05-07 DIAGNOSIS — D12.3 BENIGN NEOPLASM OF TRANSVERSE COLON: ICD-10-CM

## 2024-05-07 PROBLEM — R85.89 POSITIVE COMBINED FIT-DNA TEST (E.G. COLOGUARD): Status: ACTIVE | Noted: 2024-05-07

## 2024-05-07 PROCEDURE — 45385 COLONOSCOPY W/LESION REMOVAL: CPT | Mod: PT | Performed by: INTERNAL MEDICINE

## 2024-05-10 LAB
GASTRO ONE PATHOLOGY: PDF REPORT: (no result)

## 2024-06-22 NOTE — PROGRESS NOTES
[facility]  Critical Care - Surgery  Progress Note    Patient Name: Dereck Centeno  MRN: [id number]  Admission Date: 2/20/2023  Hospital Length of Stay: 10 days  Code Status: Full Code  Attending Provider: Alexandre Templeton MD  Primary Care Provider: Eva Reynaga MD   Principal Problem: Squamous cell carcinoma of left tonsil    Subjective:     Hospital/ICU Course:  Admitted to ICU after tonsillectomy and neck dissection. Febrile on 3/2 without signs of infection. Patient failed trach collar on 3/2.     Interval History/Significant Events: NAEON. VSS. Patient failed trach collar trial yesterday, returned to SBT vent settings, PS 8, PEEP 5. Transitioned from propofol to precedex gtt, 0.3 mcg/kg/min. Appears comfortable this AM. Responding to questions with head nods appropriately. Plans to trial trach collar again today.    Follow-up For: Procedure(s) (LRB):  CREATION, TRACHEOSTOMY (N/A)    Post-Operative Day: 6 Days Post-Op    Objective:     Vital Signs (Most Recent):  Temp: 98.4 °F (36.9 °C) (03/02/23 0315)  Pulse: 70 (03/02/23 0600)  Resp: 18 (03/02/23 0600)  BP: (!) 101/57 (03/02/23 0600)  SpO2: 96 % (03/02/23 0600)   Vital Signs (24h Range):  Temp:  [98.3 °F (36.8 °C)-99.8 °F (37.7 °C)] 98.4 °F (36.9 °C)  Pulse:  [] 70  Resp:  [12-32] 18  SpO2:  [94 %-100 %] 96 %  BP: ()/(55-80) 101/57     Weight: 75.9 kg (167 lb 5.3 oz)  Body mass index is 24.01 kg/m².      Intake/Output Summary (Last 24 hours) at 3/2/2023 0701  Last data filed at 3/2/2023 0600  Gross per 24 hour   Intake 1704.64 ml   Output 1552 ml   Net 152.64 ml       Physical Exam  Vitals and nursing note reviewed.   Constitutional:       Appearance: Normal appearance.      Interventions: He is sedated, intubated and restrained.   HENT:      Head: Normocephalic and atraumatic.      Nose:      Comments: NG tube sutured in place   Eyes:      Conjunctiva/sclera: Conjunctivae normal.      Pupils: Pupils are equal,  round, and reactive to light.   Neck:      Comments: ENMANUEL drain with SS on left side of neck   Surgical incision clean and dry   Cardiovascular:      Rate and Rhythm: Normal rate and regular rhythm.      Pulses: Normal pulses.      Heart sounds: Normal heart sounds.   Pulmonary:      Effort: Pulmonary effort is normal. No respiratory distress. He is intubated.      Comments: ETT in place   Abdominal:      General: Abdomen is flat. Bowel sounds are normal.      Palpations: Abdomen is soft.   Skin:     General: Skin is warm.           Vents:  Vent Mode: Spont (03/02/23 0311)  Ventilator Initiated: Yes (02/20/23 1443)  Set Rate: 20 BPM (03/01/23 0735)  Vt Set: 450 mL (03/01/23 0735)  Pressure Support: 8 cmH20 (03/02/23 0311)  PEEP/CPAP: 5 cmH20 (03/02/23 0311)  Oxygen Concentration (%): 35 (03/02/23 0600)  Peak Airway Pressure: 14 cmH20 (03/02/23 0311)  Plateau Pressure: 12 cmH20 (03/02/23 0311)  Total Ve: 9.8 L/m (03/02/23 0311)  Negative Inspiratory Force (cm H2O): -25 (03/02/23 0311)  F/VT Ratio<105 (RSBI): (!) 43.18 (03/02/23 0311)    Lines/Drains/Airways       Drain  Duration                  Closed/Suction Drain 02/20/23 Left Neck Bulb 19 Fr. 10 days         Trans Pyloric Feeding Tube 02/20/23 1320 nasogastric;Other (comments) 12 Fr. Right nostril 9 days         Urethral Catheter 02/20/23 0945 Non-latex 16 Fr. 9 days              Airway  Duration             Adult Surgical Airway 02/28/23 Shiley Cuffed 6.0/ 75mm 2 days              Peripheral Intravenous Line  Duration                  Midline Catheter Insertion/Assessment  - Single Lumen 02/24/23 1045 Right brachial vein 18g x 10cm 5 days         Peripheral IV - Single Lumen 03/01/23 0610 20 G Anterior;Right Forearm 1 day                    Significant Labs:    CBC/Anemia Profile:  Recent Labs   Lab 03/01/23  0301 03/01/23  0322 03/02/23  0337   WBC 6.10  --  5.00   HGB 7.9*  --  7.7*   HCT 25.2* 26* 24.2*     --  165   MCV 94  --  97   RDW 13.6  --  13.2         Chemistries:  Recent Labs   Lab 03/01/23  0301 03/02/23  0337    142   K 4.0 3.8    106   CO2 27 30*   BUN 22 33*   CREATININE 0.9 0.9   CALCIUM 8.6* 8.8   ALBUMIN 2.6* 2.6*   PROT 5.8* 5.7*   BILITOT 0.6 0.5   ALKPHOS 45* 49*   ALT 20 20   AST 25 20   MG 1.6 1.7   PHOS 2.5* 3.0       None    Significant Imaging:  I have reviewed all pertinent imaging results/findings within the past 24 hours.  I have reviewed and interpreted all pertinent imaging results/findings within the past 24 hours.  Assessment/Plan:     Oncology  * Squamous cell carcinoma of left tonsil  Dereck Centeno is a 62 y.o. male with PMHx of HTN, emphysema, HCV s/p liver transplant in 2015, tobacco abuse (50+ pack years), oral cancer s/p surgery at [facility] in 2019 (no chemo/radiation), and newly diagnosed SCC of the left tonsil. He is now s/p left tonsillectomy and neck dissection by Dr. Templeton on 2/20. Post operative course was c/b development of large hematoma at surgical site. He was taken back to the OR for an emergent neck exploration and hematoma evacuation on 2/20. Code blue on 2/27am, ROSC achieved within 5 min.      Neuro/Psych:   -- Sedation: Precedex gtt 0.3 mcg/kg/hr  -- Pain: home gabapentin, tylenol, PRN fentanyl   -- Psych: home clonazepam PRN, scheduled mirtazapine, seroquel, lexapro     Cards  -- HDS; not currently on pressors   -- Continue home amlodipine and clonidine   -- PRN labetalol and hydralazine   -- Continue home statin       Pulm:   -- H/o emphysema   -- Trach placed 2/24.   -- Ipratropium nebs scheduled,albuterol nebs PRN for wheezing   -- Goal O2 sat > 88%  -- Wean as able  -- Was weaned to trach collar 2/26; reintubated 2/27. ETT replaced 2/28.   -- SBT failed 2/28 and 3/1, patient became agitated and calmed down after being placed back on SBT setting.   -- Will reattempt trach collar today, with increased precedex gtt rate beforehand to help w/ agitation.       Renal:  -- Keep auguste for strict I/O  --  BUN/Cr stable.  -- UOP 1.5 L/24H      FEN / GI:   -- Net -150cc's/24H  -- Replace lytes as needed  -- Nutrition: NPO + TF. Dobhoff for feeds.   -- No mIVF     ID:   -- Tm: Febrile on 3/1 to 101.3; WBC 6.1. Afebrile since then.   -- Abx: none       Heme/Onc:   -- H/H 7.9/25.2  -s/p 1upRBCs 2/27  -- H/o thrombocytopenia    -- Platelets stable   -- Continue to monitor   -- Daily CBC  -- Home ASA held       Endo:   -- Gluc goal 140-180  -- SSI      Immuno:  -- H/o HCV infection s/p liver transplant in 2015  -- Hepatology consulted  -- Continue home tacrolimus  -- Monitor tacrolimus levels and INR daily   -- Valacyclovir daily per ENT      PPx:   Feeding: NPO + TF  Analgesia/Sedation: PRN fentanyl, home gabapentin, tylenol/precedex gtt  Thromboembolic prevention: Lovenox   HOB >30: yes   Stress Ulcer ppx: famotidine BID  Glucose control: Critical care goal 140-180 g/dl, ISS    Lines/Drains/Airway: ETT, auguste, PIVx2, NG tube, neck drain x1      Dispo/Code Status/Palliative:   -- SICU / Full Code            Critical secondary to Patient has a condition that poses threat to life and bodily function: SCC status post surgery requiring prolonged ventilation     Critical care was time spent personally by me on the following activities: development of treatment plan with patient or surrogate and bedside caregivers, discussions with consultants, evaluation of patient's response to treatment, examination of patient, ordering and performing treatments and interventions, ordering and review of laboratory studies, ordering and review of radiographic studies, pulse oximetry, re-evaluation of patient's condition.  This critical care time did not overlap with that of any other provider or involve time for any procedures.     Uriel Chowdary MD  Critical Care - Surgery  [facility]     No

## 2024-10-03 ENCOUNTER — TELEPHONE (OUTPATIENT)
Dept: TRANSPLANT | Facility: CLINIC | Age: 64
End: 2024-10-03
Payer: MEDICAID

## 2024-10-03 NOTE — TELEPHONE ENCOUNTER
Called and spoke to pt's sister in law who states he has officially transferred care to the [facility] transplant center under the care of Dr. Wayne Santo. Will change pt's status.

## 2025-01-23 NOTE — TELEPHONE ENCOUNTER
Pt missed two lab appointments, will send missed lab letter.

## (undated) DEVICE — DRAPE COLUMN DAVINCI XI

## (undated) DEVICE — TRAY ENT 4/CS

## (undated) DEVICE — TRACH TUBE CUFF FLEX DISP 7.5

## (undated) DEVICE — HOOK LONE STAR BLUNT 12MM

## (undated) DEVICE — BLADE SURG #15 CARBON STEEL

## (undated) DEVICE — ADHESIVE DERMABOND ADVANCED

## (undated) DEVICE — SUT 3-0 12-18IN SILK

## (undated) DEVICE — SEAL UNIVERSAL 5MM-8MM XI

## (undated) DEVICE — TRAY MINOR GEN SURG OMC

## (undated) DEVICE — SUT LIGACLIP SMALL XTRA

## (undated) DEVICE — TUBE KANGAROO FEED 12FR 109CM

## (undated) DEVICE — CONTAINER SPECIMEN STRL 4OZ

## (undated) DEVICE — SKINMARKER & RULER REGULAR X-F

## (undated) DEVICE — KIT ANTIFOG W/SPONG & FLUID

## (undated) DEVICE — SUT SILK 2-0 PS 18IN BLACK

## (undated) DEVICE — CLIP MED TICALL

## (undated) DEVICE — TRAY SKIN SCRUB WET PREMIUM

## (undated) DEVICE — DRAPE STERI INSTRUMENT 1018

## (undated) DEVICE — SEE MEDLINE ITEM 157194

## (undated) DEVICE — NDL HYPO REG 25G X 1 1/2

## (undated) DEVICE — GOWN SURGICAL X-LARGE

## (undated) DEVICE — DRESSING SURGICAL 1/2X1/2

## (undated) DEVICE — DRAPE CORETEMP FLD WRM 56X62IN

## (undated) DEVICE — DRAPE SCOPE PILLOW WARMER

## (undated) DEVICE — DRAPE HALF SURGICAL 40X58IN

## (undated) DEVICE — PROBE CATH TEMP 16 FRFOLEY 400

## (undated) DEVICE — DRAPE INCISE IOBAN 2 23X17IN

## (undated) DEVICE — ELECTRODE REM POLYHESIVE II

## (undated) DEVICE — TOWEL OR DISP STRL BLUE 4/PK

## (undated) DEVICE — PACK UNIVERSAL SPLIT II

## (undated) DEVICE — SPONGE PATTY SURGICAL .5X3IN

## (undated) DEVICE — SUT COATED VICRYL 4/0 27IN

## (undated) DEVICE — GAUZE SPONGE PEANUT STRL

## (undated) DEVICE — SUT VICRYL PLUS 3-0 SH 18IN

## (undated) DEVICE — SUT 2-0 12-18IN SILK

## (undated) DEVICE — SUCTION COAGULATOR 10FR 6IN

## (undated) DEVICE — SUT VICRYL 3-0 27 SH

## (undated) DEVICE — FIBRILLAR ABS HEMOSTAT 4X4

## (undated) DEVICE — SUT 4-0 VICRYL / SH

## (undated) DEVICE — COVER LIGHT HANDLE 80/CA

## (undated) DEVICE — ELECTRODE REM PLYHSV RETURN 9

## (undated) DEVICE — DRAPE ARM DAVINCI XI

## (undated) DEVICE — TRAY CATH FOL SIL URIMTR 16FR

## (undated) DEVICE — SPONGE LAP 18X18 PREWASHED

## (undated) DEVICE — ELECTRODE BLADE INSULATED 1 IN

## (undated) DEVICE — CORD BIPOLAR 12 FOOT

## (undated) DEVICE — DRAPE EENT SPLIT STERILE

## (undated) DEVICE — STAPLER SKIN PROXIMATE WIDE

## (undated) DEVICE — PENCIL ROCKER SWITCH 10FT CORD

## (undated) DEVICE — SUT SILK 2-0 SH 18IN BLACK

## (undated) DEVICE — CLIP LIGACLIP XTRA TITANIUM

## (undated) DEVICE — SUT MCRYL PLUS 4-0 PS2 27IN